# Patient Record
Sex: MALE | Race: BLACK OR AFRICAN AMERICAN | HISPANIC OR LATINO | Employment: OTHER | URBAN - METROPOLITAN AREA
[De-identification: names, ages, dates, MRNs, and addresses within clinical notes are randomized per-mention and may not be internally consistent; named-entity substitution may affect disease eponyms.]

---

## 2018-05-24 LAB
ABSOL LYMPHOCYTES (HISTORICAL): 2.6 K/UL (ref 0.5–4)
ALBUMIN SERPL BCP-MCNC: 4.2 G/DL (ref 3–5.2)
ALP SERPL-CCNC: 92 U/L (ref 43–122)
ALT SERPL W P-5'-P-CCNC: 32 U/L (ref 9–52)
ANION GAP SERPL CALCULATED.3IONS-SCNC: 10 MMOL/L (ref 5–14)
AST SERPL W P-5'-P-CCNC: 26 U/L (ref 17–59)
BASOPHILS # BLD AUTO: 0.1 K/UL (ref 0–0.1)
BASOPHILS # BLD AUTO: 1 % (ref 0–1)
BILIRUB SERPL-MCNC: 0.4 MG/DL
BUN SERPL-MCNC: 13 MG/DL (ref 5–25)
CALCIUM SERPL-MCNC: 10 MG/DL (ref 8.4–10.2)
CHLORIDE SERPL-SCNC: 105 MEQ/L (ref 97–108)
CHOLEST SERPL-MCNC: 113 MG/DL
CHOLEST/HDLC SERPL: 2.8 {RATIO}
CO2 SERPL-SCNC: 24 MMOL/L (ref 22–30)
CREATINE, SERUM (HISTORICAL): 1.05 MG/DL (ref 0.7–1.5)
CREATININE, RANDOM URINE (HISTORICAL): 174.2 MG/DL (ref 50–200)
DEPRECATED RDW RBC AUTO: 14.2 %
EGFR (HISTORICAL): >60 ML/MIN/1.73 M2
EOSINOPHIL # BLD AUTO: 0.2 K/UL (ref 0–0.4)
EOSINOPHIL NFR BLD AUTO: 2 % (ref 0–6)
EST. AVERAGE GLUCOSE BLD GHB EST-MCNC: 111 MG/DL
GLUCOSE SERPL-MCNC: 98 MG/DL (ref 70–99)
HBA1C MFR BLD HPLC: 5.5 %
HCT VFR BLD AUTO: 44.4 % (ref 41–53)
HDLC SERPL-MCNC: 41 MG/DL
HEPATITIS A IGM ANTIBODY (HISTORICAL): ABNORMAL
HEPATITIS B CORE IGM ANTIBODY (HISTORICAL): ABNORMAL
HEPATITIS B SURFACE AG CONFIRMATION (HISTORICAL): ABNORMAL
HEPATITIS C ANTIBODY (HISTORICAL): REACTIVE
HGB BLD-MCNC: 14.9 G/DL (ref 13.5–17.5)
LDL/HDL RATIO (HISTORICAL): 1.1
LDLC SERPL CALC-MCNC: 45 MG/DL
LYMPHOCYTES NFR BLD AUTO: 32 % (ref 25–45)
MCH RBC QN AUTO: 30.9 PG (ref 26–34)
MCHC RBC AUTO-ENTMCNC: 33.5 % (ref 31–36)
MCV RBC AUTO: 92 FL (ref 80–100)
MICROALBUM.,U,RANDOM (HISTORICAL): 1.1 MG/DL
MICROALBUMIN/CREATININE RATIO (HISTORICAL): 6.3
MONOCYTES # BLD AUTO: 0.6 K/UL (ref 0.2–0.9)
MONOCYTES NFR BLD AUTO: 7 % (ref 1–10)
NEUTROPHILS ABS COUNT (HISTORICAL): 4.7 K/UL (ref 1.8–7.8)
NEUTS SEG NFR BLD AUTO: 58 % (ref 45–65)
PLATELET # BLD AUTO: 220 K/MCL (ref 150–450)
POTASSIUM SERPL-SCNC: 5.1 MEQ/L (ref 3.6–5)
RBC # BLD AUTO: 4.81 M/MCL (ref 4.5–5.9)
SODIUM SERPL-SCNC: 139 MEQ/L (ref 137–147)
TOTAL PROTEIN (HISTORICAL): 7.7 G/DL (ref 5.9–8.4)
TRIGL SERPL-MCNC: 134 MG/DL
TSH SERPL DL<=0.05 MIU/L-ACNC: 1.45 UIU/ML (ref 0.47–4.68)
VLDLC SERPL CALC-MCNC: 27 MG/DL (ref 0–40)
WBC # BLD AUTO: 8.1 K/MCL (ref 4.5–11)

## 2018-09-10 DIAGNOSIS — E11.9 TYPE 2 DIABETES MELLITUS WITHOUT COMPLICATION, WITHOUT LONG-TERM CURRENT USE OF INSULIN (HCC): Primary | ICD-10-CM

## 2018-10-09 ENCOUNTER — TELEPHONE (OUTPATIENT)
Dept: FAMILY MEDICINE CLINIC | Facility: CLINIC | Age: 58
End: 2018-10-09

## 2018-10-09 NOTE — TELEPHONE ENCOUNTER
Patient states that he was placed on a medication but it was never sent to the Pharmacy  Patient states that he does not know the name but knows its 5mg   Patient wants medication to be sent to [facility] on [address]

## 2018-10-11 DIAGNOSIS — I10 ESSENTIAL HYPERTENSION, BENIGN: Primary | ICD-10-CM

## 2018-10-11 PROBLEM — E78.5 HYPERLIPIDEMIA: Status: ACTIVE | Noted: 2017-08-14

## 2018-10-11 PROBLEM — I85.10 SECONDARY ESOPHAGEAL VARICES WITHOUT BLEEDING (HCC): Status: ACTIVE | Noted: 2018-01-11

## 2018-10-11 PROBLEM — N43.3 HYDROCELE: Status: ACTIVE | Noted: 2017-06-28

## 2018-10-11 PROBLEM — K82.4 GALLBLADDER POLYP: Status: ACTIVE | Noted: 2018-01-11

## 2018-10-11 RX ORDER — AMILORIDE HYDROCHLORIDE 5 MG/1
5 TABLET ORAL EVERY 24 HOURS
Qty: 30 TABLET | Refills: 0 | Status: SHIPPED | OUTPATIENT
Start: 2018-10-11 | End: 2018-10-23 | Stop reason: SDUPTHER

## 2018-10-11 RX ORDER — AMILORIDE HYDROCHLORIDE 5 MG/1
TABLET ORAL EVERY 24 HOURS
COMMUNITY
Start: 2018-03-15 | End: 2018-10-11 | Stop reason: SDUPTHER

## 2018-10-23 ENCOUNTER — OFFICE VISIT (OUTPATIENT)
Dept: FAMILY MEDICINE CLINIC | Facility: CLINIC | Age: 58
End: 2018-10-23
Payer: COMMERCIAL

## 2018-10-23 VITALS
HEART RATE: 94 BPM | SYSTOLIC BLOOD PRESSURE: 132 MMHG | TEMPERATURE: 98.5 F | OXYGEN SATURATION: 97 % | WEIGHT: 220 LBS | HEIGHT: 68 IN | RESPIRATION RATE: 18 BRPM | BODY MASS INDEX: 33.34 KG/M2 | DIASTOLIC BLOOD PRESSURE: 80 MMHG

## 2018-10-23 DIAGNOSIS — B07.0 PLANTAR WART, LEFT FOOT: ICD-10-CM

## 2018-10-23 DIAGNOSIS — E11.9 TYPE 2 DIABETES MELLITUS WITHOUT COMPLICATION, WITHOUT LONG-TERM CURRENT USE OF INSULIN (HCC): Primary | ICD-10-CM

## 2018-10-23 DIAGNOSIS — I10 ESSENTIAL HYPERTENSION, BENIGN: ICD-10-CM

## 2018-10-23 PROCEDURE — 3079F DIAST BP 80-89 MM HG: CPT | Performed by: NURSE PRACTITIONER

## 2018-10-23 PROCEDURE — 3075F SYST BP GE 130 - 139MM HG: CPT | Performed by: NURSE PRACTITIONER

## 2018-10-23 PROCEDURE — 99214 OFFICE O/P EST MOD 30 MIN: CPT | Performed by: NURSE PRACTITIONER

## 2018-10-23 RX ORDER — SYRINGE AND NEEDLE,INSULIN,1ML 31 GX5/16"
SYRINGE, EMPTY DISPOSABLE MISCELLANEOUS 3 TIMES DAILY
Qty: 1 KIT | Refills: 0 | Status: SHIPPED | OUTPATIENT
Start: 2018-10-23 | End: 2018-11-20

## 2018-10-23 RX ORDER — METHADONE HYDROCHLORIDE 10 MG/1
10 TABLET ORAL EVERY 6 HOURS PRN
COMMUNITY
End: 2019-02-21 | Stop reason: SDUPTHER

## 2018-10-23 RX ORDER — PROPRANOLOL HYDROCHLORIDE 20 MG/1
20 TABLET ORAL EVERY 12 HOURS SCHEDULED
COMMUNITY
End: 2018-12-19 | Stop reason: SDUPTHER

## 2018-10-23 RX ORDER — AMILORIDE HYDROCHLORIDE 5 MG/1
5 TABLET ORAL DAILY
Qty: 30 TABLET | Refills: 3 | Status: SHIPPED | OUTPATIENT
Start: 2018-10-23 | End: 2019-05-07 | Stop reason: SDUPTHER

## 2018-10-23 RX ORDER — AMILORIDE HYDROCHLORIDE 5 MG/1
5 TABLET ORAL DAILY
Qty: 30 TABLET | Refills: 3 | Status: SHIPPED | OUTPATIENT
Start: 2018-10-23 | End: 2018-10-23 | Stop reason: SDUPTHER

## 2018-10-23 NOTE — PROGRESS NOTES
Assessment/Plan:    Diabetes mellitus type 2, uncomplicated ([facility])  Lab Results   Component Value Date    HGBA1C 5 5 05/24/2018       No results for input(s): POCGLU in the last 72 hours  Blood Sugar Average: Last 72 hrs:  Pt to continue on current medication  Latest A1c was check at previous visit  States he has enough Metformin 1000mg left  No changes made today  Referred to podiatry for diabetic foot exam as he has a non-healed plantar wart that is tender to palpation on his left foot  Plantar wart, left foot   I am recommending patient avoid sharing shoes or socks with someone with a plantar wart, to keep feet dry and change socks every day  He should wear pool slippers or flip flops in communal changing rooms and showers  Had previous surgery to have removal of plantar wart in April with continued pain and non-healing  Referral given to podiatry for further evaluation of plantar wart  Diagnoses and all orders for this visit:    Type 2 diabetes mellitus without complication, without long-term current use of insulin ([facility])  -     Blood Glucose Monitoring Suppl (FREESTYLE INSULINX SYSTEM) w/Device KIT; Inject under the skin 3 (three) times a day  -     glucose blood (FREESTYLE INSULINX TEST) test strip; Use as instructed    Plantar wart, left foot  -     Ambulatory referral to Podiatry; Future    Essential hypertension, benign  -     Discontinue: AMILoride 5 mg tablet; Take 1 tablet (5 mg total) by mouth daily  -     AMILoride 5 mg tablet; Take 1 tablet (5 mg total) by mouth daily    Other orders  -     methadone (DOLOPHINE) 10 mg tablet; Take 10 mg by mouth every 6 (six) hours as needed for moderate pain,  -     propranolol (INDERAL) 20 mg tablet; Take 20 mg by mouth every 12 (twelve) hours          Subjective:      Patient ID: Praneeth Johnson is a 60 y o  male  Cc  Per pt  Follow up feet issues  60year old male patient here for follow up visit from removal of wart removal of left foot   The removal was in May or June per patient  States that bother never really left post removal  Feels like a stabbing pain in the same area of his left part of foot  Diabetes   He presents for his initial diabetic visit  He has type 2 diabetes mellitus  No MedicAlert identification noted  His disease course has been stable  Pertinent negatives for hypoglycemia include no dizziness, headaches, hunger or sweats  Pertinent negatives for diabetes include no chest pain, no foot paresthesias, no foot ulcerations, no polyphagia and no polyuria  There are no hypoglycemic complications  Symptoms are stable  Pertinent negatives for diabetic complications include no autonomic neuropathy, peripheral neuropathy or retinopathy  Risk factors for coronary artery disease include male sex, sedentary lifestyle, obesity and diabetes mellitus  Current diabetic treatment includes oral agent (dual therapy)  There is no change in his home blood glucose trend  The following portions of the patient's history were reviewed and updated as appropriate: allergies, current medications, past family history, past medical history, past social history, past surgical history and problem list     Review of Systems   Constitutional: Negative  Negative for fever  Eyes: Negative  Respiratory: Negative  Negative for shortness of breath  Cardiovascular: Negative  Negative for chest pain  Endocrine: Negative for polyphagia and polyuria  Musculoskeletal:        Plantar pain   Skin: Negative  Neurological: Negative for dizziness and headaches  Hematological: Negative  Psychiatric/Behavioral: Negative  Objective:      /80 (BP Location: Left arm, Patient Position: Sitting, Cuff Size: Standard)   Pulse 94   Temp 98 5 °F (36 9 °C) (Oral)   Resp 18   Ht 5' 8" (1 727 m)   Wt 99 8 kg (220 lb)   SpO2 97%   BMI 33 45 kg/m²          Physical Exam   Constitutional: He is oriented to person, place, and time   He appears well-developed and well-nourished  HENT:   Head: Normocephalic and atraumatic  Cardiovascular: Normal rate, regular rhythm and normal heart sounds  Pulmonary/Chest: Effort normal and breath sounds normal    Abdominal: Soft  Bowel sounds are normal    Musculoskeletal: Normal range of motion  Feet:    Left plantar area of foot with callused, endophytic papule with deeply penetrating central depression noted  Tender to palpation  Round and 2mm in size  Neurological: He is alert and oriented to person, place, and time  He has normal reflexes  Skin: Skin is warm and dry  Psychiatric: He has a normal mood and affect  Thought content normal    Nursing note and vitals reviewed

## 2018-10-23 NOTE — PATIENT INSTRUCTIONS
VERRUGA PLANTAR   LO QUE NECESITA SABER:   ¿Qué es masoud verruga plantar? Masoud verruga plantar es masoud protuberancia gruesa y áspera en la planta de novak pie  Las verrugas plantares son benignas (no cáncer) y son causadas por el virus del papiloma humano ([address])  [address] es un germen que se propaga a través de contacto directo  Generalmente éste entra por la piel a través de rivera o rasguños en la planta de novak pie  Puede que usted contraiga masoud verruga plantar si toca la verruga de otra persona  ¿Cuáles son los signos y síntomas de la verruga plantar? Las verrugas plantares usualmente se phill en los puntos de presión, home el tobillo o la andres de novak pie  Puede presentar cualquiera de los siguientes signos o síntomas:  · Masoud protuberancia plana, lakesha, castaña, o color de piel    · Puntos negros en el centro de novak verruga    · Serenity Beecham o un racimo de ellas    · Serenity [place]cham pequeña que crece en tamaño    · Dolor o sensibilidad cuando usted camina o está de pie  ¿Cómo trata un médico masoud verruga plantar? · La terapia queratolítica  es cuando se [place] un medicamento acídico para reducir la verruga  Florencia medicamento provoca que la capa exterior de la piel se afloje y desprenda  Novak médico podría comenzar esta terapia en novak consultorio y decirle que usted continúe usando el medicamento en novak casa  · La crioterapia  es cuando novak médico congela la verruga con un nitrógeno líquido ian en novak consultorio  La piel en y alrededor de la verruga puede formar masoud Cosme Fossa  El tejido muerto de la verruga se seca y se desprende dentro de pocas semanas  · Inmunoterapia  [place] un medicamento para ayudar a que novak sistema inmunitario mate al [address]  [place] puede que cause que novak verruga desaparezca  Florencia medicamento puede ser en forma de crema o inyección  · Terapia laser  [place] masoud haz de javier estrecho para cortar la verruga  · La cirugía para extirpar la verruga  podría ser necesaria   Novak médico adormecerá la Lacey Daniele la verruga y usará electricidad para quemar el área para ayudar a prevenir que regrese  ¿Cómo puedo tratar mi verruga plantar en casa? Utilice tratamientos en casa home es indicado  Mantenga novak verruga y la piel limpias y secas entre tratamientos  · El ácido salicílico  Florencia es un agente de peladura de venta fabio que viene en forma de líquido  Remoje novak pie en agua tibia por hasta 20 minutos  Aplique masoud pequeña cantidad del ácido salicílico directamente en la verruga  Evite que el líquido caiga en otras áreas de la pie porque puede irritar piel saludable  Permita que se seque y [place] la verruga según indicado  Después de varias horas, utilice masoud jose pomez o masoud lima de uñas para suavemente quitar la piel [place]  Utilice 2 veces al día por el tiempo radha indicado  · Un parche de yeso  también está disponible sin receta médica  Carleen un parche al tamaño de novak verruga  Aplique la parte adhesiva a la verruga  Después de 1 a 2 días, pele el parche y aplique un parche nuevo  · El nitrógeno líquido  se [place] para congelar la verruga  El nitrógeno líquido está disponible sin receta médica, jose l puede también ser aplicado en el consultorio de novak médico  El nitrógeno líquido puede causar un leve dolor por un corto periodo de [place]  Use solamente según indicado  · La cinta adhesiva de maria de jesus  puede ayudar a secar y quitar la verruga  [address] indicaciones  Podrían indicarle que se deje la cinta adhesiva puesta por 6 días  En el día 7 quítese la cinta y remoje la verruga en agua tibia por 5 minutos  Tenga cuidado al limar la verruga con la jose pómez o la lima para uñas  Después aplique masoud nueva cinta adhesiva y siga los pasos anteriores hasta que la verruga desaparezca  ¿Cómo puedo prevenir otra verruga plantar? · No se toque la verruga ni las verrugas de [place] persona  Si toca la verruga, lávese las flaca  · No camine descalzo en lugares públicos    Use sandalias o zapatos de ducha en áreas cálidas y [address], duchas y áreas de piscinas  · Mantenga alvin pies limpios y secos  Utilice polvo para los pies entre alvin dedos y en alvin pies después de lavarlos y secarlos  Cambie los calcetines a menudo para evitar que los pies estén húmedos  Si alvin zapatos se encuentran húmedos a causa del sudor, colóquelos en un lugar donde puedan secarse antes de ponérselos nuevamente  · No comparta o vuelva a usar artículos  Ejemplos incluyen lima de uñas, piedras de pómez, calcetines o toallas  Limpie estos artículos con agua enjabonada antes de utilizarlos nuevamente  ¿Cuándo tayla comunicarme con mi médico?   · La verruga regresa o no desaparece después del tratamiento  · Novak verruga crece, comienza a propagarse o agruparse  · Usted tiene sangrado o más dolor después del tratamiento  · Usted tiene preguntas o inquietudes acerca de novak condición o cuidado  ACUERDOS SOBRE NOVAK CUIDADO:   Usted tiene el derecho de ayudar a planear novak cuidado  Aprenda todo lo que pueda sobre novak condición y home darle tratamiento  Discuta alvin opciones de tratamiento con alvin médicos para decidir el cuidado que usted desea recibir  Usted siempre tiene el derecho de rechazar el tratamiento  Esta información es sólo para uso en educación  Novak intención no es darle un consejo médico sobre enfermedades o tratamientos  Colsulte con novka Gaylyn Harsh farmacéutico antes de seguir cualquier régimen médico para saber si es seguro y efectivo para usted  © 2017 [facility] Information is for End User's use only and may not be sold, redistributed or otherwise used for commercial purposes  All illustrations and images included in CareNotes® are the copyrighted property of A D A M , Inc  or Vidal Wesley

## 2018-10-23 NOTE — ASSESSMENT & PLAN NOTE
Lab Results   Component Value Date    HGBA1C 5 5 05/24/2018       No results for input(s): POCGLU in the last 72 hours  Blood Sugar Average: Last 72 hrs:  Pt to continue on current medication  Latest A1c was check at previous visit  States he has enough Metformin 1000mg left  No changes made today  Referred to podiatry for diabetic foot exam as he has a non-healed plantar wart that is tender to palpation on his left foot

## 2018-10-23 NOTE — ASSESSMENT & PLAN NOTE
I am recommending patient avoid sharing shoes or socks with someone with a plantar wart, to keep feet dry and change socks every day  He should wear pool slippers or flip flops in communal changing rooms and showers  Had previous surgery to have removal of plantar wart in April with continued pain and non-healing  Referral given to podiatry for further evaluation of plantar wart

## 2018-10-26 ENCOUNTER — TELEPHONE (OUTPATIENT)
Dept: FAMILY MEDICINE CLINIC | Facility: CLINIC | Age: 58
End: 2018-10-26

## 2018-10-30 DIAGNOSIS — E11.9 TYPE 2 DIABETES MELLITUS WITHOUT COMPLICATION, WITHOUT LONG-TERM CURRENT USE OF INSULIN (HCC): Primary | ICD-10-CM

## 2018-11-20 ENCOUNTER — OFFICE VISIT (OUTPATIENT)
Dept: FAMILY MEDICINE CLINIC | Facility: CLINIC | Age: 58
End: 2018-11-20
Payer: COMMERCIAL

## 2018-11-20 VITALS
DIASTOLIC BLOOD PRESSURE: 80 MMHG | WEIGHT: 219 LBS | HEART RATE: 109 BPM | SYSTOLIC BLOOD PRESSURE: 126 MMHG | HEIGHT: 68 IN | OXYGEN SATURATION: 96 % | BODY MASS INDEX: 33.19 KG/M2 | RESPIRATION RATE: 16 BRPM | TEMPERATURE: 98 F

## 2018-11-20 DIAGNOSIS — Z00.01 ENCOUNTER FOR GENERAL ADULT MEDICAL EXAMINATION WITH ABNORMAL FINDINGS: Primary | ICD-10-CM

## 2018-11-20 DIAGNOSIS — N13.8 BPH WITH URINARY OBSTRUCTION: ICD-10-CM

## 2018-11-20 DIAGNOSIS — E11.65 UNCONTROLLED TYPE 2 DIABETES MELLITUS WITH HYPERGLYCEMIA (HCC): ICD-10-CM

## 2018-11-20 DIAGNOSIS — N52.9 IMPOTENCE: ICD-10-CM

## 2018-11-20 DIAGNOSIS — N40.1 BPH WITH URINARY OBSTRUCTION: ICD-10-CM

## 2018-11-20 DIAGNOSIS — B35.3 TINEA PEDIS OF BOTH FEET: ICD-10-CM

## 2018-11-20 DIAGNOSIS — Z23 NEEDS FLU SHOT: ICD-10-CM

## 2018-11-20 PROCEDURE — 99213 OFFICE O/P EST LOW 20 MIN: CPT | Performed by: FAMILY MEDICINE

## 2018-11-20 PROCEDURE — G0439 PPPS, SUBSEQ VISIT: HCPCS | Performed by: FAMILY MEDICINE

## 2018-11-20 PROCEDURE — 3008F BODY MASS INDEX DOCD: CPT | Performed by: FAMILY MEDICINE

## 2018-11-20 RX ORDER — KETOCONAZOLE 20 MG/G
CREAM TOPICAL DAILY
Qty: 30 G | Refills: 0 | Status: SHIPPED | OUTPATIENT
Start: 2018-11-20 | End: 2019-02-21 | Stop reason: ALTCHOICE

## 2018-11-20 RX ORDER — BLOOD-GLUCOSE METER
KIT MISCELLANEOUS 2 TIMES DAILY
Qty: 1 EACH | Refills: 0 | Status: SHIPPED | OUTPATIENT
Start: 2018-11-20 | End: 2019-05-02 | Stop reason: SDUPTHER

## 2018-11-20 RX ORDER — BLOOD-GLUCOSE METER
KIT MISCELLANEOUS 2 TIMES DAILY
Qty: 1 EACH | Refills: 0 | Status: SHIPPED | OUTPATIENT
Start: 2018-11-20 | End: 2018-11-20 | Stop reason: SDUPTHER

## 2018-11-20 RX ORDER — CLOTRIMAZOLE 1 %
CREAM (GRAM) TOPICAL
Refills: 3 | COMMUNITY
Start: 2018-10-30 | End: 2019-02-21 | Stop reason: ALTCHOICE

## 2018-11-20 NOTE — PATIENT INSTRUCTIONS
Visita de bienestar para los adultos   LO QUE NECESITA SABER:   ¿Qué es masoud visita de bienestar? Teresa Seller de bienestar es cuando usted acude con un médico para que le preston exámenes de detección de problemas de [place]  También puede obtener asesoramiento sobre cómo mantenerse saludable  Anote alvin preguntas para que se acuerde de hacerlas  Pregunte a novak médico con qué frecuencia debería realizarse masoud visita de bienestar  ¿Qupe sucede en masoud visita de bienestar? Novak médico le preguntará sobre novak brittanie y novak historia familiar [id number]   [place] incluye presión arterial carmelina, enfermedades del corazón y cáncer  El médico le preguntará si tiene síntomas que le preocupen, si [facility] y [place] de ánimo  También se le preguntará acerca del uso de medicamentos, suplementos, alimentos y alcohol  Es posible que le preston cualquiera de lo siguiente:  · Novak peso  será revisado  Es posible que [facility] midan novak altura para calcular novak índice de masa corporal [facility]  El [facility] indica si tiene un peso saludable  · Se verificarán novak presión arterial  y frecuencia cardíaca  También pueden revisar novak temperatura  · Exámenes de [facility] y [facility]  se podría realizar  Se podrían realizar exámenes de lakshmi para revisar los niveles de [facility]  Los niveles anormales de colesterol aumentan el riesgo de enfermedad del corazón y accidente cerebrovascular  Puede que también necesite masoud prueba de lakshmi u orina para revisar si tiene diabetes si usted está en mayor riesgo  Las pruebas de orina pueden hacerse para buscar signos de masoud infección o masoud enfermedad renal      · Un examen físico  incluye la comprobación de alvin latidos del corazón y los pulmones con un estetoscopio  Novak médico también podría revisarle la piel para buscar daños causados por el sol  · Pruebas de detección  podría recomendarse  Se realiza un examen de detección para detectar enfermedades que pueden no causar síntomas   Los exámenes de Damien 'THADDEUS' Us necesite dependen de novak edad, género, antecedentes familiares y hábitos de sujata  Por ejemplo, podrían recomendarle la exploración selectiva colorrectal si tiene 48 años o más  ¿Qué exámenes de detección necesito si soy masoud masha? · El examen de Papanicolaou  se utiliza para detectar cáncer de boo uterino  El examen del Papanicolaou por lo general se realiza entre 3 a 5 años dependiendo de novak edad  Puede necesitarlo más a menudo si usted ha tenido [employer] de la prueba de Papanicolaou en el pasado  Pregunte a novak médico con qué frecuencia debería realizarse un examen de Papanicolaou  · Masoud mamografía  es masoud radiografía de alvin senos para detectar cáncer de mama  Los expertos recomiendan [address] cada 2 años empezando a los 48 años de [place]  Es probable que usted necesite [address] a los 52 años o antes si tiene riesgo alto de cáncer de seno  Hable con novak médico sobre cuándo debe empezar con alvin mamografías y con cuánta frecuencia las necesita  ¿Qué vacunas podría necesitar? · Debe recibir [place] vacuna contra la influenza  todos los [place]  La vacuna contra la influenza protege de la gripe  Varios tipos de virus causan la influenza  Debido a que los virus [place] con el [place], es necesaria la producción de nuevas vacunas cada año  · Debe recibir [place] vacuna de refuerzo contra el tétanos-difteria (Td)  cada 10 años  Esta vacuna protege contra el tétanos y la difteria  El tétanos es masoud infección severa que puede causar trismo y espasmos musculares dolorosos  La difteria es masoud infección bacterial grave que produce masoud cubierta gruesa en la parte de atrás de la boca y garganta  · Debe recibir la vacuna contra el virus del papiloma humano (VPH)  si usted es masha y [place] 19 y 32 años o es hombre y [place] 23 y 24 años y nunca la recibió  Esta vacuna protege contra la infección por VPH   El virus del papiloma humano o VPH es la infección más común que se transmite por contacto sexual  El virus del papiloma humano también podría provocar cáncer vaginal, del pene y del ano  · Debe recibir la vacuna antineumocócica  si tiene más de 72 años  La vacuna antineumocócica es masoud inyección que se administra para protegerlo contra masoud enfermedad neumocócica  La enfermedad neumocócica es masoud infección causada por la bacteria neumocócica  La infección puede causar neumonía, meningitis o masoud infección del oído  · Debe recibir la vacuna contra la culebrilla  si tiene [address] [place] [address],[address] [place] [address] o [place], incluso si gupta tenido culebrilla antes  La vacuna contra la culebrilla (herpes zóster) es masoud inyección usada para proteger contra el virus zóster que causa la varicela  Florencia es el mismo virus que causa la varicela  La culebrilla es un sarpullido doloroso que se desarrolla en personas que tuvieron varicela o gupta estado expuestas al virus  ¿Cómo puedo comer de manera saludable? [place] es un modelo para planear comidas sanas  Muestra los tipos y cantidades de alimentos que deberían ir en un plato  Mehdi Rubinstein y verduras representan alrededor de la mitad de novak plato y los granos y proteínas representan la otra mitad  Se incluye masoud porción de productos lácteos al lado del plato  La cantidad de calorías y [address] de las porciones que usted necesita dependen de novak edad, [place], peso y altura  Los ejemplos de alimentos saludables son:  · Consuma masoud variedad de verduras  home las de color alfonso oscuro, hooker y [place]  Usted también puede incluir verduras enlatadas bajas en sodio (sal) y verduras congeladas sin mantequilla ni salsas [id number]  · Consuma masoud variedad de fruta frescas , las frutas enlatadas en 100% de jugo , fruta [place] y jhony secos  · Incluya granos enteros  Por lo menos la mitad de los granos que usted consume deben ser granos de silva integral  Por ejemplo, panes de grano entero, pasta integral, arroz integral y cereales de grano entero home la nikolay      · Consuma masoud variedad de alimentos con proteínas home mariscos (pescado y crustáceos), [place] Gurrola y carne de ave sin piel (pavo y lia)  Ejemplos de verena magras incluyen pierna, paleta o lomo de puerco y mckenzie, solomillo o, lomo de res y carne [place] de res extra [place]  Otros alimentos ricos en proteínas son los huevos y sustitutos de [place], frijoles, chícharos, productos de soya, nueces y semillas  · Elija productos lácteos bajos en grasa [employer] o del 1% o yogur, queso y requesón bajos en grasa  · Limite las grasas poco saludables,  home la [place], la margarina dura y la [place]  ¿Qué cantidad de actividad física necesito? Realice masoud actividad física por lo menos 30 minutos al día, la mayoría de los días de la [place]  Algunos de los ejercicios incluyen caminar, montar en bicicleta, bailar y nadar  Usted también puede realizar más actividad física usando las escaleras en vez de los ascensores o estacionarse más lejos cuando [place] a las tiendas  Incluya ejercicios para fortalecer los músculos 2 días a la semana  El ejercicio regular proporciona muchos beneficios para la brittanie  Saroj Epps a controlar novak peso y [employer] riesgo de diabetes tipo 2, presión arterial carmelina, enfermedad del corazón y accidente cerebrovascular  El ejercicio [employer] puede ayudar a mejorar novak estado de ánimo  Pregunte a novak médico acerca del mejor plan de ejercicio para usted  ¿Cuáles son Aleks Amrquez generales de brittanie y seguridad que tayla seguir? · No fume  La nicotina y otras sustancias químicas que contienen los cigarrillos y cigarros pueden dañar los pulmones  Pida información a novak médico si usted actualmente fuma y necesita ayuda para dejar de fumar  Los cigarrillos electrónicos o tabaco sin humo todavía contienen nicotina  Consulte con novak médico antes de [employer]  · Limite el consumo de alcohol  Un trago equivale a 12 onzas de cerveza, 5 onzas de vino o 1 onza y ½ de licor      · Baje de peso, si es necesario  El sobrepeso aumenta el riesgo de ciertas condiciones de [place]  Estos incluyen enfermedad del corazón, presión arterial carmelina, diabetes tipo 2 y ciertos tipos de cáncer  · Proteja novak piel  No tome el sol ni use amparo de bronceado  Use protector solar con un SPF 13 o mayor  Aplíquese el bloqueador por lo menos 15 minutos antes de que vaya a estar al [employer]  Vuelva a aplicarse la crema solar cada 2 horas  Use ropa protectora, sombrero y lentes para el sol cuando se encuentre afuera  · Conduzca con seguridad  Use siempre novak cinturón de seguridad  Asegúrese que todos en el annie usan el cinturón de seguridad  Un cinturón de seguridad puede salvar novak sujata en adam de un accidente automovilístico  No use el celular cuando esté manejando  [place] puede hacer que se distraiga y causar un accidente  Es mejor que pare y se orille si necesita hacer masoud [place] un texto  · Practique el sexo seguro  Use condones de látex si es sexualmente [place] y tiene más de [place] and [place]  Novak médico puede recomendar exámenes de detección de infecciones de transmisión sexual (ITS)  · Use un magali, un chaleco salvavidas y unos implementos de protección  Siempre use un magali al [employer] en bicicleta o motocicleta, esquiar o jugar deportes que podrían causar masoud lesión en la jesus  Use implementos de protección cuando practique deportes  Use un chaleco salvavidas cuando esté en un bote o practicando actividades acuáticas  ACUERDOS SOBRE NOVAK CUIDADO:   Usted tiene el derecho de ayudar a planear novak cuidado  Aprenda todo lo que pueda sobre novak condición y home darle tratamiento  Discuta alvin opciones de tratamiento con alvin médicos para decidir el cuidado que usted desea recibir  Usted siempre tiene el derecho de rechazar el tratamiento  Esta información es sólo para uso en educación  Novak intención no es darle un consejo médico sobre enfermedades o tratamientos   Colsulte con novak médico, enfermera o farmacéutico antes de seguir cualquier régimen médico para saber si es seguro y efectivo para usted  © 2017 [address] Information is for End User's use only and may not be sold, redistributed or otherwise used for commercial purposes  All illustrations and images included in CareNotes® are the copyrighted property of A D A M , Inc  or Vidal Wesley

## 2018-11-20 NOTE — PROGRESS NOTES
Assessment/Plan:  1  Needs flu shot  Patient declined influenza vaccine:   Despite the explanation for the need of flu Immunization need, and the  flu  possible complications and high risk for illness and death, patient declined influenza immunization      2  BPH with urinary obstruction    - PSA, total and free; Future    3  Uncontrolled type 2 diabetes mellitus with hyperglycemia (HCC)    - Comprehensive metabolic panel; Future  - Lipid Panel with Direct LDL reflex; Future  - Hemoglobin A1C; Future  - Blood Glucose Monitoring Suppl (FREESTYLE LITE) COCO; Inject under the skin 2 (two) times a day  Dispense: 1 each; Refill: 0  - glucose blood (FREESTYLE LITE) test strip; 1 each by Other route 2 (two) times a day Use as instructed  Dispense: 100 each; Refill: 5    4  Impotence    - Testosterone, free, total; Future  - Luteinizing hormone; Future  - Prolactin; Future    5  Tinea pedis of both feet  - ketoconazole (NIZORAL) 2 % cream; Apply topically daily  Dispense: 30 g; Refill: 0    6  Encounter for general adult medical examination with abnormal findings  During this visit we have a goal to personalize prevention  I discussed the patient new illness and  and decrease the risk of current problems  Health risk assessment was discussed with patient today and the ways to stay healthier  We reviewed also the current medications in his treatment regimen  Discussed about how the chronic conditions are impacting now and later  Recommended a healthy diet and exercising frequently will help to control better jerardo's current chronic conditions  We also discussed about  Vaccinations, and the need to compliance with them  Patient declined at this time advanced directives  Patient will discuss with family regarding advanced directives  No problem-specific Assessment & Plan notes found for this encounter         Diagnoses and all orders for this visit:    Needs flu shot  -     influenza vaccine, [id number], quadrivalent, recombinant, PF, 0 5 mL, for patients 18 yr+ (FLUBLOK)          Subjective:      Patient ID: Blanca Denis is a 60 y o  male  Pt here for annual physical exam       Back Pain   Pertinent negatives include no abdominal pain, chest pain, dysuria, fever or headaches  The following portions of the patient's history were reviewed and updated as appropriate: allergies, current medications, past family history, past medical history, past social history, past surgical history and problem list     Review of Systems   Constitutional: Negative for activity change, appetite change, fatigue and fever  HENT: Negative for congestion, dental problem, ear pain, sinus pain and trouble swallowing  Eyes: Negative for discharge, redness and itching  Respiratory: Negative for cough, shortness of breath and wheezing  Cardiovascular: Negative for chest pain  Gastrointestinal: Negative for abdominal distention and abdominal pain  Genitourinary: Negative for difficulty urinating, dysuria and enuresis  Musculoskeletal: Positive for back pain  Negative for arthralgias and gait problem  Neurological: Negative for dizziness and headaches  Hematological: Negative for adenopathy  Does not bruise/bleed easily  Psychiatric/Behavioral: Negative for behavioral problems  Objective:      /80 (BP Location: Left arm, Patient Position: Sitting, Cuff Size: Standard)   Pulse (!) 109   Temp 98 °F (36 7 °C) (Oral)   Resp 16   Ht 5' 8" (1 727 m)   Wt 99 3 kg (219 lb)   SpO2 96%   BMI 33 30 kg/m²          Physical Exam   Constitutional: He is oriented to person, place, and time  He appears well-developed and well-nourished  HENT:   Head: Normocephalic  Eyes: Pupils are equal, round, and reactive to light  EOM are normal    Neck: Neck supple  Cardiovascular: Normal rate, regular rhythm and normal heart sounds  Pulmonary/Chest: Effort normal and breath sounds normal    Abdominal: Soft   Bowel sounds are normal  He exhibits distension (The patient with ascites and history of cirrhosis of the liver)  Musculoskeletal: Normal range of motion  He exhibits tenderness (chronic, worsening by present Ascitis/)  Neurological: He is alert and oriented to person, place, and time  He has normal reflexes  Skin: Skin is warm and dry  Psychiatric: He has a normal mood and affect   His behavior is normal  Judgment and thought content normal

## 2018-11-21 ENCOUNTER — APPOINTMENT (OUTPATIENT)
Dept: LAB | Facility: HOSPITAL | Age: 58
End: 2018-11-21
Payer: COMMERCIAL

## 2018-11-21 ENCOUNTER — TRANSCRIBE ORDERS (OUTPATIENT)
Dept: ADMINISTRATIVE | Facility: HOSPITAL | Age: 58
End: 2018-11-21

## 2018-11-21 DIAGNOSIS — N52.9 IMPOTENCE: ICD-10-CM

## 2018-11-21 DIAGNOSIS — N40.1 BPH WITH URINARY OBSTRUCTION: ICD-10-CM

## 2018-11-21 DIAGNOSIS — N13.8 BPH WITH URINARY OBSTRUCTION: ICD-10-CM

## 2018-11-21 DIAGNOSIS — E11.65 UNCONTROLLED TYPE 2 DIABETES MELLITUS WITH HYPERGLYCEMIA (HCC): ICD-10-CM

## 2018-11-21 LAB
ALBUMIN SERPL BCP-MCNC: 4.2 G/DL (ref 3–5.2)
ALP SERPL-CCNC: 98 U/L (ref 43–122)
ALT SERPL W P-5'-P-CCNC: 29 U/L (ref 9–52)
ANION GAP SERPL CALCULATED.3IONS-SCNC: 9 MMOL/L (ref 5–14)
AST SERPL W P-5'-P-CCNC: 37 U/L (ref 17–59)
BILIRUB SERPL-MCNC: 0.5 MG/DL
BUN SERPL-MCNC: 13 MG/DL (ref 5–25)
CALCIUM SERPL-MCNC: 9.3 MG/DL (ref 8.4–10.2)
CHLORIDE SERPL-SCNC: 104 MMOL/L (ref 97–108)
CHOLEST SERPL-MCNC: 124 MG/DL
CO2 SERPL-SCNC: 27 MMOL/L (ref 22–30)
CREAT SERPL-MCNC: 1.09 MG/DL (ref 0.7–1.5)
EST. AVERAGE GLUCOSE BLD GHB EST-MCNC: 126 MG/DL
GFR SERPL CREATININE-BSD FRML MDRD: 74 ML/MIN/1.73SQ M
GLUCOSE P FAST SERPL-MCNC: 112 MG/DL (ref 70–99)
HBA1C MFR BLD: 6 % (ref 4.2–6.3)
HDLC SERPL-MCNC: 40 MG/DL (ref 40–59)
LDLC SERPL CALC-MCNC: 58 MG/DL
LH SERPL-ACNC: 4.7 MIU/ML (ref 1.2–10.6)
POTASSIUM SERPL-SCNC: 4.2 MMOL/L (ref 3.6–5)
PROLACTIN SERPL-MCNC: 7.1 NG/ML (ref 2.5–17.4)
PROT SERPL-MCNC: 8 G/DL (ref 5.9–8.4)
SODIUM SERPL-SCNC: 140 MMOL/L (ref 137–147)
TRIGL SERPL-MCNC: 128 MG/DL

## 2018-11-21 PROCEDURE — 80053 COMPREHEN METABOLIC PANEL: CPT

## 2018-11-21 PROCEDURE — 83002 ASSAY OF GONADOTROPIN (LH): CPT

## 2018-11-21 PROCEDURE — 84146 ASSAY OF PROLACTIN: CPT

## 2018-11-21 PROCEDURE — 84154 ASSAY OF PSA FREE: CPT

## 2018-11-21 PROCEDURE — 84153 ASSAY OF PSA TOTAL: CPT

## 2018-11-21 PROCEDURE — 36415 COLL VENOUS BLD VENIPUNCTURE: CPT

## 2018-11-21 PROCEDURE — 83036 HEMOGLOBIN GLYCOSYLATED A1C: CPT

## 2018-11-21 PROCEDURE — 84402 ASSAY OF FREE TESTOSTERONE: CPT

## 2018-11-21 PROCEDURE — 80061 LIPID PANEL: CPT

## 2018-11-21 PROCEDURE — 84403 ASSAY OF TOTAL TESTOSTERONE: CPT

## 2018-11-23 LAB
PSA FREE MFR SERPL: 40 %
PSA FREE SERPL-MCNC: 0.04 NG/ML
PSA SERPL-MCNC: 0.1 NG/ML (ref 0–4)
TESTOST FREE SERPL-MCNC: 2.6 PG/ML (ref 7.2–24)
TESTOST SERPL-MCNC: 186 NG/DL (ref 264–916)

## 2018-12-19 ENCOUNTER — OFFICE VISIT (OUTPATIENT)
Dept: FAMILY MEDICINE CLINIC | Facility: CLINIC | Age: 58
End: 2018-12-19
Payer: COMMERCIAL

## 2018-12-19 ENCOUNTER — HOSPITAL ENCOUNTER (OUTPATIENT)
Dept: RADIOLOGY | Facility: HOSPITAL | Age: 58
Discharge: HOME | End: 2018-12-19
Payer: COMMERCIAL

## 2018-12-19 VITALS
BODY MASS INDEX: 33.04 KG/M2 | HEIGHT: 68 IN | WEIGHT: 218 LBS | HEART RATE: 114 BPM | OXYGEN SATURATION: 98 % | DIASTOLIC BLOOD PRESSURE: 70 MMHG | SYSTOLIC BLOOD PRESSURE: 130 MMHG | TEMPERATURE: 98 F | RESPIRATION RATE: 16 BRPM

## 2018-12-19 DIAGNOSIS — R79.89 LOW TESTOSTERONE IN MALE: ICD-10-CM

## 2018-12-19 DIAGNOSIS — M53.3 SACROILIAC JOINT PAIN: ICD-10-CM

## 2018-12-19 DIAGNOSIS — I10 HYPERTENSION, UNSPECIFIED TYPE: Primary | ICD-10-CM

## 2018-12-19 DIAGNOSIS — M53.3 SACROILIAC JOINT PAIN: Primary | ICD-10-CM

## 2018-12-19 DIAGNOSIS — F11.20 METHADONE DEPENDENCE (HCC): ICD-10-CM

## 2018-12-19 PROCEDURE — 99213 OFFICE O/P EST LOW 20 MIN: CPT | Performed by: FAMILY MEDICINE

## 2018-12-19 PROCEDURE — 72202 X-RAY EXAM SI JOINTS 3/> VWS: CPT

## 2018-12-19 PROCEDURE — 3008F BODY MASS INDEX DOCD: CPT | Performed by: FAMILY MEDICINE

## 2018-12-19 RX ORDER — PROPRANOLOL HYDROCHLORIDE 20 MG/1
20 TABLET ORAL EVERY 12 HOURS SCHEDULED
Qty: 60 TABLET | Refills: 5 | Status: SHIPPED | OUTPATIENT
Start: 2018-12-19 | End: 2019-05-02 | Stop reason: SDUPTHER

## 2018-12-19 RX ORDER — TESTOSTERONE 12.5 MG/1.25G
30 GEL TOPICAL DAILY
Qty: 1 PACKAGE | Refills: 0 | Status: SHIPPED | OUTPATIENT
Start: 2018-12-19 | End: 2019-02-21 | Stop reason: ALTCHOICE

## 2018-12-19 NOTE — PROGRESS NOTES
Assessment/Plan:  1  Sacroiliac joint pain  To assess x ray and likely treated at next appt  explained to patient about methadone lower doses may take time to overcome fare ups of pain anywhere  - XR sacroiliac joints 3+ views; Future    2  Methadone dependence ([facility] )  Options are suboxon or continue on Methadone  Patient will need to go on withdrawal from methadone and have COWS criteria enough to start at 50% dose of common dose for his severe liver disease  Patient deserves a more liberal plan after faithful to current Methadone program  Demetri Vu information regarding his current plan is needed  3  Low testosterone in male  Testosterone cypionate is contraindicated, patient willing to get risk and improve sex drive, common on liver disease patient  Physiopathology explained  - testosterone (ANDROGEL) 25 MG/2 5GM (1%) GEL; Place 0 03 g on the skin daily  Dispense: 1 Package; Refill: 0    No problem-specific Assessment & Plan notes found for this encounter  Diagnoses and all orders for this visit:    Sacroiliac joint pain  -     XR sacroiliac joints 3+ views; Future    Methadone dependence ([facility] )          Subjective:      Patient ID: Maia Concepcion is a 60 y o  male  He continues having pain in the right sacral area  He states pain is worse due to decreasing methadone that he takes for the past 15 years  He did use heroine and got infected with hep C, cured after treatment  He has now cirrhosis of the liver with ascitis  He is [place] to [place] [address]  He did not take Suboxon due to his liver impairment  He is looking for option to get out of Methadone Program, that he has to be present every Tuesday, He did not committed any crime  He is not on parole          The following portions of the patient's history were reviewed and updated as appropriate: allergies, current medications, past family history, past medical history, past social history, past surgical history and problem list     Review of Systems   Constitutional: Negative for activity change, appetite change, fatigue and fever  HENT: Negative for congestion, dental problem, ear pain, sinus pain and trouble swallowing  Eyes: Negative for discharge, redness and itching  Respiratory: Negative for cough, shortness of breath and wheezing  Cardiovascular: Negative for chest pain  Gastrointestinal: Negative for abdominal distention and abdominal pain  Genitourinary: Negative for difficulty urinating, dysuria and enuresis  Musculoskeletal: Positive for arthralgias and back pain  Negative for gait problem  Neurological: Negative for dizziness and headaches  Hematological: Negative for adenopathy  Does not bruise/bleed easily  Psychiatric/Behavioral: Negative for behavioral problems  Objective:      /70 (BP Location: Left arm, Patient Position: Sitting, Cuff Size: Standard)   Pulse (!) 114   Temp 98 °F (36 7 °C) (Oral)   Resp 16   Ht 5' 8" (1 727 m)   Wt 98 9 kg (218 lb)   SpO2 98%   BMI 33 15 kg/m²          Physical Exam   Constitutional: He is oriented to person, place, and time  He appears well-developed  HENT:   Head: Normocephalic  Eyes: Pupils are equal, round, and reactive to light  Neck: Normal range of motion  Cardiovascular: Normal rate  Pulmonary/Chest: Effort normal and breath sounds normal    Abdominal: Soft  He exhibits distension (with fluid, "ascitis")  Genitourinary: Penis normal    Musculoskeletal: Normal range of motion  He exhibits tenderness (of SI joint, right )  Neurological: He is alert and oriented to person, place, and time  Skin: Skin is warm and dry  Psychiatric: He has a normal mood and affect   His behavior is normal  Judgment and thought content normal

## 2018-12-20 ENCOUNTER — TELEPHONE (OUTPATIENT)
Dept: FAMILY MEDICINE CLINIC | Facility: CLINIC | Age: 58
End: 2018-12-20

## 2019-01-24 ENCOUNTER — OFFICE VISIT (OUTPATIENT)
Dept: FAMILY MEDICINE CLINIC | Facility: CLINIC | Age: 59
End: 2019-01-24
Payer: COMMERCIAL

## 2019-01-24 VITALS
WEIGHT: 219 LBS | BODY MASS INDEX: 33.19 KG/M2 | OXYGEN SATURATION: 95 % | HEIGHT: 68 IN | RESPIRATION RATE: 16 BRPM | DIASTOLIC BLOOD PRESSURE: 80 MMHG | HEART RATE: 83 BPM | SYSTOLIC BLOOD PRESSURE: 126 MMHG | TEMPERATURE: 98.1 F

## 2019-01-24 DIAGNOSIS — R79.89 LOW TESTOSTERONE IN MALE: ICD-10-CM

## 2019-01-24 DIAGNOSIS — K74.4 SECONDARY BILIARY CIRRHOSIS (HCC): ICD-10-CM

## 2019-01-24 DIAGNOSIS — R31.0 GROSS HEMATURIA: Primary | ICD-10-CM

## 2019-01-24 DIAGNOSIS — N20.0 CALCULUS OF KIDNEY: ICD-10-CM

## 2019-01-24 PROCEDURE — 3725F SCREEN DEPRESSION PERFORMED: CPT | Performed by: FAMILY MEDICINE

## 2019-01-24 PROCEDURE — 3008F BODY MASS INDEX DOCD: CPT | Performed by: FAMILY MEDICINE

## 2019-01-24 PROCEDURE — 99214 OFFICE O/P EST MOD 30 MIN: CPT | Performed by: FAMILY MEDICINE

## 2019-01-24 NOTE — PROGRESS NOTES
Assessment/Plan:    No problem-specific Assessment & Plan notes found for this encounter  Diagnoses and all orders for this visit:    Gross hematuria  -     US retroperitoneal complete; Future    Calculus of kidney  -     US retroperitoneal complete; Future    Low testosterone in male  -     Ambulatory referral to Endocrinology; Future    Secondary biliary cirrhosis (HCC)          Subjective:      Patient ID: Mandie Epps is a 60 y o  male  HPI    The following portions of the patient's history were reviewed and updated as appropriate: allergies, current medications, past family history, past medical history, past social history, past surgical history and problem list     Review of Systems   Constitutional: Negative for activity change, appetite change, fatigue and fever  HENT: Negative for congestion, dental problem, ear pain, sinus pain and trouble swallowing  Eyes: Negative for discharge, redness and itching  Respiratory: Negative for cough, shortness of breath and wheezing  Cardiovascular: Negative for chest pain  Gastrointestinal: Negative for abdominal distention and abdominal pain  Genitourinary: Negative for difficulty urinating, dysuria and enuresis  Musculoskeletal: Negative for arthralgias, back pain and gait problem  Neurological: Negative for dizziness and headaches  Hematological: Negative for adenopathy  Does not bruise/bleed easily  Psychiatric/Behavioral: Negative for behavioral problems  Objective:      /80 (BP Location: Left arm, Patient Position: Sitting, Cuff Size: Standard)   Pulse 83   Temp 98 1 °F (36 7 °C) (Oral)   Resp 16   Ht 5' 8" (1 727 m)   Wt 99 3 kg (219 lb)   SpO2 95%   BMI 33 30 kg/m²          Physical Exam   HENT:   Head: Normocephalic  Eyes: Pupils are equal, round, and reactive to light  EOM are normal    Neck: Neck supple  Cardiovascular: Normal rate and regular rhythm  Pulmonary/Chest: Effort normal    Abdominal: Soft  Musculoskeletal: Normal range of motion  Neurological: He is alert  Skin: Skin is warm and dry  Psychiatric: He has a normal mood and affect   His behavior is normal

## 2019-01-24 NOTE — PROGRESS NOTES
Assessment/Plan:  1  Gross hematuria  To rule out kidney stones, we are going to perform an ultrasound of kidneys and bladder   - US retroperitoneal complete; Future    2  Calculus of kidney  As history  - US retroperitoneal complete; Future    3  Low testosterone in male  Patient has cirrhosis of the liver and needs to have consultation Endocrinology regarding use of testosterone in the risk of worsening liver function   - Ambulatory referral to Endocrinology; Future    4  Secondary biliary cirrhosis ([address] )  This secondary to hepatitis C that was a rate treated  No problem-specific Assessment & Plan notes found for this encounter  Diagnoses and all orders for this visit:    Gross hematuria  -     US retroperitoneal complete; Future    Calculus of kidney  -     US retroperitoneal complete; Future    Low testosterone in male  -     Ambulatory referral to Endocrinology; Future    Secondary biliary cirrhosis (HCC)          Subjective:      Patient ID: Mandie Epps is a 60 y o  male  This is a chronic problem  Patient starts pain many years ago even before got ascites  He suffers of cirrhosis of the liver with ascites which causes abdominal protuberant  Problem is intermittently during walking    The problem has been gradually worsening since onset  The pain is present in the lumbar spine  The quality of the pain is described as aching  The pain radiates to the right foot  The pain is at a severity of 5/10  The pain is moderate  The pain is worse during the night  The symptoms are aggravated by lying down  Associated symptoms include numbness, paresthesias and tingling  Pertinent negatives include no abdominal pain, chest pain, dysuria, fever or headaches  He has tried NSAIDs for the symptoms  The treatment provided mild relief  Patient states history of hematuria, back pain in the past was diagnosed with kidney stones    He states after treatment of kidney stones the pain resolved 10 years ago     Patient is stable from cirrhosis of the liver that was caused by infection hepatitis C that he get during heroin injections  Patient is now on methadone program     Multiple test testosterone were reported so optimal, patient suffers of cirrhosis and is a challenge to treat impotency  Diabetes   Pertinent negatives for hypoglycemia include no dizziness or headaches  Pertinent negatives for diabetes include no chest pain and no fatigue  The following portions of the patient's history were reviewed and updated as appropriate: allergies, current medications, past family history, past medical history, past social history, past surgical history and problem list     Review of Systems   Constitutional: Negative for activity change, appetite change, fatigue and fever  HENT: Negative for congestion, dental problem, ear pain, sinus pain and trouble swallowing  Eyes: Negative for discharge, redness and itching  Respiratory: Negative for cough, shortness of breath and wheezing  Cardiovascular: Negative for chest pain  Gastrointestinal: Negative for abdominal distention and abdominal pain  Endocrine:        Patient has erectile dysfunction related to cirrhosis of the liver  His  testosterone has been low multiple times  Genitourinary: Negative for difficulty urinating, dysuria and enuresis  Musculoskeletal: Positive for back pain (He is a chronic problems of many years is worsening with ascites  )  Negative for arthralgias and gait problem  Neurological: Negative for dizziness and headaches  Hematological: Negative for adenopathy  Does not bruise/bleed easily  Psychiatric/Behavioral: Negative for behavioral problems           Objective:      /80 (BP Location: Left arm, Patient Position: Sitting, Cuff Size: Standard)   Pulse 83   Temp 98 1 °F (36 7 °C) (Oral)   Resp 16   Ht 5' 8" (1 727 m)   Wt 99 3 kg (219 lb)   SpO2 95%   BMI 33 30 kg/m²          Physical Exam Constitutional: He is oriented to person, place, and time  Chronic ill looking, pale, no jaundice  with ascites  HENT:   Head: Normocephalic  Eyes: Pupils are equal, round, and reactive to light  Neck: Normal range of motion  Cardiovascular: Normal rate  Pulmonary/Chest: Effort normal and breath sounds normal    Abdominal: Soft  He exhibits distension (In protuberance secondary to ascites  )  Genitourinary: Penis normal    Musculoskeletal: Normal range of motion  He exhibits tenderness (Over sacroiliac joints mostly left)  Neurological: He is alert and oriented to person, place, and time  Skin: Skin is warm and dry  Several vice S through her lower back and abdomen are seen the present exam    Psychiatric: He has a normal mood and affect   His behavior is normal  Judgment and thought content normal

## 2019-01-24 NOTE — PATIENT INSTRUCTIONS
Testosterone (Absorbed through the skin)   Testosterone (osvaldo-TOS-ter-one)  Treats low testosterone levels  Brand Name(s): Androderm   There may be other brand names for this medicine  When This Medicine Should Not Be Used: This medicine is not right for everyone  Do not use it if you had an allergic reaction to testosterone, or if you have breast cancer or prostate cancer  How to Use This Medicine:   Patch  · Your doctor will tell you how many patches to use, where to apply them, and how often to apply them  Do not use more patches or apply them more often than your doctor tells you to  · Read and follow the patient instructions that come with this medicine  Talk to your doctor or pharmacist if you have any questions  · Wash your hands with soap and water before and after applying a patch  · Leave the patch in its sealed wrapper until you are ready to put it on  Tear the wrapper open carefully  NEVER CUT the wrapper or the patch with scissors  Do not use any patch that has been cut by accident  · The patient instructions will show the body areas where you can wear the patch  When putting on each new patch, choose a different place within these areas  Do not put the new patch on the same place you wore the last one  Be sure to remove the old patch before applying a new one  · Apply the patch to clean, dry skin with very little hair, on your back, abdomen, upper arms, or thighs  Apply the patch at about the same time every night  · Do not put the patch over burns, cuts, or irritated skin  Do not put the patch on oily or sweaty skin or on a spot that might put extra pressure on it (such as over a joint)  · Bathing or swimming should not affect the patch  However, wait at least 3 hours after you apply the patch before you wash the skin area or shower or swim  Heavy exercise and sweating may cause the patch to fall off  · If the patch becomes loose, smooth it down and press it back onto your skin   If the patch comes off before 12 o'clock noon, put on a new patch, and then replace the new patch at your regular time  If the patch comes off after noon, just wait and put on a new patch at your next regular time  Do not tape the patch to your skin  · Missed dose: If you forget to wear or change a patch, put one on as soon as you can  If it is almost time to put on your next patch, wait until then to apply a new patch and skip the one you missed  Do not apply extra patches to make up for a missed dose  · Store the patches at room temperature in a closed container, away from heat, moisture, and direct light  · Fold the used patch in half with the sticky sides together  Throw any used patch away so that children or pets cannot get to it  You will also need to throw away old patches after the expiration date has passed  · Keep the medicine in a safe place  Do not give it to anyone else, even if you have the same symptoms  Drugs and Foods to Avoid:   Ask your doctor or pharmacist before using any other medicine, including over-the-counter medicines, vitamins, and herbal products  · Some medicines can affect how testosterone works  Tell your doctor if you are using any of the following:   ¨ Insulin  ¨ Blood thinner (including warfarin)  ¨ Corticosteroid (including dexamethasone, hydrocortisone, methylprednisolone, prednisolone, prednisone)  Warnings While Using This Medicine:   · Tell your doctor if you have kidney disease, liver disease, cancer, diabetes, an enlarged prostate, heart disease, lung disease, sleep apnea, or a history of heart attack or stroke  · This medicine may cause the following problems:  ¨ Increased numbers of red blood cells  ¨ Increased risk of prostate cancer  ¨ Blood clot in your leg or lung  ¨ Increase risk of heart attack or stroke  ¨ Lower sperm count (with large doses)  · The skin patch contains aluminum, which may cause skin burns if you have an MRI (magnetic resonance imaging) scan   You must remove the patch before an MRI  · This medicine is not indicated for use in women and should never be used by a pregnant woman  · This medicine can be habit-forming  Do not use more than your prescribed dose  Call your doctor if you think your medicine is not working  · Your doctor will do lab tests at regular visits to check on the effects of this medicine  Keep all appointments  · Keep all medicine out of the reach of children  Never share your medicine with anyone  Possible Side Effects While Using This Medicine:   Call your doctor right away if you notice any of these side effects:  · Allergic reaction: Itching or hives, swelling in your face or hands, swelling or tingling in your mouth or throat, chest tightness, trouble breathing  · Change in how much or how often you urinate, trouble urinating  · Chest pain that may spread to your arms, jaw, back, or neck, trouble breathing, coughing up blood, unusual sweating, faintness  · Chest pain, trouble breathing, or coughing up blood  · Numbness or weakness on one side of your body, sudden or severe headache, problems with vision, speech, or walking  · Pain, redness, or swelling in your arm or leg  · Severe skin blisters, redness, swelling, or burning where the patch is applied  · Swelling in your hands, ankles, or feet  · Unusual bleeding or bruising  If you notice these less serious side effects, talk with your doctor:   · Mild skin soreness, redness, itching, or irritation where the patch was applied  · Swollen breasts  If you notice other side effects that you think are caused by this medicine, tell your doctor  Call your doctor for medical advice about side effects  You may report side effects to FDA at 9-157-FDA-9864  © 2017 [address] [employer] Information is for End User's use only and may not be sold, redistributed or otherwise used for commercial purposes  The above information is an  only   It is not intended as medical advice for individual conditions or treatments  Talk to your doctor, nurse or pharmacist before following any medical regimen to see if it is safe and effective for you

## 2019-01-31 ENCOUNTER — HOSPITAL ENCOUNTER (OUTPATIENT)
Dept: ULTRASOUND IMAGING | Facility: HOSPITAL | Age: 59
Discharge: HOME | End: 2019-01-31
Payer: COMMERCIAL

## 2019-01-31 DIAGNOSIS — R31.0 GROSS HEMATURIA: ICD-10-CM

## 2019-01-31 DIAGNOSIS — N20.0 CALCULUS OF KIDNEY: ICD-10-CM

## 2019-01-31 PROCEDURE — 76770 US EXAM ABDO BACK WALL COMP: CPT

## 2019-02-21 ENCOUNTER — OFFICE VISIT (OUTPATIENT)
Dept: FAMILY MEDICINE CLINIC | Facility: CLINIC | Age: 59
End: 2019-02-21
Payer: COMMERCIAL

## 2019-02-21 VITALS
SYSTOLIC BLOOD PRESSURE: 120 MMHG | TEMPERATURE: 99.1 F | OXYGEN SATURATION: 97 % | BODY MASS INDEX: 32.43 KG/M2 | RESPIRATION RATE: 16 BRPM | WEIGHT: 214 LBS | HEART RATE: 88 BPM | DIASTOLIC BLOOD PRESSURE: 80 MMHG | HEIGHT: 68 IN

## 2019-02-21 DIAGNOSIS — I10 BENIGN ESSENTIAL HYPERTENSION: ICD-10-CM

## 2019-02-21 DIAGNOSIS — F11.90 OPIOID USE DISORDER: ICD-10-CM

## 2019-02-21 DIAGNOSIS — B35.3 TINEA PEDIS OF BOTH FEET: ICD-10-CM

## 2019-02-21 DIAGNOSIS — K74.4 SECONDARY BILIARY CIRRHOSIS (HCC): ICD-10-CM

## 2019-02-21 DIAGNOSIS — J06.9 ACUTE UPPER RESPIRATORY INFECTION: ICD-10-CM

## 2019-02-21 DIAGNOSIS — E11.9 TYPE 2 DIABETES MELLITUS WITHOUT COMPLICATION, WITHOUT LONG-TERM CURRENT USE OF INSULIN (HCC): Primary | ICD-10-CM

## 2019-02-21 PROCEDURE — 3074F SYST BP LT 130 MM HG: CPT | Performed by: FAMILY MEDICINE

## 2019-02-21 PROCEDURE — 99214 OFFICE O/P EST MOD 30 MIN: CPT | Performed by: FAMILY MEDICINE

## 2019-02-21 PROCEDURE — 3079F DIAST BP 80-89 MM HG: CPT | Performed by: FAMILY MEDICINE

## 2019-02-21 RX ORDER — KETOCONAZOLE 20 MG/G
CREAM TOPICAL DAILY
Qty: 30 G | Refills: 1 | Status: SHIPPED | OUTPATIENT
Start: 2019-02-21 | End: 2019-05-22 | Stop reason: ALTCHOICE

## 2019-02-21 RX ORDER — METHADONE HYDROCHLORIDE 10 MG/1
TABLET ORAL
Qty: 30 TABLET | Refills: 0
Start: 2019-02-21 | End: 2020-03-23 | Stop reason: SDUPTHER

## 2019-02-21 NOTE — ASSESSMENT & PLAN NOTE
Lab Results   Component Value Date    HGBA1C 6 0 11/21/2018       Stable condition continue same medications encourage the use of metformin    Follow following up with eye doctor and podiatrist

## 2019-02-21 NOTE — PROGRESS NOTES
Assessment/Plan:    Tinea pedis of both feet  Improved condition continue same treatment  Benign essential hypertension  Well control continue same medications    Diabetes mellitus type 2, uncomplicated ([address] )  Lab Results   Component Value Date    HGBA1C 6 0 11/21/2018       Stable condition continue same medications encourage the use of metformin  Follow following up with eye doctor and podiatrist       Opioid use disorder [facility])  To continue with methadone Clinic  No narcotics or other controlled substances has been given to patient this office  Diagnoses and all orders for this visit:    Type 2 diabetes mellitus without complication, without long-term current use of insulin (HCC)    Tinea pedis of both feet  -     ketoconazole (NIZORAL) 2 % cream; Apply topically daily    Opioid use disorder (HCC)  -     methadone (DOLOPHINE) 10 mg tablet; One tablet daily ( on blind reduction),    Secondary biliary cirrhosis (HCC)    Benign essential hypertension    Acute upper respiratory infection          Subjective:      Patient ID: Rabia Danielle is a 60 y o  male  Patient is here to follow-up diabetes, he suffers of cirrhosis of the liver  He is stable in the condition  He also has opioid use disorder and has been treated by methadone Clinic  He is getting blind reduction of methadone  He also is complaining of acute respiratory infection for the past one month  His last hemoglobin A1c was 6 0  He is on metformin only  The following portions of the patient's history were reviewed and updated as appropriate: allergies, current medications, past family history, past medical history, past social history, past surgical history and problem list     Review of Systems   Constitutional: Negative for activity change, appetite change, diaphoresis, fatigue, fever and unexpected weight change  HENT: Positive for sore throat  Negative for congestion, dental problem, ear pain, sinus pain and trouble swallowing  Eyes: Negative for discharge, redness and itching  Respiratory: Negative for apnea, cough, choking, chest tightness, shortness of breath and wheezing  Cardiovascular: Negative for chest pain, palpitations and leg swelling  Gastrointestinal: Negative for abdominal distention, abdominal pain, anal bleeding, blood in stool and constipation  Genitourinary: Negative for difficulty urinating, dysuria and enuresis  Musculoskeletal: Negative for arthralgias, back pain, gait problem and joint swelling  Neurological: Negative for dizziness, facial asymmetry, light-headedness and headaches  Hematological: Negative for adenopathy  Does not bruise/bleed easily  Psychiatric/Behavioral: Negative for behavioral problems, dysphoric mood and self-injury  The patient is not nervous/anxious  Objective:      /80 (BP Location: Left arm, Patient Position: Sitting, Cuff Size: Standard)   Pulse 88   Temp 99 1 °F (37 3 °C) (Oral)   Resp 16   Ht 5' 8" (1 727 m)   Wt 97 1 kg (214 lb)   SpO2 97%   BMI 32 54 kg/m²          Physical Exam   Constitutional: He is oriented to person, place, and time  He appears well-developed  HENT:   Head: Normocephalic  Eyes: Pupils are equal, round, and reactive to light  No scleral icterus  Neck: Normal range of motion  No JVD present  No thyromegaly present  Cardiovascular: Normal rate  Pulmonary/Chest: Effort normal and breath sounds normal  No respiratory distress  He has no wheezes  He has no rales  Abdominal: Soft  He exhibits distension (From ascites secondary to liver cirrhosis)  There is no tenderness  There is no rebound  Genitourinary: Penis normal    Musculoskeletal: Normal range of motion  Right shoulder: He exhibits tenderness, spasm and decreased strength  Lumbar back: He exhibits bony tenderness and deformity  He exhibits no spasm and normal pulse  Lymphadenopathy:     He has no cervical adenopathy     Neurological: He is alert and oriented to person, place, and time  Skin: Skin is warm and dry  Psychiatric: He has a normal mood and affect  His behavior is normal  Judgment and thought content normal        BMI Counseling: Body mass index is 32 54 kg/m²  Discussed the patient's BMI with him  The BMI is above average  BMI counseling and education was provided to the patient  Nutrition recommendations include 3-5 servings of fruits/vegetables daily

## 2019-02-21 NOTE — ASSESSMENT & PLAN NOTE
To continue with methadone Clinic  No narcotics or other controlled substances has been given to patient this office

## 2019-02-21 NOTE — PATIENT INSTRUCTIONS
Por favor celine con detenimiento  Muy Importante!!!    · Novak peso  será revisado  Es posible que [employer] midan novak altura para calcular novak índice de masa corporal [facility])  El [facility] indica si tiene un peso saludable  · Se verificarán novak presión arterial  y frecuencia cardíaca  También pueden revisar novak temperatura  · Exámenes de [place] y [facility]  se podría realizar  Se podrían realizar exámenes de lakshmi para revisar los niveles de [facility]  Los niveles anormales de colesterol aumentan el riesgo de enfermedad del corazón y accidente cerebrovascular  Puede que también necesite masoud prueba de lakshmi u orina para revisar si tiene diabetes si usted está en mayor riesgo  Las pruebas de orina pueden hacerse para buscar signos de masoud infección o masoud enfermedad renal      · Un examen físico  incluye la comprobación de alvin latidos del corazón y los pulmones con un estetoscopio  Novak médico también podría revisarle la piel para buscar daños causados por el sol  · Pruebas de detección  podría recomendarse  Se realiza un examen de detección para detectar enfermedades que pueden no causar síntomas  Los exámenes de detección que necesite dependen de novak edad, género, antecedentes familiares y hábitos de sujata  Por ejemplo, podrían recomendarle la exploración selectiva colorrectal si tiene 48 años o más  ¿Qué exámenes de detección necesito si soy masoud masha? · El examen de Papanicolaou  se utiliza para detectar cáncer de boo uterino  El examen del Papanicolaou por lo general se realiza entre 3 a 5 años dependiendo de novak edad  Puede necesitarlo más a menudo si usted ha tenido [employer] de la prueba de Papanicolaou en el pasado  Pregunte a novak médico con qué frecuencia debería realizarse un examen de Papanicolaou  · Masoud mamografía  es masoud radiografía de alvin senos para detectar cáncer de mama  Los expertos recomiendan [address] cada 2 años empezando a los 48 años de [place]   Es probable que usted necesite masoud mamografía a los 52 años o antes si tiene riesgo alto de cáncer de seno  Hable con novak médico sobre cuándo debe empezar con alvin mamografías y con cuánta frecuencia las necesita  ¿Qué vacunas podría necesitar? · Debe recibir [place] vacuna contra la influenza  todos los [place]  La vacuna contra la influenza protege de la gripe  Varios tipos de virus causan la influenza  Debido a que los virus [place] con el [place], es necesaria la producción de nuevas vacunas cada año  · Debe recibir [place] vacuna de refuerzo contra el tétanos-difteria (Td)  cada 10 años  Esta vacuna protege contra el tétanos y la difteria  El tétanos es masoud infección severa que puede causar trismo y espasmos musculares dolorosos  La difteria es masoud infección bacterial grave que produce masoud cubierta gruesa en la parte de atrás de la boca y garganta  · Debe recibir la vacuna contra el virus del papiloma humano (VPH)  si usted es masha y Baker 19 y 32 años o es hombre y Baker 23 y 24 años y nunca la recibió  Esta vacuna protege contra la infección por VPH  El virus del papiloma humano o VPH es la infección más común que se transmite por contacto sexual  El virus del papiloma humano también podría provocar cáncer vaginal, del pene y del ano  · Debe recibir la vacuna antineumocócica  si tiene más de 72 años  La vacuna antineumocócica es masoud inyección que se administra para protegerlo contra masoud enfermedad neumocócica  La enfermedad neumocócica es masoud infección causada por la bacteria neumocócica  La infección puede causar neumonía, meningitis o masoud infección del oído  · Debe recibir la vacuna contra la culebrilla  si tiene [address] [place] [address],[address] [place] [address] o Antonio Gamma, incluso si gupta tenido culebrilla antes  La vacuna contra la culebrilla (herpes zóster) es masoud inyección usada para proteger contra el virus zóster que causa la varicela  Florencia es el mismo virus que causa la varicela   La culebrilla es un sarpullido doloroso que se desarrolla en personas que tuvieron varicela o gupta estado expuestas al virus  ¿Cómo puedo comer de manera saludable? [place] es un modelo para planear comidas sanas  Muestra los tipos y cantidades de alimentos que deberían ir en un plato  Lisa Bucy y verduras representan alrededor de la mitad de novak plato y los granos y proteínas representan la otra mitad  Se incluye masoud porción de productos lácteos al lado del plato  La cantidad de calorías y [address] de las porciones que usted necesita dependen de novak edad, [place], peso y altura  Los ejemplos de alimentos saludables son:  · Consuma masoud variedad de verduras  home las de color alfonso oscuro, hooker y [place]  Usted también puede incluir verduras enlatadas bajas en sodio (sal) y verduras congeladas sin mantequilla ni salsas IDCSIUIF  · Consuma masoud variedad de fruta frescas , las frutas enlatadas en 100% de jugo , fruta [place] y jhony secos  · Incluya granos enteros  Por lo menos la mitad de los granos que usted consume deben ser granos de silva integral  Por ejemplo, panes de grano entero, pasta integral, arroz integral y cereales de grano entero home la nikolay  · Consuma masoud variedad de alimentos con proteínas home mariscos (pescado y crustáceos), Dellie Buster y carne de ave sin piel (pavo y lia)  Ejemplos de verena magras incluyen pierna, paleta o lomo de puerco y mckenzie, solomillo o, lomo de res y carne [place] de res extra [place]  Otros alimentos ricos en proteínas son los huevos y sustitutos de [place], frijoles, chícharos, productos de soya, nueces y semillas  · Elija productos lácteos bajos en grasa [employer] o del 1% o yogur, queso y requesón bajos en grasa  · Limite las grasas poco saludables,  home la [place], la margarina dura y la [place]  ¿Qué cantidad de actividad física necesito? Realice masoud actividad física por lo menos 30 minutos al día, la mayoría de los días de la [place]   Algunos de los ejercicios incluyen caminar, montar en bicicleta, bailar y nadar  Usted también puede realizar más actividad física usando las escaleras en vez de los ascensores o estacionarse más lejos cuando [place] a las tiendas  Incluya ejercicios para fortalecer los músculos 2 días a la semana  El ejercicio regular proporciona muchos beneficios para la brittanie  Donnia Griffins a controlar novak peso y [employer] riesgo de diabetes tipo 2, presión arterial carmelina, enfermedad del corazón y accidente cerebrovascular  El ejercicio [employer] puede ayudar a mejorar novak estado de ánimo  Pregunte a novak médico acerca del mejor plan de ejercicio para usted  ¿Cuáles son Aleks Marquez generales de brittanie y seguridad que tayla seguir? · No fume  La nicotina y otras sustancias químicas que contienen los cigarrillos y cigarros pueden dañar los pulmones  Pida información a novak médico si usted actualmente fuma y necesita ayuda para dejar de fumar  Los cigarrillos electrónicos o tabaco sin humo todavía contienen nicotina  Consulte con novak médico antes de [employer]  · Limite el consumo de alcohol  Un trago equivale a 12 onzas de cerveza, 5 onzas de vino o 1 onza y ½ de licor  · Baje de peso, si es necesario  El sobrepeso aumenta el riesgo de ciertas condiciones de [place]  Estos incluyen enfermedad del corazón, presión arterial carmelina, diabetes tipo 2 y ciertos tipos de cáncer  · Proteja novak piel  No tome el sol ni use amparo de bronceado  Use protector solar con un SPF 13 o mayor  Aplíquese el bloqueador por lo menos 15 minutos antes de que vaya a estar al [employer]  Vuelva a aplicarse la crema solar cada 2 horas  Use ropa protectora, sombrero y lentes para el sol cuando se encuentre afuera  · Conduzca con seguridad  Use siempre novak cinturón de seguridad  Asegúrese que todos en el annie usan el cinturón de seguridad  Un cinturón de seguridad puede salvar novak sujata en adam de un accidente automovilístico  No use el celular cuando esté manejando   [place] puede hacer que se distraiga y causar un accidente  Es mejor que pare y se orille si necesita hacer masoud Yon Shereen un texto  · Practique el sexo seguro  Use condones de látex si es sexualmente [place] y tiene más de [place] and [place]  Adhikari médico puede recomendar exámenes de detección de infecciones de transmisión sexual (ITS)  · Use un magali, un chaleco salvavidas y unos implementos de protección  Siempre use un magali al [employer] en bicicleta o motocicleta, esquiar o jugar deportes que podrían causar masoud lesión en la jesus  Use implementos de protección cuando practique deportes  Use un chaleco salvavidas cuando esté en un bote o practicando actividades acuáticas

## 2019-05-02 DIAGNOSIS — E11.65 UNCONTROLLED TYPE 2 DIABETES MELLITUS WITH HYPERGLYCEMIA (HCC): ICD-10-CM

## 2019-05-02 DIAGNOSIS — I10 HYPERTENSION, UNSPECIFIED TYPE: ICD-10-CM

## 2019-05-03 RX ORDER — BLOOD-GLUCOSE METER
KIT MISCELLANEOUS 2 TIMES DAILY
Qty: 1 EACH | Refills: 0 | Status: SHIPPED | OUTPATIENT
Start: 2019-05-03 | End: 2020-09-24 | Stop reason: ALTCHOICE

## 2019-05-03 RX ORDER — PROPRANOLOL HYDROCHLORIDE 20 MG/1
20 TABLET ORAL EVERY 12 HOURS SCHEDULED
Qty: 60 TABLET | Refills: 5 | Status: SHIPPED | OUTPATIENT
Start: 2019-05-03 | End: 2019-05-22 | Stop reason: SDUPTHER

## 2019-05-07 DIAGNOSIS — I10 ESSENTIAL HYPERTENSION, BENIGN: ICD-10-CM

## 2019-05-09 RX ORDER — AMILORIDE HYDROCHLORIDE 5 MG/1
5 TABLET ORAL DAILY
Qty: 30 TABLET | Refills: 3 | Status: SHIPPED | OUTPATIENT
Start: 2019-05-09 | End: 2019-05-22 | Stop reason: SDUPTHER

## 2019-05-22 ENCOUNTER — OFFICE VISIT (OUTPATIENT)
Dept: FAMILY MEDICINE CLINIC | Facility: CLINIC | Age: 59
End: 2019-05-22
Payer: COMMERCIAL

## 2019-05-22 VITALS
BODY MASS INDEX: 34.15 KG/M2 | DIASTOLIC BLOOD PRESSURE: 72 MMHG | TEMPERATURE: 97.5 F | OXYGEN SATURATION: 97 % | HEART RATE: 95 BPM | SYSTOLIC BLOOD PRESSURE: 117 MMHG | HEIGHT: 67 IN | WEIGHT: 217.6 LBS | RESPIRATION RATE: 20 BRPM

## 2019-05-22 DIAGNOSIS — I10 ESSENTIAL HYPERTENSION, BENIGN: ICD-10-CM

## 2019-05-22 DIAGNOSIS — F11.90 OPIOID USE DISORDER: ICD-10-CM

## 2019-05-22 DIAGNOSIS — I10 BENIGN ESSENTIAL HYPERTENSION: ICD-10-CM

## 2019-05-22 DIAGNOSIS — B18.2 CHRONIC HEPATITIS C WITHOUT HEPATIC COMA (HCC): ICD-10-CM

## 2019-05-22 DIAGNOSIS — I10 HYPERTENSION, UNSPECIFIED TYPE: ICD-10-CM

## 2019-05-22 DIAGNOSIS — I85.10 SECONDARY ESOPHAGEAL VARICES WITHOUT BLEEDING (HCC): ICD-10-CM

## 2019-05-22 DIAGNOSIS — K76.6 PORTAL HYPERTENSION (HCC): ICD-10-CM

## 2019-05-22 DIAGNOSIS — E11.65 UNCONTROLLED TYPE 2 DIABETES MELLITUS WITH HYPERGLYCEMIA (HCC): Primary | ICD-10-CM

## 2019-05-22 DIAGNOSIS — K74.4 SECONDARY BILIARY CIRRHOSIS (HCC): ICD-10-CM

## 2019-05-22 DIAGNOSIS — E11.9 TYPE 2 DIABETES MELLITUS WITHOUT COMPLICATION, WITHOUT LONG-TERM CURRENT USE OF INSULIN (HCC): ICD-10-CM

## 2019-05-22 PROCEDURE — 99214 OFFICE O/P EST MOD 30 MIN: CPT | Performed by: FAMILY MEDICINE

## 2019-05-22 PROCEDURE — 3008F BODY MASS INDEX DOCD: CPT | Performed by: FAMILY MEDICINE

## 2019-05-22 PROCEDURE — 3074F SYST BP LT 130 MM HG: CPT | Performed by: FAMILY MEDICINE

## 2019-05-22 PROCEDURE — 3078F DIAST BP <80 MM HG: CPT | Performed by: FAMILY MEDICINE

## 2019-05-22 RX ORDER — CLOTRIMAZOLE 1 %
CREAM (GRAM) TOPICAL
Refills: 3 | COMMUNITY
Start: 2019-05-04 | End: 2019-05-22 | Stop reason: ALTCHOICE

## 2019-05-22 RX ORDER — AMILORIDE HYDROCHLORIDE 5 MG/1
5 TABLET ORAL DAILY
Qty: 90 TABLET | Refills: 3 | Status: SHIPPED | OUTPATIENT
Start: 2019-05-22 | End: 2020-03-04

## 2019-05-22 RX ORDER — PROPRANOLOL HYDROCHLORIDE 20 MG/1
20 TABLET ORAL EVERY 12 HOURS SCHEDULED
Qty: 180 TABLET | Refills: 3 | Status: SHIPPED | OUTPATIENT
Start: 2019-05-22 | End: 2020-08-25 | Stop reason: HOSPADM

## 2019-06-07 ENCOUNTER — LAB (OUTPATIENT)
Dept: LAB | Facility: HOSPITAL | Age: 59
End: 2019-06-07
Payer: COMMERCIAL

## 2019-06-07 DIAGNOSIS — I10 HYPERTENSION, UNSPECIFIED TYPE: ICD-10-CM

## 2019-06-07 DIAGNOSIS — E11.65 UNCONTROLLED TYPE 2 DIABETES MELLITUS WITH HYPERGLYCEMIA (HCC): ICD-10-CM

## 2019-06-07 LAB
ALBUMIN SERPL BCP-MCNC: 4.3 G/DL (ref 3–5.2)
ALP SERPL-CCNC: 108 U/L (ref 43–122)
ALT SERPL W P-5'-P-CCNC: 35 U/L (ref 9–52)
ANION GAP SERPL CALCULATED.3IONS-SCNC: 12 MMOL/L (ref 5–14)
AST SERPL W P-5'-P-CCNC: 37 U/L (ref 17–59)
BASOPHILS # BLD AUTO: 0 THOUSANDS/ΜL (ref 0–0.1)
BASOPHILS NFR BLD AUTO: 1 % (ref 0–1)
BILIRUB SERPL-MCNC: 0.6 MG/DL
BUN SERPL-MCNC: 12 MG/DL (ref 5–25)
CALCIUM SERPL-MCNC: 9.1 MG/DL (ref 8.4–10.2)
CHLORIDE SERPL-SCNC: 102 MMOL/L (ref 97–108)
CHOLEST SERPL-MCNC: 128 MG/DL
CO2 SERPL-SCNC: 26 MMOL/L (ref 22–30)
CREAT SERPL-MCNC: 0.94 MG/DL (ref 0.7–1.5)
CREAT UR-MCNC: 153 MG/DL
EOSINOPHIL # BLD AUTO: 0.1 THOUSAND/ΜL (ref 0–0.4)
EOSINOPHIL NFR BLD AUTO: 2 % (ref 0–6)
ERYTHROCYTE [DISTWIDTH] IN BLOOD BY AUTOMATED COUNT: 14.6 %
EST. AVERAGE GLUCOSE BLD GHB EST-MCNC: 126 MG/DL
GFR SERPL CREATININE-BSD FRML MDRD: 88 ML/MIN/1.73SQ M
GLUCOSE P FAST SERPL-MCNC: 114 MG/DL (ref 70–99)
HBA1C MFR BLD: 6 % (ref 4.2–6.3)
HCT VFR BLD AUTO: 46.7 % (ref 41–53)
HDLC SERPL-MCNC: 38 MG/DL (ref 40–59)
HGB BLD-MCNC: 15.5 G/DL (ref 13.5–17.5)
LDLC SERPL CALC-MCNC: 65 MG/DL
LYMPHOCYTES # BLD AUTO: 2.2 THOUSANDS/ΜL (ref 0.5–4)
LYMPHOCYTES NFR BLD AUTO: 37 % (ref 25–45)
MCH RBC QN AUTO: 30.4 PG (ref 26–34)
MCHC RBC AUTO-ENTMCNC: 33.1 G/DL (ref 31–36)
MCV RBC AUTO: 92 FL (ref 80–100)
MICROALBUMIN UR-MCNC: 12.3 MG/L (ref 0–20)
MICROALBUMIN/CREAT 24H UR: 8 MG/G CREATININE (ref 0–30)
MONOCYTES # BLD AUTO: 0.4 THOUSAND/ΜL (ref 0.2–0.9)
MONOCYTES NFR BLD AUTO: 8 % (ref 1–10)
NEUTROPHILS # BLD AUTO: 3.1 THOUSANDS/ΜL (ref 1.8–7.8)
NEUTS SEG NFR BLD AUTO: 53 % (ref 45–65)
NONHDLC SERPL-MCNC: 90 MG/DL
PLATELET # BLD AUTO: 198 THOUSANDS/UL (ref 150–450)
PMV BLD AUTO: 8.3 FL (ref 8.9–12.7)
POTASSIUM SERPL-SCNC: 4 MMOL/L (ref 3.6–5)
PROT SERPL-MCNC: 8.2 G/DL (ref 5.9–8.4)
RBC # BLD AUTO: 5.09 MILLION/UL (ref 4.5–5.9)
SODIUM SERPL-SCNC: 140 MMOL/L (ref 137–147)
TRIGL SERPL-MCNC: 125 MG/DL
WBC # BLD AUTO: 5.9 THOUSAND/UL (ref 4.5–11)

## 2019-06-07 PROCEDURE — 80053 COMPREHEN METABOLIC PANEL: CPT

## 2019-06-07 PROCEDURE — 82043 UR ALBUMIN QUANTITATIVE: CPT | Performed by: FAMILY MEDICINE

## 2019-06-07 PROCEDURE — 80061 LIPID PANEL: CPT

## 2019-06-07 PROCEDURE — 83036 HEMOGLOBIN GLYCOSYLATED A1C: CPT

## 2019-06-07 PROCEDURE — 82570 ASSAY OF URINE CREATININE: CPT | Performed by: FAMILY MEDICINE

## 2019-06-07 PROCEDURE — 3061F NEG MICROALBUMINURIA REV: CPT | Performed by: FAMILY MEDICINE

## 2019-06-07 PROCEDURE — 36415 COLL VENOUS BLD VENIPUNCTURE: CPT

## 2019-06-07 PROCEDURE — 85025 COMPLETE CBC W/AUTO DIFF WBC: CPT

## 2019-06-14 LAB
LEFT EYE DIABETIC RETINOPATHY: NORMAL
RIGHT EYE DIABETIC RETINOPATHY: NORMAL

## 2019-06-14 PROCEDURE — 3072F LOW RISK FOR RETINOPATHY: CPT | Performed by: FAMILY MEDICINE

## 2019-08-21 ENCOUNTER — OFFICE VISIT (OUTPATIENT)
Dept: FAMILY MEDICINE CLINIC | Facility: CLINIC | Age: 59
End: 2019-08-21
Payer: COMMERCIAL

## 2019-08-21 VITALS
OXYGEN SATURATION: 96 % | HEART RATE: 99 BPM | WEIGHT: 213 LBS | DIASTOLIC BLOOD PRESSURE: 70 MMHG | TEMPERATURE: 99.4 F | RESPIRATION RATE: 16 BRPM | BODY MASS INDEX: 32.28 KG/M2 | HEIGHT: 68 IN | SYSTOLIC BLOOD PRESSURE: 110 MMHG

## 2019-08-21 DIAGNOSIS — R79.89 LOW TESTOSTERONE IN MALE: ICD-10-CM

## 2019-08-21 DIAGNOSIS — F17.200 SMOKING: Primary | ICD-10-CM

## 2019-08-21 PROCEDURE — 99214 OFFICE O/P EST MOD 30 MIN: CPT | Performed by: FAMILY MEDICINE

## 2019-08-21 RX ORDER — CLOTRIMAZOLE 1 %
CREAM (GRAM) TOPICAL
Refills: 3 | COMMUNITY
Start: 2019-06-01 | End: 2020-03-04

## 2019-08-21 NOTE — PATIENT INSTRUCTIONS
Medición de testosterona, total   INFORMACIÓN GENERAL:  ¿Qué es florencia examen? Florencia examen es usado para medir la cantidad total de testosterona en la lakshmi  Es usado para evaluar que [employer] órganos sexuales (ovarios en hembras y testículos en varones) están trabajando  ¿Cuáles son otros nombres para florencia examen? · Testosterone measurement, total  ¿Cuáles son otros exámenes similares? · Medición de la hormona estimulante de tiroides  · Medición sérica de la hormona folículo estimulante  · Medición de 17-hidroxiprogesterona  · Medición de la globulina unida a hormonas sexuales  · Medición de la globulina unida a hormonas sexuales en geraldo  · Medición de la hormona luteinizante  · Medición de prolactina  · Medición de testosterona fabio en geraldo  ¿Por qué puedo necesitar florencia examen? Los exámenes de laboratorio pueden realizarse por muchas razones  Los exámenes se realizan [address] rutinaria de brittanie o sospecha de enfermedad o de toxicidad  También pueden ser [employer] para determinar si Willim Speaker  Los exámenes también podrían usarse para medir la efectividad o fracaso de un medicamento o plan de tratamiento  Pueden ser solicitados por razones médicas o legales  Usted podría necesitar florencia examen si tiene:   · Ausencia o disminución en la secreción de hormonas sexuales  · Cáncer de las glándulas suprarrenales  · Fractura de cadera  · Hiperplasia adrenal congénita  · Ovarios poliquísticos  ¿Cuándo y con qué frecuencia puedo tener florencia examen? Existen varios factores para determinar cuándo y con qué frecuencia se pueden realizar los exámenes  Adhikari duración puede depender de [place] o terminación de otros exámenes, procedimientos o tratamientos  Los exámenes pueden ser realizados inmediatamente en masoud emergencia o pueden ser demorados conforme masoud condición es tratada o monitoreada   Se puede sugerir un examen o llegar a ser necesario cuando aparecen ciertos signos o síntomas  Debido a cambios de las funciones naturales del organismo bertrand el día, los exámenes pueden ser realizados en masoud determinada hora  Si usted se ha preparado para jyoti examen con cambios en la ingesta de comida o líquidos, los exámenes pueden ser realizados de acuerdo con [employer]  Los intervalos para la realización de los exámenes pueden basarse en el aumento o disminución de los niveles de [place], drogas u otras sustancias en el organismo  La edad o el género de las personas puede influir en la fecha y la frecuencia con que se requiere un examen  Las condiciones crónicas o progresivas pueden necesitar un monitoreo continuo mediante exámenes  Ciertos exámenes pueden ser repetidos para obtener masoud serie de [place] o para confirmar o [employer]  Las veces que deban [employer] exámenes y novak frecuencia varían dependiendo si se llevan a cabo por razones profesionales o legales  Los niveles de testosterona varían a lo jas del día  Para encontrar un nivel medio, las [place] de lakshmi son coleccionadas en tiempos diferentes bertrand el día  Por lo general, la primera [place] es coleccionada a las 7 de la [place]  ¿Cómo tayla prepararme para el examen? Antes de que le tomen la [facility], informe al técnico laboratorista si es usted alérgico al látex y si está ingiriendo medicamentos que puedan causar sangrado excesivo  También informe si en alguna ocasión presentó náuseas, ligero dolor de [place] o debilidad cuando le sacaron [place]  Infórmele al trabajador de brittanie que hace el examen si usted está embarazada  ¿Cómo se hace el examen? Cuando se necesita obtener masoud muestra de [place], generalmente se selecciona masoud vena del brazo  Se puede poner un torniquete (starr larga de Psychiatricho) para kiko la vena  Se limpia la piel sobre la vena y se introduce la aguja  Le pedirán que permanezca muy quieto mientras maria antonia la [place]   La lakshmi se puede recolectar en kylee o más tubos y el torniquete puede ser retirado  Cuando se ha obtenido suficiente lakshmi para la [place] técnico laboratorista saca la aguja  ¿Qué me sentiré bertrand el examen? El Piyush Martinez que usted puede sentir dependerá de muchos factores, incluyendo novak sensibilidad para el dolor  Comuníquele a la persona que realiza la prueba lo que esté sintiendo  Informe a dicha persona si usted no puede continuar con el procedimiento  Bertrand la aung de la [place], ted puede sentir masoud ligera molestia en el sitio donde ésta se obtiene  ¿Qué tayla hacer después del examen? Después de obtener la [place] sanguínea de masoud vena, se coloca un algodón, maria de jesus adhesiva o gasa en el área donde fue insertada la aguja  Le pueden solicitar que aplique presión en el área  Evite practicar ejercicio intenso inmediatamente después de la aung sanguínea  Avise al técnico laboratorista si usted presenta dolor, enrojecimiento, hinchazón o descarga en el sitio de la punción  ¿Cuáles son los riesgos? Lakshmi: En el sitio donde se aung la [place] puede ocurrir la extravasación de [place], un hematoma (lakshmi extravasada debajo de la piel), así home masoud infección  Las personas que realizan estos exámenes pueden necesitar hacerlos más de masoud vez  Consulte con novak médico en adam de brant acerca de los riesgos de [facility]  ¿Cuáles son los resultados normales para éste examen? Los [employer] de las pruebas de laboratorio pueden variar dependiendo de la edad, género, historia clínica, el método usado para esta prueba y [place]adem[place] 1904  Si alvin resultados son diferentes de los [employer] sugeridos a continuación, esto no significa que usted tenga masoud enfermedad  Contacte a novak médico para cualquier pregunta que tenga   Los C H  [employer] son considerados normales para estas pruebas:  · Adultos masculinos (Zunilda Liu): 754-1036 ng/dL (9 36-37 1 nmol/L)  · Adultos femeninas (Zunilda Liu): 6-86 ng/dL (0 21-2 98 nmol/L)  · Mujeres embarazadas: 3 a 4 veces normales  · Mujeres postmenopáusicas: 8-35 ng/dL (0 28-1 22 nmol/L)  · Neonatos prematuros, masculinos:  ng/dL (1 28-6 87 nmol/L)  · Neonatos prematuros, femeninas: 5-22 ng/dL (0 17-0 76 nmol/L)  · Neonatos, masculinos:  ng/dL (2 6-13 9 nmol/L)  · Neonatos, femeninas: 20-64 ng/dL (0 69-2 22 nmol/L)  · Niños, masculinos, 1 a 5 meses: 1-177 ng/dL (0 03-6 14 nmol/L)  · Niñas, femeninas, 1 a 5 meses: 1-5 ng/dL (0 03-0 17 nmol/L)  · Niños, masculinos, 6-11 meses: 2-7 ng/dL (0 07-0 24 nmol/L)  · Niñas, femeninas, 6 a 11 meses: 2-5 ng/dL (0 07-0 24 nmol/L)  · Niños, masculinos, 1 a 5 años: 2-25 ng/dL (0 07-0 87 nmol/L)  · Niñas, femeninas, 1 a 5 años: 2-10 ng/dL (0 07-0 35 nmol/L)  · Niños, masculinos, 6 a 9 años: 3-30 ng/dL (0 10-1 04 nmol/L)  · Niñas, femeninas, 6 a 9 años: 2-20 ng/dL (0 07-0 69 nmol/L)  · Etapas de Eddie masculinos:  ? Etapa de Eddie 1: 2-23 ng/dL (0 07-0 8 nmol/L)  ? Etapa de Eddie 2: 5-70 ng/dL (0 17-2 43 nmol/L)  ? Etapa de Eddie 3:  ng/dL (0 52-9 72 nmol/L)  ? Etapa de Eddie 4: 105-545 ng/dL (3 64-18 91 nmol/L)  ? Etapa de Eddie 5: 265-800 ng/dL (9 19-27 76 nmol/L)  · Etapas de Eddie femeninas:  ? Etapa de Eddie 1: 2-10 ng/dL (0 07-0 35 nmol/L)  ? Etapa de Eddie 2: 5-30 ng/dL (0 17-1 04 nmol/L)  ? Etapa de Eddie 3: 10-30 ng/dL (0 35-1 04 nmol/L)  ? Etapa de Eddie 4: 15-40 ng/dL (0 52-1 39 nmol/L)  ? Etapa de Eddie 5: 10-40 ng/dL (0 35-1 39 nmol/L)  ¿Qué son los próximos pasos después del examen? Pide a tu médico que te informe [place] de tus exámenes  Puede ser que te pidan que llames para pedir [place], que hagas masoud sherita para discutirlos con tu médico, o puede ser que [employer] por correo  El seguimiento depende de muchos factores relacionados con los resultados de [place]  Incluso, puede ser que no tengas que hacer ningún seguimiento después de [employer]   En algunos casos, pueden sugerirte o ser necesario algún seguimiento  Algunos ejemplos de seguimiento pueden ser cambios en tus medicamentos, o planes de tratamiento, referirte a un especialista, masoud vigilancia con mayor o josette frecuencia, o pruebas o procedimientos adicionales  Platica con tu médico sobre cualquier cosa que te preocupe o cualquier brant que tengas acerca de tu tratamiento o las instrucciones que te ha dado  ¿Dónde puedo conseguir más información? Related Companies   ? [address] [facility] - http://cWyze/  ? National Kidney and Urologic Diseases Information Clearinghouse - http://kidney  niddk nih gov/    ACUERDOS SOBRE NOVAK CUIDADO:   Rylee tiene el derecho de participar en la planificación de alvin cuidados  Para ayudar en esta planificación; rylee debe informarse acerca de novak estado de brittanie y [place] la forma home puede tratarse  De rylee Up y alvin médicos pueden hablar acerca de alvin opciones y decidir el cuidado que se usará bertrand novak tratamiento  Rylee siempre tiene el derecho a rechazar novak tratamiento  © Copyright [employer] 2018  Information is for End User's use only and may not be sold, redistributed or otherwise used for commercial purposes  The above information is an  only  It is not intended as a substitute for medical advice for your individual conditions or treatments  Talk to your doctor, nurse or pharmacist before following any medical regimen to see if it is safe and effective for you

## 2019-08-21 NOTE — PROGRESS NOTES
Assessment/Plan:  1  Smoking  TOBACCO SCREENING: this is a follow up recommendation from the USPTF  All adults who are using tobacco are to be advised to stop using tobacco, and provide behavioral interventions  Also, [place]  Food and Drug Administration (FDA)-approved pharmacotherapy for cessation should be given to adults who use tobacco     A written information and verbal explanation was given to the patient  Patient was receptive  Screening as recommended  - [place] lung screening program; Future    2  Low testosterone in male  High risk for testosterone replacement due to liver cirrhosis  Consultation for other options  - Ambulatory referral to Endocrinology; Future    No problem-specific Assessment & Plan notes found for this encounter  Diagnoses and all orders for this visit:    Smoking  -     CT lung screening program; Future    Low testosterone in male  -     Ambulatory referral to Endocrinology; Future          Subjective:      Patient ID: Samir Giordano is a 60 y o  male  Follow after confirmation of testosterone levels been low  Replacement not indicated  Patient wants second opinion  His cirrhosis has been stable and he is 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  He is in a "blind" dose of Methadone  He is planning to "stop" after 2020 when he is planning move with her disabled mother to [facility]  He continue smoking "only few cigarettes per day", failure of previous treatments to stop smoking  The following portions of the patient's history were reviewed and updated as appropriate: allergies, current medications, past family history, past medical history, past social history, past surgical history and problem list     Review of Systems   Constitutional: Positive for fatigue  Negative for chills, diaphoresis and fever  HENT: Negative for hearing loss, sinus pressure, sore throat and trouble swallowing      Eyes: Negative for photophobia, pain, redness and visual disturbance  Respiratory: Negative for cough, choking, chest tightness and shortness of breath  Cardiovascular: Negative for chest pain, palpitations and leg swelling  Gastrointestinal: Positive for abdominal distention  Negative for abdominal pain  Cirrhosis, no more ascitis  He follows with GI  Genitourinary: Negative for difficulty urinating, dysuria, enuresis and flank pain  Poor sex drive, low testosterone  Musculoskeletal: Positive for back pain  Negative for arthralgias, gait problem and joint swelling  Neurological: Negative for dizziness, facial asymmetry, light-headedness and headaches  Psychiatric/Behavioral: Negative for agitation, behavioral problems, confusion and decreased concentration  On chronic dose of methadone  Objective:      /70 (BP Location: Left arm, Patient Position: Sitting, Cuff Size: Standard)   Pulse 99   Temp 99 4 °F (37 4 °C) (Oral)   Resp 16   Ht 5' 8" (1 727 m)   Wt 96 6 kg (213 lb)   SpO2 96%   BMI 32 39 kg/m²          Physical Exam   Constitutional: No distress  HENT:   Nose: Nose normal    Mouth/Throat: Oropharynx is clear and moist    Eyes: Pupils are equal, round, and reactive to light  Conjunctivae are normal    Neck: Normal range of motion  No thyromegaly present  Cardiovascular: Normal rate, regular rhythm and normal heart sounds  Exam reveals no friction rub  No murmur heard  Pulmonary/Chest: Effort normal and breath sounds normal  No stridor  No respiratory distress  Abdominal: He exhibits distension (,chronic ascitis, no increase of it  Not able to prove by the PE )  There is no tenderness  Musculoskeletal: He exhibits no edema, tenderness or deformity  Neurological: He displays normal reflexes  No cranial nerve deficit  He exhibits normal muscle tone  Coordination normal    Skin: He is not diaphoretic

## 2019-09-13 ENCOUNTER — TRANSCRIBE ORDERS (OUTPATIENT)
Dept: ADMINISTRATIVE | Facility: HOSPITAL | Age: 59
End: 2019-09-13

## 2019-09-13 ENCOUNTER — APPOINTMENT (OUTPATIENT)
Dept: LAB | Facility: HOSPITAL | Age: 59
End: 2019-09-13
Payer: COMMERCIAL

## 2019-09-13 DIAGNOSIS — E29.1 3-OXO-5 ALPHA-STEROID DELTA 4-DEHYDROGENASE DEFICIENCY: Primary | ICD-10-CM

## 2019-09-13 DIAGNOSIS — E29.1 3-OXO-5 ALPHA-STEROID DELTA 4-DEHYDROGENASE DEFICIENCY: ICD-10-CM

## 2019-09-13 LAB
BASOPHILS # BLD AUTO: 0.1 THOUSANDS/ΜL (ref 0–0.1)
BASOPHILS NFR BLD AUTO: 1 % (ref 0–1)
EOSINOPHIL # BLD AUTO: 0.1 THOUSAND/ΜL (ref 0–0.4)
EOSINOPHIL NFR BLD AUTO: 2 % (ref 0–6)
ERYTHROCYTE [DISTWIDTH] IN BLOOD BY AUTOMATED COUNT: 14.9 %
ESTRADIOL SERPL-MCNC: 17 PG/ML (ref 11–52.5)
FSH SERPL-ACNC: 5.1 MIU/ML (ref 0.7–10.8)
HCT VFR BLD AUTO: 45.8 % (ref 41–53)
HGB BLD-MCNC: 15.4 G/DL (ref 13.5–17.5)
LH SERPL-ACNC: 4 MIU/ML (ref 1.2–10.6)
LYMPHOCYTES # BLD AUTO: 2.7 THOUSANDS/ΜL (ref 0.5–4)
LYMPHOCYTES NFR BLD AUTO: 36 % (ref 25–45)
MCH RBC QN AUTO: 30.7 PG (ref 26–34)
MCHC RBC AUTO-ENTMCNC: 33.5 G/DL (ref 31–36)
MCV RBC AUTO: 92 FL (ref 80–100)
MONOCYTES # BLD AUTO: 0.5 THOUSAND/ΜL (ref 0.2–0.9)
MONOCYTES NFR BLD AUTO: 7 % (ref 1–10)
NEUTROPHILS # BLD AUTO: 4 THOUSANDS/ΜL (ref 1.8–7.8)
NEUTS SEG NFR BLD AUTO: 54 % (ref 45–65)
PLATELET # BLD AUTO: 199 THOUSANDS/UL (ref 150–450)
PMV BLD AUTO: 8.2 FL (ref 8.9–12.7)
RBC # BLD AUTO: 5 MILLION/UL (ref 4.5–5.9)
TSH SERPL DL<=0.05 MIU/L-ACNC: 2.68 UIU/ML (ref 0.47–4.68)
WBC # BLD AUTO: 7.4 THOUSAND/UL (ref 4.5–11)

## 2019-09-13 PROCEDURE — 82670 ASSAY OF TOTAL ESTRADIOL: CPT

## 2019-09-13 PROCEDURE — 84403 ASSAY OF TOTAL TESTOSTERONE: CPT

## 2019-09-13 PROCEDURE — 83002 ASSAY OF GONADOTROPIN (LH): CPT

## 2019-09-13 PROCEDURE — 84402 ASSAY OF FREE TESTOSTERONE: CPT

## 2019-09-13 PROCEDURE — 85025 COMPLETE CBC W/AUTO DIFF WBC: CPT

## 2019-09-13 PROCEDURE — 84443 ASSAY THYROID STIM HORMONE: CPT

## 2019-09-13 PROCEDURE — 83001 ASSAY OF GONADOTROPIN (FSH): CPT

## 2019-09-13 PROCEDURE — 36415 COLL VENOUS BLD VENIPUNCTURE: CPT

## 2019-09-14 LAB
TESTOST FREE SERPL-MCNC: 2.9 PG/ML (ref 6.6–18.1)
TESTOST SERPL-MCNC: 138 NG/DL (ref 264–916)

## 2019-12-07 LAB — HBA1C MFR BLD HPLC: 5.4 %

## 2019-12-19 ENCOUNTER — TRANSCRIBE ORDERS (OUTPATIENT)
Dept: ADMINISTRATIVE | Facility: HOSPITAL | Age: 59
End: 2019-12-19

## 2019-12-19 ENCOUNTER — APPOINTMENT (OUTPATIENT)
Dept: LAB | Facility: HOSPITAL | Age: 59
End: 2019-12-19
Payer: COMMERCIAL

## 2019-12-19 DIAGNOSIS — E29.1 3-OXO-5 ALPHA-STEROID DELTA 4-DEHYDROGENASE DEFICIENCY: ICD-10-CM

## 2019-12-19 DIAGNOSIS — E55.9 VITAMIN D DEFICIENCY DISEASE: Primary | ICD-10-CM

## 2019-12-19 DIAGNOSIS — E55.9 VITAMIN D DEFICIENCY DISEASE: ICD-10-CM

## 2019-12-19 LAB
25(OH)D3 SERPL-MCNC: 13.9 NG/ML (ref 30–100)
ALBUMIN SERPL BCP-MCNC: 4.3 G/DL (ref 3–5.2)
ALP SERPL-CCNC: 118 U/L (ref 43–122)
ALT SERPL W P-5'-P-CCNC: 25 U/L (ref 9–52)
ANION GAP SERPL CALCULATED.3IONS-SCNC: 12 MMOL/L (ref 5–14)
AST SERPL W P-5'-P-CCNC: 30 U/L (ref 17–59)
BASOPHILS # BLD AUTO: 0.1 THOUSANDS/ΜL (ref 0–0.1)
BASOPHILS NFR BLD AUTO: 1 % (ref 0–1)
BILIRUB SERPL-MCNC: 0.6 MG/DL
BUN SERPL-MCNC: 9 MG/DL (ref 5–25)
CALCIUM SERPL-MCNC: 9.3 MG/DL (ref 8.4–10.2)
CHLORIDE SERPL-SCNC: 103 MMOL/L (ref 97–108)
CO2 SERPL-SCNC: 23 MMOL/L (ref 22–30)
CREAT SERPL-MCNC: 1.1 MG/DL (ref 0.7–1.5)
EOSINOPHIL # BLD AUTO: 0.1 THOUSAND/ΜL (ref 0–0.4)
EOSINOPHIL NFR BLD AUTO: 2 % (ref 0–6)
ERYTHROCYTE [DISTWIDTH] IN BLOOD BY AUTOMATED COUNT: 14.9 %
EST. AVERAGE GLUCOSE BLD GHB EST-MCNC: 120 MG/DL
GFR SERPL CREATININE-BSD FRML MDRD: 84 ML/MIN/1.73SQ M
GLUCOSE P FAST SERPL-MCNC: 121 MG/DL (ref 70–99)
HBA1C MFR BLD: 5.8 % (ref 4.2–6.3)
HCT VFR BLD AUTO: 49.4 % (ref 41–53)
HGB BLD-MCNC: 16.7 G/DL (ref 13.5–17.5)
LYMPHOCYTES # BLD AUTO: 3.2 THOUSANDS/ΜL (ref 0.5–4)
LYMPHOCYTES NFR BLD AUTO: 42 % (ref 25–45)
MCH RBC QN AUTO: 31.3 PG (ref 26–34)
MCHC RBC AUTO-ENTMCNC: 33.9 G/DL (ref 31–36)
MCV RBC AUTO: 92 FL (ref 80–100)
MONOCYTES # BLD AUTO: 0.5 THOUSAND/ΜL (ref 0.2–0.9)
MONOCYTES NFR BLD AUTO: 6 % (ref 1–10)
NEUTROPHILS # BLD AUTO: 3.8 THOUSANDS/ΜL (ref 1.8–7.8)
NEUTS SEG NFR BLD AUTO: 49 % (ref 45–65)
PLATELET # BLD AUTO: 227 THOUSANDS/UL (ref 150–450)
PMV BLD AUTO: 7.9 FL (ref 8.9–12.7)
POTASSIUM SERPL-SCNC: 4.3 MMOL/L (ref 3.6–5)
PROT SERPL-MCNC: 8.5 G/DL (ref 5.9–8.4)
RBC # BLD AUTO: 5.35 MILLION/UL (ref 4.5–5.9)
SODIUM SERPL-SCNC: 138 MMOL/L (ref 137–147)
WBC # BLD AUTO: 7.7 THOUSAND/UL (ref 4.5–11)

## 2019-12-19 PROCEDURE — 85025 COMPLETE CBC W/AUTO DIFF WBC: CPT

## 2019-12-19 PROCEDURE — 36415 COLL VENOUS BLD VENIPUNCTURE: CPT

## 2019-12-19 PROCEDURE — 82306 VITAMIN D 25 HYDROXY: CPT

## 2019-12-19 PROCEDURE — 80053 COMPREHEN METABOLIC PANEL: CPT

## 2019-12-19 PROCEDURE — 84402 ASSAY OF FREE TESTOSTERONE: CPT

## 2019-12-19 PROCEDURE — 83036 HEMOGLOBIN GLYCOSYLATED A1C: CPT

## 2019-12-19 PROCEDURE — 84403 ASSAY OF TOTAL TESTOSTERONE: CPT

## 2019-12-21 LAB
TESTOST FREE SERPL-MCNC: 16.2 PG/ML (ref 6.6–18.1)
TESTOST SERPL-MCNC: 823 NG/DL (ref 264–916)

## 2019-12-24 RX ORDER — TESTOSTERONE CYPIONATE 100 MG/ML
INJECTION, SOLUTION INTRAMUSCULAR
Refills: 0 | COMMUNITY
Start: 2019-09-27 | End: 2020-03-04

## 2019-12-24 RX ORDER — SYRINGE WITH NEEDLE, 1 ML 25GX5/8"
SYRINGE, EMPTY DISPOSABLE MISCELLANEOUS
Refills: 1 | COMMUNITY
Start: 2019-09-30 | End: 2020-09-24 | Stop reason: SDUPTHER

## 2020-01-28 ENCOUNTER — HOSPITAL ENCOUNTER (OUTPATIENT)
Dept: RADIOLOGY | Facility: HOSPITAL | Age: 60
Discharge: HOME | End: 2020-01-28
Attending: PODIATRIST
Payer: COMMERCIAL

## 2020-01-28 ENCOUNTER — TRANSCRIBE ORDERS (OUTPATIENT)
Dept: ADMINISTRATIVE | Facility: HOSPITAL | Age: 60
End: 2020-01-28

## 2020-01-28 DIAGNOSIS — M79.671 RIGHT FOOT PAIN: ICD-10-CM

## 2020-01-28 DIAGNOSIS — M79.672 LEFT FOOT PAIN: ICD-10-CM

## 2020-01-28 DIAGNOSIS — M79.671 RIGHT FOOT PAIN: Primary | ICD-10-CM

## 2020-01-28 PROCEDURE — 73630 X-RAY EXAM OF FOOT: CPT

## 2020-03-04 ENCOUNTER — HOSPITAL ENCOUNTER (INPATIENT)
Facility: HOSPITAL | Age: 60
LOS: 3 days | Discharge: HOME | DRG: 178 | End: 2020-03-07
Attending: EMERGENCY MEDICINE | Admitting: INTERNAL MEDICINE
Payer: COMMERCIAL

## 2020-03-04 ENCOUNTER — APPOINTMENT (EMERGENCY)
Dept: CT IMAGING | Facility: HOSPITAL | Age: 60
DRG: 178 | End: 2020-03-04
Payer: COMMERCIAL

## 2020-03-04 DIAGNOSIS — E11.9 TYPE 2 DIABETES MELLITUS WITHOUT COMPLICATION, WITHOUT LONG-TERM CURRENT USE OF INSULIN (HCC): ICD-10-CM

## 2020-03-04 DIAGNOSIS — R11.2 NON-INTRACTABLE VOMITING WITH NAUSEA: ICD-10-CM

## 2020-03-04 DIAGNOSIS — E87.6 HYPOKALEMIA: ICD-10-CM

## 2020-03-04 DIAGNOSIS — R11.2 NAUSEA AND VOMITING: ICD-10-CM

## 2020-03-04 DIAGNOSIS — A41.9 SEPSIS (HCC): Primary | ICD-10-CM

## 2020-03-04 DIAGNOSIS — J18.9 MULTIFOCAL PNEUMONIA: ICD-10-CM

## 2020-03-04 DIAGNOSIS — R09.02 HYPOXIA: ICD-10-CM

## 2020-03-04 LAB
ALBUMIN SERPL BCP-MCNC: 2.9 G/DL (ref 3.5–5)
ALP SERPL-CCNC: 97 U/L (ref 46–116)
ALT SERPL W P-5'-P-CCNC: 21 U/L (ref 12–78)
ANION GAP SERPL CALCULATED.3IONS-SCNC: 13 MMOL/L (ref 4–13)
APTT PPP: 33 SECONDS (ref 23–37)
AST SERPL W P-5'-P-CCNC: 17 U/L (ref 5–45)
ATRIAL RATE: 119 BPM
BASOPHILS # BLD AUTO: 0.02 THOUSANDS/ΜL (ref 0–0.1)
BASOPHILS NFR BLD AUTO: 0 % (ref 0–1)
BILIRUB SERPL-MCNC: 1.62 MG/DL (ref 0.2–1)
BILIRUB UR QL STRIP: NEGATIVE
BUN SERPL-MCNC: 14 MG/DL (ref 5–25)
CALCIUM SERPL-MCNC: 8.9 MG/DL (ref 8.3–10.1)
CHLORIDE SERPL-SCNC: 99 MMOL/L (ref 100–108)
CLARITY UR: ABNORMAL
CO2 SERPL-SCNC: 25 MMOL/L (ref 21–32)
COLOR UR: YELLOW
CREAT SERPL-MCNC: 1.23 MG/DL (ref 0.6–1.3)
EOSINOPHIL # BLD AUTO: 0 THOUSAND/ΜL (ref 0–0.61)
EOSINOPHIL NFR BLD AUTO: 0 % (ref 0–6)
ERYTHROCYTE [DISTWIDTH] IN BLOOD BY AUTOMATED COUNT: 14.4 % (ref 11.6–15.1)
FLUAV RNA NPH QL NAA+PROBE: NORMAL
FLUBV RNA NPH QL NAA+PROBE: NORMAL
GFR SERPL CREATININE-BSD FRML MDRD: 73 ML/MIN/1.73SQ M
GLUCOSE SERPL-MCNC: 52 MG/DL (ref 65–140)
GLUCOSE SERPL-MCNC: 70 MG/DL (ref 65–140)
GLUCOSE SERPL-MCNC: 81 MG/DL (ref 65–140)
GLUCOSE SERPL-MCNC: 82 MG/DL (ref 65–140)
GLUCOSE SERPL-MCNC: 93 MG/DL (ref 65–140)
GLUCOSE SERPL-MCNC: 94 MG/DL (ref 65–140)
GLUCOSE UR STRIP-MCNC: NEGATIVE MG/DL
HCT VFR BLD AUTO: 45.7 % (ref 36.5–49.3)
HGB BLD-MCNC: 14.9 G/DL (ref 12–17)
HGB UR QL STRIP.AUTO: NEGATIVE
IMM GRANULOCYTES # BLD AUTO: 0.08 THOUSAND/UL (ref 0–0.2)
IMM GRANULOCYTES NFR BLD AUTO: 1 % (ref 0–2)
INR PPP: 1.22 (ref 0.84–1.19)
KETONES UR STRIP-MCNC: ABNORMAL MG/DL
LACTATE SERPL-SCNC: 1.1 MMOL/L (ref 0.5–2)
LEUKOCYTE ESTERASE UR QL STRIP: NEGATIVE
LYMPHOCYTES # BLD AUTO: 2.07 THOUSANDS/ΜL (ref 0.6–4.47)
LYMPHOCYTES NFR BLD AUTO: 14 % (ref 14–44)
MCH RBC QN AUTO: 29.8 PG (ref 26.8–34.3)
MCHC RBC AUTO-ENTMCNC: 32.6 G/DL (ref 31.4–37.4)
MCV RBC AUTO: 91 FL (ref 82–98)
MONOCYTES # BLD AUTO: 1.84 THOUSAND/ΜL (ref 0.17–1.22)
MONOCYTES NFR BLD AUTO: 12 % (ref 4–12)
NEUTROPHILS # BLD AUTO: 11.08 THOUSANDS/ΜL (ref 1.85–7.62)
NEUTS SEG NFR BLD AUTO: 73 % (ref 43–75)
NITRITE UR QL STRIP: NEGATIVE
NRBC BLD AUTO-RTO: 0 /100 WBCS
P AXIS: 66 DEGREES
PH UR STRIP.AUTO: 6 [PH]
PLATELET # BLD AUTO: 243 THOUSANDS/UL (ref 149–390)
PMV BLD AUTO: 9.9 FL (ref 8.9–12.7)
POTASSIUM SERPL-SCNC: 3.2 MMOL/L (ref 3.5–5.3)
PR INTERVAL: 156 MS
PROCALCITONIN SERPL-MCNC: <0.05 NG/ML
PROT SERPL-MCNC: 8.5 G/DL (ref 6.4–8.2)
PROT UR STRIP-MCNC: NEGATIVE MG/DL
PROTHROMBIN TIME: 15.6 SECONDS (ref 11.6–14.5)
QRS AXIS: -30 DEGREES
QRSD INTERVAL: 76 MS
QT INTERVAL: 324 MS
QTC INTERVAL: 455 MS
RBC # BLD AUTO: 5 MILLION/UL (ref 3.88–5.62)
RSV RNA NPH QL NAA+PROBE: NORMAL
SODIUM SERPL-SCNC: 137 MMOL/L (ref 136–145)
SP GR UR STRIP.AUTO: <=1.005 (ref 1–1.03)
T WAVE AXIS: 52 DEGREES
UROBILINOGEN UR QL STRIP.AUTO: 4 E.U./DL
VENTRICULAR RATE: 119 BPM
WBC # BLD AUTO: 15.09 THOUSAND/UL (ref 4.31–10.16)

## 2020-03-04 PROCEDURE — 99285 EMERGENCY DEPT VISIT HI MDM: CPT | Performed by: EMERGENCY MEDICINE

## 2020-03-04 PROCEDURE — 99233 SBSQ HOSP IP/OBS HIGH 50: CPT | Performed by: INTERNAL MEDICINE

## 2020-03-04 PROCEDURE — 84145 PROCALCITONIN (PCT): CPT | Performed by: EMERGENCY MEDICINE

## 2020-03-04 PROCEDURE — 94640 AIRWAY INHALATION TREATMENT: CPT

## 2020-03-04 PROCEDURE — 74177 CT ABD & PELVIS W/CONTRAST: CPT

## 2020-03-04 PROCEDURE — 81003 URINALYSIS AUTO W/O SCOPE: CPT | Performed by: PHYSICIAN ASSISTANT

## 2020-03-04 PROCEDURE — 87070 CULTURE OTHR SPECIMN AEROBIC: CPT | Performed by: PHYSICIAN ASSISTANT

## 2020-03-04 PROCEDURE — 87449 NOS EACH ORGANISM AG IA: CPT | Performed by: INTERNAL MEDICINE

## 2020-03-04 PROCEDURE — 94760 N-INVAS EAR/PLS OXIMETRY 1: CPT

## 2020-03-04 PROCEDURE — 87040 BLOOD CULTURE FOR BACTERIA: CPT | Performed by: EMERGENCY MEDICINE

## 2020-03-04 PROCEDURE — 87185 SC STD ENZYME DETCJ PER NZM: CPT | Performed by: PHYSICIAN ASSISTANT

## 2020-03-04 PROCEDURE — 96374 THER/PROPH/DIAG INJ IV PUSH: CPT

## 2020-03-04 PROCEDURE — 96365 THER/PROPH/DIAG IV INF INIT: CPT

## 2020-03-04 PROCEDURE — 82948 REAGENT STRIP/BLOOD GLUCOSE: CPT

## 2020-03-04 PROCEDURE — 93005 ELECTROCARDIOGRAM TRACING: CPT

## 2020-03-04 PROCEDURE — 99223 1ST HOSP IP/OBS HIGH 75: CPT | Performed by: PHYSICIAN ASSISTANT

## 2020-03-04 PROCEDURE — 87077 CULTURE AEROBIC IDENTIFY: CPT | Performed by: PHYSICIAN ASSISTANT

## 2020-03-04 PROCEDURE — 93010 ELECTROCARDIOGRAM REPORT: CPT | Performed by: INTERNAL MEDICINE

## 2020-03-04 PROCEDURE — 87184 SC STD DISK METHOD PER PLATE: CPT | Performed by: PHYSICIAN ASSISTANT

## 2020-03-04 PROCEDURE — 80053 COMPREHEN METABOLIC PANEL: CPT | Performed by: EMERGENCY MEDICINE

## 2020-03-04 PROCEDURE — 83605 ASSAY OF LACTIC ACID: CPT | Performed by: EMERGENCY MEDICINE

## 2020-03-04 PROCEDURE — 99285 EMERGENCY DEPT VISIT HI MDM: CPT

## 2020-03-04 PROCEDURE — 85025 COMPLETE CBC W/AUTO DIFF WBC: CPT | Performed by: EMERGENCY MEDICINE

## 2020-03-04 PROCEDURE — 36415 COLL VENOUS BLD VENIPUNCTURE: CPT | Performed by: EMERGENCY MEDICINE

## 2020-03-04 PROCEDURE — 87631 RESP VIRUS 3-5 TARGETS: CPT | Performed by: INTERNAL MEDICINE

## 2020-03-04 PROCEDURE — 85730 THROMBOPLASTIN TIME PARTIAL: CPT | Performed by: EMERGENCY MEDICINE

## 2020-03-04 PROCEDURE — 96361 HYDRATE IV INFUSION ADD-ON: CPT

## 2020-03-04 PROCEDURE — 87205 SMEAR GRAM STAIN: CPT | Performed by: PHYSICIAN ASSISTANT

## 2020-03-04 PROCEDURE — 85610 PROTHROMBIN TIME: CPT | Performed by: EMERGENCY MEDICINE

## 2020-03-04 PROCEDURE — 71275 CT ANGIOGRAPHY CHEST: CPT

## 2020-03-04 RX ORDER — PROPRANOLOL HYDROCHLORIDE 20 MG/1
20 TABLET ORAL EVERY 12 HOURS SCHEDULED
Status: DISCONTINUED | OUTPATIENT
Start: 2020-03-04 | End: 2020-03-07 | Stop reason: HOSPADM

## 2020-03-04 RX ORDER — GUAIFENESIN 600 MG
600 TABLET, EXTENDED RELEASE 12 HR ORAL 2 TIMES DAILY
Status: DISCONTINUED | OUTPATIENT
Start: 2020-03-04 | End: 2020-03-07 | Stop reason: HOSPADM

## 2020-03-04 RX ORDER — ACETAMINOPHEN 325 MG/1
650 TABLET ORAL EVERY 6 HOURS PRN
Status: DISCONTINUED | OUTPATIENT
Start: 2020-03-04 | End: 2020-03-07 | Stop reason: HOSPADM

## 2020-03-04 RX ORDER — SODIUM CHLORIDE 9 MG/ML
100 INJECTION, SOLUTION INTRAVENOUS CONTINUOUS
Status: DISCONTINUED | OUTPATIENT
Start: 2020-03-04 | End: 2020-03-07 | Stop reason: HOSPADM

## 2020-03-04 RX ORDER — POTASSIUM CHLORIDE 20 MEQ/1
40 TABLET, EXTENDED RELEASE ORAL ONCE
Status: COMPLETED | OUTPATIENT
Start: 2020-03-04 | End: 2020-03-04

## 2020-03-04 RX ORDER — AMILORIDE HYDROCHLORIDE 5 MG/1
5 TABLET ORAL DAILY
Status: DISCONTINUED | OUTPATIENT
Start: 2020-03-04 | End: 2020-03-07 | Stop reason: HOSPADM

## 2020-03-04 RX ORDER — METHADONE HYDROCHLORIDE 10 MG/ML
44 CONCENTRATE ORAL DAILY
Status: DISCONTINUED | OUTPATIENT
Start: 2020-03-04 | End: 2020-03-07 | Stop reason: HOSPADM

## 2020-03-04 RX ORDER — ONDANSETRON 2 MG/ML
4 INJECTION INTRAMUSCULAR; INTRAVENOUS EVERY 6 HOURS PRN
Status: DISCONTINUED | OUTPATIENT
Start: 2020-03-04 | End: 2020-03-07 | Stop reason: HOSPADM

## 2020-03-04 RX ORDER — LEVALBUTEROL INHALATION SOLUTION 0.63 MG/3ML
0.63 SOLUTION RESPIRATORY (INHALATION)
Status: DISCONTINUED | OUTPATIENT
Start: 2020-03-04 | End: 2020-03-05

## 2020-03-04 RX ORDER — ONDANSETRON 2 MG/ML
4 INJECTION INTRAMUSCULAR; INTRAVENOUS ONCE
Status: COMPLETED | OUTPATIENT
Start: 2020-03-04 | End: 2020-03-04

## 2020-03-04 RX ORDER — NICOTINE 21 MG/24HR
1 PATCH, TRANSDERMAL 24 HOURS TRANSDERMAL DAILY
Status: DISCONTINUED | OUTPATIENT
Start: 2020-03-04 | End: 2020-03-07 | Stop reason: HOSPADM

## 2020-03-04 RX ORDER — AZITHROMYCIN 250 MG/1
500 TABLET, FILM COATED ORAL EVERY 24 HOURS
Status: DISCONTINUED | OUTPATIENT
Start: 2020-03-04 | End: 2020-03-05

## 2020-03-04 RX ORDER — ONDANSETRON 2 MG/ML
4 INJECTION INTRAMUSCULAR; INTRAVENOUS ONCE AS NEEDED
Status: COMPLETED | OUTPATIENT
Start: 2020-03-04 | End: 2020-03-04

## 2020-03-04 RX ORDER — AMILORIDE HYDROCHLORIDE 5 MG/1
5 TABLET ORAL DAILY
COMMUNITY
End: 2020-09-24 | Stop reason: ALTCHOICE

## 2020-03-04 RX ORDER — CALCIUM CARBONATE 200(500)MG
500 TABLET,CHEWABLE ORAL 3 TIMES DAILY PRN
Status: DISCONTINUED | OUTPATIENT
Start: 2020-03-04 | End: 2020-03-07 | Stop reason: HOSPADM

## 2020-03-04 RX ADMIN — ONDANSETRON 4 MG: 2 INJECTION INTRAMUSCULAR; INTRAVENOUS at 03:34

## 2020-03-04 RX ADMIN — IPRATROPIUM BROMIDE 0.5 MG: 0.5 SOLUTION RESPIRATORY (INHALATION) at 20:33

## 2020-03-04 RX ADMIN — SODIUM CHLORIDE 125 ML/HR: 0.9 INJECTION, SOLUTION INTRAVENOUS at 04:59

## 2020-03-04 RX ADMIN — METRONIDAZOLE 500 MG: 500 INJECTION, SOLUTION INTRAVENOUS at 16:58

## 2020-03-04 RX ADMIN — LEVALBUTEROL HYDROCHLORIDE 0.63 MG: 0.63 SOLUTION RESPIRATORY (INHALATION) at 20:33

## 2020-03-04 RX ADMIN — METHADONE HYDROCHLORIDE 44 MG: 10 CONCENTRATE ORAL at 08:32

## 2020-03-04 RX ADMIN — AZITHROMYCIN 500 MG: 250 TABLET, FILM COATED ORAL at 17:07

## 2020-03-04 RX ADMIN — ONDANSETRON 4 MG: 2 INJECTION INTRAMUSCULAR; INTRAVENOUS at 14:25

## 2020-03-04 RX ADMIN — ONDANSETRON 4 MG: 2 INJECTION INTRAMUSCULAR; INTRAVENOUS at 02:02

## 2020-03-04 RX ADMIN — SODIUM CHLORIDE 1000 ML: 0.9 INJECTION, SOLUTION INTRAVENOUS at 02:01

## 2020-03-04 RX ADMIN — AMILORIDE HYDROCHLORIDE 5 MG: 5 TABLET ORAL at 08:32

## 2020-03-04 RX ADMIN — NICOTINE 1 PATCH: 14 PATCH TRANSDERMAL at 08:32

## 2020-03-04 RX ADMIN — PROPRANOLOL HYDROCHLORIDE 20 MG: 20 TABLET ORAL at 08:31

## 2020-03-04 RX ADMIN — ACETAMINOPHEN 650 MG: 325 TABLET ORAL at 07:43

## 2020-03-04 RX ADMIN — IOHEXOL 100 ML: 350 INJECTION, SOLUTION INTRAVENOUS at 02:33

## 2020-03-04 RX ADMIN — CEFEPIME HYDROCHLORIDE 2000 MG: 2 INJECTION, POWDER, FOR SOLUTION INTRAVENOUS at 01:39

## 2020-03-04 RX ADMIN — CEFTRIAXONE SODIUM 1000 MG: 10 INJECTION, POWDER, FOR SOLUTION INTRAVENOUS at 17:48

## 2020-03-04 RX ADMIN — GUAIFENESIN 600 MG: 600 TABLET, EXTENDED RELEASE ORAL at 08:32

## 2020-03-04 RX ADMIN — ONDANSETRON 4 MG: 2 INJECTION INTRAMUSCULAR; INTRAVENOUS at 22:08

## 2020-03-04 RX ADMIN — POTASSIUM CHLORIDE 40 MEQ: 1500 TABLET, EXTENDED RELEASE ORAL at 04:01

## 2020-03-04 RX ADMIN — ENOXAPARIN SODIUM 40 MG: 40 INJECTION SUBCUTANEOUS at 08:32

## 2020-03-04 RX ADMIN — GUAIFENESIN 600 MG: 600 TABLET, EXTENDED RELEASE ORAL at 17:08

## 2020-03-04 RX ADMIN — ONDANSETRON 4 MG: 2 INJECTION INTRAMUSCULAR; INTRAVENOUS at 07:43

## 2020-03-04 NOTE — ASSESSMENT & PLAN NOTE
Lab Results   Component Value Date    HGBA1C 5 8 12/19/2019       No results for input(s): POCGLU in the last 72 hours      Blood Sugar Average: Last 72 hrs:   hold oral metformin while inpatient  Sliding scale insulin with Accu-Cheks

## 2020-03-04 NOTE — ED PROVIDER NOTES
History  Chief Complaint   Patient presents with    Vomiting     5 days NV + wet cough productive of green sputum   Cough     Roberto Boas is a 60 y o  male w/ a PMHx of T2DM presenting to the ED w/ c/o nausea and vomiting x5 days  Pt reports that he hasn't been able to keep any food or fluid down  Associated sx include a productive cough and dark colored brown/red urine  Pt denies any CP, SOB, adbominal pain, back pain, or dysuria  Pt has a hx of kidney stones and passed his last stone 1 month ago  Prior to Admission Medications   Prescriptions Last Dose Informant Patient Reported? Taking?    B-D 3CC LUER-MARÍA ELENA SYR 83CL4-0/2 21G X 1-1/2" 3 ML MISC   Yes No   Sig: USE WITH TESTOSTERONE CYPIONATE EVERY TWO WEEKS   Blood Glucose Monitoring Suppl (FREESTYLE LITE) COCO   No Yes   Sig: Inject under the skin 2 (two) times a day   glucose blood (FREESTYLE LITE) test strip   No Yes   Si each by Other route 2 (two) times a day Use as instructed   metFORMIN (GLUCOPHAGE) 1000 MG tablet   No Yes   Sig: Take 1 tablet (1,000 mg total) by mouth 2 (two) times a day with meals   methadone (DOLOPHINE) 10 mg tablet   No Yes   Sig: One tablet daily ( on blind reduction),   propranolol (INDERAL) 20 mg tablet   No Yes   Sig: Take 1 tablet (20 mg total) by mouth every 12 (twelve) hours      Facility-Administered Medications: None       Past Medical History:   Diagnosis Date    Diabetes mellitus (HCC)     Esophageal varices (HCC)     Gastritis     Hepatitis C     History of kidney stones     Hyperlipidemia     Hypertension     Substance abuse ([facility] [address] )        Past Surgical History:   Procedure Laterality Date    COLONOSCOPY  2014    dr [email or web address]    ESOPHAGOGASTRODUODENOSCOPY  12/10/2015    esophageal varices, cirrhosis, gastritis    HYDROCELE EXCISION / REPAIR      LIVER BIOPSY         Family History   Problem Relation Age of Onset    Diabetes type II Mother         MELLITUS    Hypertension Mother    [facility] Hypertension Sister     Diabetes Sister         MELLITUS     I have reviewed and agree with the history as documented  E-Cigarette/Vaping     E-Cigarette/Vaping Substances     Social History     Tobacco Use    Smoking status: Current Every Day Smoker     Packs/day: 1 00    Smokeless tobacco: Never Used    Tobacco comment: TOBACCO USE   Substance Use Topics    Alcohol use: No    Drug use: No     Comment: former user       Review of Systems   Constitutional: Positive for appetite change, chills, diaphoresis and fever  HENT: Negative  Respiratory: Positive for cough  Negative for chest tightness, shortness of breath and wheezing  Cardiovascular: Negative  Gastrointestinal: Positive for nausea and vomiting  Negative for abdominal distention, abdominal pain, blood in stool, constipation, diarrhea and rectal pain  Genitourinary: Negative  Hematuria: dark colored brown/red urine  Musculoskeletal: Negative  Neurological: Negative  All other systems reviewed and are negative  Physical Exam  Physical Exam   Constitutional: He is oriented to person, place, and time  He appears well-developed and well-nourished  He is active and cooperative  He appears ill  He appears distressed  HENT:   Head: Normocephalic and atraumatic  Right Ear: External ear normal    Left Ear: External ear normal    Nose: Nose normal    Eyes: Conjunctivae and EOM are normal    Neck: Normal range of motion  Neck supple  Cardiovascular: Regular rhythm, normal heart sounds and intact distal pulses  Tachycardia present  Pulmonary/Chest: Effort normal  Tachypnea noted  He has rales  Abdominal: Soft  Bowel sounds are normal  He exhibits no distension  There is no tenderness  Musculoskeletal: Normal range of motion  He exhibits no edema or tenderness  Neurological: He is alert and oriented to person, place, and time  Skin: Skin is warm and dry  He is not diaphoretic         Vital Signs  ED Triage Vitals [03/04/20 0106]   Temperature Pulse Respirations Blood Pressure SpO2   100 °F (37 8 °C) (!) 127 (!) 24 133/74 (!) 88 %      Temp Source Heart Rate Source Patient Position - Orthostatic VS BP Location FiO2 (%)   Temporal Monitor Sitting Right arm --      Pain Score       No Pain           Vitals:    03/04/20 0106 03/04/20 0144 03/04/20 0240   BP: 133/74 133/73 131/72   Pulse: (!) 127 104 104   Patient Position - Orthostatic VS: Sitting Lying Lying         Visual Acuity      ED Medications  Medications   potassium chloride (K-DUR,KLOR-CON) CR tablet 40 mEq (has no administration in time range)   ondansetron (ZOFRAN) injection 4 mg (has no administration in time range)   cefepime (MAXIPIME) 2 g/50 mL dextrose IVPB (0 mg Intravenous Stopped 3/4/20 0240)   sodium chloride 0 9 % bolus 1,000 mL (1,000 mL Intravenous New Bag 3/4/20 0201)   ondansetron (ZOFRAN) injection 4 mg (4 mg Intravenous Given 3/4/20 0202)   iohexol (OMNIPAQUE) 350 MG/ML injection (MULTI-DOSE) 100 mL (100 mL Intravenous Given 3/4/20 0233)       Diagnostic Studies  Results Reviewed     Procedure Component Value Units Date/Time    Lactic acid x2 [[id number]]  (Normal) Collected:  03/04/20 0126    Lab Status:  Final result Specimen:  Blood from Hand, Left Updated:  03/04/20 0207     LACTIC ACID 1 1 mmol/L     Narrative:       Result may be elevated if tourniquet was used during collection      Comprehensive metabolic panel [[id number]]  (Abnormal) Collected:  03/04/20 0126    Lab Status:  Final result Specimen:  Blood from Hand, Left Updated:  03/04/20 0155     Sodium 137 mmol/L      Potassium 3 2 mmol/L      Chloride 99 mmol/L      CO2 25 mmol/L      ANION GAP 13 mmol/L      BUN 14 mg/dL      Creatinine 1 23 mg/dL      Glucose 93 mg/dL      Calcium 8 9 mg/dL      AST 17 U/L      ALT 21 U/L      Alkaline Phosphatase 97 U/L      Total Protein 8 5 g/dL      Albumin 2 9 g/dL      Total Bilirubin 1 62 mg/dL      eGFR 73 ml/min/1 73sq m     Narrative: National Kidney Disease Foundation guidelines for Chronic Kidney Disease (CKD):     Stage 1 with normal or high GFR (GFR > 90 mL/min/1 73 square meters)    Stage 2 Mild CKD (GFR = 60-89 mL/min/1 73 square meters)    Stage 3A Moderate CKD (GFR = 45-59 mL/min/1 73 square meters)    Stage 3B Moderate CKD (GFR = 30-44 mL/min/1 73 square meters)    Stage 4 Severe CKD (GFR = 15-29 mL/min/1 73 square meters)    Stage 5 End Stage CKD (GFR <15 mL/min/1 73 square meters)  Note: GFR calculation is accurate only with a steady state creatinine    Protime-INR [[id number]]  (Abnormal) Collected:  03/04/20 0126    Lab Status:  Final result Specimen:  Blood from Hand, Left Updated:  03/04/20 0152     Protime 15 6 seconds      INR 1 22    APTT [[id number]]  (Normal) Collected:  03/04/20 0126    Lab Status:  Final result Specimen:  Blood from Hand, Left Updated:  03/04/20 0152     PTT 33 seconds     CBC and differential [[id number]]  (Abnormal) Collected:  03/04/20 0126    Lab Status:  Final result Specimen:  Blood from Hand, Left Updated:  03/04/20 0141     WBC 15 09 Thousand/uL      RBC 5 00 Million/uL      Hemoglobin 14 9 g/dL      Hematocrit 45 7 %      MCV 91 fL      MCH 29 8 pg      MCHC 32 6 g/dL      RDW 14 4 %      MPV 9 9 fL      Platelets 324 Thousands/uL      nRBC 0 /100 WBCs      Neutrophils Relative 73 %      Immat GRANS % 1 %      Lymphocytes Relative 14 %      Monocytes Relative 12 %      Eosinophils Relative 0 %      Basophils Relative 0 %      Neutrophils Absolute 11 08 Thousands/µL      Immature Grans Absolute 0 08 Thousand/uL      Lymphocytes Absolute 2 07 Thousands/µL      Monocytes Absolute 1 84 Thousand/µL      Eosinophils Absolute 0 00 Thousand/µL      Basophils Absolute 0 02 Thousands/µL     Procalcitonin [[id number]] Collected:  03/04/20 0126    Lab Status: In process Specimen:  Blood from Hand, Left Updated:  03/04/20 0140    Blood culture #2 [[id number]] Collected:  03/04/20 0131    Lab Status:   In process Specimen:  Blood from Arm, Left Updated:  03/04/20 0137    Blood culture #1 [[id number]] Collected:  03/04/20 0124    Lab Status: In process Specimen:  Blood from Hand, Left Updated:  03/04/20 0137    UA w Reflex to Microscopic w Reflex to Culture [[id number]]     Lab Status:  No result Specimen:  Urine     Lactic acid x2 [[id number]]     Lab Status:  No result Specimen:  Blood                  PE Study with CT abdomen & pelvis with contrast   Final Result by Gely Husain MD (03/04 0255)      1  Limited evaluation due to poor contrast bolus timing  No pulmonary embolism to the lobar level  2   Patchy opacities seen throughout the lungs most compatible with multifocal pneumonia  3   Thickening of the bronchi predominantly in the lower lobes compatible with bronchitis  4   Mediastinal lymphadenopathy which is likely reactive  5   Cirrhotic morphology of liver  6   5 x 2 mm nonobstructive calculus within lower pole the right kidney  No hydronephrosis  7   Bilateral gynecomastia  Workstation performed: [id number]                    Procedures  ECG 12 Lead Documentation Only  Date/Time: 3/4/2020 1:31 AM  Performed by: Alta Rueda PA-C  Authorized by: Alta Rueda PA-C     ECG reviewed by me, the ED Provider: yes    Patient location:  ED  Previous ECG:     Previous ECG:  Unavailable    Comparison to cardiac monitor: No    Interpretation:     Interpretation: abnormal    Rate:     ECG rate:  119    ECG rate assessment: tachycardic    Rhythm:     Rhythm: sinus tachycardia    Ectopy:     Ectopy: none    QRS:     QRS axis:  Left    QRS intervals:  Normal  Conduction:     Conduction: normal    ST segments:     ST segments:  Normal  T waves:     T waves: normal               ED Course  ED Course as of Mar 04 0327   Wed Mar 04, 2020   0147 Pt presenting meeting SIRS criteria with tachycardia, tachypnea and elevation in white count  Concerns for pneumonia, UTI, colitis potentially   With hypoxia as well, could be from pneumonia but will CT to rule out less likely PE or CHF  Zofran for vomiting  He denies pain at this time  Will give fluids and cefepime for sepsis  Initial Sepsis Screening     Row Name 03/04/20 0326                Is the patient's history suggestive of a new or worsening infection? (!) Yes (Proceed)  -BY        Suspected source of infection  pneumonia;acute abdominal infection;urinary tract infection  -BY        Are two or more of the following signs & symptoms of infection both present and new to the patient? (!) Yes (Proceed)  -BY        Indicate SIRS criteria  Tachycardia > 90 bpm;Tachypnea > 20 resp per min;Leukocytosis (WBC > 80348 IJL)  -BY        If the answer is yes to both questions, suspicion of sepsis is present          If severe sepsis is present AND tissue hypoperfusion perists in the hour after fluid resuscitation or lactate > 4, the patient meets criteria for SEPTIC SHOCK          Are any of the following organ dysfunction criteria present within 6 hours of suspected infection and SIRS criteria that are NOT considered to be chronic conditions? No  -BY        Organ dysfunction          Date of presentation of severe sepsis          Time of presentation of severe sepsis          Tissue hypoperfusion persists in the hour after crystalloid fluid administration, evidenced, by either:          Was hypotension present within one hour of the conclusion of crystalloid fluid administration?           Date of presentation of septic shock          Time of presentation of septic shock            User Key  (r) = Recorded By, (t) = Taken By, (c) = Cosigned By    Initials Name Provider Type    BY Holly Wright PA-C Physician Assistant                  MDM      Disposition  Final diagnoses:   Sepsis ([address] )   Multifocal pneumonia   Hypoxia   Hypokalemia   Nausea and vomiting     Time reflects when diagnosis was documented in both MDM as applicable and the Disposition within this note     Time User Action Codes Description Comment    3/4/2020  3:17 AM Agata Mule B Add [A41 9] Sepsis ([address] )     3/4/2020  3:17 AM Agata Mule B Add [J18 9] Multifocal pneumonia     3/4/2020  3:17 AM Agata Mule B Add [R09 02] Hypoxia     3/4/2020  3:18 AM Agata Mule B Add [E87 6] Hypokalemia     3/4/2020  3:18 AM Agata Mule B Add [R11 2] Nausea and vomiting       ED Disposition     ED Disposition Condition Date/Time Comment    Admit Stable Wed Mar 4, 2020  3:25 AM Case was discussed with MICHELE and the patient's admission status was agreed to be Admission Status: inpatient status to the service of Dr Delmar Ugalde   Follow-up Information    None         Patient's Medications   Discharge Prescriptions    No medications on file     No discharge procedures on file      PDMP Review     None          ED Provider  Electronically Signed by           Selina Kelley PA-C  03/04/20 0329

## 2020-03-04 NOTE — SOCIAL WORK
Pt admitted with Sepsis 2' PNA  Pt lives alone in a 1st floor apartment where he is independent with all aspects of care, ambulating without an AD, driving self to appointments  Denies MH hx, D&A, legal issues nor having a POA (declines information)- pt does not wish to name a HCR at this time  PCP Dr Jane Sparks and he uses [facility] on [address] for prescriptions  Sister will transport him home on d/c  No further questions/concerns voiced  No barriers to dc identified

## 2020-03-04 NOTE — UTILIZATION REVIEW
Initial Clinical Review    Admission: Date/Time/Statement: Admission Orders (From admission, onward)     Ordered        03/04/20 0326  Inpatient Admission (expected length of stay for this patient Order details is greater than two midnights)  Once                   Orders Placed This Encounter   Procedures    Inpatient Admission (expected length of stay for this patient Order details is greater than two midnights)     Standing Status:   Standing     Number of Occurrences:   1     Order Specific Question:   Admitting Physician     Answer:   Darlin Chung [[id number]]     Order Specific Question:   Level of Care     Answer:   Med Surg [16]     Order Specific Question:   Estimated length of stay     Answer:   More than 2 Midnights     Order Specific Question:   Certification     Answer:   I certify that inpatient services are medically necessary for this patient for a duration of greater than two midnights  See H&P and MD Progress Notes for additional information about the patient's course of treatment  ED Arrival Information     Expected Arrival Acuity Means of Arrival Escorted By Service Admission Type    - 3/4/2020 01:00 Emergent Walk-In Self Hospitalist Emergency    Arrival Complaint    nausea        Chief Complaint   Patient presents with    Vomiting     5 days NV + wet cough productive of green sputum   Cough     Assessment/Plan:     60 Y O male  Presents to ED   From home with intermittent  Nausea and vomiting for past 5 days, has not been able to eat or drink much  Has productive cough, greenish sputum  Has  Been having fever and chills  at home  Ct  Chest  Shows multifocal pneumonia  Ct abdomen  No acute findings  PMH  Is  Dm2,  Benign essential hypertension, opioid use disorder  Admit  Ip  With multifocal pneumonia, non intractable vomiting with nausea   And plan is  RANCHO, monitor labs,  IVF, blood/sputum cultures, maintain methadone  And antiemetics as needed          ED Triage Vitals [03/04/20 0106]   Temperature Pulse Respirations Blood Pressure SpO2   100 °F (37 8 °C) (!) 127 (!) 24 133/74 (!) 88 %      Temp Source Heart Rate Source Patient Position - Orthostatic VS BP Location FiO2 (%)   Temporal Monitor Sitting Right arm --      Pain Score       No Pain        Wt Readings from Last 1 Encounters:   03/04/20 92 3 kg (203 lb 7 8 oz)     Additional Vital Signs:   03/04/20 1100            Nasal cannula     03/04/20 0700  99 2 °F (37 3 °C)  89  18  138/81  95 %     Lying   SpO2: 5liter at 03/04/20 0700   03/04/20 0436            Nasal cannula     03/04/20 0426  98 6 °F (37 °C)  112Abnormal   16  158/93  94 %  Nasal cannula  Sitting   03/04/20 0335    97  12  120/72  97 %  Nasal cannula  Lying   03/04/20 0240  98 8 °F (37 1 °C)  104  12  131/72  96 %  Nasal cannula  Lying   03/04/20 0144    104  24Abnormal   133/73  95 %  Nasal cannula  Lying   03/04/20 0106  100 °F (37 8 °C)  127Abnormal   24Abnormal   133/74  88 %Abnormal   None (Room air)  Sitting         Pertinent Labs/Diagnostic Test Results:   CT PULMONARY ANGIOGRAM OF THE CHEST AND CT ABDOMEN AND PELVIS   ( 3/4)     Limited evaluation due to poor contrast bolus timing   No pulmonary embolism to the lobar level  2   Patchy opacities seen throughout the lungs most compatible with multifocal pneumonia  3   Thickening of the bronchi predominantly in the lower lobes compatible with bronchitis  4   Mediastinal lymphadenopathy which is likely reactive     5   Cirrhotic morphology of liver  6   5 x 2 mm nonobstructive calculus within lower pole the right kidney   No hydronephrosis  7   Bilateral gynecomastia    Results from last 7 days   Lab Units 03/04/20  0126   WBC Thousand/uL 15 09*   HEMOGLOBIN g/dL 14 9   HEMATOCRIT % 45 7   PLATELETS Thousands/uL 243   NEUTROS ABS Thousands/µL 11 08*         Results from last 7 days   Lab Units 03/04/20  0126   SODIUM mmol/L 137   POTASSIUM mmol/L 3 2*   CHLORIDE mmol/L 99*   CO2 mmol/L 25 ANION GAP mmol/L 13   BUN mg/dL 14   CREATININE mg/dL 1 23   EGFR ml/min/1 73sq m 73   CALCIUM mg/dL 8 9     Results from last 7 days   Lab Units 03/04/20  0126   AST U/L 17   ALT U/L 21   ALK PHOS U/L 97   TOTAL PROTEIN g/dL 8 5*   ALBUMIN g/dL 2 9*   TOTAL BILIRUBIN mg/dL 1 62*     Results from last 7 days   Lab Units 03/04/20  1108 03/04/20  0702   POC GLUCOSE mg/dl 81 82     Results from last 7 days   Lab Units 03/04/20  0126   GLUCOSE RANDOM mg/dL 93               Results from last 7 days   Lab Units 03/04/20  0126   PROTIME seconds 15 6*   INR  1 22*   PTT seconds 33         Results from last 7 days   Lab Units 03/04/20  0126   PROCALCITONIN ng/ml <0 05     Results from last 7 days   Lab Units 03/04/20  0126   LACTIC ACID mmol/L 1 1               Results from last 7 days   Lab Units 03/04/20  0504   CLARITY UA  Slightly Cloudy   COLOR UA  Yellow   SPEC GRAV UA  <=1 005   PH UA  6 0   GLUCOSE UA mg/dl Negative   KETONES UA mg/dl 15 (1+)*   BLOOD UA  Negative   PROTEIN UA mg/dl Negative   NITRITE UA  Negative   BILIRUBIN UA  Negative   UROBILINOGEN UA E U /dl 4 0*   LEUKOCYTES UA  Negative     Results from last 7 days   Lab Units 03/04/20  0824   INFLUENZA A PCR  None Detected   INFLUENZA B PCR  None Detected   RSV PCR  None Detected           Results from last 7 days   Lab Units 03/04/20  0131 03/04/20  0124   BLOOD CULTURE  Received in Microbiology Lab  Culture in Progress  Received in Microbiology Lab  Culture in Progress           ED Treatment:   Medication Administration from 03/04/2020 0100 to 03/04/2020 0411       Date/Time Order Dose Route Action Comments     03/04/2020 0240 cefepime (MAXIPIME) 2 g/50 mL dextrose IVPB 0 mg Intravenous Stopped      03/04/2020 0139 cefepime (MAXIPIME) 2 g/50 mL dextrose IVPB 2,000 mg Intravenous New Bag      03/04/2020 0358 sodium chloride 0 9 % bolus 1,000 mL 0 mL Intravenous Stopped      03/04/2020 0201 sodium chloride 0 9 % bolus 1,000 mL 1,000 mL Intravenous New Bag 03/04/2020 0202 ondansetron (ZOFRAN) injection 4 mg 4 mg Intravenous Given      03/04/2020 0233 iohexol (OMNIPAQUE) 350 MG/ML injection (MULTI-DOSE) 100 mL 100 mL Intravenous Given      03/04/2020 0401 potassium chloride (K-DUR,KLOR-CON) CR tablet 40 mEq 40 mEq Oral Given      03/04/2020 0334 ondansetron (ZOFRAN) injection 4 mg 4 mg Intravenous Given           Present on Admission:   Benign essential hypertension   Diabetes mellitus type 2, uncomplicated (HCC)   Opioid use disorder ([facility] )      Admitting Diagnosis: Cough [R05]  Hypokalemia [E87 6]  Nausea and vomiting [R11 2]  Hypoxia [R09 02]  Sepsis ([facility] ) [A41 9]  Multifocal pneumonia [J18 9]  Age/Sex: 60 y o  male  Admission Orders:  Scheduled Medications:    Medications:  AMILoride 5 mg Oral Daily   cefTRIAXone 1,000 mg Intravenous Q24H   And      azithromycin 500 mg Oral Q24H   enoxaparin 40 mg Subcutaneous Daily   guaiFENesin 600 mg Oral BID   insulin lispro 1-5 Units Subcutaneous TID AC   insulin lispro 1-5 Units Subcutaneous HS   methadone 44 mg Oral Daily   nicotine 1 patch Transdermal Daily   propranolol 20 mg Oral Q12H [address]     Continuous IV Infusions:    sodium chloride 125 mL/hr Intravenous Continuous     PRN Meds:    acetaminophen 650 mg Oral Q6H PRN   calcium carbonate 500 mg Oral TID PRN   ondansetron 4 mg Intravenous Q6H PRN           Network Utilization Review Department  [email or web address]  ATTENTION: Please call with any questions or concerns to [phone] and carefully listen to the prompts so that you are directed to the right person  All voicemails are confidential   Harikiaraludivina MarmolejoMartine all requests for admission clinical reviews, approved or denied determinations and any other requests to dedicated fax number below belonging to the campus where the patient is receiving treatment   List of dedicated fax numbers for the Facilities:  FACILITY NAME DALE Laine[address] DENIALS (Administrative/Medical Necessity) [phone] [address] (Maternity/NICU/Pediatrics) [facility] [phone]   [facility] [phone]   Halley Milner [phone]   [address] [phone]   [facility]  [phone]   [address] [facility]  [address] [facility] [place] [address] [facility] [phone]

## 2020-03-04 NOTE — ASSESSMENT & PLAN NOTE
Patient presents with 5 days of nausea and vomiting  Reports poor oral intake secondary to vomiting  CT negative for acute findings in abdomen  Likely component of viral gastroenteritis  IV fluid hydration  Surgical soft diet, advance as tolerated  P r n   Zofran

## 2020-03-04 NOTE — ASSESSMENT & PLAN NOTE
Potassium 3 2 at time of presentation  Likely secondary to GI losses  Replete orally  Monitor BMP while inpatient

## 2020-03-04 NOTE — ASSESSMENT & PLAN NOTE
Patient met sepsis criteria time of presentation with leukocytosis, tachycardia, tachypnea  Source likely multifocal pneumonia  Patient reports multiple days of cough productive of sputum  Given cefepime in ED, will change to ceftriaxone and azithromycin and continue daily  Blood cultures pending  Will check sputum culture  IV fluid hydration

## 2020-03-04 NOTE — ASSESSMENT & PLAN NOTE
Patient reporting multiple days of persistent cough  Found to have CT evidence of patchy opacities throughout the lungs most compatible with multifocal pneumonia  Started on cefepime in ED, will switch to ceftriaxone and azithromycin daily  Mucinex b i d   IV fluids  Incentive spirometry  Blood and sputum cultures pending

## 2020-03-04 NOTE — H&P
[facility] Internal Medicine  H&P- Wilmer Car 1960, 60 y o  male MRN: [id number]    Unit/Bed#: E2 -01 Encounter: [id number]    Primary Care Provider: Adebayo Banda MD   Date and time admitted to hospital: 3/4/2020  1:07 AM        * Sepsis [facility])  Assessment & Plan  Patient met sepsis criteria time of presentation with leukocytosis, tachycardia, tachypnea  Source likely multifocal pneumonia  Patient reports multiple days of cough productive of sputum  Given cefepime in ED, will change to ceftriaxone and azithromycin and continue daily  Blood cultures pending  Will check sputum culture  IV fluid hydration      Multifocal pneumonia  Assessment & Plan  Patient reporting multiple days of persistent cough  Found to have CT evidence of patchy opacities throughout the lungs most compatible with multifocal pneumonia  Started on cefepime in ED, will switch to ceftriaxone and azithromycin daily  Mucinex b i d   IV fluids  Incentive spirometry  Blood and sputum cultures pending    Non-intractable vomiting with nausea  Assessment & Plan  Patient presents with 5 days of nausea and vomiting  Reports poor oral intake secondary to vomiting  CT negative for acute findings in abdomen  Likely component of viral gastroenteritis  IV fluid hydration  Surgical soft diet, advance as tolerated  P r n  Zofran    Opioid use disorder (HCC)  Assessment & Plan  Maintained on methadone  Will need to verify dose with methadone clinic    Diabetes mellitus type 2, uncomplicated [facility])  Assessment & Plan  Lab Results   Component Value Date    HGBA1C 5 8 12/19/2019       No results for input(s): POCGLU in the last 72 hours      Blood Sugar Average: Last 72 hrs:   hold oral metformin while inpatient  Sliding scale insulin with Accu-Cheks    Benign essential hypertension  Assessment & Plan  BP appears controlled at this time  Continue home propranolol and continue to monitor BP      VTE Prophylaxis: Enoxaparin (Lovenox)  / sequential compression device   Code Status:  Full code  POLST: POLST form is not discussed and not completed at this time  Discussion with family:  None    Anticipated Length of Stay:  Patient will be admitted on an Inpatient basis with an anticipated length of stay of  more than 2 midnights  Justification for Hospital Stay:  Multifocal pneumonia requiring IV antibiotics    Total Time for Visit, including Counseling / Coordination of Care: 1 hour  Greater than 50% of this total time spent on direct patient counseling and coordination of care  Chief Complaint:   Cough and vomiting    History of Present Illness:    Jaylan Chacon is a 60 y o  male with past medical history of hypertension and diabetes who presents with cough and vomiting  Patient reports approximately 5 days of intermittent nausea and vomiting  Patient reports he has not been able to eat or drink much for the past few days secondary to nausea and vomiting  Patient denies any blood in his vomit  Patient denies any diarrhea  Patient also notes productive cough  Reporting cough is productive of green sputum  Patient reports coughing spells often ending in vomiting  Patient reports he has been having fevers and chills at home  Patient denies any associated chest pain or shortness of breath  Patient denies any dysuria but has noted dark colored urine  Review of Systems:    Review of Systems   Constitutional: Positive for appetite change, chills and fever  Negative for diaphoresis  HENT: Negative for trouble swallowing  Eyes: Negative for visual disturbance  Respiratory: Positive for cough  Negative for wheezing  Cardiovascular: Negative for chest pain and leg swelling  Gastrointestinal: Positive for abdominal pain, nausea and vomiting  Negative for diarrhea  Genitourinary: Positive for decreased urine volume  Negative for difficulty urinating  Musculoskeletal: Negative for gait problem  Skin: Negative for rash     Neurological: Negative for dizziness and weakness  Psychiatric/Behavioral: Negative for confusion  Past Medical and Surgical History:     Past Medical History:   Diagnosis Date    Diabetes mellitus ([facility] )     Esophageal varices (HCC)     Gastritis     Hepatitis C     History of kidney stones     Hyperlipidemia     Hypertension     Substance abuse ([facility] )        Past Surgical History:   Procedure Laterality Date    COLONOSCOPY  06/20/2014    dr [email or web address]    ESOPHAGOGASTRODUODENOSCOPY  12/10/2015    esophageal varices, cirrhosis, gastritis    HYDROCELE EXCISION / REPAIR      LIVER BIOPSY  2014       Meds/Allergies:    Prior to Admission medications    Medication Sig Start Date End Date Taking? Authorizing Provider   Blood Glucose Monitoring Suppl (FREESTYLE LITE) COCO Inject under the skin 2 (two) times a day 5/3/19  Yes Kelvin Yeboah MD   glucose blood (FREESTYLE LITE) test strip 1 each by Other route 2 (two) times a day Use as instructed 11/20/18  Yes Kelvin Yeboah MD   metFORMIN (GLUCOPHAGE) 1000 MG tablet Take 1 tablet (1,000 mg total) by mouth 2 (two) times a day with meals 5/22/19  Yes Kelvin Yeboah MD   methadone (DOLOPHINE) 10 mg tablet One tablet daily ( on blind reduction), 2/21/19  Yes Kelvin Yeboah MD   propranolol (INDERAL) 20 mg tablet Take 1 tablet (20 mg total) by mouth every 12 (twelve) hours 5/22/19  Yes Soren Trivedi MD   B-D 3CC LUER-MARÍA ELENA SYR 03IZ5-5/2 21G X 1-1/2" 3 ML MISC USE WITH TESTOSTERONE CYPIONATE EVERY TWO WEEKS 9/30/19   Historical Provider, MD   AMILoride 5 mg tablet Take 1 tablet (5 mg total) by mouth daily 5/22/19 3/4/20  Kelvin Yeboah MD   clotrimazole (LOTRIMIN) 1 % cream APLIQUE AL ?ALICIA AFECTADA DOS VECES AL D? A EN LA MA? LAYLA Y EN LA NOCHE 6/1/19 3/4/20  Historical Provider, MD   testosterone cypionate (DEPO-TESTOSTERONE) 100 mg/mL IM injection INJECT 2 ML (200 MG TOTAL) INJECT INTO THE MUSCLE EVERY 14 (FOURTEEN) DAYS   9/27/19 3/4/20  Historical Provider, MD     I have reviewed home medications with patient personally  Allergies: Allergies   Allergen Reactions    Meperidine Abdominal Pain       Social History:     Marital Status: Single   Occupation:  Unknown  Patient Pre-hospital Living Situation:  Home  Patient Pre-hospital Level of Mobility:  Ambulatory  Patient Pre-hospital Diet Restrictions:  Diabetic  Substance Use History:   Social History     Substance and Sexual Activity   Alcohol Use Never    Alcohol/week: 0 0 standard drinks    Frequency: Never     Social History     Tobacco Use   Smoking Status Current Every Day Smoker    Packs/day: 1 00   Smokeless Tobacco Never Used   Tobacco Comment    TOBACCO USE     Social History     Substance and Sexual Activity   Drug Use No    Comment: former user- heroin was drug of choice       Family History:    Family History   Problem Relation Age of Onset    Diabetes type II Mother         MELLITUS    Hypertension Mother     Hypertension Sister     Diabetes Sister         MELLITUS       Physical Exam:     Vitals:   Blood Pressure: 158/93 (03/04/20 0426)  Pulse: (!) 112 (03/04/20 0426)  Temperature: 98 6 °F (37 °C) (03/04/20 0426)  Temp Source: Tympanic (03/04/20 0426)  Respirations: 16 (03/04/20 0426)  Height: 5' 6" (167 6 cm) (03/04/20 0503)  Weight - Scale: 92 3 kg (203 lb 7 8 oz) (03/04/20 0419)  SpO2: 94 % (03/04/20 0426)    Physical Exam   Constitutional: He is oriented to person, place, and time  He appears well-nourished  HENT:   Head: Normocephalic and atraumatic  Mouth/Throat: Oropharynx is clear and moist    Eyes: Conjunctivae are normal  No scleral icterus  Neck: Neck supple  Cardiovascular: Normal rate, regular rhythm and normal heart sounds  No murmur heard  Pulmonary/Chest: Effort normal  No respiratory distress  He has no wheezes  Coarse breath sounds, clears with cough   Abdominal: Soft  Bowel sounds are normal  He exhibits no distension  There is no tenderness     Musculoskeletal: He exhibits no edema or deformity  Neurological: He is alert and oriented to person, place, and time  Skin: Skin is warm and dry  Psychiatric: He has a normal mood and affect  His behavior is normal  Thought content normal    Vitals reviewed  Additional Data:     Lab Results: I have personally reviewed pertinent reports  Results from last 7 days   Lab Units 03/04/20  0126   WBC Thousand/uL 15 09*   HEMOGLOBIN g/dL 14 9   HEMATOCRIT % 45 7   PLATELETS Thousands/uL 243   NEUTROS PCT % 73   LYMPHS PCT % 14   MONOS PCT % 12   EOS PCT % 0     Results from last 7 days   Lab Units 03/04/20  0126   SODIUM mmol/L 137   POTASSIUM mmol/L 3 2*   CHLORIDE mmol/L 99*   CO2 mmol/L 25   BUN mg/dL 14   CREATININE mg/dL 1 23   ANION GAP mmol/L 13   CALCIUM mg/dL 8 9   ALBUMIN g/dL 2 9*   TOTAL BILIRUBIN mg/dL 1 62*   ALK PHOS U/L 97   ALT U/L 21   AST U/L 17   GLUCOSE RANDOM mg/dL 93     Results from last 7 days   Lab Units 03/04/20  0126   INR  1 22*             Results from last 7 days   Lab Units 03/04/20  0126   LACTIC ACID mmol/L 1 1       Imaging: I have personally reviewed pertinent reports  PE Study with CT abdomen & pelvis with contrast   Final Result by Shea Jordan MD (03/04 0255)      1  Limited evaluation due to poor contrast bolus timing  No pulmonary embolism to the lobar level  2   Patchy opacities seen throughout the lungs most compatible with multifocal pneumonia  3   Thickening of the bronchi predominantly in the lower lobes compatible with bronchitis  4   Mediastinal lymphadenopathy which is likely reactive  5   Cirrhotic morphology of liver  6   5 x 2 mm nonobstructive calculus within lower pole the right kidney  No hydronephrosis  7   Bilateral gynecomastia  Workstation performed: [id number]               eDabba / [employer] Records Reviewed: Yes     ** Please Note: This note has been constructed using a voice recognition system   **

## 2020-03-05 LAB
ALBUMIN SERPL BCP-MCNC: 2.2 G/DL (ref 3.5–5)
ALP SERPL-CCNC: 91 U/L (ref 46–116)
ALT SERPL W P-5'-P-CCNC: 17 U/L (ref 12–78)
ANION GAP SERPL CALCULATED.3IONS-SCNC: 13 MMOL/L (ref 4–13)
AST SERPL W P-5'-P-CCNC: 23 U/L (ref 5–45)
B PARAP IS1001 DNA NPH QL NAA+NON-PROBE: NOT DETECTED
B PERT.PT PRMT NPH QL NAA+NON-PROBE: NOT DETECTED
BILIRUB DIRECT SERPL-MCNC: 0.52 MG/DL (ref 0–0.2)
BILIRUB SERPL-MCNC: 0.86 MG/DL (ref 0.2–1)
BUN SERPL-MCNC: 11 MG/DL (ref 5–25)
C PNEUM DNA NPH QL NAA+NON-PROBE: NOT DETECTED
CALCIUM SERPL-MCNC: 8.3 MG/DL (ref 8.3–10.1)
CHLORIDE SERPL-SCNC: 103 MMOL/L (ref 100–108)
CO2 SERPL-SCNC: 21 MMOL/L (ref 21–32)
CREAT SERPL-MCNC: 1.06 MG/DL (ref 0.6–1.3)
ERYTHROCYTE [DISTWIDTH] IN BLOOD BY AUTOMATED COUNT: 14.6 % (ref 11.6–15.1)
FLUAV RNA NPH QL NAA+NON-PROBE: NOT DETECTED
FLUBV RNA NPH QL NAA+NON-PROBE: NOT DETECTED
GFR SERPL CREATININE-BSD FRML MDRD: 88 ML/MIN/1.73SQ M
GLUCOSE SERPL-MCNC: 100 MG/DL (ref 65–140)
GLUCOSE SERPL-MCNC: 102 MG/DL (ref 65–140)
GLUCOSE SERPL-MCNC: 113 MG/DL (ref 65–140)
GLUCOSE SERPL-MCNC: 62 MG/DL (ref 65–140)
GLUCOSE SERPL-MCNC: 64 MG/DL (ref 65–140)
GLUCOSE SERPL-MCNC: 69 MG/DL (ref 65–140)
HADV DNA NPH QL NAA+NON-PROBE: NOT DETECTED
HCT VFR BLD AUTO: 45 % (ref 36.5–49.3)
HGB BLD-MCNC: 14.1 G/DL (ref 12–17)
HMPV RNA NPH QL NAA+NON-PROBE: NOT DETECTED
HPIV 1+2+3+4 RNA NPH QL NAA+NON-PROBE: NOT DETECTED
HPIV 1+2+3+4 RNA NPH QL NAA+NON-PROBE: NOT DETECTED
L PNEUMO1 AG UR QL IA.RAPID: NEGATIVE
M PNEUMO DNA NPH QL NAA+NON-PROBE: NOT DETECTED
MCH RBC QN AUTO: 29.6 PG (ref 26.8–34.3)
MCHC RBC AUTO-ENTMCNC: 31.3 G/DL (ref 31.4–37.4)
MCV RBC AUTO: 94 FL (ref 82–98)
PLATELET # BLD AUTO: 208 THOUSANDS/UL (ref 149–390)
PMV BLD AUTO: 10 FL (ref 8.9–12.7)
POTASSIUM SERPL-SCNC: 4 MMOL/L (ref 3.5–5.3)
PROT SERPL-MCNC: 7.2 G/DL (ref 6.4–8.2)
RBC # BLD AUTO: 4.77 MILLION/UL (ref 3.88–5.62)
RSV RNA NPH QL NAA+NON-PROBE: NOT DETECTED
RV+EV RNA NPH QL NAA+NON-PROBE: NOT DETECTED
S PNEUM AG UR QL: NEGATIVE
SODIUM SERPL-SCNC: 137 MMOL/L (ref 136–145)
WBC # BLD AUTO: 9.24 THOUSAND/UL (ref 4.31–10.16)

## 2020-03-05 PROCEDURE — 85027 COMPLETE CBC AUTOMATED: CPT | Performed by: INTERNAL MEDICINE

## 2020-03-05 PROCEDURE — 94640 AIRWAY INHALATION TREATMENT: CPT

## 2020-03-05 PROCEDURE — 87633 RESP VIRUS 12-25 TARGETS: CPT | Performed by: NURSE PRACTITIONER

## 2020-03-05 PROCEDURE — 87581 M.PNEUMON DNA AMP PROBE: CPT | Performed by: NURSE PRACTITIONER

## 2020-03-05 PROCEDURE — 80076 HEPATIC FUNCTION PANEL: CPT | Performed by: INTERNAL MEDICINE

## 2020-03-05 PROCEDURE — 80048 BASIC METABOLIC PNL TOTAL CA: CPT | Performed by: INTERNAL MEDICINE

## 2020-03-05 PROCEDURE — 82948 REAGENT STRIP/BLOOD GLUCOSE: CPT

## 2020-03-05 PROCEDURE — 87798 DETECT AGENT NOS DNA AMP: CPT | Performed by: NURSE PRACTITIONER

## 2020-03-05 PROCEDURE — 99232 SBSQ HOSP IP/OBS MODERATE 35: CPT | Performed by: INTERNAL MEDICINE

## 2020-03-05 PROCEDURE — 94760 N-INVAS EAR/PLS OXIMETRY 1: CPT

## 2020-03-05 PROCEDURE — 87486 CHLMYD PNEUM DNA AMP PROBE: CPT | Performed by: NURSE PRACTITIONER

## 2020-03-05 PROCEDURE — 99223 1ST HOSP IP/OBS HIGH 75: CPT | Performed by: INTERNAL MEDICINE

## 2020-03-05 RX ORDER — METRONIDAZOLE 500 MG/1
500 TABLET ORAL EVERY 8 HOURS SCHEDULED
Status: DISCONTINUED | OUTPATIENT
Start: 2020-03-05 | End: 2020-03-05

## 2020-03-05 RX ORDER — LEVALBUTEROL INHALATION SOLUTION 0.63 MG/3ML
0.63 SOLUTION RESPIRATORY (INHALATION)
Status: DISCONTINUED | OUTPATIENT
Start: 2020-03-06 | End: 2020-03-06

## 2020-03-05 RX ORDER — LEVALBUTEROL 1.25 MG/.5ML
SOLUTION, CONCENTRATE RESPIRATORY (INHALATION)
Status: DISPENSED
Start: 2020-03-05 | End: 2020-03-06

## 2020-03-05 RX ORDER — LEVALBUTEROL INHALATION SOLUTION 0.63 MG/3ML
0.63 SOLUTION RESPIRATORY (INHALATION) EVERY 4 HOURS PRN
Status: DISCONTINUED | OUTPATIENT
Start: 2020-03-05 | End: 2020-03-06

## 2020-03-05 RX ADMIN — IPRATROPIUM BROMIDE 0.5 MG: 0.5 SOLUTION RESPIRATORY (INHALATION) at 08:28

## 2020-03-05 RX ADMIN — ENOXAPARIN SODIUM 40 MG: 40 INJECTION SUBCUTANEOUS at 08:48

## 2020-03-05 RX ADMIN — LEVALBUTEROL HYDROCHLORIDE 0.63 MG: 0.63 SOLUTION RESPIRATORY (INHALATION) at 08:28

## 2020-03-05 RX ADMIN — LEVALBUTEROL HYDROCHLORIDE: 0.63 SOLUTION RESPIRATORY (INHALATION) at 19:11

## 2020-03-05 RX ADMIN — NICOTINE 1 PATCH: 14 PATCH TRANSDERMAL at 08:48

## 2020-03-05 RX ADMIN — METHADONE HYDROCHLORIDE 44 MG: 10 CONCENTRATE ORAL at 08:47

## 2020-03-05 RX ADMIN — AMILORIDE HYDROCHLORIDE 5 MG: 5 TABLET ORAL at 08:49

## 2020-03-05 RX ADMIN — PROPRANOLOL HYDROCHLORIDE 20 MG: 20 TABLET ORAL at 08:48

## 2020-03-05 RX ADMIN — SODIUM CHLORIDE 125 ML/HR: 0.9 INJECTION, SOLUTION INTRAVENOUS at 07:46

## 2020-03-05 RX ADMIN — METRONIDAZOLE 500 MG: 500 INJECTION, SOLUTION INTRAVENOUS at 07:46

## 2020-03-05 RX ADMIN — ONDANSETRON 4 MG: 2 INJECTION INTRAMUSCULAR; INTRAVENOUS at 19:44

## 2020-03-05 RX ADMIN — ONDANSETRON 4 MG: 2 INJECTION INTRAMUSCULAR; INTRAVENOUS at 04:23

## 2020-03-05 RX ADMIN — IPRATROPIUM BROMIDE: 0.5 SOLUTION RESPIRATORY (INHALATION) at 19:11

## 2020-03-05 RX ADMIN — GUAIFENESIN 600 MG: 600 TABLET, EXTENDED RELEASE ORAL at 18:12

## 2020-03-05 RX ADMIN — METRONIDAZOLE 500 MG: 500 INJECTION, SOLUTION INTRAVENOUS at 00:27

## 2020-03-05 RX ADMIN — GUAIFENESIN 600 MG: 600 TABLET, EXTENDED RELEASE ORAL at 08:48

## 2020-03-05 RX ADMIN — PROPRANOLOL HYDROCHLORIDE 20 MG: 20 TABLET ORAL at 21:20

## 2020-03-05 NOTE — PLAN OF CARE
Problem: Nutrition/Hydration-ADULT  Goal: Nutrient/Hydration intake appropriate for improving, restoring or maintaining nutritional needs  Description  Monitor and assess patient's nutrition/hydration status for malnutrition  Collaborate with interdisciplinary team and initiate plan and interventions as ordered  Monitor patient's weight and dietary intake as ordered or per policy  Utilize nutrition screening tool and intervene as necessary  Determine patient's food preferences and provide high-protein, high-caloric foods as appropriate       INTERVENTIONS:  - Monitor oral intake, urinary output, labs, and treatment plans  - Assess nutrition and hydration status and recommend course of action  - Evaluate amount of meals eaten  - Assist patient with eating if necessary   - Allow adequate time for meals  - Recommend/ encourage appropriate diets, oral nutritional supplements, and vitamin/mineral supplements  - Order, calculate, and assess calorie counts as needed  - Recommend, monitor, and adjust tube feedings and TPN/PPN based on assessed needs  - Assess need for intravenous fluids  - Provide specific nutrition/hydration education as appropriate  - Include patient/family/caregiver in decisions related to nutrition  Outcome: Progressing     Problem: RESPIRATORY - ADULT  Goal: Achieves optimal ventilation and oxygenation  Description  INTERVENTIONS:  - Assess for changes in respiratory status  - Assess for changes in mentation and behavior  - Position to facilitate oxygenation and minimize respiratory effort  - Oxygen administered by appropriate delivery if ordered  - Initiate smoking cessation education as indicated  - Encourage broncho-pulmonary hygiene including cough, deep breathe, Incentive Spirometry  - Assess the need for suctioning and aspirate as needed  - Assess and instruct to report SOB or any respiratory difficulty  - Respiratory Therapy support as indicated  Outcome: Progressing     Problem: METABOLIC, FLUID AND ELECTROLYTES - ADULT  Goal: Electrolytes maintained within normal limits  Description  INTERVENTIONS:  - Monitor labs and assess patient for signs and symptoms of electrolyte imbalances  - Administer electrolyte replacement as ordered  - Monitor response to electrolyte replacements, including repeat lab results as appropriate  - Instruct patient on fluid and nutrition as appropriate  Outcome: Progressing  Goal: Glucose maintained within target range  Description  INTERVENTIONS:  - Monitor Blood Glucose as ordered  - Assess for signs and symptoms of hyperglycemia and hypoglycemia  - Administer ordered medications to maintain glucose within target range  - Assess nutritional intake and initiate nutrition service referral as needed  Outcome: Progressing     Problem: COPING  Goal: Pt/Family able to verbalize concerns and demonstrate effective coping strategies  Description  INTERVENTIONS:  - Assist patient/family to identify coping skills, available support systems and cultural and spiritual values  - Provide emotional support, including active listening and acknowledgement of concerns of patient and caregivers  - Reduce environmental stimuli, as able  - Provide patient education  - Assess for spiritual pain/suffering and initiate spiritual care, including notification of Pastoral Care or [place] based community as needed  - Assess effectiveness of coping strategies  Outcome: Progressing     Problem: PAIN - ADULT  Goal: Verbalizes/displays adequate comfort level or baseline comfort level  Description  Interventions:  - Encourage patient to monitor pain and request assistance  - Assess pain using appropriate pain scale  - Administer analgesics based on type and severity of pain and evaluate response  - Implement non-pharmacological measures as appropriate and evaluate response  - Consider cultural and social influences on pain and pain management  - Notify physician/advanced practitioner if interventions unsuccessful or patient reports new pain  Outcome: Progressing     Problem: INFECTION - ADULT  Goal: Absence or prevention of progression during hospitalization  Description  INTERVENTIONS:  - Assess and monitor for signs and symptoms of infection  - Monitor lab/diagnostic results  - Monitor all insertion sites, i e  indwelling lines, tubes, and drains  - Monitor endotracheal if appropriate and nasal secretions for changes in amount and color  - [place] appropriate cooling/warming therapies per order  - Administer medications as ordered  - Instruct and encourage patient and family to use good hand hygiene technique  - Identify and instruct in appropriate isolation precautions for identified infection/condition  Outcome: Progressing     Problem: SAFETY ADULT  Goal: Patient will remain free of falls  Description  INTERVENTIONS:  - Assess patient frequently for physical needs  -  Identify cognitive and physical deficits and behaviors that affect risk of falls    -  [place] fall precautions as indicated by assessment   - Educate patient/family on patient safety including physical limitations  - Instruct patient to call for assistance with activity based on assessment  - Modify environment to reduce risk of injury  - Consider OT/PT consult to assist with strengthening/mobility  Outcome: Progressing     Problem: DISCHARGE PLANNING  Goal: Discharge to home or other facility with appropriate resources  Description  INTERVENTIONS:  - Identify barriers to discharge w/patient and caregiver  - Arrange for needed discharge resources and transportation as appropriate  - Identify discharge learning needs (meds, wound care, etc )  - Arrange for interpretive services to assist at discharge as needed  - Refer to Case Management Department for coordinating discharge planning if the patient needs post-hospital services based on physician/advanced practitioner order or complex needs related to functional status, cognitive ability, or social support system  Outcome: Progressing     Problem: Knowledge Deficit  Goal: Patient/family/caregiver demonstrates understanding of disease process, treatment plan, medications, and discharge instructions  Description  Complete learning assessment and assess knowledge base  Interventions:  - Provide teaching at level of understanding  - Provide teaching via preferred learning methods  Outcome: Progressing     Problem: Potential for Falls  Goal: Patient will remain free of falls  Description  INTERVENTIONS:  - Assess patient frequently for physical needs  -  Identify cognitive and physical deficits and behaviors that affect risk of falls    -  [place] fall precautions as indicated by assessment   - Educate patient/family on patient safety including physical limitations  - Instruct patient to call for assistance with activity based on assessment  - Modify environment to reduce risk of injury  - Consider OT/PT consult to assist with strengthening/mobility  Outcome: Progressing

## 2020-03-05 NOTE — CONSULTS
Consultation - Infectious Disease   Zackery Santillan 60 y o  male MRN: [id number]  Unit/Bed#: E2 -01 Encounter: [id number]    IMPRESSION & RECOMMENDATIONS:   1  Sepsis  POA  Tachycardia, tachypnea, leukocytosis, and hypoxia  Suspect secondary to aspiration pneumonia versus pneumonitis from his recent nausea and vomiting  Chest imaging showed patchy opacities throughout the bilateral lungs  Patient's procalcitonin was negative  Urine strep and Legionella antigen were negative  His flu/RSV PCR was negative  Will check an RP2 for additional viral workup  No other clear source appreciated  Blood cultures are negative after 24 hours  UA was unremarkable  Fortunately the patient is clinically improving  His WBC count has trended down and he remains afebrile   -antibiotic as below  -monitor CBC and BMP  -follow-up blood cultures  -follow-up RP2  -follow-up sputum culture  -monitor vitals  -supportive care    2  Multifocal pneumonia  Suspect aspiration as patient experienced multiple days of nausea and vomiting  Also consider community-acquired has he has recently been around large crowds and family members due to  services for his mother  Flu/RSV PCR was negative  Will check an RP2 for additional viral workup  Urine strep and Legionella antigen were negative  Sputum culture is pending  The patient is currently receiving azithromycin, Flagyl, and ceftriaxone  I would recommend stopping the azithromycin at this time  Will continue him on IV ceftriaxone for now and change his Flagyl administration from IV to PO  Anticipate rapid transition to a fully oral antibiotic regimen within the next 24 hours if patient continues to clinically improve   -stop IV azithromycin  -continue Flagyl but change administration from IV to PO  -continue IV ceftriaxone  -monitor CBC and BMP  -follow-up sputum culture  -follow-up RP2  -monitor vitals  -monitor respiratory status    3  Nausea with vomiting    Unclear etiology but I suspect this was a viral gastroenteritis  The vomiting likely contributed to pneumonia above  While he cannot identify a recent sick contact he does admit to being in large crowds around family recently  He denies recent consumption of raw or undercooked foods and reports he only drinks bottled water  He has not vomited in the past 48 hours and reports his nausea is less frequent and less intense  His appetite is slowly improving  I personally reviewed his CT imaging which showed no acute abdominal or pelvic process  No obstruction noted in the bowels  -serial abdominal exams  -monitor GI symptoms  -supportive and symptomatic care per primary service    4  Acute hypoxia  Suspect this was secondary to pneumonia above  O2 saturation upon arrival to the hospital was 88%  Patient's O2 saturation now remains stable on room air  He remains on aspiration precautions  He is clinically improving with antibiotic above   -antibiotic as above  -maintain aspiration precautions  -monitor vitals  -monitor respiratory status  -wean O2 support per primary service    5  Type 2 diabetes mellitus without long-term insulin use  Patient's last hemoglobin A1c was 5 8% on 12/19/2019  Recommend tight glycemic control for overall health   -blood glucose management per primary service    6  Opioid dependence  Patient with history of heroin use  He has been maintained on methadone as an outpatient at [facility]  -methadone management per primary service    7  Cirrhosis  Patient with history of hepatitis C, portal hypertension, and esophageal varices  Patient reports he was successfully treated for his hepatitis-C in 2014 by his primary care provider  Thank you for the consult  We will continue to follow along  Above plan was discussed in detail with patient at the bedside  Above plan was discussed in detail with infection control RN, [facility]    Above plan was discussed in detail with MICHELE attending, Dr Lacne Mccoy  HISTORY OF PRESENT ILLNESS:  Reason for Consult:  Multifocal pneumonia  HPI: Angelique Khan is a 60y o  year old male who presented to the [facility] Department on 03/04/2020 with nausea, vomiting, and wet cough productive of green sputum  Patient reports his symptoms have been present and worsening over the past five days  He was unable to keep down any food or fluid  Patient has also noticed that his urine appeared dark (red/brown) in color  He does have a history of kidney stones  Upon arrival to the ED the patient's temperature was a 100°  His heart rate was 127  His respiratory rate was 24  His O2 saturation was low at 88%  Patient's WBC count was 15 09  His lactic acid was 1 1  His creatinine was 1 23 with a GFR of 73  LFTs were normal but his total bilirubin was slightly elevated  Blood cultures were sent  A flu/RSV PCR was sent  He had a urinalysis which showed positive ketones and bilirubin but was otherwise benign  He was taken for CT PE study which was concerning for bilateral pneumonia  The patient was given a dose of cefepime  He was admitted for additional medical management  After his admission the patient's antibiotic regimen was transitioned to azithromycin, ceftriaxone, and Flagyl  There was concern for possible aspiration  His procalcitonin was checked and was found to be normal at <0 05  Urine strep and Legionella antigen were checked and were negative  Flu/RSV PCR was negative  A sputum culture was collected and is pending  Patient was continued on methadone as he has a history of heroin use and is currently maintained on methadone at the methadone clinic  Patient's blood cultures are negative after 24 hours  There is concern patient's chest x-ray findings may be the result of a different viral process versus a bacterial process so we have been asked for formal consult for multifocal pneumonia      The patient has a past medical and surgical history significant for gastritis, esophageal varices, heroin abuse, hypertension, esophageal varices, type 2 diabetes mellitus, hyperlipidemia, obesity, fatigue, vitamin-D deficiency, kidney stones, hypogonadism, substance abuse, hepatitis-C, plantar warts, nicotine dependence, portal hypertension, dysthymic disorder, H pylori infection, cirrhosis of the liver with ascites, micro hematuria, hydrocele, gallbladder polyps, LUK[place], lower extremity edema, BPH with urinary obstruction, impotence, sacroiliac joint pain, proteinuria, tinea pedis of both feet, upper respiratory infections, gross hematuria, methadone use with management outpatient methadone clinic, colonoscopy, liver biopsy, repair of hydrocele, and EGD  He has an allergy to meperidine  REVIEW OF SYSTEMS:  Patient reports that he is feeling better today  He tells me last Saturday he began experiencing nausea and vomiting at home  States it persisted for several days and eventually he was just having dry heaves and occasional stomach acid because he had no appetite eat food  He also reports some recent sinus congestion and postnasal drip which he also felt contributed to him gagging and vomiting  Today he tells me he still has some mild nausea but he has not vomited in several days  Denies diarrhea and constipation  He denies difficulty breathing, chest pain, and shortness of breath  He does report a frequent cough which is now productive for thin clear/white phlegm  He feels like he has more in his chest that he has not coughed out yet  His runny nose and postnasal drip have slowed down  He denies headaches and dizziness  He denies rashes, skin lesions, open wounds  He denies dysuria, hesitancy, and frequency  He tells me that his urine does appear dark but it has no odor  He has no pain or pressure in his pelvic region  He denies fevers, chills, shakes, sweats  He does tell me that he overall feels very fatigued  He admits to feeling worn down recently because his mother passed away a few weeks ago and he has been dealing with her  and his family who is visiting from \[place] [place] [facility]\""  A complete 12 point system-based review of systems is otherwise negative  PAST MEDICAL HISTORY:  Past Medical History:   Diagnosis Date    [facility] mellitus ([facility] [address] )     Esophageal varices (HCC)     Gastritis     Hepatitis C     History of kidney stones     Hyperlipidemia     Hypertension     [facility]      Past Surgical History:   Procedure Laterality Date    COLONOSCOPY  2014    dr [email or web address]    ESOPHAGOGASTRODUODENOSCOPY  12/10/2015    esophageal varices, cirrhosis, gastritis    HYDROCELE EXCISION / REPAIR      LIVER BIOPSY       FAMILY HISTORY:  Non-contributory    SOCIAL HISTORY:  Social History   Social History     Substance and Sexual Activity   Alcohol Use Never    Alcohol/week: 0 0 standard drinks    Frequency: Never     Social History     Substance and Sexual Activity   Drug Use No    Comment: former user- heroin was drug of choice     Social History     Tobacco Use   Smoking Status Current Every Day Smoker    Packs/day: 1 00   Smokeless Tobacco Never Used   Tobacco Comment    TOBACCO USE     ALLERGIES:  Allergies   Allergen Reactions    Meperidine Abdominal Pain     MEDICATIONS:  All current active medications have been reviewed      ANTIBIOTICS:  Ceftriaxone 2  Azithromycin - stop  Flagyl 2  Antibiotics 2    PHYSICAL EXAM:  Temp:  [97 8 °F (36 6 °C)-99 3 °F (37 4 °C)] 98 1 °F (36 7 °C)  HR:  [74-79] 78  Resp:  [18-20] 18  BP: ()/(60-78) 148/66  SpO2:  [90 %-97 %] 94 %  Temp (24hrs), Av 4 °F (36 9 °C), Min:97 8 °F (36 6 °C), Max:99 3 °F (37 4 °C)  Current: Temperature: 98 1 °F (36 7 °C)    Intake/Output Summary (Last 24 hours) at 3/5/2020 1046  Last data filed at 3/5/2020 0746  Gross per 24 hour   Intake 3347 92 ml   Output 300 ml   Net 3047 92 ml     General Appearance:  Appearing well but tired, nontoxic, and in no distress   Head:  Normocephalic, without obvious abnormality, atraumatic   Eyes:  Conjunctiva pink and sclera anicteric, both eyes   Nose: Nares normal, mucosa normal, no drainage   Throat: Oropharynx moist without lesions   Neck: Supple, symmetrical, no adenopathy, no tenderness/mass/nodules   Back:   Symmetric, ROM normal, no CVA tenderness   Lungs:   Mild scattered rhonchi to auscultation bilaterally, no wheezing, respirations unlabored, patient is on nasal cannula O2   Chest Wall:  No tenderness or deformity   Heart:  RRR; no murmur, rub or gallop   Abdomen:   Soft, non-tender, non-distended, positive bowel sounds throughout   Extremities: No cyanosis or clubbing, no edema   Skin: No rashes or lesions  No draining wounds noted  Lymph nodes: Cervical, supraclavicular nodes normal   Neurologic: Alert and oriented times 3, follows commands, symmetric facial movement     LABS, IMAGING, & OTHER STUDIES:  Lab Results:  I have personally reviewed pertinent labs  Results from last 7 days   Lab Units 03/05/20  0443 03/04/20  0126   WBC Thousand/uL 9 24 15 09*   HEMOGLOBIN g/dL 14 1 14 9   PLATELETS Thousands/uL 208 243     Results from last 7 days   Lab Units 03/05/20  0442 03/04/20  0126   POTASSIUM mmol/L 4 0 3 2*   CHLORIDE mmol/L 103 99*   CO2 mmol/L 21 25   BUN mg/dL 11 14   CREATININE mg/dL 1 06 1 23   EGFR ml/min/1 73sq m 88 73   CALCIUM mg/dL 8 3 8 9   AST U/L 23 17   ALT U/L 17 21   ALK PHOS U/L 91 97     Results from last 7 days   Lab Units 03/04/20  1543 03/04/20  0131 03/04/20  0124   BLOOD CULTURE   --  No Growth at 24 hrs  No Growth at 24 hrs  LEGIONELLA URINARY ANTIGEN  Negative  --   --      Results from last 7 days   Lab Units 03/04/20  0126   PROCALCITONIN ng/ml <0 05     Imaging Studies:   I have personally reviewed pertinent imaging study reports and images in PACS      PE STUDY WITH CT ABDOMEN & PELVIS WITH CONTRAST 3/4/2020 - I personally reviewed this study which showed ground-glass opacities throughout bilateral lungs  There is thickened of the bronchi in the bilateral lower lobes  Central airways remain patent  Patient does have some thoracic lymphadenopathy  He has bilateral gynecomastia  No PE is seen  Patient's bowel and stomach appear unremarkable with no evidence of obstruction  Gallbladder and appendix have no acute abnormalities  There is a small right-sided kidney stone in the lower pole  There is some mild bilateral perinephric stranding but no obvious hydronephrosis or obstruction of the kidneys  Bladder is unremarkable with no wall thickening      Other Studies:   Sputum culture is pending

## 2020-03-06 LAB
AFP-TM SERPL-MCNC: 1.8 NG/ML (ref 0.5–8)
ALBUMIN SERPL BCP-MCNC: 2.3 G/DL (ref 3.5–5)
ALP SERPL-CCNC: 90 U/L (ref 46–116)
ALT SERPL W P-5'-P-CCNC: 18 U/L (ref 12–78)
ANION GAP SERPL CALCULATED.3IONS-SCNC: 10 MMOL/L (ref 4–13)
AST SERPL W P-5'-P-CCNC: 20 U/L (ref 5–45)
BASOPHILS # BLD AUTO: 0.03 THOUSANDS/ΜL (ref 0–0.1)
BASOPHILS NFR BLD AUTO: 0 % (ref 0–1)
BILIRUB DIRECT SERPL-MCNC: 0.3 MG/DL (ref 0–0.2)
BILIRUB SERPL-MCNC: 0.56 MG/DL (ref 0.2–1)
BUN SERPL-MCNC: 10 MG/DL (ref 5–25)
CALCIUM SERPL-MCNC: 8.7 MG/DL (ref 8.3–10.1)
CHLORIDE SERPL-SCNC: 103 MMOL/L (ref 100–108)
CO2 SERPL-SCNC: 22 MMOL/L (ref 21–32)
CREAT SERPL-MCNC: 1.07 MG/DL (ref 0.6–1.3)
EOSINOPHIL # BLD AUTO: 0.02 THOUSAND/ΜL (ref 0–0.61)
EOSINOPHIL NFR BLD AUTO: 0 % (ref 0–6)
ERYTHROCYTE [DISTWIDTH] IN BLOOD BY AUTOMATED COUNT: 14.5 % (ref 11.6–15.1)
GFR SERPL CREATININE-BSD FRML MDRD: 87 ML/MIN/1.73SQ M
GLUCOSE SERPL-MCNC: 58 MG/DL (ref 65–140)
GLUCOSE SERPL-MCNC: 61 MG/DL (ref 65–140)
GLUCOSE SERPL-MCNC: 67 MG/DL (ref 65–140)
GLUCOSE SERPL-MCNC: 74 MG/DL (ref 65–140)
GLUCOSE SERPL-MCNC: 84 MG/DL (ref 65–140)
GLUCOSE SERPL-MCNC: 89 MG/DL (ref 65–140)
HCT VFR BLD AUTO: 45.6 % (ref 36.5–49.3)
HGB BLD-MCNC: 14.2 G/DL (ref 12–17)
IMM GRANULOCYTES # BLD AUTO: 0.13 THOUSAND/UL (ref 0–0.2)
IMM GRANULOCYTES NFR BLD AUTO: 2 % (ref 0–2)
LIPASE SERPL-CCNC: 96 U/L (ref 73–393)
LYMPHOCYTES # BLD AUTO: 1.79 THOUSANDS/ΜL (ref 0.6–4.47)
LYMPHOCYTES NFR BLD AUTO: 21 % (ref 14–44)
MCH RBC QN AUTO: 29.1 PG (ref 26.8–34.3)
MCHC RBC AUTO-ENTMCNC: 31.1 G/DL (ref 31.4–37.4)
MCV RBC AUTO: 93 FL (ref 82–98)
MONOCYTES # BLD AUTO: 0.88 THOUSAND/ΜL (ref 0.17–1.22)
MONOCYTES NFR BLD AUTO: 10 % (ref 4–12)
NEUTROPHILS # BLD AUTO: 5.63 THOUSANDS/ΜL (ref 1.85–7.62)
NEUTS SEG NFR BLD AUTO: 67 % (ref 43–75)
NRBC BLD AUTO-RTO: 0 /100 WBCS
PLATELET # BLD AUTO: 228 THOUSANDS/UL (ref 149–390)
PMV BLD AUTO: 10.3 FL (ref 8.9–12.7)
POTASSIUM SERPL-SCNC: 4 MMOL/L (ref 3.5–5.3)
PROCALCITONIN SERPL-MCNC: <0.05 NG/ML
PROT SERPL-MCNC: 6.9 G/DL (ref 6.4–8.2)
RBC # BLD AUTO: 4.88 MILLION/UL (ref 3.88–5.62)
SODIUM SERPL-SCNC: 135 MMOL/L (ref 136–145)
WBC # BLD AUTO: 8.48 THOUSAND/UL (ref 4.31–10.16)

## 2020-03-06 PROCEDURE — 94640 AIRWAY INHALATION TREATMENT: CPT

## 2020-03-06 PROCEDURE — 83690 ASSAY OF LIPASE: CPT | Performed by: PHYSICIAN ASSISTANT

## 2020-03-06 PROCEDURE — 85025 COMPLETE CBC W/AUTO DIFF WBC: CPT | Performed by: INTERNAL MEDICINE

## 2020-03-06 PROCEDURE — 82105 ALPHA-FETOPROTEIN SERUM: CPT | Performed by: PHYSICIAN ASSISTANT

## 2020-03-06 PROCEDURE — 80048 BASIC METABOLIC PNL TOTAL CA: CPT | Performed by: INTERNAL MEDICINE

## 2020-03-06 PROCEDURE — 99232 SBSQ HOSP IP/OBS MODERATE 35: CPT | Performed by: INTERNAL MEDICINE

## 2020-03-06 PROCEDURE — 94760 N-INVAS EAR/PLS OXIMETRY 1: CPT

## 2020-03-06 PROCEDURE — 80076 HEPATIC FUNCTION PANEL: CPT | Performed by: PHYSICIAN ASSISTANT

## 2020-03-06 PROCEDURE — 82948 REAGENT STRIP/BLOOD GLUCOSE: CPT

## 2020-03-06 PROCEDURE — 84145 PROCALCITONIN (PCT): CPT | Performed by: INTERNAL MEDICINE

## 2020-03-06 PROCEDURE — 99223 1ST HOSP IP/OBS HIGH 75: CPT | Performed by: INTERNAL MEDICINE

## 2020-03-06 RX ORDER — PANTOPRAZOLE SODIUM 40 MG/1
40 TABLET, DELAYED RELEASE ORAL
Status: DISCONTINUED | OUTPATIENT
Start: 2020-03-06 | End: 2020-03-06

## 2020-03-06 RX ORDER — LEVALBUTEROL 1.25 MG/.5ML
1.25 SOLUTION, CONCENTRATE RESPIRATORY (INHALATION)
Status: DISCONTINUED | OUTPATIENT
Start: 2020-03-06 | End: 2020-03-07 | Stop reason: HOSPADM

## 2020-03-06 RX ORDER — PANTOPRAZOLE SODIUM 40 MG/1
40 TABLET, DELAYED RELEASE ORAL
Status: DISCONTINUED | OUTPATIENT
Start: 2020-03-06 | End: 2020-03-07 | Stop reason: HOSPADM

## 2020-03-06 RX ORDER — FAMOTIDINE 20 MG/1
20 TABLET, FILM COATED ORAL
Status: DISCONTINUED | OUTPATIENT
Start: 2020-03-06 | End: 2020-03-07 | Stop reason: HOSPADM

## 2020-03-06 RX ORDER — SUCRALFATE ORAL 1 G/10ML
1000 SUSPENSION ORAL EVERY 6 HOURS SCHEDULED
Status: DISCONTINUED | OUTPATIENT
Start: 2020-03-06 | End: 2020-03-07 | Stop reason: HOSPADM

## 2020-03-06 RX ADMIN — GUAIFENESIN 600 MG: 600 TABLET, EXTENDED RELEASE ORAL at 08:45

## 2020-03-06 RX ADMIN — ONDANSETRON 4 MG: 2 INJECTION INTRAMUSCULAR; INTRAVENOUS at 08:44

## 2020-03-06 RX ADMIN — PROPRANOLOL HYDROCHLORIDE 20 MG: 20 TABLET ORAL at 08:45

## 2020-03-06 RX ADMIN — LEVALBUTEROL HYDROCHLORIDE 0.63 MG: 0.63 SOLUTION RESPIRATORY (INHALATION) at 08:31

## 2020-03-06 RX ADMIN — SUCRALFATE 1000 MG: 1 SUSPENSION ORAL at 23:15

## 2020-03-06 RX ADMIN — FAMOTIDINE 20 MG: 20 TABLET ORAL at 15:20

## 2020-03-06 RX ADMIN — NICOTINE 1 PATCH: 14 PATCH TRANSDERMAL at 08:46

## 2020-03-06 RX ADMIN — IPRATROPIUM BROMIDE 0.5 MG: 0.5 SOLUTION RESPIRATORY (INHALATION) at 14:17

## 2020-03-06 RX ADMIN — IPRATROPIUM BROMIDE 0.5 MG: 0.5 SOLUTION RESPIRATORY (INHALATION) at 08:31

## 2020-03-06 RX ADMIN — AMILORIDE HYDROCHLORIDE 5 MG: 5 TABLET ORAL at 08:45

## 2020-03-06 RX ADMIN — LEVALBUTEROL HYDROCHLORIDE 1.25 MG: 1.25 SOLUTION, CONCENTRATE RESPIRATORY (INHALATION) at 18:57

## 2020-03-06 RX ADMIN — PROPRANOLOL HYDROCHLORIDE 20 MG: 20 TABLET ORAL at 23:16

## 2020-03-06 RX ADMIN — SUCRALFATE 1000 MG: 1 SUSPENSION ORAL at 15:18

## 2020-03-06 RX ADMIN — IPRATROPIUM BROMIDE 0.5 MG: 0.5 SOLUTION RESPIRATORY (INHALATION) at 18:55

## 2020-03-06 RX ADMIN — SODIUM CHLORIDE 100 ML/HR: 0.9 INJECTION, SOLUTION INTRAVENOUS at 23:16

## 2020-03-06 RX ADMIN — METHADONE HYDROCHLORIDE 44 MG: 10 CONCENTRATE ORAL at 08:45

## 2020-03-06 RX ADMIN — ENOXAPARIN SODIUM 40 MG: 40 INJECTION SUBCUTANEOUS at 08:45

## 2020-03-06 RX ADMIN — LEVALBUTEROL HYDROCHLORIDE 0.63 MG: 0.63 SOLUTION RESPIRATORY (INHALATION) at 14:17

## 2020-03-06 RX ADMIN — PANTOPRAZOLE SODIUM 40 MG: 40 TABLET, DELAYED RELEASE ORAL at 15:18

## 2020-03-06 NOTE — PROGRESS NOTES
Progress Note - Infectious Disease   Adam Mustafa 60 y o  male MRN: [id number]  Unit/Bed#: E2 -01 Encounter: [id number]      Impression/Plan:  1  SIRS  POA  Tachycardia, tachypnea, leukocytosis, and hypoxia  Suspect secondary to aspiration pneumonitis from his recent nausea and vomiting  Chest imaging showed patchy opacities throughout the bilateral lungs  Patient's procalcitonin was negative  Urine strep and Legionella antigen were negative  His flu/RSV PCR was negative  RP2 was negative  No other clear source appreciated  Blood cultures are negative after 48 hours  UA was unremarkable  Fortunately the patient is clinically improving  His WBC count has trended down and he remains afebrile  No indication for antibiotic treatment at this time   -monitor patient off antibiotics  -monitor CBC and BMP  -follow-up blood cultures  -can stop droplet precaution given his flu/RSV PCR and RP2 were negative  -monitor vitals  -supportive care     2  Aspiration pneumonitis  Likely secondary to multiple days of nausea and vomiting  Also consider community-acquired pneumonia, however this is less likely as his procalcitonin was negative  Flu/RSV PCR was negative  RP2 was negative  Urine strep and Legionella antigen were negative  Antibiotic treatment was discontinued and the patient remains clinically stable  No indication for ongoing antibiotics at this time   -monitor patient off antibiotics  -monitor CBC and BMP  -monitor vitals  -monitor respiratory status     3  Nausea with vomiting  Unclear etiology but I suspect this was a viral gastroenteritis  Also consider secondary to his past history of H pylori  This is likely cause his aspiration pneumonitis above  Patient's vomiting has stopped but he continues to feel nauseous especially with eating  CT imaging showed no acute abdominal or pelvic process  No obstruction noted in the bowels    -serial abdominal exams  -monitor GI symptoms  -supportive and symptomatic care per primary service     4  Acute hypoxia  Suspect this was secondary to pneumonitis above  O2 saturation upon arrival to the hospital was 88%  Patient's O2 saturation now remains stable on room air  He remains on aspiration precautions  -maintain aspiration precautions  -monitor vitals  -monitor respiratory status  -wean O2 support per primary service     5  Type 2 diabetes mellitus without long-term insulin use  Patient's last hemoglobin A1c was 5 8% on 2019  Recommend tight glycemic control for overall health   -blood glucose management per primary service     6  Opioid dependence  Patient with history of heroin use  He has been maintained on methadone as an outpatient at [facility]  -methadone management per primary service     7  Cirrhosis  Patient with history of hepatitis C, portal hypertension, and esophageal varices  Patient reports he was successfully treated for his hepatitis-C in  by his primary care provider  Above plan was discussed in detail with patient at the bedside  Antibiotics:  No current antibiotic use  Subjective:  Patient reports he is feeling much better today  He has no shortness of breath, cough, or chest pain  He tells me that he has not vomited at all today but does have ongoing nausea, especially when he tries to eat  He denies abdominal pain, cramping, bloating, and diarrhea  The patient tells me he just took some nausea medication and is hopeful that will help him eat his lunch  Objective:  Vitals:  Temp:  [97 °F (36 1 °C)-99 2 °F (37 3 °C)] 97 °F (36 1 °C)  HR:  [69-76] 69  Resp:  [18-20] 18  BP: (123-131)/(66-73) 131/69  SpO2:  [90 %-97 %] 97 %  Temp (24hrs), Av 4 °F (36 9 °C), Min:97 °F (36 1 °C), Max:99 2 °F (37 3 °C)  Current: Temperature: (!) 97 °F (36 1 °C)    Physical Exam:   General Appearance:  Alert, interactive, nontoxic, no acute distress  Appears comfortable out of bed visiting with his sister  Throat: Oropharynx moist without lesions  Lungs:   Clear to auscultation bilaterally; no wheezes, rhonchi or rales; respirations unlabored; patient is on room air   Heart:  RRR; no murmur, rub or gallop   Abdomen:   Soft, non-tender, non-distended, positive bowel sounds  Extremities: No clubbing or cyanosis, no edema   Skin: No new rashes, lesions, or draining wounds noted on exposed skin  Labs, Imaging, & Other studies:   All pertinent labs and imaging studies were personally reviewed  Results from last 7 days   Lab Units 03/06/20  0425 03/05/20 0443 03/04/20  0126   WBC Thousand/uL 8 48 9 24 15 09*   HEMOGLOBIN g/dL 14 2 14 1 14 9   PLATELETS Thousands/uL 228 208 243     Results from last 7 days   Lab Units 03/06/20  0425 03/05/20  0442 03/04/20  0126   POTASSIUM mmol/L 4 0 4 0 3 2*   CHLORIDE mmol/L 103 103 99*   CO2 mmol/L 22 21 25   BUN mg/dL 10 11 14   CREATININE mg/dL 1 07 1 06 1 23   EGFR ml/min/1 73sq m 87 88 73   CALCIUM mg/dL 8 7 8 3 8 9   AST U/L  --  23 17   ALT U/L  --  17 21   ALK PHOS U/L  --  91 97     Results from last 7 days   Lab Units 03/04/20  1543 03/04/20  0825 03/04/20  0131 03/04/20  0124   BLOOD CULTURE   --   --  No Growth at 24 hrs  No Growth at 24 hrs     SPUTUM CULTURE   --  Culture too young- will reincubate  --   --    GRAM STAIN RESULT   --  2+ Polys  No bacteria seen  --   --    LEGIONELLA URINARY ANTIGEN  Negative  --   --   --      Results from last 7 days   Lab Units 03/04/20  0126   PROCALCITONIN ng/ml <0 05

## 2020-03-06 NOTE — PROGRESS NOTES
Progress Note - Janina Dougherty 60 y o  male MRN: [id number]    Unit/Bed#: E2 -01 Encounter: [id number]    Delayed entry:  Patient was examined and interviewed earlier this afternoon, however charting delayed as computers were down    Assessment/Plan:  1-sepsis:  Patient presented to the ER with criteria for sepsis  He was noted to have a leukocytosis with a white count of 15, tachycardia, and tachypnea  His sepsis was attributed to his community-acquired, multifocal pneumonia  -patient was given IV fluids, and started on broad-spectrum antibiotics, and has improved              -blood cultures negative   -negative Legionella/strep pneumoniae antigen              -urinalysis not consistent with acute infection and patient does not have any urinary symptoms              -influenza/RSV PCR negative   -negative mycoplasma   -respiratory pathogen panel negative      -continue current antibiotics with ceftriaxone/Flagyl for the time being, however patient's procalcitonin is unremarkable      -if repeat procalcitonin is unremarkable in the a m   Anticipate discontinuing antibiotics    7-uaaapajlp-lyquqzno, multifocal pneumonia:  Patient's CT scan was consistent with multifocal pneumonia               -blood cultures negative              -Legionella/strep pneumoniae antigen negative   -negative mycoplasma              -sputum cultures pending              -patient also at risk for possible aspiration as he had 5 days of nausea/vomiting prior to the onset of his pulmonary symptoms              -CT scan without evidence of pulmonary embolism in the main or lobar pulmonary arteries              -patient's initial procalcitonin was unremarkable, also raising the possibility of aspiration pneumonitis rather than pneumonia    -continue antibiotics with ceftriaxone/Flagyl possible aspiration pneumonia:  If procalcitonin remains unremarkable tomorrow, his presentation may be have been secondary to aspiration pneumonitis rather than pneumonia     3-opioid use disorder on methadone maintenance:  Continue patient's home dose of methadone of 44 mg daily  Patient relates he goes to his methadone [facility] and [place] once a week and receives a 6 day supply  He relates he has been tolerating his methadone, despite nausea and vomiting  He denies any withdrawal symptoms are missed doses  -EKG with normal intervals     4-nausea/vomiting:  Patient presented with a 5 day history of nausea/vomiting and poor p o  Intake  -CT scan without acute abnormalities              -LFTs with isolated total bilirubin  No evidence of obstructive pattern              -possible viral gastroenteritis              -denies opiate withdrawal, or missed doses     5-diabetes type 2:  Without long-term use of insulin, without complication  He follows with the endocrinologist   He is on metformin 1 g b i d  As an outpatient               -continue Accu-Cheks and sliding scale insulin              -blood sugars with episodes of hypoglycemia this morning, patient relates he was nauseous and did not eat breakfast      6-essential hypertension:  Patient's blood pressure adequately controlled  Continue his home amiloride 5 mg daily, and propranolol 20 mg b i d               -most recent blood pressure 148/66     7-cirrhosis:  Patient's CT scan has radiographic evidence of cirrhosis  This is a known diagnosis, per his outpatient records  -patient has a history of chronic hepatitis C, cirrhosis, esophageal varices, and portal hypertension               -he does not have any significant thrombocytopenia or coagulopathy  Continue outpatient follow-up  Patient relates he was previously treated for his hepatitis C   He does not have a hepatologist and notes he is managed by his primary care provider     8-incidental finding:  Nephrolithiasis:  Patient's CT scan revealed a 5 x 2 mm nonobstructing calculus in the lower pole of the right kidney  No current intervention required  The patient relates a known history of nephrolithiasis     9-history of hypogonadism:  Patient follows with endocrinology, Dr Ludivina Talbot  Was last seen 1/2020 and received testosterone injection     10-tobacco abuse:  Smoking cessation counseled  Nicotine patch     11-status post elevated total bilirubin:  Patient's total bilirubin was elevated at 1 62  He did not have any significant elevation in his transaminases are alkaline phosphatase      -patient's repeat labs this morning are significant for a normal total bilirubin  No significant transaminitis  His previous elevated 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      12-history of hepatitis-C:  Per old records, secondary to IV drug abuse  Patient received treatment for this  Outpatient follow-up     13-acute hypoxic respiratory distress:  Patient is noted to have hypoxia and was 88% on room air  This is likely secondary to his multifocal pneumonia  Patient has adequate oxygenation is on supplemental O2  Continue to monitor              -will need home O2 eval prior to discharge     Discussed with patient's nurse and   Discussed with ID team: Rose Bull PA-C and Dr Maria Ines Erickson        VTE Pharmacologic Prophylaxis: Enoxaparin (Lovenox)  VTE Mechanical Prophylaxis: sequential compression device     Certification Statement: The patient will continue to require additional inpatient hospital stay due to need for further acute intervention for pneumonia, IV antibiotics     Status: inpatient       ===================================================================    Subjective:  Patient notes he had nausea this morning was not able to eat breakfast   He relates that improved afterwards and he was able to eat lunch without any difficulties  He denies any current nausea or vomiting  He denies any abdominal pain or discomfort    He denies any diarrhea or constipation  He denies any abdominal cramping  He denies any pain anywhere else  He denies any headache, chest pain, back pain, or extremity pain  He denies any shortness of breath  He notes his cough has improved  He denies any dizziness or lightheadedness  He denies any other complaints    Physical Exam:   Temp:  [98 1 °F (36 7 °C)-99 3 °F (37 4 °C)] 99 2 °F (37 3 °C)  HR:  [76-79] 76  Resp:  [18-20] 18  BP: ()/(60-70) 148/66    Gen:  Pleasant, non-tachypnic, non-dyspnic  Conversant  Heart: regular rate and rhythm, S1S2 present, no murmur, rub or gallop  Lungs:  Faint crackles right lower lobe  Decreased breath sounds bilaterally, however no wheezes or rhonchi  No other crackles    No accessory muscle use or respiratory distress  Abd: soft, non-tender, non-distended  NABS, no guarding, rebound or peritoneal signs  Extremities: no clubbing, cyanosis or edema  2+pedal pulses bilaterally  Full range of motion  Neuro: awake, alert  Fluent speech   Skin: warm and dry: no petechiae, purpura and rash  LABS:   Results from last 7 days   Lab Units 03/05/20  0443 03/04/20  0126   WBC Thousand/uL 9 24 15 09*   HEMOGLOBIN g/dL 14 1 14 9   HEMATOCRIT % 45 0 45 7   PLATELETS Thousands/uL 208 243     Results from last 7 days   Lab Units 03/05/20  0442 03/04/20  0126   POTASSIUM mmol/L 4 0 3 2*   CHLORIDE mmol/L 103 99*   CO2 mmol/L 21 25   BUN mg/dL 11 14   CREATININE mg/dL 1 06 1 23   CALCIUM mg/dL 8 3 8 9       Hospital Data:    3/4:  CTA chest/abdomen/pelvis:  1   Limited evaluation due to poor contrast bolus timing   No pulmonary embolism to the lobar level  2   Patchy opacities seen throughout the lungs most compatible with multifocal pneumonia  3   Thickening of the bronchi predominantly in the lower lobes compatible with bronchitis  4   Mediastinal lymphadenopathy which is likely reactive     5   Cirrhotic morphology of liver    6   5 x 2 mm nonobstructive calculus within lower pole the right kidney   No hydronephrosis  7   Bilateral gynecomastia     Microbiology  3/5:  Negative respiratory pathogen PCR  3/5:  Negative mycoplasma  3/4:  Negative strep pneumoniae/Legionella antigen  3/4:  Negative influenza/RSV PCR  3/4:  Blood culture:  Negative x2     3/3:  EKG:  Sinus tachycardia rate of 119  T-wave inversion in V1  T-wave inversion in 3  Normal QTC interval            ---------------------------------------------------------------------------------------------------------------  This note has been constructed using a voice recognition system

## 2020-03-06 NOTE — PROGRESS NOTES
Progress Note - Wilmer Car 60 y o  male MRN: [id number]    Unit/Bed#: E2 -01 Encounter: [id number]      Assessment/Plan:  1-SIRS:  Patient presented to the ER with criteria for sepsis  He was noted to have a leukocytosis with a white count of 15, tachycardia, and tachypnea  His sepsis was attributed to his community-acquired, multifocal pneumonia  -patient was given IV fluids, and started on broad-spectrum antibiotics, and has improved              -blood cultures negative thus far              -urinalysis not consistent with acute infection and patient does not have any urinary symptoms              -influenza/RSV PCR negative   -respiratory pathogen panel negative   -patient's presentation was more likely consistent with SIRS secondary to aspiration pneumonitis     2-aspiration pneumonitis, multifocal: Patient's CT scan was consistent with multifocal pneumonia               -blood cultures negative              -Legionella/strep pneumoniae antigen negative  Negative respiratory pathogen panel               -patient also at risk for possible aspiration as he had 5 days of nausea/vomiting prior to the onset of his pulmonary symptoms              -CT scan without evidence of pulmonary embolism in the main or lobar pulmonary arteries              -patient's initial and repeat procalcitonin was unremarkable, indicating his presentation is more consistent with aspiration pneumonitis rather than pneumonia  His aspiration pneumonitis is likely secondary to his multiple preceding days of nausea and vomiting    -patient was initially on antibiotics with ceftriaxone and Flagyl, which has since been discontinued     3-opioid use disorder on methadone maintenance:  Continue patient's home dose of methadone of 44 mg daily  Patient relates he goes to his methadone [facility] and [place] once a week and receives a 6 day supply    He relates he has been tolerating his methadone, despite nausea and vomiting  He denies any withdrawal symptoms are missed doses  -EKG with normal intervals     4-nausea/vomiting:  Patient presented with a 5 day history of nausea/vomiting and poor p o  Intake  -CT scan without acute abnormalities              -LFTs with isolated total bilirubin  No evidence of obstructive pattern              -possible viral gastroenteritis              -denies opiate withdrawal, or missed doses   -patient is overall improved, apart from his nausea  Yesterday and this morning again he had significant nausea he was unable to eat breakfast, and had subsequent hypoglycemia  -patient noted nausea this morning  No associated abdominal pain  No indigestion  His symptoms did not sound consistent with gastroparesis    -this is most likely related to his recent viral gastroenteritis:  Continue p r n  Zofran  Schedule proton pump inhibitor  Will confer with the GI team due to his prolonged symptoms      5-diabetes type 2:  Without long-term use of insulin, without complication  He follows with the endocrinologist   He is on metformin 1 g b i d  As an outpatient               -continue Accu-Cheks and sliding scale insulin              -patient without hypoglycemia secondary to lack of eating  Not on standing insulin      6-essential hypertension:  Patient's blood pressure adequately controlled  Continue his home amiloride 5 mg daily, and propranolol 20 mg b i d               -most recent blood pressure 131/69     7-cirrhosis:  Patient's CT scan has radiographic evidence of cirrhosis  This is a known diagnosis, per his outpatient records  -patient has a history of chronic hepatitis C, cirrhosis, esophageal varices, and portal hypertension               -he does not have any significant thrombocytopenia or coagulopathy  Continue outpatient follow-up  Patient relates he was previously treated for his hepatitis C   He does not have a hepatologist and notes he is managed by his primary care provider     8-incidental finding:  Nephrolithiasis:  Patient's CT scan revealed a 5 x 2 mm nonobstructing calculus in the lower pole of the right kidney  No current intervention required  The patient relates a known history of nephrolithiasis     9-history of hypogonadism:  Patient follows with endocrinology, Dr Paige Gaspar  Was last seen 1/2020 and received testosterone injection     10-tobacco abuse:  Smoking cessation counseled  Nicotine patch     11-elevated total bilirubin:  Patient's total bilirubin was elevated at 1 62  He did not have any significant elevation in his transaminases are alkaline phosphatase  Will fractionate his bilirubin, as may be secondary to Gilbert's disease, however patient also has a history of cirrhosis  Review of his previous lab work from 2019, his bilirubin was normal      12-history of hepatitis-C:  Per old records, secondary to IV drug abuse  Patient received treatment for this  Outpatient follow-up     13-acute hypoxic respiratory distress:  Patient is noted to have hypoxia and was 88% on room air  This is likely secondary to his multifocal pneumonia  patient's cognition continue to improve  He currently has adequate oxygenation on room air    Discussed with patient's nurse and         VTE Pharmacologic Prophylaxis: Enoxaparin (Lovenox)  VTE Mechanical Prophylaxis: sequential compression device     Certification Statement: The patient will continue to require additional inpatient hospital stay due to need for further acute intervention for pneumonia, IV antibiotics     Status: inpatient         ===================================================================    Subjective:  Patient denies any shortness of breath or dyspnea on exertion  He notes he continues to have a productive cough, however improved  He feels that is his baseline cough  Patient notes he again felt nauseous this morning    He notes just smelling the food worsened his nausea and he was unable to eat anything  He relates a Zofran improved his nausea and he was able to eat lunch  He denies any abdominal pain or cramping  He denies any diarrhea or constipation  Patient denies any postprandial pain  He relates prior to his this admission, and the 5 days of vomiting that preceded this admission, he did not have any GI complaints last week  He notes he is tolerating p o  He denies any prior abdominal pain, nausea, vomiting, postprandial pain, or heartburn  Patient denies any dizziness or lightheadedness  He notes he has chronic left lower back pain  He denies any other pain anywhere at this time      Physical Exam:   Temp:  [97 °F (36 1 °C)-99 2 °F (37 3 °C)] 97 °F (36 1 °C)  HR:  [69-76] 69  Resp:  [18-20] 18  BP: (123-131)/(66-73) 131/69    Gen:  Pleasant, non-tachypnic, non-dyspnic  Conversant  Heart: regular rate and rhythm, S1S2 present, no murmur, rub or gallop  Lungs: clear to ausculatation bilaterally  No wheezing, crackles, or rhonchi  No accessory muscle use or respiratory distress  Abd: soft, non-tender, non-distended  NABS, no guarding, rebound or peritoneal signs  Extremities: no clubbing, cyanosis or edema  2+pedal pulses bilaterally  Full range of motion  Neuro: awake, alert  Fluent speech  Skin: warm and dry: no petechiae, purpura and rash      LABS:   Results from last 7 days   Lab Units 03/06/20  0425 03/05/20  0443 03/04/20  0126   WBC Thousand/uL 8 48 9 24 15 09*   HEMOGLOBIN g/dL 14 2 14 1 14 9   HEMATOCRIT % 45 6 45 0 45 7   PLATELETS Thousands/uL 228 208 243     Results from last 7 days   Lab Units 03/06/20  0425 03/05/20  0442 03/04/20  0126   POTASSIUM mmol/L 4 0 4 0 3 2*   CHLORIDE mmol/L 103 103 99*   CO2 mmol/L 22 21 25   BUN mg/dL 10 11 14   CREATININE mg/dL 1 07 1 06 1 23   CALCIUM mg/dL 8 7 8 3 8 9       Hospital Data:    3/4:  CTA chest/abdomen/pelvis:  1   Limited evaluation due to poor contrast bolus timing   No pulmonary embolism to the lobar level  2   Patchy opacities seen throughout the lungs most compatible with multifocal pneumonia  3   Thickening of the bronchi predominantly in the lower lobes compatible with bronchitis  4   Mediastinal lymphadenopathy which is likely reactive     5   Cirrhotic morphology of liver  6   5 x 2 mm nonobstructive calculus within lower pole the right kidney   No hydronephrosis  7   Bilateral gynecomastia     Microbiology  3/4:  Negative influenza/RSV PCR  3/4:  Blood culture:  Negative x2     3/3:  EKG:  Sinus tachycardia rate of 119  T-wave inversion in V1  T-wave inversion in 3  Normal QTC interval         ---------------------------------------------------------------------------------------------------------------  This note has been constructed using a voice recognition system

## 2020-03-06 NOTE — SOCIAL WORK
Pt not medically cleared for d/c due to ongoing nausea and hypoglycemia- GI to see  Pt continues to have no needs from CM standpoint- friend to transport home when medically cleared

## 2020-03-06 NOTE — CONSULTS
[facility] - New Consultation  Adam Mustafa 60 y o  male MRN: [id number]  Encounter: [id number]          ASSESSMENT AND PLAN:      1   Nausea, vomiting x 5 days:  Patient with approximately 5 days of nausea, anorexia, decreased p o  Intake, and intermittent vomiting prior to eating and after coughing  Patient with documented history of gastritis on last e g  from 12/2015, biopsies positive for H pylori  Acute symptoms correspond to duration of acute respiratory symptoms as well, rendering nausea and vomiting secondary to pulmonary infection possible vs  GERD vs  Gastritis vs  PUD  Gastroparesis less likely as A1c indicates diabetes  well-controlled  - PPI BID  - famotidine BID  - carafate 1gm PO QID  - non-ulcerogenic diet  - check H pylori stool antigen  - antiemetics per primary team; could try scheduled antiemetic rather than PRN, with cautious monitoring of QTc given methadone use  - if N/V persist beyond this acute episode, could consider EGD next week vs  Outpatient    2  Hx of Hep C (reportedly treated) c/b cirrhosis, portal HTN, esophageal varices:  History of hep C reportedly treated per patient (Hep C RN not detected 79/9/5188 x 1), complicated by cirrhosis with documentation of portal hypertension esophageal varices in patient's epic chart  AFP 4 4 in 2015  Last EGD documented on 12/10/2015 with Dr Amalia Velez of [facility], with finding of small esophageal varices and gastritis  No reported history of hepatic encephalopathy or ascites per patient  CT scan of abdomen pelvis during this admission does not show any discrete liver masses    - check Hep C VL  - pt expresses interest in following up with [facility], [facility] for his GI care and cirrhosis management; will request appointment for pt with our office  - check AFP    Recommendations relayed to Dr Deven Live, primary team   ______________________________________________________________________  Leonor Barrett was seen examined using [employer]  #[id number] per pt request]    HPI:  Bernadine Schwartz is a 60 y o  Korean-speaking male with PMH of 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), type 2 diabetes, hep C cirrhosis complicated by portal hypertension, ?esophageal varices, tobacco abuse who presents to the hospital with cough, nausea, and emesis x 5 days, found to have multifocal pneumonia  Gastroenterology was consulted to comment on patient's persistent nausea and vomiting x 5 days  Patient reports that he began having nausea before meals starting on 03/01/2020  Patient reports that the smell of his meal would make him nauseated, he began eating less as a result, and occasionally would have postprandial emesis  Patient states he has also been coughing over this course of time, and states that he occasionally has emesis after fits of coughing  Patient reports occasional sensation of acid reflux at home, for which he takes Tums p r n  Patient denies any odynophagia or dysphagia  Last EGD documented on 12/10/2015 with Dr Iban Reyes of [facility], with finding of small esophageal varices and gastritis  Patient reports history of hep C which he states was treated  Patient denies any sick contacts, recent travel, or new medications  Patient denies any hematemesis, abdominal pain, diarrhea, or black or bloody stool  Patient states his diabetes has been under good control, with last A1c of 5 8 in 12/2019  Patient denies any recent unintentional weight loss  Patient reports history of IV drug use, clean since 1999, currently on methadone maintenance without any other use of opioids reported since that time  Patient denies missing any doses of methadone in the interim  Patient is a daily smoker of tobacco   Patient denies any alcohol use, marijuana use  Patient reports that he does not have a gastroenterologist the outpatient setting    Patient denies any prior history of hepatic encephalopathy or ascites requiring paracentesis  REVIEW OF SYSTEMS:    CONSTITUTIONAL: Denies any fever, chills, rigors, and weight loss  HEENT: No earache or tinnitus  Denies hearing loss or visual disturbances  CARDIOVASCULAR: No chest pain or palpitations  RESPIRATORY: +cough, no hemoptysis  GASTROINTESTINAL: As noted in the History of Present Illness  GENITOURINARY: No problems with urination  Denies any hematuria or dysuria  NEUROLOGIC: No dizziness or vertigo, denies headaches  MUSCULOSKELETAL: Denies any muscle or joint pain  SKIN: Denies skin rashes or itching  ENDOCRINE: Denies excessive thirst  Denies intolerance to heat or cold  PSYCHOSOCIAL: Denies depression or anxiety  Denies any recent memory loss         Historical Information   Past Medical History:   Diagnosis Date    Diabetes mellitus ([facility] )     Esophageal varices (HCC)     Gastritis     Hepatitis C     History of kidney stones     Hyperlipidemia     Hypertension     Substance abuse ([place] [address] )      Past Surgical History:   Procedure Laterality Date    COLONOSCOPY  06/20/2014    dr [email or web address]    ESOPHAGOGASTRODUODENOSCOPY  12/10/2015    esophageal varices, cirrhosis, gastritis    HYDROCELE EXCISION / REPAIR      LIVER BIOPSY  2014     Social History   Social History     Substance and Sexual Activity   Alcohol Use Never    Alcohol/week: 0 0 standard drinks    Frequency: Never     Social History     Substance and Sexual Activity   Drug Use No    Comment: former user- heroin was drug of choice     Social History     Tobacco Use   Smoking Status Current Every Day Smoker    Packs/day: 1 00   Smokeless Tobacco Never Used   Tobacco Comment    TOBACCO USE     Family History   Problem Relation Age of Onset    Diabetes type II Mother         MELLITUS    Hypertension Mother     Hypertension Sister     Diabetes Sister         MELLITUS       Meds/Allergies       Current Facility-Administered Medications:     acetaminophen (TYLENOL) tablet 650 mg, 650 mg, Oral, Q6H PRN, 650 mg at 03/04/20 0743    AMILoride tablet 5 mg, 5 mg, Oral, Daily, 5 mg at 03/06/20 0845    calcium carbonate (TUMS) chewable tablet 500 mg, 500 mg, Oral, TID PRN    enoxaparin (LOVENOX) subcutaneous injection 40 mg, 40 mg, Subcutaneous, Daily, 40 mg at 03/06/20 0845    guaiFENesin (MUCINEX) 12 hr tablet 600 mg, 600 mg, Oral, BID, 600 mg at 03/06/20 0845    insulin lispro (HumaLOG) 100 units/mL subcutaneous injection 1-5 Units, 1-5 Units, Subcutaneous, TID AC, Stopped at 03/05/20 1202 **AND** Fingerstick Glucose (POCT), , , TID AC    insulin lispro (HumaLOG) 100 units/mL subcutaneous injection 1-5 Units, 1-5 Units, Subcutaneous, HS    ipratropium (ATROVENT) 0 02 % inhalation solution 0 5 mg, 0 5 mg, Nebulization, TID, 0 5 mg at 03/06/20 1417    ipratropium (ATROVENT) 0 02 % inhalation solution 0 5 mg, 0 5 mg, Nebulization, Q4H PRN    levalbuterol (XOPENEX) inhalation solution 0 63 mg, 0 63 mg, Nebulization, TID, 0 63 mg at 03/06/20 1417    levalbuterol (XOPENEX) inhalation solution 0 63 mg, 0 63 mg, Nebulization, Q4H PRN    methadone (DOLOPHINE) 10 mg/mL oral concentrated solution 44 mg, 44 mg, Oral, Daily, 44 mg at 03/06/20 0845    nicotine (NICODERM CQ) 14 mg/24hr TD 24 hr patch 1 patch, 1 patch, Transdermal, Daily, 1 patch at 03/06/20 0846    ondansetron (ZOFRAN) injection 4 mg, 4 mg, Intravenous, Q6H PRN, 4 mg at 03/06/20 0844    pantoprazole (PROTONIX) EC tablet 40 mg, 40 mg, Oral, BID AC    propranolol (INDERAL) tablet 20 mg, 20 mg, Oral, Q12H HADLEY, 20 mg at 03/06/20 0845    sodium chloride 0 9 % infusion, 100 mL/hr, Intravenous, Continuous, 100 mL/hr at 03/05/20 2121    sucralfate (CARAFATE) oral suspension 1,000 mg, 1,000 mg, Oral, Q6H HADLEY    Allergies   Allergen Reactions    Meperidine Abdominal Pain           Objective     Blood pressure 131/69, pulse 69, temperature (!) 97 °F (36 1 °C), temperature source Tympanic, resp   rate 18, height 5' 6" (1 676 m), weight 92 3 kg (203 lb 7 8 oz), SpO2 96 %  Body mass index is 32 84 kg/m²      PHYSICAL EXAM:      General Appearance:   Alert, cooperative, no distress   HEENT:   Normocephalic, atraumatic, anicteric, edentulous   Neck:  Supple, symmetrical, trachea midline   Lungs:   Clear to auscultation bilaterally; respirations even and unlabored   Heart:   Regular rate and rhythm; +S1/+S2   Abdomen:   Soft, non-tender, non-distended; normal bowel sounds; no masses, no organomegaly    Genitalia:   Deferred    Rectal:   Deferred    Extremities:  No cyanosis, clubbing or edema    Pulses:  2+ and symmetric    Skin:  No jaundice, rashes, or lesions          Lab Results:   Admission on 03/04/2020   Component Date Value    WBC 03/04/2020 15 09*    RBC 03/04/2020 5 00     Hemoglobin 03/04/2020 14 9     Hematocrit 03/04/2020 45 7     MCV 03/04/2020 91     MCH 03/04/2020 29 8     MCHC 03/04/2020 32 6     RDW 03/04/2020 14 4     MPV 03/04/2020 9 9     Platelets 84/84/9635 243     nRBC 03/04/2020 0     Neutrophils Relative 03/04/2020 73     Immat GRANS % 03/04/2020 1     Lymphocytes Relative 03/04/2020 14     Monocytes Relative 03/04/2020 12     Eosinophils Relative 03/04/2020 0     Basophils Relative 03/04/2020 0     Neutrophils Absolute 03/04/2020 11 08*    Immature Grans Absolute 03/04/2020 0 08     Lymphocytes Absolute 03/04/2020 2 07     Monocytes Absolute 03/04/2020 1 84*    Eosinophils Absolute 03/04/2020 0 00     Basophils Absolute 03/04/2020 0 02     Sodium 03/04/2020 137     Potassium 03/04/2020 3 2*    Chloride 03/04/2020 99*    CO2 03/04/2020 25     ANION GAP 03/04/2020 13     BUN 03/04/2020 14     Creatinine 03/04/2020 1 23     Glucose 03/04/2020 93     Calcium 03/04/2020 8 9     AST 03/04/2020 17     ALT 03/04/2020 21     Alkaline Phosphatase 03/04/2020 97     Total Protein 03/04/2020 8 5*    Albumin 03/04/2020 2 9*    Total Bilirubin 03/04/2020 1 62*    eGFR 03/04/2020 73     LACTIC ACID 03/04/2020 1 1     Procalcitonin 03/04/2020 <0 05     Protime 03/04/2020 15 6*    INR 03/04/2020 1 22*    PTT 03/04/2020 33     Blood Culture 03/04/2020 No Growth at 48 hrs   Blood Culture 03/04/2020 No Growth at 48 hrs   Color, UA 03/04/2020 Yellow     Clarity, UA 03/04/2020 Slightly Cloudy     Specific Gravity, UA 03/04/2020 <=1 005     pH, UA 03/04/2020 6 0     Leukocytes, UA 03/04/2020 Negative     Nitrite, UA 03/04/2020 Negative     Protein, UA 03/04/2020 Negative     Glucose, UA 03/04/2020 Negative     Ketones, UA 03/04/2020 15 (1+)*    Urobilinogen, UA 03/04/2020 4 0*    Bilirubin, UA 03/04/2020 Negative     Blood, UA 03/04/2020 Negative     Sputum Culture 03/04/2020 Culture results to follow       Gram Stain Result 03/04/2020 2+ Polys     Gram Stain Result 03/04/2020 No bacteria seen     POC Glucose 03/04/2020 82     INFLUENZA A PCR 03/04/2020 None Detected     INFLUENZA B PCR 03/04/2020 None Detected     RSV PCR 03/04/2020 None Detected     Ventricular Rate 03/04/2020 119     Atrial Rate 03/04/2020 119     FL Interval 03/04/2020 156     QRSD Interval 03/04/2020 76     QT Interval 03/04/2020 324     QTC Interval 03/04/2020 455     P Axis 03/04/2020 66     QRS Axis 03/04/2020 -30     T Wave Axis 03/04/2020 52     POC Glucose 03/04/2020 81     Legionella Urinary Antig* 03/04/2020 Negative     Strep pneumoniae antigen* 03/04/2020 Negative     POC Glucose 03/04/2020 52*    POC Glucose 03/04/2020 94     POC Glucose 03/04/2020 70     WBC 03/05/2020 9 24     RBC 03/05/2020 4 77     Hemoglobin 03/05/2020 14 1     Hematocrit 03/05/2020 45 0     MCV 03/05/2020 94     MCH 03/05/2020 29 6     MCHC 03/05/2020 31 3*    RDW 03/05/2020 14 6     Platelets 70/93/7344 208     MPV 03/05/2020 10 0     Sodium 03/05/2020 137     Potassium 03/05/2020 4 0     Chloride 03/05/2020 103     CO2 03/05/2020 21     ANION GAP 03/05/2020 13     BUN 03/05/2020 11     Creatinine 03/05/2020 1 06     Glucose 03/05/2020 69     Calcium 03/05/2020 8 3     eGFR 03/05/2020 88     Total Bilirubin 03/05/2020 0 86     Bilirubin, Direct 03/05/2020 0 52*    Alkaline Phosphatase 03/05/2020 91     AST 03/05/2020 23     ALT 03/05/2020 17     Total Protein 03/05/2020 7 2     Albumin 03/05/2020 2 2*    POC Glucose 03/05/2020 62*    POC Glucose 03/05/2020 102     Adenovirus 03/05/2020 Not Detected     Bordetella parapertussis 03/05/2020 Not Detected     Bordetella pertussis 03/05/2020 Not Detected     Chlamydia pneumoniae 03/05/2020 Not Detected     Coronavirus 03/05/2020 Not Detected     Human Metapneumovirus 03/05/2020 Not Detected     Rhino/Enterovirus 03/05/2020 Not Detected     Influenza A 03/05/2020 Not Detected     Influenza B 03/05/2020 Not Detected     Mycoplasma pneumoniae 03/05/2020 Not Detected     Parainfluenza Virus 03/05/2020 Not Detected     Respiratory Syncytial Vi* 03/05/2020 Not Detected     POC Glucose 03/05/2020 64*    POC Glucose 03/05/2020 100     POC Glucose 03/05/2020 113     Procalcitonin 03/06/2020 <0 05     WBC 03/06/2020 8 48     RBC 03/06/2020 4 88     Hemoglobin 03/06/2020 14 2     Hematocrit 03/06/2020 45 6     MCV 03/06/2020 93     MCH 03/06/2020 29 1     MCHC 03/06/2020 31 1*    RDW 03/06/2020 14 5     MPV 03/06/2020 10 3     Platelets 77/10/0536 228     nRBC 03/06/2020 0     Neutrophils Relative 03/06/2020 67     Immat GRANS % 03/06/2020 2     Lymphocytes Relative 03/06/2020 21     Monocytes Relative 03/06/2020 10     Eosinophils Relative 03/06/2020 0     Basophils Relative 03/06/2020 0     Neutrophils Absolute 03/06/2020 5 63     Immature Grans Absolute 03/06/2020 0 13     Lymphocytes Absolute 03/06/2020 1 79     Monocytes Absolute 03/06/2020 0 88     Eosinophils Absolute 03/06/2020 0 02     Basophils Absolute 03/06/2020 0 03     Sodium 03/06/2020 135*    Potassium 03/06/2020 4 0     Chloride 03/06/2020 103     CO2 03/06/2020 22     ANION GAP 03/06/2020 10     BUN 03/06/2020 10     Creatinine 03/06/2020 1 07     Glucose 03/06/2020 84     Calcium 03/06/2020 8 7     eGFR 03/06/2020 87     POC Glucose 03/06/2020 89     POC Glucose 03/06/2020 74          Radiology Results:   Pe Study With Ct Abdomen & Pelvis With Contrast    Result Date: 3/4/2020  Narrative: CT PULMONARY ANGIOGRAM OF THE CHEST AND CT ABDOMEN AND PELVIS WITH INTRAVENOUS CONTRAST INDICATION:   Nausea/vomiting tachycardic, hypoxic, cough, sepsis, vomiting  COMPARISON:  None  TECHNIQUE:  CT examination of the chest, abdomen and pelvis was performed  Thin section CT angiographic technique was used in the chest in order to evaluate for pulmonary embolus and coronal 3D MIP postprocessing was performed on the acquisition scanner  Axial, sagittal, and coronal 2D reformatted images were created from the source data and submitted for interpretation  Radiation dose length product (DLP) for this visit:  1203 mGy-cm   This examination, like all CT scans performed in the [facility], was performed utilizing techniques to minimize radiation dose exposure, including the use of iterative reconstruction and automated exposure control  IV Contrast:  100 mL of iohexol (OMNIPAQUE)  was administered intravenously without immediate adverse reaction  Enteric Contrast:  Enteric contrast was not administered  FINDINGS: CHEST PULMONARY ARTERIAL TREE:  Limited evaluation due to poor contrast bolus timing  No main or lobar pulmonary embolism  LUNGS:  Patchy opacities are seen within the lower aspects of the right upper lobe and right lower lobe  Scattered small groundglass opacities seen within the right upper lobe  Patchy opacities seen within the left lower lobe base and perihilar left upper lobe  The central airways are patent however there is thickening of the bronchi predominantly in the lower lobes  PLEURA:  No pneumothorax or pleural effusion   HEART/AORTA:  Unremarkable for patient's age  MEDIASTINUM AND CORRIE:  Precarinal lymph node measures 1 1 cm in short axis  Subcarinal lymph node measures 1 1 cm in short axis  Hilar lymph nodes measure up to 5 mm in short axis  CHEST WALL AND LOWER NECK:   Bilateral gynecomastia  ABDOMEN LIVER/BILIARY TREE:  Cirrhotic morphology of the liver  GALLBLADDER:  No calcified gallstones  No pericholecystic inflammatory change  SPLEEN:  Unremarkable  PANCREAS:  Unremarkable  ADRENAL GLANDS:  Unremarkable  KIDNEYS/URETERS:  One or more sharply circumscribed subcentimeter renal hypodensities are noted  These lesions are too small to accurately characterize, but are statistically most likely to represent benign cortical renal cyst(s)  According to the guidelines published in the [facility] Paper of the ACR Incidental Findings Committee (Radiology 2010), no further workup of these lesions is recommended  No hydronephrosis  Nonspecific bilateral perinephric stranding  5 x 2 mm calculus within the lower pole the right kidney  STOMACH AND BOWEL:  Unremarkable  APPENDIX:  A normal appendix was visualized  ABDOMINOPELVIC CAVITY:  No ascites or free intraperitoneal air  No lymphadenopathy  VESSELS:  Unremarkable for patient's age  PELVIS REPRODUCTIVE ORGANS:  Unremarkable for patient's age  URINARY BLADDER:  Unremarkable  ABDOMINAL WALL/INGUINAL REGIONS:  Unremarkable  OSSEOUS STRUCTURES:  No acute fracture or destructive osseous lesion  Impression: 1  Limited evaluation due to poor contrast bolus timing  No pulmonary embolism to the lobar level  2   Patchy opacities seen throughout the lungs most compatible with multifocal pneumonia  3   Thickening of the bronchi predominantly in the lower lobes compatible with bronchitis  4   Mediastinal lymphadenopathy which is likely reactive  5   Cirrhotic morphology of liver  6   5 x 2 mm nonobstructive calculus within lower pole the right kidney  No hydronephrosis  7   Bilateral gynecomastia   Workstation performed: [id number]       The patient was seen and examined by Dr Delvin Mendoza, all pickering medical decisions were made with Dr Delvin Mendoza  Thank you for allowing us to participate in the care of this pleasant patient  We will follow up with you closely

## 2020-03-07 VITALS
OXYGEN SATURATION: 98 % | HEIGHT: 66 IN | DIASTOLIC BLOOD PRESSURE: 93 MMHG | BODY MASS INDEX: 32.7 KG/M2 | WEIGHT: 203.48 LBS | TEMPERATURE: 98.1 F | HEART RATE: 60 BPM | RESPIRATION RATE: 18 BRPM | SYSTOLIC BLOOD PRESSURE: 141 MMHG

## 2020-03-07 PROBLEM — R65.10 SIRS (SYSTEMIC INFLAMMATORY RESPONSE SYNDROME) (HCC): Status: ACTIVE | Noted: 2020-03-04

## 2020-03-07 PROBLEM — E87.6 HYPOKALEMIA: Status: RESOLVED | Noted: 2020-03-04 | Resolved: 2020-03-07

## 2020-03-07 PROBLEM — J69.0 ASPIRATION PNEUMONITIS (HCC): Status: ACTIVE | Noted: 2020-03-04

## 2020-03-07 PROBLEM — A08.4 VIRAL GASTROENTERITIS: Status: RESOLVED | Noted: 2020-03-07 | Resolved: 2020-03-07

## 2020-03-07 PROBLEM — R09.02 HYPOXIA: Status: RESOLVED | Noted: 2020-03-04 | Resolved: 2020-03-07

## 2020-03-07 PROBLEM — R11.2 NON-INTRACTABLE VOMITING WITH NAUSEA: Status: RESOLVED | Noted: 2020-03-04 | Resolved: 2020-03-07

## 2020-03-07 PROBLEM — A08.4 VIRAL GASTROENTERITIS: Status: ACTIVE | Noted: 2020-03-07

## 2020-03-07 LAB
BACTERIA SPT RESP CULT: ABNORMAL
GLUCOSE SERPL-MCNC: 96 MG/DL (ref 65–140)
GRAM STN SPEC: ABNORMAL
GRAM STN SPEC: ABNORMAL

## 2020-03-07 PROCEDURE — 87522 HEPATITIS C REVRS TRNSCRPJ: CPT | Performed by: PHYSICIAN ASSISTANT

## 2020-03-07 PROCEDURE — 99239 HOSP IP/OBS DSCHRG MGMT >30: CPT | Performed by: INTERNAL MEDICINE

## 2020-03-07 PROCEDURE — 94760 N-INVAS EAR/PLS OXIMETRY 1: CPT

## 2020-03-07 PROCEDURE — 82948 REAGENT STRIP/BLOOD GLUCOSE: CPT

## 2020-03-07 PROCEDURE — 94640 AIRWAY INHALATION TREATMENT: CPT

## 2020-03-07 RX ORDER — ONDANSETRON 4 MG/1
4 TABLET, ORALLY DISINTEGRATING ORAL EVERY 6 HOURS PRN
Qty: 10 TABLET | Refills: 0 | Status: SHIPPED | OUTPATIENT
Start: 2020-03-07 | End: 2020-03-18 | Stop reason: ALTCHOICE

## 2020-03-07 RX ORDER — PANTOPRAZOLE SODIUM 40 MG/1
40 TABLET, DELAYED RELEASE ORAL DAILY
Qty: 30 TABLET | Refills: 0 | Status: SHIPPED | OUTPATIENT
Start: 2020-03-07 | End: 2020-09-24 | Stop reason: ALTCHOICE

## 2020-03-07 RX ADMIN — NICOTINE 1 PATCH: 14 PATCH TRANSDERMAL at 09:27

## 2020-03-07 RX ADMIN — METHADONE HYDROCHLORIDE 44 MG: 10 CONCENTRATE ORAL at 09:26

## 2020-03-07 RX ADMIN — AMILORIDE HYDROCHLORIDE 5 MG: 5 TABLET ORAL at 09:30

## 2020-03-07 RX ADMIN — PROPRANOLOL HYDROCHLORIDE 20 MG: 20 TABLET ORAL at 09:26

## 2020-03-07 RX ADMIN — ENOXAPARIN SODIUM 40 MG: 40 INJECTION SUBCUTANEOUS at 09:27

## 2020-03-07 RX ADMIN — LEVALBUTEROL HYDROCHLORIDE 1.25 MG: 1.25 SOLUTION, CONCENTRATE RESPIRATORY (INHALATION) at 08:48

## 2020-03-07 RX ADMIN — PANTOPRAZOLE SODIUM 40 MG: 40 TABLET, DELAYED RELEASE ORAL at 05:24

## 2020-03-07 RX ADMIN — SUCRALFATE 1000 MG: 1 SUSPENSION ORAL at 05:23

## 2020-03-07 RX ADMIN — IPRATROPIUM BROMIDE 0.5 MG: 0.5 SOLUTION RESPIRATORY (INHALATION) at 08:48

## 2020-03-07 RX ADMIN — FAMOTIDINE 20 MG: 20 TABLET ORAL at 05:24

## 2020-03-07 NOTE — PLAN OF CARE
Problem: Nutrition/Hydration-ADULT  Goal: Nutrient/Hydration intake appropriate for improving, restoring or maintaining nutritional needs  Description  Monitor and assess patient's nutrition/hydration status for malnutrition  Collaborate with interdisciplinary team and initiate plan and interventions as ordered  Monitor patient's weight and dietary intake as ordered or per policy  Utilize nutrition screening tool and intervene as necessary  Determine patient's food preferences and provide high-protein, high-caloric foods as appropriate       INTERVENTIONS:  - Monitor oral intake, urinary output, labs, and treatment plans  - Assess nutrition and hydration status and recommend course of action  - Evaluate amount of meals eaten  - Assist patient with eating if necessary   - Allow adequate time for meals  - Recommend/ encourage appropriate diets, oral nutritional supplements, and vitamin/mineral supplements  - Order, calculate, and assess calorie counts as needed  - Recommend, monitor, and adjust tube feedings and TPN/PPN based on assessed needs  - Assess need for intravenous fluids  - Provide specific nutrition/hydration education as appropriate  - Include patient/family/caregiver in decisions related to nutrition  Outcome: Adequate for Discharge     Problem: RESPIRATORY - ADULT  Goal: Achieves optimal ventilation and oxygenation  Description  INTERVENTIONS:  - Assess for changes in respiratory status  - Assess for changes in mentation and behavior  - Position to facilitate oxygenation and minimize respiratory effort  - Oxygen administered by appropriate delivery if ordered  - Initiate smoking cessation education as indicated  - Encourage broncho-pulmonary hygiene including cough, deep breathe, Incentive Spirometry  - Assess the need for suctioning and aspirate as needed  - Assess and instruct to report SOB or any respiratory difficulty  - Respiratory Therapy support as indicated  Outcome: Adequate for Discharge Problem: METABOLIC, FLUID AND ELECTROLYTES - ADULT  Goal: Electrolytes maintained within normal limits  Description  INTERVENTIONS:  - Monitor labs and assess patient for signs and symptoms of electrolyte imbalances  - Administer electrolyte replacement as ordered  - Monitor response to electrolyte replacements, including repeat lab results as appropriate  - Instruct patient on fluid and nutrition as appropriate  Outcome: Adequate for Discharge  Goal: Glucose maintained within target range  Description  INTERVENTIONS:  - Monitor Blood Glucose as ordered  - Assess for signs and symptoms of hyperglycemia and hypoglycemia  - Administer ordered medications to maintain glucose within target range  - Assess nutritional intake and initiate nutrition service referral as needed  Outcome: Adequate for Discharge     Problem: COPING  Goal: Pt/Family able to verbalize concerns and demonstrate effective coping strategies  Description  INTERVENTIONS:  - Assist patient/family to identify coping skills, available support systems and cultural and spiritual values  - Provide emotional support, including active listening and acknowledgement of concerns of patient and caregivers  - Reduce environmental stimuli, as able  - Provide patient education  - Assess for spiritual pain/suffering and initiate spiritual care, including notification of Pastoral Care or [place] based community as needed  - Assess effectiveness of coping strategies  Outcome: Adequate for Discharge     Problem: PAIN - ADULT  Goal: Verbalizes/displays adequate comfort level or baseline comfort level  Description  Interventions:  - Encourage patient to monitor pain and request assistance  - Assess pain using appropriate pain scale  - Administer analgesics based on type and severity of pain and evaluate response  - Implement non-pharmacological measures as appropriate and evaluate response  - Consider cultural and social influences on pain and pain management  - Notify physician/advanced practitioner if interventions unsuccessful or patient reports new pain  Outcome: Adequate for Discharge     Problem: INFECTION - ADULT  Goal: Absence or prevention of progression during hospitalization  Description  INTERVENTIONS:  - Assess and monitor for signs and symptoms of infection  - Monitor lab/diagnostic results  - Monitor all insertion sites, i e  indwelling lines, tubes, and drains  - Monitor endotracheal if appropriate and nasal secretions for changes in amount and color  - [place] appropriate cooling/warming therapies per order  - Administer medications as ordered  - Instruct and encourage patient and family to use good hand hygiene technique  - Identify and instruct in appropriate isolation precautions for identified infection/condition  Outcome: Adequate for Discharge     Problem: SAFETY ADULT  Goal: Patient will remain free of falls  Description  INTERVENTIONS:  - Assess patient frequently for physical needs  -  Identify cognitive and physical deficits and behaviors that affect risk of falls    -  [place] fall precautions as indicated by assessment   - Educate patient/family on patient safety including physical limitations  - Instruct patient to call for assistance with activity based on assessment  - Modify environment to reduce risk of injury  - Consider OT/PT consult to assist with strengthening/mobility  Outcome: Adequate for Discharge     Problem: DISCHARGE PLANNING  Goal: Discharge to home or other facility with appropriate resources  Description  INTERVENTIONS:  - Identify barriers to discharge w/patient and caregiver  - Arrange for needed discharge resources and transportation as appropriate  - Identify discharge learning needs (meds, wound care, etc )  - Arrange for interpretive services to assist at discharge as needed  - Refer to Case Management Department for coordinating discharge planning if the patient needs post-hospital services based on physician/advanced practitioner order or complex needs related to functional status, cognitive ability, or social support system  Outcome: Adequate for Discharge     Problem: Knowledge Deficit  Goal: Patient/family/caregiver demonstrates understanding of disease process, treatment plan, medications, and discharge instructions  Description  Complete learning assessment and assess knowledge base  Interventions:  - Provide teaching at level of understanding  - Provide teaching via preferred learning methods  Outcome: Adequate for Discharge     Problem: Potential for Falls  Goal: Patient will remain free of falls  Description  INTERVENTIONS:  - Assess patient frequently for physical needs  -  Identify cognitive and physical deficits and behaviors that affect risk of falls    -  [place] fall precautions as indicated by assessment   - Educate patient/family on patient safety including physical limitations  - Instruct patient to call for assistance with activity based on assessment  - Modify environment to reduce risk of injury  - Consider OT/PT consult to assist with strengthening/mobility  Outcome: Adequate for Discharge

## 2020-03-07 NOTE — PLAN OF CARE
Problem: Nutrition/Hydration-ADULT  Goal: Nutrient/Hydration intake appropriate for improving, restoring or maintaining nutritional needs  Description  Monitor and assess patient's nutrition/hydration status for malnutrition  Collaborate with interdisciplinary team and initiate plan and interventions as ordered  Monitor patient's weight and dietary intake as ordered or per policy  Utilize nutrition screening tool and intervene as necessary  Determine patient's food preferences and provide high-protein, high-caloric foods as appropriate       INTERVENTIONS:  - Monitor oral intake, urinary output, labs, and treatment plans  - Assess nutrition and hydration status and recommend course of action  - Evaluate amount of meals eaten  - Assist patient with eating if necessary   - Allow adequate time for meals  - Recommend/ encourage appropriate diets, oral nutritional supplements, and vitamin/mineral supplements  - Order, calculate, and assess calorie counts as needed  - Recommend, monitor, and adjust tube feedings and TPN/PPN based on assessed needs  - Assess need for intravenous fluids  - Provide specific nutrition/hydration education as appropriate  - Include patient/family/caregiver in decisions related to nutrition  Outcome: Progressing     Problem: RESPIRATORY - ADULT  Goal: Achieves optimal ventilation and oxygenation  Description  INTERVENTIONS:  - Assess for changes in respiratory status  - Assess for changes in mentation and behavior  - Position to facilitate oxygenation and minimize respiratory effort  - Oxygen administered by appropriate delivery if ordered  - Initiate smoking cessation education as indicated  - Encourage broncho-pulmonary hygiene including cough, deep breathe, Incentive Spirometry  - Assess the need for suctioning and aspirate as needed  - Assess and instruct to report SOB or any respiratory difficulty  - Respiratory Therapy support as indicated  Outcome: Progressing     Problem: METABOLIC, FLUID AND ELECTROLYTES - ADULT  Goal: Electrolytes maintained within normal limits  Description  INTERVENTIONS:  - Monitor labs and assess patient for signs and symptoms of electrolyte imbalances  - Administer electrolyte replacement as ordered  - Monitor response to electrolyte replacements, including repeat lab results as appropriate  - Instruct patient on fluid and nutrition as appropriate  Outcome: Progressing  Goal: Glucose maintained within target range  Description  INTERVENTIONS:  - Monitor Blood Glucose as ordered  - Assess for signs and symptoms of hyperglycemia and hypoglycemia  - Administer ordered medications to maintain glucose within target range  - Assess nutritional intake and initiate nutrition service referral as needed  Outcome: Progressing     Problem: COPING  Goal: Pt/Family able to verbalize concerns and demonstrate effective coping strategies  Description  INTERVENTIONS:  - Assist patient/family to identify coping skills, available support systems and cultural and spiritual values  - Provide emotional support, including active listening and acknowledgement of concerns of patient and caregivers  - Reduce environmental stimuli, as able  - Provide patient education  - Assess for spiritual pain/suffering and initiate spiritual care, including notification of Pastoral Care or [place] based community as needed  - Assess effectiveness of coping strategies  Outcome: Progressing     Problem: PAIN - ADULT  Goal: Verbalizes/displays adequate comfort level or baseline comfort level  Description  Interventions:  - Encourage patient to monitor pain and request assistance  - Assess pain using appropriate pain scale  - Administer analgesics based on type and severity of pain and evaluate response  - Implement non-pharmacological measures as appropriate and evaluate response  - Consider cultural and social influences on pain and pain management  - Notify physician/advanced practitioner if interventions unsuccessful or patient reports new pain  Outcome: Progressing     Problem: INFECTION - ADULT  Goal: Absence or prevention of progression during hospitalization  Description  INTERVENTIONS:  - Assess and monitor for signs and symptoms of infection  - Monitor lab/diagnostic results  - Monitor all insertion sites, i e  indwelling lines, tubes, and drains  - Monitor endotracheal if appropriate and nasal secretions for changes in amount and color  - [place] appropriate cooling/warming therapies per order  - Administer medications as ordered  - Instruct and encourage patient and family to use good hand hygiene technique  - Identify and instruct in appropriate isolation precautions for identified infection/condition  Outcome: Progressing     Problem: SAFETY ADULT  Goal: Patient will remain free of falls  Description  INTERVENTIONS:  - Assess patient frequently for physical needs  -  Identify cognitive and physical deficits and behaviors that affect risk of falls    -  [place] fall precautions as indicated by assessment   - Educate patient/family on patient safety including physical limitations  - Instruct patient to call for assistance with activity based on assessment  - Modify environment to reduce risk of injury  - Consider OT/PT consult to assist with strengthening/mobility  Outcome: Progressing     Problem: DISCHARGE PLANNING  Goal: Discharge to home or other facility with appropriate resources  Description  INTERVENTIONS:  - Identify barriers to discharge w/patient and caregiver  - Arrange for needed discharge resources and transportation as appropriate  - Identify discharge learning needs (meds, wound care, etc )  - Arrange for interpretive services to assist at discharge as needed  - Refer to Case Management Department for coordinating discharge planning if the patient needs post-hospital services based on physician/advanced practitioner order or complex needs related to functional status, cognitive ability, or social support system  Outcome: Progressing     Problem: Knowledge Deficit  Goal: Patient/family/caregiver demonstrates understanding of disease process, treatment plan, medications, and discharge instructions  Description  Complete learning assessment and assess knowledge base  Interventions:  - Provide teaching at level of understanding  - Provide teaching via preferred learning methods  Outcome: Progressing     Problem: Potential for Falls  Goal: Patient will remain free of falls  Description  INTERVENTIONS:  - Assess patient frequently for physical needs  -  Identify cognitive and physical deficits and behaviors that affect risk of falls    -  [place] fall precautions as indicated by assessment   - Educate patient/family on patient safety including physical limitations  - Instruct patient to call for assistance with activity based on assessment  - Modify environment to reduce risk of injury  - Consider OT/PT consult to assist with strengthening/mobility  Outcome: Progressing

## 2020-03-07 NOTE — PROGRESS NOTES
Patient Name: Janina Dougherty  Patient MRN: [id number]  Date: 03/07/20  Service: [facility]    Ty Rodriguez is a 60 y o  male who was admitted with SIRS (systemic inflammatory response syndrome) ([facility] [address] )  He feels well today  Patient denies: Chest pain, shortness of breath, fever, weight loss, rash, adenopathy  All others negative except as noted in HPI  Objective     Vitals  Blood pressure 141/93, pulse 60, temperature 98 1 °F (36 7 °C), temperature source Tympanic, resp  rate 18, height 5' 6" (1 676 m), weight 92 3 kg (203 lb 7 8 oz), SpO2 98 %  ROS:  Patient denies: Chest pain, shortness of breath, fever, weight loss, rash, adenopathy  All others negative except as noted in HPI  General: Alert, no apparent distress  Eyes: No scleral icterus  Abdomen: Soft  Nontender  Nondistended  Bowel sounds present and normoactive  No hepatosplenomegaly    Skin: No jaundice  Neuro: Alert and oriented x3    Laboratory Studies  Lab Results   Component Value Date    CREATININE 1 07 03/06/2020    BUN 10 03/06/2020    SODIUM 135 (L) 03/06/2020    K 4 0 03/06/2020     03/06/2020    CO2 22 03/06/2020    GLUCOSE 98 05/24/2018    CALCIUM 8 7 03/06/2020    PROT 7 7 05/24/2018    ALKPHOS 90 03/06/2020    BILITOT 0 4 05/24/2018    AST 20 03/06/2020    ALT 18 03/06/2020     Lab Results   Component Value Date    WBC 8 48 03/06/2020    HGB 14 2 03/06/2020    HCT 45 6 03/06/2020     03/06/2020    MCV 93 03/06/2020     Lab Results   Component Value Date    PROTIME 15 6 (H) 03/04/2020    INR 1 22 (H) 03/04/2020       Inhouse Medications       Current Facility-Administered Medications:     acetaminophen (TYLENOL) tablet 650 mg, 650 mg, Oral, Q6H PRN, 650 mg at 03/04/20 0743    AMILoride tablet 5 mg, 5 mg, Oral, Daily, 5 mg at 03/07/20 0930    calcium carbonate (TUMS) chewable tablet 500 mg, 500 mg, Oral, TID PRN    enoxaparin (LOVENOX) subcutaneous injection 40 mg, 40 mg, Subcutaneous, Daily, 40 mg at 03/07/20 0927    famotidine (PEPCID) tablet 20 mg, 20 mg, Oral, BID AC, 20 mg at 03/07/20 0524    guaiFENesin (MUCINEX) 12 hr tablet 600 mg, 600 mg, Oral, BID, Stopped at 03/06/20 1800    insulin lispro (HumaLOG) 100 units/mL subcutaneous injection 1-5 Units, 1-5 Units, Subcutaneous, TID AC, Stopped at 03/05/20 1202 **AND** Fingerstick Glucose (POCT), , , TID AC    insulin lispro (HumaLOG) 100 units/mL subcutaneous injection 1-5 Units, 1-5 Units, Subcutaneous, HS    ipratropium (ATROVENT) 0 02 % inhalation solution 0 5 mg, 0 5 mg, Nebulization, TID, 0 5 mg at 03/07/20 0848    ipratropium (ATROVENT) 0 02 % inhalation solution 0 5 mg, 0 5 mg, Nebulization, Q4H PRN    levalbuterol (XOPENEX) inhalation solution 1 25 mg, 1 25 mg, Nebulization, TID, 1 25 mg at 03/07/20 0848    methadone (DOLOPHINE) 10 mg/mL oral concentrated solution 44 mg, 44 mg, Oral, Daily, 44 mg at 03/07/20 0926    nicotine (NICODERM CQ) 14 mg/24hr TD 24 hr patch 1 patch, 1 patch, Transdermal, Daily, 1 patch at 03/07/20 0927    ondansetron (ZOFRAN) injection 4 mg, 4 mg, Intravenous, Q6H PRN, 4 mg at 03/06/20 0844    pantoprazole (PROTONIX) EC tablet 40 mg, 40 mg, Oral, BID AC, 40 mg at 03/07/20 0524    propranolol (INDERAL) tablet 20 mg, 20 mg, Oral, Q12H [place], 20 mg at 03/07/20 0926    sodium chloride 0 9 % infusion, 100 mL/hr, Intravenous, Continuous, 100 mL/hr at 03/06/20 2316    sucralfate (CARAFATE) oral suspension 1,000 mg, 1,000 mg, Oral, Q6H [address], 1,000 mg at 03/07/20 0523      Assessment/Plan:    Feels well and eating without difficulty  He will follow-up with CARLYLE Novak MD

## 2020-03-07 NOTE — DISCHARGE SUMMARY
Discharge Summary - Medical Angel Luis Waters 60 y o  male MRN: [id number]    [address] [facility] [address] [address] MED SURG Room / Bed: [place] [address] /E2 -* Encounter: [id number]    BRIEF OVERVIEW      Admission Date: 3/4/2020       Discharge Date:  03/07/2020    Primary Diagnoses  Principal Problem:    Sepsis [facility])  Active Problems:    Benign essential hypertension    Chronic hepatitis C virus infection ([facility] )    Cirrhosis of liver ([facility] )    Type 2 diabetes mellitus without complication, without long-term current use of insulin (HCC)    Methadone maintenance therapy patient ([facility] )    Tobacco use disorder    Opioid use disorder ([place] [address] )    Multifocal pneumonia    Non-intractable vomiting with nausea    Hypokalemia    Hypoxia  Resolved Problems:    * No resolved hospital problems  *      Service:  [facility] Internal Medicine, [facility]  Consulting Providers   Id:  Dr Lebron Pagan  GI: Dr Octavia Doyle    Procedures Performed   none    Hospital Studies:  3/4:  CTA chest/abdomen/pelvis:  1   Limited evaluation due to poor contrast bolus timing   No pulmonary embolism to the lobar level  2   Patchy opacities seen throughout the lungs most compatible with multifocal pneumonia  3   Thickening of the bronchi predominantly in the lower lobes compatible with bronchitis  4   Mediastinal lymphadenopathy which is likely reactive     5   Cirrhotic morphology of liver  6   5 x 2 mm nonobstructive calculus within lower pole the right kidney   No hydronephrosis  7   Bilateral gynecomastia     Microbiology  3/5:  Sputum culture:  2+ Haemophilus influenzae, 2+actinomyces odontolyticus  Mixed resp kemal  3/4:  Negative influenza/RSV PCR  3/4:  Blood culture:  Negative x2     3/3:  EKG:  Sinus tachycardia rate of 119   T-wave inversion in V1   T-wave inversion in 3   Normal QTC interval     Results from last 7 days   Lab Units 03/06/20  0425 03/05/20  0443 03/04/20  0126   WBC Thousand/uL 8 48 9 24 15 09* HEMOGLOBIN g/dL 14 2 14 1 14 9   HEMATOCRIT % 45 6 45 0 45 7   PLATELETS Thousands/uL 228 208 243     Results from last 7 days   Lab Units 03/06/20  0425 03/05/20  0442 03/04/20  0126   POTASSIUM mmol/L 4 0 4 0 3 2*   CHLORIDE mmol/L 103 103 99*   CO2 mmol/L 22 21 25   BUN mg/dL 10 11 14   CREATININE mg/dL 1 07 1 06 1 23   CALCIUM mg/dL 8 7 8 3 8 9       History and Physical Exam:  Please refer to the Admission H&P note    Hospital Course by Problem  1-SIRS:  Patient presented to the ER with criteria for sepsis   He was noted to have a leukocytosis with a white count of 15, tachycardia, and tachypnea   His sepsis was initially attributed to his community-acquired, multifocal pneumonia              -TYXZFHM was given IV fluids, and started on broad-spectrum antibiotics, and has improved              -blood cultures negative thus far              -urinalysis not consistent with acute infection and patient does not have any urinary symptoms              -influenza/RSV PCR negative              -respiratory pathogen panel negative              -patient's sputum culture was positive for Haemophilus influenzae and Actinomyces    -patient was evaluated by the Infectious Disease team :  It is suspected that patient had initial viral gastroenteritis with 4-5 days of intractable nausea and vomiting , and then had subsequent aspiration pneumonitis  He was initially placed on broad-spectrum antibiotics with ceftriaxone /azithromycin , and then Flagyl was added to cover possible aspiration pneumonia  However as his procalcitonin was negative x2, infectious disease team notes that his symptoms are more likely secondary to SIRS, from aspiration pneumonitis rather than pneumonia and sepsis  His antibiotics were discontinued  His sputum cultures were felt to represent colonization      2-aspiration pneumonitis, multifocal: Patient's CT scan was consistent with multifocal pneumonia                -blood cultures negative              -Legionella/strep pneumoniae antigen negative  Negative respiratory pathogen panel  Urine culture positive for Haemophilus influenzae and Actinomyces, mixed respiratory kemal              -patient also at risk for possible aspiration as he had 5 days of nausea/vomiting prior to the onset of his pulmonary symptoms              -CT scan without evidence of pulmonary embolism in the main or lobar pulmonary arteries   -patient was seen in consultation by the Infectious Disease team   Initially he was diagnosed with multifocal community-acquired pneumonia, and possible aspiration pneumonia  He was on broad-spectrum antibiotics with ceftriaxone/azithromycin/Flagyl  However after patient's procalcitonin was negative x2, the Infectious Disease team concluded that his presentation and radiographic changes are secondary to aspiration pneumonitis, rather than bacterial pneumonia  His antibiotics were discontinued  His sputum culture was felt to represent colonization                3-opioid use disorder on methadone maintenance:  Continue patient's home dose of methadone of 44 mg daily   Patient relates he goes to his methadone [facility] once a week and receives a 6 day supply  Medrano Maine relates he has been tolerating his methadone, despite nausea and vomiting   He denies any withdrawal symptoms are missed doses              -EKG with normal intervals     4-status post nausea/vomiting:  Patient presented with a 5 day history of nausea/vomiting and poor p o  Intake               -OA scan without acute abnormalities              -LFTs with isolated total bilirubin   No evidence of obstructive pattern              -possible viral gastroenteritis              -denies opiate withdrawal, or missed doses              -patient is seen in consultation with the GI team during his hospitalization as he had persistent nausea with poor oral intake especially in the mornings    Patient improved with Zofran as well as initiation of proton pump inhibitor    -patient's symptoms are likely secondary to a recent viral gastroenteritis  He has improved and his symptoms have resolved  He is tolerating p o  And did not require Zofran today  He will be discharged home    -he will follow-up with the GI team in the office      5-diabetes type 2:  Without long-term use of insulin, without complication   He follows with the endocrinologist  [facility] is on metformin 1 g b i d  As an outpatient  Patient noted to have hypoglycemia in the hospital, but had poor p o  Intake  He is currently tolerating p o  At this time  He was instructed to decrease his metformin to 500 mg b i d , half his prior dose  He will continue to check his Accu-Chek was he states he checks b i d  He denies any hypoglycemia at home prior to admission     6-essential hypertension:  Patient's blood pressure adequately controlled   Continue his home amiloride 5 mg daily, and propranolol 20 mg b i d               -most recent blood pressure  141/93     7-cirrhosis:  Patient's CT scan has radiographic evidence of cirrhosis   This is a known diagnosis, per his outpatient records               -LAKISHA has a history of chronic hepatitis C, cirrhosis, esophageal varices, and portal hypertension               -he does not have any significant thrombocytopenia or coagulopathy   Continue outpatient follow-up  Magdalena Micaela relates he was previously treated for his hepatitis C     -he will follow-up with the GI team as an outpatient      8-incidental finding:  Nephrolithiasis:  Patient's CT scan revealed a 5 x 2 mm nonobstructing calculus in the lower pole of the right kidney   No current intervention required   The patient relates a known history of nephrolithiasis  Patient relates he has had recurrent kidney stones for many years    The usually pass without any intervention required      9-history of hypogonadism:  Patient follows with endocrinology, Dr Woodrow Hernandes last seen 1/2020 and received testosterone injection     10-tobacco abuse:  Smoking cessation counseled   Nicotine patch     11-elevated total bilirubin:  Patient's total bilirubin was elevated at 1 62  Britany Frey did not have any significant elevation in his transaminases are alkaline phosphatase   Patient did not have any significant transaminitis  His total bilirubin normalized prior to discharge  This may have been secondary to [place] disease     12-history of hepatitis-C:  Per old records, secondary to IV drug abuse   Patient received treatment for this   Outpatient follow-up     13-acute hypoxic respiratory distress:  Patient was initially noted to have hypoxia and was 88% on room air   This is likely secondary to his multifocal pneumonia    patient's cognition continue to improve  He currently has adequate oxygenation on room air  He will not require home O2 at discharge  He is 98% saturation on room air     Discussed with patient's nurse      VTE Pharmacologic Prophylaxis: Enoxaparin (Lovenox)  VTE Mechanical Prophylaxis: sequential compression device    Discharge Condition: Improved  Discharge Disposition:  Home    Discharge Note and Physical Exam:   Patient denies any complaints at all  He denies any nausea or vomiting  He notes this morning he was able to eat breakfast without any nausea or vomiting and did not require any Zofran  He notes he is tolerating p o  He denies any diarrhea or constipation  He is passing bowel movements  He denies any heartburn  Patient denies any shortness of breath, chest congestion  He notes his cough has improved  Notes he is ambulating and denies any dyspnea on exertion  He denies any pain anywhere else apart from chronic left lower back pain which she attributes to chronic kidney stone pain  He denies any headache, chest pain  He relates he is urinating without any difficulty  He denies any hematuria    He notes he feels markedly improved and is agreeable for discharge home    Vitals:    03/07/20 0848   BP:    Pulse:    Resp:    Temp:    SpO2: 98%     General:  Pleasant, non-tachypnic, non-dyspnic  Conversant  Heart: Regular rate and rhythm, S1S2 present  No murmur, rub or gallop  Lungs: Clear to auscultation bilaterally, no wheezing, rhonchi, or crackles  Good air movement  No accessory muscle use or respiratory distress  Abdomen: soft, non-tender, non-distended, NABS  No rebound or guarding  No mass or peritoneal signs  Extremities: no clubbing, cyanosis or edema  2+ pedal pulses bilaterally  Neurologic:  Awake and alert  Fluent speech  Skin: warm and dry  No petechiae, purpura or rash  Discharge Medications   Please see Medical Reconciliation Discharge Form    Discharge Follow Up Appointments:   Anabell Lambert MD: 1 week  Dr Najera Ny: 2 weeks    Discharge  Statement   Total Time Spent today including physical exam, discussion with patient, and discharge arrangements/care = 34 minutes      This note has been constructed using a voice recognition system

## 2020-03-07 NOTE — DISCHARGE INSTRUCTIONS
==========================================================  Discharge instructions:    Please take your medication as directed    Please be sure to stay well hydrated and drink at least 6-8 large glasses of water a day    Please continue the pill Protonix/pantoprazole for your stomach, as well as the nausea pill if you need it      Please see your family doctor in 1 week for a checkup as well as the GI doctor in the office    Please return to the ER with any problems at all, especially any worsening nausea, vomiting, diarrhea, abdominal pain, difficulty breathing, worsening cough, or any other problems at

## 2020-03-09 ENCOUNTER — TRANSITIONAL CARE MANAGEMENT (OUTPATIENT)
Dept: FAMILY MEDICINE CLINIC | Facility: CLINIC | Age: 60
End: 2020-03-09

## 2020-03-09 LAB
BACTERIA BLD CULT: NORMAL
BACTERIA BLD CULT: NORMAL

## 2020-03-10 NOTE — UTILIZATION REVIEW
Notification of Discharge  This is a Notification of Discharge from our facility [address]  Please be advised that this patient has been discharge from our facility  Below you will find the admission and discharge date and time including the patients disposition  PRESENTATION DATE: 3/4/2020  1:07 AM    IP ADMISSION DATE: 3/4/20 0326   DISCHARGE DATE: 3/7/2020 12:58 PM  DISPOSITION: Home/Self Care Home/Self Care   Admission Orders listed below:  Admission Orders (From admission, onward)     Ordered        03/04/20 0326  Inpatient Admission (expected length of stay for this patient Order details is greater than two midnights)  Once                   Please contact the UR Department if additional information is required to close this patient's authorization/case  Gabreila Awad  Network Utilization Review Department  Main: [phone] x carefully listen to the prompts  All voicemails are confidential   [email or web address]  Send all requests for admission clinical reviews, approved or denied determinations and any other requests to dedicated fax number below belonging to the campus where the patient is receiving treatment   List of dedicated fax numbers:  [address] [place] [address] DENIALS (Administrative/Medical Necessity) [phone]   [address] [place] [address] (Maternity/NICU/Pediatrics) [phone]   Ayanna Gan [phone]     [address]  [phone]   Pj Santiago [phone]   [address] [facility] [phone]   Elen Wright [phone]   [address] [facility] [address]  [address] [facility] [address] [facility] [phone]

## 2020-03-11 LAB
HCV RNA SERPL NAA+PROBE-ACNC: NORMAL IU/ML
TEST INFORMATION: NORMAL

## 2020-03-12 ENCOUNTER — TELEPHONE (OUTPATIENT)
Dept: GASTROENTEROLOGY | Facility: MEDICAL CENTER | Age: 60
End: 2020-03-12

## 2020-03-12 NOTE — TELEPHONE ENCOUNTER
----- Message from Ritesh Davis PA-C sent at 3/6/2020  3:17 PM EST -----  Regarding: outpatient new pt appointment  Pt admitted to [facility] with PNA, now with N/V  Hx of cirrhosis, does not follow with any GI provider but would like to establish care with GI MD at [facility], [facility]  Pt speaks some English, Chinese is primary language  Likely hospital d/c in 24-48 hours  Please contact him to set up appointment  Thank you!

## 2020-03-18 ENCOUNTER — OFFICE VISIT (OUTPATIENT)
Dept: FAMILY MEDICINE CLINIC | Facility: CLINIC | Age: 60
End: 2020-03-18
Payer: COMMERCIAL

## 2020-03-18 VITALS
HEIGHT: 68 IN | TEMPERATURE: 97.9 F | DIASTOLIC BLOOD PRESSURE: 70 MMHG | SYSTOLIC BLOOD PRESSURE: 110 MMHG | RESPIRATION RATE: 16 BRPM | BODY MASS INDEX: 31.22 KG/M2 | OXYGEN SATURATION: 98 % | HEART RATE: 96 BPM | WEIGHT: 206 LBS

## 2020-03-18 DIAGNOSIS — E11.9 TYPE 2 DIABETES MELLITUS WITHOUT COMPLICATION, WITHOUT LONG-TERM CURRENT USE OF INSULIN (HCC): ICD-10-CM

## 2020-03-18 DIAGNOSIS — F11.20 METHADONE MAINTENANCE THERAPY PATIENT (HCC): ICD-10-CM

## 2020-03-18 DIAGNOSIS — Z11.4 SCREENING FOR HIV (HUMAN IMMUNODEFICIENCY VIRUS): ICD-10-CM

## 2020-03-18 DIAGNOSIS — E29.1 3-OXO-5 ALPHA-STEROID DELTA 4-DEHYDROGENASE DEFICIENCY: ICD-10-CM

## 2020-03-18 DIAGNOSIS — K74.4 SECONDARY BILIARY CIRRHOSIS (HCC): ICD-10-CM

## 2020-03-18 DIAGNOSIS — F11.90 OPIOID USE DISORDER: ICD-10-CM

## 2020-03-18 DIAGNOSIS — E78.2 MIXED HYPERLIPIDEMIA: Primary | ICD-10-CM

## 2020-03-18 DIAGNOSIS — R65.10 SIRS (SYSTEMIC INFLAMMATORY RESPONSE SYNDROME) (HCC): ICD-10-CM

## 2020-03-18 PROCEDURE — 99495 TRANSJ CARE MGMT MOD F2F 14D: CPT | Performed by: FAMILY MEDICINE

## 2020-03-18 PROCEDURE — 1111F DSCHRG MED/CURRENT MED MERGE: CPT | Performed by: FAMILY MEDICINE

## 2020-03-18 RX ORDER — ERGOCALCIFEROL 1.25 MG/1
CAPSULE ORAL
COMMUNITY
Start: 2019-12-27 | End: 2020-09-24 | Stop reason: ALTCHOICE

## 2020-03-23 ENCOUNTER — TELEPHONE (OUTPATIENT)
Dept: FAMILY MEDICINE CLINIC | Facility: CLINIC | Age: 60
End: 2020-03-23

## 2020-03-23 RX ORDER — METHADONE HYDROCHLORIDE 10 MG/1
TABLET ORAL
Qty: 30 TABLET | Refills: 0
Start: 2020-03-23 | End: 2021-03-18 | Stop reason: CLARIF

## 2020-03-23 NOTE — ASSESSMENT & PLAN NOTE
Lab Results   Component Value Date    HGBA1C 5 8 12/19/2019    Is controlled, metformin was discontinued since I believe the was was the cause of  his diarrhea

## 2020-03-23 NOTE — ASSESSMENT & PLAN NOTE
He will continue care with methadone clinic  I am concerned about patient is not having appropriate MAT, he needs to be on naloxone in case of needed  I sent a communication to his methadone clinic about this issue

## 2020-04-02 ENCOUNTER — TELEMEDICINE (OUTPATIENT)
Dept: GASTROENTEROLOGY | Facility: MEDICAL CENTER | Age: 60
End: 2020-04-02
Payer: COMMERCIAL

## 2020-04-02 DIAGNOSIS — B18.2 CHRONIC HEPATITIS C WITHOUT HEPATIC COMA (HCC): ICD-10-CM

## 2020-04-02 DIAGNOSIS — K74.4 SECONDARY BILIARY CIRRHOSIS (HCC): Primary | ICD-10-CM

## 2020-04-02 DIAGNOSIS — R11.2 NON-INTRACTABLE VOMITING WITH NAUSEA: ICD-10-CM

## 2020-04-02 PROCEDURE — G2012 BRIEF CHECK IN BY MD/QHP: HCPCS | Performed by: PHYSICIAN ASSISTANT

## 2020-06-24 ENCOUNTER — TELEMEDICINE (OUTPATIENT)
Dept: GASTROENTEROLOGY | Facility: MEDICAL CENTER | Age: 60
End: 2020-06-24
Payer: COMMERCIAL

## 2020-06-24 DIAGNOSIS — R11.2 NON-INTRACTABLE VOMITING WITH NAUSEA: ICD-10-CM

## 2020-06-24 DIAGNOSIS — K74.4 SECONDARY BILIARY CIRRHOSIS (HCC): ICD-10-CM

## 2020-06-24 DIAGNOSIS — B18.2 CHRONIC HEPATITIS C WITHOUT HEPATIC COMA (HCC): Primary | ICD-10-CM

## 2020-06-24 DIAGNOSIS — Z12.12 ENCOUNTER FOR COLORECTAL CANCER SCREENING: ICD-10-CM

## 2020-06-24 DIAGNOSIS — Z12.11 ENCOUNTER FOR COLORECTAL CANCER SCREENING: ICD-10-CM

## 2020-06-24 DIAGNOSIS — Z86.19 HISTORY OF HELICOBACTER PYLORI INFECTION: ICD-10-CM

## 2020-06-24 PROCEDURE — 99214 OFFICE O/P EST MOD 30 MIN: CPT | Performed by: INTERNAL MEDICINE

## 2020-07-30 ENCOUNTER — OFFICE VISIT (OUTPATIENT)
Dept: GASTROENTEROLOGY | Facility: MEDICAL CENTER | Age: 60
End: 2020-07-30
Payer: COMMERCIAL

## 2020-07-30 VITALS
SYSTOLIC BLOOD PRESSURE: 138 MMHG | WEIGHT: 213 LBS | HEIGHT: 68 IN | DIASTOLIC BLOOD PRESSURE: 88 MMHG | BODY MASS INDEX: 32.28 KG/M2 | TEMPERATURE: 98.6 F | HEART RATE: 100 BPM

## 2020-07-30 DIAGNOSIS — I85.10 SECONDARY ESOPHAGEAL VARICES WITHOUT BLEEDING (HCC): ICD-10-CM

## 2020-07-30 DIAGNOSIS — Z12.11 COLON CANCER SCREENING: ICD-10-CM

## 2020-07-30 DIAGNOSIS — B18.2 CHRONIC HEPATITIS C WITHOUT HEPATIC COMA (HCC): Primary | ICD-10-CM

## 2020-07-30 PROCEDURE — 3079F DIAST BP 80-89 MM HG: CPT | Performed by: PHYSICIAN ASSISTANT

## 2020-07-30 PROCEDURE — 3008F BODY MASS INDEX DOCD: CPT | Performed by: PHYSICIAN ASSISTANT

## 2020-07-30 PROCEDURE — 3066F NEPHROPATHY DOC TX: CPT | Performed by: PHYSICIAN ASSISTANT

## 2020-07-30 PROCEDURE — 99214 OFFICE O/P EST MOD 30 MIN: CPT | Performed by: PHYSICIAN ASSISTANT

## 2020-07-30 PROCEDURE — 2022F DILAT RTA XM EVC RTNOPTHY: CPT | Performed by: PHYSICIAN ASSISTANT

## 2020-07-30 PROCEDURE — 3075F SYST BP GE 130 - 139MM HG: CPT | Performed by: PHYSICIAN ASSISTANT

## 2020-07-30 NOTE — PATIENT INSTRUCTIONS
The patient is scheduled at [facility] for a Colon/egd with Miles Conway on 08/25/2020  miralax/dulcolax prep instructions have been gone over in the office, with the patient, by the MA  The patient is aware that they will receive a call with the arrival time the day prior to procedure and that they will need a  the day of the procedure   I have asked the patient to call with any questions that they might have prior to procedure

## 2020-07-30 NOTE — PROGRESS NOTES
[facility] - Outpatient Follow-up Note  Argenis Live 63 y o  male MRN: [id number]  Encounter: [id number]          ASSESSMENT AND PLAN:      1  Chronic hepatitis C without hepatic coma ([facility] ): hx of hep C s/p treatment in 2014 and achieving SVR  He has a hx of EV but no hx of HE or ascites  No recent labs for meld score and he will be due for [facility]  screening in september  - CBC and differential  - Comprehensive metabolic panel  - Protime-INR  - AFP tumor marker; Future  - US right upper quadrant; Future  - EGD; Future  - Colonoscopy; Future    2  Secondary esophageal varices without bleeding ([facility] ): his last EGD was in 2015 with small esophageal varices  He was previously on 20mg propranolol but is no longer taking this  Will evaluated for EV during EGD and make further recommendations on non-selective beta blocker afterwards  -EGD for variceal screening    3  Colon cancer screening: his last colonoscopy was in 2014 with Dr Charity Malone  -repeat colonoscopy at time of EGD for screening    4  H pylori: last EGD with biopsy showing h pylor, unclear if this was ever treated and stool antigen was never completed  -check h pylori stool antigen  -can re-biopsy on upcoming EGD    Risks and benefits of EGD and colonoscopy were discussed including but not limited to bleeding, infection, perforation  He understands and agrees to proceed with procedure  He needs to be done in the hospital setting secondary to his cirrhosis    ______________________________________________________________________    SUBJECTIVE:  Argenis Live is a 53-year-old male who is here for follow-up of hep C cirrhosis  He has a history of chronic hepatitis C but is status post treatment in 2014 and achieved SVR  Her known recent labs calculate a meld score  His last [facility]  screening was in March, which was negative via CT scan in AFP tumor marker  His cirrhosis is complicated by esophageal varices seen on last EGD in 2015    There is no evidence of hepatic encephalopathy or ascites  He denies any confusion, abdominal pain, further nausea vomiting, change in bowel movements, melena or hematochezia  His last EGD was in 2015 that showed small esophageal varices, biopsies showed H pylori  It is unsure if he was ever treated for this and there has not been any stool antigen  His last colonoscopy was in 2015 which was unremarkable  REVIEW OF SYSTEMS IS OTHERWISE NEGATIVE        Historical Information   Past Medical History:   Diagnosis Date    Diabetes mellitus ([facility] )     Esophageal varices (HCC)     Gastritis     Hepatitis C     History of kidney stones     Hyperlipidemia     Hypertension     Kidney stone     Pneumonia     Substance abuse ([facility] )      Past Surgical History:   Procedure Laterality Date    COLONOSCOPY  06/20/2014    dr padilla    COLONOSCOPY  2014    Dr Padilla    ESOPHAGOGASTRODUODENOSCOPY  12/10/2015    esophageal varices, cirrhosis, gastritis    HYDROCELE EXCISION / REPAIR      LIVER BIOPSY  2014     Social History   Social History     Substance and Sexual Activity   Alcohol Use Never    Alcohol/week: 0 0 standard drinks    Frequency: Never     Social History     Substance and Sexual Activity   Drug Use No    Comment: former user- heroin was drug of choice     Social History     Tobacco Use   Smoking Status Current Every Day Smoker    Packs/day: 1 00   Smokeless Tobacco Never Used   Tobacco Comment    TOBACCO USE     Family History   Problem Relation Age of Onset    Diabetes type II Mother         MELLITUS    Hypertension Mother     Hypertension Sister     Diabetes Sister         MELLITUS       Meds/Allergies       Current Outpatient Medications:     methadone (DOLOPHINE) 10 mg tablet    AMILoride 5 mg tablet    B-D 3CC LUER-MARÍA ELENA SYR 23HH7-6/2 21G X 1-1/2" 3 ML MISC    Blood Glucose Monitoring Suppl (FREESTYLE LITE) COCO    ergocalciferol (VITAMIN D2) 50,000 units    glucose blood (FREESTYLE LITE) test strip    pantoprazole (PROTONIX) 40 mg tablet    propranolol (INDERAL) 20 mg tablet    Allergies   Allergen Reactions    Meperidine Abdominal Pain           Objective     Blood pressure 138/88, pulse 100, temperature 98 6 °F (37 °C), temperature source Tympanic, height 5' 8" (1 727 m), weight 96 6 kg (213 lb)  Body mass index is 32 39 kg/m²  PHYSICAL EXAM:      General Appearance:   Alert, cooperative, no distress   HEENT:   Normocephalic, atraumatic, anicteric  Right eye exhibits no discharge  Left eye exhibits no discharge  No scleral icterus    Neck:  Supple, symmetrical, trachea midline, no stridor    Lungs:   Clear to auscultation bilaterally; no rales, rhonchi or wheezing; respirations unlabored    Heart[de-identified]   Regular rate and rhythm; no murmur, rub, or gallop  Abdomen:   Soft, non-tender, non-distended; normal bowel sounds; no masses, no organomegaly    Genitalia:   Deferred    Rectal:   Deferred    Extremities:  No cyanosis, clubbing or edema        Skin:  No jaundice, rashes, or lesions          Lab Results:   No visits with results within 1 Day(s) from this visit     Latest known visit with results is:   Admission on 03/04/2020, Discharged on 03/07/2020   Component Date Value    WBC 03/04/2020 15 09*    RBC 03/04/2020 5 00     Hemoglobin 03/04/2020 14 9     Hematocrit 03/04/2020 45 7     MCV 03/04/2020 91     MCH 03/04/2020 29 8     MCHC 03/04/2020 32 6     RDW 03/04/2020 14 4     MPV 03/04/2020 9 9     Platelets 36/44/2495 243     nRBC 03/04/2020 0     Neutrophils Relative 03/04/2020 73     Immat GRANS % 03/04/2020 1     Lymphocytes Relative 03/04/2020 14     Monocytes Relative 03/04/2020 12     Eosinophils Relative 03/04/2020 0     Basophils Relative 03/04/2020 0     Neutrophils Absolute 03/04/2020 11 08*    Immature Grans Absolute 03/04/2020 0 08     Lymphocytes Absolute 03/04/2020 2 07     Monocytes Absolute 03/04/2020 1 84*    Eosinophils Absolute 03/04/2020 0 00     Basophils Absolute 03/04/2020 0 02     Sodium 03/04/2020 137     Potassium 03/04/2020 3 2*    Chloride 03/04/2020 99*    CO2 03/04/2020 25     ANION GAP 03/04/2020 13     BUN 03/04/2020 14     Creatinine 03/04/2020 1 23     Glucose 03/04/2020 93     Calcium 03/04/2020 8 9     AST 03/04/2020 17     ALT 03/04/2020 21     Alkaline Phosphatase 03/04/2020 97     Total Protein 03/04/2020 8 5*    Albumin 03/04/2020 2 9*    Total Bilirubin 03/04/2020 1 62*    eGFR 03/04/2020 73     LACTIC ACID 03/04/2020 1 1     Procalcitonin 03/04/2020 <0 05     Protime 03/04/2020 15 6*    INR 03/04/2020 1 22*    PTT 03/04/2020 33     Blood Culture 03/04/2020 No Growth After 5 Days   Blood Culture 03/04/2020 No Growth After 5 Days       Color, UA 03/04/2020 Yellow     Clarity, UA 03/04/2020 Slightly Cloudy     Specific Gravity, UA 03/04/2020 <=1 005     pH, UA 03/04/2020 6 0     Leukocytes, UA 03/04/2020 Negative     Nitrite, UA 03/04/2020 Negative     Protein, UA 03/04/2020 Negative     Glucose, UA 03/04/2020 Negative     Ketones, UA 03/04/2020 15 (1+)*    Urobilinogen, UA 03/04/2020 4 0*    Bilirubin, UA 03/04/2020 Negative     Blood, UA 03/04/2020 Negative     Sputum Culture 03/04/2020 2+ Growth of Haemophilus influenzae*    Sputum Culture 03/04/2020 2+ Growth of Actinomyces odontolyticus*    Sputum Culture 03/04/2020 2+ Growth of      Gram Stain Result 03/04/2020 2+ Polys     Gram Stain Result 03/04/2020 No bacteria seen     POC Glucose 03/04/2020 82     INFLUENZA A PCR 03/04/2020 None Detected     INFLUENZA B PCR 03/04/2020 None Detected     RSV PCR 03/04/2020 None Detected     Ventricular Rate 03/04/2020 119     Atrial Rate 03/04/2020 119     CT Interval 03/04/2020 156     QRSD Interval 03/04/2020 76     QT Interval 03/04/2020 324     QTC Interval 03/04/2020 455     P Axis 03/04/2020 66     QRS Axis 03/04/2020 -30     T Wave Axis 03/04/2020 52     POC Glucose 03/04/2020 81     Legionella Urinary Antig* 03/04/2020 Negative     Strep pneumoniae antigen* 03/04/2020 Negative     POC Glucose 03/04/2020 52*    POC Glucose 03/04/2020 94     POC Glucose 03/04/2020 70     WBC 03/05/2020 9 24     RBC 03/05/2020 4 77     Hemoglobin 03/05/2020 14 1     Hematocrit 03/05/2020 45 0     MCV 03/05/2020 94     MCH 03/05/2020 29 6     MCHC 03/05/2020 31 3*    RDW 03/05/2020 14 6     Platelets 71/41/3529 208     MPV 03/05/2020 10 0     Sodium 03/05/2020 137     Potassium 03/05/2020 4 0     Chloride 03/05/2020 103     CO2 03/05/2020 21     ANION GAP 03/05/2020 13     BUN 03/05/2020 11     Creatinine 03/05/2020 1 06     Glucose 03/05/2020 69     Calcium 03/05/2020 8 3     eGFR 03/05/2020 88     Total Bilirubin 03/05/2020 0 86     Bilirubin, Direct 03/05/2020 0 52*    Alkaline Phosphatase 03/05/2020 91     AST 03/05/2020 23     ALT 03/05/2020 17     Total Protein 03/05/2020 7 2     Albumin 03/05/2020 2 2*    POC Glucose 03/05/2020 62*    POC Glucose 03/05/2020 102     Adenovirus 03/05/2020 Not Detected     Bordetella parapertussis 03/05/2020 Not Detected     Bordetella pertussis 03/05/2020 Not Detected     Chlamydia pneumoniae 03/05/2020 Not Detected     (NOT novel covid-19) Cor* 03/05/2020 Not Detected     Human Metapneumovirus 03/05/2020 Not Detected     Rhino/Enterovirus 03/05/2020 Not Detected     Influenza A 03/05/2020 Not Detected     Influenza B 03/05/2020 Not Detected     Mycoplasma pneumoniae 03/05/2020 Not Detected     Parainfluenza Virus 03/05/2020 Not Detected     Respiratory Syncytial Vi* 03/05/2020 Not Detected     POC Glucose 03/05/2020 64*    POC Glucose 03/05/2020 100     POC Glucose 03/05/2020 113     Procalcitonin 03/06/2020 <0 05     WBC 03/06/2020 8 48     RBC 03/06/2020 4 88     Hemoglobin 03/06/2020 14 2     Hematocrit 03/06/2020 45 6     MCV 03/06/2020 93     MCH 03/06/2020 29 1     MCHC 03/06/2020 31 1*    RDW 03/06/2020 14 5     MPV 03/06/2020 10 3     Platelets 21/58/7603 228     nRBC 03/06/2020 0     Neutrophils Relative 03/06/2020 67     Immat GRANS % 03/06/2020 2     Lymphocytes Relative 03/06/2020 21     Monocytes Relative 03/06/2020 10     Eosinophils Relative 03/06/2020 0     Basophils Relative 03/06/2020 0     Neutrophils Absolute 03/06/2020 5 63     Immature Grans Absolute 03/06/2020 0 13     Lymphocytes Absolute 03/06/2020 1 79     Monocytes Absolute 03/06/2020 0 88     Eosinophils Absolute 03/06/2020 0 02     Basophils Absolute 03/06/2020 0 03     Sodium 03/06/2020 135*    Potassium 03/06/2020 4 0     Chloride 03/06/2020 103     CO2 03/06/2020 22     ANION GAP 03/06/2020 10     BUN 03/06/2020 10     Creatinine 03/06/2020 1 07     Glucose 03/06/2020 84     Calcium 03/06/2020 8 7     eGFR 03/06/2020 87     POC Glucose 03/06/2020 89     POC Glucose 03/06/2020 74     Total Bilirubin 03/06/2020 0 56     Bilirubin, Direct 03/06/2020 0 30*    Alkaline Phosphatase 03/06/2020 90     AST 03/06/2020 20     ALT 03/06/2020 18     Total Protein 03/06/2020 6 9     Albumin 03/06/2020 2 3*    Lipase 03/06/2020 96     AFP TUMOR MARKER 03/06/2020 1 8     POC Glucose 03/06/2020 58*    POC Glucose 03/06/2020 61*    POC Glucose 03/06/2020 67     HCV PCR Quantitative 03/07/2020 HCV Not Detected     Test Information 03/07/2020 Comment     POC Glucose 03/07/2020 96          Radiology Results:   No results found

## 2020-08-24 ENCOUNTER — ANESTHESIA EVENT (OUTPATIENT)
Dept: GASTROENTEROLOGY | Facility: HOSPITAL | Age: 60
End: 2020-08-24

## 2020-08-24 NOTE — ANESTHESIA PREPROCEDURE EVALUATION
Procedure:  EGD  COLONOSCOPY    Relevant Problems   CARDIO   (+) Benign essential hypertension   (+) Hyperlipidemia      GI/HEPATIC   (+) Chronic hepatitis C virus infection (HCC)   (+) Cirrhosis of liver (HCC)      NEURO/PSYCH   (+) History of Helicobacter pylori infection      Other   (+) Methadone maintenance therapy patient ([facility] )   (+) Opioid use disorder ([place] [address] )   (+) Tobacco use disorder        Physical Exam    Airway    Mallampati score: I  TM Distance: >3 FB  Neck ROM: full     Dental   Comment: No teeth,     Cardiovascular  Cardiovascular exam normal    Pulmonary  Pulmonary exam normal     Other Findings        Anesthesia Plan  ASA Score- 3     Anesthesia Type- IV sedation with anesthesia with ASA Monitors  Additional Monitors:   Airway Plan:           Plan Factors-    Induction-     Postoperative Plan-     Informed Consent- Anesthetic plan and risks discussed with patient  I personally reviewed this patient with the CRNA  Discussed and agreed on the Anesthesia Plan with the CRNA  Bridget Ly

## 2020-08-25 ENCOUNTER — HOSPITAL ENCOUNTER (OUTPATIENT)
Dept: GASTROENTEROLOGY | Facility: HOSPITAL | Age: 60
Setting detail: OUTPATIENT SURGERY
Discharge: HOME | End: 2020-08-25
Attending: PHYSICIAN ASSISTANT | Admitting: INTERNAL MEDICINE
Payer: COMMERCIAL

## 2020-08-25 ENCOUNTER — ANESTHESIA (OUTPATIENT)
Dept: GASTROENTEROLOGY | Facility: HOSPITAL | Age: 60
End: 2020-08-25

## 2020-08-25 VITALS
DIASTOLIC BLOOD PRESSURE: 68 MMHG | BODY MASS INDEX: 32.28 KG/M2 | TEMPERATURE: 98 F | OXYGEN SATURATION: 95 % | SYSTOLIC BLOOD PRESSURE: 105 MMHG | RESPIRATION RATE: 16 BRPM | WEIGHT: 213 LBS | HEIGHT: 68 IN | HEART RATE: 71 BPM

## 2020-08-25 DIAGNOSIS — I85.10 ESOPHAGEAL VARICES IN CIRRHOSIS (HCC): Primary | ICD-10-CM

## 2020-08-25 DIAGNOSIS — K74.60 ESOPHAGEAL VARICES IN CIRRHOSIS (HCC): Primary | ICD-10-CM

## 2020-08-25 DIAGNOSIS — I10 HYPERTENSION, UNSPECIFIED TYPE: ICD-10-CM

## 2020-08-25 DIAGNOSIS — B18.2 CHRONIC HEPATITIS C WITHOUT HEPATIC COMA (HCC): ICD-10-CM

## 2020-08-25 PROCEDURE — 88305 TISSUE EXAM BY PATHOLOGIST: CPT | Performed by: PATHOLOGY

## 2020-08-25 PROCEDURE — 43239 EGD BIOPSY SINGLE/MULTIPLE: CPT | Performed by: INTERNAL MEDICINE

## 2020-08-25 PROCEDURE — 88312 SPECIAL STAINS GROUP 1: CPT | Performed by: PATHOLOGY

## 2020-08-25 PROCEDURE — 45380 COLONOSCOPY AND BIOPSY: CPT | Performed by: INTERNAL MEDICINE

## 2020-08-25 RX ORDER — PROPOFOL 10 MG/ML
INJECTION, EMULSION INTRAVENOUS AS NEEDED
Status: DISCONTINUED | OUTPATIENT
Start: 2020-08-25 | End: 2020-08-25

## 2020-08-25 RX ORDER — NADOLOL 20 MG/1
20 TABLET ORAL DAILY
Qty: 30 TABLET | Refills: 3 | Status: SHIPPED | OUTPATIENT
Start: 2020-08-25 | End: 2020-11-13

## 2020-08-25 RX ORDER — LIDOCAINE HYDROCHLORIDE 10 MG/ML
INJECTION, SOLUTION EPIDURAL; INFILTRATION; INTRACAUDAL; PERINEURAL AS NEEDED
Status: DISCONTINUED | OUTPATIENT
Start: 2020-08-25 | End: 2020-08-25

## 2020-08-25 RX ORDER — SODIUM CHLORIDE 9 MG/ML
125 INJECTION, SOLUTION INTRAVENOUS CONTINUOUS
Status: DISCONTINUED | OUTPATIENT
Start: 2020-08-25 | End: 2020-08-29 | Stop reason: HOSPADM

## 2020-08-25 RX ADMIN — PROPOFOL 60 MG: 10 INJECTION, EMULSION INTRAVENOUS at 12:33

## 2020-08-25 RX ADMIN — PROPOFOL 20 MG: 10 INJECTION, EMULSION INTRAVENOUS at 12:43

## 2020-08-25 RX ADMIN — PROPOFOL 20 MG: 10 INJECTION, EMULSION INTRAVENOUS at 12:45

## 2020-08-25 RX ADMIN — SODIUM CHLORIDE 125 ML/HR: 0.9 INJECTION, SOLUTION INTRAVENOUS at 11:12

## 2020-08-25 RX ADMIN — PROPOFOL 30 MG: 10 INJECTION, EMULSION INTRAVENOUS at 12:34

## 2020-08-25 RX ADMIN — LIDOCAINE HYDROCHLORIDE 50 MG: 10 INJECTION, SOLUTION EPIDURAL; INFILTRATION; INTRACAUDAL; PERINEURAL at 12:32

## 2020-08-25 RX ADMIN — PROPOFOL 60 MG: 10 INJECTION, EMULSION INTRAVENOUS at 12:32

## 2020-08-25 RX ADMIN — PROPOFOL 30 MG: 10 INJECTION, EMULSION INTRAVENOUS at 12:36

## 2020-08-25 NOTE — H&P
H&P EXAM - Outpatient Endoscopy   Maia Concepcion 63 y o  male MRN: [id number]    [address]   Encounter: [id number]      History and Physical -  [facility]  Maia Concepcion 63 y o  male MRN: [id number]                  HPI: Maia Concepcion is a 63y o  year old male who presents for egd/colonoscopy      REVIEW OF SYSTEMS: Per the HPI, and otherwise unremarkable      Historical Information   Past Medical History:   Diagnosis Date    Diabetes mellitus ([facility] )     Esophageal varices (HCC)     Gastritis     Hepatitis C     History of kidney stones     Hyperlipidemia     Hypertension     Kidney stone     Pneumonia     Substance abuse ([facility] )      Past Surgical History:   Procedure Laterality Date    COLONOSCOPY  06/20/2014    dr padilla    COLONOSCOPY  2014    Dr Padilla    ESOPHAGOGASTRODUODENOSCOPY  12/10/2015    esophageal varices, cirrhosis, gastritis    HYDROCELE EXCISION / REPAIR      LIVER BIOPSY  2014     Social History   Social History     Substance and Sexual Activity   Alcohol Use Never    Alcohol/week: 0 0 standard drinks    Frequency: Never     Social History     Substance and Sexual Activity   Drug Use No    Comment: former user- heroin was drug of choice     Social History     Tobacco Use   Smoking Status Current Every Day Smoker    Packs/day: 1 00   Smokeless Tobacco Never Used   Tobacco Comment    TOBACCO USE     Family History   Problem Relation Age of Onset    Diabetes type II Mother         MELLITUS    Hypertension Mother     Hypertension Sister     Diabetes Sister         MELLITUS       Meds/Allergies     (Not in a hospital admission)      Allergies   Allergen Reactions    Meperidine Abdominal Pain       Objective     /86   Pulse 92   Temp 98 4 °F (36 9 °C) (Temporal)   Resp 18   Ht 5' 8" (1 727 m)   Wt 96 6 kg (213 lb)   SpO2 95%   BMI 32 39 kg/m²       PHYSICAL EXAM    Gen: NAD  CV: RRR  CHEST: Clear  ABD: soft, NT/ND  EXT: no edema      ASSESSMENT/PLAN:  This is a 63y o  year old male here for egd/colonoscopy, and he is stable and optimized for his procedure

## 2020-08-25 NOTE — NURSING NOTE
Pt is awake, alert,tolerated diet, seen and instructed by Dr Tess Zabala   Pt and friend verbalize an understanding  Pt offers no complaints,all questions were answered

## 2020-08-25 NOTE — DISCHARGE INSTRUCTIONS
Endoscopia superior   LO QUE NECESITA SABER:   Masoud endoscopia superior también se conoce home endoscopia gastrointestinal (GI) superior o esofagogastroduodenoscopia (EGD)  Usted podría sentirse inflamado, con gases o sentir molestia abdominal después de novak procedimiento  Novak garganta podría estar adolorida por 24 a 36 horas  Usted podría eructar o expulsar gas debido al aire que todavía está en novak cuerpo  [facility]:   Llame al 911 si presenta:   · Tiene dolor en el pecho o dificultad para respirar de forma repentina  Busque atención médica de inmediato si:   · Está mareado o siente que se va a desmayar  · Usted tiene dificultad para tragar  · Usted tiene dolor de garganta intenso  · Alvin evacuaciones intestinales son [place]le [place] o negras  · Novak abdomen está maite y firme y usted siente dolor intenso  · Usted vomita lakshmi  Pregúntele a novak [place] vitaminas y minerales son adecuados para usted  · Usted se siente lleno o hinchado y no puede eructar o expulsar gas  · Usted no ha tenido masoud evacuación intestinal después de 3 días de novak procedimiento  · Usted tiene dolor de boo  · Usted tiene fiebre o escalofríos  · Usted tiene náuseas o está vomitando  · Usted tiene salpullido o urticaria  · Usted tiene preguntas o inquietudes acerca de novak endoscopia  Alivie el dolor de garganta:  Chupe pastillas para la garganta o hielo triturado  Ca gárgaras con masoud pequeña cantidad de agua tibia con sal  Mezcle 1 cucharadita de sal y 1 taza de agua tibia para hacer agua salada  Alivie el gas y la molestia de la inflamación:  Acuéstese sobre novak costado derecho con masoud almohada térmica sobre novak abdomen  Camine un poco para ayudar a expulsar el gas  Coma comidas pequeñas hasta que se alivie de la inflamación  Descanse después de novak procedimiento:  No maneje o tome decisiones importantes hasta el día siguiente de novak procedimiento   Regrese a alvin [employer] normales según le indiquen  Usted generalmente puede regresar al [place] al día siguiente de adhikari procedimiento  Acuda a alvin consultas de control con adhikari médico según le indicaron  Anote alvin preguntas para que se acuerde de hacerlas bertrand alvin visitas  © 2017 [address] Information is for End User's use only and may not be sold, redistributed or otherwise used for commercial purposes  All illustrations and images included in CareNotes® are the copyrighted property of A D A M , Inc  or Vidal Wesley  Esta información es sólo para uso en educación  Adhikari intención no es darle un consejo médico sobre enfermedades o tratamientos  Colsulte con adhikari Mariya Shouts farmacéutico antes de seguir cualquier régimen médico para saber si es seguro y efectivo para usted  Colonoscopia   LO QUE NECESITA SABER:   Masoud colonoscopia es un procedimiento para examinar con un endoscopio el interior de adhikari colon (intestino)  Bertrand masoud colonoscopia, es posible que le retiren pólipos o crecimientos de tejidos  Es normal que se sienta inflamado o que tenga molestia abdominal  Usted debería estar expulsando los gases  Si tiene hemorroides o si le removieron pólipos, usted podría presentar masoud pequeña cantidad de sangrado  INSTRUCCIONES [facility]BRE [facility]:   Busque atención médica de inmediato si:   · Usted presenta masoud cantidad mitul de lakshmi ada brillante en alvin evacuaciones intestinales  · Adhikari abdomen está maite y firme y usted siente dolor intenso  · Usted tiene dificultad repentina para respirar  Pregúntele a adhikari Le[place] Handsome vitaminas y minerales son adecuados para usted  · Usted presenta sarpullido o urticaria  · Usted tiene fiebre dentro de las 24 horas después de adhikari procedimiento  · Usted no ha tenido masoud evacuación intestinal después de 3 días de adhikari procedimiento  · Usted tiene preguntas o inquietudes acerca de adhikari condición o cuidado    Actividad:   · No levante nada, no se esfuerce o corra  por 3 días después de novak procedimiento  · Descanse después de novak procedimiento  A usted le gupta administrado medicamento para relajarse  No  maneje o tome decisiones importantes hasta el día siguiente de novak procedimiento  Regrese a alvin actividades normales según le indiquen  · Alivie los gases y la incomodidad de la inflamación  acostándose en novak costado derecho con masoud almohada térmica sobre novak abdomen  Es posible que necesite caminar un poco para ayudar a eliminar los gases  Coma comidas pequeñas hasta que se alivie de la inflamación  Si a usted le removieron pólipos:  Por 7 días después de novak procedimiento:  · No  tome aspirina  · No  realice paseos largos en annie  Ayude a prevenir el estreñimiento:   · Consuma alimentos saludables y variados  Los alimentos saludables incluyen fruta, vegetales, panes integrales, productos lácteos bajo en grasa, frijoles, verena sin grasa, y pescado  Pregunte si necesita seguir masoud dieta especial  Novak médico puede recomendarle que coma alimentos ricos en fibra, home frijoles cocidos  La fibra lo ayuda a tener evacuaciones intestinales regulares  · [address] se le haya indicado  Los adultos deberían de beber entre 9 a 13 vasos de 8 onzas de líquidos cada día  Pregunte cuál es la cantidad [facility] para usted  Para [facility], los mejores líquidos son Ruba Sol[place], y [place]  · Ejercítese según indicaciones  Consulte con novak médico acerca de cuál es el mejor régimen de ejercicio para usted  El ejercicio puede ayudar a prevenir estreñimiento, reducir novak presión arterial y [employer]  Acuda a alvin consultas de control con novak médico según le indicaron  Anote alvin preguntas para que se acuerde de hacerlas bertrand alvin visitas  © 2017 [address] Information is for End User's use only and may not be sold, redistributed or otherwise used for commercial purposes   All illustrations and images included in CareNotes® are the copyrighted property of A D A M , Inc  or Vidal Wesley  Esta información es sólo para uso en educación  Novak intención no es darle un consejo médico sobre enfermedades o tratamientos  Colsulte con novak Ozie Sharp farmacéutico antes de seguir cualquier régimen médico para saber si es seguro y efectivo para usted

## 2020-08-25 NOTE — ANESTHESIA POSTPROCEDURE EVALUATION
Post-Op Assessment Note    CV Status:  Stable  Pain Score: 0    Pain management: adequate     Mental Status:  Alert and awake   Hydration Status:  Euvolemic   PONV Controlled:  Controlled   Airway Patency:  Patent      Post Op Vitals Reviewed: Yes      Staff: CRNA         No complications documented      BP   92/50   Temp      Pulse  73   Resp   18   SpO2   98

## 2020-09-01 ENCOUNTER — HOSPITAL ENCOUNTER (OUTPATIENT)
Dept: ULTRASOUND IMAGING | Facility: HOSPITAL | Age: 60
Discharge: HOME | End: 2020-09-01
Payer: COMMERCIAL

## 2020-09-01 DIAGNOSIS — B18.2 CHRONIC HEPATITIS C WITHOUT HEPATIC COMA (HCC): ICD-10-CM

## 2020-09-01 PROCEDURE — 76705 ECHO EXAM OF ABDOMEN: CPT

## 2020-09-02 NOTE — RESULT ENCOUNTER NOTE
My medical assistant will call patient with results  Stomach biopsy showed inflammation and no infection    The  colon polyp removed was called an adenoma  This is a pre-cancerous lesion and was completely removed  There was no evidence of cancer in the polyp       he should have the colonoscopy repeated in 7 years due to  A history of colon polyp    He is scheduled for office follow up in september

## 2020-09-03 ENCOUNTER — TELEPHONE (OUTPATIENT)
Dept: GASTROENTEROLOGY | Facility: MEDICAL CENTER | Age: 60
End: 2020-09-03

## 2020-09-03 NOTE — TELEPHONE ENCOUNTER
Vijay Ch   9/3/2020 10:02 AM       Called patient, caridacia to call office   [facility] letter to patient      Michael Fuentes MD   9/2/2020  5:11 PM       My medical assistant will call patient with results        Stomach biopsy showed inflammation and no infection       The  colon polyp removed was called an adenoma  This is a pre-cancerous lesion and was completely removed   There was no evidence of cancer in the polyp        he should have the colonoscopy repeated in 7 years due to  A history of colon polyp       He is scheduled for office follow up in september

## 2020-09-18 ENCOUNTER — APPOINTMENT (OUTPATIENT)
Dept: LAB | Facility: MEDICAL CENTER | Age: 60
End: 2020-09-18
Payer: COMMERCIAL

## 2020-09-18 ENCOUNTER — OFFICE VISIT (OUTPATIENT)
Dept: GASTROENTEROLOGY | Facility: MEDICAL CENTER | Age: 60
End: 2020-09-18
Payer: COMMERCIAL

## 2020-09-18 VITALS
DIASTOLIC BLOOD PRESSURE: 81 MMHG | HEIGHT: 68 IN | SYSTOLIC BLOOD PRESSURE: 126 MMHG | TEMPERATURE: 97.3 F | HEART RATE: 83 BPM | WEIGHT: 226 LBS | BODY MASS INDEX: 34.25 KG/M2

## 2020-09-18 DIAGNOSIS — I85.10 SECONDARY ESOPHAGEAL VARICES WITHOUT BLEEDING (HCC): ICD-10-CM

## 2020-09-18 DIAGNOSIS — K74.60 CIRRHOSIS OF LIVER WITHOUT ASCITES, UNSPECIFIED HEPATIC CIRRHOSIS TYPE (HCC): ICD-10-CM

## 2020-09-18 DIAGNOSIS — B18.2 CHRONIC HEPATITIS C WITHOUT HEPATIC COMA (HCC): Primary | ICD-10-CM

## 2020-09-18 DIAGNOSIS — B18.2 CHRONIC HEPATITIS C WITHOUT HEPATIC COMA (HCC): ICD-10-CM

## 2020-09-18 DIAGNOSIS — D36.9 TUBULAR ADENOMA: ICD-10-CM

## 2020-09-18 LAB
AFP-TM SERPL-MCNC: 4 NG/ML (ref 0.5–8)
ALBUMIN SERPL BCP-MCNC: 3.5 G/DL (ref 3.5–5)
ALP SERPL-CCNC: 117 U/L (ref 46–116)
ALT SERPL W P-5'-P-CCNC: 40 U/L (ref 12–78)
ANION GAP SERPL CALCULATED.3IONS-SCNC: 5 MMOL/L (ref 4–13)
AST SERPL W P-5'-P-CCNC: 27 U/L (ref 5–45)
BASOPHILS # BLD AUTO: 0.04 THOUSANDS/ΜL (ref 0–0.1)
BASOPHILS NFR BLD AUTO: 1 % (ref 0–1)
BILIRUB SERPL-MCNC: 0.49 MG/DL (ref 0.2–1)
BUN SERPL-MCNC: 11 MG/DL (ref 5–25)
CALCIUM SERPL-MCNC: 9 MG/DL (ref 8.3–10.1)
CHLORIDE SERPL-SCNC: 104 MMOL/L (ref 100–108)
CO2 SERPL-SCNC: 30 MMOL/L (ref 21–32)
CREAT SERPL-MCNC: 0.97 MG/DL (ref 0.6–1.3)
EOSINOPHIL # BLD AUTO: 0.11 THOUSAND/ΜL (ref 0–0.61)
EOSINOPHIL NFR BLD AUTO: 2 % (ref 0–6)
ERYTHROCYTE [DISTWIDTH] IN BLOOD BY AUTOMATED COUNT: 13.6 % (ref 11.6–15.1)
GFR SERPL CREATININE-BSD FRML MDRD: 97 ML/MIN/1.73SQ M
GLUCOSE P FAST SERPL-MCNC: 89 MG/DL (ref 65–99)
HCT VFR BLD AUTO: 48.5 % (ref 36.5–49.3)
HGB BLD-MCNC: 15.5 G/DL (ref 12–17)
IMM GRANULOCYTES # BLD AUTO: 0.03 THOUSAND/UL (ref 0–0.2)
IMM GRANULOCYTES NFR BLD AUTO: 0 % (ref 0–2)
INR PPP: 0.99 (ref 0.84–1.19)
LYMPHOCYTES # BLD AUTO: 2.55 THOUSANDS/ΜL (ref 0.6–4.47)
LYMPHOCYTES NFR BLD AUTO: 38 % (ref 14–44)
MCH RBC QN AUTO: 30.3 PG (ref 26.8–34.3)
MCHC RBC AUTO-ENTMCNC: 32 G/DL (ref 31.4–37.4)
MCV RBC AUTO: 95 FL (ref 82–98)
MONOCYTES # BLD AUTO: 0.47 THOUSAND/ΜL (ref 0.17–1.22)
MONOCYTES NFR BLD AUTO: 7 % (ref 4–12)
NEUTROPHILS # BLD AUTO: 3.52 THOUSANDS/ΜL (ref 1.85–7.62)
NEUTS SEG NFR BLD AUTO: 52 % (ref 43–75)
NRBC BLD AUTO-RTO: 0 /100 WBCS
PLATELET # BLD AUTO: 197 THOUSANDS/UL (ref 149–390)
PMV BLD AUTO: 9.6 FL (ref 8.9–12.7)
POTASSIUM SERPL-SCNC: 4.5 MMOL/L (ref 3.5–5.3)
PROT SERPL-MCNC: 8.1 G/DL (ref 6.4–8.2)
PROTHROMBIN TIME: 13.1 SECONDS (ref 11.6–14.5)
RBC # BLD AUTO: 5.11 MILLION/UL (ref 3.88–5.62)
SODIUM SERPL-SCNC: 139 MMOL/L (ref 136–145)
WBC # BLD AUTO: 6.72 THOUSAND/UL (ref 4.31–10.16)

## 2020-09-18 PROCEDURE — 82105 ALPHA-FETOPROTEIN SERUM: CPT

## 2020-09-18 PROCEDURE — 36415 COLL VENOUS BLD VENIPUNCTURE: CPT | Performed by: PHYSICIAN ASSISTANT

## 2020-09-18 PROCEDURE — 3074F SYST BP LT 130 MM HG: CPT | Performed by: PHYSICIAN ASSISTANT

## 2020-09-18 PROCEDURE — 3079F DIAST BP 80-89 MM HG: CPT | Performed by: PHYSICIAN ASSISTANT

## 2020-09-18 PROCEDURE — 85610 PROTHROMBIN TIME: CPT | Performed by: PHYSICIAN ASSISTANT

## 2020-09-18 PROCEDURE — 80053 COMPREHEN METABOLIC PANEL: CPT | Performed by: PHYSICIAN ASSISTANT

## 2020-09-18 PROCEDURE — 99214 OFFICE O/P EST MOD 30 MIN: CPT | Performed by: PHYSICIAN ASSISTANT

## 2020-09-18 PROCEDURE — 85025 COMPLETE CBC W/AUTO DIFF WBC: CPT | Performed by: PHYSICIAN ASSISTANT

## 2020-09-18 NOTE — PROGRESS NOTES
[facility] - Outpatient Follow-up Note  Jeana Bernabe 63 y o  male MRN: [id number]  Encounter: [id number]          ASSESSMENT AND PLAN:      1  Chronic hepatitis C without hepatic coma ([facility] )  2  Cirrhosis:   History of hep C cirrhosis status post treatment in 2014 and achieving sustained viral response  He does have a history of esophageal varices but does not currently have any evidence of hepatic encephalopathy or ascites  No recent labs for meld score  -CBC  -CMP  -INR  -AFP tumor marker  -he did recently have ultrasound that was negative for liver mass    3  Secondary esophageal varices without bleeding ([place] [address] ):   EGD 08/25/2020 with 3 columns of small esophageal varices without high-risk stigmata, mild antral erythema without gastric varices  Biopsy showed inflammation but no evidence of H pylori  Was started on nadolol 20 mg daily and is doing well on this medication  His heart rate was in the 80s today and blood pressure 120 is over 80s  Will plan to increase this to 40 mg daily  -increase to nadolol 20 mg b i d   -goal for heart rate in the 60s    4  Colon cancer screen:  Colonoscopy 08/25/2020 revealed a <5 mm ascending colon polyp and small internal hemorrhoids, biopsies revealed a tubular adenoma  Repeat was recommended in 7 years    ______________________________________________________________________    SUBJECTIVE:  Jeana Bernabe is a 67-year-old male who is here for follow-up after EGD and colonoscopy  He has a past medical history of chronic hep C cirrhosis  Fortunately he is status post treatment in 2014 and achieved sustained viral response  He does not have any recent labs to calculate a meld score  His last [place] [address]  screening was in March of 2020 with a negative CT scan and AFP tumor marker  His cirrhosis is complicated by esophageal varices last seen on EGD in 2015    He does not have any evidence of hepatic encephalopathy or ascites on exam   He has taken diuretics in the past but has not been on these for quite some time  He did recently have EGD and colonoscopy 08/25/2020  EGD revealed 3 columns of small esophageal varices without high-risk stigmata, mild antral erythema without gastric varices  Biopsy showed inflammation but were negative for infection  He was started on nadolol 20 mg daily his heart rate today is in the 80s  Colonoscopy revealed a less than 5 mm ascending colon polyp and small internal hemorrhoids  Biopsies revealed a tubular adenoma  Repeat was recommended in 7 years      REVIEW OF SYSTEMS IS OTHERWISE NEGATIVE        Historical Information   Past Medical History:   Diagnosis Date    Diabetes mellitus ([address] )     Esophageal varices (HCC)     Gastritis     Hepatitis C     History of kidney stones     Hyperlipidemia     Hypertension     Kidney stone     Pneumonia     Substance abuse [facility])      Past Surgical History:   Procedure Laterality Date    COLONOSCOPY  06/20/2014    dr padilla    COLONOSCOPY  2014    Dr Padilla    ESOPHAGOGASTRODUODENOSCOPY  12/10/2015    esophageal varices, cirrhosis, gastritis    HYDROCELE EXCISION / REPAIR      LIVER BIOPSY  2014     Social History   Social History     Substance and Sexual Activity   Alcohol Use Never    Alcohol/week: 0 0 standard drinks    Frequency: Never     Social History     Substance and Sexual Activity   Drug Use No    Comment: former user- heroin was drug of choice     Social History     Tobacco Use   Smoking Status Current Every Day Smoker    Packs/day: 1 00   Smokeless Tobacco Never Used   Tobacco Comment    TOBACCO USE     Family History   Problem Relation Age of Onset    Diabetes type II Mother         MELLITUS    Hypertension Mother     Hypertension Sister     Diabetes Sister         MELLITUS       Meds/Allergies       Current Outpatient Medications:     AMILoride 5 mg tablet    B-D 3CC LUER-MARÍA ELENA SYR 18BC6-1/2 21G X 1-1/2" 3 ML MISC    Blood Glucose Monitoring Suppl (FREESTYLE LITE) COCO    ergocalciferol (VITAMIN D2) 50,000 units    glucose blood (FREESTYLE LITE) test strip    methadone (DOLOPHINE) 10 mg tablet    nadolol (CORGARD) 20 mg tablet    pantoprazole (PROTONIX) 40 mg tablet    Allergies   Allergen Reactions    Meperidine Abdominal Pain           Objective     Blood pressure 126/81, pulse 83, temperature (!) 97 3 °F (36 3 °C), temperature source Tympanic, height 5' 8" (1 727 m), weight 103 kg (226 lb)  Body mass index is 34 36 kg/m²  PHYSICAL EXAM:      General Appearance:   Alert, cooperative, no distress   HEENT:   Normocephalic, atraumatic, anicteric      Neck:  Supple, symmetrical, trachea midline   Lungs:   Clear to auscultation bilaterally; no rales, rhonchi or wheezing; respirations unlabored    Heart[de-identified]   Regular rate and rhythm; no murmur, rub, or gallop  Abdomen:   Soft, non-tender, non-distended; normal bowel sounds; no masses, no organomegaly    Genitalia:   Deferred    Rectal:   Deferred    Extremities:  No cyanosis, clubbing or edema    Pulses:  2+ and symmetric    Skin:  No jaundice, rashes, or lesions    Lymph nodes:  No palpable cervical lymphadenopathy        Lab Results:   No visits with results within 1 Day(s) from this visit     Latest known visit with results is:   Hospital Outpatient Visit on 08/25/2020   Component Date Value    Case Report 08/25/2020                      Value:Surgical Pathology Report                         Case: [id number]                                   Authorizing Provider:  Les Yarbrough MD       Collected:           08/25/2020 1235              Ordering Location:     [facility] Received:            08/25/2020 1823                                     Heart Endoscopy                                                              Pathologist:           Anthony Christy MD                                                                Specimens:   A) - Stomach, stomach bx, r/o hpylori B) - Large Intestine, Right/Ascending Colon, ascending colon polyp                         Final Diagnosis 08/25/2020                      Value: This result contains rich text formatting which cannot be displayed here   Additional Information 08/25/2020                      Value: This result contains rich text formatting which cannot be displayed here   Synoptic Checklist 08/25/2020                      Value:  (COLON/RECTUM POLYP FORM - GI - B)                                                                                                                 : Adenoma(s)     Eunice James Description 08/25/2020                      Value: This result contains rich text formatting which cannot be displayed here  Radiology Results:   Us Right Upper Quadrant    Result Date: 9/2/2020  Narrative: RIGHT UPPER QUADRANT ULTRASOUND INDICATION:     B18 2: Chronic viral hepatitis C  COMPARISON:  CT 3/4/2020 TECHNIQUE:   Real-time ultrasound of the right upper quadrant was performed with a curvilinear transducer with both volumetric sweeps and still imaging techniques  FINDINGS: PANCREAS:  Visualized portions of the pancreas are within normal limits  AORTA AND IVC:  Visualized portions are normal for patient age  LIVER: Size:  Within normal range  The liver measures 13 4 cm in the midclavicular line  Contour:  Surface contour is nodular  Parenchyma:  Echogenicity and echotexture are within normal limits  No evidence of suspicious mass  Limited imaging of the main portal vein shows it to be patent and hepatopetal   BILIARY: Minimal gallbladder sludge  Comet tail artifacts from the gallbladder wall may be associated with Rokitansky-Aschoff sinuses  No intrahepatic biliary dilatation  CBD measures 4 4 mm  No choledocholithiasis  KIDNEY: Right kidney measures 12 5 x 6 x 5 6 cm  No hydronephrosis  Tiny cortical cyst measuring 6 x 8 mm  ASCITES:   None  Impression: 1  Minimal gallbladder sludge  2   Nodular liver may be associated with underlying cirrhosis

## 2020-09-24 ENCOUNTER — OFFICE VISIT (OUTPATIENT)
Dept: FAMILY MEDICINE CLINIC | Facility: CLINIC | Age: 60
End: 2020-09-24
Payer: COMMERCIAL

## 2020-09-24 VITALS
OXYGEN SATURATION: 98 % | WEIGHT: 228 LBS | BODY MASS INDEX: 37.99 KG/M2 | RESPIRATION RATE: 16 BRPM | TEMPERATURE: 97.8 F | HEART RATE: 64 BPM | SYSTOLIC BLOOD PRESSURE: 140 MMHG | DIASTOLIC BLOOD PRESSURE: 80 MMHG | HEIGHT: 65 IN

## 2020-09-24 DIAGNOSIS — R79.89 LOW TESTOSTERONE IN MALE: ICD-10-CM

## 2020-09-24 DIAGNOSIS — E29.1 HYPOGONADISM IN MALE: ICD-10-CM

## 2020-09-24 DIAGNOSIS — Z00.00 MEDICARE ANNUAL WELLNESS VISIT, SUBSEQUENT: Primary | ICD-10-CM

## 2020-09-24 PROCEDURE — G0439 PPPS, SUBSEQ VISIT: HCPCS | Performed by: FAMILY MEDICINE

## 2020-09-24 PROCEDURE — 3725F SCREEN DEPRESSION PERFORMED: CPT | Performed by: FAMILY MEDICINE

## 2020-09-24 PROCEDURE — 99214 OFFICE O/P EST MOD 30 MIN: CPT | Performed by: FAMILY MEDICINE

## 2020-09-24 RX ORDER — TESTOSTERONE CYPIONATE 100 MG/ML
100 INJECTION, SOLUTION INTRAMUSCULAR
Qty: 10 ML | Refills: 0 | Status: SHIPPED | OUTPATIENT
Start: 2020-09-24 | End: 2020-11-19 | Stop reason: SDUPTHER

## 2020-09-24 RX ORDER — SYRINGE WITH NEEDLE, 1 ML 25GX5/8"
SYRINGE, EMPTY DISPOSABLE MISCELLANEOUS
Qty: 10 EACH | Refills: 0 | Status: SHIPPED | OUTPATIENT
Start: 2020-09-24 | End: 2020-11-19

## 2020-09-24 RX ORDER — TESTOSTERONE CYPIONATE 100 MG/ML
VIAL (ML) INTRAMUSCULAR
Status: CANCELLED | OUTPATIENT
Start: 2020-09-24

## 2020-09-24 NOTE — PROGRESS NOTES
Assessment and Plan:     Problem List Items Addressed This Visit     None      Visit Diagnoses     Medicare annual wellness visit, subsequent    -  Primary           Preventive health issues were discussed with patient, and age appropriate screening tests were ordered as noted in patient's After Visit Summary  Personalized health advice and appropriate referrals for health education or preventive services given if needed, as noted in patient's After Visit Summary       History of Present Illness:     Patient presents for Medicare Annual Wellness visit    Patient Care Team:  Jamarcus Garcia MD as PCP - General (Family Medicine)     Problem List:     Patient Active Problem List   Diagnosis    Benign essential hypertension    BMI 36 0-36 9,adult    Chronic [facility] C virus infection ([facility] )    Cirrhosis of liver ([facility] )    Gallbladder polyp    History of Helicobacter pylori infection    Hydrocele    Hyperlipidemia    Methadone maintenance therapy patient ([facility] )    Microhematuria    Portal hypertension ([facility] )    Proteinuria    Secondary esophageal varices without bleeding (HCC)    Tobacco use disorder    Plantar wart, left foot    Tinea pedis of both feet    Opioid use disorder ([facility] )    Aspiration pneumonitis ([facility] )    SIRS (systemic inflammatory response syndrome) ([facility] )    Non-intractable vomiting with nausea    3-oxo-5 alpha-steroid delta 4-dehydrogenase deficiency    Encounter for colorectal cancer screening      Past Medical and Surgical History:     Past Medical History:   Diagnosis Date    Colon polyp     Diabetes mellitus ([facility] )     Esophageal varices ([facility] )     Gastritis     Hepatitis C     History of kidney stones     Hyperlipidemia     Hypertension     Kidney stone     Pneumonia     Substance abuse ([facility] )      Past Surgical History:   Procedure Laterality Date    COLONOSCOPY  06/20/2014    dr padilla    COLONOSCOPY  2014    Dr Padilla    EGD AND COLONOSCOPY  08/2020    esophageal varices, colon polyp, hemorrhoids    ESOPHAGOGASTRODUODENOSCOPY  12/10/2015    esophageal varices, cirrhosis, gastritis    HYDROCELE EXCISION / REPAIR      LIVER BIOPSY  2014      Family History:     Family History   Problem Relation Age of Onset    Diabetes type II Mother         MELLITUS    Hypertension Mother     Hypertension Sister     Diabetes Sister         MELLITUS      Social History:     E-Cigarette/Vaping    E-Cigarette Use Never User      E-Cigarette/Vaping Substances    Nicotine No     THC No     CBD No      Social History     Socioeconomic History    Marital status: Single     Spouse name: None    Number of children: None    Years of education: None    Highest education level: None   Occupational History    None   Social Needs    Financial resource strain: None    Food insecurity     Worry: None     Inability: None    Transportation needs     Medical: None     Non-medical: None   Tobacco Use    Smoking status: Current Every Day Smoker     Packs/day: 1 00    Smokeless tobacco: Never Used    Tobacco comment: TOBACCO USE   Substance and Sexual Activity    Alcohol use: Never     Alcohol/week: 0 0 standard drinks     Frequency: Never    Drug use: No     Comment: former user- heroin was drug of choice    Sexual activity: Not Currently     Partners: Female   Lifestyle    Physical activity     Days per week: None     Minutes per session: None    Stress: None   Relationships    Social connections     Talks on phone: None     Gets together: None     Attends Yazidi service: None     Active member of club or organization: None     Attends meetings of clubs or organizations: None     Relationship status: None    Intimate partner violence     Fear of current or ex partner: None     Emotionally abused: None     Physically abused: None     Forced sexual activity: None   Other Topics Concern    None   Social History Narrative    None      Medications and Allergies:     Current Outpatient Medications   Medication Sig Dispense Refill    methadone (DOLOPHINE) 10 mg tablet One tablet daily ( on blind reduction) 45 mg patient does not know dose, 30 tablet 0    nadolol (CORGARD) 20 mg tablet Take 1 tablet (20 mg total) by mouth daily 30 tablet 3    B-D 3CC LUER-MARÍA ELENA SYR 53OY8-2/2 21G X 1-1/2" 3 ML MISC USE WITH TESTOSTERONE CYPIONATE EVERY TWO WEEKS  1     No current facility-administered medications for this visit  Allergies   Allergen Reactions    Meperidine Abdominal Pain      Immunizations:     Immunization History   Administered Date(s) Administered    Hep A / Hep B 02/07/2014, 03/07/2014, 09/12/2014    INFLUENZA 10/20/2013, 11/13/2014, 11/06/2015    Pneumococcal Polysaccharide PPV23 11/05/2013    Tdap 12/10/2013    Tuberculin Skin Test-PPD Intradermal 11/15/2011      Health Maintenance:         Topic Date Due    Hepatitis C Screening  Discontinued     There are no preventive care reminders to display for this patient  Medicare Health Risk Assessment:     /80 (BP Location: Left arm, Patient Position: Sitting, Cuff Size: Standard)   Pulse 64   Temp 97 8 °F (36 6 °C) (Temporal)   Resp 16   Ht 5' 5" (1 651 m)   Wt 103 kg (228 lb)   SpO2 98%   BMI 37 94 kg/m²      Alka Pires is here for his Subsequent Wellness visit  Last Medicare Wellness visit information reviewed, patient interviewed and updates made to the record today  Health Risk Assessment:   Patient rates overall health as good  Patient feels that their physical health rating is same  Eyesight was rated as same  Hearing was rated as same  Patient feels that their emotional and mental health rating is same  Pain experienced in the last 7 days has been none  Patient states that he has experienced no weight loss or gain in last 6 months  Depression Screening:   PHQ-2 Score: 0      Fall Risk Screening:    In the past year, patient has experienced: no history of falling in past year      Home Safety:  Patient does not have trouble with stairs inside or outside of their home  Patient has working smoke alarms and has working carbon monoxide detector  Home safety hazards include: none  Nutrition:   Current diet is Regular  Medications:   Patient is not currently taking any over-the-counter supplements  Patient is able to manage medications  Activities of Daily Living (ADLs)/Instrumental Activities of Daily Living (IADLs):   Walk and transfer into and out of bed and chair?: Yes  Dress and groom yourself?: Yes    Bathe or shower yourself?: Yes    Feed yourself?  Yes  Do your laundry/housekeeping?: Yes  Manage your money, pay your bills and track your expenses?: Yes  Make your own meals?: Yes    Do your own shopping?: Yes    Previous Hospitalizations:   Any hospitalizations or ED visits within the last 12 months?: Yes    How many hospitalizations have you had in the last year?: 1-2    Hospitalization Comments: egd/colonoscopy    Advance Care Planning:   Living will: No    Durable POA for healthcare: No    Advanced directive: No    Advanced directive counseling given: Yes    Five wishes given: Yes    Patient declined ACP directive: No    End of Life Decisions reviewed with patient: Yes    Provider agrees with end of life decisions: Yes      Cognitive Screening:   Provider or family/friend/caregiver concerned regarding cognition?: No    PREVENTIVE SCREENINGS      Cardiovascular Screening:    General: Screening Not Indicated and History Lipid Disorder      Diabetes Screening:     General: Screening Not Indicated and History Diabetes      Colorectal Cancer Screening:     General: Screening Current      Prostate Cancer Screening:      Due for: PSA      Osteoporosis Screening:    General: Screening Not Indicated      Abdominal Aortic Aneurysm (AAA) Screening:    Risk factors include: tobacco use        General: Screening Not Indicated      Lung Cancer Screening:     General: Screening Not Indicated      Hepatitis C Screening: General: Screening Not Indicated and History Hepatitis C    Other Counseling Topics:   Alcohol use counseling, car/seat belt/driving safety and calcium and vitamin D intake and regular weightbearing exercise         Devona Sacks, MD

## 2020-09-24 NOTE — PATIENT INSTRUCTIONS
Medicare Preventive Visit Patient Instructions  Thank you for completing your Welcome to Medicare Visit or Medicare Annual Wellness Visit today  Your next wellness visit will be due in one year (9/24/2021)  The screening/preventive services that you may require over the next 5-10 years are detailed below  Some tests may not apply to you based off risk factors and/or age  Screening tests ordered at today's visit but not completed yet may show as past due  Also, please note that scanned in results may not display below  Preventive Screenings:  Service Recommendations Previous Testing/Comments   Colorectal Cancer Screening  · Colonoscopy    · Fecal Occult Blood Test (FOBT)/Fecal Immunochemical Test (FIT)  · Fecal DNA/Cologuard Test  · Flexible Sigmoidoscopy Age: 54-65 years old   Colonoscopy: every 10 years (May be performed more frequently if at higher risk)  OR  FOBT/FIT: every 1 year  OR  Cologuard: every 3 years  OR  Sigmoidoscopy: every 5 years  Screening may be recommended earlier than age 48 if at higher risk for colorectal cancer  Also, an individualized decision between you and your healthcare provider will decide whether screening between the ages of 74-80 would be appropriate   Colonoscopy: 08/25/2020  FOBT/FIT: Not on file  Cologuard: Not on file  Sigmoidoscopy: Not on file    Screening Current     Prostate Cancer Screening Individualized decision between patient and health care provider in men between ages of 53-78   Medicare will cover every 12 months beginning on the day after your 50th birthday PSA: 0 1 ng/mL          Hepatitis C Screening Once for adults born between 1945 and 1965  More frequently in patients at high risk for Hepatitis C Hep C Antibody: 03/07/2020    Screening Not Indicated  History Hepatitis C   Diabetes Screening 1-2 times per year if you're at risk for diabetes or have pre-diabetes Fasting glucose: 89 mg/dL   A1C: 5 8 %    Screening Not Indicated  History Diabetes   Cholesterol Screening Once every 5 years if you don't have a lipid disorder  May order more often based on risk factors  Lipid panel: 06/07/2019    Screening Not Indicated  History Lipid Disorder      Other Preventive Screenings Covered by Medicare:  1  Abdominal Aortic Aneurysm (AAA) Screening: covered once if your at risk  You're considered to be at risk if you have a family history of AAA or a male between the age of 73-68 who smoking at least 100 cigarettes in your lifetime  2  Lung Cancer Screening: covers low dose CT scan once per year if you meet all of the following conditions: (1) Age 50-69; (2) No signs or symptoms of lung cancer; (3) Current smoker or have quit smoking within the last 15 years; (4) You have a tobacco smoking history of at least 30 pack years (packs per day x number of years you smoked); (5) You get a written order from a healthcare provider  3  Glaucoma Screening: covered annually if you're considered high risk: (1) You have diabetes OR (2) Family history of glaucoma OR (3)  aged 48 and older OR (3)  American aged 72 and older  3  Osteoporosis Screening: covered every 2 years if you meet one of the following conditions: (1) Have a vertebral abnormality; (2) On glucocorticoid therapy for more than 3 months; (3) Have primary hyperparathyroidism; (4) On osteoporosis medications and need to assess response to drug therapy  5  HIV Screening: covered annually if you're between the age of 12-76  Also covered annually if you are younger than 13 and older than 72 with risk factors for HIV infection  For pregnant patients, it is covered up to 3 times per pregnancy      Immunizations:  Immunization Recommendations   Influenza Vaccine Annual influenza vaccination during flu season is recommended for all persons aged >= 6 months who do not have contraindications   Pneumococcal Vaccine (Prevnar and Pneumovax)  * Prevnar = PCV13  * Pneumovax = PPSV23 Adults 25-60 years old: 1-3 doses may be recommended based on certain risk factors  Adults 72 years old: Prevnar (PCV13) vaccine recommended followed by Pneumovax (PPSV23) vaccine  If already received PPSV23 since turning 65, then PCV13 recommended at least one year after PPSV23 dose  Hepatitis B Vaccine 3 dose series if at intermediate or high risk (ex: diabetes, end stage renal disease, liver disease)   Tetanus (Td) Vaccine - COST NOT COVERED BY MEDICARE PART B Following completion of primary series, a booster dose should be given every 10 years to maintain immunity against tetanus  Td may also be given as tetanus wound prophylaxis  Tdap Vaccine - COST NOT COVERED BY MEDICARE PART B Recommended at least once for all adults  For pregnant patients, recommended with each pregnancy  Shingles Vaccine (Shingrix) - COST NOT COVERED BY MEDICARE PART B  2 shot series recommended in those aged 48 and above     Health Maintenance Due:      Topic Date Due    Hepatitis C Screening  Discontinued     Immunizations Due:  There are no preventive care reminders to display for this patient  Advance Directives   What are advance directives? Advance directives are legal documents that state your wishes and plans for medical care  These plans are made ahead of time in case you lose your ability to make decisions for yourself  Advance directives can apply to any medical decision, such as the treatments you want, and if you want to donate organs  What are the types of advance directives? There are many types of advance directives, and each state has rules about how to use them  You may choose a combination of any of the following:  · Living will: This is a written record of the treatment you want  You can also choose which treatments you do not want, which to limit, and which to stop at a certain time  This includes surgery, medicine, IV fluid, and tube feedings  · Durable power of  for healthcare [facility]):   This is a written record that states who you want to make healthcare choices for you when you are unable to make them for yourself  This person, called a proxy, is usually a family member or a friend  You may choose more than 1 proxy  · Do not resuscitate (DNR) order:  A DNR order is used in case your heart stops beating or you stop breathing  It is a request not to have certain forms of treatment, such as CPR  A DNR order may be included in other types of advance directives  · Medical directive: This covers the care that you want if you are in a coma, near death, or unable to make decisions for yourself  You can list the treatments you want for each condition  Treatment may include pain medicine, surgery, blood transfusions, dialysis, IV or tube feedings, and a ventilator (breathing machine)  · Values history: This document has questions about your views, beliefs, and how you feel and think about life  This information can help others choose the care that you would choose  Why are advance directives important? An advance directive helps you control your care  Although spoken wishes may be used, it is better to have your wishes written down  Spoken wishes can be misunderstood, or not followed  Treatments may be given even if you do not want them  An advance directive may make it easier for your family to make difficult choices about your care  Cigarette Smoking and Your Health   Risks to your health if you smoke:  Nicotine and other chemicals found in tobacco damage every cell in your body  Even if you are a light smoker, you have an increased risk for cancer, heart disease, and lung disease  If you are pregnant or have diabetes, smoking increases your risk for complications  Benefits to your health if you stop smoking:   · You decrease respiratory symptoms such as coughing, wheezing, and shortness of breath  · You reduce your risk for cancers of the lung, mouth, throat, kidney, bladder, pancreas, stomach, and cervix   If you already have cancer, you increase the benefits of chemotherapy  You also reduce your risk for cancer returning or a second cancer from developing  · You reduce your risk for heart disease, blood clots, heart attack, and stroke  · You reduce your risk for lung infections, and diseases such as pneumonia, asthma, chronic bronchitis, and emphysema  · Your circulation improves  More oxygen can be delivered to your body  If you have diabetes, you lower your risk for complications, such as kidney, artery, and eye diseases  You also lower your risk for nerve damage  Nerve damage can lead to amputations, poor vision, and blindness  · You improve your body's ability to heal and to fight infections  For more information and support to stop smoking:   · [place]  Phone: [phone]  Web Address: [employer]  Weight Management   Why it is important to manage your weight:  Being overweight increases your risk of health conditions such as heart disease, high blood pressure, type 2 diabetes, and certain types of cancer  It can also increase your risk for osteoarthritis, sleep apnea, and other respiratory problems  Aim for a slow, steady weight loss  Even a small amount of weight loss can lower your risk of health problems  How to lose weight safely:  A safe and healthy way to lose weight is to eat fewer calories and get regular exercise  You can lose up about 1 pound a week by decreasing the number of calories you eat by 500 calories each day  Healthy meal plan for weight management:  A healthy meal plan includes a variety of foods, contains fewer calories, and helps you stay healthy  A healthy meal plan includes the following:  · Eat whole-grain foods more often  A healthy meal plan should contain fiber  Fiber is the part of grains, fruits, and vegetables that is not broken down by your body  Whole-grain foods are healthy and provide extra fiber in your diet   Some examples of whole-grain foods are whole-wheat breads and pastas, oatmeal, brown rice, and bulgur  · Eat a variety of vegetables every day  Include dark, leafy greens such as spinach, kale, giovany greens, and mustard greens  Eat yellow and orange vegetables such as carrots, sweet potatoes, and winter squash  · Eat a variety of fruits every day  Choose fresh or canned fruit (canned in its own juice or light syrup) instead of juice  Fruit juice has very little or no fiber  · Eat low-fat dairy foods  Drink fat-free (skim) milk or 1% milk  Eat fat-free yogurt and low-fat cottage cheese  Try low-fat cheeses such as mozzarella and other reduced-fat cheeses  · Choose meat and other protein foods that are low in fat  Choose beans or other legumes such as split peas or lentils  Choose fish, skinless poultry (chicken or turkey), or lean cuts of red meat (beef or pork)  Before you cook meat or poultry, cut off any visible fat  · Use less fat and oil  Try baking foods instead of frying them  Add less fat, such as margarine, sour cream, regular salad dressing and mayonnaise to foods  Eat fewer high-fat foods  Some examples of high-fat foods include french fries, doughnuts, ice cream, and cakes  · Eat fewer sweets  Limit foods and drinks that are high in sugar  This includes candy, cookies, regular soda, and sweetened drinks  Exercise:  Exercise at least 30 minutes per day on most days of the week  Some examples of exercise include walking, biking, dancing, and swimming  You can also fit in more physical activity by taking the stairs instead of the elevator or parking farther away from stores  Ask your healthcare provider about the best exercise plan for you  © Copyright [employer] 2018 Information is for End User's use only and may not be sold, redistributed or otherwise used for commercial purposes   All illustrations and images included in CareNotes® are the copyrighted property of A D A M Silvia  or Jessy Haque  Calorie Counting Diet   WHAT YOU NEED TO KNOW:   What is a calorie counting diet? It is a meal plan based on counting calories each day to reach a healthy body weight  You will need to eat fewer calories if you are trying to lose weight  Weight loss may decrease your risk for certain health problems or improve your health if you have health problems  Some of these health problems include heart disease, high blood pressure, and diabetes  What foods should I avoid? Your dietitian will tell you if you need to avoid certain foods based on your body weight and health condition  You may need to avoid high-fat foods if you are at risk for or have heart disease  You may need to eat fewer foods from the breads and starches food group if you have diabetes  How many calories are in foods? The following is a list of foods and drinks with the approximate number of calories in each  Check the food label to find the exact number of calories  A dietitian can tell you how many calories you should have from each food group each day    · Carbohydrate:      ¨ ½ of a 3-inch bagel, 1 slice of bread, or ½ of a hamburger bun or hot dog bun (80)    ¨ 1 (8-inch) flour tortilla or ½ cup of cooked rice (100)    ¨ 1 (6-inch) corn tortilla (80)    ¨ 1 (6-inch) pancake or 1 cup of bran flakes cereal (110)    ¨ ½ cup of cooked cereal (80)    ¨ ½ cup of cooked pasta (85)    ¨ 1 ounce of pretzels (100)    ¨ 3 cups of air-popped popcorn without butter or oil (80)    · Dairy:      ¨ 1 cup of skim or 1% milk (90)    ¨ 1 cup of 2% milk (120)    ¨ 1 cup of whole milk (160)    ¨ 1 cup of 2% chocolate milk (220)    ¨ 1 ounce of low-fat cheese with 3 grams of fat per ounce (70)    ¨ 1 ounce of cheddar cheese (114)    ¨ ½ cup of 1% fat cottage cheese (80)    ¨ 1 cup of plain or sugar-free, fat-free yogurt (90)    · Protein foods:      ¨ 3 ounces of fish (not breaded or fried) (95)    ¨ 3 ounces of breaded, fried fish (195)    ¨ ¾ cup of tuna canned in water (105)    ¨ 3 ounces of chicken breast without skin (105)    ¨ 1 fried chicken breast with skin (350)    ¨ ¼ cup of fat free egg substitute (40)    ¨ 1 large egg (75)    ¨ 3 ounces of lean beef or pork (165)    ¨ 3 ounces of fried pork chop or ham (185)    ¨ ½ cup of cooked dried beans, such as kidney, christian, lentils, or navy (115)    ¨ 3 ounces of bologna or lunch meat (225)    ¨ 2 links of breakfast sausage (140)    · Vegetables:      ¨ ½ cup of sliced mushrooms (10)    ¨ 1 cup of salad greens, such as lettuce, spinach, or reymundo (15)    ¨ ½ cup of steamed asparagus (20)    ¨ ½ cup of cooked summer squash, zucchini squash, or green or wax beans (25)    ¨ 1 cup of broccoli or cauliflower florets, or 1 medium tomato (25)    ¨ 1 large raw carrot or ½ cup of cooked carrots (40)    ¨ ? of a medium cucumber or 1 stalk of celery (5)    ¨ 1 small baked potato (160)    ¨ 1 cup of breaded, fried vegetables (230)    · Fruit:      ¨ 1 (6-inch) banana (55)     ¨ ½ of a 4-inch grapefruit (55)    ¨ 15 grapes (60)    ¨ 1 medium orange or apple (70)    ¨ 1 large peach (65)    ¨ 1 cup of fresh pineapple chunks (75)    ¨ 1 cup of melon cubes (50)    ¨ 1¼ cups of whole strawberries (45)    ¨ ½ cup of fruit canned in juice (55)    ¨ ½ cup of fruit canned in heavy syrup (110)    ¨ ?  cup of raisins (130)    ¨ ½ cup of unsweetened fruit juice (60)    ¨ ½ cup of grape, cranberry, or prune juice (90)    · Fat:      ¨ 10 peanuts or 2 teaspoons of peanut butter (55)    ¨ 2 tablespoons of avocado or 1 tablespoon of regular salad dressing (45)    ¨ 2 slices of marroquin (90)    ¨ 1 teaspoon of oil, such as safflower, canola, corn, or olive oil (45)    ¨ 2 teaspoons of low-fat margarine, or 1 tablespoon of low-fat mayonnaise (50)    ¨ 1 teaspoon of regular margarine (40)    ¨ 1 tablespoon of regular mayonnaise (135)    ¨ 1 tablespoon of cream cheese or 2 tablespoons of low-fat cream cheese (45)    ¨ 2 tablespoons of vegetable shortening (215)    · Dessert and sweets:      ¨ 8 animal crackers or 5 vanilla wafers (80)    ¨ 1 frozen fruit juice bar (80)    ¨ ½ cup of ice milk or low-fat frozen yogurt (90)    ¨ ½ cup of sherbet or sorbet (125)    ¨ ½ cup of sugar-free pudding or custard (60)    ¨ ½ cup of ice cream (140)    ¨ ½ cup of pudding or custard (175)    ¨ 1 (2-inch) square chocolate brownie (185)    · Combination foods:      ¨ Bean burrito made with an 8-inch tortilla, without cheese (275)    ¨ Chicken breast sandwich with lettuce and tomato (325)    ¨ 1 cup of chicken noodle soup (60)    ¨ 1 beef taco (175)    ¨ Regular hamburger with lettuce and tomato (310)    ¨ Regular cheeseburger with lettuce and tomato (410)     ¨ ¼ of a 12-inch cheese pizza (280)    ¨ Fried fish sandwich with lettuce and tomato (425)    ¨ Hot dog and bun (275)    ¨ 1½ cups of macaroni and cheese (310)    ¨ Taco salad with a fried tortilla shell (870)    · Low-calorie foods:      ¨ 1 tablespoon of ketchup or 1 tablespoon of fat free sour cream (15)    ¨ 1 teaspoon of mustard (5)    ¨ ¼ cup of salsa (20)    ¨ 1 large dill pickle (15)    ¨ 1 tablespoon of fat free salad dressing (10)    ¨ 2 teaspoons of low-sugar, light jam or jelly, or 1 tablespoon of sugar-free syrup (15)    ¨ 1 sugar-free popsicle (15)    ¨ 1 cup of club soda, seltzer water, or diet soda (0)  CARE AGREEMENT:   You have the right to help plan your care  Discuss treatment options with your caregivers to decide what care you want to receive  You always have the right to refuse treatment  The above information is an  only  It is not intended as medical advice for individual conditions or treatments  Talk to your doctor, nurse or pharmacist before following any medical regimen to see if it is safe and effective for you  © 2017 [address] [employer] Information is for End User's use only and may not be sold, redistributed or otherwise used for commercial purposes   All illustrations and images included in CareNotes® are the copyrighted property of Silvia QUINONES  or Vidal Wesley

## 2020-09-30 NOTE — PROGRESS NOTES
Assessment/Plan:    No problem-specific Assessment & Plan notes found for this encounter  Diagnoses and all orders for this visit:    Medicare annual wellness visit, subsequent    Low testosterone in male  -     Insulin syringes    Hypogonadism in male  -     testosterone cypionate (DEPO-TESTOSTERONE) 100 mg/mL IM injection; Inject 1 mL (100 mg total) into a muscle every 7 days for 10 doses  -     B-D 3CC LUER-MARÍA ELENA SYR 00OK3-0/2 21G X 1-1/2" 3 ML MISC; Inject into a muscle every 7 days for 1 dose    BMI 37 0-37 9, adult    Other orders  -     Cancel: Testosterone Cypionate 100 MG/ML SOLN; Inject as directed          Subjective:      Patient ID: Darron Rizo is a 63 y o  male  Patient follow-up cirrhosis of the liver  He is in a chronic treatment with methadone for opioid use disorder  He also has hypogonadism as consequence  He was in testosterone replacement by Endocrinology that he stopped due to no follow-up appointment  by their office  The following portions of the patient's history were reviewed and updated as appropriate: allergies, current medications, past family history, past medical history, past social history, past surgical history and problem list     Review of Systems   Constitutional: Positive for fatigue  Negative for diaphoresis, fever and unexpected weight change  Respiratory: Negative for apnea, cough, choking, chest tightness and shortness of breath  Cardiovascular: Negative for chest pain, palpitations and leg swelling  Gastrointestinal: Negative for abdominal distention, abdominal pain, anal bleeding, blood in stool and constipation  Musculoskeletal: Negative for arthralgias, back pain, gait problem and joint swelling  Neurological: Negative for dizziness, facial asymmetry, light-headedness and headaches  Psychiatric/Behavioral: Negative for behavioral problems, dysphoric mood and self-injury  The patient is not nervous/anxious            Objective:      /80 (BP Location: Left arm, Patient Position: Sitting, Cuff Size: Standard)   Pulse 64   Temp 97 8 °F (36 6 °C) (Temporal)   Resp 16   Ht 5' 5" (1 651 m)   Wt 103 kg (228 lb)   SpO2 98%   BMI 37 94 kg/m²          Physical Exam      BMI Counseling: Body mass index is 37 94 kg/m²  The BMI is above normal  Exercise recommendations include exercising 3-5 times per week

## 2020-11-13 DIAGNOSIS — K74.60 ESOPHAGEAL VARICES IN CIRRHOSIS (HCC): ICD-10-CM

## 2020-11-13 DIAGNOSIS — I85.10 ESOPHAGEAL VARICES IN CIRRHOSIS (HCC): ICD-10-CM

## 2020-11-13 RX ORDER — NADOLOL 20 MG/1
TABLET ORAL
Qty: 90 TABLET | Refills: 3 | Status: SHIPPED | OUTPATIENT
Start: 2020-11-13 | End: 2021-04-20 | Stop reason: SDUPTHER

## 2020-11-19 ENCOUNTER — OFFICE VISIT (OUTPATIENT)
Dept: FAMILY MEDICINE CLINIC | Facility: CLINIC | Age: 60
End: 2020-11-19
Payer: COMMERCIAL

## 2020-11-19 ENCOUNTER — LAB (OUTPATIENT)
Dept: LAB | Facility: HOSPITAL | Age: 60
End: 2020-11-19
Payer: COMMERCIAL

## 2020-11-19 VITALS
DIASTOLIC BLOOD PRESSURE: 80 MMHG | WEIGHT: 233 LBS | BODY MASS INDEX: 38.82 KG/M2 | HEART RATE: 86 BPM | HEIGHT: 65 IN | RESPIRATION RATE: 16 BRPM | SYSTOLIC BLOOD PRESSURE: 140 MMHG | OXYGEN SATURATION: 98 % | TEMPERATURE: 98 F

## 2020-11-19 DIAGNOSIS — E29.1 HYPOGONADISM IN MALE: ICD-10-CM

## 2020-11-19 DIAGNOSIS — E66.01 OBESITY, MORBID (HCC): ICD-10-CM

## 2020-11-19 DIAGNOSIS — Z13.220 SCREENING CHOLESTEROL LEVEL: ICD-10-CM

## 2020-11-19 DIAGNOSIS — Z11.4 SCREENING FOR HUMAN IMMUNODEFICIENCY VIRUS: ICD-10-CM

## 2020-11-19 DIAGNOSIS — Z13.220 SCREENING CHOLESTEROL LEVEL: Primary | ICD-10-CM

## 2020-11-19 DIAGNOSIS — R31.0 GROSS HEMATURIA: ICD-10-CM

## 2020-11-19 PROBLEM — E11.65 UNCONTROLLED TYPE 2 DIABETES MELLITUS WITH HYPERGLYCEMIA (HCC): Status: RESOLVED | Noted: 2020-11-19 | Resolved: 2020-11-19

## 2020-11-19 PROBLEM — E11.65 UNCONTROLLED TYPE 2 DIABETES MELLITUS WITH HYPERGLYCEMIA (HCC): Status: ACTIVE | Noted: 2020-11-19

## 2020-11-19 LAB
CHOLEST SERPL-MCNC: 119 MG/DL
HDLC SERPL-MCNC: 29 MG/DL
LDLC SERPL CALC-MCNC: 66 MG/DL
NONHDLC SERPL-MCNC: 90 MG/DL
SL AMB  POCT GLUCOSE, UA: ABNORMAL
SL AMB LEUKOCYTE ESTERASE,UA: ABNORMAL
SL AMB POCT BILIRUBIN,UA: ABNORMAL
SL AMB POCT BLOOD,UA: ABNORMAL
SL AMB POCT CLARITY,UA: CLEAR
SL AMB POCT COLOR,UA: ABNORMAL
SL AMB POCT KETONES,UA: ABNORMAL
SL AMB POCT NITRITE,UA: ABNORMAL
SL AMB POCT PH,UA: 6
SL AMB POCT SPECIFIC GRAVITY,UA: 1.02
SL AMB POCT URINE PROTEIN: ABNORMAL
SL AMB POCT UROBILINOGEN: 0.2
TRIGL SERPL-MCNC: 119 MG/DL

## 2020-11-19 PROCEDURE — 3079F DIAST BP 80-89 MM HG: CPT | Performed by: FAMILY MEDICINE

## 2020-11-19 PROCEDURE — 3008F BODY MASS INDEX DOCD: CPT | Performed by: FAMILY MEDICINE

## 2020-11-19 PROCEDURE — 87389 HIV-1 AG W/HIV-1&-2 AB AG IA: CPT

## 2020-11-19 PROCEDURE — 3061F NEG MICROALBUMINURIA REV: CPT | Performed by: FAMILY MEDICINE

## 2020-11-19 PROCEDURE — 99214 OFFICE O/P EST MOD 30 MIN: CPT | Performed by: FAMILY MEDICINE

## 2020-11-19 PROCEDURE — 3077F SYST BP >= 140 MM HG: CPT | Performed by: FAMILY MEDICINE

## 2020-11-19 PROCEDURE — 80061 LIPID PANEL: CPT

## 2020-11-19 PROCEDURE — 81002 URINALYSIS NONAUTO W/O SCOPE: CPT | Performed by: FAMILY MEDICINE

## 2020-11-19 PROCEDURE — 36415 COLL VENOUS BLD VENIPUNCTURE: CPT

## 2020-11-19 RX ORDER — TESTOSTERONE CYPIONATE 100 MG/ML
100 INJECTION, SOLUTION INTRAMUSCULAR
Qty: 12 ML | Refills: 0 | Status: SHIPPED | OUTPATIENT
Start: 2020-11-19 | End: 2021-01-07 | Stop reason: SDUPTHER

## 2020-11-19 RX ORDER — SYRINGE W-NEEDLE,DISPOSAB,3 ML 25GX5/8"
SYRINGE, EMPTY DISPOSABLE MISCELLANEOUS
Qty: 12 EACH | Refills: 0 | Status: SHIPPED | OUTPATIENT
Start: 2020-11-19 | End: 2021-05-24 | Stop reason: SDUPTHER

## 2020-11-20 LAB — HIV 1+2 AB+HIV1 P24 AG SERPL QL IA: NORMAL

## 2020-11-24 PROBLEM — R11.2 NON-INTRACTABLE VOMITING WITH NAUSEA: Status: RESOLVED | Noted: 2020-03-04 | Resolved: 2020-11-24

## 2020-11-24 PROBLEM — J69.0 ASPIRATION PNEUMONITIS (HCC): Status: RESOLVED | Noted: 2020-03-04 | Resolved: 2020-11-24

## 2020-11-24 PROBLEM — Z12.11 ENCOUNTER FOR COLORECTAL CANCER SCREENING: Status: RESOLVED | Noted: 2020-06-24 | Resolved: 2020-11-24

## 2020-11-24 PROBLEM — E78.5 HYPERLIPIDEMIA: Status: RESOLVED | Noted: 2017-08-14 | Resolved: 2020-11-24

## 2020-11-24 PROBLEM — B35.3 TINEA PEDIS OF BOTH FEET: Status: RESOLVED | Noted: 2019-02-21 | Resolved: 2020-11-24

## 2020-11-24 PROBLEM — R65.10 SIRS (SYSTEMIC INFLAMMATORY RESPONSE SYNDROME) (HCC): Status: RESOLVED | Noted: 2020-03-04 | Resolved: 2020-11-24

## 2020-11-24 PROBLEM — Z12.12 ENCOUNTER FOR COLORECTAL CANCER SCREENING: Status: RESOLVED | Noted: 2020-06-24 | Resolved: 2020-11-24

## 2020-12-08 ENCOUNTER — TRANSCRIBE ORDERS (OUTPATIENT)
Dept: ADMINISTRATIVE | Facility: HOSPITAL | Age: 60
End: 2020-12-08

## 2020-12-08 ENCOUNTER — HOSPITAL ENCOUNTER (OUTPATIENT)
Dept: RADIOLOGY | Facility: HOSPITAL | Age: 60
Discharge: HOME | End: 2020-12-08
Payer: COMMERCIAL

## 2020-12-08 DIAGNOSIS — F11.29 OPIOID DEPENDENCE WITH OPIOID-INDUCED DISORDER (HCC): Primary | ICD-10-CM

## 2020-12-08 DIAGNOSIS — F11.29 OPIOID DEPENDENCE WITH OPIOID-INDUCED DISORDER (HCC): ICD-10-CM

## 2020-12-08 DIAGNOSIS — R76.11 POSITIVE PPD: ICD-10-CM

## 2020-12-08 DIAGNOSIS — Z02.9 ENCOUNTERS FOR UNSPECIFIED ADMINISTRATIVE PURPOSE: ICD-10-CM

## 2020-12-08 PROCEDURE — 71046 X-RAY EXAM CHEST 2 VIEWS: CPT

## 2021-01-07 ENCOUNTER — OFFICE VISIT (OUTPATIENT)
Dept: FAMILY MEDICINE CLINIC | Facility: CLINIC | Age: 61
End: 2021-01-07
Payer: COMMERCIAL

## 2021-01-07 VITALS
HEART RATE: 92 BPM | TEMPERATURE: 97.9 F | SYSTOLIC BLOOD PRESSURE: 130 MMHG | BODY MASS INDEX: 38.82 KG/M2 | RESPIRATION RATE: 16 BRPM | WEIGHT: 233 LBS | OXYGEN SATURATION: 98 % | HEIGHT: 65 IN | DIASTOLIC BLOOD PRESSURE: 70 MMHG

## 2021-01-07 DIAGNOSIS — E66.01 OBESITY, MORBID (HCC): ICD-10-CM

## 2021-01-07 DIAGNOSIS — E29.1 HYPOGONADISM IN MALE: ICD-10-CM

## 2021-01-07 DIAGNOSIS — I85.10 SECONDARY ESOPHAGEAL VARICES WITHOUT BLEEDING (HCC): ICD-10-CM

## 2021-01-07 DIAGNOSIS — F11.29 OPIOID DEPENDENCE WITH OPIOID-INDUCED DISORDER (HCC): ICD-10-CM

## 2021-01-07 DIAGNOSIS — E11.9 TYPE 2 DIABETES MELLITUS WITHOUT COMPLICATION, WITHOUT LONG-TERM CURRENT USE OF INSULIN (HCC): ICD-10-CM

## 2021-01-07 LAB
CREAT UR-MCNC: 133 MG/DL
MICROALBUMIN UR-MCNC: 6.3 MG/L (ref 0–20)
MICROALBUMIN/CREAT 24H UR: 5 MG/G CREATININE (ref 0–30)
SL AMB POCT HEMOGLOBIN AIC: 8.6 (ref ?–6.5)

## 2021-01-07 PROCEDURE — 82043 UR ALBUMIN QUANTITATIVE: CPT | Performed by: FAMILY MEDICINE

## 2021-01-07 PROCEDURE — 3052F HG A1C>EQUAL 8.0%<EQUAL 9.0%: CPT | Performed by: FAMILY MEDICINE

## 2021-01-07 PROCEDURE — 83036 HEMOGLOBIN GLYCOSYLATED A1C: CPT | Performed by: FAMILY MEDICINE

## 2021-01-07 PROCEDURE — 82570 ASSAY OF URINE CREATININE: CPT | Performed by: FAMILY MEDICINE

## 2021-01-07 PROCEDURE — 3061F NEG MICROALBUMINURIA REV: CPT | Performed by: FAMILY MEDICINE

## 2021-01-07 PROCEDURE — 3075F SYST BP GE 130 - 139MM HG: CPT | Performed by: FAMILY MEDICINE

## 2021-01-07 PROCEDURE — 3008F BODY MASS INDEX DOCD: CPT | Performed by: FAMILY MEDICINE

## 2021-01-07 PROCEDURE — 3078F DIAST BP <80 MM HG: CPT | Performed by: FAMILY MEDICINE

## 2021-01-07 PROCEDURE — 99214 OFFICE O/P EST MOD 30 MIN: CPT | Performed by: FAMILY MEDICINE

## 2021-01-07 RX ORDER — TESTOSTERONE CYPIONATE 100 MG/ML
100 INJECTION, SOLUTION INTRAMUSCULAR
Qty: 12 ML | Refills: 3 | Status: SHIPPED | OUTPATIENT
Start: 2021-01-07 | End: 2021-05-20 | Stop reason: SDUPTHER

## 2021-01-07 NOTE — PROGRESS NOTES
Assessment/Plan:    Secondary esophageal varices without bleeding (HCC)  Condition is stable with current treatment, to  continue the same    No bleeding    Methadone maintenance therapy patient [facility])  Continue care, blind treatment in [place] in [facility]  Obesity, morbid ([address] )  The BMI is above normal  Nutrition recommendations include reducing portion sizes, decreasing overall calorie intake, 3-5 servings of fruits/vegetables daily, reducing fast food intake, consuming healthier snacks, decreasing soda and/or juice intake, moderation in carbohydrate intake, increasing intake of lean protein, reducing intake of saturated fat and trans fat, and reducing intake of cholesterol  Exercise recommendations include moderate aerobic physical activity for 150 minutes/week, vigorous aerobic physical activity for 75 minutes/week if possible, the most usual recommendation is exercising 3-5 times per week, joining a gym is a reasonable option with the precaution of the current situation, and strength training exercises are always important as we the as well  Type 2 diabetes mellitus without complication, without long-term current use of insulin (HCC)    Lab Results   Component Value Date    HGBA1C 5 8 12/19/2019   I explained to CARLYLE JENNINGS  [facility] the goals of blood sugar levels , with fasting  of 130 or less and  2 hrs after meals of 150 or less  Education given verbally and with written materials  Change His life style modification again stressed  The vascular complications secondary to diabetes poor control were explained with details  The renal function protection and the prevention of major vascular disease with the use of  ARB's and statins respectively and exercise/diet was also discussed  Diagnoses and all orders for this visit:    Hypogonadism in male  -     testosterone cypionate (DEPO-TESTOSTERONE) 100 mg/mL IM injection;  Inject 1 mL (100 mg total) into a muscle every 7 days for 12 doses    Secondary esophageal varices without bleeding (HCC)    Opioid dependence with opioid-induced disorder (HCC)    Obesity, morbid ([facility] )    Type 2 diabetes mellitus without complication, without long-term current use of insulin ([facility] )    Other orders  -     POCT hemoglobin A1c  -     Microalbumin / creatinine urine ratio          Subjective:      Patient ID: Romaine Don is a 63 y o  male  HPI  Patient is here to follow Diabetes and HTN, patient states good compliance with treatment  Denies chest pain, shortness of breath, angina, urinary problems  No exercise but follows low salt diet  The following portions of the patient's history were reviewed and updated as appropriate: allergies, current medications, past family history, past medical history, past social history, past surgical history and problem list     Review of Systems   Constitutional: Negative for diaphoresis, fatigue, fever and unexpected weight change  Respiratory: Negative for apnea, cough, choking, chest tightness and shortness of breath  Cardiovascular: Negative for chest pain, palpitations and leg swelling  Gastrointestinal: Negative for abdominal distention, abdominal pain, anal bleeding, blood in stool and constipation  Musculoskeletal: Negative for arthralgias, back pain, gait problem and joint swelling  Neurological: Negative for dizziness, facial asymmetry, light-headedness and headaches  Psychiatric/Behavioral: Negative for behavioral problems, dysphoric mood and self-injury  The patient is not nervous/anxious  Objective:      /70 (BP Location: Left arm, Patient Position: Sitting, Cuff Size: Standard)   Pulse 92   Temp 97 9 °F (36 6 °C) (Temporal)   Resp 16   Ht 5' 5" (1 651 m)   Wt 106 kg (233 lb)   SpO2 98%   BMI 38 77 kg/m²          Physical Exam  Vitals signs and nursing note reviewed  Neck:      Musculoskeletal: No edema or neck rigidity  Thyroid: No thyroid mass or thyromegaly  Vascular: No carotid bruit or JVD  Trachea: No tracheal tenderness  Cardiovascular:      Rate and Rhythm: Normal rate and regular rhythm  No extrasystoles are present  Pulses: Normal pulses  Heart sounds: Normal heart sounds  Heart sounds not distant  No friction rub  Pulmonary:      Effort: Pulmonary effort is normal  No tachypnea or bradypnea  Breath sounds: Normal breath sounds  No stridor  Abdominal:      General: Bowel sounds are normal  There is no abdominal bruit  Palpations: Abdomen is soft  There is no hepatomegaly or splenomegaly  Hernia: No hernia is present  Musculoskeletal: Normal range of motion  Skin:     General: Skin is warm and dry  Neurological:      Mental Status: He is oriented to person, place, and time  Deep Tendon Reflexes: Reflexes are normal and symmetric  Psychiatric:         Behavior: Behavior normal          Thought Content:  Thought content normal          Judgment: Judgment normal

## 2021-01-07 NOTE — ASSESSMENT & PLAN NOTE
Lab Results   Component Value Date    HGBA1C 5 8 12/19/2019   I explained to CARLYLE JENNINGS  [facility] the goals of blood sugar levels , with fasting  of 130 or less and  2 hrs after meals of 150 or less  Education given verbally and with written materials  Change His life style modification again stressed  The vascular complications secondary to diabetes poor control were explained with details  The renal function protection and the prevention of major vascular disease with the use of  ARB's and statins respectively and exercise/diet was also discussed

## 2021-01-15 ENCOUNTER — TELEPHONE (OUTPATIENT)
Dept: FAMILY MEDICINE CLINIC | Facility: CLINIC | Age: 61
End: 2021-01-15

## 2021-01-15 NOTE — TELEPHONE ENCOUNTER
testosterone cypionate     Denied by insurance, prior required or medication to be changed  Paper scanned

## 2021-01-18 DIAGNOSIS — E11.9 TYPE 2 DIABETES MELLITUS WITHOUT COMPLICATION, WITHOUT LONG-TERM CURRENT USE OF INSULIN (HCC): Primary | ICD-10-CM

## 2021-01-18 RX ORDER — ATORVASTATIN CALCIUM 40 MG/1
40 TABLET, FILM COATED ORAL DAILY
Qty: 90 TABLET | Refills: 3 | Status: SHIPPED | OUTPATIENT
Start: 2021-01-18 | End: 2021-09-10

## 2021-01-27 ENCOUNTER — TELEPHONE (OUTPATIENT)
Dept: FAMILY MEDICINE CLINIC | Facility: CLINIC | Age: 61
End: 2021-01-27

## 2021-02-16 DIAGNOSIS — E29.1 HYPOGONADISM IN MALE: ICD-10-CM

## 2021-02-17 RX ORDER — SYRINGE W-NEEDLE,DISPOSAB,3 ML 25GX5/8"
SYRINGE, EMPTY DISPOSABLE MISCELLANEOUS
Qty: 12 EACH | Refills: 0 | OUTPATIENT
Start: 2021-02-17

## 2021-02-17 RX ORDER — TESTOSTERONE CYPIONATE 100 MG/ML
100 INJECTION, SOLUTION INTRAMUSCULAR
Qty: 12 ML | Refills: 3 | OUTPATIENT
Start: 2021-02-17 | End: 2021-05-06

## 2021-02-25 DIAGNOSIS — E29.1 HYPOGONADISM IN MALE: ICD-10-CM

## 2021-02-26 RX ORDER — SYRINGE W-NEEDLE,DISPOSAB,3 ML 25GX5/8"
SYRINGE, EMPTY DISPOSABLE MISCELLANEOUS
Qty: 12 EACH | Refills: 0 | OUTPATIENT
Start: 2021-02-26

## 2021-02-26 RX ORDER — TESTOSTERONE CYPIONATE 100 MG/ML
100 INJECTION, SOLUTION INTRAMUSCULAR
Qty: 12 ML | Refills: 3 | OUTPATIENT
Start: 2021-02-26 | End: 2021-05-15

## 2021-02-26 NOTE — TELEPHONE ENCOUNTER
Please call patient insurance is not covering testosterone, he needs to follow up with endocrinologist

## 2021-02-27 DIAGNOSIS — E11.9 TYPE 2 DIABETES MELLITUS WITHOUT COMPLICATION, WITHOUT LONG-TERM CURRENT USE OF INSULIN (HCC): ICD-10-CM

## 2021-02-27 DIAGNOSIS — E29.1 HYPOGONADISM IN MALE: Primary | ICD-10-CM

## 2021-03-18 ENCOUNTER — CONSULT (OUTPATIENT)
Dept: ENDOCRINOLOGY | Facility: CLINIC | Age: 61
End: 2021-03-18
Payer: COMMERCIAL

## 2021-03-18 VITALS
HEIGHT: 65 IN | SYSTOLIC BLOOD PRESSURE: 118 MMHG | DIASTOLIC BLOOD PRESSURE: 80 MMHG | BODY MASS INDEX: 38.62 KG/M2 | WEIGHT: 231.8 LBS | HEART RATE: 99 BPM

## 2021-03-18 DIAGNOSIS — E29.1 HYPOGONADISM IN MALE: ICD-10-CM

## 2021-03-18 DIAGNOSIS — R06.83 SNORING: ICD-10-CM

## 2021-03-18 DIAGNOSIS — I10 BENIGN ESSENTIAL HYPERTENSION: Primary | ICD-10-CM

## 2021-03-18 DIAGNOSIS — E11.9 TYPE 2 DIABETES MELLITUS WITHOUT COMPLICATION, WITHOUT LONG-TERM CURRENT USE OF INSULIN (HCC): ICD-10-CM

## 2021-03-18 DIAGNOSIS — N62 GYNECOMASTIA: ICD-10-CM

## 2021-03-18 PROCEDURE — 99204 OFFICE O/P NEW MOD 45 MIN: CPT | Performed by: INTERNAL MEDICINE

## 2021-03-18 RX ORDER — METHADONE HYDROCHLORIDE 10 MG/ML
44 CONCENTRATE ORAL DAILY
COMMUNITY

## 2021-03-18 NOTE — PROGRESS NOTES
Ming Orantes 63 y o  male MRN: [id number]    Encounter: [id number]      Assessment/Plan     Assessment: This is a 63 y o  male diagnosed with hypogonadism  Plan:    Diagnoses and all orders for this visit:    Benign essential hypertension    Hypogonadism in male -  It is unclear whether this is primary or secondary hypogonadism  Patient has risk factor for both, possibly uncontrolled obstructive sleep apnea, obesity, chronic opioid use, varicocele or hydrocele of testes  Patient is agreeable to stop testosterone completely  I will recheck laboratory work including testosterone, [address], [address] [place] [address] in 2 months  -     Ambulatory referral to Endocrinology  -     Luteinizing hormone Lab Collect; Future  -     Follicle stimulating hormone Lab Collect; Future  -     Testosterone, free, total Lab Collect; Future  -     Sex Hormone Binding Globulin- Lab Collect; Future    Type 2 diabetes mellitus without complication, without long-term current use of insulin (HCC) -  most recent A1c 8 6%, patient currently on 500 mg of metformin twice a day  I referred patient to a nutritionist  -     Ambulatory referral to Endocrinology  -     Microalbumin / creatinine urine ratio; Future  -     HEMOGLOBIN A1C W/ EAG ESTIMATION Lab Collect; Future  -     Comprehensive metabolic panel Lab Collect; Future  -     Ambulatory referral to Diabetic Education; Future  -     Lipid Panel with Direct LDL reflex Lab Collect; Future    Gynecomastia -  Patient has bilateral gynecomastia larger on the right breast, will order ultrasound of the breast bilaterally  -     US BREAST BILATERAL LIMITED (DIAGNOSTIC); Future    Snoring -  I referred patient to Sleep Medicine for obstructive sleep apnea evaluation  -     Ambulatory referral to Sleep Medicine; Future     I will see patient back in my office in 2 months         CC:  hypogonadism      History of Present Illness     HPI:  This is a 63 y o  male diagnosed with hypogonadism 2 years ago, first note at [address] [place] [address] 321 system by Dr Tobias Davis in 10/2019  Patient was started on testosterone 200 mg im every 14 days  He is currently on testosterone 100 mg every week  He has been off of testosterone therapy for the past 1 month  He says that he does not see any improvement in his symptoms of low libido, erectile dysfunction, mood, muscle strength with testosterone therapy  He was never evaluated for obstructive sleep apnea  He has history of chronic methadone use  He also has history of hydrocele or varicocele in one of his testes  Last lab work for testosterone was done in 12/2019 showed total testosterone 823, free testosterone 16 2  Diabetes first diagnosed in June 2014  He was initially on insulin therapy  He was on metformin for 3-4 years, it was stopped  He was restarted on metformin in 1/2021 when his blood work showed A1C 8 6%  PMH - resolved hepC, liver cirrhosis, HTN, CM, methadone use, obesity  PSH - operation on kidney many years ago, 1 testicle hydrocele or varicocele - 4 years  SH - he is smoker, denies alcohol use  FH - mother with diabetes    Review of Systems   Constitutional: Negative for fatigue and unexpected weight change  HENT: Negative for congestion, postnasal drip and sore throat  Eyes: Negative for pain and redness  Respiratory: Negative for cough, chest tightness, shortness of breath and wheezing  Cardiovascular: Negative for chest pain, palpitations and leg swelling  Gastrointestinal: Negative for abdominal pain, constipation, diarrhea, nausea and vomiting  Endocrine: Negative for polydipsia and polyuria  Genitourinary: Negative for dysuria  Musculoskeletal: Negative for arthralgias and back pain  Neurological: Negative for dizziness, light-headedness and headaches  Psychiatric/Behavioral: Negative for agitation  The patient is not nervous/anxious  All other systems reviewed and are negative        Historical Information   Past Medical History:   Diagnosis Date    Colon polyp     [facility]     Esophageal varices (HCC)     Gastritis     Hepatitis C     History of kidney stones     Hyperlipidemia     Hypertension     Kidney stone     Pneumonia     PPD positive 2020    Substance abuse ([facility] )      Past Surgical History:   Procedure Laterality Date    COLONOSCOPY  06/20/2014    dr padilla    COLONOSCOPY  2014    Dr Padilla    EGD AND COLONOSCOPY  08/2020    esophageal varices, colon polyp, hemorrhoids    ESOPHAGOGASTRODUODENOSCOPY  12/10/2015    esophageal varices, cirrhosis, gastritis    HYDROCELE EXCISION / REPAIR      LIVER BIOPSY  2014     Social History   Social History     Substance and Sexual Activity   Alcohol Use Not Currently    Alcohol/week: 0 0 standard drinks    Frequency: Never    Binge frequency: Never     Social History     Substance and Sexual Activity   Drug Use Not Currently    Types: Heroin    Comment: former user- heroin was drug of choice     Social History     Tobacco Use   Smoking Status Current Every Day Smoker    Packs/day: 1 00    Types: Cigarettes   Smokeless Tobacco Never Used   Tobacco Comment    TOBACCO USE     Family History:   Family History   Problem Relation Age of Onset    Diabetes type II Mother         MELLITUS    Hypertension Mother     Hypertension Sister     Diabetes Sister         MELLITUS       Meds/Allergies   Current Outpatient Medications   Medication Sig Dispense Refill    metFORMIN (GLUCOPHAGE) 500 mg tablet Take 1 tablet (500 mg total) by mouth 2 (two) times a day with meals    60 tablet 5    methadone (DOLOPHINE) 10 mg/mL oral concentrated solution Take 44 mg by mouth daily      nadolol (CORGARD) 20 mg tablet TOME CHARITY TABLETA TODOS LOS LO 90 tablet 3    atorvastatin (LIPITOR) 40 mg tablet Take 1 tablet (40 mg total) by mouth daily (Patient not taking: Reported on 3/18/2021) 90 tablet 3    Syringe/Needle, Disp, (SYRINGE 3CC/26ZI7-0/2") 21G X 1-1/2" 3 ML MISC Use every 7 days (Patient not taking: Reported on 3/18/2021) 12 each 0    testosterone cypionate (DEPO-TESTOSTERONE) 100 mg/mL IM injection Inject 1 mL (100 mg total) into a muscle every 7 days for 12 doses (Patient not taking: Reported on 3/18/2021) 12 mL 3     No current facility-administered medications for this visit  Allergies   Allergen Reactions    Meperidine Abdominal Pain       Objective   Vitals: Blood pressure 118/80, pulse 99, height 5' 5" (1 651 m), weight 105 kg (231 lb 12 8 oz)  Physical Exam  Constitutional:       Appearance: He is well-developed  HENT:      Head: Normocephalic and atraumatic  Mouth/Throat:      Pharynx: No oropharyngeal exudate  Eyes:      Conjunctiva/sclera: Conjunctivae normal       Pupils: Pupils are equal, round, and reactive to light  Neck:      Musculoskeletal: Neck supple  Cardiovascular:      Rate and Rhythm: Normal rate and regular rhythm  Heart sounds: No murmur  Pulmonary:      Effort: Pulmonary effort is normal  No respiratory distress  Breath sounds: Normal breath sounds  Abdominal:      General: There is no distension  Palpations: Abdomen is soft  Tenderness: There is no abdominal tenderness  Skin:     General: Skin is warm and dry  Findings: No erythema  Neurological:      Mental Status: He is alert  Psychiatric:         Mood and Affect: Mood normal          The history was obtained from the review of the chart, patient  Lab Results:        Imaging Studies:        I have personally reviewed pertinent reports  Portions of the record may have been created with voice recognition software  Occasional wrong word or "sound a like" substitutions may have occurred due to the inherent limitations of voice recognition software  Read the chart carefully and recognize, using context, where substitutions have occurred

## 2021-03-29 ENCOUNTER — OFFICE VISIT (OUTPATIENT)
Dept: DIABETES SERVICES | Facility: CLINIC | Age: 61
End: 2021-03-29
Payer: COMMERCIAL

## 2021-03-29 VITALS — BODY MASS INDEX: 37.24 KG/M2 | WEIGHT: 223.8 LBS

## 2021-03-29 DIAGNOSIS — E11.9 TYPE 2 DIABETES MELLITUS WITHOUT COMPLICATION, WITHOUT LONG-TERM CURRENT USE OF INSULIN (HCC): Primary | ICD-10-CM

## 2021-03-29 PROCEDURE — 97802 MEDICAL NUTRITION INDIV IN: CPT | Performed by: DIETITIAN, REGISTERED

## 2021-03-29 NOTE — PATIENT INSTRUCTIONS
1  Consume 3 meals a day 4-5 hours apart  2  45-60 grams of carbohydrate per meal   3  Avoid consume juice unless having a low blood sugar

## 2021-03-29 NOTE — PROGRESS NOTES
Medical Nutrition Therapy        Assessment    Visit Type: Initial visit  Chief complaint/Medical Diagnosis/reason for visit E11 9 (I9ZH without complications, without insulin)    MERISSA Garcia was seen today for his initial MNT visit  Rishi reports skipping breakfast for the past two months secondary to loss of appetite  He states, "I have multiple health problems that may be affecting my appetite including liver disease " Problems identified in food recall include inconsistent carbohydrate intake at meals and meal skipping  Provided patient with carbohydrate parameters for a 1600 calorie meal plan to assist with consistency, balance and portion control  Encouraged the consumption of regular meals at regular times about 4-5 hours apart  Advised patient to keep carbohydrate intake to 45-60 grams per meal to assist with glycemic control  Rishi would benefit from avoiding all sweetened beverages including juice  Khmer portion booklet and food labels were used to teach basic carbohydrate counting  Follow-up TBD  RD will remain available for further dietary questions/concerns  Ht Readings from Last 1 Encounters:   03/18/21 5' 5" (1 651 m)     Wt Readings from Last 3 Encounters:   03/29/21 102 kg (223 lb 12 8 oz)   03/18/21 105 kg (231 lb 12 8 oz)   01/07/21 106 kg (233 lb)     Weight Change: Yes Weight gain of 10-15 pounds in the past year      Barriers to Learning: no barriers    Do you follow any special diet presently?: No  Who shops: patient  Who cooks: patient    Food Log: Completed via the method of food recall    Breakfast:SKIPS DAILY  Morning Snack:none  Lunch:11:00-12:00PM eggs, 1 cup boiled yucca, 4 ounces of passion fruit juice  Afternoon Snack: none  Dinner:5:00-6:00PM 2-3 taco shells with tuna and salami, OR 1 cup rice and beans, meat; water or 4 oz passion fruit juice  Evening Snack:none  Beverages: juice and water  Eating out/Take out:none  Exercise walk around the building he lives in (20-30 minutes) daily after each meal    Calorie needs 1600 kcals/day Carbs: 45-60g/meal        Nutrition Diagnosis:  Food and nutrition related knowledge deficit  related to Lack of prior exposure to accurate nutrition related information as evidenced by No prior knowledge of need for food and nutrition related recommendations    Intervention: label reading, behavior modification strategies, carbohydrate counting, meal timing, meal planning and monitoring portion control     Treatment Goals: Patient will consume 3 meals a day and Patient will count carbohydrates    Monitoring and evaluation:    Term code indicator  FH 1 3 2 Food Intake Criteria: Consume 3 meals a day 4-5 hours apart  Term code indicator  FH 1 6 3 Carbohydrate Intake Criteria: 45-60 grams of carbohydrate per meal     Materials Provided: Ghanaian portion booklet    Patients Response to Instruction:  Mer Martin  Expected Compliancegood    Start- Stop: 1:00-1:45  Total Minutes: 45 Minutes  Group or Individual Instruction: MNT-I  Other: Tamar Serra MD    Thank you for coming to the [facility] for education today  Please feel free to call with any questions or concerns      Nata De Oliveira  [address] [place] [phone]

## 2021-03-30 ENCOUNTER — IMMUNIZATIONS (OUTPATIENT)
Dept: FAMILY MEDICINE CLINIC | Facility: HOSPITAL | Age: 61
End: 2021-03-30

## 2021-03-30 DIAGNOSIS — Z23 ENCOUNTER FOR IMMUNIZATION: Primary | ICD-10-CM

## 2021-03-30 PROCEDURE — 0011A SARS-COV-2 / COVID-19 MRNA VACCINE (MODERNA) 100 MCG: CPT

## 2021-03-30 PROCEDURE — 91301 SARS-COV-2 / COVID-19 MRNA VACCINE (MODERNA) 100 MCG: CPT

## 2021-04-20 ENCOUNTER — HOSPITAL ENCOUNTER (OUTPATIENT)
Dept: MAMMOGRAPHY | Facility: CLINIC | Age: 61
Discharge: HOME | End: 2021-04-20
Payer: COMMERCIAL

## 2021-04-20 VITALS — HEIGHT: 65 IN | BODY MASS INDEX: 37.15 KG/M2 | WEIGHT: 223 LBS

## 2021-04-20 DIAGNOSIS — N62 GYNECOMASTIA: ICD-10-CM

## 2021-04-20 DIAGNOSIS — K74.60 ESOPHAGEAL VARICES IN CIRRHOSIS (HCC): ICD-10-CM

## 2021-04-20 DIAGNOSIS — I85.10 ESOPHAGEAL VARICES IN CIRRHOSIS (HCC): ICD-10-CM

## 2021-04-20 PROCEDURE — 76642 ULTRASOUND BREAST LIMITED: CPT

## 2021-04-20 PROCEDURE — 77066 DX MAMMO INCL CAD BI: CPT

## 2021-04-20 PROCEDURE — G0279 TOMOSYNTHESIS, MAMMO: HCPCS

## 2021-04-20 RX ORDER — NADOLOL 20 MG/1
20 TABLET ORAL DAILY
Qty: 90 TABLET | Refills: 3 | Status: SHIPPED | OUTPATIENT
Start: 2021-04-20 | End: 2021-12-29

## 2021-04-26 ENCOUNTER — IMMUNIZATIONS (OUTPATIENT)
Dept: FAMILY MEDICINE CLINIC | Facility: HOSPITAL | Age: 61
End: 2021-04-26

## 2021-04-26 DIAGNOSIS — Z23 ENCOUNTER FOR IMMUNIZATION: Primary | ICD-10-CM

## 2021-04-26 PROCEDURE — 91301 SARS-COV-2 / COVID-19 MRNA VACCINE (MODERNA) 100 MCG: CPT

## 2021-04-26 PROCEDURE — 0012A SARS-COV-2 / COVID-19 MRNA VACCINE (MODERNA) 100 MCG: CPT

## 2021-05-11 ENCOUNTER — APPOINTMENT (OUTPATIENT)
Dept: LAB | Facility: HOSPITAL | Age: 61
End: 2021-05-11
Payer: COMMERCIAL

## 2021-05-11 DIAGNOSIS — E11.9 TYPE 2 DIABETES MELLITUS WITHOUT COMPLICATION, WITHOUT LONG-TERM CURRENT USE OF INSULIN (HCC): ICD-10-CM

## 2021-05-11 DIAGNOSIS — E29.1 HYPOGONADISM IN MALE: ICD-10-CM

## 2021-05-11 LAB
ALBUMIN SERPL BCP-MCNC: 3.9 G/DL (ref 3–5.2)
ALP SERPL-CCNC: 129 U/L (ref 43–122)
ALT SERPL W P-5'-P-CCNC: 39 U/L
ANION GAP SERPL CALCULATED.3IONS-SCNC: 8 MMOL/L (ref 5–14)
AST SERPL W P-5'-P-CCNC: 49 U/L (ref 17–59)
BILIRUB SERPL-MCNC: 0.73 MG/DL
BUN SERPL-MCNC: 10 MG/DL (ref 5–25)
CALCIUM SERPL-MCNC: 9.5 MG/DL (ref 8.4–10.2)
CHLORIDE SERPL-SCNC: 101 MMOL/L (ref 97–108)
CHOLEST SERPL-MCNC: 70 MG/DL
CO2 SERPL-SCNC: 31 MMOL/L (ref 22–30)
CREAT SERPL-MCNC: 0.8 MG/DL (ref 0.7–1.5)
CREAT UR-MCNC: 181 MG/DL
EST. AVERAGE GLUCOSE BLD GHB EST-MCNC: 180 MG/DL
FSH SERPL-ACNC: 6.2 MIU/ML (ref 0.7–10.8)
GFR SERPL CREATININE-BSD FRML MDRD: 111 ML/MIN/1.73SQ M
GLUCOSE P FAST SERPL-MCNC: 128 MG/DL (ref 70–99)
HBA1C MFR BLD: 7.9 %
HDLC SERPL-MCNC: 37 MG/DL
LDLC SERPL CALC-MCNC: 18 MG/DL
LH SERPL-ACNC: 3.9 MIU/ML (ref 1.2–10.6)
MICROALBUMIN UR-MCNC: 8 MG/L (ref 0–20)
MICROALBUMIN/CREAT 24H UR: 4 MG/G CREATININE (ref 0–30)
POTASSIUM SERPL-SCNC: 4.5 MMOL/L (ref 3.6–5)
PROT SERPL-MCNC: 8 G/DL (ref 5.9–8.4)
SODIUM SERPL-SCNC: 140 MMOL/L (ref 137–147)
TRIGL SERPL-MCNC: 77 MG/DL

## 2021-05-11 PROCEDURE — 84403 ASSAY OF TOTAL TESTOSTERONE: CPT

## 2021-05-11 PROCEDURE — 80061 LIPID PANEL: CPT

## 2021-05-11 PROCEDURE — 3051F HG A1C>EQUAL 7.0%<8.0%: CPT | Performed by: FAMILY MEDICINE

## 2021-05-11 PROCEDURE — 36415 COLL VENOUS BLD VENIPUNCTURE: CPT

## 2021-05-11 PROCEDURE — 83036 HEMOGLOBIN GLYCOSYLATED A1C: CPT

## 2021-05-11 PROCEDURE — 84270 ASSAY OF SEX HORMONE GLOBUL: CPT

## 2021-05-11 PROCEDURE — 84402 ASSAY OF FREE TESTOSTERONE: CPT

## 2021-05-11 PROCEDURE — 82043 UR ALBUMIN QUANTITATIVE: CPT

## 2021-05-11 PROCEDURE — 82570 ASSAY OF URINE CREATININE: CPT

## 2021-05-11 PROCEDURE — 83002 ASSAY OF GONADOTROPIN (LH): CPT

## 2021-05-11 PROCEDURE — 80053 COMPREHEN METABOLIC PANEL: CPT

## 2021-05-11 PROCEDURE — 83001 ASSAY OF GONADOTROPIN (FSH): CPT

## 2021-05-12 LAB
SHBG SERPL-SCNC: 43.6 NMOL/L (ref 19.3–76.4)
TESTOST FREE SERPL-MCNC: 2.6 PG/ML (ref 6.6–18.1)
TESTOST SERPL-MCNC: 145 NG/DL (ref 264–916)

## 2021-05-14 DIAGNOSIS — R06.83 SNORING: Primary | ICD-10-CM

## 2021-05-20 ENCOUNTER — OFFICE VISIT (OUTPATIENT)
Dept: ENDOCRINOLOGY | Facility: CLINIC | Age: 61
End: 2021-05-20
Payer: COMMERCIAL

## 2021-05-20 VITALS
BODY MASS INDEX: 36.32 KG/M2 | HEART RATE: 81 BPM | SYSTOLIC BLOOD PRESSURE: 134 MMHG | WEIGHT: 218 LBS | DIASTOLIC BLOOD PRESSURE: 76 MMHG | HEIGHT: 65 IN

## 2021-05-20 DIAGNOSIS — E29.1 SECONDARY MALE HYPOGONADISM: Primary | ICD-10-CM

## 2021-05-20 DIAGNOSIS — E66.01 OBESITY, MORBID (HCC): ICD-10-CM

## 2021-05-20 DIAGNOSIS — E11.65 TYPE 2 DIABETES MELLITUS WITH HYPERGLYCEMIA, WITHOUT LONG-TERM CURRENT USE OF INSULIN (HCC): ICD-10-CM

## 2021-05-20 DIAGNOSIS — E29.1 HYPOGONADISM IN MALE: ICD-10-CM

## 2021-05-20 DIAGNOSIS — F11.90 OPIOID USE DISORDER: ICD-10-CM

## 2021-05-20 DIAGNOSIS — I10 BENIGN ESSENTIAL HYPERTENSION: ICD-10-CM

## 2021-05-20 PROCEDURE — 99214 OFFICE O/P EST MOD 30 MIN: CPT | Performed by: INTERNAL MEDICINE

## 2021-05-20 RX ORDER — TESTOSTERONE CYPIONATE 100 MG/ML
100 INJECTION, SOLUTION INTRAMUSCULAR
Qty: 12 ML | Refills: 1 | Status: SHIPPED | OUTPATIENT
Start: 2021-05-20 | End: 2021-07-07 | Stop reason: SDUPTHER

## 2021-05-20 NOTE — PROGRESS NOTES
Stiven Dailey 63 y o  male MRN: [id number]    Encounter: [id number]      Assessment/Plan     Assessment: This is a 63 y o  male with hypogonadism, diabetes mellitus type 2,  hypertension, hyperlipidemia  Plan:    Diagnoses and all orders for this visit:    Secondary male hypogonadism - due to obesity, chronic opioid use, possible obstructive sleep apnea  -  Patient with very low total testosterone less than 200, ordered MRI pituitary of the brain  I restarted patient on Depo testosterone 100 mg every 7 days  I asked patient to have testosterone lab work done in 6 weeks in the middle of his injection cycle, in the morning between 7-9 a m   -     Testosterone, free, total Lab Collect; Future  -     MRI brain pituitary wo and w contrast; Future  -     Testosterone, free, total- Lab Collect; Future  -     testosterone cypionate (DEPO-TESTOSTERONE) 100 mg/mL IM injection; Inject 1 mL (100 mg total) into a muscle every 7 days for 12 doses    Type 2 diabetes mellitus with hyperglycemia, without long-term current use of insulin (HCC) -  Most recent A1c 7 9%, increase metformin to 1000 mg twice a day  -     metFORMIN (GLUCOPHAGE) 1000 MG tablet; Take 1 tablet (1,000 mg total) by mouth 2 (two) times a day with meals  -     HEMOGLOBIN A1C W/ EAG ESTIMATION Lab Collect; Future  -     Comprehensive metabolic panel Lab Collect; Future    Hypogonadism in male  -     testosterone cypionate (DEPO-TESTOSTERONE) 100 mg/mL IM injection; Inject 1 mL (100 mg total) into a muscle every 7 days for 12 doses    Benign essential hypertension -  Blood pressure well controlled, continue current regimen    Opioid use disorder ([facility] ) -  I discussed with patient importance of weaning him off of methadone gradually to help to increase testosterone level  He will discuss this further with his addiction medicine physician      Obesity, morbid ([facility] ) -  Advised  Diet and exercise, loose weight will help him to increase testosterone level    CC: hypogonadism, diabetes mellitus type 2      History of Present Illness     HPI:  This is a 63 y o  male diagnosed with hypogonadism in 2019  Initially saw Dr Kellie Hussein  He was on testosterone injections 200 mcg every 2 weeks, then 100 mg weekly  He came to our office when he was off of testosterone  Lab work up:  Component      Latest Ref Rng & Units 5/11/2021   TESTOSTERONE FREE      6 6 - 18 1 pg/mL 2 6 (L)   Testosterone, Total, LC/MS      264 - 916 ng/dL 145 (L)   LUTEINIZING HORMONE      1 2 - 10 6 mIU/mL 3 9   FSH, POC      0 7 - 10 8 mIU/mL 6 2   SEX HORMONE BINDING GLOBULIN      19 3 - 76 4 nmol/L 43 6     Patient is scheduled for RODRICK evaluation on 5/25  He has history of chronic methadone use  He also has history of hydrocele or varicocele in left testes first diagnosed about 2 years ago  He has obesity and lost 5 lb in the past few months  Patient says when on testosterone he had erection 2x weekly, better libido, no change in the mood or muscle strength  He wants to go back on testosterone, prefers injections  Diabetes first diagnosed in June 2014  He restarted metformin on 1/2021 when his blood work showed A1C 8 6%  Complications: neuropathy  He is currently taking 1000 mg of metformin daily  He saw nutritionist  He lost 5 lb since 3/2021  He follows up with podiatry  He saw ophthalmology in 2020  Regarding HLP - lipitor  Regarding HTN - nadolol      Review of Systems   Constitutional: Negative for fatigue and unexpected weight change  HENT: Negative for congestion, postnasal drip and sore throat  Eyes: Negative for pain and redness  Respiratory: Negative for cough, chest tightness, shortness of breath and wheezing  Cardiovascular: Negative for chest pain, palpitations and leg swelling  Gastrointestinal: Negative for abdominal pain, constipation, diarrhea, nausea and vomiting  Endocrine: Negative for polydipsia and polyuria  Genitourinary: Negative for dysuria  Musculoskeletal: Negative for arthralgias and back pain  Neurological: Negative for dizziness, light-headedness and headaches  Psychiatric/Behavioral: Negative for agitation  The patient is not nervous/anxious  All other systems reviewed and are negative        Historical Information   Past Medical History:   Diagnosis Date    Colon polyp     Diabetes mellitus ([facility] )     Esophageal varices (HCC)     Gastritis     Hepatitis C     History of kidney stones     Hyperlipidemia     Hypertension     Kidney stone     Pneumonia     PPD positive 2020    Substance abuse ([facility] )      Past Surgical History:   Procedure Laterality Date    COLONOSCOPY  06/20/2014    dr padilla    COLONOSCOPY  2014    Dr Padilla    EGD AND COLONOSCOPY  08/2020    esophageal varices, colon polyp, hemorrhoids    ESOPHAGOGASTRODUODENOSCOPY  12/10/2015    esophageal varices, cirrhosis, gastritis    HYDROCELE EXCISION / REPAIR      LIVER BIOPSY  2014     Social History   Social History     Substance and Sexual Activity   Alcohol Use Not Currently    Alcohol/week: 0 0 standard drinks    Frequency: Never    Binge frequency: Never     Social History     Substance and Sexual Activity   Drug Use Not Currently    Types: Heroin    Comment: former user- heroin was drug of choice     Social History     Tobacco Use   Smoking Status Current Every Day Smoker    Packs/day: 1 00    Types: Cigarettes   Smokeless Tobacco Never Used   Tobacco Comment    TOBACCO USE     Family History:   Family History   Problem Relation Age of Onset    Diabetes type II Mother         MELLITUS    Hypertension Mother     Hypertension Sister     Diabetes Sister         MELLITUS    No Known Problems Maternal Grandmother     No Known Problems Maternal Grandfather     No Known Problems Paternal Grandmother     No Known Problems Paternal Grandfather     No Known Problems Sister     Breast cancer Maternal Aunt         45s    No Known Problems Maternal Aunt     No Known Problems Paternal Aunt     No Known Problems Paternal Aunt     No Known Problems Paternal Aunt     No Known Problems Paternal Aunt     No Known Problems Paternal Aunt     No Known Problems Paternal Aunt        Meds/Allergies   Current Outpatient Medications   Medication Sig Dispense Refill    atorvastatin (LIPITOR) 40 mg tablet Take 1 tablet (40 mg total) by mouth daily 90 tablet 3    methadone (DOLOPHINE) 10 mg/mL oral concentrated solution Take 44 mg by mouth daily      nadolol (CORGARD) 20 mg tablet Take 1 tablet (20 mg total) by mouth daily 90 tablet 3    metFORMIN (GLUCOPHAGE) 500 mg tablet Take 1 tablet (500 mg total) by mouth 2 (two) times a day with meals   60 tablet 5    Syringe/Needle, Disp, (SYRINGE 3CC/78NX0-1/2") 21G X 1-1/2" 3 ML MISC Use every 7 days (Patient not taking: Reported on 3/18/2021) 12 each 0    testosterone cypionate (DEPO-TESTOSTERONE) 100 mg/mL IM injection Inject 1 mL (100 mg total) into a muscle every 7 days for 12 doses (Patient not taking: Reported on 3/18/2021) 12 mL 3     No current facility-administered medications for this visit  Allergies   Allergen Reactions    Meperidine Abdominal Pain       Objective   Vitals: Blood pressure 134/76, pulse 81, height 5' 5" (1 651 m), weight 98 9 kg (218 lb)  Physical Exam  Constitutional:       Appearance: He is well-developed  HENT:      Head: Normocephalic and atraumatic  Mouth/Throat:      Pharynx: No oropharyngeal exudate  Eyes:      Conjunctiva/sclera: Conjunctivae normal       Pupils: Pupils are equal, round, and reactive to light  Neck:      Musculoskeletal: Neck supple  Cardiovascular:      Rate and Rhythm: Normal rate and regular rhythm  Heart sounds: No murmur  Pulmonary:      Effort: Pulmonary effort is normal  No respiratory distress  Breath sounds: Normal breath sounds  Abdominal:      General: There is no distension  Palpations: Abdomen is soft  Tenderness:  There is no abdominal tenderness  Skin:     General: Skin is warm and dry  Findings: No erythema  Neurological:      Mental Status: He is alert  Psychiatric:         Mood and Affect: Mood normal          The history was obtained from the review of the chart, patient  Lab Results:        Imaging Studies:        I have personally reviewed pertinent reports  Portions of the record may have been created with voice recognition software  Occasional wrong word or "sound a like" substitutions may have occurred due to the inherent limitations of voice recognition software  Read the chart carefully and recognize, using context, where substitutions have occurred

## 2021-05-21 ENCOUNTER — TELEPHONE (OUTPATIENT)
Dept: ENDOCRINOLOGY | Facility: CLINIC | Age: 61
End: 2021-05-21

## 2021-05-21 NOTE — TELEPHONE ENCOUNTER
Received a request form [facility] requesting PA for Depo Testosterone vial   PA sent to Pt's prescription plan via cover my med  Maia Jamey ([place]ider: [id number])Need help? Call us at [phone]  Status  Sent to [place]  [employer]  The plan will fax you a determination, typically within 1 to 5 business days  How do I follow up?   Drug  Depo-Testosterone 100MG/ML solution  Form  [employer] Medicare Coverage Determination Form  Prior Authorization Form for Medicare Members  [phone]

## 2021-05-24 DIAGNOSIS — E29.1 HYPOGONADISM IN MALE: ICD-10-CM

## 2021-05-24 RX ORDER — SYRINGE W-NEEDLE,DISPOSAB,3 ML 25GX5/8"
SYRINGE, EMPTY DISPOSABLE MISCELLANEOUS
Qty: 12 EACH | Refills: 3 | Status: SHIPPED | OUTPATIENT
Start: 2021-05-24 | End: 2021-08-26 | Stop reason: SDUPTHER

## 2021-05-25 ENCOUNTER — OFFICE VISIT (OUTPATIENT)
Dept: SLEEP CENTER | Facility: CLINIC | Age: 61
End: 2021-05-25
Payer: COMMERCIAL

## 2021-05-25 VITALS
HEIGHT: 65 IN | SYSTOLIC BLOOD PRESSURE: 110 MMHG | HEART RATE: 80 BPM | DIASTOLIC BLOOD PRESSURE: 76 MMHG | BODY MASS INDEX: 35.69 KG/M2 | WEIGHT: 214.2 LBS

## 2021-05-25 DIAGNOSIS — F17.200 TOBACCO USE DISORDER: ICD-10-CM

## 2021-05-25 DIAGNOSIS — F11.90 OPIOID USE DISORDER: ICD-10-CM

## 2021-05-25 DIAGNOSIS — R06.83 SNORING: ICD-10-CM

## 2021-05-25 DIAGNOSIS — F11.20 METHADONE MAINTENANCE THERAPY PATIENT (HCC): ICD-10-CM

## 2021-05-25 DIAGNOSIS — K74.60 CIRRHOSIS OF LIVER WITHOUT ASCITES, UNSPECIFIED HEPATIC CIRRHOSIS TYPE (HCC): ICD-10-CM

## 2021-05-25 DIAGNOSIS — I10 BENIGN ESSENTIAL HYPERTENSION: ICD-10-CM

## 2021-05-25 DIAGNOSIS — I85.10 SECONDARY ESOPHAGEAL VARICES WITHOUT BLEEDING (HCC): ICD-10-CM

## 2021-05-25 DIAGNOSIS — G47.33 OSA (OBSTRUCTIVE SLEEP APNEA): Primary | ICD-10-CM

## 2021-05-25 DIAGNOSIS — E66.9 OBESITY (BMI 30-39.9): ICD-10-CM

## 2021-05-25 DIAGNOSIS — E29.1 HYPOGONADISM IN MALE: ICD-10-CM

## 2021-05-25 DIAGNOSIS — E11.9 TYPE 2 DIABETES MELLITUS WITHOUT COMPLICATION, WITHOUT LONG-TERM CURRENT USE OF INSULIN (HCC): ICD-10-CM

## 2021-05-25 PROCEDURE — 3078F DIAST BP <80 MM HG: CPT | Performed by: INTERNAL MEDICINE

## 2021-05-25 PROCEDURE — 99204 OFFICE O/P NEW MOD 45 MIN: CPT | Performed by: INTERNAL MEDICINE

## 2021-05-25 PROCEDURE — 3074F SYST BP LT 130 MM HG: CPT | Performed by: INTERNAL MEDICINE

## 2021-05-25 NOTE — PROGRESS NOTES
Review of Systems      Genitourinary difficulty with erection   Cardiology ankle/leg swelling   Gastrointestinal none   Neurology none   Constitutional weight change   Integumentary none   Psychiatry none   Musculoskeletal back pain and leg cramps   Pulmonary snoring   ENT none   Endocrine none   Hematological none

## 2021-05-25 NOTE — PATIENT INSTRUCTIONS
What is RODRICK? Obstructive sleep apnea is a common and serious sleep disorder that causes you to stop breathing during sleep  The airway repeatedly becomes blocked, limiting the amount of air that reaches your lungs  When this happens, you may snore loudly or making choking noises as you try to breathe  Your brain and body becomes oxygen deprived and you may wake up  This may happen a few times a night, or in more severe cases, several hundred times a night  Sleep apnea can make you wake up in the morning feeling tired or unrefreshed even though you have had a full night of sleep  During the day, you may feel fatigued, have difficulty concentrating or you may even unintentionally fall asleep  This is because your body is waking up numerous times throughout the night, even though you might not be conscious of each awakening  The lack of oxygen your body receives can have negative long-term consequences for your health  This includes:  High blood pressure  Heart disease  Irregular heart rhythms  Stroke  Pre-diabetes and diabetes  Depression    Testing  An objective evaluation of your sleep may be needed before your board certified sleep physician can make a diagnosis  Options include:   In-lab overnight sleep study  This type of sleep study requires you to stay overnight at a sleep center, in a bed that may resemble a hotel room  You will sleep with sensors hooked up to various parts of your body  These sensors record your brain waves, heartbeat, breathing and movement  An overnight sleep study also provides your doctor with the most complete information about your sleep  Learn more about an overnight sleep study      Home sleep apnea test  Some patients with high risk factors for obstructive sleep apnea and no other medical disorders may be candidates for a home sleep apnea test  The testing equipment differs in that it is less complicated than what is used in an overnight sleep study   As such, does not give all the data an in-lab will and if negative, may not mean you do not have the problem  Treatment for sleep apnea  includes using a continuous positive airway pressure (CPAP) machine to keep your airway open during sleep  A mask is placed over your nose and mouth, or just your nose  The mask is hooked to the CPAP machine that blows a gentle stream of air into the mask when you breathe  This helps keep your airway open so you can breathe more regularly  Extra oxygen may be given to you through the machine  You may be given a mouth device  It looks like a mouth guard or dental retainer and stops your tongue and mouth tissues from blocking your throat while you sleep  Surgery may be needed to remove extra tissues that block your mouth, throat, or nose  Manage sleep apnea:   Do not smoke  Nicotine and other chemicals in cigarettes and cigars can cause lung damage  Ask your healthcare provider for information if you currently smoke and need help to quit  E-cigarettes or smokeless tobacco still contain nicotine  Talk to your healthcare provider before you use these products  Do not drink alcohol or take sedative medicine before you go to sleep  Alcohol and sedatives can relax the muscles and tissues around your throat  This can block the airflow to your lungs  Maintain a healthy weight  Excess tissue around your throat may restrict your breathing  Ask your healthcare provider for information if you need to lose weight  Sleep on your side or use pillows designed to prevent sleep apnea  This prevents your tongue or other tissues from blocking your throat  You can also raise the head of your bed  Driving Safety  Refrain from driving when drowsy  Follow up with your healthcare provider as directed:  Write down your questions so you remember to ask them during your visits  Go to AASM website for more information: Sleepeducation  org     What is RODRICK?    Obstructive sleep apnea is a common and serious sleep disorder that causes you to stop breathing during sleep  The airway repeatedly becomes blocked, limiting the amount of air that reaches your lungs  When this happens, you may snore loudly or making choking noises as you try to breathe  Your brain and body becomes oxygen deprived and you may wake up  This may happen a few times a night, or in more severe cases, several hundred times a night  Sleep apnea can make you wake up in the morning feeling tired or unrefreshed even though you have had a full night of sleep  During the day, you may feel fatigued, have difficulty concentrating or you may even unintentionally fall asleep  This is because your body is waking up numerous times throughout the night, even though you might not be conscious of each awakening  The lack of oxygen your body receives can have negative long-term consequences for your health  This includes:  High blood pressure  Heart disease  Irregular heart rhythms  Stroke  Pre-diabetes and diabetes  Depression    Testing  An objective evaluation of your sleep may be needed before your board certified sleep physician can make a diagnosis  Options include:   In-lab overnight sleep study  This type of sleep study requires you to stay overnight at a sleep center, in a bed that may resemble a hotel room  You will sleep with sensors hooked up to various parts of your body  These sensors record your brain waves, heartbeat, breathing and movement  An overnight sleep study also provides your doctor with the most complete information about your sleep  Learn more about an overnight sleep study      Home sleep apnea test  Some patients with high risk factors for obstructive sleep apnea and no other medical disorders may be candidates for a home sleep apnea test  The testing equipment differs in that it is less complicated than what is used in an overnight sleep study   As such, does not give all the data an in-lab will and if negative, may not mean you do not have the problem  Treatment for sleep apnea  includes using a continuous positive airway pressure (CPAP) machine to keep your airway open during sleep  A mask is placed over your nose and mouth, or just your nose  The mask is hooked to the CPAP machine that blows a gentle stream of air into the mask when you breathe  This helps keep your airway open so you can breathe more regularly  Extra oxygen may be given to you through the machine  You may be given a mouth device  It looks like a mouth guard or dental retainer and stops your tongue and mouth tissues from blocking your throat while you sleep  Surgery may be needed to remove extra tissues that block your mouth, throat, or nose  Manage sleep apnea:   Do not smoke  Nicotine and other chemicals in cigarettes and cigars can cause lung damage  Ask your healthcare provider for information if you currently smoke and need help to quit  E-cigarettes or smokeless tobacco still contain nicotine  Talk to your healthcare provider before you use these products  Do not drink alcohol or take sedative medicine before you go to sleep  Alcohol and sedatives can relax the muscles and tissues around your throat  This can block the airflow to your lungs  Maintain a healthy weight  Excess tissue around your throat may restrict your breathing  Ask your healthcare provider for information if you need to lose weight  Sleep on your side or use pillows designed to prevent sleep apnea  This prevents your tongue or other tissues from blocking your throat  You can also raise the head of your bed  Driving Safety  Refrain from driving when drowsy  Follow up with your healthcare provider as directed:  Write down your questions so you remember to ask them during your visits  Go to AAS website for more information: Sleepeducation  org           Nursing Support:  When: Monday through Friday 7A-5PM except holidays  Where: Our direct line is [phone]      If you are having a true emergency please call 911  In the event that the line is busy or it is after hours please leave a voice message and we will return your call  Please speak clearly, leaving your full name, birth date, best number to reach you and the reason for your call  Medication refills: We will need the name of the medication, the dosage, the ordering provider, whether you get a 30 or 90 day refill, and the pharmacy name and address  Medications will be ordered by the provider only  Nurses cannot call in prescriptions  Please allow 7 days for medication refills  Physician requested updates: If your provider requested that you call with an update after starting medication, please be ready to provide us the medication and dosage, what time you take your medication, the time you attempt to fall asleep, time you fall asleep, when you wake up, and what time you get out of bed  Sleep Study Results: We will contact you with sleep study results and/or next steps after the physician has reviewed your testing

## 2021-05-25 NOTE — PROGRESS NOTES
Consultation - [address] [facility]  63 y o  male  :1960  HGE:[id number]    Physician Requesting Consult: Charity León MD     Reason for Consult : At your kind request I saw Stiven Dailey for initial sleep evaluation today  The patient is here to evaluate for suspected Obstructive Sleep Apnea  PFSH, Problem List, Medications & Allergies were reviewed in EMR  Charly Anthony  has a past medical history of Colon polyp, Diabetes mellitus ([facility] ), Esophageal varices ([facility] ), Gastritis, Hepatitis C, History of kidney stones, Hyperlipidemia, Hypertension, Kidney stone, Pneumonia, PPD positive (), and Substance abuse ([facility] )  He has a current medication list which includes the following prescription(s): atorvastatin, metformin, methadone, nadolol, syringe 3cc/44pt8-4/2", testosterone cypionate, and metformin  HPI:  He is unsure why he is here  He volunteered no sleep complaints  However,  Family report loud snoring that he is not aware of  There is no report of breathing difficulties during sleep  Other Complaints: none  Restless Leg Syndrome: reports no suggestive symptoms    Parasomnia: no features reported    Sleep Routine (on average):   Typical Bedtime:  10:00 p m  Gets OOB:  7:00 a m  TIB:9 hrs  Sleep latency:<  45 minutes Sleep Interruptions:1-2/nite because of nocturia and is able to fall back asleep  Awakens: spontaneously  Upon awakening: feels refreshed  Charly Anthony denies Excessive Daytime Sleepiness or napping and rated himself at Total score: 4 /24 on the [place] Sleepiness Scale  Habits:  reports that he has been smoking cigarettes  He has been smoking about 1 00 pack per day  He has never used smokeless tobacco  ;   E-Cigarette/Vaping    E-Cigarette Use Never User     ;  reports previous drug use  Drug: Heroin ;  reports previous alcohol use  ; Caffeine use:limited ; Exercise routine: regular through walking  Family History: Negative for sleep disturbance    ROS - see attached  Significant for approximately 15 lb intentional weight loss in the past year  Abel Lapel EXAM:  /76   Pulse 80   Ht 5' 5" (1 651 m)   Wt 97 2 kg (214 lb 3 2 oz)   BMI 35 64 kg/m²    General: Well groomed male, well appearing, in no apparent distress  Neurological: Alert and orientated;  cooperative; Cranial nerves intact;    Psychiatric: Speech:clear and coherent;  Normal mood, affect & thought   Skin: warm and dry; Color& Hydration good; no facial rashes or lesions   HEENT:  Craniofacial anatomy: retrognathia Sinuses: non- tender  TMJ: Normal    Eyes: EOM's intact;  conjunctiva/corneas clear   Ears: Externallyappear normal     Nasal Airway: assymetric nares Septum:  Deviated; Mucosa: normal; Turbinates: normal; Rhinorrhea: None   Mouth: Lips: normal posture; Dentition: edentulous  Mucosa:moist  ; Hard Palate:normal    Oropharryx: crowdedTongue: Mallampati:Class III and MobileSoft Palate:  redundant  Tonsils: 1+  and cryptic  Neck: Neck Circumference: 14 5 "; Supple; no abnormal masses; Thyroid:normal  Trachea:central     Lymph: No Cervical or Submandibular Lymhadenopathy  Heart: S1,S2 normal; RRR; no gallop; no murmurs   Lungs: Respiratory Effort:normal  Air entry good bilaterally  No wheezes  No rales  Abdomen: Obese, Soft & non-tender    Extremities: No pedal edema  No clubbing or cyanosis  Musculoskeletal:  Motor normal; Gait:normal        IMPRESSION: Primary/Secondary Sleep Diagnoses (to Medical or Psych conditions) & Comorbidities   1  RODRICK (obstructive sleep apnea)  Diagnostic Sleep Study    CPAP Study   2  Snoring  Ambulatory referral to Sleep Medicine    Diagnostic Sleep Study   3  Obesity (BMI 30-39  9)  Diagnostic Sleep Study   4  Opioid use disorder ([facility] )  Diagnostic Sleep Study   5  Methadone maintenance therapy patient ([facility] )     6  Tobacco use disorder     7  Benign essential hypertension     8  Hypogonadism in male     5   Cirrhosis of liver without ascites, unspecified hepatic cirrhosis type ([facility] )     10  Secondary esophageal varices without bleeding (HCC)     11  Type 2 diabetes mellitus without complication, without long-term current use of insulin (HCC)          PLAN:   With respect to above conditions, comprehensive counseling provided on pathophysiology; effects on symptoms and comorbidities, diagnostic strategies & limitations; treatment options; risks or no treament; risks & benefits of the various therapeutic options; costs and insurance aspects  He is at risk for sleep disordered breathing based on he snoring and multiple other risk factors  I advised weight reduction, avoiding sleeping supine, using alcohol or sedating medications close to bed time and on safe driving practices  Nocturnal polysomnography is indicated and a diagnostic study will be scheduled  Patient is willing to try Positive airway pressure therapy and will be scheduled for a titration study if indicated  I advised smoking cessation and he is working at it  Follow-up to be scheduled after the studies to review results, further details of treatment options and to initiate/adjust therapy      Sincerely,     Authenticated electronically by Dutch Olvera MD   on 25/69/80   Board Certified Specialist

## 2021-05-27 ENCOUNTER — HOSPITAL ENCOUNTER (OUTPATIENT)
Dept: SLEEP CENTER | Facility: CLINIC | Age: 61
Discharge: HOME | End: 2021-05-27
Payer: COMMERCIAL

## 2021-05-27 DIAGNOSIS — E66.9 OBESITY (BMI 30-39.9): ICD-10-CM

## 2021-05-27 DIAGNOSIS — F11.90 OPIOID USE DISORDER: ICD-10-CM

## 2021-05-27 DIAGNOSIS — R06.83 SNORING: ICD-10-CM

## 2021-05-27 DIAGNOSIS — G47.33 OSA (OBSTRUCTIVE SLEEP APNEA): ICD-10-CM

## 2021-05-27 PROCEDURE — 95810 POLYSOM 6/> YRS 4/> PARAM: CPT

## 2021-05-28 NOTE — PROGRESS NOTES
Sleep Study Documentation    Pre-Sleep Study       Sleep testing procedure explained to patient:NO    Patient napped prior to study:NO    Caffeine:Dayshift worker after 12PM   Caffeine use:NO    Alcohol:Dayshift workers after 5PM: Alcohol use:NO    Typical day for patient:YES       Study Documentation    Sleep Study Indications: The patient is here for snoring and a BMI>30  Sleep Study: Diagnostic   Snore: Moderate  Supplemental O2: no    Minimum SaO2 85  Baseline SaO2 89            EKG abnormalities: no     EEG abnormalities: no    Sleep Study Recorded < 2 hours: N/A    Sleep Study Recorded > 2 hours but incomplete study: N/A    Sleep Study Recorded 6 hours but no sleep obtained: NO          Post-Sleep Study    Medication used at bedtime or during sleep study:YES other prescription medications    Patient reports time it took to fall asleep:20 to 30 minutes    Patient reports waking up during study:1 to 2 times  Patient reports returning to sleep in 10 to 30 minutes  Patient reports sleeping 6 to 8 hours without dreaming  Patient reports sleep during study:typical    Patient rated sleepiness: Not sleepy or tired    PAP treatment:no

## 2021-06-01 ENCOUNTER — OFFICE VISIT (OUTPATIENT)
Dept: FAMILY MEDICINE CLINIC | Facility: CLINIC | Age: 61
End: 2021-06-01
Payer: COMMERCIAL

## 2021-06-01 VITALS
RESPIRATION RATE: 16 BRPM | HEIGHT: 65 IN | WEIGHT: 216 LBS | OXYGEN SATURATION: 96 % | DIASTOLIC BLOOD PRESSURE: 80 MMHG | SYSTOLIC BLOOD PRESSURE: 126 MMHG | TEMPERATURE: 98 F | BODY MASS INDEX: 35.99 KG/M2 | HEART RATE: 84 BPM

## 2021-06-01 DIAGNOSIS — E66.9 OBESITY (BMI 35.0-39.9 WITHOUT COMORBIDITY): ICD-10-CM

## 2021-06-01 DIAGNOSIS — E11.9 TYPE 2 DIABETES MELLITUS WITHOUT COMPLICATION, WITHOUT LONG-TERM CURRENT USE OF INSULIN (HCC): Primary | ICD-10-CM

## 2021-06-01 DIAGNOSIS — R30.0 DYSURIA: ICD-10-CM

## 2021-06-01 DIAGNOSIS — F17.200 CURRENT EVERY DAY SMOKER: ICD-10-CM

## 2021-06-01 LAB
SL AMB  POCT GLUCOSE, UA: ABNORMAL
SL AMB LEUKOCYTE ESTERASE,UA: ABNORMAL
SL AMB POCT BILIRUBIN,UA: ABNORMAL
SL AMB POCT BLOOD,UA: ABNORMAL
SL AMB POCT CLARITY,UA: CLEAR
SL AMB POCT COLOR,UA: ABNORMAL
SL AMB POCT KETONES,UA: ABNORMAL
SL AMB POCT NITRITE,UA: ABNORMAL
SL AMB POCT PH,UA: 6
SL AMB POCT SPECIFIC GRAVITY,UA: 1.02
SL AMB POCT URINE PROTEIN: ABNORMAL
SL AMB POCT UROBILINOGEN: 0.2

## 2021-06-01 PROCEDURE — 99215 OFFICE O/P EST HI 40 MIN: CPT | Performed by: FAMILY MEDICINE

## 2021-06-01 PROCEDURE — 3061F NEG MICROALBUMINURIA REV: CPT | Performed by: FAMILY MEDICINE

## 2021-06-01 PROCEDURE — 81002 URINALYSIS NONAUTO W/O SCOPE: CPT | Performed by: FAMILY MEDICINE

## 2021-06-01 PROCEDURE — 3008F BODY MASS INDEX DOCD: CPT | Performed by: FAMILY MEDICINE

## 2021-06-01 PROCEDURE — 3725F SCREEN DEPRESSION PERFORMED: CPT | Performed by: FAMILY MEDICINE

## 2021-06-01 NOTE — PROGRESS NOTES
Assessment/Plan:  The main concern today is the fact the patient is still smoking despite multiple attempts smoking cessations  He states used to smoke two pack per day  He has a history of 30 pack year smoker  He does not have signs of cancer  He needs to have a screening lung cancer due to  Current smoking  he has liver cirrhosis that remains stable  He has diabetes mellitus type 2 currently on metformin only with the last hemoglobin A1c of 7 6  Patient understands the importance of following low carb diet and exercise  He also is obese patient with BMI of 35 94 which is more obvious in the center of the body, statistically putting him and high risk for coronary artery disease  He does not have any symptoms to stress him at this time  He is in chronic replacement of testosterone with 100 mg weekly  He understands checking testosterone levels fasting fashion three days after injection  Today a check his prostate which was normal   His testicle were normal size  I explained to patient that I do not have any criteria to do a MRI of the brain since no other brain tropics factors of hormones abnormalities  No problem-specific Assessment & Plan notes found for this encounter  Diagnoses and all orders for this visit:    Type 2 diabetes mellitus without complication, without long-term current use of insulin ([facility] [address] )  -     Ambulatory referral to Ophthalmology; Future    Obesity (BMI 35 0-39 9 without comorbidity)    Current every day smoker  -     CT lung screening program; Future    Dysuria  -     POCT urine dip          Subjective:      Patient ID: Taty Gray is a 63 y o  male  Patient is here to follow Diabetes and HTN, patient states good compliance with treatment  Denies chest pain, shortness of breath, angina, urinary problems  No exercise but follows low salt and low carbohydrates diet       Lab Results       Component                Value               Date/Time HGBA1C                   7 9 (H)             05/11/2021 07:58 AM        HGBA1C                   5 5                 05/24/2018 08:36 AM        MICROALBCRE              4                   05/11/2021 07:58 AM        MICROALBCRE              6 3                 05/24/2018 08:36 AM        CHOLESTEROL              70                  05/11/2021 07:58 AM        LDLCALC                  18                  05/11/2021 07:58 AM        LDLCALC                  45                  05/24/2018 08:36 AM        TRIG                     77                  05/11/2021 07:58 AM        TRIG                     134                 05/24/2018 08:36 AM        CREATININE               0 80                05/11/2021 07:58 AM        EGFR                     111                 05/11/2021 07:58 AM     atorvastatin  metFORMIN  methadone  nadolol  SYRINGE 3CC/38YP2-9/2" Misc  testosterone cypionate the    He follows with Endo for hypogonadism and getting 100 mg of testosterone weekly  The following portions of the patient's history were reviewed and updated as appropriate: allergies, current medications, past family history, past medical history, past social history, past surgical history and problem list     Review of Systems   Constitutional: Negative for diaphoresis, fatigue, fever and unexpected weight change  Respiratory: Negative for apnea, cough, choking, chest tightness and shortness of breath  Cardiovascular: Negative for chest pain, palpitations and leg swelling  Gastrointestinal: Negative for abdominal distention, abdominal pain, anal bleeding, blood in stool and constipation  Musculoskeletal: Negative for arthralgias, back pain, gait problem and joint swelling  Neurological: Negative for dizziness, facial asymmetry, light-headedness and headaches  Psychiatric/Behavioral: Negative for behavioral problems, dysphoric mood and self-injury  The patient is not nervous/anxious            Objective:      /80 (BP Location: Left arm, Patient Position: Sitting, Cuff Size: Standard)   Pulse 84   Temp 98 °F (36 7 °C) (Temporal)   Resp 16   Ht 5' 5" (1 651 m)   Wt 98 kg (216 lb)   SpO2 96%   BMI 35 94 kg/m²          Physical Exam  Vitals signs and nursing note reviewed  Neck:      Musculoskeletal: No edema or neck rigidity  Thyroid: No thyroid mass or thyromegaly  Vascular: No carotid bruit or JVD  Trachea: No tracheal tenderness  Cardiovascular:      Rate and Rhythm: Normal rate and regular rhythm  No extrasystoles are present  Pulses: Normal pulses  Heart sounds: Normal heart sounds  Heart sounds not distant  No friction rub  Pulmonary:      Effort: Pulmonary effort is normal  No tachypnea or bradypnea  Breath sounds: Normal breath sounds  No stridor  Abdominal:      General: Bowel sounds are normal  There is no abdominal bruit  Palpations: Abdomen is soft  There is no hepatomegaly or splenomegaly  Hernia: No hernia is present  Genitourinary:     Scrotum/Testes: Normal       Prostate: Normal       Comments: Grade 2 external hemorrhoid, non tender  (2)  Musculoskeletal: Normal range of motion  Skin:     General: Skin is warm and dry  Neurological:      Mental Status: He is oriented to person, place, and time  Deep Tendon Reflexes: Reflexes are normal and symmetric  Psychiatric:         Behavior: Behavior normal          Thought Content: Thought content normal          Judgment: Judgment normal          BMI Counseling: Body mass index is 35 94 kg/m²  The BMI is above normal  Nutrition recommendations include moderation in carbohydrate intake, increasing intake of lean protein, reducing intake of saturated fat and trans fat and reducing intake of cholesterol  Exercise recommendations include exercising 3-5 times per week

## 2021-06-03 ENCOUNTER — TELEPHONE (OUTPATIENT)
Dept: SLEEP CENTER | Facility: CLINIC | Age: 61
End: 2021-06-03

## 2021-06-03 NOTE — TELEPHONE ENCOUNTER
Via  # [id number]    Sleep study shows mild to moderate RODRICK  Dr Desire Lopez ordered CPAP titration study that is scheduled for 6/24/2021   Patient aware

## 2021-06-30 ENCOUNTER — TELEPHONE (OUTPATIENT)
Dept: ENDOCRINOLOGY | Facility: CLINIC | Age: 61
End: 2021-06-30

## 2021-06-30 NOTE — TELEPHONE ENCOUNTER
Brain MRI that was ordered has not been approved    If you want to do a peer to peer, the number is [phone]

## 2021-06-30 NOTE — TELEPHONE ENCOUNTER
I called the peer to peer review, ID number [id number], reference number [id number]  - I was told the MRI ordered by Ms Lena Freire on 6/28 was not approved  - MRI ordered by me on 5/25 was approved, order valid until 9/18      - please call pt to schedule anytime until 9/18

## 2021-07-01 ENCOUNTER — APPOINTMENT (OUTPATIENT)
Dept: LAB | Facility: HOSPITAL | Age: 61
End: 2021-07-01
Payer: COMMERCIAL

## 2021-07-01 DIAGNOSIS — E29.1 SECONDARY MALE HYPOGONADISM: ICD-10-CM

## 2021-07-01 PROCEDURE — 84402 ASSAY OF FREE TESTOSTERONE: CPT

## 2021-07-01 PROCEDURE — 36415 COLL VENOUS BLD VENIPUNCTURE: CPT

## 2021-07-01 PROCEDURE — 84403 ASSAY OF TOTAL TESTOSTERONE: CPT

## 2021-07-02 LAB
TESTOST FREE SERPL-MCNC: 25.1 PG/ML (ref 6.6–18.1)
TESTOST SERPL-MCNC: 1250 NG/DL (ref 264–916)

## 2021-07-06 ENCOUNTER — TELEPHONE (OUTPATIENT)
Dept: ENDOCRINOLOGY | Facility: CLINIC | Age: 61
End: 2021-07-06

## 2021-07-06 DIAGNOSIS — E29.1 SECONDARY MALE HYPOGONADISM: Primary | ICD-10-CM

## 2021-07-06 DIAGNOSIS — E29.1 SECONDARY MALE HYPOGONADISM: ICD-10-CM

## 2021-07-06 DIAGNOSIS — E29.1 HYPOGONADISM IN MALE: ICD-10-CM

## 2021-07-06 RX ORDER — TESTOSTERONE CYPIONATE 100 MG/ML
75 INJECTION, SOLUTION INTRAMUSCULAR
Qty: 4.5 ML | Refills: 27 | Status: CANCELLED | OUTPATIENT
Start: 2021-07-06 | End: 2028-05-17

## 2021-07-06 NOTE — TELEPHONE ENCOUNTER
----- Message from Gagandeep Gomez MD sent at 7/2/2021  9:46 PM EDT -----  Pls call patient regarding results: testosterone level is high, please verify with pt that he took lab testing 3-4 days after the testo injection

## 2021-07-06 NOTE — TELEPHONE ENCOUNTER
----- Message from Mili Schmidt MD sent at 7/2/2021  9:46 PM EDT -----  Pls call patient regarding results: testosterone level is high, please verify with pt that he took lab testing 3-4 days after the testo injection

## 2021-07-06 NOTE — TELEPHONE ENCOUNTER
Thanks  Please call pt to hold all injections for two weeks, then lower to 75mg every 2 weeks, repeat testosterone labs in 6 weeks

## 2021-07-07 DIAGNOSIS — E29.1 HYPOGONADISM IN MALE: ICD-10-CM

## 2021-07-07 DIAGNOSIS — E29.1 SECONDARY MALE HYPOGONADISM: ICD-10-CM

## 2021-07-07 RX ORDER — TESTOSTERONE CYPIONATE 100 MG/ML
100 INJECTION, SOLUTION INTRAMUSCULAR
Qty: 12 ML | Refills: 1 | Status: SHIPPED | OUTPATIENT
Start: 2021-07-07 | End: 2021-08-26 | Stop reason: SDUPTHER

## 2021-07-09 ENCOUNTER — TELEPHONE (OUTPATIENT)
Dept: SLEEP CENTER | Facility: CLINIC | Age: 61
End: 2021-07-09

## 2021-07-10 ENCOUNTER — HOSPITAL ENCOUNTER (OUTPATIENT)
Dept: MRI IMAGING | Facility: HOSPITAL | Age: 61
Discharge: HOME | End: 2021-07-10
Payer: COMMERCIAL

## 2021-07-10 DIAGNOSIS — E29.1 SECONDARY MALE HYPOGONADISM: ICD-10-CM

## 2021-07-10 PROCEDURE — 70553 MRI BRAIN STEM W/O & W/DYE: CPT

## 2021-07-10 PROCEDURE — G1004 CDSM NDSC: HCPCS

## 2021-07-10 PROCEDURE — A9585 GADOBUTROL INJECTION: HCPCS | Performed by: INTERNAL MEDICINE

## 2021-07-10 RX ADMIN — GADOBUTROL 9 ML: 604.72 INJECTION INTRAVENOUS at 11:41

## 2021-07-12 ENCOUNTER — TELEPHONE (OUTPATIENT)
Dept: SLEEP CENTER | Facility: CLINIC | Age: 61
End: 2021-07-12

## 2021-07-12 NOTE — TELEPHONE ENCOUNTER
----- Message from Robbie Martínez MD sent at 7/9/2021  2:54 PM EDT -----  Notify patient we unable to do titration studies at this time  I am initiating auto titrating Pap pending the study

## 2021-08-19 ENCOUNTER — APPOINTMENT (OUTPATIENT)
Dept: LAB | Facility: HOSPITAL | Age: 61
End: 2021-08-19
Payer: COMMERCIAL

## 2021-08-19 DIAGNOSIS — E29.1 SECONDARY MALE HYPOGONADISM: ICD-10-CM

## 2021-08-19 DIAGNOSIS — E11.65 TYPE 2 DIABETES MELLITUS WITH HYPERGLYCEMIA, WITHOUT LONG-TERM CURRENT USE OF INSULIN (HCC): ICD-10-CM

## 2021-08-19 LAB
ALBUMIN SERPL BCP-MCNC: 4.1 G/DL (ref 3–5.2)
ALP SERPL-CCNC: 89 U/L (ref 43–122)
ALT SERPL W P-5'-P-CCNC: 18 U/L
ANION GAP SERPL CALCULATED.3IONS-SCNC: 9 MMOL/L (ref 5–14)
AST SERPL W P-5'-P-CCNC: 27 U/L (ref 17–59)
BILIRUB SERPL-MCNC: 1.03 MG/DL
BUN SERPL-MCNC: 3 MG/DL (ref 5–25)
CALCIUM SERPL-MCNC: 9.2 MG/DL (ref 8.4–10.2)
CHLORIDE SERPL-SCNC: 101 MMOL/L (ref 97–108)
CO2 SERPL-SCNC: 27 MMOL/L (ref 22–30)
CREAT SERPL-MCNC: 0.89 MG/DL (ref 0.7–1.5)
EST. AVERAGE GLUCOSE BLD GHB EST-MCNC: 137 MG/DL
GFR SERPL CREATININE-BSD FRML MDRD: 106 ML/MIN/1.73SQ M
GLUCOSE P FAST SERPL-MCNC: 118 MG/DL (ref 70–99)
HBA1C MFR BLD: 6.4 %
POTASSIUM SERPL-SCNC: 4 MMOL/L (ref 3.6–5)
PROT SERPL-MCNC: 7.6 G/DL (ref 5.9–8.4)
SODIUM SERPL-SCNC: 137 MMOL/L (ref 137–147)

## 2021-08-19 PROCEDURE — 80053 COMPREHEN METABOLIC PANEL: CPT

## 2021-08-19 PROCEDURE — 3044F HG A1C LEVEL LT 7.0%: CPT | Performed by: INTERNAL MEDICINE

## 2021-08-19 PROCEDURE — 84403 ASSAY OF TOTAL TESTOSTERONE: CPT

## 2021-08-19 PROCEDURE — 84402 ASSAY OF FREE TESTOSTERONE: CPT

## 2021-08-19 PROCEDURE — 36415 COLL VENOUS BLD VENIPUNCTURE: CPT

## 2021-08-19 PROCEDURE — 83036 HEMOGLOBIN GLYCOSYLATED A1C: CPT

## 2021-08-20 ENCOUNTER — TELEPHONE (OUTPATIENT)
Dept: ENDOCRINOLOGY | Facility: CLINIC | Age: 61
End: 2021-08-20

## 2021-08-20 LAB
TESTOST FREE SERPL-MCNC: 16.7 PG/ML (ref 6.6–18.1)
TESTOST SERPL-MCNC: 968 NG/DL (ref 264–916)

## 2021-08-20 NOTE — TELEPHONE ENCOUNTER
----- Message from Los Vital MD sent at 8/19/2021  6:35 PM EDT -----  Pls call patient regarding results: labs will be discussed during upcoming appt

## 2021-08-26 ENCOUNTER — OFFICE VISIT (OUTPATIENT)
Dept: ENDOCRINOLOGY | Facility: CLINIC | Age: 61
End: 2021-08-26
Payer: COMMERCIAL

## 2021-08-26 VITALS
SYSTOLIC BLOOD PRESSURE: 122 MMHG | WEIGHT: 222.6 LBS | BODY MASS INDEX: 37.09 KG/M2 | HEART RATE: 111 BPM | HEIGHT: 65 IN | DIASTOLIC BLOOD PRESSURE: 88 MMHG

## 2021-08-26 DIAGNOSIS — K74.60 CIRRHOSIS OF LIVER WITHOUT ASCITES, UNSPECIFIED HEPATIC CIRRHOSIS TYPE (HCC): ICD-10-CM

## 2021-08-26 DIAGNOSIS — E55.9 VITAMIN D DEFICIENCY: ICD-10-CM

## 2021-08-26 DIAGNOSIS — E66.9 OBESITY (BMI 30-39.9): ICD-10-CM

## 2021-08-26 DIAGNOSIS — E29.1 HYPOGONADISM IN MALE: ICD-10-CM

## 2021-08-26 DIAGNOSIS — F11.20 METHADONE MAINTENANCE THERAPY PATIENT (HCC): ICD-10-CM

## 2021-08-26 DIAGNOSIS — G47.33 OSA (OBSTRUCTIVE SLEEP APNEA): ICD-10-CM

## 2021-08-26 DIAGNOSIS — E11.9 TYPE 2 DIABETES MELLITUS WITHOUT COMPLICATION, WITHOUT LONG-TERM CURRENT USE OF INSULIN (HCC): ICD-10-CM

## 2021-08-26 DIAGNOSIS — E29.1 SECONDARY MALE HYPOGONADISM: Primary | ICD-10-CM

## 2021-08-26 PROCEDURE — 99215 OFFICE O/P EST HI 40 MIN: CPT | Performed by: INTERNAL MEDICINE

## 2021-08-26 PROCEDURE — 3074F SYST BP LT 130 MM HG: CPT | Performed by: INTERNAL MEDICINE

## 2021-08-26 PROCEDURE — 3008F BODY MASS INDEX DOCD: CPT | Performed by: INTERNAL MEDICINE

## 2021-08-26 PROCEDURE — 3079F DIAST BP 80-89 MM HG: CPT | Performed by: INTERNAL MEDICINE

## 2021-08-26 RX ORDER — ORAL SEMAGLUTIDE 3 MG/1
TABLET ORAL
Qty: 30 TABLET | Refills: 3 | Status: SHIPPED | OUTPATIENT
Start: 2021-08-26 | End: 2021-11-10

## 2021-08-26 RX ORDER — TESTOSTERONE CYPIONATE 100 MG/ML
50 INJECTION, SOLUTION INTRAMUSCULAR
Qty: 12 ML | Refills: 1
Start: 2021-08-26 | End: 2022-01-18 | Stop reason: ALTCHOICE

## 2021-08-26 NOTE — PATIENT INSTRUCTIONS
Decrease Testosterone to 50 mg weekly  In 6-8 weeks obtain testosterone level: 3 days after your injection    Stop metformin  Start Rybelsus 1 pill daily

## 2021-08-26 NOTE — PROGRESS NOTES
Alannah Villeda 57 y o  male MRN: [id number]    Encounter: [id number]      Assessment/Plan     Assessment: This is a 57y o -year-old male with secondary hypogonadism, history of opioid use disorder on methadone maintenance therapy, obstructive sleep apnea, type 2 diabetes, obesity, liver cirrhosis and hyperlipidemia  Plan:    -Hypogonadism: likely secondary due to methadone use; labs while off testosterone showed low testosterone and  Inappropriately normal FSH and LH  MRI of the pituitary was normal   Labs on testosterone 75 mg weekly show elevated testosterone  We will decrease testosterone to 50 mg weekly  Repeat testosterone level in 6-8 weeks to be done 3-4 days post injection  We will check hematocrit level  -Type 2 diabetes: controlled with A1c 6 4%  patient reports episodes of hypoglycemia that could be related to liver cirrhosis  We will stop metformin in the context of liver cirrhosis  Patient will benefit from 1 of the incretin based therapy in this setting  Given his BMI of 37 04 he will benefit from GLP 1 analogs for glycemic control as well as promoting weight loss  Discussed benefits and risks/ side effects GLP 1 analogs  We will start Rybelsus 3 mg daily and increase as tolerated     -Obstructive sleep apnea: continue CPAP, per sleep medicine    -Obesity:  Lifestyle modifications advised  He will benefit from GLP 1 analogs as above  CC: Hypogonadism    History of Present Illness     HPI:    Patient is a 57year old male who presents for follow up of hypogonadism  Medical Hx is also significant for [facility] and liver cirrhosis,  Opioid use disorder on chronic methadone therapy, obstructive sleep apnea and type 2 diabetes  After his initial consult, patient was asked to stop testosterone therapy and obtain lab work  Do later showed low total and free testosterone, normal sex hormone binding globulin, and inappropriately  Normal FSH and LH   He has had MRI of the pituitary that came back normal   He was restarted on testosterone 100 mg weekly  Labs showed elevated testosterone and the dose was decreased to 75 mg weekly  He reports a good energy level  He reports improved libido and resolution of the erectile dysfunction  He is on maintenance methadone therapy since 2002  He was diagnosed with type 2 diabetes in 2014  He was started on insulin therapy then switched to metformin  Currently he takes metformin 1000 mg b i d  He checks FS 1 to 2 times a day  Morning 110s  Before dinner 100-140  Twice a week Hypoglycemia: 65-70s; sweating and shaking, in the afternoon  Denies polyuria, polydipsia or blurry vision  Reports numbness of the feet  Weight has been stable  Sees Podiatry regularly  He has seen Ophthalmology last year, no known diabetic retinopathy  Due for exam                 Review of Systems   Constitutional: Negative for chills and fever  HENT: Negative for ear pain, sore throat, trouble swallowing and voice change  Eyes: Negative for pain and visual disturbance  Respiratory: Negative for cough and shortness of breath  Cardiovascular: Negative for chest pain and palpitations  Gastrointestinal: Negative for abdominal pain, constipation, diarrhea and vomiting  Endocrine: Negative for cold intolerance, heat intolerance, polydipsia, polyphagia and polyuria  Genitourinary: Negative for dysuria and hematuria  Musculoskeletal: Negative for arthralgias, back pain and gait problem  Skin: Negative for color change and rash  Neurological: Positive for numbness  Negative for seizures, syncope, light-headedness and headaches  Psychiatric/Behavioral: Negative for decreased concentration, dysphoric mood and hallucinations  The patient is not nervous/anxious and is not hyperactive  All other systems reviewed and are negative        Historical Information   Past Medical History:   Diagnosis Date    Colon polyp     Diabetes mellitus ([facility] [address] )     Esophageal varices (HCC)     Gastritis     Hepatitis C     History of kidney stones     Hyperlipidemia     Hypertension     Kidney stone     Obesity     Pneumonia     PPD positive 2020    Sleep apnea     Substance abuse ([facility] [address] )      Past Surgical History:   Procedure Laterality Date    COLONOSCOPY  06/20/2014    dr padilla    COLONOSCOPY  2014    Dr Padilla    EGD AND COLONOSCOPY  08/2020    esophageal varices, colon polyp, hemorrhoids    ESOPHAGOGASTRODUODENOSCOPY  12/10/2015    esophageal varices, cirrhosis, gastritis    HYDROCELE EXCISION / REPAIR      LIVER BIOPSY  2014     Social History   Social History     Substance and Sexual Activity   Alcohol Use Not Currently    Alcohol/week: 0 0 standard drinks     Social History     Substance and Sexual Activity   Drug Use Not Currently    Types: Heroin    Comment: former user- heroin was drug of choice     Social History     Tobacco Use   Smoking Status Current Every Day Smoker    Packs/day: 1 00    Types: Cigarettes   Smokeless Tobacco Never Used   Tobacco Comment    TOBACCO USE     Family History:   Family History   Problem Relation Age of Onset    Diabetes type II Mother         MELLITUS    Hypertension Mother     Hypertension Sister     Diabetes Sister         MELLITUS    No Known Problems Maternal Grandmother     No Known Problems Maternal Grandfather     No Known Problems Paternal Grandmother     No Known Problems Paternal Grandfather     No Known Problems Sister     Breast cancer Maternal Aunt         45s    No Known Problems Maternal Aunt     No Known Problems Paternal Aunt     No Known Problems Paternal Aunt     No Known Problems Paternal Aunt     No Known Problems Paternal Aunt     No Known Problems Paternal Aunt     No Known Problems Paternal Aunt        Meds/Allergies   Current Outpatient Medications   Medication Sig Dispense Refill    atorvastatin (LIPITOR) 40 mg tablet Take 1 tablet (40 mg total) by mouth daily 90 tablet 3    metFORMIN (GLUCOPHAGE) 1000 MG tablet Take 1 tablet (1,000 mg total) by mouth 2 (two) times a day with meals 90 tablet 1    methadone (DOLOPHINE) 10 mg/mL oral concentrated solution Take 44 mg by mouth daily      nadolol (CORGARD) 20 mg tablet Take 1 tablet (20 mg total) by mouth daily 90 tablet 3    Syringe/Needle, Disp, (SYRINGE 3CC/81KW7-2/2") 21G X 1-1/2" 3 ML MISC Use every 7 days 12 each 3    testosterone cypionate (DEPO-TESTOSTERONE) 100 mg/mL IM injection Inject 1 mL (100 mg total) into a muscle every 7 days for 180 doses 12 mL 1    metFORMIN (GLUCOPHAGE) 500 mg tablet Take 1 tablet (500 mg total) by mouth 2 (two) times a day with meals   60 tablet 5     No current facility-administered medications for this visit  Allergies   Allergen Reactions    Meperidine Abdominal Pain       Objective   Vitals: Blood pressure 122/88, pulse (!) 111, height 5' 5" (1 651 m), weight 101 kg (222 lb 9 6 oz)  Physical Exam  Vitals reviewed  Constitutional:       General: He is not in acute distress  Appearance: He is obese  HENT:      Head: Normocephalic and atraumatic  Nose: Nose normal       Mouth/Throat:      Mouth: Mucous membranes are moist    Eyes:      General: No scleral icterus  Left eye: No discharge  Extraocular Movements: Extraocular movements intact  Conjunctiva/sclera: Conjunctivae normal       Pupils: Pupils are equal, round, and reactive to light  Neck:      Comments: No thyromegaly  Cardiovascular:      Rate and Rhythm: Normal rate  Pulses: Normal pulses  Heart sounds: Normal heart sounds  Pulmonary:      Effort: Pulmonary effort is normal       Breath sounds: Normal breath sounds  Abdominal:      General: Bowel sounds are normal       Palpations: Abdomen is soft  Tenderness: There is no abdominal tenderness  There is no right CVA tenderness  Musculoskeletal:         General: Normal range of motion        Cervical back: Normal range of motion and neck supple  Right lower leg: Edema present  Left lower leg: Edema present  Lymphadenopathy:      Cervical: No cervical adenopathy  Skin:     General: Skin is warm  Neurological:      Mental Status: He is oriented to person, place, and time  Gait: Gait normal       Deep Tendon Reflexes: Reflexes normal    Psychiatric:         Mood and Affect: Mood normal          Behavior: Behavior normal          Thought Content: Thought content normal          Judgment: Judgment normal          The history was obtained from the review of the chart, patient      Lab Results:   Lab Results   Component Value Date/Time    Hemoglobin A1C 6 4 (H) 08/19/2021 07:11 AM    Hemoglobin A1C 7 9 (H) 05/11/2021 07:58 AM    Hemoglobin A1C 8 6 (A) 01/07/2021 01:35 PM    WBC 6 72 09/18/2020 11:49 AM    Hemoglobin 15 5 09/18/2020 11:49 AM    Hematocrit 48 5 09/18/2020 11:49 AM    MCV 95 09/18/2020 11:49 AM    Platelets 784 08/27/0602 11:49 AM    BUN 3 (L) 08/19/2021 07:11 AM    BUN 10 05/11/2021 07:58 AM    BUN 11 09/18/2020 11:49 AM    Potassium 4 0 08/19/2021 07:11 AM    Potassium 4 5 05/11/2021 07:58 AM    Potassium 4 5 09/18/2020 11:49 AM    Chloride 101 08/19/2021 07:11 AM    Chloride 101 05/11/2021 07:58 AM    Chloride 104 09/18/2020 11:49 AM    CO2 27 08/19/2021 07:11 AM    CO2 31 (H) 05/11/2021 07:58 AM    CO2 30 09/18/2020 11:49 AM    Creatinine 0 89 08/19/2021 07:11 AM    Creatinine 0 80 05/11/2021 07:58 AM    Creatinine 0 97 09/18/2020 11:49 AM    AST 27 08/19/2021 07:11 AM    AST 49 05/11/2021 07:58 AM    AST 27 09/18/2020 11:49 AM    ALT 18 08/19/2021 07:11 AM    ALT 39 05/11/2021 07:58 AM    ALT 40 09/18/2020 11:49 AM    Albumin 4 1 08/19/2021 07:11 AM    Albumin 3 9 05/11/2021 07:58 AM    Albumin 3 5 09/18/2020 11:49 AM    HDL, Direct 37 (L) 05/11/2021 07:58 AM    HDL, Direct 29 (L) 11/19/2020 10:30 AM    Triglycerides 77 05/11/2021 07:58 AM    Triglycerides 119 11/19/2020 10:30 AM           Imaging Studies: I have personally reviewed pertinent reports  Portions of the record may have been created with voice recognition software  Occasional wrong word or "sound a like" substitutions may have occurred due to the inherent limitations of voice recognition software  Read the chart carefully and recognize, using context, where substitutions have occurred

## 2021-09-10 DIAGNOSIS — E11.9 TYPE 2 DIABETES MELLITUS WITHOUT COMPLICATION, WITHOUT LONG-TERM CURRENT USE OF INSULIN (HCC): ICD-10-CM

## 2021-09-10 RX ORDER — ATORVASTATIN CALCIUM 40 MG/1
40 TABLET, FILM COATED ORAL DAILY
Qty: 90 TABLET | Refills: 3 | Status: SHIPPED | OUTPATIENT
Start: 2021-09-10 | End: 2022-02-09

## 2021-09-17 ENCOUNTER — OFFICE VISIT (OUTPATIENT)
Dept: SLEEP CENTER | Facility: CLINIC | Age: 61
End: 2021-09-17
Payer: COMMERCIAL

## 2021-09-17 VITALS
DIASTOLIC BLOOD PRESSURE: 82 MMHG | WEIGHT: 225 LBS | HEIGHT: 65 IN | BODY MASS INDEX: 37.49 KG/M2 | SYSTOLIC BLOOD PRESSURE: 116 MMHG

## 2021-09-17 DIAGNOSIS — E66.9 OBESITY (BMI 30-39.9): ICD-10-CM

## 2021-09-17 DIAGNOSIS — F17.200 TOBACCO USE DISORDER: ICD-10-CM

## 2021-09-17 DIAGNOSIS — G47.33 OSA (OBSTRUCTIVE SLEEP APNEA): Primary | ICD-10-CM

## 2021-09-17 DIAGNOSIS — E11.9 TYPE 2 DIABETES MELLITUS WITHOUT COMPLICATION, WITHOUT LONG-TERM CURRENT USE OF INSULIN (HCC): ICD-10-CM

## 2021-09-17 DIAGNOSIS — G47.34 SLEEP RELATED HYPOXIA: ICD-10-CM

## 2021-09-17 DIAGNOSIS — E29.1 HYPOGONADISM IN MALE: ICD-10-CM

## 2021-09-17 DIAGNOSIS — I10 BENIGN ESSENTIAL HYPERTENSION: ICD-10-CM

## 2021-09-17 DIAGNOSIS — F11.90 OPIOID USE DISORDER: ICD-10-CM

## 2021-09-17 DIAGNOSIS — F11.20 METHADONE MAINTENANCE THERAPY PATIENT (HCC): ICD-10-CM

## 2021-09-17 PROCEDURE — 3074F SYST BP LT 130 MM HG: CPT | Performed by: INTERNAL MEDICINE

## 2021-09-17 PROCEDURE — 3079F DIAST BP 80-89 MM HG: CPT | Performed by: INTERNAL MEDICINE

## 2021-09-17 PROCEDURE — 99214 OFFICE O/P EST MOD 30 MIN: CPT | Performed by: INTERNAL MEDICINE

## 2021-09-17 PROCEDURE — 3008F BODY MASS INDEX DOCD: CPT | Performed by: INTERNAL MEDICINE

## 2021-09-17 NOTE — PATIENT INSTRUCTIONS

## 2021-09-17 NOTE — PROGRESS NOTES
Follow-Up Note - [facility]  57 y o  male  :1960  ZHJ:[id number]  DOS:2021    CC: I saw this patient for follow-up in clinic today for Sleep disordered breathing, Coexisting Sleep and Medical Problems  Patient recently had a diagnostic sleep study and is here to review results and adjust therapy  The study demonstrated   obstructive sleep apnea: AHI 11 9 /hour , higher while supine and during stage REM  Rafita Jossy Moderate intensity  snoring  was noted  Minimum oxygen saturation 84 %  and 289 5 minutes (out of 474 minutes) of total sleep time was spent with saturations less than 90%  Auto titrating Pap using a ResMed med machine was initiated while awaiting a titration study that could not be undertaken because of the Dinesh recall  PFSH, Problem List, Medications & Allergies were reviewed in EMR  Interval changes: none reported  He  has a past medical history of Colon polyp, Diabetes mellitus ([address] ), Esophageal varices ([address] ), Gastritis, Hepatitis C, History of kidney stones, Hyperlipidemia, Hypertension, Kidney stone, Obesity, Pneumonia, PPD positive (), Sleep apnea, and Substance abuse ([address] )  He has a current medication list which includes the following prescription(s): atorvastatin, metformin, methadone, nadolol, rybelsus, syringe 3cc/[id number]/2", testosterone cypionate, and metformin  PHYSIOLOGICAL DATA REVIEW AND INTERPRETATION:   using PAP > 4 hours/night 10%  He has used for 25 out of the past 30 days and data shows average use of just over 2 hours but he states he is using entire sleep duration  Estimated JOHN 2 4/hour with pressure of 13cm H2O @90th percentile; Compliance: fair; Sleep disordered breathing:stable & within target range; Patient has not been using ozone based or other devices to sanitize the machine      SUBJECTIVE: Regarding use of PAP, Rishi reports:   · He is experiencing no  adverse effects: ; has not noticed any fibres or foreign material in air line      · He is benefiting from use: sleeping better   Sleep Routine: [place] Client reports getting 7 hrs sleep  ; he has no difficulty initiating or maintaining sleep   He arises spontaneously and feels refreshed  Rishi reports somewhat improved Excessive Daytime Sleepiness, or napping  He rated himself at Total score: 9 /24 on the [place] Sleepiness Scale  Habits: reports that he has been smoking cigarettes  He has been smoking about 1 00 pack per day  He has never used smokeless tobacco ,  reports previous alcohol use ,  reports previous drug use  Drug: Heroin  , Caffeine use: rarely , Exercise routine: regular    ROS: reviewed & as attached  EXAM: /82   Ht 5' 5" (1 651 m)   Wt 102 kg (225 lb)   BMI 37 44 kg/m²     Patient is well groomed; well appearing  H&N: EOMI; NC/AT:no facial pressure marks, no rashes  Skin/Extrem: col & hydration normal; no edema  Psych: cooperativeand in no distress  Mental state:appears normal   Resp: Respiratory effort is normal  CNS: Alert, orientated, clear & coherent speech  Physical findings otherwise essentially unchanged from previous  IMPRESSION: Problem List Items & Comorbidities Addressed this Visit    1  RODRICK (obstructive sleep apnea)  CPAP Study   2  Sleep related hypoxia  CPAP Study   3  Methadone maintenance therapy patient ([facility] )     4  Opioid use disorder ([place] [address] )     5  Tobacco use disorder     6  Benign essential hypertension     7  Hypogonadism in male     8  Obesity (BMI 30-39 9)     9  Type 2 diabetes mellitus without complication, without long-term current use of insulin ([place] [address] )         PLAN:  1  I reviewed results of prior studies and physiologic data with the patient  I discussed treatment options with risks and benefits  2  Treatment with  PAP is medically necessary and [place] Client is agreable to continue use  3  Care of equipment, methods to improve comfort using PAP and importance of compliance with therapy were discussed    4  Pressure setting: change 8-15 cmH2O     5  Rx provided to replace  supplies and Care coordinated with DME provider  6  Discussed strategies for weight reduction  7  I advised smoking cessation and he is working at it  8  He is unable to reduce dose of methadone further at this time because of chronic pain  9  A in-lab titration study is warranted to determine adequate treatment of sleep disordered breathing  He may need BiPAP or supplemental oxygen during sleep  10  Follow-up is advised in 1 year or sooner if needed to monitor progress, compliance and to adjust therapy  Thank you for allowing me to participate in the care of this patient  Sincerely,    Authenticated electronically by Nito Mares MD on 73/18/00   Board Certified Specialist     Portions of the record may have been created with voice recognition software  Occasional wrong word or "sound a like" substitutions may have occurred due to the inherent limitations of voice recognition software  There may also be notations and random deletions of words or characters from malfunctioning software  Read the chart carefully and recognize, using context, where substitutions/deletions have occurred

## 2021-09-20 ENCOUNTER — HOSPITAL ENCOUNTER (OUTPATIENT)
Dept: SLEEP CENTER | Facility: CLINIC | Age: 61
Discharge: HOME | End: 2021-09-20
Payer: COMMERCIAL

## 2021-09-20 DIAGNOSIS — G47.33 OSA (OBSTRUCTIVE SLEEP APNEA): ICD-10-CM

## 2021-09-20 DIAGNOSIS — G47.34 SLEEP RELATED HYPOXIA: ICD-10-CM

## 2021-09-20 PROCEDURE — 95811 POLYSOM 6/>YRS CPAP 4/> PARM: CPT

## 2021-09-21 NOTE — PROGRESS NOTES
Sleep Study Documentation    Pre-Sleep Study       Sleep testing procedure explained to patient:YES    Patient napped prior to study:NO    Caffeine:Dayshift worker after 12PM   Caffeine use:NO    Alcohol:Dayshift workers after 5PM: Alcohol use:NO    Typical day for patient:YES       Study Documentation    Sleep Study Indications: RODRICK diagnosed in-lab study    Sleep Study: Treatment   Optimal PAP pressure: 12  Leak:Small  Snore:Eliminated  REM Obtained:yes  Supplemental O2: no    Minimum SaO2 87  Baseline SaO2 96  PAP mask tried (list all)[employer] Simplus Fullface, Small  PAP mask choice (final)[employer] Simplus Fullface, Small    PAP mask type:full face  PAP pressure at which snoring was eliminated 9  Minimum SaO2 at final PAP pressure 90  Mode of Therapy:CPAP    CPAP changed to BiPAP:No        EKG abnormalities: no     EEG abnormalities: no    Sleep Study Recorded < 2 hours: N/A    Sleep Study Recorded > 2 hours but incomplete study: N/A    Sleep Study Recorded 6 hours but no sleep obtained: NO        Post-Sleep Study    Medication used at bedtime or during sleep study:NO    Patient reports time it took to fall asleep:20 to 30 minutes    Patient reports waking up during study:1 to 2 times  Patient reports returning to sleep in 10 to 30 minutes  Patient reports sleeping 4 to 6 hours without dreaming  Patient reports sleep during study:typical    Patient rated sleepiness: Not sleepy or tired    PAP treatment:yes: Post PAP treatment patient reports feeling unchanged and would wear PAP mask at home

## 2021-09-23 ENCOUNTER — TELEPHONE (OUTPATIENT)
Dept: SLEEP CENTER | Facility: CLINIC | Age: 61
End: 2021-09-23

## 2021-09-23 NOTE — TELEPHONE ENCOUNTER
Called patient and advised sleep study resulted  Pressure change ordered to CPAP 12cm  Patient currently using APAP 8-18cm  Advised order will be sent to [facility] to make change  It may take 24-48 hours for the change to occur  Patient verbalized understanding  [facility] made aware of pressure change order

## 2021-09-24 LAB
LEFT EYE DIABETIC RETINOPATHY: NORMAL
RIGHT EYE DIABETIC RETINOPATHY: NORMAL

## 2021-10-05 ENCOUNTER — TELEPHONE (OUTPATIENT)
Dept: ADMINISTRATIVE | Facility: OTHER | Age: 61
End: 2021-10-05

## 2021-10-12 ENCOUNTER — VBI (OUTPATIENT)
Dept: ADMINISTRATIVE | Facility: OTHER | Age: 61
End: 2021-10-12

## 2021-10-21 ENCOUNTER — APPOINTMENT (OUTPATIENT)
Dept: LAB | Facility: HOSPITAL | Age: 61
End: 2021-10-21
Payer: COMMERCIAL

## 2021-10-21 DIAGNOSIS — E29.1 SECONDARY MALE HYPOGONADISM: ICD-10-CM

## 2021-10-21 DIAGNOSIS — E11.9 TYPE 2 DIABETES MELLITUS WITHOUT COMPLICATION, WITHOUT LONG-TERM CURRENT USE OF INSULIN (HCC): ICD-10-CM

## 2021-10-21 DIAGNOSIS — E55.9 VITAMIN D DEFICIENCY: ICD-10-CM

## 2021-10-21 DIAGNOSIS — F11.20 METHADONE MAINTENANCE THERAPY PATIENT (HCC): ICD-10-CM

## 2021-10-21 DIAGNOSIS — E29.1 HYPOGONADISM IN MALE: ICD-10-CM

## 2021-10-21 DIAGNOSIS — K74.60 CIRRHOSIS OF LIVER WITHOUT ASCITES, UNSPECIFIED HEPATIC CIRRHOSIS TYPE (HCC): ICD-10-CM

## 2021-10-21 DIAGNOSIS — G47.33 OSA (OBSTRUCTIVE SLEEP APNEA): ICD-10-CM

## 2021-10-21 PROCEDURE — 84402 ASSAY OF FREE TESTOSTERONE: CPT

## 2021-10-21 PROCEDURE — 36415 COLL VENOUS BLD VENIPUNCTURE: CPT

## 2021-10-21 PROCEDURE — 84403 ASSAY OF TOTAL TESTOSTERONE: CPT

## 2021-10-22 LAB
TESTOST FREE SERPL-MCNC: 13.8 PG/ML (ref 6.6–18.1)
TESTOST SERPL-MCNC: 740 NG/DL (ref 264–916)

## 2021-11-05 DIAGNOSIS — E29.1 HYPOGONADISM IN MALE: ICD-10-CM

## 2021-11-05 DIAGNOSIS — F11.20 METHADONE MAINTENANCE THERAPY PATIENT (HCC): ICD-10-CM

## 2021-11-05 DIAGNOSIS — G47.33 OSA (OBSTRUCTIVE SLEEP APNEA): ICD-10-CM

## 2021-11-05 DIAGNOSIS — K74.60 CIRRHOSIS OF LIVER WITHOUT ASCITES, UNSPECIFIED HEPATIC CIRRHOSIS TYPE (HCC): ICD-10-CM

## 2021-11-05 DIAGNOSIS — E55.9 VITAMIN D DEFICIENCY: ICD-10-CM

## 2021-11-05 DIAGNOSIS — E29.1 SECONDARY MALE HYPOGONADISM: ICD-10-CM

## 2021-11-05 DIAGNOSIS — E11.9 TYPE 2 DIABETES MELLITUS WITHOUT COMPLICATION, WITHOUT LONG-TERM CURRENT USE OF INSULIN (HCC): ICD-10-CM

## 2021-11-10 RX ORDER — ORAL SEMAGLUTIDE 3 MG/1
TABLET ORAL
Qty: 30 TABLET | Refills: 3 | Status: SHIPPED | OUTPATIENT
Start: 2021-11-10 | End: 2021-12-07 | Stop reason: DRUGHIGH

## 2021-11-26 ENCOUNTER — APPOINTMENT (OUTPATIENT)
Dept: LAB | Facility: HOSPITAL | Age: 61
End: 2021-11-26
Payer: COMMERCIAL

## 2021-11-26 DIAGNOSIS — K74.60 CIRRHOSIS OF LIVER WITHOUT ASCITES, UNSPECIFIED HEPATIC CIRRHOSIS TYPE (HCC): ICD-10-CM

## 2021-11-26 DIAGNOSIS — E29.1 SECONDARY MALE HYPOGONADISM: ICD-10-CM

## 2021-11-26 DIAGNOSIS — E11.9 TYPE 2 DIABETES MELLITUS WITHOUT COMPLICATION, WITHOUT LONG-TERM CURRENT USE OF INSULIN (HCC): ICD-10-CM

## 2021-11-26 DIAGNOSIS — G47.33 OSA (OBSTRUCTIVE SLEEP APNEA): ICD-10-CM

## 2021-11-26 DIAGNOSIS — E55.9 VITAMIN D DEFICIENCY: ICD-10-CM

## 2021-11-26 DIAGNOSIS — E29.1 HYPOGONADISM IN MALE: ICD-10-CM

## 2021-11-26 DIAGNOSIS — F11.20 METHADONE MAINTENANCE THERAPY PATIENT (HCC): ICD-10-CM

## 2021-11-26 LAB
25(OH)D3 SERPL-MCNC: 29.1 NG/ML (ref 30–100)
BASOPHILS # BLD AUTO: 0.1 THOUSANDS/ΜL (ref 0–0.1)
BASOPHILS NFR BLD AUTO: 1 % (ref 0–1)
EOSINOPHIL # BLD AUTO: 0.1 THOUSAND/ΜL (ref 0–0.4)
EOSINOPHIL NFR BLD AUTO: 1 % (ref 0–6)
ERYTHROCYTE [DISTWIDTH] IN BLOOD BY AUTOMATED COUNT: 16.7 %
EST. AVERAGE GLUCOSE BLD GHB EST-MCNC: 166 MG/DL
HBA1C MFR BLD: 7.4 %
HCT VFR BLD AUTO: 53.2 % (ref 41–53)
HGB BLD-MCNC: 17.4 G/DL (ref 13.5–17.5)
LYMPHOCYTES # BLD AUTO: 3.5 THOUSANDS/ΜL (ref 0.5–4)
LYMPHOCYTES NFR BLD AUTO: 29 % (ref 25–45)
MCH RBC QN AUTO: 29.8 PG (ref 26–34)
MCHC RBC AUTO-ENTMCNC: 32.7 G/DL (ref 31–36)
MCV RBC AUTO: 91 FL (ref 80–100)
MONOCYTES # BLD AUTO: 1 THOUSAND/ΜL (ref 0.2–0.9)
MONOCYTES NFR BLD AUTO: 8 % (ref 1–10)
NEUTROPHILS # BLD AUTO: 7.3 THOUSANDS/ΜL (ref 1.8–7.8)
NEUTS SEG NFR BLD AUTO: 61 % (ref 45–65)
PLATELET # BLD AUTO: 215 THOUSANDS/UL (ref 150–450)
PMV BLD AUTO: 8 FL (ref 8.9–12.7)
RBC # BLD AUTO: 5.85 MILLION/UL (ref 4.5–5.9)
WBC # BLD AUTO: 12 THOUSAND/UL (ref 4.5–11)

## 2021-11-26 PROCEDURE — 36415 COLL VENOUS BLD VENIPUNCTURE: CPT

## 2021-11-26 PROCEDURE — 85025 COMPLETE CBC W/AUTO DIFF WBC: CPT

## 2021-11-26 PROCEDURE — 82306 VITAMIN D 25 HYDROXY: CPT

## 2021-11-26 PROCEDURE — 83036 HEMOGLOBIN GLYCOSYLATED A1C: CPT

## 2021-12-01 ENCOUNTER — TELEPHONE (OUTPATIENT)
Dept: ENDOCRINOLOGY | Facility: CLINIC | Age: 61
End: 2021-12-01

## 2021-12-02 ENCOUNTER — OFFICE VISIT (OUTPATIENT)
Dept: ENDOCRINOLOGY | Facility: CLINIC | Age: 61
End: 2021-12-02
Payer: COMMERCIAL

## 2021-12-02 VITALS
HEART RATE: 92 BPM | SYSTOLIC BLOOD PRESSURE: 110 MMHG | WEIGHT: 220 LBS | BODY MASS INDEX: 36.65 KG/M2 | DIASTOLIC BLOOD PRESSURE: 80 MMHG | HEIGHT: 65 IN

## 2021-12-02 DIAGNOSIS — E29.1 HYPOGONADISM IN MALE: ICD-10-CM

## 2021-12-02 DIAGNOSIS — F11.90 OPIOID USE DISORDER: ICD-10-CM

## 2021-12-02 DIAGNOSIS — E11.9 TYPE 2 DIABETES MELLITUS WITHOUT COMPLICATION, WITHOUT LONG-TERM CURRENT USE OF INSULIN (HCC): ICD-10-CM

## 2021-12-02 DIAGNOSIS — I10 BENIGN ESSENTIAL HYPERTENSION: ICD-10-CM

## 2021-12-02 DIAGNOSIS — K74.60 CIRRHOSIS OF LIVER WITHOUT ASCITES, UNSPECIFIED HEPATIC CIRRHOSIS TYPE (HCC): ICD-10-CM

## 2021-12-02 DIAGNOSIS — F11.20 METHADONE MAINTENANCE THERAPY PATIENT (HCC): ICD-10-CM

## 2021-12-02 DIAGNOSIS — G47.33 OSA (OBSTRUCTIVE SLEEP APNEA): ICD-10-CM

## 2021-12-02 DIAGNOSIS — E55.9 VITAMIN D DEFICIENCY: ICD-10-CM

## 2021-12-02 DIAGNOSIS — E29.1 SECONDARY MALE HYPOGONADISM: Primary | ICD-10-CM

## 2021-12-02 PROCEDURE — 99214 OFFICE O/P EST MOD 30 MIN: CPT | Performed by: INTERNAL MEDICINE

## 2021-12-02 RX ORDER — ORAL SEMAGLUTIDE 7 MG/1
TABLET ORAL
Qty: 90 TABLET | Refills: 3 | Status: SHIPPED | OUTPATIENT
Start: 2021-12-02 | End: 2022-06-09

## 2021-12-02 RX ORDER — SYRINGE WITH NEEDLE, 1 ML 25GX5/8"
SYRINGE, EMPTY DISPOSABLE MISCELLANEOUS
Qty: 12 EACH | Refills: 0 | Status: SHIPPED | OUTPATIENT
Start: 2021-12-02

## 2021-12-07 ENCOUNTER — OFFICE VISIT (OUTPATIENT)
Dept: FAMILY MEDICINE CLINIC | Facility: CLINIC | Age: 61
End: 2021-12-07
Payer: COMMERCIAL

## 2021-12-07 VITALS
RESPIRATION RATE: 20 BRPM | HEIGHT: 65 IN | WEIGHT: 220 LBS | DIASTOLIC BLOOD PRESSURE: 80 MMHG | HEART RATE: 92 BPM | SYSTOLIC BLOOD PRESSURE: 138 MMHG | TEMPERATURE: 97.9 F | OXYGEN SATURATION: 99 % | BODY MASS INDEX: 36.65 KG/M2

## 2021-12-07 DIAGNOSIS — E11.9 TYPE 2 DIABETES MELLITUS WITHOUT COMPLICATION, WITHOUT LONG-TERM CURRENT USE OF INSULIN (HCC): ICD-10-CM

## 2021-12-07 DIAGNOSIS — Z12.5 PROSTATE CANCER SCREENING: ICD-10-CM

## 2021-12-07 DIAGNOSIS — Z00.00 ENCOUNTER FOR ANNUAL WELLNESS VISIT (AWV) IN MEDICARE PATIENT: Primary | ICD-10-CM

## 2021-12-07 PROBLEM — N43.3 HYDROCELE: Status: RESOLVED | Noted: 2017-06-28 | Resolved: 2021-12-07

## 2021-12-07 PROBLEM — E66.01 OBESITY, MORBID (HCC): Status: RESOLVED | Noted: 2020-11-19 | Resolved: 2021-12-07

## 2021-12-07 PROBLEM — B07.0 PLANTAR WART, LEFT FOOT: Status: RESOLVED | Noted: 2018-10-23 | Resolved: 2021-12-07

## 2021-12-07 PROCEDURE — 3725F SCREEN DEPRESSION PERFORMED: CPT | Performed by: PHYSICIAN ASSISTANT

## 2021-12-07 PROCEDURE — G0439 PPPS, SUBSEQ VISIT: HCPCS | Performed by: PHYSICIAN ASSISTANT

## 2021-12-07 PROCEDURE — 3075F SYST BP GE 130 - 139MM HG: CPT | Performed by: PHYSICIAN ASSISTANT

## 2021-12-07 PROCEDURE — 4004F PT TOBACCO SCREEN RCVD TLK: CPT | Performed by: PHYSICIAN ASSISTANT

## 2021-12-07 PROCEDURE — 3008F BODY MASS INDEX DOCD: CPT | Performed by: PHYSICIAN ASSISTANT

## 2021-12-07 PROCEDURE — 3079F DIAST BP 80-89 MM HG: CPT | Performed by: PHYSICIAN ASSISTANT

## 2021-12-27 DIAGNOSIS — K74.60 ESOPHAGEAL VARICES IN CIRRHOSIS (HCC): ICD-10-CM

## 2021-12-27 DIAGNOSIS — I85.10 ESOPHAGEAL VARICES IN CIRRHOSIS (HCC): ICD-10-CM

## 2021-12-29 RX ORDER — NADOLOL 20 MG/1
20 TABLET ORAL DAILY
Qty: 90 TABLET | Refills: 3 | Status: SHIPPED | OUTPATIENT
Start: 2021-12-29 | End: 2022-03-21

## 2022-01-10 ENCOUNTER — HOSPITAL ENCOUNTER (OUTPATIENT)
Dept: RADIOLOGY | Facility: HOSPITAL | Age: 62
Discharge: HOME | End: 2022-01-10
Payer: MEDICARE

## 2022-01-10 DIAGNOSIS — R76.11 NONSPECIFIC REACTION TO TUBERCULIN TEST: ICD-10-CM

## 2022-01-10 DIAGNOSIS — F11.20 OPIOID TYPE DEPENDENCE, CONTINUOUS (HCC): ICD-10-CM

## 2022-01-10 DIAGNOSIS — Z02.9 ENCOUNTERS FOR UNSPECIFIED ADMINISTRATIVE PURPOSE: ICD-10-CM

## 2022-01-10 PROCEDURE — 71046 X-RAY EXAM CHEST 2 VIEWS: CPT

## 2022-01-13 ENCOUNTER — APPOINTMENT (OUTPATIENT)
Dept: LAB | Facility: HOSPITAL | Age: 62
End: 2022-01-13
Payer: MEDICARE

## 2022-01-13 ENCOUNTER — TELEPHONE (OUTPATIENT)
Dept: ENDOCRINOLOGY | Facility: CLINIC | Age: 62
End: 2022-01-13

## 2022-01-13 DIAGNOSIS — G47.33 OSA (OBSTRUCTIVE SLEEP APNEA): ICD-10-CM

## 2022-01-13 DIAGNOSIS — F11.20 METHADONE MAINTENANCE THERAPY PATIENT (HCC): ICD-10-CM

## 2022-01-13 DIAGNOSIS — E29.1 HYPOGONADISM IN MALE: ICD-10-CM

## 2022-01-13 DIAGNOSIS — E55.9 VITAMIN D DEFICIENCY: ICD-10-CM

## 2022-01-13 DIAGNOSIS — K74.60 CIRRHOSIS OF LIVER WITHOUT ASCITES, UNSPECIFIED HEPATIC CIRRHOSIS TYPE (HCC): ICD-10-CM

## 2022-01-13 DIAGNOSIS — E29.1 SECONDARY MALE HYPOGONADISM: ICD-10-CM

## 2022-01-13 DIAGNOSIS — E29.1 HYPOGONADISM IN MALE: Primary | ICD-10-CM

## 2022-01-13 DIAGNOSIS — E11.9 TYPE 2 DIABETES MELLITUS WITHOUT COMPLICATION, WITHOUT LONG-TERM CURRENT USE OF INSULIN (HCC): ICD-10-CM

## 2022-01-13 DIAGNOSIS — Z12.5 PROSTATE CANCER SCREENING: ICD-10-CM

## 2022-01-13 DIAGNOSIS — I10 BENIGN ESSENTIAL HYPERTENSION: ICD-10-CM

## 2022-01-13 DIAGNOSIS — F11.90 OPIOID USE DISORDER: ICD-10-CM

## 2022-01-13 LAB
CHOLEST SERPL-MCNC: 82 MG/DL
EST. AVERAGE GLUCOSE BLD GHB EST-MCNC: 131 MG/DL
HBA1C MFR BLD: 6.2 %
HCT VFR BLD AUTO: 57.9 % (ref 41–53)
HDLC SERPL-MCNC: 39 MG/DL
HGB BLD-MCNC: 19.5 G/DL (ref 13.5–17.5)
LDLC SERPL CALC-MCNC: 24 MG/DL
NONHDLC SERPL-MCNC: 43 MG/DL
PSA SERPL-MCNC: 0.5 NG/ML (ref 0–4)
TRIGL SERPL-MCNC: 95 MG/DL

## 2022-01-13 PROCEDURE — 36415 COLL VENOUS BLD VENIPUNCTURE: CPT

## 2022-01-13 PROCEDURE — 80061 LIPID PANEL: CPT

## 2022-01-13 PROCEDURE — 85014 HEMATOCRIT: CPT

## 2022-01-13 PROCEDURE — 85018 HEMOGLOBIN: CPT

## 2022-01-13 PROCEDURE — G0103 PSA SCREENING: HCPCS

## 2022-01-13 PROCEDURE — 3044F HG A1C LEVEL LT 7.0%: CPT | Performed by: PHYSICIAN ASSISTANT

## 2022-01-13 PROCEDURE — 83036 HEMOGLOBIN GLYCOSYLATED A1C: CPT

## 2022-01-13 NOTE — TELEPHONE ENCOUNTER
I called the patient regarding his lab results showing elevated hematocrit and hemoglobin  I asked him to stop testosterone and treat his sleep apnea  We will repeat labs prior to his visit  He understands and will stop taking testosterone    He has an appointment for sleep study in April 2022

## 2022-01-14 LAB — TESTOST SERPL-MCNC: NORMAL NG/DL

## 2022-01-19 ENCOUNTER — TELEPHONE (OUTPATIENT)
Dept: ENDOCRINOLOGY | Facility: CLINIC | Age: 62
End: 2022-01-19

## 2022-02-08 DIAGNOSIS — E11.9 TYPE 2 DIABETES MELLITUS WITHOUT COMPLICATION, WITHOUT LONG-TERM CURRENT USE OF INSULIN (HCC): ICD-10-CM

## 2022-02-09 RX ORDER — ATORVASTATIN CALCIUM 40 MG/1
40 TABLET, FILM COATED ORAL DAILY
Qty: 90 TABLET | Refills: 3 | Status: SHIPPED | OUTPATIENT
Start: 2022-02-09

## 2022-03-01 ENCOUNTER — APPOINTMENT (OUTPATIENT)
Dept: LAB | Facility: HOSPITAL | Age: 62
End: 2022-03-01
Payer: MEDICARE

## 2022-03-01 DIAGNOSIS — E29.1 HYPOGONADISM IN MALE: ICD-10-CM

## 2022-03-01 LAB
HCT VFR BLD AUTO: 53.5 % (ref 41–53)
HGB BLD-MCNC: 18 G/DL (ref 13.5–17.5)

## 2022-03-01 PROCEDURE — 36415 COLL VENOUS BLD VENIPUNCTURE: CPT

## 2022-03-01 PROCEDURE — 85014 HEMATOCRIT: CPT

## 2022-03-01 PROCEDURE — 85018 HEMOGLOBIN: CPT

## 2022-03-01 PROCEDURE — 84403 ASSAY OF TOTAL TESTOSTERONE: CPT

## 2022-03-01 PROCEDURE — 84402 ASSAY OF FREE TESTOSTERONE: CPT

## 2022-03-02 LAB
TESTOST FREE SERPL-MCNC: 3.3 PG/ML (ref 6.6–18.1)
TESTOST SERPL-MCNC: 147 NG/DL (ref 264–916)

## 2022-03-04 ENCOUNTER — TELEPHONE (OUTPATIENT)
Dept: ENDOCRINOLOGY | Facility: CLINIC | Age: 62
End: 2022-03-04

## 2022-03-04 NOTE — TELEPHONE ENCOUNTER
----- Message from Mayela Marmolejo MD sent at 3/3/2022 11:05 AM EST -----  Labs reviewed  They show low testosterone but we can not resume Testosterone therapy at this moment given the high hematocrit

## 2022-03-08 ENCOUNTER — OFFICE VISIT (OUTPATIENT)
Dept: FAMILY MEDICINE CLINIC | Facility: CLINIC | Age: 62
End: 2022-03-08
Payer: MEDICARE

## 2022-03-08 VITALS
WEIGHT: 200 LBS | HEART RATE: 88 BPM | HEIGHT: 65 IN | TEMPERATURE: 98 F | RESPIRATION RATE: 20 BRPM | DIASTOLIC BLOOD PRESSURE: 86 MMHG | SYSTOLIC BLOOD PRESSURE: 138 MMHG | BODY MASS INDEX: 33.32 KG/M2 | OXYGEN SATURATION: 97 %

## 2022-03-08 DIAGNOSIS — K74.60 CIRRHOSIS OF LIVER WITHOUT ASCITES, UNSPECIFIED HEPATIC CIRRHOSIS TYPE (HCC): ICD-10-CM

## 2022-03-08 DIAGNOSIS — I85.10 SECONDARY ESOPHAGEAL VARICES WITHOUT BLEEDING (HCC): ICD-10-CM

## 2022-03-08 DIAGNOSIS — K76.6 PORTAL HYPERTENSION (HCC): ICD-10-CM

## 2022-03-08 DIAGNOSIS — R30.0 DYSURIA: ICD-10-CM

## 2022-03-08 DIAGNOSIS — F11.20 METHADONE MAINTENANCE THERAPY PATIENT (HCC): ICD-10-CM

## 2022-03-08 DIAGNOSIS — B18.2 CHRONIC HEPATITIS C WITHOUT HEPATIC COMA (HCC): ICD-10-CM

## 2022-03-08 DIAGNOSIS — Z23 NEEDS FLU SHOT: ICD-10-CM

## 2022-03-08 DIAGNOSIS — F17.200 TOBACCO USE DISORDER: Primary | ICD-10-CM

## 2022-03-08 DIAGNOSIS — E11.9 TYPE 2 DIABETES MELLITUS WITHOUT COMPLICATION, WITHOUT LONG-TERM CURRENT USE OF INSULIN (HCC): ICD-10-CM

## 2022-03-08 LAB
CREAT UR-MCNC: 150 MG/DL
MICROALBUMIN UR-MCNC: 7.8 MG/L (ref 0–20)
MICROALBUMIN/CREAT 24H UR: 5 MG/G CREATININE (ref 0–30)
SL AMB  POCT GLUCOSE, UA: ABNORMAL
SL AMB LEUKOCYTE ESTERASE,UA: ABNORMAL
SL AMB POCT BILIRUBIN,UA: ABNORMAL
SL AMB POCT BLOOD,UA: ABNORMAL
SL AMB POCT CLARITY,UA: CLEAR
SL AMB POCT COLOR,UA: ABNORMAL
SL AMB POCT KETONES,UA: ABNORMAL
SL AMB POCT NITRITE,UA: ABNORMAL
SL AMB POCT PH,UA: 6
SL AMB POCT SPECIFIC GRAVITY,UA: 1.01
SL AMB POCT URINE PROTEIN: ABNORMAL
SL AMB POCT UROBILINOGEN: 0.2

## 2022-03-08 PROCEDURE — G0008 ADMIN INFLUENZA VIRUS VAC: HCPCS | Performed by: FAMILY MEDICINE

## 2022-03-08 PROCEDURE — 90682 RIV4 VACC RECOMBINANT DNA IM: CPT | Performed by: FAMILY MEDICINE

## 2022-03-08 PROCEDURE — 99215 OFFICE O/P EST HI 40 MIN: CPT | Performed by: FAMILY MEDICINE

## 2022-03-08 PROCEDURE — 82043 UR ALBUMIN QUANTITATIVE: CPT | Performed by: FAMILY MEDICINE

## 2022-03-08 PROCEDURE — 82570 ASSAY OF URINE CREATININE: CPT | Performed by: FAMILY MEDICINE

## 2022-03-08 PROCEDURE — 81002 URINALYSIS NONAUTO W/O SCOPE: CPT | Performed by: FAMILY MEDICINE

## 2022-03-08 RX ORDER — BLOOD-GLUCOSE CONTROL, NORMAL
EACH MISCELLANEOUS
COMMUNITY
Start: 2022-03-05

## 2022-03-08 RX ORDER — LANCING DEVICE
EACH MISCELLANEOUS
COMMUNITY
Start: 2022-03-05

## 2022-03-08 RX ORDER — BLOOD-GLUCOSE METER
KIT MISCELLANEOUS
COMMUNITY
Start: 2022-03-05

## 2022-03-08 RX ORDER — BLOOD-GLUCOSE METER
1 KIT MISCELLANEOUS DAILY
Qty: 100 STRIP | Refills: 3 | Status: SHIPPED | OUTPATIENT
Start: 2022-03-08

## 2022-03-08 RX ORDER — BLOOD-GLUCOSE METER
KIT MISCELLANEOUS
COMMUNITY
Start: 2022-03-05 | End: 2022-03-08 | Stop reason: SDUPTHER

## 2022-03-08 RX ORDER — LANCETS 28 GAUGE
EACH MISCELLANEOUS
COMMUNITY
Start: 2022-03-05

## 2022-03-08 RX ORDER — ISOPROPYL ALCOHOL 0.75 G/1
SWAB TOPICAL
COMMUNITY
Start: 2022-03-05

## 2022-03-08 NOTE — ASSESSMENT & PLAN NOTE
Cornell Costello is 57y o  years old, asymptomatic from lung cancer; with smoking status current , he has a 30 pack year smoking history  he  understands CT lung screening goals  We discussed about the possible diagnosis from the screening, the false positive rate and the total radiation exposure  Also he understood about importance of compliance annual lung cancer low dose CT screening  Rishi decided to undergo diagnosis and treatment if needed  Patient  was encouraged to quit smoking,  I offered smoking cessation that he declined

## 2022-03-08 NOTE — PATIENT INSTRUCTIONS
Cuidado del pie para personas con diabetes   CUIDADO AMBULATORIO:   Lo que usted necesita saber acerca del cuidado del pie:  · El cuidado del pie ayuda a proteger novak pies y evitar úlceras o llagas en el pie  Los [facility] de azúcar en la lakshmi a jas plazo pueden dañar los vasos sanguíneos y los nervios en caity piernas y pies  Florencia daño dificulta que sienta presión, dolor, temperatura y el tacto  Es posible que usted no sienta masoud cortada o masoud úlcera o que los zapatos están muy apretados  El cuidado del pie es necesario para evitar problemas graves, home masoud infección o masoud amputación  · La diabetes podría provocar que los dedos de caity pies se tuerzan o se encorven [place]  Estos cambios podrían afectar la manera en que usted camina y pueden conllevar al aumento de la presión en novak pie  La presión puede disminuir el flujo sanguíneo a caity pies  La falta del flujo sanguíneo aumenta novak riesgo de masoud úlcera en [employer]  No ignore problemas pequeños, home la piel reseca o heridas pequeñas  Con el tiempo, estos puede representar masoud amenaza para la sujata si no se les da el cuidado apropiado  Llame al proveedor del equipo de cuidados de brittanie si:  · Caity pies se ponen entumecidos, débiles o difícil de   · Usted tiene pus drenando de masoud llaga en novak pie  · Usted tiene masoud herida en novak pie que se hace más mitul, más profunda o que no karlie  · Usted nota que tiene ampollas, cortadas, rasguños, callos o llagas en novak pie  · Usted tiene fiebre y caity pies se ponen rojos, calientes e inflamados  · Las uñas de caity pies se vuelven gruesas, encorvadas o [id number]  · Le es difícil revisarse los pies porque novak visión no está karen  · Usted tiene preguntas o inquietudes acerca de novak condición o cuidado  Cómo cuidar de caity pies:  · Revísese los pies a diario  Observe todo el pie, incluyendo la planta del pie, entre y [employer] dedos  Revise si hay heridas y callos   Use un karen para verse la planta de los pies  La piel de los pies podría estar brillante, estirada o más obscura de lo normal  Caity pies también podrían estar fríos y pálidos  Pase caity flaca por encima, por debajo, por los lados y [employer] dedos del pie para sentir la piel  El enrojecimiento, inflamación y calor son signos de problemas con el flujo sanguíneo que pueden conllevar a masoud úlcera en el pie  No trate de quitarse los callos usted mismo  · [employer] todos los días con agua tibia y Ronald  No use [place], porque esto puede lesionarle el pie  Séquese los pies suavemente con masoud toalla después de lavarlos  Seque entre y [employer] dedos  · Aplique masoud loción o un humectante sobre caity pies secos  Pregunte al médico del equipo de cuidados de brittanie cuáles lociones son las mejores que puede usar  No  aplique loción o humectante entre caity dedos  La [employer] dedos del pie podría causar rupturas de la piel  · [address] uñas de los pies correctamente  Lime o colin las uñas de los pies en línea recta  Use un cepillo suave para limpiar alrededor [employer]  Si las [address] gruesas, es posible que necesite que un médico del equipo de cuidados de brittanie o un especialista se las colin  · Proteja caity pies  No  camine descalzo ni use zapatos sin calcetines  Compruebe que no haya piedras u otros objetos dentro de caity zapatos que le podrían independenceIT  Use calcetines de algodón para ayudar a [employer] [employer]  Use calcetines sin costura en los dedos o póngaselos con la costura hacia afuera  Cámbiese los calcetines diariamente  No use calcetines sucios ni húmedos  · Use zapatos que le calcen karen  Use zapatos que no rocen ninguna parte de caity pies  Adhikari calzado debería ser de ½ a ¾ pulgada (1 a 2 centímetros) más grandes que caity pies  Adhikari calzado también debería tener espacio adicional alrededor de la parte más ancha de caity pies   El calzado para caminar o atlético con cordones o correas que se ajustan son los mejores  Pida ayuda al médico del equipo de cuidados de brittanie para elegir un par de zapatos que le calcen karen  Pregúntele si debe usar algún tipo de plantilla, soporte o vendaje en alvin pies  · Asista a alvin citas de seguimiento  El médico del equipo de cuidados de brittanie raffy [place] revisión a alvin pies al menos masoud vez al [place]  Es posible que usted necesite hacerse un examen de pie más seguido si tiene los nervios dañados, deformidad en el pie o [place]  Él revisará si hay daño al nervio y qué tan karen usted puede sentir alvin pies  Él le revisará novak calzado para kiko si le [employer]  · No fume  Fumar puede dañar los vasos sanguíneos y aumentar novak riesgo de úlceras en los pies  Pida al médico de novak equipo de cuidados de brittanie información si usted fuma actualmente y [place] para dejar de hacerlo  Los cigarrillos electrónicos o el tabaco sin humo igualmente contienen nicotina  Consulte con novak médico del equipo de cuidados de brittanie antes de utilizar estos productos  Acuda a alvin consultas de control con el médico del equipo de cuidado de la diabetes o un especialista en pies según le indicaron: Usyessy va a necesitar que le revisen alvin pies al menos masoud vez al [place]  Es posible que usted necesite hacerse un examen de pie más seguido si tiene los nervios dañados, deformidad en el pie o [place]  Anote alvin preguntas para que se acuerde de hacerlas bertrand alvin visitas  © Copyright [employer] 2022 Information is for End User's use only and may not be sold, redistributed or otherwise used for commercial purposes  All illustrations and images included in CareNotes® are the copyrighted property of [employer]  or [address] [place] [address] es sólo para uso en educación  Novak intención no es darle un consejo médico sobre enfermedades o tratamientos  Colsulte con novak Alexia Antis farmacéutico antes de seguir cualquier régimen médico para saber si es seguro y efectivo para usted

## 2022-03-08 NOTE — PROGRESS NOTES
Assessment/Plan:  1  Tobacco use 38 pk year    - CT lung screening program; Future    2  Chronic hepatitis C without hepatic coma ([facility] )      3  Cirrhosis of liver without ascites, unspecified hepatic cirrhosis type ([facility] )              4  Methadone maintenance therapy patient ([facility] )    I have evaluated patient: condition is stable, I reviewed consultation with methadone clinic   , Avoid OTC NSAID's and Compliance with therapy encouraged  8  Dysuria    - POCT urine dip    9  Needs flu shot    - influenza vaccine, quadrivalent, recombinant, PF, 0 5 mL, for patients 18 yr+ (FLUBLOK)    Chronic hepatitis C treated in 2014 [facility])  Post treatment Simeprevir+Sofosbuvir+Ribavirin x 12 weeks  Negative viral load after treatment  Patient cured  Cirrhosis of liver as sequela  Type 2 diabetes mellitus with hyperglycemia, without long-term current use of insulin (HCC)    Lab Results   Component Value Date    HGBA1C 6 2 (H) 01/13/2022   I have evaluated patient: condition is stable            Tobacco use 45 pk year    Lynette Marmolejo is 57y o  years old, asymptomatic from lung cancer; with smoking status current , he has a 30 pack year smoking history  he  understands CT lung screening goals  We discussed about the possible diagnosis from the screening, the false positive rate and the total radiation exposure  Also he understood about importance of compliance annual lung cancer low dose CT screening  Rishi decided to undergo diagnosis and treatment if needed  Patient  was encouraged to quit smoking,  I offered smoking cessation that he declined           Diagnoses and all orders for this visit:    Tobacco use 38 pk year  -     CT lung screening program; Future    Chronic hepatitis C without hepatic coma (HCC)    Cirrhosis of liver without ascites, unspecified hepatic cirrhosis type ([facility] )    Methadone maintenance therapy patient ([place] [address] )    Portal hypertension ([place] [address] )    Secondary esophageal varices without bleeding ([facility] )    Type 2 diabetes mellitus without complication, without long-term current use of insulin ([place] [address] )  -     Cancel: Microalbumin / creatinine urine ratio; Future  -     FREESTYLE LITE test strip; Use 1 each in the morning Use as instructed  -     Microalbumin / creatinine urine ratio    Dysuria  -     POCT urine dip    Needs flu shot  -     influenza vaccine, quadrivalent, recombinant, PF, 0 5 mL, for patients 18 yr+ (FLUBLOK)    Other orders  -     Alcohol Swabs (B-D SINGLE USE SWABS REGULAR) PADS; TEST 2-3 TIMES A DAY AS DIRECTED TEST 2-3 TIMES A DAY AS DIRECTED  -     Blood Glucose Calibration (FreeStyle Control Solution) LIQD; USE AS DIRECTED USE DANI LAS INDICACIONES  -     Blood Glucose Monitoring Suppl (FreeStyle Lite) w/Device KIT; USE AS DIRECTED USE DANI LAS INDICACIONES  -     Blood Pressure Monitoring ([place] Choice BP Monitor/Arm) COCO; USE AS DIRECTED USE DANI LAS INDICACIONES  -     Discontinue: FREESTYLE LITE test strip; TEST 2-3 TIMES A DAY AS DIRECTED TEST 2-3 TIMES A DAY AS DIRECTED  -     Lancet Devices (Adjustable Lancing Device) MISC; USE AS DIRECTED USE DANI LAS INDICACIONES  -     Global Inject Ease Lancets 28G MISC; TEST 2-3 TIMES A DAY AS DIRECTED TEST 2-3 TIMES A DAY AS DIRECTED          Subjective:      Patient ID: Juventino Brown is a 57 y o  male  Patient is here to follow Diabetes and HTN, patient states good compliance with treatment  Denies chest pain, shortness of breath, angina, urinary problems  No exercise but follows low salt and low carbohydrates diet  Atorvastatin 40 mg a day  Nadolol for portal hypertension  Rybelsus Tabs 7 mg a day  Having nauseas after increases rybelsus        The following portions of the patient's history were reviewed and updated as appropriate: allergies, current medications, past family history, past medical history, past social history, past surgical history and problem list     Review of Systems   Constitutional: Negative for diaphoresis, fatigue, fever and unexpected weight change  Respiratory: Negative for apnea, cough, choking, chest tightness and shortness of breath  Cardiovascular: Negative for chest pain, palpitations and leg swelling  Gastrointestinal: Positive for nausea  Negative for abdominal distention, abdominal pain, anal bleeding, blood in stool and constipation  Musculoskeletal: Negative for arthralgias, back pain, gait problem and joint swelling  Neurological: Negative for dizziness, facial asymmetry, light-headedness and headaches  Psychiatric/Behavioral: Negative for behavioral problems, dysphoric mood and self-injury  The patient is not nervous/anxious  Objective:      /86 (BP Location: Left arm, Patient Position: Sitting, Cuff Size: Standard)   Pulse 88   Temp 98 °F (36 7 °C) (Temporal)   Resp 20   Ht 5' 5" (1 651 m)   Wt 90 7 kg (200 lb)   SpO2 97%   BMI 33 28 kg/m²          Physical Exam  Vitals and nursing note reviewed  Constitutional:       Appearance: Normal appearance  Neck:      Thyroid: No thyroid mass or thyromegaly  Vascular: No carotid bruit or JVD  Trachea: No tracheal tenderness  Cardiovascular:      Rate and Rhythm: Normal rate and regular rhythm  No extrasystoles are present  Pulses: Normal pulses  Heart sounds: Normal heart sounds  Heart sounds not distant  No friction rub  Pulmonary:      Effort: Pulmonary effort is normal  No tachypnea or bradypnea  Breath sounds: Normal breath sounds  No stridor  Abdominal:      General: Bowel sounds are normal  There is distension  There is no abdominal bruit  Palpations: Abdomen is soft  There is no hepatomegaly or splenomegaly  Hernia: No hernia is present  Musculoskeletal:         General: Normal range of motion  Cervical back: No edema or rigidity  Skin:     General: Skin is warm and dry  Neurological:      Mental Status: He is oriented to person, place, and time        Deep Tendon Reflexes: Reflexes are normal and symmetric  Psychiatric:         Behavior: Behavior normal          Thought Content:  Thought content normal          Judgment: Judgment normal

## 2022-03-08 NOTE — ASSESSMENT & PLAN NOTE
Post treatment Simeprevir+Sofosbuvir+Ribavirin x 12 weeks  Negative viral load after treatment  Patient cured  Cirrhosis of liver as sequela

## 2022-03-08 NOTE — ASSESSMENT & PLAN NOTE
Lab Results   Component Value Date    HGBA1C 6 2 (H) 01/13/2022   I have evaluated patient: condition is stable

## 2022-03-17 ENCOUNTER — OFFICE VISIT (OUTPATIENT)
Dept: ENDOCRINOLOGY | Facility: CLINIC | Age: 62
End: 2022-03-17
Payer: MEDICARE

## 2022-03-17 VITALS
BODY MASS INDEX: 33.72 KG/M2 | WEIGHT: 202.4 LBS | HEIGHT: 65 IN | SYSTOLIC BLOOD PRESSURE: 134 MMHG | HEART RATE: 98 BPM | DIASTOLIC BLOOD PRESSURE: 98 MMHG

## 2022-03-17 DIAGNOSIS — E11.65 TYPE 2 DIABETES MELLITUS WITH HYPERGLYCEMIA, WITHOUT LONG-TERM CURRENT USE OF INSULIN (HCC): ICD-10-CM

## 2022-03-17 DIAGNOSIS — E29.1 HYPOGONADISM IN MALE: Primary | ICD-10-CM

## 2022-03-17 DIAGNOSIS — G47.33 OSA (OBSTRUCTIVE SLEEP APNEA): ICD-10-CM

## 2022-03-17 DIAGNOSIS — E55.9 VITAMIN D DEFICIENCY: ICD-10-CM

## 2022-03-17 PROCEDURE — 99214 OFFICE O/P EST MOD 30 MIN: CPT | Performed by: INTERNAL MEDICINE

## 2022-03-17 NOTE — PROGRESS NOTES
Luz Garvin 57 y o  male MRN: [id number]    Encounter: [id number]      Assessment/Plan     Assessment: This is a 57y o -year-old male with secondary hypogonadism due to chronic opioid use, type 2 diabetes, RODRICK, liver cirrhosis, vitamin D deficiency and hyperlipidemia  Plan:    -Secondary hypogonadism due to chronic opioid use  Patient is currently off testosterone supplementation due to high hematocrit level  We discussed again the risks of blood clots with testosterone therapy  We advised to treat congestion and use CPAP regularly  Patient can follow-up p r n  and consider restarting testosterone once hematocrit is normal         -Type 2 diabetes: controlled with A1c 6 2%  Patient reports nausea with Rebylsus 7 mg daily  We discussed the option of decreasing the dose back to 3 mg daily  He prefers to continue Rybelsus 7 mg daily for next few months and assess if nausea resolve  -RODRICK: continue CPAP and follow-up with sleep medicine    -Vitamin-D deficiency: level improved to 29 1; continue vitamin-D supplementation          CC: Hypogonadism/  Type 2 diabetes    History of Present Illness     HPI:    Patient is a 60-year-old male who presents for follow-up of secondary hypogonadism and type 2 diabetes  Medical history is also significant for liver cirrhosis and hepatitis-C, opioid use disorder on chronic methadone therapy and obstructive sleep apnea  Since his last visit, hematocrit was rechecked and found persistently high; we have discontinued testosterone supplementation and advised patient to treat RODRICK to normalize hematocrit level  Prior to that, testosterone level was normal on testosterone 50 mg weekly  Patient admits to wearing the CPAP machine 4 to 5 times a week due to nasal congestion  He has an upcoming appointment with sleep medicine   He reports good energy level  He reports erectile dysfunction that does not bother him at this moment        For type 2 diabetes, he take Rebylsus 7 mg daily  He reports nausea since increasing his dose from 3 mg to 7 mg  He denies any abdominal pain or vomiting  He lost 20 lb in the last 3 months  He made changes in his diet and walks regularly  He checks his fingerstick 2 to 3 times a day; they range between 90 and 130 during the day,He does report lower fasting blood glucose 78-97  He has no symptoms of low blood glucose  He denies polyuria, polydipsia or blurry vision  He does report numbness of the feet  Last foot exam: last Tuesday  Eye exam:   September 2021  No diabetic retinopathy      Review of Systems   Constitutional: Negative for activity change, appetite change, chills, fatigue, fever and unexpected weight change  HENT: Positive for congestion  Negative for ear pain, sore throat, trouble swallowing and voice change  Eyes: Negative for pain and visual disturbance  Respiratory: Negative for cough and shortness of breath  Cardiovascular: Negative for chest pain and palpitations  Gastrointestinal: Negative for abdominal pain, constipation, diarrhea and vomiting  Endocrine: Negative for cold intolerance, heat intolerance, polydipsia, polyphagia and polyuria  Genitourinary: Negative for dysuria and hematuria  Musculoskeletal: Negative for arthralgias and back pain  Skin: Negative for color change and rash  Neurological: Positive for numbness  Negative for seizures and syncope  Psychiatric/Behavioral: Negative for confusion  The patient is not nervous/anxious  All other systems reviewed and are negative        Historical Information   Past Medical History:   Diagnosis Date    Colon polyp     Diabetes mellitus ([address] )     Esophageal varices (HCC)     Gastritis     Hepatitis C     History of Helicobacter pylori infection 2/23/2016    History of kidney stones     Hyperlipidemia     Hypertension     Infectious viral hepatitis     Kidney stone     Obesity     Pneumonia     PPD positive 2020    Sleep apnea  Substance abuse [facility])      Past Surgical History:   Procedure Laterality Date    COLONOSCOPY  06/20/2014    dr padilla    COLONOSCOPY  2014    Dr Padilla    EGD AND COLONOSCOPY  08/2020    esophageal varices, colon polyp, hemorrhoids    ESOPHAGOGASTRODUODENOSCOPY  12/10/2015    esophageal varices, cirrhosis, gastritis    HYDROCELE EXCISION / REPAIR      LIVER BIOPSY  2014     Social History   Social History     Substance and Sexual Activity   Alcohol Use Not Currently    Alcohol/week: 0 0 standard drinks     Social History     Substance and Sexual Activity   Drug Use Not Currently    Types: Heroin    Comment: former user- heroin was drug of choice     Social History     Tobacco Use   Smoking Status Current Every Day Smoker    Packs/day: 0 50    Types: Cigarettes   Smokeless Tobacco Never Used   Tobacco Comment    TOBACCO USE     Family History:   Family History   Problem Relation Age of Onset    Diabetes type II Mother         MELLITUS    Hypertension Mother     Hypertension Sister     Diabetes Sister         MELLITUS    No Known Problems Maternal Grandmother     No Known Problems Maternal Grandfather     No Known Problems Paternal Grandmother     No Known Problems Paternal Grandfather     No Known Problems Sister     Breast cancer Maternal Aunt         45s    No Known Problems Maternal Aunt     No Known Problems Paternal Aunt     No Known Problems Paternal Aunt     No Known Problems Paternal Aunt     No Known Problems Paternal Aunt     No Known Problems Paternal Aunt     No Known Problems Paternal Aunt        Meds/Allergies   Current Outpatient Medications   Medication Sig Dispense Refill    Alcohol Swabs (B-D SINGLE USE SWABS REGULAR) PADS TEST 2-3 TIMES A DAY AS DIRECTED TEST 2-3 TIMES A DAY AS DIRECTED      atorvastatin (LIPITOR) 40 mg tablet TAKE 1 TABLET (40 MG TOTAL) BY MOUTH DAILY 90 tablet 3    Blood Glucose Calibration (FreeStyle Control Solution) LIQD USE AS DIRECTED USE DANI PEREZ INDICAPRISCILLA      Blood Glucose Monitoring Suppl (FreeStyle Lite) w/Device KIT USE AS DIRECTED USE DANI PEREZ DESOUZA      Blood Pressure Monitoring ([place] Choice BP Monitor/Arm) COCO USE AS DIRECTED USE DANI PEREZ DESOUZA      FREESTYLE LITE test strip Use 1 each in the morning Use as instructed 100 strip 3    Global Inject Ease Lancets 28G MISC TEST 2-3 TIMES A DAY AS DIRECTED TEST 2-3 TIMES A DAY AS DIRECTED      Lancet Devices (Adjustable Lancing Device) MISC USE AS DIRECTED USE DANI LAS INDICAPRISCILLA      methadone (DOLOPHINE) 10 mg/mL oral concentrated solution Take 44 mg by mouth daily      nadolol (CORGARD) 20 mg tablet TAKE 1 TABLET (20 MG TOTAL) BY MOUTH DAILY 90 tablet 3    Semaglutide (Rybelsus) 7 MG TABS Take 7 mg daily 90 tablet 3    SYRINGE-NEEDLE, DISP, 3 ML (B-D 3CC LUER-MARÍA ELENA SYR 00NO4-7/2) 21G X 1-1/2" 3 ML MISC Use once weekly 12 each 0     No current facility-administered medications for this visit  Allergies   Allergen Reactions    Meperidine Abdominal Pain       Objective   Vitals: There were no vitals taken for this visit  Physical Exam  Vitals reviewed  Constitutional:       General: He is not in acute distress  Appearance: Normal appearance  He is obese  He is not ill-appearing or diaphoretic  HENT:      Head: Normocephalic and atraumatic  Nose: Nose normal       Mouth/Throat:      Mouth: Mucous membranes are moist    Eyes:      General: No scleral icterus  Extraocular Movements: Extraocular movements intact  Conjunctiva/sclera: Conjunctivae normal       Pupils: Pupils are equal, round, and reactive to light  Neck:      Comments: No thyromegaly  Cardiovascular:      Rate and Rhythm: Normal rate  Pulses: Normal pulses  Heart sounds: Normal heart sounds  Pulmonary:      Effort: Pulmonary effort is normal       Breath sounds: Normal breath sounds     Abdominal:      General: Bowel sounds are normal       Palpations: Abdomen is soft       Tenderness: There is no abdominal tenderness  Musculoskeletal:         General: Normal range of motion  Cervical back: Normal range of motion and neck supple  Right lower leg: No edema  Left lower leg: No edema  Lymphadenopathy:      Cervical: No cervical adenopathy  Skin:     General: Skin is warm  Neurological:      Mental Status: He is alert and oriented to person, place, and time  Gait: Gait normal       Deep Tendon Reflexes: Reflexes normal    Psychiatric:         Mood and Affect: Mood normal          Behavior: Behavior normal          Thought Content: Thought content normal          Judgment: Judgment normal          The history was obtained from the review of the chart, patient      Lab Results:   Lab Results   Component Value Date/Time    Hemoglobin A1C 6 2 (H) 01/13/2022 07:13 AM    Hemoglobin A1C 7 4 (H) 11/26/2021 07:16 AM    Hemoglobin A1C 6 4 (H) 08/19/2021 07:11 AM    WBC 12 00 (H) 11/26/2021 07:16 AM    Hemoglobin 18 0 (H) 03/01/2022 07:11 AM    Hemoglobin 19 5 (H) 01/13/2022 07:13 AM    Hemoglobin 17 4 11/26/2021 07:16 AM    Hematocrit 53 5 (H) 03/01/2022 07:11 AM    Hematocrit 57 9 (H) 01/13/2022 07:13 AM    Hematocrit 53 2 (H) 11/26/2021 07:16 AM    MCV 91 11/26/2021 07:16 AM    Platelets 097 31/26/7936 07:16 AM    BUN 3 (L) 08/19/2021 07:11 AM    BUN 10 05/11/2021 07:58 AM    Potassium 4 0 08/19/2021 07:11 AM    Potassium 4 5 05/11/2021 07:58 AM    Chloride 101 08/19/2021 07:11 AM    Chloride 101 05/11/2021 07:58 AM    CO2 27 08/19/2021 07:11 AM    CO2 31 (H) 05/11/2021 07:58 AM    Creatinine 0 89 08/19/2021 07:11 AM    Creatinine 0 80 05/11/2021 07:58 AM    AST 27 08/19/2021 07:11 AM    AST 49 05/11/2021 07:58 AM    ALT 18 08/19/2021 07:11 AM    ALT 39 05/11/2021 07:58 AM    Albumin 4 1 08/19/2021 07:11 AM    Albumin 3 9 05/11/2021 07:58 AM    HDL, Direct 39 (L) 01/13/2022 07:13 AM    HDL, Direct 37 (L) 05/11/2021 07:58 AM    Triglycerides 95 01/13/2022 07:13 AM    Triglycerides 77 05/11/2021 07:58 AM        Ref  Range 3/1/2022 07:11   Testosterone, Total, LC/MS Latest Ref Range: 264 - 916 ng/dL 147 (L)   TESTOSTERONE FREE Latest Ref Range: 6 6 - 18 1 pg/mL 3 3 (L)      Ref  Range 1/13/2022 07:13 3/1/2022 07:11   Hemoglobin Latest Ref Range: 13 5 - 17 5 g/dL 19 5 (H) 18 0 (H)   HCT Latest Ref Range: 41 0 - 53 0 % 57 9 (H) 53 5 (H)         Imaging Studies: I have personally reviewed pertinent reports  Portions of the record may have been created with voice recognition software  Occasional wrong word or "sound a like" substitutions may have occurred due to the inherent limitations of voice recognition software  Read the chart carefully and recognize, using context, where substitutions have occurred

## 2022-03-21 DIAGNOSIS — K74.60 ESOPHAGEAL VARICES IN CIRRHOSIS (HCC): ICD-10-CM

## 2022-03-21 DIAGNOSIS — I85.10 ESOPHAGEAL VARICES IN CIRRHOSIS (HCC): ICD-10-CM

## 2022-03-21 RX ORDER — NADOLOL 20 MG/1
20 TABLET ORAL DAILY
Qty: 90 TABLET | Refills: 3 | Status: SHIPPED | OUTPATIENT
Start: 2022-03-21

## 2022-04-15 ENCOUNTER — OFFICE VISIT (OUTPATIENT)
Dept: SLEEP CENTER | Facility: CLINIC | Age: 62
End: 2022-04-15
Payer: MEDICARE

## 2022-04-15 DIAGNOSIS — F11.20 METHADONE MAINTENANCE THERAPY PATIENT (HCC): ICD-10-CM

## 2022-04-15 DIAGNOSIS — F17.200 TOBACCO USE DISORDER: ICD-10-CM

## 2022-04-15 DIAGNOSIS — K74.60 CIRRHOSIS OF LIVER WITHOUT ASCITES, UNSPECIFIED HEPATIC CIRRHOSIS TYPE (HCC): ICD-10-CM

## 2022-04-15 DIAGNOSIS — E11.9 TYPE 2 DIABETES MELLITUS WITHOUT COMPLICATION, WITHOUT LONG-TERM CURRENT USE OF INSULIN (HCC): ICD-10-CM

## 2022-04-15 DIAGNOSIS — F11.90 OPIOID USE DISORDER: ICD-10-CM

## 2022-04-15 DIAGNOSIS — I10 BENIGN ESSENTIAL HYPERTENSION: ICD-10-CM

## 2022-04-15 DIAGNOSIS — G47.34 SLEEP RELATED HYPOXIA: ICD-10-CM

## 2022-04-15 DIAGNOSIS — G47.33 OSA (OBSTRUCTIVE SLEEP APNEA): Primary | ICD-10-CM

## 2022-04-15 DIAGNOSIS — R09.81 NASAL CONGESTION: ICD-10-CM

## 2022-04-15 DIAGNOSIS — E66.9 OBESITY (BMI 30-39.9): ICD-10-CM

## 2022-04-15 PROCEDURE — 99214 OFFICE O/P EST MOD 30 MIN: CPT | Performed by: INTERNAL MEDICINE

## 2022-04-15 NOTE — PATIENT INSTRUCTIONS
Nasal symptoms may [improve] with regular nasal saline rinse followed by topical nasal steroid if necessary  Nursing Support:  When: Monday through Friday 7A-5PM except holidays  Where: Our direct line is [phone]  If you are having a true emergency please call 911  In the event that the line is busy or it is after hours please leave a voice message and we will return your call  Please speak clearly, leaving your full name, birth date, best number to reach you and the reason for your call  Medication refills: We will need the name of the medication, the dosage, the ordering provider, whether you get a 30 or 90 day refill, and the pharmacy name and address  Medications will be ordered by the provider only  Nurses cannot call in prescriptions  Please allow 7 days for medication refills  Physician requested updates: If your provider requested that you call with an update after starting medication, please be ready to provide us the medication and dosage, what time you take your medication, the time you attempt to fall asleep, time you fall asleep, when you wake up, and what time you get out of bed  Sleep Study Results: We will contact you with sleep study results and/or next steps after the physician has reviewed your testing

## 2022-04-15 NOTE — PROGRESS NOTES
Follow-Up Note - [facility]  57 y o  male  :1960  NCC:[id number]  DOS:4/15/2022    CC: I saw this patient for follow-up in clinic today for Sleep disordered breathing, Coexisting Sleep and Medical Problems  A sleep study to titrate Positive airway pressure (PAP) therapy was undertaken  Patient is here to review results and to adjust therapy  He initiated CPAP using a ResMed med machine in   The study demonstrated sleep disordered breathing was adequately remediated with PAP at 12 cm H2O  A diagnostic study in May of 2021 demonstrated obstructive sleep apnea: AHI 11 9 /hour , higher while supine and during stage REM  Francella Crooked Moderate intensity  snoring  was noted  Minimum oxygen saturation 84 %  and 289 5 minutes (out of 474 minutes) of total sleep time was spent with saturations less than 90%  Auto titrating Pap using a ResMed med machine was initiated while awaiting a titration study that could not be undertaken because of the Dinesh recall  PFSH, Problem List, Medications & Allergies were reviewed in EMR  Interval changes: none reported  He  has a past medical history of Colon polyp, Diabetes mellitus ([address] ), Esophageal varices ([address] ), Gastritis, Hepatitis C, History of Helicobacter pylori infection (2016), History of kidney stones, Hyperlipidemia, Hypertension, Infectious viral hepatitis, Kidney stone, Obesity, Pneumonia, PPD positive (), Sleep apnea, and Substance abuse ([address] )  He has a current medication list which includes the following prescription(s): b-d single use swabs regular, atorvastatin, freestyle control solution, freestyle lite, clever choice bp monitor/arm, freestyle lite, global inject ease lancets 28g, adjustable lancing device, methadone, nadolol, rybelsus, and b-d 3cc luer-falguni syr [id number]/2  PHYSIOLOGICAL DATA REVIEW AND INTERPRETATION:  In the past 30 days  using PAP > 4 hours/night 3%   Estimated JOHN 1 4/hour with pressure of 12cm H2O ]; Patient has not been using ozone based devices to sanitize the machine  SUBJECTIVE: Regarding use of PAP, Rishi reports:   · He is experiencing significant adverse effects: nasal congestion that limits use; has not noticed any fibres or foreign material in air line  · He is benefiting from use: sleeping better   Sleep Routine: Jimmy Jalloh reports getting 6 hrs sleep  ; he has no difficulty initiating or maintaining sleep   He arises spontaneously and usually feels refreshed  Rishi reports Excessive Daytime Sleepiness, takes planned naps for 2-3 hours  He rated himself at Total score: 10 /24 on the [place] Sleepiness Scale  Habits: reports that he has been smoking cigarettes  He has been smoking about 0 50 packs per day  He has never used smokeless tobacco ,  reports previous alcohol use ,  reports previous drug use  Drug: Heroin  , Caffeine use:rarely ; Exercise routine: regular    ROS: reviewed & as attached  Significant for some intentional weight reduction since his last visit  Jose Espinoza He reported no respiratory or cardiac symptoms  He has back pain that is not disturbing sleep  Jose Espinoza EXAM: /88 (BP Location: Left arm, Patient Position: Sitting, Cuff Size: Adult)   Pulse (!) 110   Ht 5' 5" (1 651 m)   BMI 33 68 kg/m²     Patient is well groomed; well appearing  CNS: Alert, orientated, clear & coherent speech  Psych: cooperativeand in no distress  Mental state:appears normal   H&N: EOMI; NC/AT:no facial pressure marks, no rashes  Skin/Extrem: col & hydration normal; no edema  Resp: Respiratory effort is normal  Physical findings otherwise essentially unchanged from previous  IMPRESSION: Problem List Items & Comorbidities Addressed this Visit    1  RODRICK (obstructive sleep apnea)  PAP DME Resupply/Reorder   2  Sleep related hypoxia     3  Nasal congestion     4  Methadone maintenance therapy patient ([facility] )     5  Opioid use disorder     6  Tobacco use disorder     7   Benign essential hypertension     8  Obesity (BMI 30-39 9)     9  Type 2 diabetes mellitus without complication, without long-term current use of insulin ([facility] )     10  Cirrhosis of liver without ascites, unspecified hepatic cirrhosis type ([facility] )         PLAN:  1  I reviewed results of prior studies and physiologic data with the patient  2  I discussed treatment options with risks and benefits  3  Treatment with  PAP is medically necessary and Maryann James is agreable to continue use  4  Care of equipment, methods to improve comfort using PAP and importance of compliance with therapy were discussed  5  Pressure setting: continue 12 cmH2O     6  Rx provided to replace  supplies and Care coordinated with DME provider  7  Nasal symptoms may improve with regular nasal saline rinse followed by topical nasal steroid if necessary  If symptoms persists, he would warrant ENT evaluation  8  I also advised smoking cessation and he is contemplating  9  Encouraged to persist with strategies for weight reduction  10  Follow-up advised in a few months but he declined  He will return 1 year or sooner if needed to monitor progress, compliance and to adjust therapy  Thank you for allowing me to participate in the care of this patient  Sincerely,    Authenticated electronically by Zach Kearney MD on 59/81/66   Board Certified Specialist     Portions of the record may have been created with voice recognition software  Occasional wrong word or "sound a like" substitutions may have occurred due to the inherent limitations of voice recognition software  There may also be notations and random deletions of words or characters from malfunctioning software  Read the chart carefully and recognize, using context, where substitutions/deletions have occurred  Clear bilaterally, pupils equal, round and reactive to light.

## 2022-04-15 NOTE — PROGRESS NOTES
Review of Systems      Genitourinary difficulty with erection   Cardiology none   Gastrointestinal none   Neurology none   Constitutional none   Integumentary none   Psychiatry none   Musculoskeletal back pain   Pulmonary none   ENT none   Endocrine none   Hematological none

## 2022-04-18 ENCOUNTER — TELEPHONE (OUTPATIENT)
Dept: SLEEP CENTER | Facility: CLINIC | Age: 62
End: 2022-04-18

## 2022-04-18 NOTE — TELEPHONE ENCOUNTER
Rx for PAP resupply sent to [facility] via [facility]

## 2022-05-16 DIAGNOSIS — E11.9 TYPE 2 DIABETES MELLITUS WITHOUT COMPLICATION, WITHOUT LONG-TERM CURRENT USE OF INSULIN (HCC): Primary | ICD-10-CM

## 2022-05-18 VITALS
HEART RATE: 110 BPM | BODY MASS INDEX: 33.49 KG/M2 | SYSTOLIC BLOOD PRESSURE: 141 MMHG | HEIGHT: 65 IN | DIASTOLIC BLOOD PRESSURE: 88 MMHG | WEIGHT: 201 LBS

## 2022-06-08 DIAGNOSIS — G47.33 OSA (OBSTRUCTIVE SLEEP APNEA): ICD-10-CM

## 2022-06-08 DIAGNOSIS — F11.90 OPIOID USE DISORDER: ICD-10-CM

## 2022-06-08 DIAGNOSIS — K74.60 CIRRHOSIS OF LIVER WITHOUT ASCITES, UNSPECIFIED HEPATIC CIRRHOSIS TYPE (HCC): ICD-10-CM

## 2022-06-08 DIAGNOSIS — E29.1 HYPOGONADISM IN MALE: ICD-10-CM

## 2022-06-08 DIAGNOSIS — E29.1 SECONDARY MALE HYPOGONADISM: ICD-10-CM

## 2022-06-08 DIAGNOSIS — E55.9 VITAMIN D DEFICIENCY: ICD-10-CM

## 2022-06-08 DIAGNOSIS — F11.20 METHADONE MAINTENANCE THERAPY PATIENT (HCC): ICD-10-CM

## 2022-06-08 DIAGNOSIS — E11.9 TYPE 2 DIABETES MELLITUS WITHOUT COMPLICATION, WITHOUT LONG-TERM CURRENT USE OF INSULIN (HCC): ICD-10-CM

## 2022-06-08 DIAGNOSIS — I10 BENIGN ESSENTIAL HYPERTENSION: ICD-10-CM

## 2022-09-13 ENCOUNTER — OFFICE VISIT (OUTPATIENT)
Dept: FAMILY MEDICINE CLINIC | Facility: CLINIC | Age: 62
End: 2022-09-13
Payer: MEDICARE

## 2022-09-13 VITALS
TEMPERATURE: 97.9 F | DIASTOLIC BLOOD PRESSURE: 90 MMHG | HEART RATE: 96 BPM | HEIGHT: 65 IN | WEIGHT: 218 LBS | RESPIRATION RATE: 20 BRPM | BODY MASS INDEX: 36.32 KG/M2 | SYSTOLIC BLOOD PRESSURE: 138 MMHG | OXYGEN SATURATION: 97 %

## 2022-09-13 DIAGNOSIS — K74.60 CIRRHOSIS OF LIVER WITHOUT ASCITES, UNSPECIFIED HEPATIC CIRRHOSIS TYPE (HCC): ICD-10-CM

## 2022-09-13 DIAGNOSIS — E11.9 TYPE 2 DIABETES MELLITUS WITHOUT COMPLICATION, WITHOUT LONG-TERM CURRENT USE OF INSULIN (HCC): Primary | ICD-10-CM

## 2022-09-13 LAB — SL AMB POCT HEMOGLOBIN AIC: 6.8 (ref ?–6.5)

## 2022-09-13 PROCEDURE — 83036 HEMOGLOBIN GLYCOSYLATED A1C: CPT | Performed by: FAMILY MEDICINE

## 2022-09-13 PROCEDURE — 99214 OFFICE O/P EST MOD 30 MIN: CPT | Performed by: FAMILY MEDICINE

## 2022-09-13 NOTE — PROGRESS NOTES
Name: Blanca Denis      : 1960      MRN: [id number]  Encounter Provider: Cyndi Lopez MD  Encounter Date: 2022   Encounter department:   [facility]     1  Type 2 diabetes mellitus without complication, without long-term current use of insulin (HCC)  -     Comprehensive metabolic panel; Future  -     Lipid panel; Future  -     POCT hemoglobin A1c    2  BMI 36 0-36 9,adult    3   Cirrhosis of liver without ascites, unspecified hepatic cirrhosis type (HCC)  -     CBC and differential; Future           Subjective      HPI  Review of Systems    Current Outpatient Medications on File Prior to Visit   Medication Sig    Alcohol Swabs (B-D SINGLE USE SWABS REGULAR) PADS TEST 2-3 TIMES A DAY AS DIRECTED TEST 2-3 TIMES A DAY AS DIRECTED    atorvastatin (LIPITOR) 40 mg tablet TAKE 1 TABLET (40 MG TOTAL) BY MOUTH DAILY    Blood Glucose Calibration (FreeStyle Control Solution) LIQD USE AS DIRECTED USE DANI Intellitix    Blood Glucose Monitoring Suppl (FreeStyle Lite) w/Device KIT USE AS DIRECTED USE DANI PEREZ INDICACIONES    Blood Pressure Monitoring ([place] Choice BP Monitor/Arm) COCO USE AS DIRECTEDUSE DANI LAS INDICACIONES    FREESTYLE LITE test strip Use 1 each in the morning Use as instructed    Global Inject Ease Lancets 28G MISC TEST 2-3 TIMES A DAY AS DIRECTED TEST 2-3 TIMES A DAY AS DIRECTED    Lancet Devices (Adjustable Lancing Device) MISC USE AS DIRECTED USE DANI PEREZ INDICACIONES    methadone (DOLOPHINE) 10 mg/mL oral concentrated solution Take 44 mg by mouth daily    nadolol (CORGARD) 20 mg tablet TAKE 1 TABLET (20 MG TOTAL) BY MOUTH DAILY    SYRINGE-NEEDLE, DISP, 3 ML (B-D 3CC LUER-MARÍA ELENA SYR 77TZ6-3/2) 21G X 1-1/2" 3 ML MISC Use once weekly    [DISCONTINUED] Semaglutide (Rybelsus) 7 MG TABS TAKE ONE TABLET BY MOUTH DAILYTOMAR 1 TABLETA POR VIA ORAL DIARIAMENTE       Objective     /90 (BP Location: Left arm, Patient Position: Sitting, Cuff Size: Standard)   Pulse 96   Temp 97 9 °F (36 6 °C) (Temporal)   Resp 20   Ht 5' 5" (1 651 m)   Wt 98 9 kg (218 lb)   SpO2 97%   BMI 36 28 kg/m²     Physical Exam  Leo Bill MD  BMI Counseling: Body mass index is 36 28 kg/m²  The BMI is above normal  Nutrition recommendations include reducing fast food intake, consuming healthier snacks and decreasing soda and/or juice intake  Exercise recommendations include exercising 3-5 times per week

## 2022-09-23 ENCOUNTER — LAB (OUTPATIENT)
Dept: LAB | Facility: HOSPITAL | Age: 62
End: 2022-09-23
Payer: MEDICARE

## 2022-09-23 ENCOUNTER — TELEPHONE (OUTPATIENT)
Dept: FAMILY MEDICINE CLINIC | Facility: CLINIC | Age: 62
End: 2022-09-23

## 2022-09-23 DIAGNOSIS — K74.60 CIRRHOSIS OF LIVER WITHOUT ASCITES, UNSPECIFIED HEPATIC CIRRHOSIS TYPE (HCC): ICD-10-CM

## 2022-09-23 DIAGNOSIS — E11.9 TYPE 2 DIABETES MELLITUS WITHOUT COMPLICATION, WITHOUT LONG-TERM CURRENT USE OF INSULIN (HCC): ICD-10-CM

## 2022-09-23 LAB
ALBUMIN SERPL BCP-MCNC: 4.2 G/DL (ref 3.5–5)
ALP SERPL-CCNC: 90 U/L (ref 43–122)
ALT SERPL W P-5'-P-CCNC: 46 U/L
ANION GAP SERPL CALCULATED.3IONS-SCNC: 6 MMOL/L (ref 5–14)
AST SERPL W P-5'-P-CCNC: 42 U/L (ref 17–59)
BASOPHILS # BLD AUTO: 0.06 THOUSANDS/ΜL (ref 0–0.1)
BASOPHILS NFR BLD AUTO: 1 % (ref 0–1)
BILIRUB SERPL-MCNC: 1.04 MG/DL (ref 0.2–1)
BUN SERPL-MCNC: 13 MG/DL (ref 5–25)
CALCIUM SERPL-MCNC: 8.9 MG/DL (ref 8.4–10.2)
CHLORIDE SERPL-SCNC: 98 MMOL/L (ref 96–108)
CHOLEST SERPL-MCNC: 104 MG/DL
CO2 SERPL-SCNC: 33 MMOL/L (ref 21–32)
CREAT SERPL-MCNC: 1.21 MG/DL (ref 0.7–1.5)
EOSINOPHIL # BLD AUTO: 0.08 THOUSAND/ΜL (ref 0–0.61)
EOSINOPHIL NFR BLD AUTO: 1 % (ref 0–6)
ERYTHROCYTE [DISTWIDTH] IN BLOOD BY AUTOMATED COUNT: 17.4 % (ref 11.6–15.1)
GFR SERPL CREATININE-BSD FRML MDRD: 63 ML/MIN/1.73SQ M
GLUCOSE P FAST SERPL-MCNC: 126 MG/DL (ref 70–99)
HCT VFR BLD AUTO: 60.4 % (ref 36.5–49.3)
HDLC SERPL-MCNC: 37 MG/DL
HGB BLD-MCNC: 20.1 G/DL (ref 12–17)
IMM GRANULOCYTES # BLD AUTO: 0.05 THOUSAND/UL (ref 0–0.2)
IMM GRANULOCYTES NFR BLD AUTO: 1 % (ref 0–2)
LDLC SERPL CALC-MCNC: 51 MG/DL
LYMPHOCYTES # BLD AUTO: 3.2 THOUSANDS/ΜL (ref 0.6–4.47)
LYMPHOCYTES NFR BLD AUTO: 41 % (ref 14–44)
MCH RBC QN AUTO: 31 PG (ref 26.8–34.3)
MCHC RBC AUTO-ENTMCNC: 33.3 G/DL (ref 31.4–37.4)
MCV RBC AUTO: 93 FL (ref 82–98)
MONOCYTES # BLD AUTO: 0.63 THOUSAND/ΜL (ref 0.17–1.22)
MONOCYTES NFR BLD AUTO: 8 % (ref 4–12)
NEUTROPHILS # BLD AUTO: 3.88 THOUSANDS/ΜL (ref 1.85–7.62)
NEUTS SEG NFR BLD AUTO: 48 % (ref 43–75)
NONHDLC SERPL-MCNC: 67 MG/DL
NRBC BLD AUTO-RTO: 0 /100 WBCS
PLATELET # BLD AUTO: 174 THOUSANDS/UL (ref 149–390)
PMV BLD AUTO: 10.6 FL (ref 8.9–12.7)
POTASSIUM SERPL-SCNC: 4.6 MMOL/L (ref 3.5–5.3)
PROT SERPL-MCNC: 8 G/DL (ref 6.4–8.4)
RBC # BLD AUTO: 6.49 MILLION/UL (ref 3.88–5.62)
SODIUM SERPL-SCNC: 137 MMOL/L (ref 135–147)
TRIGL SERPL-MCNC: 80 MG/DL
WBC # BLD AUTO: 7.9 THOUSAND/UL (ref 4.31–10.16)

## 2022-09-23 PROCEDURE — 80061 LIPID PANEL: CPT

## 2022-09-23 PROCEDURE — 80053 COMPREHEN METABOLIC PANEL: CPT

## 2022-09-23 PROCEDURE — 85025 COMPLETE CBC W/AUTO DIFF WBC: CPT

## 2022-09-23 PROCEDURE — 36415 COLL VENOUS BLD VENIPUNCTURE: CPT

## 2022-10-03 DIAGNOSIS — E11.9 TYPE 2 DIABETES MELLITUS WITHOUT COMPLICATION, WITHOUT LONG-TERM CURRENT USE OF INSULIN (HCC): Primary | ICD-10-CM

## 2022-10-03 RX ORDER — FLASH GLUCOSE SENSOR
KIT MISCELLANEOUS
Qty: 6 EACH | Refills: 4 | Status: SHIPPED | OUTPATIENT
Start: 2022-10-03

## 2022-11-29 DIAGNOSIS — E11.9 TYPE 2 DIABETES MELLITUS WITHOUT COMPLICATION, WITHOUT LONG-TERM CURRENT USE OF INSULIN (HCC): ICD-10-CM

## 2022-11-29 RX ORDER — ATORVASTATIN CALCIUM 40 MG/1
40 TABLET, FILM COATED ORAL DAILY
Qty: 90 TABLET | Refills: 3 | Status: SHIPPED | OUTPATIENT
Start: 2022-11-29

## 2022-12-07 DIAGNOSIS — K74.60 ESOPHAGEAL VARICES IN CIRRHOSIS (HCC): ICD-10-CM

## 2022-12-07 DIAGNOSIS — I85.10 ESOPHAGEAL VARICES IN CIRRHOSIS (HCC): ICD-10-CM

## 2022-12-07 RX ORDER — NADOLOL 20 MG/1
20 TABLET ORAL DAILY
Qty: 90 TABLET | Refills: 3 | Status: SHIPPED | OUTPATIENT
Start: 2022-12-07 | End: 2022-12-13 | Stop reason: SINTOL

## 2022-12-12 RX ORDER — ORAL SEMAGLUTIDE 7 MG/1
TABLET ORAL
COMMUNITY
Start: 2022-12-07 | End: 2022-12-13 | Stop reason: ALTCHOICE

## 2022-12-13 ENCOUNTER — APPOINTMENT (OUTPATIENT)
Dept: LAB | Facility: HOSPITAL | Age: 62
End: 2022-12-13

## 2022-12-13 ENCOUNTER — OFFICE VISIT (OUTPATIENT)
Dept: FAMILY MEDICINE CLINIC | Facility: CLINIC | Age: 62
End: 2022-12-13

## 2022-12-13 VITALS
HEART RATE: 88 BPM | BODY MASS INDEX: 34.32 KG/M2 | WEIGHT: 206 LBS | DIASTOLIC BLOOD PRESSURE: 90 MMHG | SYSTOLIC BLOOD PRESSURE: 146 MMHG | RESPIRATION RATE: 20 BRPM | OXYGEN SATURATION: 100 % | HEIGHT: 65 IN | TEMPERATURE: 97.8 F

## 2022-12-13 DIAGNOSIS — K74.60 ESOPHAGEAL VARICES IN CIRRHOSIS (HCC): ICD-10-CM

## 2022-12-13 DIAGNOSIS — Z23 NEEDS FLU SHOT: ICD-10-CM

## 2022-12-13 DIAGNOSIS — E11.9 TYPE 2 DIABETES MELLITUS WITHOUT COMPLICATION, WITHOUT LONG-TERM CURRENT USE OF INSULIN (HCC): ICD-10-CM

## 2022-12-13 DIAGNOSIS — D75.1 POLYCYTHEMIA: ICD-10-CM

## 2022-12-13 DIAGNOSIS — B35.3 TINEA PEDIS OF BOTH FEET: ICD-10-CM

## 2022-12-13 DIAGNOSIS — I85.10 ESOPHAGEAL VARICES IN CIRRHOSIS (HCC): ICD-10-CM

## 2022-12-13 DIAGNOSIS — Z00.00 MEDICARE ANNUAL WELLNESS VISIT, SUBSEQUENT: ICD-10-CM

## 2022-12-13 DIAGNOSIS — E11.9 TYPE 2 DIABETES MELLITUS WITHOUT COMPLICATION, WITHOUT LONG-TERM CURRENT USE OF INSULIN (HCC): Primary | ICD-10-CM

## 2022-12-13 LAB
ALBUMIN SERPL BCP-MCNC: 4.3 G/DL (ref 3.5–5)
ALP SERPL-CCNC: 99 U/L (ref 43–122)
ALT SERPL W P-5'-P-CCNC: 34 U/L
ANION GAP SERPL CALCULATED.3IONS-SCNC: 7 MMOL/L (ref 5–14)
AST SERPL W P-5'-P-CCNC: 35 U/L (ref 17–59)
BASOPHILS # BLD AUTO: 0.04 THOUSANDS/ÂΜL (ref 0–0.1)
BASOPHILS NFR BLD AUTO: 1 % (ref 0–1)
BILIRUB SERPL-MCNC: 1.28 MG/DL (ref 0.2–1)
BUN SERPL-MCNC: 11 MG/DL (ref 5–25)
CALCIUM SERPL-MCNC: 9.1 MG/DL (ref 8.4–10.2)
CHLORIDE SERPL-SCNC: 105 MMOL/L (ref 96–108)
CO2 SERPL-SCNC: 26 MMOL/L (ref 21–32)
CREAT SERPL-MCNC: 0.94 MG/DL (ref 0.7–1.5)
EOSINOPHIL # BLD AUTO: 0.04 THOUSAND/ÂΜL (ref 0–0.61)
EOSINOPHIL NFR BLD AUTO: 1 % (ref 0–6)
ERYTHROCYTE [DISTWIDTH] IN BLOOD BY AUTOMATED COUNT: 13.6 % (ref 11.6–15.1)
EST. AVERAGE GLUCOSE BLD GHB EST-MCNC: 137 MG/DL
GFR SERPL CREATININE-BSD FRML MDRD: 85 ML/MIN/1.73SQ M
GLUCOSE P FAST SERPL-MCNC: 135 MG/DL (ref 70–99)
HBA1C MFR BLD: 6.4 %
HCT VFR BLD AUTO: 53 % (ref 36.5–49.3)
HGB BLD-MCNC: 18.1 G/DL (ref 12–17)
IMM GRANULOCYTES # BLD AUTO: 0.02 THOUSAND/UL (ref 0–0.2)
IMM GRANULOCYTES NFR BLD AUTO: 0 % (ref 0–2)
LYMPHOCYTES # BLD AUTO: 1.78 THOUSANDS/ÂΜL (ref 0.6–4.47)
LYMPHOCYTES NFR BLD AUTO: 33 % (ref 14–44)
MCH RBC QN AUTO: 31.8 PG (ref 26.8–34.3)
MCHC RBC AUTO-ENTMCNC: 34.2 G/DL (ref 31.4–37.4)
MCV RBC AUTO: 93 FL (ref 82–98)
MONOCYTES # BLD AUTO: 0.4 THOUSAND/ÂΜL (ref 0.17–1.22)
MONOCYTES NFR BLD AUTO: 7 % (ref 4–12)
NEUTROPHILS # BLD AUTO: 3.2 THOUSANDS/ÂΜL (ref 1.85–7.62)
NEUTS SEG NFR BLD AUTO: 58 % (ref 43–75)
NRBC BLD AUTO-RTO: 0 /100 WBCS
PLATELET # BLD AUTO: 154 THOUSANDS/UL (ref 149–390)
PMV BLD AUTO: 10.5 FL (ref 8.9–12.7)
POTASSIUM SERPL-SCNC: 3.7 MMOL/L (ref 3.5–5.3)
PROT SERPL-MCNC: 8.2 G/DL (ref 6.4–8.4)
RBC # BLD AUTO: 5.7 MILLION/UL (ref 3.88–5.62)
SODIUM SERPL-SCNC: 138 MMOL/L (ref 135–147)
WBC # BLD AUTO: 5.48 THOUSAND/UL (ref 4.31–10.16)

## 2022-12-13 RX ORDER — ORAL SEMAGLUTIDE 7 MG/1
7 TABLET ORAL DAILY
Qty: 30 TABLET | Refills: 5 | Status: SHIPPED | OUTPATIENT
Start: 2022-12-13

## 2022-12-13 RX ORDER — NADOLOL 20 MG/1
20 TABLET ORAL DAILY
Qty: 90 TABLET | Refills: 3 | Status: SHIPPED | OUTPATIENT
Start: 2022-12-13

## 2022-12-13 RX ORDER — KETOCONAZOLE 20 MG/G
CREAM TOPICAL DAILY
Qty: 15 G | Refills: 0 | Status: SHIPPED | OUTPATIENT
Start: 2022-12-13

## 2022-12-13 NOTE — PROGRESS NOTES
Assessment and Plan:   During this visit we have a goal to personalize prevention  I discussed the patient about Benign essential hypertension     Chronic hepatitis C treated in 2014 [facility])     Cirrhosis of liver (HCC)     Type 2 diabetes mellitus without complication     Methadone maintenance therapy patient ([facility] )     Portal hypertension ([facility] )     Proteinuria     Secondary esophageal varices without bleeding (HCC)     Tobacco use 38 pk year     Opioid use disorder     3-oxo-5 alpha-steroid delta 4-dehydrogenase deficiency     RODRICK (obstructive sleep apnea)     Polycythemia  The need for a life style plan and decrease the impact of current problems  Health risk assessment was discussed with patient also and the ways to stay healthier  We reviewed also the current medications, the need to avoid polypharmacy in his current treatment; also about how the chronic conditions are impacting now and later  Recommended a healthy diet and exercising frequently will help to control better patient's current chronic conditions;  Immunizations, and the need to compliance with current CDC's recommendations  Patient declined at this time advanced directives  I encouraged against the use alcohol, tobacco, recreational illegal prescribed and non-prescribed drugs  About smoking: recommended quit smoking  Diabetes mellitus type 2: Recommended patient to restart medication as prescribed  Checking hemoglobin A1c today  Influenza vaccine was given to patient today  Polycythemia: Despite stopped testosterone administration patient still having elevated hemoglobin  He is not likely an effect combination of testosterone along with obstructive sleep apnea and his smoking  Requesting opinion from hematology  History of esophageal varices, explained to patient that he should not stop taking nadolol that is to decrease the pressure of the portal system    Problem List Items Addressed This Visit     Type 2 diabetes mellitus without complication, without long-term current use of insulin (HCC) - Primary    Relevant Medications    Rybelsus 7 MG TABS    Other Relevant Orders    HEMOGLOBIN A1C W/ EAG ESTIMATION    Needs flu shot    Relevant Orders    influenza vaccine, quadrivalent, recombinant, PF, 0 5 mL, for patients 18 yr+ (FLUBLOK) (Completed)    Polycythemia    Relevant Orders    CBC and differential    Comprehensive metabolic panel    Ambulatory referral to Hematology / Oncology   Other Visit Diagnoses     Esophageal varices in cirrhosis (HCC)        Relevant Medications    nadolol (CORGARD) 20 mg tablet    Tinea pedis of both feet        Relevant Medications    ketoconazole (NIZORAL) 2 % cream           Preventive health issues were discussed with patient, and age appropriate screening tests were ordered as noted in patient's After Visit Summary  Personalized health advice and appropriate referrals for health education or preventive services given if needed, as noted in patient's After Visit Summary  History of Present Illness:     Patient presents for a Medicare Wellness Visit    Prince Francisco is a 69-year-old male who suffers of      Benign essential hypertension     Chronic hepatitis C treated in 2014 [facility])     Cirrhosis of liver ([facility] )     Type 2 diabetes mellitus without complication     Methadone maintenance therapy patient ([facility] )     Portal hypertension ([facility] )     Proteinuria     Secondary esophageal varices without bleeding (HCC)     Tobacco use 38 pk year     Opioid use disorder     3-oxo-5 alpha-steroid delta 4-dehydrogenase deficiency     RODRICK (obstructive sleep apnea)     Polycythemia    He reports a history of syphilis that was treated in the medical [place]  Current labs performed by the methadone clinic 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  Patient is stopped using testosterone cypionate 6-month ago when it was requested due to elevation of hemoglobin      Labs performed 1 month ago shows hemoglobin of 19 g/dL    Yesterday his blood sugar has been low and he stopped using all medications  He is not taking either atorvastatin  Only medication that he is using is methadone               Patient Care Team:  Wilfred Zarco MD as PCP - General (Family Medicine)  Kyle Moser MD as PCP - Endocrinology (Endocrinology)     Review of Systems:     Review of Systems     Problem List:     Patient Active Problem List   Diagnosis   • Benign essential hypertension   • Chronic hepatitis C treated in 2014 [facility])   • Cirrhosis of liver ([place] [address] )   • Type 2 diabetes mellitus without complication, without long-term current use of insulin ([place] [address] )   • Gallbladder polyp   • Methadone maintenance therapy patient ([place] [address] )   • Portal hypertension ([place] [address] )   • Proteinuria   • Secondary esophageal varices without bleeding ([place] [address] )   • Tobacco use 38 pk year   • Opioid use disorder   • 3-oxo-5 alpha-steroid delta 4-dehydrogenase deficiency   • RODRICK (obstructive sleep apnea)   • Dysuria   • Needs flu shot   • Polycythemia      Past Medical and Surgical History:     Past Medical History:   Diagnosis Date   • Colon polyp    • Diabetes mellitus ([place] [address] )    • Esophageal varices ([place] [address] )    • Gastritis    • Hepatitis C    • History of Helicobacter pylori infection 2/23/2016   • History of kidney stones    • Hyperlipidemia    • Hypertension    • Infectious viral hepatitis    • Kidney stone    • Obesity    • Pneumonia    • PPD positive 2020   • Sleep apnea    • Substance abuse [facility])      Past Surgical History:   Procedure Laterality Date   • COLONOSCOPY  06/20/2014    dr padilla   • COLONOSCOPY  2014    Dr Padilla   • EGD AND COLONOSCOPY  08/2020    esophageal varices, colon polyp, hemorrhoids   • ESOPHAGOGASTRODUODENOSCOPY  12/10/2015    esophageal varices, cirrhosis, gastritis   • HYDROCELE EXCISION / REPAIR     • LIVER BIOPSY  2014      Family History:     Family History   Problem Relation Age of Onset   • Diabetes type II Mother         MELLITUS   • Hypertension Mother    • Hypertension Sister    • Diabetes Sister         MELLITUS   • No Known Problems Maternal Grandmother    • No Known Problems Maternal Grandfather    • No Known Problems Paternal Grandmother    • No Known Problems Paternal Grandfather    • No Known Problems Sister    • Breast cancer Maternal Aunt         45s   • No Known Problems Maternal Aunt    • No Known Problems Paternal Aunt    • No Known Problems Paternal Aunt    • No Known Problems Paternal Aunt    • No Known Problems Paternal Aunt    • No Known Problems Paternal Aunt    • No Known Problems Paternal Aunt       Social History:     Social History     Socioeconomic History   • Marital status: Single     Spouse name: None   • Number of children: None   • Years of education: None   • Highest education level: None   Occupational History   • None   Tobacco Use   • Smoking status: Every Day     Packs/day: 0 50     Types: Cigarettes   • Smokeless tobacco: Never   • Tobacco comments:     TOBACCO USE   Vaping Use   • Vaping Use: Never used   Substance and Sexual Activity   • Alcohol use: Not Currently     Alcohol/week: 0 0 standard drinks   • Drug use: Not Currently     Types: Heroin     Comment: former user- heroin was drug of choice   • Sexual activity: Not Currently     Partners: Female   Other Topics Concern   • None   Social History Narrative   • None     Social Determinants of Health     Financial Resource Strain: Low Risk    • Difficulty of Paying Living Expenses: Not hard at all   Food Insecurity: Not on file   Transportation Needs: No Transportation Needs   • Lack of Transportation (Medical): No   • Lack of Transportation (Non-Medical):  No   Physical Activity: Not on file   Stress: Not on file   Social Connections: Not on file   Intimate Partner Violence: Not on file   Housing Stability: Not on file      Medications and Allergies:     Current Outpatient Medications   Medication Sig Dispense Refill   • ketoconazole (NIZORAL) 2 % cream Apply topically daily 15 g 0   • nadolol (CORGARD) 20 mg tablet Take 1 tablet (20 mg total) by mouth daily 90 tablet 3   • Rybelsus 7 MG TABS Take 7 mg by mouth in the morning 30 tablet 5   • Alcohol Swabs (B-D SINGLE USE SWABS REGULAR) PADS TEST 2-3 TIMES A DAY AS DIRECTED TEST 2-3 TIMES A DAY AS DIRECTED     • atorvastatin (LIPITOR) 40 mg tablet TAKE 1 TABLET (40 MG TOTAL) BY MOUTH DAILY 90 tablet 3   • Blood Glucose Calibration (FreeStyle Control Solution) LIQD USE AS DIRECTED USE DANI LAS INDICACIONES     • Blood Glucose Monitoring Suppl (FreeStyle Lite) w/Device KIT USE AS DIRECTED USE DANI LAS INDICACIONES     • Blood Pressure Monitoring ([place] Choice BP Monitor/Arm) COCO USE AS DIRECTEDUSE DANI LAS INDICACIONES 1 each 0   • Continuous Blood Gluc Sensor (FreeStyle Luis Alberto 14 Day Sensor) MISC USE AS DIRECTEDUSE DANI LAS INDICACIONES 6 each 4   • FREESTYLE LITE test strip Use 1 each in the morning Use as instructed 100 strip 3   • Global Inject Ease Lancets 28G MISC TEST 2-3 TIMES A DAY AS DIRECTED TEST 2-3 TIMES A DAY AS DIRECTED     • Lancet Devices (Adjustable Lancing Device) MISC USE AS DIRECTED USE DANI LAS INDICACIONES     • methadone (DOLOPHINE) 10 mg/mL oral concentrated solution Take 44 mg by mouth daily     • SYRINGE-NEEDLE, DISP, 3 ML (B-D 3CC LUER-MARÍA ELENA SYR 12VA9-9/2) 21G X 1-1/2" 3 ML MISC Use once weekly 12 each 0     No current facility-administered medications for this visit       Allergies   Allergen Reactions   • Meperidine Abdominal Pain      Immunizations:     Immunization History   Administered Date(s) Administered   • COVID-19 MODERNA VACC 0 5 ML IM 03/30/2021, 04/26/2021, 12/16/2021   • Hep A / Hep B 02/07/2014, 03/07/2014, 09/12/2014   • INFLUENZA 10/20/2013, 11/13/2014, 11/06/2015   • Influenza Quadrivalent 3 years and older 02/01/2022   • Influenza, recombinant, quadrivalent,injectable, preservative free 03/08/2022, 12/13/2022   • Pneumococcal Polysaccharide PPV23 11/05/2013   • Tdap 12/10/2013   • Tuberculin Skin Test-PPD Intradermal 11/15/2011   • influenza, injectable, quadrivalent 02/01/2022      Health Maintenance:         Topic Date Due   • Colorectal Cancer Screening  08/25/2027   • HIV Screening  Completed   • Hepatitis C Screening  Discontinued         Topic Date Due   • Pneumococcal Vaccine: Pediatrics (0 to 5 Years) and At-Risk Patients (6 to 59 Years) (2 - PCV) 11/05/2014   • COVID-19 Vaccine (4 - Booster for Moderna series) 04/16/2022      Medicare Screening Tests and Risk Assessments:     Nury Umanzor is here for his Subsequent Wellness visit  Last Medicare Wellness visit information reviewed, patient interviewed and updates made to the record today  Health Risk Assessment:   Patient rates overall health as good  Patient feels that their physical health rating is same  Patient is satisfied with their life  Eyesight was rated as same  Hearing was rated as same  Patient feels that their emotional and mental health rating is same  Patients states they are never, rarely angry  Patient states they are sometimes unusually tired/fatigued  Pain experienced in the last 7 days has been some  Patient's pain rating has been 2/10  Patient states that he has experienced no weight loss or gain in last 6 months  Fall Risk Screening: In the past year, patient has experienced: no history of falling in past year      Home Safety:  Patient does not have trouble with stairs inside or outside of their home  Patient has working smoke alarms and has working carbon monoxide detector  Home safety hazards include: none  Nutrition:   Current diet is Diabetic  Medications:   Patient is currently taking over-the-counter supplements  OTC medications include: see medication list  Patient is able to manage medications       Activities of Daily Living (ADLs)/Instrumental Activities of Daily Living (IADLs):   Walk and transfer into and out of bed and chair?: Yes  Dress and groom yourself?: Yes    Bathe or shower yourself?: Yes    Feed yourself? Yes  Do your laundry/housekeeping?: Yes  Manage your money, pay your bills and track your expenses?: Yes  Make your own meals?: Yes    Do your own shopping?: Yes    Previous Hospitalizations:   Any hospitalizations or ED visits within the last 12 months?: Yes    How many hospitalizations have you had in the last year?: 1-2    Advance Care Planning:   Living will: No    Durable POA for healthcare: No    Advanced directive: No    Advanced directive counseling given: Yes    Five wishes given: Yes    Patient declined ACP directive: No    End of Life Decisions reviewed with patient: Yes    Provider agrees with end of life decisions: Yes      Cognitive Screening:   Provider or family/friend/caregiver concerned regarding cognition?: No    PREVENTIVE SCREENINGS      Cardiovascular Screening:    General: Screening Current    Due for: Lipid Panel      Diabetes Screening:     General: Screening Not Indicated and History Diabetes    Due for: Blood Glucose      Colorectal Cancer Screening:     General: Screening Current    Due for: FOBT/FIT      Prostate Cancer Screening:    General: Screening Current      Osteoporosis Screening:    General: Risks and Benefits Discussed    Due for: DXA Axial      Abdominal Aortic Aneurysm (AAA) Screening:    Risk factors include: tobacco use        General: Screening Current      Lung Cancer Screening:     General: Risks and Benefits Discussed      Hepatitis C Screening:    General: Screening Not Indicated and History Hepatitis C    Hep C Screening Accepted: Yes        Preventive Screening Comments: Patient previously giving the request for lung cancer screening that he did not do since he was cutting in his smoking  Explained to him that this is needed even 15 years after quitting smoking  Gerry Villareal is 60y o  years old, asymptomatic from lung cancer; with smoking status current , he has a 30 pack year smoking history  he  understands CT lung screening goals  We discussed about the possible diagnosis from the screening, the false positive rate and the total radiation exposure  Also he understood about importance of compliance annual lung cancer low dose CT screening  Rishi decided to undergo diagnosis and treatment if needed  Patient  was encouraged to quit smoking,  I offered smoking cessation that he declined  Screening, Brief Intervention, and Referral to Treatment (SBIRT)    Screening  Typical number of drinks in a day: 0  Typical number of drinks in a week: 0  Interpretation: Low risk drinking behavior  Single Item Drug Screening:  How often have you used an illegal drug (including marijuana) or a prescription medication for non-medical reasons in the past year? never    Single Item Drug Screen Score: 0  Interpretation: Negative screen for possible drug use disorder    Review of Current Opioid Use    Opioid Risk Tool (ORT) Interpretation: Complete Opioid Risk Tool (ORT)    Other Counseling Topics:   Alcohol use counseling, car/seat belt/driving safety, skin self-exam, sunscreen and calcium and vitamin D intake and regular weightbearing exercise  No results found  Physical Exam:     /90 (BP Location: Left arm, Patient Position: Sitting, Cuff Size: Standard)   Pulse 88   Temp 97 8 °F (36 6 °C) (Tympanic)   Resp 20   Ht 5' 5" (1 651 m)   Wt 93 4 kg (206 lb)   SpO2 100%   BMI 34 28 kg/m²     Physical Exam  Vitals and nursing note reviewed  Constitutional:       General: He is not in acute distress  Appearance: He is well-developed  HENT:      Head: Normocephalic and atraumatic  Eyes:      Conjunctiva/sclera: Conjunctivae normal    Cardiovascular:      Rate and Rhythm: Normal rate and regular rhythm  Heart sounds: No murmur heard  Pulmonary:      Effort: Pulmonary effort is normal  No respiratory distress  Breath sounds: Normal breath sounds  Abdominal:      General: There is no distension        Palpations: Abdomen is soft  Tenderness: There is no abdominal tenderness  Comments: Protuberant abdomen, domestic conference he is 60 inches, I could not feel fluid or ascites  Genitourinary:     Prostate: Normal    Musculoskeletal:         General: No swelling  Cervical back: Neck supple  Skin:     General: Skin is warm and dry  Capillary Refill: Capillary refill takes less than 2 seconds  Comments: Scaling in both feet   Neurological:      Mental Status: He is alert     Psychiatric:         Mood and Affect: Mood normal           Cristian Purvis MD

## 2022-12-13 NOTE — LETTER
December 13, 2022     Patient: Carol Saucedo  YOB: 1960  Date of Visit: 12/13/2022      Current Outpatient Medications:   •  ketoconazole (NIZORAL) 2 % cream, Apply topically daily, Disp: 15 g, Rfl: 0  •  nadolol (CORGARD) 20 mg tablet, Take 1 tablet (20 mg total) by mouth daily, Disp: 90 tablet, Rfl: 3  •  Rybelsus 7 MG TABS, Take 7 mg by mouth in the morning, Disp: 30 tablet, Rfl: 5  •  atorvastatin (LIPITOR) 40 mg tablet, TAKE 1 TABLET (40 MG TOTAL) BY MOUTH DAILY, Disp: 90 tablet, Rfl: 3  •  methadone (DOLOPHINE) 10 mg/mL oral concentrated solution, Take 44 mg by mouth daily, Disp: , Rfl:   I         Frantz Quiñones MD

## 2022-12-13 NOTE — PATIENT INSTRUCTIONS
Medicare Preventive Visit Patient Instructions  Thank you for completing your Welcome to Medicare Visit or Medicare Annual Wellness Visit today  Your next wellness visit will be due in one year (12/14/2023)  The screening/preventive services that you may require over the next 5-10 years are detailed below  Some tests may not apply to you based off risk factors and/or age  Screening tests ordered at today's visit but not completed yet may show as past due  Also, please note that scanned in results may not display below  Preventive Screenings:  Service Recommendations Previous Testing/Comments   Colorectal Cancer Screening  · Colonoscopy    · Fecal Occult Blood Test (FOBT)/Fecal Immunochemical Test (FIT)  · Fecal DNA/Cologuard Test  · Flexible Sigmoidoscopy Age: 39-70 years old   Colonoscopy: every 10 years (May be performed more frequently if at higher risk)  OR  FOBT/FIT: every 1 year  OR  Cologuard: every 3 years  OR  Sigmoidoscopy: every 5 years  Screening may be recommended earlier than age 39 if at higher risk for colorectal cancer  Also, an individualized decision between you and your healthcare provider will decide whether screening between the ages of 74-80 would be appropriate   Colonoscopy: 08/25/2020  FOBT/FIT: Not on file  Cologuard: Not on file  Sigmoidoscopy: Not on file    Screening Current     Prostate Cancer Screening Individualized decision between patient and health care provider in men between ages of 53-78   Medicare will cover every 12 months beginning on the day after your 50th birthday PSA: 0 5 ng/mL     Screening Current     Hepatitis C Screening Once for adults born between 1945 and 1965  More frequently in patients at high risk for Hepatitis C Hep C Antibody: 03/07/2020    Screening Not Indicated  History Hepatitis C   Diabetes Screening 1-2 times per year if you're at risk for diabetes or have pre-diabetes Fasting glucose: 126 mg/dL (9/23/2022)  A1C: 6 8 (9/13/2022)  Screening Not Indicated  History Diabetes   Cholesterol Screening Once every 5 years if you don't have a lipid disorder  May order more often based on risk factors  Lipid panel: 09/23/2022  Screening Current      Other Preventive Screenings Covered by Medicare:  1  Abdominal Aortic Aneurysm (AAA) Screening: covered once if your at risk  You're considered to be at risk if you have a family history of AAA or a male between the age of 73-68 who smoking at least 100 cigarettes in your lifetime  2  Lung Cancer Screening: covers low dose CT scan once per year if you meet all of the following conditions: (1) Age 50-69; (2) No signs or symptoms of lung cancer; (3) Current smoker or have quit smoking within the last 15 years; (4) You have a tobacco smoking history of at least 20 pack years (packs per day x number of years you smoked); (5) You get a written order from a healthcare provider  3  Glaucoma Screening: covered annually if you're considered high risk: (1) You have diabetes OR (2) Family history of glaucoma OR (3)  aged 48 and older OR (3)  American aged 72 and older  3  Osteoporosis Screening: covered every 2 years if you meet one of the following conditions: (1) Have a vertebral abnormality; (2) On glucocorticoid therapy for more than 3 months; (3) Have primary hyperparathyroidism; (4) On osteoporosis medications and need to assess response to drug therapy  5  HIV Screening: covered annually if you're between the age of 12-76  Also covered annually if you are younger than 13 and older than 72 with risk factors for HIV infection  For pregnant patients, it is covered up to 3 times per pregnancy      Immunizations:  Immunization Recommendations   Influenza Vaccine Annual influenza vaccination during flu season is recommended for all persons aged >= 6 months who do not have contraindications   Pneumococcal Vaccine   * Pneumococcal conjugate vaccine = PCV13 (Prevnar 13), PCV15 (Vaxneuvance), PCV20 (Prevnar 20)  * Pneumococcal polysaccharide vaccine = PPSV23 (Pneumovax) Adults 2364 years old: 1-3 doses may be recommended based on certain risk factors  Adults 72 years old: 1-2 doses may be recommended based off what pneumonia vaccine you previously received   Hepatitis B Vaccine 3 dose series if at intermediate or high risk (ex: diabetes, end stage renal disease, liver disease)   Tetanus (Td) Vaccine - COST NOT COVERED BY MEDICARE PART B Following completion of primary series, a booster dose should be given every 10 years to maintain immunity against tetanus  Td may also be given as tetanus wound prophylaxis  Tdap Vaccine - COST NOT COVERED BY MEDICARE PART B Recommended at least once for all adults  For pregnant patients, recommended with each pregnancy  Shingles Vaccine (Shingrix) - COST NOT COVERED BY MEDICARE PART B  2 shot series recommended in those aged 48 and above     Health Maintenance Due:      Topic Date Due   • Colorectal Cancer Screening  08/25/2027   • HIV Screening  Completed   • Hepatitis C Screening  Discontinued     Immunizations Due:      Topic Date Due   • Pneumococcal Vaccine: Pediatrics (0 to 5 Years) and At-Risk Patients (6 to 59 Years) (2 - PCV) 11/05/2014   • COVID-19 Vaccine (4 - Booster for Moderna series) 04/16/2022   • Influenza Vaccine (1) 09/01/2022     Advance Directives   What are advance directives? Advance directives are legal documents that state your wishes and plans for medical care  These plans are made ahead of time in case you lose your ability to make decisions for yourself  Advance directives can apply to any medical decision, such as the treatments you want, and if you want to donate organs  What are the types of advance directives? There are many types of advance directives, and each state has rules about how to use them  You may choose a combination of any of the following:  · Living will: This is a written record of the treatment you want   You can also choose which treatments you do not want, which to limit, and which to stop at a certain time  This includes surgery, medicine, IV fluid, and tube feedings  · Durable power of  for healthcare [facility]): This is a written record that states who you want to make healthcare choices for you when you are unable to make them for yourself  This person, called a proxy, is usually a family member or a friend  You may choose more than 1 proxy  · Do not resuscitate (DNR) order:  A DNR order is used in case your heart stops beating or you stop breathing  It is a request not to have certain forms of treatment, such as CPR  A DNR order may be included in other types of advance directives  · Medical directive: This covers the care that you want if you are in a coma, near death, or unable to make decisions for yourself  You can list the treatments you want for each condition  Treatment may include pain medicine, surgery, blood transfusions, dialysis, IV or tube feedings, and a ventilator (breathing machine)  · Values history: This document has questions about your views, beliefs, and how you feel and think about life  This information can help others choose the care that you would choose  Why are advance directives important? An advance directive helps you control your care  Although spoken wishes may be used, it is better to have your wishes written down  Spoken wishes can be misunderstood, or not followed  Treatments may be given even if you do not want them  An advance directive may make it easier for your family to make difficult choices about your care  Cigarette Smoking and Your Health   Risks to your health if you smoke:  Nicotine and other chemicals found in tobacco damage every cell in your body  Even if you are a light smoker, you have an increased risk for cancer, heart disease, and lung disease  If you are pregnant or have diabetes, smoking increases your risk for complications     Benefits to your health if you stop smoking:   · You decrease respiratory symptoms such as coughing, wheezing, and shortness of breath  · You reduce your risk for cancers of the lung, mouth, throat, kidney, bladder, pancreas, stomach, and cervix  If you already have cancer, you increase the benefits of chemotherapy  You also reduce your risk for cancer returning or a second cancer from developing  · You reduce your risk for heart disease, blood clots, heart attack, and stroke  · You reduce your risk for lung infections, and diseases such as pneumonia, asthma, chronic bronchitis, and emphysema  · Your circulation improves  More oxygen can be delivered to your body  If you have diabetes, you lower your risk for complications, such as kidney, artery, and eye diseases  You also lower your risk for nerve damage  Nerve damage can lead to amputations, poor vision, and blindness  · You improve your body's ability to heal and to fight infections  For more information and support to stop smoking:   · [employer]  Phone: 2- 406 - 532-8120  Web Address: [employer]  Weight Management   Why it is important to manage your weight:  Being overweight increases your risk of health conditions such as heart disease, high blood pressure, type 2 diabetes, and certain types of cancer  It can also increase your risk for osteoarthritis, sleep apnea, and other respiratory problems  Aim for a slow, steady weight loss  Even a small amount of weight loss can lower your risk of health problems  How to lose weight safely:  A safe and healthy way to lose weight is to eat fewer calories and get regular exercise  You can lose up about 1 pound a week by decreasing the number of calories you eat by 500 calories each day  Healthy meal plan for weight management:  A healthy meal plan includes a variety of foods, contains fewer calories, and helps you stay healthy  A healthy meal plan includes the following:  · Eat whole-grain foods more often    A healthy meal plan should contain fiber  Fiber is the part of grains, fruits, and vegetables that is not broken down by your body  Whole-grain foods are healthy and provide extra fiber in your diet  Some examples of whole-grain foods are whole-wheat breads and pastas, oatmeal, brown rice, and bulgur  · Eat a variety of vegetables every day  Include dark, leafy greens such as spinach, kale, giovany greens, and mustard greens  Eat yellow and orange vegetables such as carrots, sweet potatoes, and winter squash  · Eat a variety of fruits every day  Choose fresh or canned fruit (canned in its own juice or light syrup) instead of juice  Fruit juice has very little or no fiber  · Eat low-fat dairy foods  Drink fat-free (skim) milk or 1% milk  Eat fat-free yogurt and low-fat cottage cheese  Try low-fat cheeses such as mozzarella and other reduced-fat cheeses  · Choose meat and other protein foods that are low in fat  Choose beans or other legumes such as split peas or lentils  Choose fish, skinless poultry (chicken or turkey), or lean cuts of red meat (beef or pork)  Before you cook meat or poultry, cut off any visible fat  · Use less fat and oil  Try baking foods instead of frying them  Add less fat, such as margarine, sour cream, regular salad dressing and mayonnaise to foods  Eat fewer high-fat foods  Some examples of high-fat foods include french fries, doughnuts, ice cream, and cakes  · Eat fewer sweets  Limit foods and drinks that are high in sugar  This includes candy, cookies, regular soda, and sweetened drinks  Exercise:  Exercise at least 30 minutes per day on most days of the week  Some examples of exercise include walking, biking, dancing, and swimming  You can also fit in more physical activity by taking the stairs instead of the elevator or parking farther away from stores  Ask your healthcare provider about the best exercise plan for you     Narcotic (Opioid) Safety    Use narcotics safely:  · Take prescribed narcotics exactly as directed  · Do not give narcotics to others or take narcotics that belong to someone else  · Do not mix narcotics without medicines or alcohol  · Do not drive or operate heavy machinery after you take the narcotic  · Monitor for side effects and notify your healthcare provider if you experienced side effects such as nausea, sleepiness, itching, or trouble thinking clearly  Manage constipation:    Constipation is the most common side effect of narcotic medicine  Constipation is when you have hard, dry bowel movements, or you go longer than usual between bowel movements  Tell your healthcare provider about all changes in your bowel movements while you are taking narcotics  He or she may recommend laxative medicine to help you have a bowel movement  He or she may also change the kind of narcotic you are taking, or change when you take it  The following are more ways you can prevent or relieve constipation:    · Drink liquids as directed  You may need to drink extra liquids to help soften and move your bowels  Ask how much liquid to drink each day and which liquids are best for you  · Eat high-fiber foods  This may help decrease constipation by adding bulk to your bowel movements  High-fiber foods include fruits, vegetables, whole-grain breads and cereals, and beans  Your healthcare provider or dietitian can help you create a high-fiber meal plan  Your provider may also recommend a fiber supplement if you cannot get enough fiber from food  · Exercise regularly  Regular physical activity can help stimulate your intestines  Walking is a good exercise to prevent or relieve constipation  Ask which exercises are best for you  · Schedule a time each day to have a bowel movement  This may help train your body to have regular bowel movements  Bend forward while you are on the toilet to help move the bowel movement out   Sit on the toilet for at least 10 minutes, even if you do not have a bowel movement  Store narcotics safely:   · Store narcotics where others cannot easily get them  Keep them in a locked cabinet or secure area  Do not  keep them in a purse or other bag you carry with you  A person may be looking for something else and find the narcotics  · Make sure narcotics are stored out of the reach of children  A child can easily overdose on narcotics  Narcotics may look like candy to a small child  The best way to dispose of narcotics: The laws vary by country and area  In the [place], the best way is to return the narcotics through a take-back program  This program is offered by the [place] ([employer])  The following are options for using the program:  · Take the narcotics to a [employer] collection site  The site is often a law enforcement center  Call your local law enforcement center for scheduled take-back days in your area  You will be given information on where to go if the collection site is in a different location  · Take the narcotics to an approved pharmacy or hospital   A pharmacy or hospital may be set up as a collection site  You will need to ask if it is a [employer] collection site if you were not directed there  A pharmacy or doctor's office may not be able to take back narcotics unless it is a [employer] site  · Use a mail-back system  This means you are given containers to put the narcotics into  You will then mail them in the containers  · Use a take-back drop box  This is a place to leave the narcotics at any time  People and animals will not be able to get into the box  Your local law enforcement agency can tell you where to find a drop box in your area  Other ways to manage pain:   · Ask your healthcare provider about non-narcotic medicines to control pain  Nonprescription medicines include NSAIDs (such as ibuprofen) and acetaminophen  Prescription medicines include muscle relaxers, antidepressants, and steroids    · Pain may be managed without any medicines  Some ways to relieve pain include massage, aromatherapy, or meditation  Physical or occupational therapy may also help  For more information:   · Drug Enforcement Administration  [address] [facility] [address]  Phone: 3- 968 - 089-5942  Web Address: [facility]/drug_[email or web address]    · [address]  and Drug Administration  [place] [facility] , [address] [facility]  Phone: 6- 180 - 697-4823  Web Address: http://[employer]/     © Copyright [employer] 2018 Information is for End User's use only and may not be sold, redistributed or otherwise used for commercial purposes   All illustrations and images included in CareNotes® are the copyrighted property of A D A M , Inc  or [address] [place] [address]

## 2022-12-19 ENCOUNTER — TELEPHONE (OUTPATIENT)
Dept: HEMATOLOGY ONCOLOGY | Facility: CLINIC | Age: 62
End: 2022-12-19

## 2022-12-19 NOTE — TELEPHONE ENCOUNTER
Made attempt to schedule a new patient consultation  A voicemail was left using [employer]  [phone] with [facility] phone number instructing patient to call back and schedule

## 2022-12-20 ENCOUNTER — TELEPHONE (OUTPATIENT)
Dept: HEMATOLOGY ONCOLOGY | Facility: CLINIC | Age: 62
End: 2022-12-20

## 2022-12-20 NOTE — TELEPHONE ENCOUNTER
Made attempt to schedule new patient appointment using [employer]  [id number]  A voicemail was left with [facility] phone number instructing patient to call back and schedule  Third attempt to contact patient unsuccessfully - referral closed, and department letter mailed to patient

## 2023-01-07 DIAGNOSIS — B35.3 TINEA PEDIS OF BOTH FEET: ICD-10-CM

## 2023-01-09 ENCOUNTER — CONSULT (OUTPATIENT)
Dept: HEMATOLOGY ONCOLOGY | Facility: CLINIC | Age: 63
End: 2023-01-09

## 2023-01-09 VITALS
HEIGHT: 65 IN | BODY MASS INDEX: 34.72 KG/M2 | OXYGEN SATURATION: 98 % | HEART RATE: 95 BPM | WEIGHT: 208.4 LBS | RESPIRATION RATE: 18 BRPM | DIASTOLIC BLOOD PRESSURE: 72 MMHG | TEMPERATURE: 96.6 F | SYSTOLIC BLOOD PRESSURE: 124 MMHG

## 2023-01-09 DIAGNOSIS — D75.1 POLYCYTHEMIA: ICD-10-CM

## 2023-01-09 DIAGNOSIS — Z12.2 ENCOUNTER FOR SCREENING FOR LUNG CANCER: Primary | ICD-10-CM

## 2023-01-09 NOTE — PROGRESS NOTES
Hematology/Oncology Outpatient Follow-up  Jeana Bernabe 60 y o  male 1960 [id number]    Date:  1/9/2023        Assessment and Plan:  1  Polycythemia  The patient was educated about the potential etiology of his polycythemia including sleep apnea, tobacco use and less likely due to the testosterone treatment which lasted for 3 months around the beginning of 2022  The patient was asked not to pursue any testosterone treatment at this point  He also was asked to consider smoking cessation  We will check The usual work-up for polycythemia to rule out myeloproliferative disorder type of etiology like PV  We will then discuss the results findings and act accordingly   - CBC and differential; Future  - Comprehensive metabolic panel; Future  - Magnesium; Future  - C-reactive protein; Future  - Sedimentation rate, automated; Future  - LD,Blood; Future  - Iron Panel (Includes Ferritin, Iron Sat%, Iron, and TIBC); Future  - Ferritin; Future  - Vitamin B12; Future  - JAK2 V617F,Ql,W/RFL Exons 12,13 and MPL R214,O380; Future  - Carboxyhemoglobin; Future    2  Encounter for screening for lung cancer  His PCP did order a screening CT scan of the lung since he is a smoker  I did encourage him to schedule the CT scan of the lung as soon as possible  Patient agreed with the plan  HPI:  This is a 69-year-old male with multiple morbid conditions including diabetes mellitus, esophageal varices, hepatitis C, hyperlipidemia, hypertension, sleep apnea, substance abuse, tobacco use, etc   The patient stated he had testosterone injections for 3 months around the beginning of 2022  Subsequently, he was found to have elevated hemoglobin hematocrit which is persisting  His recent blood work from 12/13/2022 showed hemoglobin of 18 1 with hematocrit of 53 0%  White blood cell count was 5 4 with a platelet count of 259    White cell differential was normal     Interval history:    ROS: Review of Systems   Constitutional: Negative for chills and fever  HENT: Negative for ear pain and sore throat  Eyes: Negative for pain and visual disturbance  Respiratory: Negative for cough and shortness of breath  Cardiovascular: Negative for chest pain and palpitations  Gastrointestinal: Negative for abdominal pain and vomiting  Genitourinary: Negative for dysuria and hematuria  Musculoskeletal: Negative for arthralgias and back pain  Skin: Negative for color change and rash  Neurological: Positive for numbness  Negative for seizures and syncope  All other systems reviewed and are negative        Past Medical History:   Diagnosis Date   • Colon polyp    • Diabetes mellitus ([facility] )    • Esophageal varices ([facility] )    • Gastritis    • Hepatitis C    • History of Helicobacter pylori infection 2/23/2016   • History of kidney stones    • Hyperlipidemia    • Hypertension    • Infectious viral hepatitis    • Kidney stone    • Obesity    • Pneumonia    • PPD positive 2020   • Sleep apnea    • Substance abuse [facility])        Past Surgical History:   Procedure Laterality Date   • COLONOSCOPY  06/20/2014    dr padilla   • COLONOSCOPY  2014    Dr Padilla   • EGD AND COLONOSCOPY  08/2020    esophageal varices, colon polyp, hemorrhoids   • ESOPHAGOGASTRODUODENOSCOPY  12/10/2015    esophageal varices, cirrhosis, gastritis   • HYDROCELE EXCISION / REPAIR     • LIVER BIOPSY  2014       Social History     Socioeconomic History   • Marital status: Single     Spouse name: None   • Number of children: None   • Years of education: None   • Highest education level: None   Occupational History   • None   Tobacco Use   • Smoking status: Every Day     Packs/day: 0 50     Types: Cigarettes   • Smokeless tobacco: Never   • Tobacco comments:     TOBACCO USE   Vaping Use   • Vaping Use: Never used   Substance and Sexual Activity   • Alcohol use: Not Currently     Alcohol/week: 0 0 standard drinks   • Drug use: Not Currently     Types: Heroin     Comment: former user- heroin was drug of choice   • Sexual activity: Not Currently     Partners: Female   Other Topics Concern   • None   Social History Narrative   • None     Social Determinants of Health     Financial Resource Strain: Low Risk    • Difficulty of Paying Living Expenses: Not hard at all   Food Insecurity: Not on file   Transportation Needs: No Transportation Needs   • Lack of Transportation (Medical): No   • Lack of Transportation (Non-Medical):  No   Physical Activity: Not on file   Stress: Not on file   Social Connections: Not on file   Intimate Partner Violence: Not on file   Housing Stability: Not on file       Family History   Problem Relation Age of Onset   • Diabetes type II Mother         MELLITUS   • Hypertension Mother    • Hypertension Sister    • Diabetes Sister         MELLITUS   • No Known Problems Maternal Grandmother    • No Known Problems Maternal Grandfather    • No Known Problems Paternal Grandmother    • No Known Problems Paternal Grandfather    • No Known Problems Sister    • Breast cancer Maternal Aunt         45s   • No Known Problems Maternal Aunt    • No Known Problems Paternal Aunt    • No Known Problems Paternal Aunt    • No Known Problems Paternal Aunt    • No Known Problems Paternal Aunt    • No Known Problems Paternal Aunt    • No Known Problems Paternal Aunt        Allergies   Allergen Reactions   • Meperidine Abdominal Pain         Current Outpatient Medications:   •  Alcohol Swabs (B-D SINGLE USE SWABS REGULAR) PADS, TEST 2-3 TIMES A DAY AS DIRECTED TEST 2-3 TIMES A DAY AS DIRECTED, Disp: , Rfl:   •  Blood Glucose Calibration (FreeStyle Control Solution) LIQD, USE AS DIRECTED USE DANI LAS INDICACIONES, Disp: , Rfl:   •  Blood Glucose Monitoring Suppl (FreeStyle Lite) w/Device KIT, USE AS DIRECTED USE DANI LAS INDICACIONES, Disp: , Rfl:   •  Blood Pressure Monitoring ([place] Choice BP Monitor/Arm) COCO, USE AS DIRECTEDUSE DANI LAS INDICACIONES, Disp: 1 each, Rfl: 0  •  Continuous Blood Gluc Sensor (FreeStyle Luis Alberto 14 Day Sensor) MISC, USE AS DIRECTEDUSE DANI LAS INDICACIONES, Disp: 6 each, Rfl: 4  •  FREESTYLE LITE test strip, Use 1 each in the morning Use as instructed, Disp: 100 strip, Rfl: 3  •  Global Inject Ease Lancets 28G MISC, TEST 2-3 TIMES A DAY AS DIRECTED TEST 2-3 TIMES A DAY AS DIRECTED, Disp: , Rfl:   •  ketoconazole (NIZORAL) 2 % cream, Apply topically daily, Disp: 15 g, Rfl: 0  •  Lancet Devices (Adjustable Lancing Device) MISC, USE AS DIRECTED USE DANI LAS INDICACIONES, Disp: , Rfl:   •  methadone (DOLOPHINE) 10 mg/mL oral concentrated solution, Take 44 mg by mouth daily, Disp: , Rfl:   •  SYRINGE-NEEDLE, DISP, 3 ML (B-D 3CC LUER-MARÍA ELENA SYR 51IJ7-6/2) 21G X 1-1/2" 3 ML MISC, Use once weekly, Disp: 12 each, Rfl: 0  •  atorvastatin (LIPITOR) 40 mg tablet, TAKE 1 TABLET (40 MG TOTAL) BY MOUTH DAILY (Patient not taking: Reported on 1/9/2023), Disp: 90 tablet, Rfl: 3  •  nadolol (CORGARD) 20 mg tablet, Take 1 tablet (20 mg total) by mouth daily (Patient not taking: Reported on 1/9/2023), Disp: 90 tablet, Rfl: 3  •  Rybelsus 7 MG TABS, Take 7 mg by mouth in the morning (Patient not taking: Reported on 1/9/2023), Disp: 30 tablet, Rfl: 5      Physical Exam:  /72 (BP Location: Left arm, Patient Position: Sitting, Cuff Size: Large)   Pulse 95   Temp (!) 96 6 °F (35 9 °C) (Tympanic)   Resp 18   Ht 5' 5" (1 651 m)   Wt 94 5 kg (208 lb 6 4 oz)   SpO2 98%   BMI 34 68 kg/m²     Physical Exam  Constitutional:       Appearance: He is well-developed  HENT:      Head: Normocephalic and atraumatic  Eyes:      General: No scleral icterus  Right eye: No discharge  Left eye: No discharge  Conjunctiva/sclera: Conjunctivae normal       Pupils: Pupils are equal, round, and reactive to light  Neck:      Thyroid: No thyromegaly  Trachea: No tracheal deviation  Cardiovascular:      Rate and Rhythm: Normal rate and regular rhythm        Heart sounds: Normal heart sounds  No murmur heard  No friction rub  Pulmonary:      Effort: Pulmonary effort is normal  No respiratory distress  Breath sounds: Normal breath sounds  No wheezing or rales  Chest:      Chest wall: No tenderness  Abdominal:      General: There is no distension  Palpations: Abdomen is soft  There is no hepatomegaly or splenomegaly  Tenderness: There is no abdominal tenderness  There is no guarding or rebound  Musculoskeletal:         General: No tenderness or deformity  Normal range of motion  Cervical back: Normal range of motion and neck supple  Lymphadenopathy:      Cervical: No cervical adenopathy  Skin:     General: Skin is warm and dry  Coloration: Skin is not pale  Findings: No erythema or rash  Neurological:      Mental Status: He is alert and oriented to person, place, and time  Cranial Nerves: No cranial nerve deficit  Coordination: Coordination normal       Deep Tendon Reflexes: Reflexes are normal and symmetric  Psychiatric:         Behavior: Behavior normal          Thought Content: Thought content normal          Judgment: Judgment normal            Labs:  Lab Results   Component Value Date    WBC 5 48 12/13/2022    HGB 18 1 (H) 12/13/2022    HCT 53 0 (H) 12/13/2022    MCV 93 12/13/2022     12/13/2022     Lab Results   Component Value Date     05/24/2018    K 3 7 12/13/2022     12/13/2022    CO2 26 12/13/2022    ANIONGAP 10 05/24/2018    BUN 11 12/13/2022    CREATININE 0 94 12/13/2022    GLUCOSE 98 05/24/2018    GLUF 135 (H) 12/13/2022    CALCIUM 9 1 12/13/2022    AST 35 12/13/2022    ALT 34 12/13/2022    ALKPHOS 99 12/13/2022    PROT 7 7 05/24/2018    BILITOT 0 4 05/24/2018    EGFR 85 12/13/2022     No results found for: TSH    Patient voiced understanding and agreement in the above discussion  Aware to contact our office with questions/symptoms in the interim

## 2023-01-11 RX ORDER — KETOCONAZOLE 20 MG/G
CREAM TOPICAL DAILY
Qty: 15 G | Refills: 0 | Status: SHIPPED | OUTPATIENT
Start: 2023-01-11

## 2023-01-17 ENCOUNTER — HOSPITAL ENCOUNTER (OUTPATIENT)
Dept: CT IMAGING | Facility: HOSPITAL | Age: 63
Discharge: HOME | End: 2023-01-17

## 2023-01-17 DIAGNOSIS — F17.200 TOBACCO USE DISORDER: ICD-10-CM

## 2023-02-06 ENCOUNTER — APPOINTMENT (OUTPATIENT)
Dept: LAB | Facility: HOSPITAL | Age: 63
End: 2023-02-06
Payer: MEDICARE

## 2023-02-06 DIAGNOSIS — D75.1 POLYCYTHEMIA: ICD-10-CM

## 2023-02-06 LAB
ALBUMIN SERPL BCP-MCNC: 3.9 G/DL (ref 3.5–5)
ALP SERPL-CCNC: 105 U/L (ref 43–122)
ALT SERPL W P-5'-P-CCNC: 26 U/L
ANION GAP SERPL CALCULATED.3IONS-SCNC: 5 MMOL/L (ref 5–14)
AST SERPL W P-5'-P-CCNC: 33 U/L (ref 17–59)
BASOPHILS # BLD AUTO: 0.04 THOUSANDS/ÂΜL (ref 0–0.1)
BASOPHILS NFR BLD AUTO: 1 % (ref 0–1)
BILIRUB SERPL-MCNC: 0.76 MG/DL (ref 0.2–1)
BUN SERPL-MCNC: 15 MG/DL (ref 5–25)
CALCIUM SERPL-MCNC: 9.2 MG/DL (ref 8.4–10.2)
CHLORIDE SERPL-SCNC: 104 MMOL/L (ref 96–108)
CO2 SERPL-SCNC: 32 MMOL/L (ref 21–32)
CREAT SERPL-MCNC: 1.04 MG/DL (ref 0.7–1.5)
CRP SERPL QL: <5 MG/L
EOSINOPHIL # BLD AUTO: 0.12 THOUSAND/ÂΜL (ref 0–0.61)
EOSINOPHIL NFR BLD AUTO: 2 % (ref 0–6)
ERYTHROCYTE [DISTWIDTH] IN BLOOD BY AUTOMATED COUNT: 13.5 % (ref 11.6–15.1)
ERYTHROCYTE [SEDIMENTATION RATE] IN BLOOD: 24 MM/HOUR (ref 0–19)
FERRITIN SERPL-MCNC: 338 NG/ML (ref 8–388)
GAS + CO PNL BLDA: 7.4 % (ref 0–1.5)
GFR SERPL CREATININE-BSD FRML MDRD: 76 ML/MIN/1.73SQ M
GLUCOSE P FAST SERPL-MCNC: 127 MG/DL (ref 70–99)
HCT VFR BLD AUTO: 50.8 % (ref 36.5–49.3)
HGB BLD-MCNC: 16.2 G/DL (ref 12–17)
IMM GRANULOCYTES # BLD AUTO: 0.03 THOUSAND/UL (ref 0–0.2)
IMM GRANULOCYTES NFR BLD AUTO: 1 % (ref 0–2)
IRON SATN MFR SERPL: 33 % (ref 20–50)
IRON SERPL-MCNC: 88 UG/DL (ref 65–175)
LDH SERPL-CCNC: 357 U/L (ref 313–618)
LYMPHOCYTES # BLD AUTO: 2.62 THOUSANDS/ÂΜL (ref 0.6–4.47)
LYMPHOCYTES NFR BLD AUTO: 40 % (ref 14–44)
MAGNESIUM SERPL-MCNC: 1.8 MG/DL (ref 1.6–2.3)
MCH RBC QN AUTO: 31.3 PG (ref 26.8–34.3)
MCHC RBC AUTO-ENTMCNC: 31.9 G/DL (ref 31.4–37.4)
MCV RBC AUTO: 98 FL (ref 82–98)
MONOCYTES # BLD AUTO: 0.45 THOUSAND/ÂΜL (ref 0.17–1.22)
MONOCYTES NFR BLD AUTO: 7 % (ref 4–12)
NEUTROPHILS # BLD AUTO: 3.3 THOUSANDS/ÂΜL (ref 1.85–7.62)
NEUTS SEG NFR BLD AUTO: 49 % (ref 43–75)
NRBC BLD AUTO-RTO: 0 /100 WBCS
PLATELET # BLD AUTO: 164 THOUSANDS/UL (ref 149–390)
PMV BLD AUTO: 9.4 FL (ref 8.9–12.7)
POTASSIUM SERPL-SCNC: 4.6 MMOL/L (ref 3.5–5.3)
PROT SERPL-MCNC: 7.7 G/DL (ref 6.4–8.4)
RBC # BLD AUTO: 5.17 MILLION/UL (ref 3.88–5.62)
SODIUM SERPL-SCNC: 141 MMOL/L (ref 135–147)
TIBC SERPL-MCNC: 265 UG/DL (ref 250–450)
VIT B12 SERPL-MCNC: 312 PG/ML (ref 100–900)
WBC # BLD AUTO: 6.56 THOUSAND/UL (ref 4.31–10.16)

## 2023-02-06 PROCEDURE — 83550 IRON BINDING TEST: CPT

## 2023-02-06 PROCEDURE — 81270 JAK2 GENE: CPT

## 2023-02-06 PROCEDURE — 82728 ASSAY OF FERRITIN: CPT

## 2023-02-06 PROCEDURE — 83540 ASSAY OF IRON: CPT

## 2023-02-06 PROCEDURE — 85025 COMPLETE CBC W/AUTO DIFF WBC: CPT

## 2023-02-06 PROCEDURE — 85652 RBC SED RATE AUTOMATED: CPT

## 2023-02-06 PROCEDURE — 82607 VITAMIN B-12: CPT

## 2023-02-06 PROCEDURE — 80053 COMPREHEN METABOLIC PANEL: CPT

## 2023-02-06 PROCEDURE — 83735 ASSAY OF MAGNESIUM: CPT

## 2023-02-06 PROCEDURE — 86140 C-REACTIVE PROTEIN: CPT

## 2023-02-06 PROCEDURE — 82375 ASSAY CARBOXYHB QUANT: CPT

## 2023-02-06 PROCEDURE — 83615 LACTATE (LD) (LDH) ENZYME: CPT

## 2023-02-07 LAB — EPO SERPL-ACNC: 10.8 MIU/ML (ref 2.6–18.5)

## 2023-02-17 LAB — MISCELLANEOUS LAB TEST RESULT: NORMAL

## 2023-02-20 ENCOUNTER — OFFICE VISIT (OUTPATIENT)
Dept: HEMATOLOGY ONCOLOGY | Facility: CLINIC | Age: 63
End: 2023-02-20

## 2023-02-20 VITALS
HEIGHT: 65 IN | WEIGHT: 212 LBS | HEART RATE: 102 BPM | TEMPERATURE: 98 F | RESPIRATION RATE: 17 BRPM | SYSTOLIC BLOOD PRESSURE: 120 MMHG | DIASTOLIC BLOOD PRESSURE: 90 MMHG | BODY MASS INDEX: 35.32 KG/M2 | OXYGEN SATURATION: 98 %

## 2023-02-20 DIAGNOSIS — E53.8 VITAMIN B 12 DEFICIENCY: ICD-10-CM

## 2023-02-20 DIAGNOSIS — D75.1 POLYCYTHEMIA: Primary | ICD-10-CM

## 2023-02-20 NOTE — PROGRESS NOTES
Hematology/Oncology Outpatient Follow-up  Sulema Bojorquez 60 y o  male 1960 [id number]    Date:  2/20/2023    Assessment and Plan:  1  Polycythemia  The patient had extensive work-up for his polycythemia which seems to be secondary polycythemia without any obvious hint of JAK2 mutation which usually rules out myeloproliferative disorder etiology  The patient seems to have a well-established diagnosis of sleep apnea, he also uses testosterone on and off per the endocrinology team which may be a contributing factor  Smoking is also very important contributing factor which is also the etiology of his high carboxyhemoglobin level  I did ask him to consider smoking cessation  We will recheck his carboxyhemoglobin level prior to his next visit in 6 months from now  - CBC and differential; Future  - Comprehensive metabolic panel; Future  - Magnesium; Future  - LD,Blood; Future  - C-reactive protein; Future  - Sedimentation rate, automated; Future  - Erythropoietin; Future  - Carboxyhemoglobin; Future    2  Vitamin B 12 deficiency  He seems to have borderline low normal vitamin B12 level  I did ask him to consider taking vitamin B12 supplements daily  - Vitamin B12; Future      HPI:  This is a 79-year-old male with multiple morbid conditions including diabetes mellitus, esophageal varices, hepatitis C, hyperlipidemia, hypertension, sleep apnea, substance abuse, tobacco use, etc   The patient stated he had testosterone injections for 3 months around the beginning of 2022  Subsequently, he was found to have elevated hemoglobin hematocrit which is persisting  His recent blood work from 12/13/2022 showed hemoglobin of 18 1 with hematocrit of 53 0%  White blood cell count was 5 4 with a platelet count of 034  White cell differential was normal           Interval history:  The patient came today for follow-up visit  He had extensive work-up for further evaluation of his polycythemia    Recent CT scan of the lung for screening was done on 1/17/2023 which was negative for any lung nodules  He was found to have moderate coronary artery calcification with stable liver cirrhosis morphology  Blood work on 2/6/2023 showed hemoglobin of 16 2 with hematocrit of 50 8  White cells and platelets were within normal range with normal white cell differential   Creatinine 1 0 with normal calcium and liver enzymes  88   C-reactive protein was normal with slightly elevated sed rate of 20 normal   Vitamin B12 312  JAK2 mutation analysis came back negative  Carboxyhemoglobin level was 7 4%  Iron panel showed saturation of 33% and ferritin of 338  He denied obvious bleeding from any sites  ROS: Review of Systems   Constitutional: Negative for chills and fever  HENT: Negative for ear pain and sore throat  Eyes: Negative for pain and visual disturbance  Respiratory: Negative for cough and shortness of breath  Cardiovascular: Negative for chest pain and palpitations  Gastrointestinal: Negative for abdominal pain and vomiting  Genitourinary: Negative for dysuria and hematuria  Musculoskeletal: Negative for arthralgias and back pain  Skin: Negative for color change and rash  Neurological: Negative for seizures and syncope  All other systems reviewed and are negative        Past Medical History:   Diagnosis Date   • Colon polyp    • Diabetes mellitus ([facility] )    • Esophageal varices (HCC)    • Gastritis    • Hepatitis C    • History of Helicobacter pylori infection 2/23/2016   • History of kidney stones    • Hyperlipidemia    • Hypertension    • Infectious viral hepatitis    • Kidney stone    • Obesity    • Pneumonia    • PPD positive 2020   • Sleep apnea    • Substance abuse [facility])        Past Surgical History:   Procedure Laterality Date   • COLONOSCOPY  06/20/2014    dr padilla   • COLONOSCOPY  2014    Dr Padilla   • EGD AND COLONOSCOPY  08/2020    esophageal varices, colon polyp, hemorrhoids   • ESOPHAGOGASTRODUODENOSCOPY 12/10/2015    esophageal varices, cirrhosis, gastritis   • HYDROCELE EXCISION / REPAIR     • LIVER BIOPSY  2014       Social History     Socioeconomic History   • Marital status: Single     Spouse name: None   • Number of children: None   • Years of education: None   • Highest education level: None   Occupational History   • None   Tobacco Use   • Smoking status: Every Day     Packs/day: 1 50     Years: 30 00     Pack years: 45 00     Types: Cigarettes   • Smokeless tobacco: Never   • Tobacco comments:     TOBACCO USE   Vaping Use   • Vaping Use: Never used   Substance and Sexual Activity   • Alcohol use: Not Currently     Alcohol/week: 0 0 standard drinks   • Drug use: Not Currently     Types: Heroin     Comment: former user- heroin was drug of choice   • Sexual activity: Not Currently     Partners: Female   Other Topics Concern   • None   Social History Narrative   • None     Social Determinants of Health     Financial Resource Strain: Low Risk    • Difficulty of Paying Living Expenses: Not hard at all   Food Insecurity: Not on file   Transportation Needs: No Transportation Needs   • Lack of Transportation (Medical): No   • Lack of Transportation (Non-Medical):  No   Physical Activity: Not on file   Stress: Not on file   Social Connections: Not on file   Intimate Partner Violence: Not on file   Housing Stability: Not on file       Family History   Problem Relation Age of Onset   • Diabetes type II Mother         MELLITUS   • Hypertension Mother    • Hypertension Sister    • Diabetes Sister         MELLITUS   • No Known Problems Maternal Grandmother    • No Known Problems Maternal Grandfather    • No Known Problems Paternal Grandmother    • No Known Problems Paternal Grandfather    • No Known Problems Sister    • Breast cancer Maternal Aunt         45s   • No Known Problems Maternal Aunt    • No Known Problems Paternal Aunt    • No Known Problems Paternal Aunt    • No Known Problems Paternal Aunt    • No Known Problems Paternal Aunt    • No Known Problems Paternal Aunt    • No Known Problems Paternal Aunt        Allergies   Allergen Reactions   • Meperidine Abdominal Pain         Current Outpatient Medications:   •  Alcohol Swabs (B-D SINGLE USE SWABS REGULAR) PADS, TEST 2-3 TIMES A DAY AS DIRECTED TEST 2-3 TIMES A DAY AS DIRECTED, Disp: , Rfl:   •  Blood Glucose Calibration (FreeStyle Control Solution) LIQD, USE AS DIRECTED USE DANI PEREZ INDICACIONES, Disp: , Rfl:   •  Blood Glucose Monitoring Suppl (FreeStyle Lite) w/Device KIT, USE AS DIRECTED USE DANI LAS INDICACIONES, Disp: , Rfl:   •  Blood Pressure Monitoring ([place] Choice BP Monitor/Arm) COCO, USE AS DIRECTEDUSE DANI LAS INDICACIONES, Disp: 1 each, Rfl: 0  •  Continuous Blood Gluc Sensor (FreeStyle Luis Alberto 14 Day Sensor) MISC, USE AS DIRECTEDUSE DANI PEREZ INDICACIONES, Disp: 6 each, Rfl: 4  •  FREESTYLE LITE test strip, Use 1 each in the morning Use as instructed, Disp: 100 strip, Rfl: 3  •  Global Inject Ease Lancets 28G MISC, TEST 2-3 TIMES A DAY AS DIRECTED TEST 2-3 TIMES A DAY AS DIRECTED, Disp: , Rfl:   •  ketoconazole (NIZORAL) 2 % cream, APPLY TOPICALLY DAILY, Disp: 15 g, Rfl: 0  •  Lancet Devices (Adjustable Lancing Device) MISC, USE AS DIRECTED USE DANI PEREZ INDICACIONES, Disp: , Rfl:   •  methadone (DOLOPHINE) 10 mg/mL oral concentrated solution, Take 44 mg by mouth daily, Disp: , Rfl:   •  SYRINGE-NEEDLE, DISP, 3 ML (B-D 3CC LUER-MARÍA ELENA SYR 19BJ5-9/2) 21G X 1-1/2" 3 ML MISC, Use once weekly, Disp: 12 each, Rfl: 0  •  atorvastatin (LIPITOR) 40 mg tablet, TAKE 1 TABLET (40 MG TOTAL) BY MOUTH DAILY (Patient not taking: Reported on 1/9/2023), Disp: 90 tablet, Rfl: 3  •  nadolol (CORGARD) 20 mg tablet, Take 1 tablet (20 mg total) by mouth daily (Patient not taking: Reported on 1/9/2023), Disp: 90 tablet, Rfl: 3  •  Rybelsus 7 MG TABS, Take 7 mg by mouth in the morning (Patient not taking: Reported on 1/9/2023), Disp: 30 tablet, Rfl: 5      Physical Exam:  BP 120/90 (BP Location: Left arm, Patient Position: Sitting, Cuff Size: Adult)   Pulse 102   Temp 98 °F (36 7 °C)   Resp 17   Ht 5' 5" (1 651 m)   Wt 96 2 kg (212 lb)   SpO2 98%   BMI 35 28 kg/m²     Physical Exam  Constitutional:       Appearance: He is well-developed  HENT:      Head: Normocephalic and atraumatic  Nose: Nose normal    Eyes:      General: No scleral icterus  Right eye: No discharge  Left eye: No discharge  Conjunctiva/sclera: Conjunctivae normal       Pupils: Pupils are equal, round, and reactive to light  Neck:      Thyroid: No thyromegaly  Trachea: No tracheal deviation  Cardiovascular:      Rate and Rhythm: Normal rate and regular rhythm  Heart sounds: Normal heart sounds  No murmur heard  No friction rub  Pulmonary:      Effort: Pulmonary effort is normal  No respiratory distress  Breath sounds: Normal breath sounds  No wheezing or rales  Chest:      Chest wall: No tenderness  Abdominal:      General: There is no distension  Palpations: Abdomen is soft  There is no hepatomegaly or splenomegaly  Tenderness: There is no abdominal tenderness  There is no guarding or rebound  Comments: Obese abdomen   Musculoskeletal:         General: No tenderness or deformity  Normal range of motion  Cervical back: Normal range of motion and neck supple  Lymphadenopathy:      Cervical: No cervical adenopathy  Skin:     General: Skin is warm and dry  Coloration: Skin is not pale  Findings: No erythema or rash  Neurological:      Mental Status: He is alert and oriented to person, place, and time  Cranial Nerves: No cranial nerve deficit  Coordination: Coordination normal       Deep Tendon Reflexes: Reflexes are normal and symmetric  Psychiatric:         Behavior: Behavior normal          Thought Content:  Thought content normal          Judgment: Judgment normal            Labs:  Lab Results   Component Value Date    WBC 6 56 02/06/2023    HGB 16 2 02/06/2023    HCT 50 8 (H) 02/06/2023    MCV 98 02/06/2023     02/06/2023     Lab Results   Component Value Date     05/24/2018    K 4 6 02/06/2023     02/06/2023    CO2 32 02/06/2023    ANIONGAP 10 05/24/2018    BUN 15 02/06/2023    CREATININE 1 04 02/06/2023    GLUCOSE 98 05/24/2018    GLUF 127 (H) 02/06/2023    CALCIUM 9 2 02/06/2023    AST 33 02/06/2023    ALT 26 02/06/2023    ALKPHOS 105 02/06/2023    PROT 7 7 05/24/2018    BILITOT 0 4 05/24/2018    EGFR 76 02/06/2023       Patient voiced understanding and agreement in the above discussion  Aware to contact our office with questions/symptoms in the interim

## 2023-06-14 RX ORDER — TERBINAFINE HYDROCHLORIDE 250 MG/1
TABLET ORAL
COMMUNITY
Start: 2023-04-13

## 2023-06-14 RX ORDER — CLOTRIMAZOLE 1 %
CREAM (GRAM) TOPICAL
COMMUNITY
Start: 2023-05-17

## 2023-06-14 RX ORDER — AMMONIUM LACTATE 12 G/100G
LOTION TOPICAL
COMMUNITY
Start: 2023-05-12

## 2023-06-15 ENCOUNTER — OFFICE VISIT (OUTPATIENT)
Dept: FAMILY MEDICINE CLINIC | Facility: CLINIC | Age: 63
End: 2023-06-15
Payer: MEDICARE

## 2023-06-15 VITALS
HEART RATE: 96 BPM | TEMPERATURE: 97.8 F | SYSTOLIC BLOOD PRESSURE: 140 MMHG | RESPIRATION RATE: 16 BRPM | BODY MASS INDEX: 34.82 KG/M2 | WEIGHT: 209 LBS | HEIGHT: 65 IN | DIASTOLIC BLOOD PRESSURE: 90 MMHG | OXYGEN SATURATION: 98 %

## 2023-06-15 DIAGNOSIS — K74.60 ESOPHAGEAL VARICES IN CIRRHOSIS (HCC): ICD-10-CM

## 2023-06-15 DIAGNOSIS — E11.9 TYPE 2 DIABETES MELLITUS WITHOUT COMPLICATION, WITHOUT LONG-TERM CURRENT USE OF INSULIN (HCC): Primary | ICD-10-CM

## 2023-06-15 DIAGNOSIS — I25.10 CORONARY ARTERY CALCIFICATION: ICD-10-CM

## 2023-06-15 DIAGNOSIS — I25.84 CORONARY ARTERY CALCIFICATION: ICD-10-CM

## 2023-06-15 DIAGNOSIS — I85.10 ESOPHAGEAL VARICES IN CIRRHOSIS (HCC): ICD-10-CM

## 2023-06-15 DIAGNOSIS — F11.20 METHADONE MAINTENANCE THERAPY PATIENT (HCC): ICD-10-CM

## 2023-06-15 LAB
CREAT UR-MCNC: 140 MG/DL
MICROALBUMIN UR-MCNC: 10.8 MG/L (ref 0–20)
MICROALBUMIN/CREAT 24H UR: 8 MG/G CREATININE (ref 0–30)
SL AMB POCT HEMOGLOBIN AIC: 7 (ref ?–6.5)

## 2023-06-15 PROCEDURE — 99215 OFFICE O/P EST HI 40 MIN: CPT | Performed by: FAMILY MEDICINE

## 2023-06-15 PROCEDURE — 83036 HEMOGLOBIN GLYCOSYLATED A1C: CPT | Performed by: FAMILY MEDICINE

## 2023-06-15 PROCEDURE — 82570 ASSAY OF URINE CREATININE: CPT | Performed by: FAMILY MEDICINE

## 2023-06-15 PROCEDURE — 82043 UR ALBUMIN QUANTITATIVE: CPT | Performed by: FAMILY MEDICINE

## 2023-06-15 RX ORDER — ATORVASTATIN CALCIUM 40 MG/1
40 TABLET, FILM COATED ORAL DAILY
Qty: 90 TABLET | Refills: 3 | Status: SHIPPED | OUTPATIENT
Start: 2023-06-15

## 2023-06-15 RX ORDER — LISINOPRIL 5 MG/1
5 TABLET ORAL DAILY
Qty: 30 TABLET | Refills: 3 | Status: SHIPPED | OUTPATIENT
Start: 2023-06-15

## 2023-06-15 NOTE — PROGRESS NOTES
Name: Jenny Roman      : 1960      MRN: [id number]  Encounter Provider: Marcie Isaacs MD  Encounter Date: 6/15/2023   Encounter department: [facility]     1  Type 2 diabetes mellitus without complication, without long-term current use of insulin ([facility])  Assessment & Plan:    Lab Results   Component Value Date    HGBA1C 7 0 (A) 06/15/2023   Patient to stop Rybelsus due to low blood sugar when he was at the dose of 7 mg  His hemoglobin A1c increased  He states morning numbers between 100 mg/dL and 130 mg/dL  Current Outpatient Medications   Medication Instructions   • Alcohol Swabs (B-D SINGLE USE SWABS REGULAR) PADS TEST 2-3 TIMES A DAY AS DIRECTED TEST 2-3 TIMES A DAY AS DIRECTED   • ammonium lactate (LAC-HYDRIN) 12 % lotion APPLY 1 APPLICATION TWICE A DAY BY TOPICAL ROUTE     • atorvastatin (LIPITOR) 40 mg, Oral, Daily   • Blood Glucose Calibration (FreeStyle Control Solution) LIQD USE AS DIRECTED USE DANI LAS INDICACIONES   • Blood Glucose Monitoring Suppl (FreeStyle Lite) w/Device KIT USE AS DIRECTED USE DANI LAS INDICACIONES   • Blood Pressure Monitoring ([place] Choice BP Monitor/Arm) COCO USE AS DIRECTEDUSE DANI LAS INDICACIONES   • clotrimazole (LOTRIMIN) 1 % cream APPLY TO AFFECTED AREA TWICE DAILY APPLY A / HASTA AFFECTED AREA DOS VECES AL MORIAH   • Continuous Blood Gluc Sensor (FreeStyle Luis Alberto 14 Day Sensor) MISC USE AS DIRECTEDUSE DANI LAS INDICACIONES   • FREESTYLE LITE test strip 1 each, Other, Daily, Use as instructed   • Global Inject Ease Lancets 28G MISC TEST 2-3 TIMES A DAY AS DIRECTED TEST 2-3 TIMES A DAY AS DIRECTED   • ketoconazole (NIZORAL) 2 % cream Topical, Daily   • Lancet Devices (Adjustable Lancing Device) MISC USE AS DIRECTED USE DANI LAS INDICACIONES   • lisinopril (ZESTRIL) 5 mg, Oral, Daily   • methadone (DOLOPHINE) 44 mg, Oral, Daily   • nadolol (CORGARD) 20 mg, Oral, Daily   • semaglutide (RYBELSUS) 3 mg, "Oral, Daily before breakfast   • SYRINGE-NEEDLE, DISP, 3 ML (B-D 3CC LUER-MARÍA ELENA SYR 83HJ5-4/2) 21G X 1-1/2\" 3 ML MISC Use once weekly   • terbinafine (LamISIL) 250 mg tablet TAKE ONE TABLET BY MOUTH DAILY NO 1 TABLETA POR VIA ORAL DIARIAMENTE      Lab Results   Component Value Date    HGBA1C 7 0 (A) 06/15/2023    HGBA1C 6 4 (H) 12/13/2022    HGBA1C 6 8 (A) 09/13/2022     Lab Results   Component Value Date    CREATININE 1 04 02/06/2023    GLUF 127 (H) 02/06/2023    LDLCALC 51 09/23/2022     Lab Results   Component Value Date/Time    HGBA1C 7 0 (A) 06/15/2023 09:43 AM    HGBA1C 6 4 (H) 12/13/2022 08:56 AM    HGBA1C 5 5 05/24/2018 08:36 AM     Lab Results   Component Value Date/Time    MICROALBCRE 5 03/08/2022 08:19 AM    MICROALBCRE 6 3 05/24/2018 08:36 AM    and GFR is   Lab Results   Component Value Date/Time    BUN 15 02/06/2023 07:53 AM    BUN 13 05/24/2018 08:36 AM    EGFR 76 02/06/2023 07:53 AM      Lab Results   Component Value Date/Time    LDLCALC 51 09/23/2022 06:18 AM    LDLCALC 45 05/24/2018 08:36 AM     Rishi has diabetes type 2,   His HbA1C shows poor  control:   Lab Results   Component Value Date    HGBA1C 7 0 (A) 06/15/2023      he is not on renal protection and will start on ACI agent:  Lisinopril  Albuminuria is Absent, stable  The importance of statin therapy was explained  LDL is at target with last LDL of   Lab Results   Component Value Date    LDLCALC 51 09/23/2022     Continue Atorvastatin 40 mg  I explained to CARLYLE JENNINGS  [facility] & [facility] the goals of blood sugar levels , with fasting  of less than 130 mg/dl and less than 150 mg/dl 2 hrs after meals  Discussed complication from uncontrolled Diabetes and hypoglycemia symptoms  The importance of following with diabetes educator and life style modification also was stressed  Feet exam: Risk Category 0 : Normal Plantar Sensation -moderate risk due to onychomycosis  Requested patient to make appointment with podiatrist   Retin exam: Done previously and normal   " Compliance with treatment encouraged  Call if side effect with the treatment  Bring blood sugars log book at the next appointment  Orders:  -     Comprehensive metabolic panel; Future  -     Lipid panel; Future  -     Albumin / creatinine urine ratio; Future  -     POCT hemoglobin A1c  -     Albumin / creatinine urine ratio  -     lisinopril (ZESTRIL) 5 mg tablet; Take 1 tablet (5 mg total) by mouth daily  -     semaglutide (Rybelsus) 3 MG tablet; Take 1 tablet (3 mg total) by mouth daily before breakfast    2  BMI 34 0-34 9,adult  Comments:  Stressed importance of low-carb diet, exercise and weight control  3  Esophageal varices in cirrhosis (HCC)  Comments:  Related to cirrhosis of the liver  He does not have any complaints  Denies epigastric pain, hematemesis, melena  He does not have ascites  4  Methadone maintenance therapy patient [facility])  Comments:  He continues long-term use of opioids replacement taking medications weekly with daily dose of 44 mg of methadone  5  Coronary artery calcification  Comments:  lung cancer screening performed in January 2023 reported coronary arteries calcification  Requesting cardiology consultation  Patient denies any symptoms  Orders:  -     Ambulatory Referral to Cardiology; Future  -     atorvastatin (LIPITOR) 40 mg tablet; Take 1 tablet (40 mg total) by mouth daily  -     ECG 12 lead; Future         Subjective      HPI     Review of Systems    Current Outpatient Medications on File Prior to Visit   Medication Sig   • Alcohol Swabs (B-D SINGLE USE SWABS REGULAR) PADS TEST 2-3 TIMES A DAY AS DIRECTED TEST 2-3 TIMES A DAY AS DIRECTED   • ammonium lactate (LAC-HYDRIN) 12 % lotion APPLY 1 APPLICATION TWICE A DAY BY TOPICAL ROUTE     • Blood Glucose Calibration (FreeStyle Control Solution) LIQD USE AS DIRECTED USE DANI LAS INDICACIONES   • Blood Glucose Monitoring Suppl (FreeStyle Lite) w/Device KIT USE AS DIRECTED USE DANI LAS INDICACIONES   • Blood "Pressure Monitoring ([place] Choice BP Monitor/Arm) COCO USE AS DIRECTEDUSE DANI LAS INDICACIONES   • clotrimazole (LOTRIMIN) 1 % cream APPLY TO AFFECTED AREA TWICE DAILY APPLY A / HASTA AFFECTED AREA DOS VECES AL MORIAH   • Continuous Blood Gluc Sensor (FreeStyle Luis Alberto 14 Day Sensor) MISC USE AS DIRECTEDUSE DANI LAS INDICACIONES   • FREESTYLE LITE test strip Use 1 each in the morning Use as instructed   • Global Inject Ease Lancets 28G MISC TEST 2-3 TIMES A DAY AS DIRECTED TEST 2-3 TIMES A DAY AS DIRECTED   • ketoconazole (NIZORAL) 2 % cream APPLY TOPICALLY DAILY   • Lancet Devices (Adjustable Lancing Device) MISC USE AS DIRECTED USE DANI LAS INDICACIONES   • methadone (DOLOPHINE) 10 mg/mL oral concentrated solution Take 44 mg by mouth daily   • nadolol (CORGARD) 20 mg tablet Take 1 tablet (20 mg total) by mouth daily (Patient not taking: Reported on 1/9/2023)   • SYRINGE-NEEDLE, DISP, 3 ML (B-D 3CC LUER-MARÍA ELENA SYR 24DW5-0/2) 21G X 1-1/2\" 3 ML MISC Use once weekly   • terbinafine (LamISIL) 250 mg tablet TAKE ONE TABLET BY MOUTH DAILY NO 1 TABLETA POR VIA ORAL DIARIAMENTE   • [DISCONTINUED] atorvastatin (LIPITOR) 40 mg tablet TAKE 1 TABLET (40 MG TOTAL) BY MOUTH DAILY (Patient not taking: Reported on 1/9/2023)   • [DISCONTINUED] Rybelsus 7 MG TABS Take 7 mg by mouth in the morning (Patient not taking: Reported on 1/9/2023)       Objective     /90 (BP Location: Left arm, Patient Position: Sitting, Cuff Size: Standard)   Pulse 96   Temp 97 8 °F (36 6 °C) (Tympanic)   Resp 16   Ht 5' 5\" (1 651 m)   Wt 94 8 kg (209 lb)   SpO2 98%   BMI 34 78 kg/m²     Physical Exam  Constitutional:       Appearance: Normal appearance  Cardiovascular:      Pulses: no weak pulses          Dorsalis pedis pulses are 2+ on the right side and 2+ on the left side  Posterior tibial pulses are 2+ on the right side and 2+ on the left side     Musculoskeletal:        Feet:    Feet:      Right foot:      Skin integrity: Callus " and dry skin present  No ulcer, skin breakdown, erythema or warmth  Left foot:      Skin integrity: Callus and dry skin present  No ulcer, skin breakdown, erythema or warmth  Neurological:      Mental Status: He is alert  Patient's shoes and socks removed  Right Foot/Ankle   Right Foot Inspection  Skin Exam: skin normal, skin intact, dry skin, callus and callus  No warmth, no erythema, no maceration, no abnormal color, no pre-ulcer and no ulcer  Toe Exam: ROM and strength within normal limits  No swelling, no tenderness, erythema and  no right toe deformity    Sensory   Vibration: intact  Proprioception: intact  Monofilament testing: intact    Vascular  Capillary refills: < 3 seconds  The right DP pulse is 2+  The right PT pulse is 2+  Left Foot/Ankle  Left Foot Inspection  Skin Exam: skin normal, skin intact, dry skin and callus  No warmth, no erythema, no maceration, normal color, no pre-ulcer and no ulcer  Toe Exam: ROM and strength within normal limits  No swelling, no tenderness, no erythema and no left toe deformity  Sensory   Vibration: intact  Proprioception: intact  Monofilament testing: intact    Vascular  Capillary refills: < 3 seconds  The left DP pulse is 2+  The left PT pulse is 2+  Assign Risk Category  No deformity present  No loss of protective sensation  No weak pulses  Risk: 0        Roseanna Donis MD  BMI Counseling: Body mass index is 34 78 kg/m²  The BMI is above normal  Nutrition recommendations include reducing portion sizes  Exercise recommendations include exercising 3-5 times per week

## 2023-06-15 NOTE — ASSESSMENT & PLAN NOTE
"  Lab Results   Component Value Date    HGBA1C 7 0 (A) 06/15/2023   Patient to stop Rybelsus due to low blood sugar when he was at the dose of 7 mg  His hemoglobin A1c increased  He states morning numbers between 100 mg/dL and 130 mg/dL  Current Outpatient Medications   Medication Instructions   • Alcohol Swabs (B-D SINGLE USE SWABS REGULAR) PADS TEST 2-3 TIMES A DAY AS DIRECTED TEST 2-3 TIMES A DAY AS DIRECTED   • ammonium lactate (LAC-HYDRIN) 12 % lotion APPLY 1 APPLICATION TWICE A DAY BY TOPICAL ROUTE     • atorvastatin (LIPITOR) 40 mg, Oral, Daily   • Blood Glucose Calibration (FreeStyle Control Solution) LIQD USE AS DIRECTED USE DANI LAS INDICACIONES   • Blood Glucose Monitoring Suppl (FreeStyle Lite) w/Device KIT USE AS DIRECTED USE DANI LAS INDICACIONES   • Blood Pressure Monitoring ([place] Choice BP Monitor/Arm) COCO USE AS DIRECTEDUSE DANI LAS INDICACIONES   • clotrimazole (LOTRIMIN) 1 % cream APPLY TO AFFECTED AREA TWICE DAILY APPLY A / HASTA AFFECTED AREA DOS VECES AL MORIAH   • Continuous Blood Gluc Sensor (FreeStyle Luis Alberto 14 Day Sensor) MISC USE AS DIRECTEDUSE DANI LAS INDICACIONES   • FREESTYLE LITE test strip 1 each, Other, Daily, Use as instructed   • Global Inject Ease Lancets 28G MISC TEST 2-3 TIMES A DAY AS DIRECTED TEST 2-3 TIMES A DAY AS DIRECTED   • ketoconazole (NIZORAL) 2 % cream Topical, Daily   • Lancet Devices (Adjustable Lancing Device) MISC USE AS DIRECTED USE DANI LAS INDICACIONES   • lisinopril (ZESTRIL) 5 mg, Oral, Daily   • methadone (DOLOPHINE) 44 mg, Oral, Daily   • nadolol (CORGARD) 20 mg, Oral, Daily   • semaglutide (RYBELSUS) 3 mg, Oral, Daily before breakfast   • SYRINGE-NEEDLE, DISP, 3 ML (B-D 3CC LUER-MARÍA ELENA SYR 18HN7-7/2) 21G X 1-1/2\" 3 ML MISC Use once weekly   • terbinafine (LamISIL) 250 mg tablet TAKE ONE TABLET BY MOUTH DAILY NO 1 TABLETA POR VIA ORAL DIARIAMENTE      Lab Results   Component Value Date    HGBA1C 7 0 (A) 06/15/2023    HGBA1C 6 4 (H) 12/13/2022    " HGBA1C 6 8 (A) 09/13/2022     Lab Results   Component Value Date    CREATININE 1 04 02/06/2023    GLUF 127 (H) 02/06/2023    LDLCALC 51 09/23/2022     Lab Results   Component Value Date/Time    HGBA1C 7 0 (A) 06/15/2023 09:43 AM    HGBA1C 6 4 (H) 12/13/2022 08:56 AM    HGBA1C 5 5 05/24/2018 08:36 AM     Lab Results   Component Value Date/Time    MICROALBCRE 5 03/08/2022 08:19 AM    MICROALBCRE 6 3 05/24/2018 08:36 AM    and GFR is   Lab Results   Component Value Date/Time    BUN 15 02/06/2023 07:53 AM    BUN 13 05/24/2018 08:36 AM    EGFR 76 02/06/2023 07:53 AM      Lab Results   Component Value Date/Time    LDLCALC 51 09/23/2022 06:18 AM    LDLCALC 45 05/24/2018 08:36 AM     Rishi has diabetes type 2,   His HbA1C shows poor  control:   Lab Results   Component Value Date    HGBA1C 7 0 (A) 06/15/2023      he is not on renal protection and will start on ACI agent:  Lisinopril  Albuminuria is Absent, stable  The importance of statin therapy was explained  LDL is at target with last LDL of   Lab Results   Component Value Date    LDLCALC 51 09/23/2022     Continue Atorvastatin 40 mg  I explained to CARLYLE JENNINGS  [facility] & [facility] the goals of blood sugar levels , with fasting  of less than 130 mg/dl and less than 150 mg/dl 2 hrs after meals  Discussed complication from uncontrolled Diabetes and hypoglycemia symptoms  The importance of following with diabetes educator and life style modification also was stressed  Feet exam: Risk Category 0 : Normal Plantar Sensation -moderate risk due to onychomycosis  Requested patient to make appointment with podiatrist   Retin exam: Done previously and normal    Compliance with treatment encouraged  Call if side effect with the treatment  Bring blood sugars log book at the next appointment

## 2023-07-17 DIAGNOSIS — F11.90 OPIOID USE DISORDER: ICD-10-CM

## 2023-07-17 DIAGNOSIS — E29.1 HYPOGONADISM IN MALE: ICD-10-CM

## 2023-07-17 DIAGNOSIS — K74.60 CIRRHOSIS OF LIVER WITHOUT ASCITES, UNSPECIFIED HEPATIC CIRRHOSIS TYPE (HCC): ICD-10-CM

## 2023-07-17 DIAGNOSIS — F11.20 METHADONE MAINTENANCE THERAPY PATIENT (HCC): ICD-10-CM

## 2023-07-17 DIAGNOSIS — I10 BENIGN ESSENTIAL HYPERTENSION: ICD-10-CM

## 2023-07-17 DIAGNOSIS — E11.9 TYPE 2 DIABETES MELLITUS WITHOUT COMPLICATION, WITHOUT LONG-TERM CURRENT USE OF INSULIN (HCC): ICD-10-CM

## 2023-07-17 DIAGNOSIS — G47.33 OSA (OBSTRUCTIVE SLEEP APNEA): ICD-10-CM

## 2023-07-17 DIAGNOSIS — E55.9 VITAMIN D DEFICIENCY: ICD-10-CM

## 2023-07-17 DIAGNOSIS — E29.1 SECONDARY MALE HYPOGONADISM: ICD-10-CM

## 2023-07-18 RX ORDER — ORAL SEMAGLUTIDE 7 MG/1
TABLET ORAL
Qty: 90 TABLET | Refills: 3 | OUTPATIENT
Start: 2023-07-18

## 2023-07-26 DIAGNOSIS — E11.9 TYPE 2 DIABETES MELLITUS WITHOUT COMPLICATION, WITHOUT LONG-TERM CURRENT USE OF INSULIN (HCC): ICD-10-CM

## 2023-07-26 RX ORDER — ORAL SEMAGLUTIDE 3 MG/1
TABLET ORAL
Qty: 30 TABLET | Refills: 0 | Status: SHIPPED | OUTPATIENT
Start: 2023-07-26

## 2023-07-27 DIAGNOSIS — E11.9 TYPE 2 DIABETES MELLITUS WITHOUT COMPLICATION, WITHOUT LONG-TERM CURRENT USE OF INSULIN (HCC): ICD-10-CM

## 2023-07-27 RX ORDER — BLOOD-GLUCOSE METER
KIT MISCELLANEOUS
Qty: 300 EACH | Refills: 1 | Status: SHIPPED | OUTPATIENT
Start: 2023-07-27

## 2023-07-28 DIAGNOSIS — E29.1 HYPOGONADISM IN MALE: ICD-10-CM

## 2023-07-28 DIAGNOSIS — F11.90 OPIOID USE DISORDER: ICD-10-CM

## 2023-07-28 DIAGNOSIS — E55.9 VITAMIN D DEFICIENCY: ICD-10-CM

## 2023-07-28 DIAGNOSIS — E29.1 SECONDARY MALE HYPOGONADISM: ICD-10-CM

## 2023-07-28 DIAGNOSIS — F11.20 METHADONE MAINTENANCE THERAPY PATIENT (HCC): ICD-10-CM

## 2023-07-28 DIAGNOSIS — B35.3 TINEA PEDIS OF BOTH FEET: ICD-10-CM

## 2023-07-28 DIAGNOSIS — G47.33 OSA (OBSTRUCTIVE SLEEP APNEA): ICD-10-CM

## 2023-07-28 DIAGNOSIS — E11.9 TYPE 2 DIABETES MELLITUS WITHOUT COMPLICATION, WITHOUT LONG-TERM CURRENT USE OF INSULIN (HCC): ICD-10-CM

## 2023-07-28 DIAGNOSIS — K74.60 CIRRHOSIS OF LIVER WITHOUT ASCITES, UNSPECIFIED HEPATIC CIRRHOSIS TYPE (HCC): ICD-10-CM

## 2023-07-28 DIAGNOSIS — I10 BENIGN ESSENTIAL HYPERTENSION: ICD-10-CM

## 2023-07-30 RX ORDER — KETOCONAZOLE 20 MG/G
CREAM TOPICAL DAILY
Qty: 15 G | Refills: 0 | Status: SHIPPED | OUTPATIENT
Start: 2023-07-30

## 2023-08-01 RX ORDER — ORAL SEMAGLUTIDE 7 MG/1
7 TABLET ORAL DAILY
Qty: 90 TABLET | Refills: 3 | OUTPATIENT
Start: 2023-08-01

## 2023-08-08 ENCOUNTER — APPOINTMENT (OUTPATIENT)
Dept: LAB | Facility: HOSPITAL | Age: 63
End: 2023-08-08
Payer: MEDICARE

## 2023-08-08 DIAGNOSIS — E53.8 VITAMIN B 12 DEFICIENCY: ICD-10-CM

## 2023-08-08 DIAGNOSIS — E11.9 TYPE 2 DIABETES MELLITUS WITHOUT COMPLICATION, WITHOUT LONG-TERM CURRENT USE OF INSULIN (HCC): ICD-10-CM

## 2023-08-08 DIAGNOSIS — D75.1 POLYCYTHEMIA: ICD-10-CM

## 2023-08-08 LAB
ALBUMIN SERPL BCP-MCNC: 4 G/DL (ref 3.5–5)
ALP SERPL-CCNC: 85 U/L (ref 34–104)
ALT SERPL W P-5'-P-CCNC: 16 U/L (ref 7–52)
ANION GAP SERPL CALCULATED.3IONS-SCNC: 10 MMOL/L
AST SERPL W P-5'-P-CCNC: 22 U/L (ref 13–39)
BASOPHILS # BLD AUTO: 0.05 THOUSANDS/ÂΜL (ref 0–0.1)
BASOPHILS NFR BLD AUTO: 1 % (ref 0–1)
BILIRUB SERPL-MCNC: 1.41 MG/DL (ref 0.2–1)
BUN SERPL-MCNC: 11 MG/DL (ref 5–25)
CALCIUM SERPL-MCNC: 9.5 MG/DL (ref 8.4–10.2)
CHLORIDE SERPL-SCNC: 99 MMOL/L (ref 96–108)
CHOLEST SERPL-MCNC: 144 MG/DL
CO2 SERPL-SCNC: 27 MMOL/L (ref 21–32)
CREAT SERPL-MCNC: 1.01 MG/DL (ref 0.6–1.3)
CRP SERPL QL: 3.9 MG/L
EOSINOPHIL # BLD AUTO: 0.13 THOUSAND/ÂΜL (ref 0–0.61)
EOSINOPHIL NFR BLD AUTO: 2 % (ref 0–6)
ERYTHROCYTE [DISTWIDTH] IN BLOOD BY AUTOMATED COUNT: 13.9 % (ref 11.6–15.1)
ERYTHROCYTE [SEDIMENTATION RATE] IN BLOOD: 22 MM/HOUR (ref 0–19)
GAS + CO PNL BLDA: 7.2 % (ref 0–1.5)
GFR SERPL CREATININE-BSD FRML MDRD: 78 ML/MIN/1.73SQ M
GLUCOSE P FAST SERPL-MCNC: 105 MG/DL (ref 65–99)
HCT VFR BLD AUTO: 52.6 % (ref 36.5–49.3)
HDLC SERPL-MCNC: 45 MG/DL
HGB BLD-MCNC: 17.5 G/DL (ref 12–17)
IMM GRANULOCYTES # BLD AUTO: 0.03 THOUSAND/UL (ref 0–0.2)
IMM GRANULOCYTES NFR BLD AUTO: 0 % (ref 0–2)
LDH SERPL-CCNC: 160 U/L (ref 140–271)
LDLC SERPL CALC-MCNC: 52 MG/DL (ref 0–100)
LYMPHOCYTES # BLD AUTO: 2.83 THOUSANDS/ÂΜL (ref 0.6–4.47)
LYMPHOCYTES NFR BLD AUTO: 41 % (ref 14–44)
MAGNESIUM SERPL-MCNC: 1.7 MG/DL (ref 1.9–2.7)
MCH RBC QN AUTO: 31.2 PG (ref 26.8–34.3)
MCHC RBC AUTO-ENTMCNC: 33.3 G/DL (ref 31.4–37.4)
MCV RBC AUTO: 94 FL (ref 82–98)
MONOCYTES # BLD AUTO: 0.46 THOUSAND/ÂΜL (ref 0.17–1.22)
MONOCYTES NFR BLD AUTO: 7 % (ref 4–12)
NEUTROPHILS # BLD AUTO: 3.41 THOUSANDS/ÂΜL (ref 1.85–7.62)
NEUTS SEG NFR BLD AUTO: 49 % (ref 43–75)
NONHDLC SERPL-MCNC: 99 MG/DL
NRBC BLD AUTO-RTO: 0 /100 WBCS
PLATELET # BLD AUTO: 171 THOUSANDS/UL (ref 149–390)
PMV BLD AUTO: 10.1 FL (ref 8.9–12.7)
POTASSIUM SERPL-SCNC: 4.2 MMOL/L (ref 3.5–5.3)
PROT SERPL-MCNC: 7.4 G/DL (ref 6.4–8.4)
RBC # BLD AUTO: 5.61 MILLION/UL (ref 3.88–5.62)
SODIUM SERPL-SCNC: 136 MMOL/L (ref 135–147)
TRIGL SERPL-MCNC: 236 MG/DL
VIT B12 SERPL-MCNC: 244 PG/ML (ref 180–914)
WBC # BLD AUTO: 6.91 THOUSAND/UL (ref 4.31–10.16)

## 2023-08-08 PROCEDURE — 83735 ASSAY OF MAGNESIUM: CPT

## 2023-08-08 PROCEDURE — 83615 LACTATE (LD) (LDH) ENZYME: CPT

## 2023-08-08 PROCEDURE — 82607 VITAMIN B-12: CPT

## 2023-08-08 PROCEDURE — 85025 COMPLETE CBC W/AUTO DIFF WBC: CPT

## 2023-08-08 PROCEDURE — 80053 COMPREHEN METABOLIC PANEL: CPT

## 2023-08-08 PROCEDURE — 36415 COLL VENOUS BLD VENIPUNCTURE: CPT

## 2023-08-08 PROCEDURE — 85652 RBC SED RATE AUTOMATED: CPT

## 2023-08-08 PROCEDURE — 80061 LIPID PANEL: CPT

## 2023-08-08 PROCEDURE — 82668 ASSAY OF ERYTHROPOIETIN: CPT

## 2023-08-08 PROCEDURE — 82375 ASSAY CARBOXYHB QUANT: CPT

## 2023-08-08 PROCEDURE — 86140 C-REACTIVE PROTEIN: CPT

## 2023-08-09 LAB — EPO SERPL-ACNC: 15.3 MIU/ML (ref 2.6–18.5)

## 2023-08-13 DIAGNOSIS — E11.9 TYPE 2 DIABETES MELLITUS WITHOUT COMPLICATION, WITHOUT LONG-TERM CURRENT USE OF INSULIN (HCC): ICD-10-CM

## 2023-08-14 RX ORDER — LISINOPRIL 5 MG/1
5 TABLET ORAL DAILY
Qty: 30 TABLET | Refills: 3 | Status: SHIPPED | OUTPATIENT
Start: 2023-08-14

## 2023-08-15 DIAGNOSIS — B35.3 TINEA PEDIS OF BOTH FEET: ICD-10-CM

## 2023-08-15 PROBLEM — D75.1 ERYTHROCYTOSIS: Status: ACTIVE | Noted: 2023-08-15

## 2023-08-15 PROBLEM — D72.829 LEUKOCYTOSIS: Status: ACTIVE | Noted: 2023-08-15

## 2023-08-15 RX ORDER — KETOCONAZOLE 20 MG/G
CREAM TOPICAL DAILY
Qty: 15 G | Refills: 0 | Status: SHIPPED | OUTPATIENT
Start: 2023-08-15

## 2023-08-21 ENCOUNTER — OFFICE VISIT (OUTPATIENT)
Dept: HEMATOLOGY ONCOLOGY | Facility: CLINIC | Age: 63
End: 2023-08-21
Payer: MEDICARE

## 2023-08-21 ENCOUNTER — TELEPHONE (OUTPATIENT)
Dept: HEMATOLOGY ONCOLOGY | Facility: CLINIC | Age: 63
End: 2023-08-21

## 2023-08-21 VITALS
TEMPERATURE: 97.7 F | SYSTOLIC BLOOD PRESSURE: 122 MMHG | HEART RATE: 87 BPM | BODY MASS INDEX: 34.49 KG/M2 | DIASTOLIC BLOOD PRESSURE: 80 MMHG | WEIGHT: 207 LBS | HEIGHT: 65 IN | RESPIRATION RATE: 16 BRPM | OXYGEN SATURATION: 97 %

## 2023-08-21 DIAGNOSIS — E53.8 B12 DEFICIENCY: ICD-10-CM

## 2023-08-21 DIAGNOSIS — E53.8 VITAMIN B 12 DEFICIENCY: ICD-10-CM

## 2023-08-21 DIAGNOSIS — D75.1 POLYCYTHEMIA, SECONDARY: Primary | ICD-10-CM

## 2023-08-21 PROCEDURE — 99214 OFFICE O/P EST MOD 30 MIN: CPT | Performed by: NURSE PRACTITIONER

## 2023-08-21 RX ORDER — OMEGA-3S/DHA/EPA/FISH OIL/D3 300MG-1000
400 CAPSULE ORAL AS NEEDED
COMMUNITY

## 2023-08-21 RX ORDER — CYANOCOBALAMIN 1000 UG/ML
1000 INJECTION, SOLUTION INTRAMUSCULAR; SUBCUTANEOUS ONCE
Status: CANCELLED | OUTPATIENT
Start: 2023-08-28

## 2023-08-21 NOTE — TELEPHONE ENCOUNTER
What would be a preferred day of the week that would work best for your infusion appointment? Monday or Tuesday  Do you prefer mornings or afternoons for your appointments? AM  Are there any days or dates that do not work for your schedule, including any upcoming vacations? yes  We are going to try our best to schedule you at the infusion center closest to your home. In the event that we are unable to what would be your next preferred infusion site or sites? [address]    Do you have transportation to take you to all of your appointments?  yes

## 2023-08-21 NOTE — PROGRESS NOTES
Hematology/Oncology Outpatient Follow-up  Unknown Abdulaziz 58 y.o. male 1960 [id number]    Date:  8/21/2023      Assessment and Plan:  1. Polycythemia, secondary  Patient continues to have mild polycythemia/erythrocytosis which has improved since he stopped testosterone injections. He did have extensive work-up in the past which was negative for myeloproliferative disorder/polycythemia vera. Etiology of his polycythemia is likely secondary to tobacco abuse and sleep apnea. His carboxyhemoglobin level continues to be elevated 7.2% which is slightly higher than expected for smoker. I did encourage him to slow down the smoking or better yet consider quitting altogether which most likely will improve his red cells. Encouraged him to follow-up with sleep medicine regarding his obstructive sleep apnea as he has not been able to tolerate CPAP machine. No need for therapeutic phlebotomy since he is not symptomatic and polycythemia is compensatory mechanism for his hypoxia. I did recommend that he start baby aspirin. Request he follow-up again with repeat labs in about 6 months from now or sooner should the need arise.    - CBC and differential; Future  - Comprehensive metabolic panel; Future  - LD,Blood; Future  - C-reactive protein; Future  - Sedimentation rate, automated; Future  - Erythropoietin; Future  - Carboxyhemoglobin; Future    2. Vitamin B 12 deficiency  Patient states that he is giving himself B12 injections in the buttocks once or twice a month (do not see this ordered on his med list) is asking for refill on syringes. Recommended rather that we start him on monthly B12 injections in the infusion center which we can arrange and he was in agreement with this. Also recommended that he start sublingual over-the-counter B12 in addition since his B12 is pretty low.     - Vitamin B12; Future        HPI:  This is a 59-year-old male with multiple morbid conditions including diabetes mellitus, esophageal varices, hepatitis C, hyperlipidemia, hypertension, sleep apnea, substance abuse, tobacco use, etc.  The patient stated he had testosterone injections for 3 months around the beginning of 2022.  Subsequently, he was found to have elevated hemoglobin hematocrit which is persisting. His recent blood work from 12/13/2022 showed hemoglobin of 18.1 with hematocrit of 53.0%.  White blood cell count was 5.4 with a platelet count of 150.  White cell differential was normal.    He had extensive work-up for further evaluation of his polycythemia. CT scan of the lung for screening was done on 1/17/2023 which was negative for any lung nodules. He was found to have moderate coronary artery calcification with stable liver cirrhosis morphology. Blood work on 2/6/2023 showed hemoglobin of 16.2 with hematocrit of 50.8. White cells and platelets were within normal range with normal white cell differential. Creatinine 1.0 with normal calcium and liver enzymes. 88.  C-reactive protein was normal with slightly elevated sed rate of 20 normal.  Vitamin B12 312. JAK2 mutation analysis came back negative. Carboxyhemoglobin level was 7.4%. Iron panel showed saturation of 33% and ferritin of 338. Interval history:  Patient presents today for a follow-up visit. He has no new complaints today and feels well. States that his testosterone injections were discontinued quite some time ago. He continues to smoke cigarettes on a daily basis without desire to quit but mentions that he has slowed down from 2 packs a day down to 1 pack/day. He states that he is not using CPAP machine for his sleep apnea and has returned equipment as he cannot tolerate. Mentions that he has been giving himself "B12 injections" in the buttocks once or twice per month. Is asking for refill on syringes.     His most recent laboratory studies from 8/8/23 showed normal white cells and platelets, he continues to have erythrocytosis however this has improved slightly compared to fall/winter of last year H&H 7.5/52.6, MCV 94 glucose 105, total bili slightly elevated 1.41 remaining metabolic panel/liver enzymes normal.  Magnesium slightly lower than average 1.7. Inflammatory markers are slightly elevated C-reactive protein 3.9, sed rate 22. His carboxyhemoglobin level continues to be elevated 7.2%. Vitamin B12 continues to be low 244. ROS: Review of Systems   Psychiatric/Behavioral: Positive for sleep disturbance. All other systems reviewed and are negative.       Past Medical History:   Diagnosis Date   • Colon polyp    • Diabetes mellitus ([address])    • Esophageal varices ([address])    • Gastritis    • Hepatitis C    • History of Helicobacter pylori infection 2/23/2016   • History of kidney stones    • Hyperlipidemia    • Hypertension    • Infectious viral hepatitis    • Kidney stone    • Obesity    • Pneumonia    • PPD positive 2020   • Sleep apnea    • Substance abuse [facility])        Past Surgical History:   Procedure Laterality Date   • COLONOSCOPY  06/20/2014    dr [email or web address]   • COLONOSCOPY  2014       • EGD AND COLONOSCOPY  08/2020    esophageal varices, colon polyp, hemorrhoids   • ESOPHAGOGASTRODUODENOSCOPY  12/10/2015    esophageal varices, cirrhosis, gastritis   • HYDROCELE EXCISION / REPAIR     • LIVER BIOPSY  2014       Social History     Socioeconomic History   • Marital status: Single     Spouse name: None   • Number of children: None   • Years of education: None   • Highest education level: None   Occupational History   • None   Tobacco Use   • Smoking status: Every Day     Packs/day: 1.00     Years: 30.00     Total pack years: 30.00     Types: Cigarettes     Passive exposure: Current   • Smokeless tobacco: Never   • Tobacco comments:     TOBACCO USE   Vaping Use   • Vaping Use: Never used   Substance and Sexual Activity   • Alcohol use: Not Currently     Alcohol/week: 0.0 standard drinks of alcohol   • Drug use: Not Currently     Types: Heroin     Comment: former user- heroin was drug of choice   • Sexual activity: Not Currently     Partners: Female   Other Topics Concern   • None   Social History Narrative   • None     Social Determinants of Health     Financial Resource Strain: Low Risk  (12/13/2022)    Overall Financial Resource Strain (CARDIA)    • Difficulty of Paying Living Expenses: Not hard at all   Food Insecurity: Not on file   Transportation Needs: No Transportation Needs (12/13/2022)    PRAPARE - Transportation    • Lack of Transportation (Medical): No    • Lack of Transportation (Non-Medical):  No   Physical Activity: Not on file   Stress: Not on file   Social Connections: Not on file   Intimate Partner Violence: Not on file   Housing Stability: Not on file       Family History   Problem Relation Age of Onset   • Diabetes type II Mother         MELLITUS   • Hypertension Mother    • Hypertension Sister    • Diabetes Sister         MELLITUS   • No Known Problems Maternal Grandmother    • No Known Problems Maternal Grandfather    • No Known Problems Paternal Grandmother    • No Known Problems Paternal Grandfather    • No Known Problems Sister    • Breast cancer Maternal Aunt         45s   • No Known Problems Maternal Aunt    • No Known Problems Paternal Aunt    • No Known Problems Paternal Aunt    • No Known Problems Paternal Aunt    • No Known Problems Paternal Aunt    • No Known Problems Paternal Aunt    • No Known Problems Paternal Aunt        Allergies   Allergen Reactions   • Meperidine Abdominal Pain         Current Outpatient Medications:   •  Alcohol Swabs (B-D SINGLE USE SWABS REGULAR) PADS, TEST 2-3 TIMES A DAY AS DIRECTED TEST 2-3 TIMES A DAY AS DIRECTED, Disp: , Rfl:   •  ammonium lactate (LAC-HYDRIN) 12 % lotion, APPLY 1 APPLICATION TWICE A DAY BY TOPICAL ROUTE., Disp: , Rfl:   •  atorvastatin (LIPITOR) 40 mg tablet, Take 1 tablet (40 mg total) by mouth daily, Disp: 90 tablet, Rfl: 3  •  Blood Glucose Calibration (FreeStyle Control Solution) LIQD, USE AS DIRECTED USE DANI LAS INDICACIONES, Disp: , Rfl:   •  Blood Glucose Monitoring Suppl (FreeStyle Lite) w/Device KIT, USE AS DIRECTED USE DANI LAS INDICACIONES, Disp: , Rfl:   •  Blood Pressure Monitoring ([place] Choice BP Monitor/Arm) COCO, USE AS DIRECTEDUSE DANI LAS INDICACIONES, Disp: 1 each, Rfl: 0  •  cholecalciferol (VITAMIN D3) 400 units tablet, Take 400 Units by mouth if needed, Disp: , Rfl:   •  clotrimazole (LOTRIMIN) 1 % cream, APPLY TO AFFECTED AREA TWICE DAILY APPLY A / HASTA AFFECTED AREA DOS VECES AL MORIAH, Disp: , Rfl:   •  cyanocobalamin (VITAMIN B-12) 500 MCG tablet, Take 500 mcg by mouth daily, Disp: , Rfl:   •  FREESTYLE LITE test strip, TEST 2-3 TIMES A DAY AS DIRECTEDTEST 2-3 TIMES A DAY AS DIRECTED, Disp: 300 each, Rfl: 1  •  Global Inject Ease Lancets 28G MISC, TEST 2-3 TIMES A DAY AS DIRECTED TEST 2-3 TIMES A DAY AS DIRECTED, Disp: , Rfl:   •  ketoconazole (NIZORAL) 2 % cream, APPLY TOPICALLY DAILY, Disp: 15 g, Rfl: 0  •  Lancet Devices (Adjustable Lancing Device) MISC, USE AS DIRECTED USE DANI LAS INDICACIONES, Disp: , Rfl:   •  lisinopril (ZESTRIL) 5 mg tablet, TAKE 1 TABLET (5 MG TOTAL) BY MOUTH DAILY, Disp: 30 tablet, Rfl: 3  •  methadone (DOLOPHINE) 10 mg/mL oral concentrated solution, Take 44 mg by mouth daily, Disp: , Rfl:   •  nadolol (CORGARD) 20 mg tablet, Take 1 tablet (20 mg total) by mouth daily, Disp: 90 tablet, Rfl: 3  •  SYRINGE-NEEDLE, DISP, 3 ML (B-D 3CC LUER-MARÍA ELENA SYR 39JR4-6/2) 21G X 1-1/2" 3 ML MISC, Use once weekly, Disp: 12 each, Rfl: 0  •  terbinafine (LamISIL) 250 mg tablet, TAKE ONE TABLET BY MOUTH DAILY NO 1 TABLETA POR VIA ORAL DIARIAMENTE, Disp: , Rfl:   •  Continuous Blood Gluc Sensor (FreeStyle Luis Alberto 14 Day Sensor) MISC, USE AS DIRECTEDUSE DANI LAS INDICACIONES (Patient not taking: Reported on 8/21/2023), Disp: 6 each, Rfl: 4      Physical Exam:  /80 (BP Location: Left arm, Patient Position: Sitting, Cuff Size: Adult)   Pulse 87 Temp 97.7 °F (36.5 °C) (Temporal)   Resp 16   Ht 5' 5" (1.651 m)   Wt 93.9 kg (207 lb)   SpO2 97%   BMI 34.45 kg/m²     Physical Exam  Vitals reviewed. Constitutional:       General: He is not in acute distress. Appearance: He is well-developed. He is not diaphoretic. HENT:      Head: Normocephalic and atraumatic. Eyes:      General: Lids are normal. No scleral icterus. Conjunctiva/sclera: Conjunctivae normal.      Pupils: Pupils are equal, round, and reactive to light. Neck:      Thyroid: No thyromegaly. Cardiovascular:      Rate and Rhythm: Normal rate and regular rhythm. Heart sounds: Normal heart sounds. No murmur heard. Pulmonary:      Effort: Pulmonary effort is normal. No respiratory distress. Breath sounds: Normal breath sounds. Abdominal:      General: There is no distension. Palpations: Abdomen is soft. There is no hepatomegaly or splenomegaly. Tenderness: There is no abdominal tenderness. Musculoskeletal:         General: No swelling. Normal range of motion. Cervical back: Normal range of motion and neck supple. Lymphadenopathy:      Cervical: No cervical adenopathy. Upper Body:      Right upper body: No axillary adenopathy. Left upper body: No axillary adenopathy. Skin:     General: Skin is warm and dry. Findings: No erythema or rash. Neurological:      General: No focal deficit present. Mental Status: He is alert and oriented to person, place, and time. Psychiatric:         Mood and Affect: Mood normal. Affect is blunt. Behavior: Behavior normal. Behavior is cooperative. Thought Content: Thought content normal.         Judgment: Judgment normal.           Labs:  Lab Results   Component Value Date    WBC 6.91 08/08/2023    HGB 17.5 (H) 08/08/2023    HCT 52.6 (H) 08/08/2023    MCV 94 08/08/2023     08/08/2023       Patient voiced understanding and agreement in the above discussion.  Aware to contact our office with questions/symptoms in the interim. This note has been generated by voice recognition software system. Therefore, there may be spelling, grammar, and or syntax errors. Please contact if questions arise.

## 2023-08-28 ENCOUNTER — HOSPITAL ENCOUNTER (OUTPATIENT)
Dept: INFUSION CENTER | Facility: HOSPITAL | Age: 63
Discharge: HOME | End: 2023-08-28
Attending: INTERNAL MEDICINE
Payer: MEDICARE

## 2023-08-28 DIAGNOSIS — E53.8 B12 DEFICIENCY: Primary | ICD-10-CM

## 2023-08-28 PROCEDURE — 96372 THER/PROPH/DIAG INJ SC/IM: CPT

## 2023-08-28 RX ORDER — CYANOCOBALAMIN 1000 UG/ML
1000 INJECTION, SOLUTION INTRAMUSCULAR; SUBCUTANEOUS ONCE
OUTPATIENT
Start: 2023-09-25

## 2023-08-28 RX ORDER — CYANOCOBALAMIN 1000 UG/ML
1000 INJECTION, SOLUTION INTRAMUSCULAR; SUBCUTANEOUS ONCE
Status: COMPLETED | OUTPATIENT
Start: 2023-08-28 | End: 2023-08-28

## 2023-08-28 RX ADMIN — CYANOCOBALAMIN 1000 MCG: 1000 INJECTION INTRAMUSCULAR; SUBCUTANEOUS at 09:04

## 2023-08-30 ENCOUNTER — OFFICE VISIT (OUTPATIENT)
Dept: CARDIOLOGY CLINIC | Facility: CLINIC | Age: 63
End: 2023-08-30
Payer: MEDICARE

## 2023-08-30 VITALS
WEIGHT: 208.2 LBS | BODY MASS INDEX: 34.69 KG/M2 | DIASTOLIC BLOOD PRESSURE: 86 MMHG | SYSTOLIC BLOOD PRESSURE: 130 MMHG | HEIGHT: 65 IN | HEART RATE: 73 BPM

## 2023-08-30 DIAGNOSIS — G47.33 OSA (OBSTRUCTIVE SLEEP APNEA): ICD-10-CM

## 2023-08-30 DIAGNOSIS — I10 BENIGN ESSENTIAL HYPERTENSION: ICD-10-CM

## 2023-08-30 DIAGNOSIS — F17.210 TOBACCO DEPENDENCE DUE TO CIGARETTES: ICD-10-CM

## 2023-08-30 DIAGNOSIS — I25.84 CORONARY ARTERY CALCIFICATION: Primary | ICD-10-CM

## 2023-08-30 DIAGNOSIS — E11.65 TYPE 2 DIABETES MELLITUS WITH HYPERGLYCEMIA, WITHOUT LONG-TERM CURRENT USE OF INSULIN (HCC): ICD-10-CM

## 2023-08-30 DIAGNOSIS — F11.20 METHADONE MAINTENANCE THERAPY PATIENT (HCC): ICD-10-CM

## 2023-08-30 DIAGNOSIS — I25.10 CORONARY ARTERY CALCIFICATION: Primary | ICD-10-CM

## 2023-08-30 PROBLEM — Z78.9 INTOLERANCE OF CONTINUOUS POSITIVE AIRWAY PRESSURE (CPAP) VENTILATION: Status: ACTIVE | Noted: 2023-08-30

## 2023-08-30 PROCEDURE — 93000 ELECTROCARDIOGRAM COMPLETE: CPT | Performed by: INTERNAL MEDICINE

## 2023-08-30 PROCEDURE — 99204 OFFICE O/P NEW MOD 45 MIN: CPT | Performed by: INTERNAL MEDICINE

## 2023-08-30 RX ORDER — ASPIRIN 81 MG/1
81 TABLET, CHEWABLE ORAL DAILY
Qty: 90 TABLET | Refills: 3 | Status: SHIPPED | OUTPATIENT
Start: 2023-08-30

## 2023-08-30 NOTE — PATIENT INSTRUCTIONS
CARDIOLOGY ASSESSMENT & PLAN:   Diagnosis ICD-10-CM Associated Orders   1. BMI 34.0-34.9,adult  Z68.34       2. Coronary artery calcification  I25.10 Ambulatory Referral to Cardiology    I25.84 POCT ECG     aspirin 81 mg chewable tablet    lung cancer screening performed in January 2023 reported coronary arteries calcification. Requesting cardiology consultation. Patient denies any symptoms. 3. RODRICK (obstructive sleep apnea)  G47.33       4. Benign essential hypertension  I10       5. Tobacco dependence due to cigarettes  F17.210       6. Methadone maintenance therapy patient ([address] [facility])  F11.20       7. Type 2 diabetes mellitus with hyperglycemia, without long-term current use of insulin ([facility])  E11.65         Coronary artery calcification  Mr. Yogesh Duncan is noted to have moderate coronary artery calcification on his labs screening CT scan. There was no cardiomegaly reported. He gives no recent symptoms that suggest angina or congestive heart failure. His exercise tolerance is fair. Unfortunately he has been a chronic smoker and has tried unsuccessfully to quit smoking in the past.  ECG there is no evidence of significant valvular heart disease on clinical examination. ECG has nonspecific benign changes. At this time in the absence of symptoms and fair exercise tolerance and reasonably controlled diabetes and blood pressure I do not recommend further testing. -- I am advising him that smoking cessation will  reduce his risk for having heart attack in the future. I am strongly encouraging him to try to cut down and quit smoking eventually. -- Given his risk factors and secondary polycythemia I am advising aspirin therapy. -- I am advising him to continue lisinopril therapy and atorvastatin at current dose of 40 mg daily. -- In addition I am encouraging him to try to lose weight through dietary changes and physical activity. -- I will plan to see him back 1 more time in 6 months.   He is advised to reach out to me if he develops any symptoms such as chest pain or unusual shortness of breath or dizziness or palpitations or decline in his exercise tolerance.

## 2023-08-30 NOTE — ASSESSMENT & PLAN NOTE
Mr. Oj Cintron is noted to have moderate coronary artery calcification on his labs screening CT scan. There was no cardiomegaly reported. He gives no recent symptoms that suggest angina or congestive heart failure. His exercise tolerance is fair. Unfortunately he has been a chronic smoker and has tried unsuccessfully to quit smoking in the past.  ECG there is no evidence of significant valvular heart disease on clinical examination. ECG has nonspecific benign changes. At this time in the absence of symptoms and fair exercise tolerance and reasonably controlled diabetes and blood pressure I do not recommend further testing. -- I am advising him that smoking cessation will  reduce his risk for having heart attack in the future. I am strongly encouraging him to try to cut down and quit smoking eventually. -- Given his risk factors and secondary polycythemia I am advising aspirin therapy. -- I am advising him to continue lisinopril therapy and atorvastatin at current dose of 40 mg daily. -- In addition I am encouraging him to try to lose weight through dietary changes and physical activity. -- I will plan to see him back 1 more time in 6 months. He is advised to reach out to me if he develops any symptoms such as chest pain or unusual shortness of breath or dizziness or palpitations or decline in his exercise tolerance.

## 2023-08-30 NOTE — PROGRESS NOTES
[facility]  [address] [address]  Phone#  [phone]. Fax#  [phone]  *-*-*-*-*-*-*-*-*-*-*-*-*-*-*-*-*-*-*-*-*-*-*-*-*-*-*-*-*-*-*-*-*-*-*-*-*-*-*-*-*-*-*-*-*-*-*-*-*-*-*-*-*-*  Jacob Fraire DATE: 08/30/23 4:20 PM  PATIENT NAME: Lindy Lynch   1960    [id number]  Age: 58 y.o. Sex: male  AUTHOR: Erasmo Dubon MD  PRIMARYCARE PHYSICIAN: Amanda Norris MD  REFERRING PHYSICIAN: Amanda Norris MD  [address] Amanda Norris MD   *-*-*-*-*-*-*-*-*-*-*-*-*-*-*-*-*-*-*-*-*-*-*-*-*-*-*-*-*-*-*-*-*-*-*-*-*-*-*-*-*-*-*-*-*-*-*-*-*-*-*-*-*-*-  REASON FOR REFERRAL: Evaluation and management for coronary artery calcification noted during lung cancer screening    *-*-*-*-*-*-*-*-*-*-*-*-*-*-*-*-*-*-*-*-*-*-*-*-*-*-*-*-*-*-*-*-*-*-*-*-*-*-*-*-*-*-*-*-*-*-*-*-*-*-*-*-*-*-  CARDIOLOGY ASSESSMENT & PLAN:   Diagnosis ICD-10-CM Associated Orders   1. Coronary artery calcification  I25.10 Ambulatory Referral to Cardiology    I25.84 POCT ECG     aspirin 81 mg chewable tablet    lung cancer screening performed in January 2023 reported coronary arteries calcification. Requesting cardiology consultation. Patient denies any symptoms. 2. BMI 34.0-34.9,adult  Z68.34       3. RODRICK (obstructive sleep apnea)  G47.33       4. Benign essential hypertension  I10       5. Tobacco dependence due to cigarettes  F17.210       6. Methadone maintenance therapy patient ([facility])  F11.20       7. Type 2 diabetes mellitus with hyperglycemia, without long-term current use of insulin ([facility])  E11.65         Coronary artery calcification  Mr. Lindy Lynch is noted to have moderate coronary artery calcification on his labs screening CT scan. There was no cardiomegaly reported. He gives no recent symptoms that suggest angina or congestive heart failure. His exercise tolerance is fair.   Unfortunately he has been a chronic smoker and has tried unsuccessfully to quit smoking in the past.  ECG there is no evidence of significant valvular heart disease on clinical examination. ECG has nonspecific benign changes. At this time in the absence of symptoms and fair exercise tolerance and reasonably controlled diabetes and blood pressure I do not recommend further testing. -- I am advising him that smoking cessation will  reduce his risk for having heart attack in the future. I am strongly encouraging him to try to cut down and quit smoking eventually. -- Given his risk factors and secondary polycythemia I am advising aspirin therapy. -- I am advising him to continue lisinopril therapy and atorvastatin at current dose of 40 mg daily. -- In addition I am encouraging him to try to lose weight through dietary changes and physical activity. -- I will plan to see him back 1 more time in 6 months. He is advised to reach out to me if he develops any symptoms such as chest pain or unusual shortness of breath or dizziness or palpitations or decline in his exercise tolerance. *-*-*-*-*-*-*-*-*-*-*-*-*-*-*-*-*-*-*-*-*-*-*-*-*-*-*-*-*-*-*-*-*-*-*-*-*-*-*-*-*-*-*-*-*-*-*-*-*-*-*-*-*-*-  CURRENT ECG:  Results for orders placed or performed in visit on 08/30/23   POCT ECG    Narrative    Sinus rhythm, HR 73 bpm, leftward axis, normal intervals, no significant chamber hypertrophy or enlargement, no evidence of prior infarction, no acute ischemia. *-*-*-*-*-*-*-*-*-*-*-*-*-*-*-*-*-*-*-*-*-*-*-*-*-*-*-*-*-*-*-*-*-*-*-*-*-*-*-*-*-*-*-*-*-*-*-*-*-*-*-*-*-*-  HISTORY OF PRESENT ILLNESS:  Patient is a 61-year gentleman of [place] origin who has been referred for evaluation after being noted to have coronary artery calcification during lung cancer screening. His prior medical history significant for:  1. Diabetes mellitus since 2014  2. Hypertension  3. Dyslipidemia  4. History of viral hepatitis secondary to hepatitis C, s/p Rx 2014  5.   History of Helicobacter pylori infection  6. Cirrhosis of liver esophageal varices  7. History of renal calculi  8. Obesity  9. 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 Obstructive sleep apnea, intolerant to CPAP use  11. Positive PPD  12. Chronic tobacco dependence due to smoking for over 40 years  15. Secondary polycythemia    He has no previous history of coronary artery disease or congestive heart failure or arrhythmia or TIA/CVA. He underwent a screening CT scan to detect lung cancer in January this year. He was noted to have moderate coronary artery calcification during the test and hence is referred for further evaluation. From a symptom perspective he denies any recent chest pain or shortness of breath or dizziness or lightheadedness or palpitations or passing out or near passing out episodes. He reports being active and walking regularly 3 times a day after each meal for 15 to 20 minutes. He has not experienced any decline in his exercise tolerance. Denies any orthopnea PND or worsening pedal edema. Reports that he does not use the CPAP machine because it was very uncomfortable to wear it. Reports being compliant with medications. Family history is negative for premature coronary artery disease or sudden cardiac death. States that his mother passed away recently due to leukemia. He is a current smoker smoking a pack of cigarettes a day for over 40 years. He does not intend to quit smoking primary. He says that he tried many times but he was not successful. He states he tried patches in the past it did not work. He reports that he stopped drinking alcohol in 2004. 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 Previously was a  working for 15 years. His home cardiac medications include atorvastatin 40 mg daily.     Most recent blood work is from 8/8/2023:  Sodium 136 potassium 4.2 chloride 99 bicarb 27 BUN 11 creatinine 1.01 fasting glucose 105  Normal liver function test except for total bilirubin which was slightly increased at 1.41  Total cholesterol 144 triglyceride 236 HDL 45 calculated LDL 52  Hemoglobin 17.5 hematocrit 52.6  ESR 22  Last hemoglobin A1c was 7.0 in June 2023  CRP with level recently was 3.9  Last TSH was 2.68 in September 2019    *-*-*-*-*-*-*-*-*-*-*-*-*-*-*-*-*-*-*-*-*-*-*-*-*-*-*-*-*-*-*-*-*-*-*-*-*-*-*-*-*-*-*-*-*-*-*-*-*-*-*-*-*-*  PAST MEDICAL HISTORY:  Past Medical History:   Diagnosis Date   • Colon polyp    • Diabetes mellitus ([facility])    • Esophageal varices (HCC)    • Gastritis    • Hepatitis C    • History of Helicobacter pylori infection 2/23/2016   • History of kidney stones    • Hyperlipidemia    • Hypertension    • Infectious viral hepatitis    • Kidney stone    • Obesity    • Pneumonia    • PPD positive 2020   • Sleep apnea    • Substance abuse ([address])     PAST SURGICAL HISTORY:   Past Surgical History:   Procedure Laterality Date   • COLONOSCOPY  06/20/2014    dr [email or web address]   • COLONOSCOPY  2014       • EGD AND COLONOSCOPY  08/2020    esophageal varices, colon polyp, hemorrhoids   • ESOPHAGOGASTRODUODENOSCOPY  12/10/2015    esophageal varices, cirrhosis, gastritis   • HYDROCELE EXCISION / REPAIR     • LIVER BIOPSY  2014         FAMILY HISTORY:  Family History   Problem Relation Age of Onset   • Diabetes type II Mother         MELLITUS   • Hypertension Mother    • Hypertension Sister    • Diabetes Sister         MELLITUS   • No Known Problems Maternal Grandmother    • No Known Problems Maternal Grandfather    • No Known Problems Paternal Grandmother    • No Known Problems Paternal Grandfather    • No Known Problems Sister    • Breast cancer Maternal Aunt         45s   • No Known Problems Maternal Aunt    • No Known Problems Paternal Aunt    • No Known Problems Paternal Aunt    • No Known Problems Paternal Aunt    • No Known Problems Paternal Aunt    • No Known Problems Paternal Aunt    • No Known Problems Paternal Aunt     SOCIAL HISTORY:  Social History     Tobacco Use   Smoking Status Every Day   • Packs/day: 1.00   • Years: 30.00   • Total pack years: 30.00   • Types: Cigarettes   • Passive exposure: Current   Smokeless Tobacco Never   Tobacco Comments    TOBACCO USE      Social History     Substance and Sexual Activity   Alcohol Use Not Currently   • Alcohol/week: 0.0 standard drinks of alcohol     Social History     Substance and Sexual Activity   Drug Use Not Currently   • Types: Heroin    Comment: former user- heroin was drug of choice    @[facility]@     *-*-*-*-*-*-*-*-*-*-*-*-*-*-*-*-*-*-*-*-*-*-*-*-*-*-*-*-*-*-*-*-*-*-*-*-*-*-*-*-*-*-*-*-*-*-*-*-*-*-*-*-*-*  ALLERGIES:  Allergies   Allergen Reactions   • Meperidine Abdominal Pain    CURRENT SCHEDULED MEDICATIONS:    Current Outpatient Medications:   •  Alcohol Swabs (B-D SINGLE USE SWABS REGULAR) PADS, TEST 2-3 TIMES A DAY AS DIRECTED TEST 2-3 TIMES A DAY AS DIRECTED, Disp: , Rfl:   •  ammonium lactate (LAC-HYDRIN) 12 % lotion, APPLY 1 APPLICATION TWICE A DAY BY TOPICAL ROUTE., Disp: , Rfl:   •  aspirin 81 mg chewable tablet, Chew 1 tablet (81 mg total) daily, Disp: 90 tablet, Rfl: 3  •  atorvastatin (LIPITOR) 40 mg tablet, Take 1 tablet (40 mg total) by mouth daily, Disp: 90 tablet, Rfl: 3  •  Blood Glucose Calibration (FreeStyle Control Solution) LIQD, USE AS DIRECTED USE DANI LAS INDICACIONES, Disp: , Rfl:   •  Blood Glucose Monitoring Suppl (FreeStyle Lite) w/Device KIT, USE AS DIRECTED USE DANI LAS INDICACIONES, Disp: , Rfl:   •  Blood Pressure Monitoring ([place] Choice BP Monitor/Arm) COCO, USE AS DIRECTEDUSE DANI LAS INDICACIONES, Disp: 1 each, Rfl: 0  •  cholecalciferol (VITAMIN D3) 400 units tablet, Take 400 Units by mouth if needed, Disp: , Rfl:   •  clotrimazole (LOTRIMIN) 1 % cream, APPLY TO AFFECTED AREA TWICE DAILY APPLY A / HASTA AFFECTED AREA DOS VECES AL MORIAH, Disp: , Rfl:   •  cyanocobalamin (VITAMIN B-12) 500 MCG tablet, Take 500 mcg by mouth daily, Disp: , Rfl:   •  FREESTYLE LITE test strip, TEST 2-3 TIMES A DAY AS DIRECTEDTEST 2-3 TIMES A DAY AS DIRECTED, Disp: 300 each, Rfl: 1  •  Global Inject Ease Lancets 28G MISC, TEST 2-3 TIMES A DAY AS DIRECTED TEST 2-3 TIMES A DAY AS DIRECTED, Disp: , Rfl:   •  ketoconazole (NIZORAL) 2 % cream, APPLY TOPICALLY DAILY, Disp: 15 g, Rfl: 0  •  Lancet Devices (Adjustable Lancing Device) MISC, USE AS DIRECTED USE DANI LAS INDICACIONES, Disp: , Rfl:   •  lisinopril (ZESTRIL) 5 mg tablet, TAKE 1 TABLET (5 MG TOTAL) BY MOUTH DAILY, Disp: 30 tablet, Rfl: 3  •  methadone (DOLOPHINE) 10 mg/mL oral concentrated solution, Take 44 mg by mouth daily, Disp: , Rfl:   •  nadolol (CORGARD) 20 mg tablet, Take 1 tablet (20 mg total) by mouth daily, Disp: 90 tablet, Rfl: 3  •  SYRINGE-NEEDLE, DISP, 3 ML (B-D 3CC LUER-MARÍA ELENA SYR 51GG8-3/2) 21G X 1-1/2" 3 ML MISC, Use once weekly, Disp: 12 each, Rfl: 0  •  terbinafine (LamISIL) 250 mg tablet, TAKE ONE TABLET BY MOUTH DAILY NO 1 TABLETA POR VIA ORAL DIARIAMENTE, Disp: , Rfl:   •  Continuous Blood Gluc Sensor (FreeStyle Luis Alberto 14 Day Sensor) MISC, USE AS DIRECTEDUSE DANI LAS INDICACIONES (Patient not taking: Reported on 8/21/2023), Disp: 6 each, Rfl: 4     *-*-*-*-*-*-*-*-*-*-*-*-*-*-*-*-*-*-*-*-*-*-*-*-*-*-*-*-*-*-*-*-*-*-*-*-*-*-*-*-*-*-*-*-*-*-*-*-*-*-*-*-*-*  REVIEW OF SYMPTOMS:    Positive for: As noted above in HPI      Negative for: All remaining as reviewed below and in HPI.  SYSTEM SYMPTOMS REVIEWED:  General--weight change, fever, night sweats  Respiratoryl-- Wheezing, shortness of breath, cough, URI symptoms, sputum, blood  Cardiovascular--chest pain, syncope, dyspnea on exertion, edema, decline in exercise tolerance, claudication   Gastrointestinal--persistent vomiting, diarrhea, abdominal distention, blood in stool   Muscular or skeletal--joint pain or swelling   Neurologic--headaches, syncope, abnormal movement  Hematologic--history of easy bruising and bleeding   Endocrine--thyroid enlargement, heat or cold intolerance, polyuria   Psychiatric--anxiety, depression      *-*-*-*-*-*-*-*-*-*-*-*-*-*-*-*-*-*-*-*-*-*-*-*-*-*-*-*-*-*-*-*-*-*-*-*-*-*-*-*-*-*-*-*-*-*-*-*-*-*-*-*-*-*  CURRENT OUTPATIENT MEDICATIONS:     Current Outpatient Medications:   •  Alcohol Swabs (B-D SINGLE USE SWABS REGULAR) PADS, TEST 2-3 TIMES A DAY AS DIRECTED TEST 2-3 TIMES A DAY AS DIRECTED, Disp: , Rfl:   •  ammonium lactate (LAC-HYDRIN) 12 % lotion, APPLY 1 APPLICATION TWICE A DAY BY TOPICAL ROUTE., Disp: , Rfl:   •  aspirin 81 mg chewable tablet, Chew 1 tablet (81 mg total) daily, Disp: 90 tablet, Rfl: 3  •  atorvastatin (LIPITOR) 40 mg tablet, Take 1 tablet (40 mg total) by mouth daily, Disp: 90 tablet, Rfl: 3  •  Blood Glucose Calibration (FreeStyle Control Solution) LIQD, USE AS DIRECTED USE DANI LAS INDICACIONES, Disp: , Rfl:   •  Blood Glucose Monitoring Suppl (FreeStyle Lite) w/Device KIT, USE AS DIRECTED USE DNAI LAS INDICACIONES, Disp: , Rfl:   •  Blood Pressure Monitoring ([place] Choice BP Monitor/Arm) COCO, USE AS DIRECTEDUSE DANI LAS INDICACIONES, Disp: 1 each, Rfl: 0  •  cholecalciferol (VITAMIN D3) 400 units tablet, Take 400 Units by mouth if needed, Disp: , Rfl:   •  clotrimazole (LOTRIMIN) 1 % cream, APPLY TO AFFECTED AREA TWICE DAILY APPLY A / HASTA AFFECTED AREA DOS VECES AL MORIAH, Disp: , Rfl:   •  cyanocobalamin (VITAMIN B-12) 500 MCG tablet, Take 500 mcg by mouth daily, Disp: , Rfl:   •  FREESTYLE LITE test strip, TEST 2-3 TIMES A DAY AS DIRECTEDTEST 2-3 TIMES A DAY AS DIRECTED, Disp: 300 each, Rfl: 1  •  Global Inject Ease Lancets 28G MISC, TEST 2-3 TIMES A DAY AS DIRECTED TEST 2-3 TIMES A DAY AS DIRECTED, Disp: , Rfl:   •  ketoconazole (NIZORAL) 2 % cream, APPLY TOPICALLY DAILY, Disp: 15 g, Rfl: 0  •  Lancet Devices (Adjustable Lancing Device) MISC, USE AS DIRECTED USE DANI LAS INDICACIONES, Disp: , Rfl:   •  lisinopril (ZESTRIL) 5 mg tablet, TAKE 1 TABLET (5 MG TOTAL) BY MOUTH DAILY, Disp: 30 tablet, Rfl: 3  •  methadone (DOLOPHINE) 10 mg/mL oral concentrated solution, Take 44 mg by mouth daily, Disp: , Rfl:   •  nadolol (CORGARD) 20 mg tablet, Take 1 tablet (20 mg total) by mouth daily, Disp: 90 tablet, Rfl: 3  •  SYRINGE-NEEDLE, DISP, 3 ML (B-D 3CC LUER-MARÍA ELENA SYR 19LA3-9/2) 21G X 1-1/2" 3 ML MISC, Use once weekly, Disp: 12 each, Rfl: 0  •  terbinafine (LamISIL) 250 mg tablet, TAKE ONE TABLET BY MOUTH DAILY NO 1 TABLETA POR VIA ORAL DIARIAMENTE, Disp: , Rfl:   •  Continuous Blood Gluc Sensor (FreeStyle Luis Alberto 14 Day Sensor) MISC, USE AS DIRECTEDUSE DANI LAS INDICACIONES (Patient not taking: Reported on 8/21/2023), Disp: 6 each, Rfl: 4    *-*-*-*-*-*-*-*-*-*-*-*-*-*-*-*-*-*-*-*-*-*-*-*-*-*-*-*-*-*-*-*-*-*-*-*-*-*-*-*-*-*-*-*-*-*-*-*-*-*-*-*-*-*  VITAL SIGNS:  Vitals:    08/30/23 1536   BP: 130/86   BP Location: Left arm   Patient Position: Sitting   Cuff Size: Large   Pulse: 73   Weight: 94.4 kg (208 lb 3.2 oz)   Height: 5' 5" (1.651 m)       BMI: Body mass index is 34.65 kg/m².     WEIGHTS:   Wt Readings from Last 25 Encounters:   08/30/23 94.4 kg (208 lb 3.2 oz)   08/21/23 93.9 kg (207 lb)   06/15/23 94.8 kg (209 lb)   02/20/23 96.2 kg (212 lb)   01/09/23 94.5 kg (208 lb 6.4 oz)   12/13/22 93.4 kg (206 lb)   09/13/22 98.9 kg (218 lb)   04/15/22 91.2 kg (201 lb)   03/17/22 91.8 kg (202 lb 6.4 oz)   03/08/22 90.7 kg (200 lb)   12/07/21 99.8 kg (220 lb)   12/02/21 99.8 kg (220 lb)   09/17/21 102 kg (225 lb)   08/26/21 101 kg (222 lb 9.6 oz)   06/01/21 98 kg (216 lb)   05/25/21 97.2 kg (214 lb 3.2 oz)   05/20/21 98.9 kg (218 lb)   04/20/21 101 kg (223 lb)   03/29/21 102 kg (223 lb 12.8 oz)   03/18/21 105 kg (231 lb 12.8 oz)   01/07/21 106 kg (233 lb)   11/19/20 106 kg (233 lb)   09/24/20 103 kg (228 lb)   09/18/20 103 kg (226 lb)   08/25/20 96.6 kg (213 lb) *-*-*-*-*-*-*-*-*-*-*-*-*-*-*-*-*-*-*-*-*-*-*-*-*-*-*-*-*-*-*-*-*-*-*-*-*-*-*-*-*-*-*-*-*-*-*-*-*-*-*-*-*-*-  PHYSICAL EXAM:  General Appearance:    Alert, cooperative, no distress, appears stated age slightly obese   Head, Eyes, ENT:    No obvious abnormality, moist mucous mebranes. Neck:   Supple, no carotid bruit or JVD   Back:     Symmetric, no curvature. Lungs:     Respirations unlabored. Clear to auscultation bilaterally,    Chest wall:    No tenderness or deformity   Heart:    Regular rate and rhythm, S1 and S2 normal, no murmur, rub  or gallop. Abdomen:     Soft, non-tender, obese   Extremities:   Extremities warm, no cyanosis or edema    Skin:   Mild venostatic changes in lower extremities. Normal skin color, texture, and turgor. No rashes or lesions     *-*-*-*-*-*-*-*-*-*-*-*-*-*-*-*-*-*-*-*-*-*-*-*-*-*-*-*-*-*-*-*-*-*-*-*-*-*-*-*-*-*-*-*-*-*-*-*-*-*-*-*-*-*-  LABORATORY DATA: I have personally reviewed the available laboratory data.         Potassium   Date Value Ref Range Status   08/08/2023 4.2 3.5 - 5.3 mmol/L Final   02/06/2023 4.6 3.5 - 5.3 mmol/L Final   12/13/2022 3.7 3.5 - 5.3 mmol/L Final   05/24/2018 5.1 (H) 3.6 - 5.0 MEQ/L Final     Chloride   Date Value Ref Range Status   08/08/2023 99 96 - 108 mmol/L Final   02/06/2023 104 96 - 108 mmol/L Final   12/13/2022 105 96 - 108 mmol/L Final   05/24/2018 105 97 - 108 MEQ/L Final     CO2   Date Value Ref Range Status   08/08/2023 27 21 - 32 mmol/L Final   02/06/2023 32 21 - 32 mmol/L Final   12/13/2022 26 21 - 32 mmol/L Final   05/24/2018 24 22 - 30 MMOL/L Final     Anion Gap   Date Value Ref Range Status   05/24/2018 10 5 - 14 MMOL/L Final     BUN   Date Value Ref Range Status   08/08/2023 11 5 - 25 mg/dL Final   02/06/2023 15 5 - 25 mg/dL Final   12/13/2022 11 5 - 25 mg/dL Final   05/24/2018 13 5 - 25 MG/DL Final     Creatinine   Date Value Ref Range Status   08/08/2023 1.01 0.60 - 1.30 mg/dL Final     Comment:     Standardized to IDMS reference method   02/06/2023 1.04 0.70 - 1.50 mg/dL Final     Comment:     Standardized to IDMS reference method   12/13/2022 0.94 0.70 - 1.50 mg/dL Final     Comment:     Standardized to IDMS reference method     eGFR   Date Value Ref Range Status   08/08/2023 78 ml/min/1.73sq m Final   02/06/2023 76 ml/min/1.73sq m Final   12/13/2022 85 ml/min/1.73sq m Final     Glucose   Date Value Ref Range Status   05/24/2018 98 70 - 99 MG/DL Final     Calcium   Date Value Ref Range Status   08/08/2023 9.5 8.4 - 10.2 mg/dL Final   02/06/2023 9.2 8.4 - 10.2 mg/dL Final   12/13/2022 9.1 8.4 - 10.2 mg/dL Final   05/24/2018 10.0 8.4 - 10.2 MG/DL Final     AST   Date Value Ref Range Status   08/08/2023 22 13 - 39 U/L Final   02/06/2023 33 17 - 59 U/L Final     Comment:     Specimen collection should occur prior to Sulfasalazine administration due to the potential for falsely depressed results. 12/13/2022 35 17 - 59 U/L Final     Comment:     Specimen collection should occur prior to Sulfasalazine administration due to the potential for falsely depressed results. 05/24/2018 26 17 - 59 U/L Final     ALT   Date Value Ref Range Status   08/08/2023 16 7 - 52 U/L Final     Comment:     Specimen collection should occur prior to Sulfasalazine administration due to the potential for falsely depressed results. 02/06/2023 26 <50 U/L Final     Comment:     Specimen collection should occur prior to Sulfasalazine administration due to the potential for falsely depressed results. 12/13/2022 34 <50 U/L Final     Comment:     Specimen collection should occur prior to Sulfasalazine administration due to the potential for falsely depressed results.     05/24/2018 32 9 - 52 U/L Final     Alkaline Phosphatase   Date Value Ref Range Status   08/08/2023 85 34 - 104 U/L Final   02/06/2023 105 43 - 122 U/L Final   12/13/2022 99 43 - 122 U/L Final   05/24/2018 92 43 - 122 U/L Final     Total Protein   Date Value Ref Range Status   05/24/2018 7.7 5.9 - 8.4 G/DL Final     Total Bilirubin   Date Value Ref Range Status   05/24/2018 0.4 <1.3 mg/dL Final     Magnesium   Date Value Ref Range Status   08/08/2023 1.7 (L) 1.9 - 2.7 mg/dL Final   02/06/2023 1.8 1.6 - 2.3 mg/dL Final     WBC   Date Value Ref Range Status   08/08/2023 6.91 4.31 - 10.16 Thousand/uL Final   02/06/2023 6.56 4.31 - 10.16 Thousand/uL Final   12/13/2022 5.48 4.31 - 10.16 Thousand/uL Final   05/24/2018 8.1 4.5 - 11.0 K/MCL Final     Hemoglobin   Date Value Ref Range Status   08/08/2023 17.5 (H) 12.0 - 17.0 g/dL Final   02/06/2023 16.2 12.0 - 17.0 g/dL Final   12/13/2022 18.1 (H) 12.0 - 17.0 g/dL Final   05/24/2018 14.9 13.5 - 17.5 G/DL Final     Platelets   Date Value Ref Range Status   08/08/2023 171 149 - 390 Thousands/uL Final   02/06/2023 164 149 - 390 Thousands/uL Final   12/13/2022 154 149 - 390 Thousands/uL Final   05/24/2018 220 150 - 450 K/MCL Final     PTT   Date Value Ref Range Status   03/04/2020 33 23 - 37 seconds Final     Comment:     Therapeutic Heparin Range =  60-90 seconds     INR   Date Value Ref Range Status   09/18/2020 0.99 0.84 - 1.19 Final   03/04/2020 1.22 (H) 0.84 - 1.19 Final     No results found for: "CKMB", "DIGOXIN"  No results found for: "TSH"  Cholesterol   Date Value Ref Range Status   05/24/2018 113 <200 mg/dL Final     Comment:            NCEP ATP III      <200           DESIRABLE   200-239     BORDERLINE HIGH   > = 240                HIGH       HDL   Date Value Ref Range Status   05/24/2018 41 >40 mg/dL Final     Comment:            NCEP ATP III       <40                 LOW    > = 60                HIGH       HDL, Direct   Date Value Ref Range Status   08/08/2023 45 >=40 mg/dL Final     Triglycerides   Date Value Ref Range Status   08/08/2023 236 (H) See Comment mg/dL Final     Comment:     Triglyceride:     0-9Y            <75mg/dL     10Y-17Y         <90 mg/dL       >=18Y     Normal          <150 mg/dL     Borderline High 150-199 mg/dL     High 200-499 mg/dL        Very High       >499 mg/dL    Specimen collection should occur prior to Metamizole administration due to the potential for falsely depressed results. 05/24/2018 134 <150 mg/dL Final     Comment:            NCEP ATP III      <150              NORMAL  150-199     BORDERLINE HIGH  200-499                HIGH  = > 500           VERY HIGH        Hemoglobin A1C   Date Value Ref Range Status   06/15/2023 7.0 (A) 6.5 Final   12/13/2022 6.4 (H) Normal 3.8-5.6%; PreDiabetic 5.7-6.4%; Diabetic >=6.5%; Glycemic control for adults with diabetes <7.0% % Final   05/24/2018 5.5 <5.7 % Final     Comment:     HEMOGLOBIN A1c VALUES OF 5.7-6.4 PERCENT  INDICATE AN INCREASED RISK OF DEVELOPING  DIABETES MELLITUS. HEMOGLOBIN A1c VALUES  GREATER THAN OR EQUAL TO 6.5 PERCENT ARE  DIAGNOSTIC OF DIABETES MELLITUS. TARGET FOR  DIABETIC CONTROL IS LESS THAN 7 PERCENT. THIS  BORONATE AFFINITY Hb A1c METHOD PROVIDES  ACCURATE ANALYTICAL RESULTS IN THE PRESENCE  OF NEARLY ALL HEMOGLOBIN VARIANTS. Hb F  HIGHER THAN 10 PERCENT OF TOTAL Hb MAY YIELD  FALSELY LOW RESULTS. CONDITIONS THAT SHORTEN  RED CELL SURVIVAL, SUCH AS THE PRESENCE OF  UNSTABLE HEMOGLOBINS LIKE Hb SS, Hb CC AND  Hb SC OR OTHER CAUSES OF HEMOLYTIC ANEMIA MAY  YIELD FALSELY LOW RESULTS. IRON DEFICIENCY  ANEMIA MAY YIELD FALSELY HIGH RESULTS. Blood Culture   Date Value Ref Range Status   03/04/2020 No Growth After 5 Days. Final   03/04/2020 No Growth After 5 Days. Final     Sputum Culture   Date Value Ref Range Status   03/04/2020 2+ Growth of Haemophilus influenzae (AA)  Final   03/04/2020 2+ Growth of Actinomyces odontolyticus (A)  Final   03/04/2020 2+ Growth of  Final     Comment:     Mixed Respiratory kemal     Gram Stain Result   Date Value Ref Range Status   03/04/2020 2+ Polys  Final     Comment: This is an appended report. These results have been appended to a previously preliminary verified report.    03/04/2020 No bacteria seen  Final Comment: This is an appended report. These results have been appended to a previously preliminary verified report. Legionella Urinary Antigen   Date Value Ref Range Status   03/04/2020 Negative Negative Final       *-*-*-*-*-*-*-*-*-*-*-*-*-*-*-*-*-*-*-*-*-*-*-*-*-*-*-*-*-*-*-*-*-*-*-*-*-*-*-*-*-*-*-*-*-*-*-*-*-*-*-*-*-*-  RADIOLOGY RESULTS:  No results found. *-*-*-*-*-*-*-*-*-*-*-*-*-*-*-*-*-*-*-*-*-*-*-*-*-*-*-*-*-*-*-*-*-*-*-*-*-*-*-*-*-*-*-*-*-*-*-*-*-*-*-*-*-*-  LAST ECHOCARDIOGRAM AND OTHER CARDIOLOGY RESULTS:  No results found for this or any previous visit. No results found for this or any previous visit. No results found for this or any previous visit. No results found for this or any previous visit. *-*-*-*-*-*-*-*-*-*-*-*-*-*-*-*-*-*-*-*-*-*-*-*-*-*-*-*-*-*-*-*-*-*-*-*-*-*-*-*-*-*-*-*-*-*-*-*-*-*-*-*-*-*-  RADIOLOGY RESULTS:  No results found. *-*-*-*-*-*-*-*-*-*-*-*-*-*-*-*-*-*-*-*-*-*-*-*-*-*-*-*-*-*-*-*-*-*-*-*-*-*-*-*-*-*-*-*-*-*-*-*-*-*-*-*-*-*-  ECHOCARDIOGRAM AND OTHER CARDIOLOGY RESULTS:  No results found for this or any previous visit. No results found for this or any previous visit. No results found for this or any previous visit. No results found for this or any previous visit.        *-*-*-*-*-*-*-*-*-*-*-*-*-*-*-*-*-*-*-*-*-*-*-*-*-*-*-*-*-*-*-*-*-*-*-*-*-*-*-*-*-*-*-*-*-*-*-*-*-*-*-*-*-*-  SIGNATURES:   [unfilled]   Alex Morales MD     CC:   MD Bbea Delvalle MD

## 2023-09-06 ENCOUNTER — DOCUMENTATION (OUTPATIENT)
Dept: HEMATOLOGY ONCOLOGY | Facility: CLINIC | Age: 63
End: 2023-09-06

## 2023-09-06 NOTE — PROGRESS NOTES
Oncology Finance Advocacy Intake and Intervention  Oncology Finance Counselor/Advocate placed call to patient. This writer informed patient that this writer is here to assist patient with billing questions, financial assistance, payment/payment plans, quotes, copayment assistance, insurance optimization, and insurance navigation. This writer conducted a thorough benefit review of copayment, deductible, and out of pocket cost. This information is documented below and has been reviewed with patient. Copayment: N/A  Deductible: N/A  Out of Pocket Cost: N/A  Insurance optimization (Limited benefit vs self-pay): N/A  Patient assistance status: N/A  Free Drug Applications: N/A  Interventions:  Pt has active [employer]  Added to careteam        Information above was review thoroughly with patient and patient was advise of possible assistance programs/interventions. If any question arise patient can contact this writer at below information. This information was given to patient at time of contact. Nav Morton  Phone:[phone]  Email: [email or web address]

## 2023-09-25 ENCOUNTER — HOSPITAL ENCOUNTER (OUTPATIENT)
Dept: INFUSION CENTER | Facility: HOSPITAL | Age: 63
Discharge: HOME | End: 2023-09-25
Attending: INTERNAL MEDICINE
Payer: MEDICARE

## 2023-09-25 DIAGNOSIS — E53.8 B12 DEFICIENCY: Primary | ICD-10-CM

## 2023-09-25 PROCEDURE — 96372 THER/PROPH/DIAG INJ SC/IM: CPT

## 2023-09-25 RX ORDER — CYANOCOBALAMIN 1000 UG/ML
1000 INJECTION, SOLUTION INTRAMUSCULAR; SUBCUTANEOUS ONCE
Status: COMPLETED | OUTPATIENT
Start: 2023-09-25 | End: 2023-09-25

## 2023-09-25 RX ORDER — CYANOCOBALAMIN 1000 UG/ML
1000 INJECTION, SOLUTION INTRAMUSCULAR; SUBCUTANEOUS ONCE
OUTPATIENT
Start: 2023-10-23

## 2023-09-25 RX ADMIN — CYANOCOBALAMIN 1000 MCG: 1000 INJECTION INTRAMUSCULAR; SUBCUTANEOUS at 09:36

## 2023-09-25 NOTE — PROGRESS NOTES
Pt tolerated B12 IM in the left deltoid. AVS provided and next apt confirmed. Left unit ambulatory with a steady gait.

## 2023-09-26 LAB
LEFT EYE DIABETIC RETINOPATHY: NORMAL
RIGHT EYE DIABETIC RETINOPATHY: NORMAL

## 2023-10-23 ENCOUNTER — HOSPITAL ENCOUNTER (OUTPATIENT)
Dept: INFUSION CENTER | Facility: HOSPITAL | Age: 63
Discharge: HOME | End: 2023-10-23
Attending: INTERNAL MEDICINE
Payer: MEDICARE

## 2023-10-23 DIAGNOSIS — E53.8 B12 DEFICIENCY: Primary | ICD-10-CM

## 2023-10-23 DIAGNOSIS — B35.3 TINEA PEDIS OF BOTH FEET: ICD-10-CM

## 2023-10-23 PROCEDURE — 96372 THER/PROPH/DIAG INJ SC/IM: CPT

## 2023-10-23 RX ORDER — CYANOCOBALAMIN 1000 UG/ML
1000 INJECTION, SOLUTION INTRAMUSCULAR; SUBCUTANEOUS ONCE
Status: COMPLETED | OUTPATIENT
Start: 2023-10-23 | End: 2023-10-23

## 2023-10-23 RX ORDER — KETOCONAZOLE 20 MG/G
CREAM TOPICAL DAILY
Qty: 60 G | Refills: 0 | Status: SHIPPED | OUTPATIENT
Start: 2023-10-23

## 2023-10-23 RX ORDER — CYANOCOBALAMIN 1000 UG/ML
1000 INJECTION, SOLUTION INTRAMUSCULAR; SUBCUTANEOUS ONCE
OUTPATIENT
Start: 2023-11-20

## 2023-10-23 RX ADMIN — CYANOCOBALAMIN 1000 MCG: 1000 INJECTION INTRAMUSCULAR; SUBCUTANEOUS at 09:04

## 2023-11-02 ENCOUNTER — OFFICE VISIT (OUTPATIENT)
Dept: ENDOCRINOLOGY | Facility: CLINIC | Age: 63
End: 2023-11-02
Payer: MEDICARE

## 2023-11-02 VITALS
HEART RATE: 109 BPM | SYSTOLIC BLOOD PRESSURE: 110 MMHG | WEIGHT: 210.8 LBS | BODY MASS INDEX: 35.12 KG/M2 | DIASTOLIC BLOOD PRESSURE: 84 MMHG | HEIGHT: 65 IN

## 2023-11-02 DIAGNOSIS — E11.65 TYPE 2 DIABETES MELLITUS WITH HYPERGLYCEMIA, WITHOUT LONG-TERM CURRENT USE OF INSULIN (HCC): Primary | ICD-10-CM

## 2023-11-02 DIAGNOSIS — I10 BENIGN ESSENTIAL HYPERTENSION: ICD-10-CM

## 2023-11-02 DIAGNOSIS — E29.1 SECONDARY MALE HYPOGONADISM: ICD-10-CM

## 2023-11-02 DIAGNOSIS — N20.0 NEPHROLITHIASIS: ICD-10-CM

## 2023-11-02 LAB — SL AMB POCT HEMOGLOBIN AIC: 6.2 (ref ?–6.5)

## 2023-11-02 PROCEDURE — 83036 HEMOGLOBIN GLYCOSYLATED A1C: CPT | Performed by: INTERNAL MEDICINE

## 2023-11-02 PROCEDURE — 99214 OFFICE O/P EST MOD 30 MIN: CPT | Performed by: INTERNAL MEDICINE

## 2023-11-02 NOTE — PROGRESS NOTES
11/2/2023    Assessment/Plan      Diagnoses and all orders for this visit:    Type 2 diabetes mellitus with hyperglycemia, without long-term current use of insulin (HCC)  -     semaglutide (Rybelsus) 3 MG tablet; 1 tab daily  -     Hemoglobin A1C  -     Comprehensive metabolic panel  -     Lipid Panel with Direct LDL reflex  -     CBC and differential  -     Albumin / creatinine urine ratio  -     TSH, 3rd generation  -     POCT hemoglobin A1c    Benign essential hypertension  -     Hemoglobin A1C  -     Comprehensive metabolic panel  -     Lipid Panel with Direct LDL reflex  -     CBC and differential  -     Albumin / creatinine urine ratio  -     TSH, 3rd generation    Secondary male hypogonadism  -     Testosterone, free, total Lab Collect    Nephrolithiasis  -     Stone risk profile  -     Ambulatory Referral to Nephrology; Future        Assessment/Plan:  1. Type 2 diabetes: Point-of-care A1c today was 6.2. Continue current regimen. In 6 months. I have ordered labs as above and we will call with results. 2.  Hypertension: Continue lisinopril check urine microalbumin as above. 3.  Hyperlipidemia: Continue statin. 4.  History of hypogonadism: Clinically doing well off of testosterone supplementation. Update lab work. 5.  History of multiple kidney stones: Patient is interested in obtaining 24-hour urine stone risk profile and establishing with nephrology for preventive measures. CC: Diabetes follow-up    History of Present Illness     HPI: Blessing Bailey is a 58y.o. year old male with type 2 diabetes who presents for follow up  He is currently not on medication but previously was on Rybelsus. He does state he continues on Rybelsus 3 mg daily and I added this back to his medication list.  He reports higher doses caused hypoglycemia per patient. He denies any polyuria, polydipsia, nocturia and blurry vision. He denies known complications of DM.       Hypoglycemic episodes: No.     The patient's last eye exam was in Sept 2023 no . The patient's last foot exam was in June 2023 with PCP. Blood Sugar/Glucometer/Pump/CGM review: No logs to review today but states fasting sugars typically 90-1 40. HTN: Lisinopril. HLD: Atorvastatin. He also has hx of hypogonadotropic hypogonadism but currently off of testosterone supplementation with history of polycythemia. Nephrolithiasis: Patient states he has had a number of what he believes are calcium based kidney stones over several decades and has had at least 1/year. He has seen nephrology years ago at [facility] but not recently. Review of Systems   Constitutional:  Negative for fatigue. HENT:  Negative for trouble swallowing and voice change. Eyes:  Negative for visual disturbance. Respiratory:  Negative for shortness of breath. Cardiovascular:  Negative for palpitations and leg swelling. Gastrointestinal:  Negative for abdominal pain, nausea and vomiting. Endocrine: Negative for polydipsia and polyuria. Musculoskeletal:  Negative for arthralgias and myalgias. Skin:  Negative for rash. Neurological:  Negative for dizziness, tremors and weakness. Hematological:  Negative for adenopathy. Psychiatric/Behavioral:  Negative for agitation and confusion.         Historical Information   Past Medical History:   Diagnosis Date    Colon polyp     Diabetes mellitus ([address])     Esophageal varices ([address])     Gastritis     Hepatitis C     History of Helicobacter pylori infection 2/23/2016    History of kidney stones     Hyperlipidemia     Hypertension     Infectious viral hepatitis     Kidney stone     Obesity     Pneumonia     PPD positive 2020    Sleep apnea     Substance abuse [facility])      Past Surgical History:   Procedure Laterality Date    COLONOSCOPY  06/20/2014    dr [email or web address]    COLONOSCOPY  2014        EGD AND COLONOSCOPY  08/2020    esophageal varices, colon polyp, hemorrhoids    ESOPHAGOGASTRODUODENOSCOPY 12/10/2015    esophageal varices, cirrhosis, gastritis    HYDROCELE EXCISION / REPAIR      LIVER BIOPSY  2014     Social History   Social History     Substance and Sexual Activity   Alcohol Use Not Currently     Social History     Substance and Sexual Activity   Drug Use Not Currently    Types: Heroin    Comment: former user- heroin was drug of choice     Social History     Tobacco Use   Smoking Status Every Day    Packs/day: 0.50    Years: 30.00    Total pack years: 15.00    Types: Cigarettes    Passive exposure: Current   Smokeless Tobacco Current   Tobacco Comments    TOBACCO USE     Family History:   Family History   Problem Relation Age of Onset    Diabetes type II Mother         MELLITUS    Hypertension Mother     Hypertension Sister     Diabetes Sister         MELLITUS    No Known Problems Maternal Grandmother     No Known Problems Maternal Grandfather     No Known Problems Paternal Grandmother     No Known Problems Paternal Grandfather     No Known Problems Sister     Breast cancer Maternal Aunt         45s    No Known Problems Maternal Aunt     No Known Problems Paternal Aunt     No Known Problems Paternal Aunt     No Known Problems Paternal Aunt     No Known Problems Paternal Aunt     No Known Problems Paternal Aunt     No Known Problems Paternal Aunt        Meds/Allergies   Current Outpatient Medications   Medication Sig Dispense Refill    Alcohol Swabs (B-D SINGLE USE SWABS REGULAR) PADS TEST 2-3 TIMES A DAY AS DIRECTED TEST 2-3 TIMES A DAY AS DIRECTED      ammonium lactate (LAC-HYDRIN) 12 % lotion APPLY 1 APPLICATION TWICE A DAY BY TOPICAL ROUTE.      aspirin 81 mg chewable tablet Chew 1 tablet (81 mg total) daily 90 tablet 3    atorvastatin (LIPITOR) 40 mg tablet Take 1 tablet (40 mg total) by mouth daily 90 tablet 3    Blood Glucose Calibration (FreeStyle Control Solution) LIQD USE AS DIRECTED USE DANI LAS INDICACIONES      Blood Glucose Monitoring Suppl (FreeStyle Lite) w/Device KIT USE AS DIRECTED USE DANI LAS INDICACIONES      Blood Pressure Monitoring ([place] Choice BP Monitor/Arm) COCO USE AS DIRECTEDUSE DANI LAS INDICACIONES 1 each 0    cholecalciferol (VITAMIN D3) 400 units tablet Take 400 Units by mouth if needed      clotrimazole (LOTRIMIN) 1 % cream APPLY TO AFFECTED AREA TWICE DAILY APPLY A / HASTA AFFECTED AREA DOS VECES AL MORIAH      FREESTYLE LITE test strip TEST 2-3 TIMES A DAY AS DIRECTEDTEST 2-3 TIMES A DAY AS DIRECTED 300 each 1    Global Inject Ease Lancets 28G MISC TEST 2-3 TIMES A DAY AS DIRECTED TEST 2-3 TIMES A DAY AS DIRECTED      ketoconazole (NIZORAL) 2 % cream APPLY TOPICALLY DAILY 60 g 0    lisinopril (ZESTRIL) 5 mg tablet TAKE 1 TABLET (5 MG TOTAL) BY MOUTH DAILY 30 tablet 3    methadone (DOLOPHINE) 10 mg/mL oral concentrated solution Take 44 mg by mouth daily      nadolol (CORGARD) 20 mg tablet Take 1 tablet (20 mg total) by mouth daily 90 tablet 3    semaglutide (Rybelsus) 3 MG tablet 1 tab daily      SYRINGE-NEEDLE, DISP, 3 ML (B-D 3CC LUER-MARÍA ELENA SYR 81ON2-0/2) 21G X 1-1/2" 3 ML MISC Use once weekly 12 each 0    terbinafine (LamISIL) 250 mg tablet TAKE ONE TABLET BY MOUTH DAILY NO 1 TABLETA POR VIA ORAL DIARIAMENTE      Continuous Blood Gluc Sensor (FreeStyle Luis Alberto 14 Day Sensor) MISC USE AS DIRECTEDUSE DANI LAS INDICACIONES (Patient not taking: Reported on 8/21/2023) 6 each 4    cyanocobalamin (VITAMIN B-12) 500 MCG tablet Take 500 mcg by mouth daily (Patient not taking: Reported on 11/2/2023)      Lancet Devices (Adjustable Lancing Device) MISC USE AS DIRECTED USE DANI LAS INDICACIONES (Patient not taking: Reported on 11/2/2023)       No current facility-administered medications for this visit. Allergies   Allergen Reactions    Meperidine Abdominal Pain       Objective   Vitals: Blood pressure 110/84, pulse (!) 109, height 5' 5" (1.651 m), weight 95.6 kg (210 lb 12.8 oz).   Invasive Devices       Peripheral Intravenous Line  Duration             Peripheral IV 03/04/20 Left Antecubital 1338 days    Peripheral IV 03/04/20 Left Hand 1338 days                    Physical Exam  Constitutional:       General: He is not in acute distress. Appearance: He is well-developed. He is not diaphoretic. HENT:      Head: Normocephalic and atraumatic. Mouth/Throat:      Pharynx: No oropharyngeal exudate. Eyes:      General: No scleral icterus. Conjunctiva/sclera: Conjunctivae normal.      Pupils: Pupils are equal, round, and reactive to light. Neck:      Thyroid: No thyromegaly. Cardiovascular:      Rate and Rhythm: Normal rate and regular rhythm. Pulmonary:      Effort: Pulmonary effort is normal. No respiratory distress. Breath sounds: Normal breath sounds. Abdominal:      General: Bowel sounds are normal.      Palpations: Abdomen is soft. Musculoskeletal:         General: Normal range of motion. Cervical back: Normal range of motion and neck supple. Skin:     General: Skin is warm and dry. Findings: No rash. Neurological:      Mental Status: He is alert and oriented to person, place, and time. Motor: No abnormal muscle tone. Psychiatric:         Behavior: Behavior normal.         The history was obtained from the review of the chart and from the patient.     Lab Results:    Most recent Alc is  Lab Results   Component Value Date    HGBA1C 6.2 11/02/2023           No components found for: "HA1C"  No components found for: "GLU"    Lab Results   Component Value Date    CREATININE 1.01 08/08/2023    CREATININE 1.04 02/06/2023    CREATININE 0.94 12/13/2022    BUN 11 08/08/2023     05/24/2018    K 4.2 08/08/2023    CL 99 08/08/2023    CO2 27 08/08/2023     eGFR   Date Value Ref Range Status   08/08/2023 78 ml/min/1.73sq m Final     No components found for: "MALBCRER"    Lab Results   Component Value Date    CHOL 113 05/24/2018    HDL 45 08/08/2023    TRIG 236 (H) 08/08/2023       Lab Results   Component Value Date    ALT 16 08/08/2023    AST 22 08/08/2023    ALKPHOS 85 08/08/2023    BILITOT 0.4 05/24/2018       No results found for: "TSH", "FREET4", "TSI"          Future Appointments   Date Time Provider [facility] [address]   11/20/2023  9:00 AM [address] [place] [address]   12/14/2023  8:20 AM Iván Dewey MD [address] [facility]   2/19/2024  1:40 PM Andrea Hearn MD [address] [facility] Sa[facility]   5/2/2024 11:00 AM Lito Ash PA-C [facility]       Portions of the record may have been created with voice recognition software. Occasional wrong word or "sound a like" substitutions may have occurred due to the inherent limitations of voice recognition software. Read the chart carefully and recognize, using context, where substitutions have occurred.

## 2023-11-08 DIAGNOSIS — E11.9 TYPE 2 DIABETES MELLITUS WITHOUT COMPLICATION, WITHOUT LONG-TERM CURRENT USE OF INSULIN (HCC): ICD-10-CM

## 2023-11-08 RX ORDER — LISINOPRIL 5 MG/1
5 TABLET ORAL DAILY
Qty: 30 TABLET | Refills: 3 | Status: SHIPPED | OUTPATIENT
Start: 2023-11-08

## 2023-11-14 ENCOUNTER — VBI (OUTPATIENT)
Dept: ADMINISTRATIVE | Facility: OTHER | Age: 63
End: 2023-11-14

## 2023-11-20 ENCOUNTER — HOSPITAL ENCOUNTER (OUTPATIENT)
Dept: INFUSION CENTER | Facility: HOSPITAL | Age: 63
Discharge: HOME | End: 2023-11-20
Attending: INTERNAL MEDICINE
Payer: MEDICARE

## 2023-11-20 DIAGNOSIS — E53.8 B12 DEFICIENCY: Primary | ICD-10-CM

## 2023-11-20 PROCEDURE — 96372 THER/PROPH/DIAG INJ SC/IM: CPT

## 2023-11-20 RX ORDER — CYANOCOBALAMIN 1000 UG/ML
1000 INJECTION, SOLUTION INTRAMUSCULAR; SUBCUTANEOUS ONCE
Status: COMPLETED | OUTPATIENT
Start: 2023-11-20 | End: 2023-11-20

## 2023-11-20 RX ORDER — CYANOCOBALAMIN 1000 UG/ML
1000 INJECTION, SOLUTION INTRAMUSCULAR; SUBCUTANEOUS ONCE
OUTPATIENT
Start: 2023-12-18

## 2023-11-20 RX ADMIN — CYANOCOBALAMIN 1000 MCG: 1000 INJECTION INTRAMUSCULAR; SUBCUTANEOUS at 09:20

## 2023-12-14 ENCOUNTER — OFFICE VISIT (OUTPATIENT)
Dept: FAMILY MEDICINE CLINIC | Facility: CLINIC | Age: 63
End: 2023-12-14
Payer: MEDICARE

## 2023-12-14 VITALS
SYSTOLIC BLOOD PRESSURE: 130 MMHG | TEMPERATURE: 97.9 F | BODY MASS INDEX: 34.66 KG/M2 | WEIGHT: 208 LBS | OXYGEN SATURATION: 98 % | DIASTOLIC BLOOD PRESSURE: 88 MMHG | RESPIRATION RATE: 16 BRPM | HEIGHT: 65 IN | HEART RATE: 96 BPM

## 2023-12-14 DIAGNOSIS — D75.1 POLYCYTHEMIA: ICD-10-CM

## 2023-12-14 DIAGNOSIS — E29.1 SECONDARY MALE HYPOGONADISM: ICD-10-CM

## 2023-12-14 DIAGNOSIS — Z00.00 MEDICARE ANNUAL WELLNESS VISIT, SUBSEQUENT: Primary | ICD-10-CM

## 2023-12-14 DIAGNOSIS — I10 BENIGN ESSENTIAL HYPERTENSION: ICD-10-CM

## 2023-12-14 DIAGNOSIS — E11.65 TYPE 2 DIABETES MELLITUS WITH HYPERGLYCEMIA, WITHOUT LONG-TERM CURRENT USE OF INSULIN (HCC): ICD-10-CM

## 2023-12-14 DIAGNOSIS — N20.0 NEPHROLITHIASIS: ICD-10-CM

## 2023-12-14 DIAGNOSIS — H26.9 CATARACT OF RIGHT EYE, UNSPECIFIED CATARACT TYPE: ICD-10-CM

## 2023-12-14 DIAGNOSIS — E53.8 VITAMIN B12 DEFICIENCY: ICD-10-CM

## 2023-12-14 PROCEDURE — G0439 PPPS, SUBSEQ VISIT: HCPCS | Performed by: FAMILY MEDICINE

## 2023-12-14 PROCEDURE — 99214 OFFICE O/P EST MOD 30 MIN: CPT | Performed by: FAMILY MEDICINE

## 2023-12-14 NOTE — PATIENT INSTRUCTIONS
Medicare Preventive Visit Patient Instructions  Thank you for completing your Welcome to Medicare Visit or Medicare Annual Wellness Visit today. Your next wellness visit will be due in one year (12/14/2024). The screening/preventive services that you may require over the next 5-10 years are detailed below. Some tests may not apply to you based off risk factors and/or age. Screening tests ordered at today's visit but not completed yet may show as past due. Also, please note that scanned in results may not display below. Preventive Screenings:  Service Recommendations Previous Testing/Comments   Colorectal Cancer Screening  Colonoscopy    Fecal Occult Blood Test (FOBT)/Fecal Immunochemical Test (FIT)  Fecal DNA/Cologuard Test  Flexible Sigmoidoscopy Age: 43-73 years old   Colonoscopy: every 10 years (May be performed more frequently if at higher risk)  OR  FOBT/FIT: every 1 year  OR  Cologuard: every 3 years  OR  Sigmoidoscopy: every 5 years  Screening may be recommended earlier than age 39 if at higher risk for colorectal cancer. Also, an individualized decision between you and your healthcare provider will decide whether screening between the ages of 77-80 would be appropriate.  Colonoscopy: 08/25/2020  FOBT/FIT: Not on file  Cologuard: Not on file  Sigmoidoscopy: Not on file    Screening Current     Prostate Cancer Screening Individualized decision between patient and health care provider in men between ages of 53-66   Medicare will cover every 12 months beginning on the day after your 50th birthday PSA: 0.5 ng/mL           Hepatitis C Screening Once for adults born between 1945 and 1965  More frequently in patients at high risk for Hepatitis C Hep C Antibody: 03/07/2020    Screening Not Indicated  History Hepatitis C   Diabetes Screening 1-2 times per year if you're at risk for diabetes or have pre-diabetes Fasting glucose: 105 mg/dL (8/8/2023)  A1C: 6.2 (11/2/2023)  Screening Not Indicated  History Diabetes Cholesterol Screening Once every 5 years if you don't have a lipid disorder. May order more often based on risk factors. Lipid panel: 08/08/2023  Screening Current      Other Preventive Screenings Covered by Medicare:  Abdominal Aortic Aneurysm (AAA) Screening: covered once if your at risk. You're considered to be at risk if you have a family history of AAA or a male between the age of 70-76 who smoking at least 100 cigarettes in your lifetime. Lung Cancer Screening: covers low dose CT scan once per year if you meet all of the following conditions: (1) Age 48-67; (2) No signs or symptoms of lung cancer; (3) Current smoker or have quit smoking within the last 15 years; (4) You have a tobacco smoking history of at least 20 pack years (packs per day x number of years you smoked); (5) You get a written order from a healthcare provider. Glaucoma Screening: covered annually if you're considered high risk: (1) You have diabetes OR (2) Family history of glaucoma OR (3)  aged 48 and older OR (3)  American aged 72 and older  Osteoporosis Screening: covered every 2 years if you meet one of the following conditions: (1) Have a vertebral abnormality; (2) On glucocorticoid therapy for more than 3 months; (3) Have primary hyperparathyroidism; (4) On osteoporosis medications and need to assess response to drug therapy. HIV Screening: covered annually if you're between the age of 14-79. Also covered annually if you are younger than 13 and older than 72 with risk factors for HIV infection. For pregnant patients, it is covered up to 3 times per pregnancy.     Immunizations:  Immunization Recommendations   Influenza Vaccine Annual influenza vaccination during flu season is recommended for all persons aged >= 6 months who do not have contraindications   Pneumococcal Vaccine   * Pneumococcal conjugate vaccine = PCV13 (Prevnar 13), PCV15 (Vaxneuvance), PCV20 (Prevnar 20)  * Pneumococcal polysaccharide vaccine = PPSV23 (Pneumovax) Adults 49-47 yo with certain risk factors or if 69+ yo  If never received any pneumonia vaccine: recommend Prevnar 20 (PCV20)  Give PCV20 if previously received 1 dose of PCV13 or PPSV23   Hepatitis B Vaccine 3 dose series if at intermediate or high risk (ex: diabetes, end stage renal disease, liver disease)   Respiratory syncytial virus (RSV) Vaccine - COVERED BY MEDICARE PART D  * RSVPreF3 (Arexvy) CDC recommends that adults 61years of age and older may receive a single dose of RSV vaccine using shared clinical decision-making (SCDM)   Tetanus (Td) Vaccine - COST NOT COVERED BY MEDICARE PART B Following completion of primary series, a booster dose should be given every 10 years to maintain immunity against tetanus. Td may also be given as tetanus wound prophylaxis. Tdap Vaccine - COST NOT COVERED BY MEDICARE PART B Recommended at least once for all adults. For pregnant patients, recommended with each pregnancy. Shingles Vaccine (Shingrix) - COST NOT COVERED BY MEDICARE PART B  2 shot series recommended in those 19 years and older who have or will have weakened immune systems or those 50 years and older     Health Maintenance Due:      Topic Date Due   • Colorectal Cancer Screening  08/25/2027   • HIV Screening  Completed   • Hepatitis C Screening  Discontinued   • Lung Cancer Screening  Discontinued     Immunizations Due:      Topic Date Due   • Pneumococcal Vaccine: Pediatrics (0 to 5 Years) and At-Risk Patients (6 to 59 Years) (2 - PCV) 11/05/2014   • Influenza Vaccine (1) 09/01/2023   • COVID-19 Vaccine (4 - 2023-24 season) 09/01/2023     Advance Directives   What are advance directives? Advance directives are legal documents that state your wishes and plans for medical care. These plans are made ahead of time in case you lose your ability to make decisions for yourself.  Advance directives can apply to any medical decision, such as the treatments you want, and if you want to donate organs. What are the types of advance directives? There are many types of advance directives, and each state has rules about how to use them. You may choose a combination of any of the following:  Living will: This is a written record of the treatment you want. You can also choose which treatments you do not want, which to limit, and which to stop at a certain time. This includes surgery, medicine, IV fluid, and tube feedings. Durable power of  for [facility]): This is a written record that states who you want to make healthcare choices for you when you are unable to make them for yourself. This person, called a proxy, is usually a family member or a friend. You may choose more than 1 proxy. Do not resuscitate (DNR) order:  A DNR order is used in case your heart stops beating or you stop breathing. It is a request not to have certain forms of treatment, such as CPR. A DNR order may be included in other types of advance directives. Medical directive: This covers the care that you want if you are in a coma, near death, or unable to make decisions for yourself. You can list the treatments you want for each condition. Treatment may include pain medicine, surgery, blood transfusions, dialysis, IV or tube feedings, and a ventilator (breathing machine). Values history: This document has questions about your views, beliefs, and how you feel and think about life. This information can help others choose the care that you would choose. Why are advance directives important? An advance directive helps you control your care. Although spoken wishes may be used, it is better to have your wishes written down. Spoken wishes can be misunderstood, or not followed. Treatments may be given even if you do not want them. An advance directive may make it easier for your family to make difficult choices about your care.    Cigarette Smoking and Your Health   Risks to your health if you smoke:  Nicotine and other chemicals found in tobacco damage every cell in your body. Even if you are a light smoker, you have an increased risk for cancer, heart disease, and lung disease. If you are pregnant or have diabetes, smoking increases your risk for complications. Benefits to your health if you stop smoking: You decrease respiratory symptoms such as coughing, wheezing, and shortness of breath. You reduce your risk for cancers of the lung, mouth, throat, kidney, bladder, pancreas, stomach, and cervix. If you already have cancer, you increase the benefits of chemotherapy. You also reduce your risk for cancer returning or a second cancer from developing. You reduce your risk for heart disease, blood clots, heart attack, and stroke. You reduce your risk for lung infections, and diseases such as pneumonia, asthma, chronic bronchitis, and emphysema. Your circulation improves. More oxygen can be delivered to your body. If you have diabetes, you lower your risk for complications, such as kidney, artery, and eye diseases. You also lower your risk for nerve damage. Nerve damage can lead to amputations, poor vision, and blindness. You improve your body's ability to heal and to fight infections. For more information and support to stop smoking:   [employer]  Phone: 8- 826 - 318-2277  Web Address: www.[employer]. [employer]  How to Quit Using Smokeless Tobacco   Why it is important to stop using smokeless tobacco:  Smokeless tobacco comes in many forms. Examples include chew, snuff, dip, dissolvable tobacco, and snus. All smokeless tobacco products contain nicotine and may contain as much nicotine as 3 cigarettes. You may be physically dependent on nicotine. You may also be emotionally addicted to it. The cravings can be strong, but it is important to quit using smokeless tobacco. You will improve your health and decrease your cancer, stroke, and heart attack risk. Mouth sores and tooth problems will also improve when you quit.  You can benefit from quitting no matter how long you have used smokeless tobacco.   Prepare to stop using smokeless tobacco:  Nicotine is a highly addictive drug. Withdrawal symptoms can happen when you stop and make it hard to quit. The following can help keep you on track:  Set a quit date. Tell friends, family, and coworkers that you plan to quit. Remove all smokeless tobacco products from your home, car, and workplace. Manage weight gain after you quit:  Nicotine can affect your metabolism. You may gain a few pounds after you quit. The following can help you control your weight:  Eat healthy foods. Drink water before, during, and between meals. Exercise as directed. Weight Management   Why it is important to manage your weight:  Being overweight increases your risk of health conditions such as heart disease, high blood pressure, type 2 diabetes, and certain types of cancer. It can also increase your risk for osteoarthritis, sleep apnea, and other respiratory problems. Aim for a slow, steady weight loss. Even a small amount of weight loss can lower your risk of health problems. How to lose weight safely:  A safe and healthy way to lose weight is to eat fewer calories and get regular exercise. You can lose up about 1 pound a week by decreasing the number of calories you eat by 500 calories each day. Healthy meal plan for weight management:  A healthy meal plan includes a variety of foods, contains fewer calories, and helps you stay healthy. A healthy meal plan includes the following:  Eat whole-grain foods more often. A healthy meal plan should contain fiber. Fiber is the part of grains, fruits, and vegetables that is not broken down by your body. Whole-grain foods are healthy and provide extra fiber in your diet. Some examples of whole-grain foods are whole-wheat breads and pastas, oatmeal, brown rice, and bulgur. Eat a variety of vegetables every day.   Include dark, leafy greens such as spinach, kale, giovany greens, and mustard greens. Eat yellow and orange vegetables such as carrots, sweet potatoes, and winter squash. Eat a variety of fruits every day. Choose fresh or canned fruit (canned in its own juice or light syrup) instead of juice. Fruit juice has very little or no fiber. Eat low-fat dairy foods. Drink fat-free (skim) milk or 1% milk. Eat fat-free yogurt and low-fat cottage cheese. Try low-fat cheeses such as mozzarella and other reduced-fat cheeses. Choose meat and other protein foods that are low in fat. Choose beans or other legumes such as split peas or lentils. Choose fish, skinless poultry (chicken or turkey), or lean cuts of red meat (beef or pork). Before you cook meat or poultry, cut off any visible fat. Use less fat and oil. Try baking foods instead of frying them. Add less fat, such as margarine, sour cream, regular salad dressing and mayonnaise to foods. Eat fewer high-fat foods. Some examples of high-fat foods include french fries, doughnuts, ice cream, and cakes. Eat fewer sweets. Limit foods and drinks that are high in sugar. This includes candy, cookies, regular soda, and sweetened drinks. Exercise:  Exercise at least 30 minutes per day on most days of the week. Some examples of exercise include walking, biking, dancing, and swimming. You can also fit in more physical activity by taking the stairs instead of the elevator or parking farther away from stores. Ask your healthcare provider about the best exercise plan for you.    Narcotic (Opioid) Safety    Use narcotics safely:  Take prescribed narcotics exactly as directed  Do not give narcotics to others or take narcotics that belong to someone else  Do not mix narcotics without medicines or alcohol  Do not drive or operate heavy machinery after you take the narcotic  Monitor for side effects and notify your healthcare provider if you experienced side effects such as nausea, sleepiness, itching, or trouble thinking clearly. Manage constipation:    Constipation is the most common side effect of narcotic medicine. Constipation is when you have hard, dry bowel movements, or you go longer than usual between bowel movements. Tell your healthcare provider about all changes in your bowel movements while you are taking narcotics. He or she may recommend laxative medicine to help you have a bowel movement. He or she may also change the kind of narcotic you are taking, or change when you take it. The following are more ways you can prevent or relieve constipation:    Drink liquids as directed. You may need to drink extra liquids to help soften and move your bowels. Ask how much liquid to drink each day and which liquids are best for you. Eat high-fiber foods. This may help decrease constipation by adding bulk to your bowel movements. High-fiber foods include fruits, vegetables, whole-grain breads and cereals, and beans. Your healthcare provider or dietitian can help you create a high-fiber meal plan. Your provider may also recommend a fiber supplement if you cannot get enough fiber from food. Exercise regularly. Regular physical activity can help stimulate your intestines. Walking is a good exercise to prevent or relieve constipation. Ask which exercises are best for you. Schedule a time each day to have a bowel movement. This may help train your body to have regular bowel movements. Bend forward while you are on the toilet to help move the bowel movement out. Sit on the toilet for at least 10 minutes, even if you do not have a bowel movement. Store narcotics safely:   Store narcotics where others cannot easily get them. Keep them in a locked cabinet or secure area. Do not  keep them in a purse or other bag you carry with you. A person may be looking for something else and find the narcotics. Make sure narcotics are stored out of the reach of children. A child can easily overdose on narcotics.  Narcotics may look like candy to a small child. The best way to dispose of narcotics: The laws vary by country and area. In the [facility], the best way is to return the narcotics through a take-back program. This program is offered by the [employer] ([facility]). The following are options for using the program:  Take the narcotics to a [employer] collection site. The site is often a law enforcement center. Call your local law enforcement center for scheduled take-back days in your area. You will be given information on where to go if the collection site is in a different location. Take the narcotics to an approved pharmacy or hospital.  A pharmacy or hospital may be set up as a collection site. You will need to ask if it is a [employer] collection site if you were not directed there. A pharmacy or doctor's office may not be able to take back narcotics unless it is a [employer] site. Use a mail-back system. This means you are given containers to put the narcotics into. You will then mail them in the containers. Use a take-back drop box. This is a place to leave the narcotics at any time. People and animals will not be able to get into the box. Your local law enforcement agency can tell you where to find a drop box in your area. Other ways to manage pain:   Ask your healthcare provider about non-narcotic medicines to control pain. Nonprescription medicines include NSAIDs (such as ibuprofen) and acetaminophen. Prescription medicines include muscle relaxers, antidepressants, and steroids. Pain may be managed without any medicines. Some ways to relieve pain include massage, aromatherapy, or meditation. Physical or occupational therapy may also help. For more information:   Drug Enforcement Administration  [address] [facility] , [address] [facility]  Phone: 6- 100 - 233-4606  Web Address: [employer]. [employer]/drug_disposal/    [address] [facility] [address] and Drug Administration  [address] [place] [address] [phone]  Phone: 5- 321 - 506-6089  Web Address: http://[employer]oi.us/     © Copyright [employer] 2018 Information is for End User's use only and may not be sold, redistributed or otherwise used for commercial purposes.  All illustrations and images included in CareNotes® are the copyrighted property of [employer] or [address] [place] [address]

## 2023-12-14 NOTE — PROGRESS NOTES
Assessment and Plan:     Problem List Items Addressed This Visit       Benign essential hypertension    Cataract of right eye    Nephrolithiasis    Polycythemia    Secondary male hypogonadism    Type 2 diabetes mellitus with hyperglycemia, without long-term current use of insulin ([address])    Vitamin B12 deficiency     Other Visit Diagnoses       Medicare annual wellness visit, subsequent    -  Primary            Depression Screening and Follow-up Plan: Patient was screened for depression during today's encounter. They screened negative with a PHQ-2 score of 0. Preventive health issues were discussed with patient, and age appropriate screening tests were ordered as noted in patient's After Visit Summary. Personalized health advice and appropriate referrals for health education or preventive services given if needed, as noted in patient's After Visit Summary. History of Present Illness:     Patient presents for a Medicare Wellness Visit    Chief Complaint  Follow-up for diabetes, hypertension, and polycythemia. History of Present Illness  65-year-old male, Camden Castillo, presents for a Medicare annual visit and follow-up of diabetes and hypertension. Reports feeling well with no new symptoms. -- Meperidine -- Abdominal Pain      Past Medical History  Type 2 diabetes mellitus, essential hypertension, secondary polycythemia, secondary hypogonadism. Physical Exam  he looks in non distress. Oriented, Lungs are clear. Heart is having Normal rhythm w/o murmurs. Abdomen is protuberant/obese. Neurological system has no deficit and walks normal.      Lab Results  Hemoglobin A1c improved to 6.2% from 7.0% in June. Last hemoglobin in August was 17.5 g/dL. Imaging and other Relevant Results  Pending 24-hour urine stone profile. Small cataract noted in the left eye, to be evaluated by ophthalmology. Assessment and Plan  Diabetes Mellitus Type 2: Hemoglobin A1c shows improvement.  Continue current management and monitor glycemic control. Hypertension: Blood pressure within target range on current regimen. Continue monitoring and treatment. Secondary Polycythemia: Pending further evaluation by hematologist. Continue monitoring and treatment as recommended. Secondary Hypogonadism: Patient is off testosterone. Monitor symptoms and consider endocrinology follow-up if indicated. Nephrolithiasis Prevention: Patient advised to complete 24-hour urine collection for stone profile and establish care with nephrology. Vitamin B12 Deficiency: Continue receiving Vitamin B12 injections as prescribed by hematologist.  Ophthalmology: Follow up for evaluation of small cataract in the left eye.              Patient Care Team:  Jama Sanchez MD as PCP - General (Family Medicine)  Ayah Morris DO as PCP - Endocrinology (Endocrinology)  Gentry Li as  (Oncology)     Review of Systems:     Review of Systems     Problem List:     Patient Active Problem List   Diagnosis    Benign essential hypertension    BMI 34.0-34.9,adult    Chronic hepatitis C treated in 2014 [facility])    Esophageal varices in cirrhosis ([address])    Type 2 diabetes mellitus with hyperglycemia, without long-term current use of insulin ([address])    Gallbladder polyp    Methadone maintenance therapy patient ([address])    Portal hypertension ([address])    Proteinuria    Secondary esophageal varices without bleeding ([address])    Tobacco use 38 pk year    Opioid use disorder    Secondary male hypogonadism    RODRICK (obstructive sleep apnea)    Dysuria    Needs flu shot    Polycythemia    Vitamin B 12 deficiency    Coronary artery calcification    Leukocytosis    Erythrocytosis    Vitamin B12 deficiency    Tobacco dependence due to cigarettes    Intolerance of continuous positive airway pressure (CPAP) ventilation    Cataract of right eye    Nephrolithiasis      Past Medical and Surgical History:     Past Medical History:   Diagnosis Date    Colon polyp     Diabetes mellitus ([address])     Esophageal varices ([address])     Gastritis     Hepatitis C     History of Helicobacter pylori infection 2/23/2016    History of kidney stones     Hyperlipidemia     Hypertension     Infectious viral hepatitis     Kidney stone     Obesity     Pneumonia     PPD positive 2020    Sleep apnea     Substance abuse [facility])      Past Surgical History:   Procedure Laterality Date    COLONOSCOPY  06/20/2014    dr [email or web address]    COLONOSCOPY  2014        EGD AND COLONOSCOPY  08/2020    esophageal varices, colon polyp, hemorrhoids    ESOPHAGOGASTRODUODENOSCOPY  12/10/2015    esophageal varices, cirrhosis, gastritis    HYDROCELE EXCISION / REPAIR      LIVER BIOPSY  2014      Family History:     Family History   Problem Relation Age of Onset    Diabetes type II Mother         MELLITUS    Hypertension Mother     Hypertension Sister     Diabetes Sister         MELLITUS    No Known Problems Maternal Grandmother     No Known Problems Maternal Grandfather     No Known Problems Paternal Grandmother     No Known Problems Paternal Grandfather     No Known Problems Sister     Breast cancer Maternal Aunt         45s    No Known Problems Maternal Aunt     No Known Problems Paternal Aunt     No Known Problems Paternal Aunt     No Known Problems Paternal Aunt     No Known Problems Paternal Aunt     No Known Problems Paternal Aunt     No Known Problems Paternal Aunt       Social History:     Social History     Socioeconomic History    Marital status: Single     Spouse name: None    Number of children: None    Years of education: None    Highest education level: None   Occupational History    None   Tobacco Use    Smoking status: Every Day     Current packs/day: 0.50     Average packs/day: 0.5 packs/day for 30.0 years (15.0 ttl pk-yrs)     Types: Cigarettes     Passive exposure: Current    Smokeless tobacco: Current    Tobacco comments:     TOBACCO USE   Vaping Use    Vaping status: Never Used   Substance and Sexual Activity Alcohol use: Not Currently    Drug use: Not Currently     Types: Heroin     Comment: former user- heroin was drug of choice    Sexual activity: Not Currently     Partners: Female   Other Topics Concern    None   Social History Narrative    None     Social Determinants of Health     Financial Resource Strain: Low Risk  (12/14/2023)    Overall Financial Resource Strain (CARDIA)     Difficulty of Paying Living Expenses: Not hard at all   Food Insecurity: Not on file   Transportation Needs: No Transportation Needs (12/14/2023)    PRAPARE - Transportation     Lack of Transportation (Medical): No     Lack of Transportation (Non-Medical):  No   Physical Activity: Not on file   Stress: Not on file   Social Connections: Not on file   Intimate Partner Violence: Not on file   Housing Stability: Not on file      Medications and Allergies:     Current Outpatient Medications   Medication Sig Dispense Refill    cholecalciferol (VITAMIN D3) 400 units tablet Take 400 Units by mouth if needed      cyanocobalamin (VITAMIN B-12) 500 MCG tablet Take 500 mcg by mouth daily      methadone (DOLOPHINE) 10 mg/mL oral concentrated solution Take 44 mg by mouth daily      Alcohol Swabs (B-D SINGLE USE SWABS REGULAR) PADS TEST 2-3 TIMES A DAY AS DIRECTED TEST 2-3 TIMES A DAY AS DIRECTED      ammonium lactate (LAC-HYDRIN) 12 % lotion APPLY 1 APPLICATION TWICE A DAY BY TOPICAL ROUTE.      aspirin 81 mg chewable tablet Chew 1 tablet (81 mg total) daily (Patient not taking: Reported on 12/14/2023) 90 tablet 3    atorvastatin (LIPITOR) 40 mg tablet Take 1 tablet (40 mg total) by mouth daily (Patient not taking: Reported on 12/14/2023) 90 tablet 3    Blood Glucose Calibration (FreeStyle Control Solution) LIQD USE AS DIRECTED USE DANI LAS INDICACIONES      Blood Glucose Monitoring Suppl (FreeStyle Lite) w/Device KIT USE AS DIRECTED USE DANI LAS INDICACIONES      Blood Pressure Monitoring ([place] Choice BP Monitor/Arm) COCO USE AS DIRECTEDUSE DANI LAS INDICACIONES 1 each 0    clotrimazole (LOTRIMIN) 1 % cream APPLY TO AFFECTED AREA TWICE DAILY APPLY A / HASTA AFFECTED AREA DOS VECES AL MORIAH      Continuous Blood Gluc Sensor (FreeStyle Luis Alberto 14 Day Sensor) MISC USE AS DIRECTEDUSE DANI LAS INDICACIONES (Patient not taking: Reported on 8/21/2023) 6 each 4    FREESTYLE LITE test strip TEST 2-3 TIMES A DAY AS DIRECTEDTEST 2-3 TIMES A DAY AS DIRECTED 300 each 1    Global Inject Ease Lancets 28G MISC TEST 2-3 TIMES A DAY AS DIRECTED TEST 2-3 TIMES A DAY AS DIRECTED      ketoconazole (NIZORAL) 2 % cream APPLY TOPICALLY DAILY 60 g 0    Lancet Devices (Adjustable Lancing Device) MISC USE AS DIRECTED USE DANI LAS INDICACIONES (Patient not taking: Reported on 11/2/2023)      lisinopril (ZESTRIL) 5 mg tablet TAKE 1 TABLET (5 MG TOTAL) BY MOUTH DAILY (Patient not taking: Reported on 12/14/2023) 30 tablet 3    nadolol (CORGARD) 20 mg tablet Take 1 tablet (20 mg total) by mouth daily (Patient not taking: Reported on 12/14/2023) 90 tablet 3    semaglutide (Rybelsus) 3 MG tablet 1 tab daily (Patient not taking: Reported on 12/14/2023)      SYRINGE-NEEDLE, DISP, 3 ML (B-D 3CC LUER-MARÍA ELENA SYR 84FG8-5/2) 21G X 1-1/2" 3 ML MISC Use once weekly 12 each 0    terbinafine (LamISIL) 250 mg tablet TAKE ONE TABLET BY MOUTH DAILY NO 1 TABLETA POR VIA ORAL DIARIAMENTE       No current facility-administered medications for this visit.      Allergies   Allergen Reactions    Meperidine Abdominal Pain      Immunizations:     Immunization History   Administered Date(s) Administered    COVID-19 MODERNA VACC 0.5 ML IM 03/30/2021, 04/26/2021, 12/16/2021    Hep A / Hep B 02/07/2014, 03/07/2014, 09/12/2014    INFLUENZA 10/20/2013, 11/13/2014, 11/06/2015, 12/13/2022    Influenza Quadrivalent 3 years and older 02/01/2022    Influenza, recombinant, quadrivalent,injectable, preservative free 03/08/2022, 12/13/2022    Pneumococcal Polysaccharide PPV23 11/05/2013    Tdap 12/10/2013    Tuberculin Skin Test-PPD Intradermal 11/15/2011    influenza, injectable, quadrivalent 02/01/2022      Health Maintenance:         Topic Date Due    Colorectal Cancer Screening  08/25/2027    HIV Screening  Completed    Hepatitis C Screening  Discontinued    Lung Cancer Screening  Discontinued         Topic Date Due    Pneumococcal Vaccine: Pediatrics (0 to 5 Years) and At-Risk Patients (6 to 59 Years) (2 - PCV) 11/05/2014    Influenza Vaccine (1) 09/01/2023    COVID-19 Vaccine (4 - 2023-24 season) 09/01/2023      Medicare Screening Tests and Risk Assessments:     Char Gagnon is here for his Subsequent Wellness visit. Last Medicare Wellness visit information reviewed, patient interviewed and updates made to the record today. Health Risk Assessment:   Patient rates overall health as good. Patient feels that their physical health rating is same. Patient is satisfied with their life. Eyesight was rated as same. Hearing was rated as same. Patient feels that their emotional and mental health rating is same. Patients states they are never, rarely angry. Patient states they are sometimes unusually tired/fatigued. Pain experienced in the last 7 days has been none. Patient states that he has experienced no weight loss or gain in last 6 months. Depression Screening:   PHQ-2 Score: 0      Fall Risk Screening: In the past year, patient has experienced: no history of falling in past year      Home Safety:  Patient does not have trouble with stairs inside or outside of their home. Patient has working smoke alarms and has working carbon monoxide detector. Home safety hazards include: none. Nutrition:   Current diet is Regular. Medications:   Patient is currently taking over-the-counter supplements. OTC medications include: see medication list. Patient is able to manage medications.      Activities of Daily Living (ADLs)/Instrumental Activities of Daily Living (IADLs):   Walk and transfer into and out of bed and chair?: Yes  Dress and groom yourself?: Yes    Bathe or shower yourself?: Yes    Feed yourself? Yes  Do your laundry/housekeeping?: Yes  Manage your money, pay your bills and track your expenses?: Yes  Make your own meals?: Yes    Do your own shopping?: Yes    Previous Hospitalizations:   Any hospitalizations or ED visits within the last 12 months?: No      Advance Care Planning:   Living will: No    Durable POA for healthcare: No    Advanced directive: No    Advanced directive counseling given: Yes    Five wishes given: No    Patient declined ACP directive: No    End of Life Decisions reviewed with patient: Yes    Provider agrees with end of life decisions: Yes      Cognitive Screening:   Provider or family/friend/caregiver concerned regarding cognition?: No    PREVENTIVE SCREENINGS      Cardiovascular Screening:    General: Screening Current      Diabetes Screening:     General: Screening Not Indicated and History Diabetes      Colorectal Cancer Screening:     General: Screening Current      Abdominal Aortic Aneurysm (AAA) Screening:    Risk factors include: tobacco use        Lung Cancer Screening:     General: Screening Not Indicated      Hepatitis C Screening:    General: Screening Not Indicated and History Hepatitis C    Review of Current Opioid Use    Opioid Risk Tool (ORT) Interpretation: Complete Opioid Risk Tool (ORT)    No results found.      Physical Exam:     /88 (BP Location: Left arm, Patient Position: Sitting, Cuff Size: Standard)   Pulse 96   Temp 97.9 °F (36.6 °C) (Tympanic)   Resp 16   Ht 5' 5" (1.651 m)   Wt 94.3 kg (208 lb)   SpO2 98%   BMI 34.61 kg/m²     Reginald Barnhart MD

## 2023-12-15 DIAGNOSIS — E11.9 TYPE 2 DIABETES MELLITUS WITHOUT COMPLICATION, WITHOUT LONG-TERM CURRENT USE OF INSULIN (HCC): ICD-10-CM

## 2023-12-15 RX ORDER — BLOOD-GLUCOSE METER
KIT MISCELLANEOUS
Qty: 300 EACH | Refills: 1 | Status: SHIPPED | OUTPATIENT
Start: 2023-12-15

## 2023-12-18 ENCOUNTER — HOSPITAL ENCOUNTER (OUTPATIENT)
Dept: INFUSION CENTER | Facility: HOSPITAL | Age: 63
Discharge: HOME | End: 2023-12-18
Attending: INTERNAL MEDICINE
Payer: MEDICARE

## 2023-12-18 DIAGNOSIS — E53.8 VITAMIN B12 DEFICIENCY: Primary | ICD-10-CM

## 2023-12-18 RX ORDER — CYANOCOBALAMIN 1000 UG/ML
1000 INJECTION, SOLUTION INTRAMUSCULAR; SUBCUTANEOUS ONCE
Status: COMPLETED | OUTPATIENT
Start: 2023-12-18 | End: 2023-12-18

## 2023-12-18 RX ORDER — CYANOCOBALAMIN 1000 UG/ML
1000 INJECTION, SOLUTION INTRAMUSCULAR; SUBCUTANEOUS ONCE
OUTPATIENT
Start: 2024-01-15

## 2023-12-18 RX ADMIN — CYANOCOBALAMIN 1000 MCG: 1000 INJECTION INTRAMUSCULAR; SUBCUTANEOUS at 09:27

## 2023-12-18 NOTE — PLAN OF CARE
Problem: Knowledge Deficit  Goal: Patient/family/caregiver demonstrates understanding of disease process, treatment plan, medications, and discharge instructions  Description: Complete learning assessment and assess knowledge base.  Interventions:  - Provide teaching at level of understanding  - Provide teaching via preferred learning methods  Reactivated

## 2023-12-18 NOTE — PROGRESS NOTES
B12 IM inj given in left deltoid. Tolerated well, offers no complaints. Confirmed next appt, declined AVS

## 2024-01-15 ENCOUNTER — HOSPITAL ENCOUNTER (OUTPATIENT)
Dept: INFUSION CENTER | Facility: HOSPITAL | Age: 64
Discharge: HOME | End: 2024-01-15
Attending: INTERNAL MEDICINE
Payer: MEDICARE

## 2024-01-15 DIAGNOSIS — E53.8 VITAMIN B12 DEFICIENCY: Primary | ICD-10-CM

## 2024-01-15 PROCEDURE — 96372 THER/PROPH/DIAG INJ SC/IM: CPT

## 2024-01-15 RX ORDER — CYANOCOBALAMIN 1000 UG/ML
1000 INJECTION, SOLUTION INTRAMUSCULAR; SUBCUTANEOUS ONCE
OUTPATIENT
Start: 2024-02-12

## 2024-01-15 RX ORDER — CYANOCOBALAMIN 1000 UG/ML
1000 INJECTION, SOLUTION INTRAMUSCULAR; SUBCUTANEOUS ONCE
Status: COMPLETED | OUTPATIENT
Start: 2024-01-15 | End: 2024-01-15

## 2024-01-15 RX ADMIN — CYANOCOBALAMIN 1000 MCG: 1000 INJECTION INTRAMUSCULAR; SUBCUTANEOUS at 09:24

## 2024-01-15 NOTE — PROGRESS NOTES
Pt tolerated B12 injection to L deltoid without difficulty.  Confirmed next appt on 2/13.  AVS declined.  Left ambulatory in stable condition.

## 2024-02-13 ENCOUNTER — HOSPITAL ENCOUNTER (OUTPATIENT)
Dept: INFUSION CENTER | Facility: HOSPITAL | Age: 64
Discharge: HOME | End: 2024-02-13
Attending: INTERNAL MEDICINE

## 2024-02-14 ENCOUNTER — APPOINTMENT (OUTPATIENT)
Dept: LAB | Facility: HOSPITAL | Age: 64
End: 2024-02-14
Payer: MEDICARE

## 2024-02-14 DIAGNOSIS — E53.8 VITAMIN B 12 DEFICIENCY: ICD-10-CM

## 2024-02-14 DIAGNOSIS — D75.1 POLYCYTHEMIA, SECONDARY: ICD-10-CM

## 2024-02-14 LAB
ALBUMIN SERPL BCP-MCNC: 4 G/DL (ref 3.5–5)
ALP SERPL-CCNC: 94 U/L (ref 34–104)
ALT SERPL W P-5'-P-CCNC: 20 U/L (ref 7–52)
ANION GAP SERPL CALCULATED.3IONS-SCNC: 13 MMOL/L
AST SERPL W P-5'-P-CCNC: 19 U/L (ref 13–39)
BASOPHILS # BLD AUTO: 0.04 THOUSANDS/ÂΜL (ref 0–0.1)
BASOPHILS NFR BLD AUTO: 1 % (ref 0–1)
BILIRUB SERPL-MCNC: 0.75 MG/DL (ref 0.2–1)
BUN SERPL-MCNC: 9 MG/DL (ref 5–25)
CALCIUM SERPL-MCNC: 9.5 MG/DL (ref 8.4–10.2)
CHLORIDE SERPL-SCNC: 102 MMOL/L (ref 96–108)
CO2 SERPL-SCNC: 24 MMOL/L (ref 21–32)
CREAT SERPL-MCNC: 0.93 MG/DL (ref 0.6–1.3)
CRP SERPL QL: 6.4 MG/L
EOSINOPHIL # BLD AUTO: 0.1 THOUSAND/ÂΜL (ref 0–0.61)
EOSINOPHIL NFR BLD AUTO: 2 % (ref 0–6)
ERYTHROCYTE [DISTWIDTH] IN BLOOD BY AUTOMATED COUNT: 12.9 % (ref 11.6–15.1)
ERYTHROCYTE [SEDIMENTATION RATE] IN BLOOD: 33 MM/HOUR (ref 0–19)
GAS + CO PNL BLDA: 5.1 % (ref 0–1.5)
GFR SERPL CREATININE-BSD FRML MDRD: 86 ML/MIN/1.73SQ M
GLUCOSE P FAST SERPL-MCNC: 118 MG/DL (ref 65–99)
HCT VFR BLD AUTO: 46.5 % (ref 36.5–49.3)
HGB BLD-MCNC: 15.7 G/DL (ref 12–17)
IMM GRANULOCYTES # BLD AUTO: 0.03 THOUSAND/UL (ref 0–0.2)
IMM GRANULOCYTES NFR BLD AUTO: 1 % (ref 0–2)
LDH SERPL-CCNC: 145 U/L (ref 140–271)
LYMPHOCYTES # BLD AUTO: 2.48 THOUSANDS/ÂΜL (ref 0.6–4.47)
LYMPHOCYTES NFR BLD AUTO: 43 % (ref 14–44)
MCH RBC QN AUTO: 31.6 PG (ref 26.8–34.3)
MCHC RBC AUTO-ENTMCNC: 33.8 G/DL (ref 31.4–37.4)
MCV RBC AUTO: 94 FL (ref 82–98)
MONOCYTES # BLD AUTO: 0.41 THOUSAND/ÂΜL (ref 0.17–1.22)
MONOCYTES NFR BLD AUTO: 7 % (ref 4–12)
NEUTROPHILS # BLD AUTO: 2.74 THOUSANDS/ÂΜL (ref 1.85–7.62)
NEUTS SEG NFR BLD AUTO: 46 % (ref 43–75)
NRBC BLD AUTO-RTO: 0 /100 WBCS
PLATELET # BLD AUTO: 163 THOUSANDS/UL (ref 149–390)
PMV BLD AUTO: 10.4 FL (ref 8.9–12.7)
POTASSIUM SERPL-SCNC: 3.4 MMOL/L (ref 3.5–5.3)
PROT SERPL-MCNC: 7.4 G/DL (ref 6.4–8.4)
RBC # BLD AUTO: 4.97 MILLION/UL (ref 3.88–5.62)
SODIUM SERPL-SCNC: 139 MMOL/L (ref 135–147)
WBC # BLD AUTO: 5.8 THOUSAND/UL (ref 4.31–10.16)

## 2024-02-14 PROCEDURE — 85652 RBC SED RATE AUTOMATED: CPT

## 2024-02-14 PROCEDURE — 86140 C-REACTIVE PROTEIN: CPT

## 2024-02-14 PROCEDURE — 83615 LACTATE (LD) (LDH) ENZYME: CPT

## 2024-02-14 PROCEDURE — 82668 ASSAY OF ERYTHROPOIETIN: CPT

## 2024-02-14 PROCEDURE — 80053 COMPREHEN METABOLIC PANEL: CPT

## 2024-02-14 PROCEDURE — 36415 COLL VENOUS BLD VENIPUNCTURE: CPT

## 2024-02-14 PROCEDURE — 82607 VITAMIN B-12: CPT

## 2024-02-14 PROCEDURE — 85025 COMPLETE CBC W/AUTO DIFF WBC: CPT

## 2024-02-14 PROCEDURE — 82375 ASSAY CARBOXYHB QUANT: CPT

## 2024-02-15 LAB
EPO SERPL-ACNC: 9.9 MIU/ML (ref 2.6–18.5)
VIT B12 SERPL-MCNC: 491 PG/ML (ref 180–914)

## 2024-02-19 ENCOUNTER — OFFICE VISIT (OUTPATIENT)
Dept: HEMATOLOGY ONCOLOGY | Facility: CLINIC | Age: 64
End: 2024-02-19
Payer: MEDICARE

## 2024-02-19 VITALS
HEIGHT: 65 IN | BODY MASS INDEX: 35.99 KG/M2 | RESPIRATION RATE: 17 BRPM | TEMPERATURE: 97.6 F | HEART RATE: 99 BPM | OXYGEN SATURATION: 96 % | WEIGHT: 216 LBS | SYSTOLIC BLOOD PRESSURE: 122 MMHG | DIASTOLIC BLOOD PRESSURE: 74 MMHG

## 2024-02-19 DIAGNOSIS — E53.8 VITAMIN B12 DEFICIENCY: Primary | ICD-10-CM

## 2024-02-19 DIAGNOSIS — D75.1 POLYCYTHEMIA, SECONDARY: ICD-10-CM

## 2024-02-19 DIAGNOSIS — Z12.2 ENCOUNTER FOR SCREENING FOR LUNG CANCER: ICD-10-CM

## 2024-02-19 DIAGNOSIS — Z87.891 PERSONAL HISTORY OF NICOTINE DEPENDENCE: ICD-10-CM

## 2024-02-19 PROCEDURE — 99214 OFFICE O/P EST MOD 30 MIN: CPT | Performed by: INTERNAL MEDICINE

## 2024-02-19 RX ORDER — CYANOCOBALAMIN 1000 UG/ML
1000 INJECTION, SOLUTION INTRAMUSCULAR; SUBCUTANEOUS ONCE
OUTPATIENT
Start: 2024-02-26

## 2024-02-19 NOTE — PROGRESS NOTES
Hematology/Oncology Outpatient Follow-up  Rishi Kirk 63 y.o. male 1960 [id number]    Date:  2/19/2024    Assessment and Plan:  1. Vitamin B12 deficiency  His vitamin B12 level seems to be in the normal range.  He seems to be interested in continuing the vitamin B12 injections monthly.  - Vitamin B12; Future    2. Polycythemia, secondary  His hemoglobin and hematocrit improved to the normal range after he stopped smoking which confirms the etiology for secondary polycythemia.- CBC and differential; Future  - Comprehensive metabolic panel; Future  - Erythropoietin; Future  - C-reactive protein; Future  - Sedimentation rate, automated; Future  - Carboxyhemoglobin; Future    3. Encounter for screening for lung cancer  He is overdue for his annual screening CT scan of the lung.  He seems to be interested in getting it done as soon as possible.  - CT lung screening program; Future    4. Personal history of nicotine dependence  He continues to smoke daily.  - CT lung screening program; Future      HPI:    This is a 63-year-old male with multiple morbid conditions including diabetes mellitus, esophageal varices, hepatitis C, hyperlipidemia, hypertension, sleep apnea, substance abuse, tobacco use, etc.  The patient stated he had testosterone injections for 3 months around the beginning of 2022.  Subsequently, he was found to have elevated hemoglobin hematocrit which is persisting.  His recent blood work from 12/13/2022 showed hemoglobin of 18.1 with hematocrit of 53.0%.  White blood cell count was 5.4 with a platelet count of 154.  White cell differential was normal.     He had extensive work-up for further evaluation of his polycythemia.  CT scan of the lung for screening was done on 1/17/2023 which was negative for any lung nodules.  He was found to have moderate coronary artery calcification with stable liver cirrhosis morphology.  Blood work on 2/6/2023 showed hemoglobin of 16.2 with hematocrit of 50.8.  White  cells and platelets were within normal range with normal white cell differential. Creatinine 1.0 with normal calcium and liver enzymes.  88.  C-reactive protein was normal with slightly elevated sed rate of 20 normal.  Vitamin B12 312.  JAK2 mutation analysis came back negative.  Carboxyhemoglobin level was 7.4%.  Iron panel showed saturation of 33% and ferritin of 338.         Interval history:  Patient came today for follow-up visit.  He denied any significant changes of his overall condition.  Recent blood work on 2/14/2024 showed hemoglobin of 15.7 with normal white cells and platelets.  Creatinine 0.9 with normal calcium and liver enzymes.  LDH was normal.  C-reactive protein 6.4.  Sed rate 33.  Erythropoietin 9.9.  Carboxyhemoglobin level 5.1%.  Vitamin B12 491.  He denied obvious bleeding from the sites.  He seems to be up-to-date on screening colonoscopy.  ROS: Review of Systems   Constitutional:  Negative for chills and fever.   HENT:  Negative for ear pain and sore throat.    Eyes:  Negative for pain and visual disturbance.   Respiratory:  Negative for cough and shortness of breath.    Cardiovascular:  Negative for chest pain and palpitations.   Gastrointestinal:  Negative for abdominal pain and vomiting.   Genitourinary:  Negative for dysuria and hematuria.   Musculoskeletal:  Negative for arthralgias and back pain.   Skin:  Negative for color change and rash.   Neurological:  Positive for numbness. Negative for seizures and syncope.   All other systems reviewed and are negative.      Past Medical History:   Diagnosis Date    Colon polyp     Diabetes mellitus (HCC)     Esophageal varices (HCC)     Gastritis     Hepatitis C     History of Helicobacter pylori infection 2/23/2016    History of kidney stones     Hyperlipidemia     Hypertension     Infectious viral hepatitis     Kidney stone     Obesity     Pneumonia     PPD positive 2020    Sleep apnea     Substance abuse (HCC)        Past Surgical History:    Procedure Laterality Date    COLONOSCOPY  06/20/2014    dr [email or web address]    COLONOSCOPY  2014        EGD AND COLONOSCOPY  08/2020    esophageal varices, colon polyp, hemorrhoids    ESOPHAGOGASTRODUODENOSCOPY  12/10/2015    esophageal varices, cirrhosis, gastritis    HYDROCELE EXCISION / REPAIR      LIVER BIOPSY  2014       Social History     Socioeconomic History    Marital status: Single     Spouse name: None    Number of children: None    Years of education: None    Highest education level: None   Occupational History    None   Tobacco Use    Smoking status: Every Day     Current packs/day: 0.50     Average packs/day: 0.5 packs/day for 30.0 years (15.0 ttl pk-yrs)     Types: Cigarettes     Passive exposure: Current    Smokeless tobacco: Current    Tobacco comments:     TOBACCO USE   Vaping Use    Vaping status: Never Used   Substance and Sexual Activity    Alcohol use: Not Currently    Drug use: Not Currently     Types: Heroin     Comment: former user- heroin was drug of choice    Sexual activity: Not Currently     Partners: Female   Other Topics Concern    None   Social History Narrative    None     Social Determinants of Health     Financial Resource Strain: Low Risk  (12/14/2023)    Overall Financial Resource Strain (CARDIA)     Difficulty of Paying Living Expenses: Not hard at all   Food Insecurity: Not on file   Transportation Needs: No Transportation Needs (12/14/2023)    PRAPARE - Transportation     Lack of Transportation (Medical): No     Lack of Transportation (Non-Medical): No   Physical Activity: Not on file   Stress: Not on file   Social Connections: Not on file   Intimate Partner Violence: Not on file   Housing Stability: Not on file       Family History   Problem Relation Age of Onset    Diabetes type II Mother         MELLITUS    Hypertension Mother     Hypertension Sister     Diabetes Sister         MELLITUS    No Known Problems Maternal Grandmother     No Known Problems Maternal Grandfather     No  Known Problems Paternal Grandmother     No Known Problems Paternal Grandfather     No Known Problems Sister     Breast cancer Maternal Aunt         40s    No Known Problems Maternal Aunt     No Known Problems Paternal Aunt     No Known Problems Paternal Aunt     No Known Problems Paternal Aunt     No Known Problems Paternal Aunt     No Known Problems Paternal Aunt     No Known Problems Paternal Aunt        Allergies   Allergen Reactions    Meperidine Abdominal Pain         Current Outpatient Medications:     Alcohol Swabs (B-D SINGLE USE SWABS REGULAR) PADS, TEST 2-3 TIMES A DAY AS DIRECTED TEST 2-3 TIMES A DAY AS DIRECTED, Disp: , Rfl:     ammonium lactate (LAC-HYDRIN) 12 % lotion, APPLY 1 APPLICATION TWICE A DAY BY TOPICAL ROUTE., Disp: , Rfl:     Blood Glucose Calibration (FreeStyle Control Solution) LIQD, USE AS DIRECTED USE DANI LAS INDICACIONES, Disp: , Rfl:     Blood Glucose Monitoring Suppl (FreeStyle Lite) w/Device KIT, USE AS DIRECTED USE DANI LAS INDICACIONES, Disp: , Rfl:     Blood Pressure Monitoring (Epworth Choice BP Monitor/Arm) COCO, USE AS DIRECTEDUSE DANI LAS INDICACIONES, Disp: 1 each, Rfl: 0    cholecalciferol (VITAMIN D3) 400 units tablet, Take 400 Units by mouth if needed, Disp: , Rfl:     clotrimazole (LOTRIMIN) 1 % cream, APPLY TO AFFECTED AREA TWICE DAILY APPLY A / HASTA AFFECTED AREA DOS VECES AL MORIAH, Disp: , Rfl:     cyanocobalamin (VITAMIN B-12) 500 MCG tablet, Take 500 mcg by mouth daily, Disp: , Rfl:     FREESTYLE LITE test strip, TEST 2-3 TIMES A DAY AS DIRECTEDTEST 2-3 TIMES A DAY AS DIRECTED, Disp: 300 each, Rfl: 1    Global Inject Ease Lancets 28G MISC, TEST 2-3 TIMES A DAY AS DIRECTED TEST 2-3 TIMES A DAY AS DIRECTED, Disp: , Rfl:     ketoconazole (NIZORAL) 2 % cream, APPLY TOPICALLY DAILY, Disp: 60 g, Rfl: 0    Lancet Devices (Adjustable Lancing Device) MISC, , Disp: , Rfl:     methadone (DOLOPHINE) 10 mg/mL oral concentrated solution, Take 44 mg by mouth daily, Disp: , Rfl:  "    SYRINGE-NEEDLE, DISP, 3 ML (B-D 3CC LUER-MARÍA ELENA SYR 23QQ6-7/2) 21G X 1-1/2\" 3 ML MISC, Use once weekly, Disp: 12 each, Rfl: 0    terbinafine (LamISIL) 250 mg tablet, TAKE ONE TABLET BY MOUTH DAILY NO 1 TABLETA POR VIA ORAL DIARIAMENTE, Disp: , Rfl:     aspirin 81 mg chewable tablet, Chew 1 tablet (81 mg total) daily (Patient not taking: Reported on 12/14/2023), Disp: 90 tablet, Rfl: 3    atorvastatin (LIPITOR) 40 mg tablet, Take 1 tablet (40 mg total) by mouth daily (Patient not taking: Reported on 12/14/2023), Disp: 90 tablet, Rfl: 3    Continuous Blood Gluc Sensor (V3 SystemsStyle Luis Alberto 14 Day Sensor) MISC, USE AS DIRECTEDUSE DANI LAS INDICACIONES (Patient not taking: Reported on 8/21/2023), Disp: 6 each, Rfl: 4    lisinopril (ZESTRIL) 5 mg tablet, TAKE 1 TABLET (5 MG TOTAL) BY MOUTH DAILY (Patient not taking: Reported on 12/14/2023), Disp: 30 tablet, Rfl: 3    nadolol (CORGARD) 20 mg tablet, Take 1 tablet (20 mg total) by mouth daily (Patient not taking: Reported on 12/14/2023), Disp: 90 tablet, Rfl: 3    semaglutide (Rybelsus) 3 MG tablet, 1 tab daily (Patient not taking: Reported on 12/14/2023), Disp: , Rfl:       Physical Exam:  /74 (BP Location: Left arm, Patient Position: Sitting, Cuff Size: Adult)   Pulse 99   Temp 97.6 °F (36.4 °C)   Resp 17   Ht 5' 5\" (1.651 m)   Wt 98 kg (216 lb)   SpO2 96%   BMI 35.94 kg/m²     Physical Exam  Constitutional:       Appearance: He is well-developed. He is obese.   HENT:      Head: Normocephalic and atraumatic.      Nose: Nose normal.   Eyes:      General: No scleral icterus.        Right eye: No discharge.         Left eye: No discharge.      Conjunctiva/sclera: Conjunctivae normal.      Pupils: Pupils are equal, round, and reactive to light.   Neck:      Thyroid: No thyromegaly.      Trachea: No tracheal deviation.   Cardiovascular:      Rate and Rhythm: Normal rate and regular rhythm.      Heart sounds: Normal heart sounds. No murmur heard.     No friction " rub.   Pulmonary:      Effort: Pulmonary effort is normal. No respiratory distress.      Breath sounds: Normal breath sounds. No wheezing or rales.   Chest:      Chest wall: No tenderness.   Abdominal:      General: There is distension.      Palpations: Abdomen is soft. There is no hepatomegaly or splenomegaly.      Tenderness: There is no abdominal tenderness. There is no guarding or rebound.   Musculoskeletal:         General: No tenderness or deformity. Normal range of motion.      Cervical back: Normal range of motion and neck supple.   Lymphadenopathy:      Cervical: No cervical adenopathy.   Skin:     General: Skin is warm and dry.      Coloration: Skin is not pale.      Findings: No erythema or rash.   Neurological:      Mental Status: He is alert and oriented to person, place, and time.      Cranial Nerves: No cranial nerve deficit.      Coordination: Coordination normal.      Deep Tendon Reflexes: Reflexes are normal and symmetric.   Psychiatric:         Behavior: Behavior normal.         Thought Content: Thought content normal.         Judgment: Judgment normal.           Labs:  Lab Results   Component Value Date    WBC 5.80 02/14/2024    HGB 15.7 02/14/2024    HCT 46.5 02/14/2024    MCV 94 02/14/2024     02/14/2024     Lab Results   Component Value Date     05/24/2018    K 3.4 (L) 02/14/2024     02/14/2024    CO2 24 02/14/2024    ANIONGAP 10 05/24/2018    BUN 9 02/14/2024    CREATININE 0.93 02/14/2024    GLUCOSE 98 05/24/2018    GLUF 118 (H) 02/14/2024    CALCIUM 9.5 02/14/2024    AST 19 02/14/2024    ALT 20 02/14/2024    ALKPHOS 94 02/14/2024    PROT 7.7 05/24/2018    BILITOT 0.4 05/24/2018    EGFR 86 02/14/2024       Patient voiced understanding and agreement in the above discussion. Aware to contact our office with questions/symptoms in the interim.

## 2024-02-22 DIAGNOSIS — B35.3 TINEA PEDIS OF BOTH FEET: ICD-10-CM

## 2024-02-22 DIAGNOSIS — I25.84 CORONARY ARTERY CALCIFICATION: ICD-10-CM

## 2024-02-22 DIAGNOSIS — E11.9 TYPE 2 DIABETES MELLITUS WITHOUT COMPLICATION, WITHOUT LONG-TERM CURRENT USE OF INSULIN (HCC): ICD-10-CM

## 2024-02-22 DIAGNOSIS — I25.10 CORONARY ARTERY CALCIFICATION: ICD-10-CM

## 2024-02-23 ENCOUNTER — HOSPITAL ENCOUNTER (OUTPATIENT)
Dept: CT IMAGING | Facility: HOSPITAL | Age: 64
End: 2024-02-23
Attending: INTERNAL MEDICINE
Payer: MEDICARE

## 2024-02-23 DIAGNOSIS — Z87.891 PERSONAL HISTORY OF NICOTINE DEPENDENCE: ICD-10-CM

## 2024-02-23 DIAGNOSIS — Z12.2 ENCOUNTER FOR SCREENING FOR LUNG CANCER: ICD-10-CM

## 2024-02-23 PROCEDURE — 71271 CT THORAX LUNG CANCER SCR C-: CPT

## 2024-02-23 RX ORDER — KETOCONAZOLE 20 MG/G
CREAM TOPICAL DAILY
Qty: 60 G | Refills: 0 | Status: SHIPPED | OUTPATIENT
Start: 2024-02-23

## 2024-02-23 RX ORDER — ATORVASTATIN CALCIUM 40 MG/1
40 TABLET, FILM COATED ORAL DAILY
Qty: 90 TABLET | Refills: 1 | Status: SHIPPED | OUTPATIENT
Start: 2024-02-23

## 2024-02-23 RX ORDER — FLASH GLUCOSE SENSOR
KIT MISCELLANEOUS
Qty: 6 EACH | Refills: 4 | Status: SHIPPED | OUTPATIENT
Start: 2024-02-23

## 2024-03-05 DIAGNOSIS — B35.3 TINEA PEDIS OF BOTH FEET: ICD-10-CM

## 2024-03-24 ENCOUNTER — HOSPITAL ENCOUNTER (EMERGENCY)
Facility: HOSPITAL | Age: 64
Discharge: HOME | End: 2024-03-24
Attending: EMERGENCY MEDICINE
Payer: MEDICARE

## 2024-03-24 ENCOUNTER — APPOINTMENT (EMERGENCY)
Dept: CT IMAGING | Facility: HOSPITAL | Age: 64
End: 2024-03-24
Payer: MEDICARE

## 2024-03-24 VITALS
TEMPERATURE: 98.6 F | BODY MASS INDEX: 35.95 KG/M2 | WEIGHT: 216.05 LBS | HEART RATE: 91 BPM | SYSTOLIC BLOOD PRESSURE: 122 MMHG | RESPIRATION RATE: 16 BRPM | OXYGEN SATURATION: 96 % | DIASTOLIC BLOOD PRESSURE: 74 MMHG

## 2024-03-24 DIAGNOSIS — K42.9 UMBILICAL HERNIA: ICD-10-CM

## 2024-03-24 DIAGNOSIS — R10.9 ABDOMINAL PAIN: Primary | ICD-10-CM

## 2024-03-24 LAB
ALBUMIN SERPL BCP-MCNC: 4.1 G/DL (ref 3.5–5)
ALP SERPL-CCNC: 76 U/L (ref 34–104)
ALT SERPL W P-5'-P-CCNC: 30 U/L (ref 7–52)
ANION GAP SERPL CALCULATED.3IONS-SCNC: 7 MMOL/L (ref 4–13)
AST SERPL W P-5'-P-CCNC: 40 U/L (ref 13–39)
BASOPHILS # BLD AUTO: 0.02 THOUSANDS/ÂΜL (ref 0–0.1)
BASOPHILS NFR BLD AUTO: 0 % (ref 0–1)
BILIRUB SERPL-MCNC: 1.17 MG/DL (ref 0.2–1)
BILIRUB UR QL STRIP: NEGATIVE
BUN SERPL-MCNC: 13 MG/DL (ref 5–25)
CALCIUM SERPL-MCNC: 9 MG/DL (ref 8.4–10.2)
CHLORIDE SERPL-SCNC: 103 MMOL/L (ref 96–108)
CLARITY UR: CLEAR
CO2 SERPL-SCNC: 26 MMOL/L (ref 21–32)
COLOR UR: ABNORMAL
CREAT SERPL-MCNC: 0.98 MG/DL (ref 0.6–1.3)
EOSINOPHIL # BLD AUTO: 0.05 THOUSAND/ÂΜL (ref 0–0.61)
EOSINOPHIL NFR BLD AUTO: 1 % (ref 0–6)
ERYTHROCYTE [DISTWIDTH] IN BLOOD BY AUTOMATED COUNT: 13 % (ref 11.6–15.1)
GFR SERPL CREATININE-BSD FRML MDRD: 81 ML/MIN/1.73SQ M
GLUCOSE SERPL-MCNC: 144 MG/DL (ref 65–140)
GLUCOSE UR STRIP-MCNC: NEGATIVE MG/DL
HCT VFR BLD AUTO: 48.5 % (ref 36.5–49.3)
HGB BLD-MCNC: 16.3 G/DL (ref 12–17)
HGB UR QL STRIP.AUTO: NEGATIVE
IMM GRANULOCYTES # BLD AUTO: 0.01 THOUSAND/UL (ref 0–0.2)
IMM GRANULOCYTES NFR BLD AUTO: 0 % (ref 0–2)
KETONES UR STRIP-MCNC: ABNORMAL MG/DL
LEUKOCYTE ESTERASE UR QL STRIP: NEGATIVE
LIPASE SERPL-CCNC: 16 U/L (ref 11–82)
LYMPHOCYTES # BLD AUTO: 1.04 THOUSANDS/ÂΜL (ref 0.6–4.47)
LYMPHOCYTES NFR BLD AUTO: 18 % (ref 14–44)
MCH RBC QN AUTO: 31.2 PG (ref 26.8–34.3)
MCHC RBC AUTO-ENTMCNC: 33.6 G/DL (ref 31.4–37.4)
MCV RBC AUTO: 93 FL (ref 82–98)
MONOCYTES # BLD AUTO: 0.33 THOUSAND/ÂΜL (ref 0.17–1.22)
MONOCYTES NFR BLD AUTO: 6 % (ref 4–12)
NEUTROPHILS # BLD AUTO: 4.24 THOUSANDS/ÂΜL (ref 1.85–7.62)
NEUTS SEG NFR BLD AUTO: 75 % (ref 43–75)
NITRITE UR QL STRIP: NEGATIVE
NRBC BLD AUTO-RTO: 0 /100 WBCS
PH UR STRIP.AUTO: 6 [PH] (ref 4.5–8)
PLATELET # BLD AUTO: 156 THOUSANDS/UL (ref 149–390)
PMV BLD AUTO: 9.3 FL (ref 8.9–12.7)
POTASSIUM SERPL-SCNC: 5.3 MMOL/L (ref 3.5–5.3)
PROT SERPL-MCNC: 7.8 G/DL (ref 6.4–8.4)
PROT UR STRIP-MCNC: NEGATIVE MG/DL
RBC # BLD AUTO: 5.23 MILLION/UL (ref 3.88–5.62)
SODIUM SERPL-SCNC: 136 MMOL/L (ref 135–147)
SP GR UR STRIP.AUTO: 1.02 (ref 1–1.03)
UROBILINOGEN UR QL STRIP.AUTO: 2 E.U./DL
WBC # BLD AUTO: 5.69 THOUSAND/UL (ref 4.31–10.16)

## 2024-03-24 PROCEDURE — 83690 ASSAY OF LIPASE: CPT | Performed by: PHYSICIAN ASSISTANT

## 2024-03-24 PROCEDURE — 74177 CT ABD & PELVIS W/CONTRAST: CPT

## 2024-03-24 PROCEDURE — 96374 THER/PROPH/DIAG INJ IV PUSH: CPT

## 2024-03-24 PROCEDURE — 80053 COMPREHEN METABOLIC PANEL: CPT | Performed by: PHYSICIAN ASSISTANT

## 2024-03-24 PROCEDURE — 99285 EMERGENCY DEPT VISIT HI MDM: CPT

## 2024-03-24 PROCEDURE — 96375 TX/PRO/DX INJ NEW DRUG ADDON: CPT

## 2024-03-24 PROCEDURE — 36415 COLL VENOUS BLD VENIPUNCTURE: CPT | Performed by: PHYSICIAN ASSISTANT

## 2024-03-24 PROCEDURE — 85025 COMPLETE CBC W/AUTO DIFF WBC: CPT | Performed by: PHYSICIAN ASSISTANT

## 2024-03-24 PROCEDURE — 81003 URINALYSIS AUTO W/O SCOPE: CPT

## 2024-03-24 PROCEDURE — 99285 EMERGENCY DEPT VISIT HI MDM: CPT | Performed by: PHYSICIAN ASSISTANT

## 2024-03-24 PROCEDURE — 96361 HYDRATE IV INFUSION ADD-ON: CPT

## 2024-03-24 RX ORDER — KETOROLAC TROMETHAMINE 30 MG/ML
15 INJECTION, SOLUTION INTRAMUSCULAR; INTRAVENOUS ONCE
Status: COMPLETED | OUTPATIENT
Start: 2024-03-24 | End: 2024-03-24

## 2024-03-24 RX ORDER — ONDANSETRON 4 MG/1
4 TABLET, ORALLY DISINTEGRATING ORAL EVERY 8 HOURS PRN
Qty: 10 TABLET | Refills: 0 | Status: SHIPPED | OUTPATIENT
Start: 2024-03-24 | End: 2024-03-31

## 2024-03-24 RX ORDER — MAGNESIUM HYDROXIDE/ALUMINUM HYDROXICE/SIMETHICONE 120; 1200; 1200 MG/30ML; MG/30ML; MG/30ML
30 SUSPENSION ORAL ONCE
Status: COMPLETED | OUTPATIENT
Start: 2024-03-24 | End: 2024-03-24

## 2024-03-24 RX ORDER — SUCRALFATE 1 G/1
1 TABLET ORAL ONCE
Status: COMPLETED | OUTPATIENT
Start: 2024-03-24 | End: 2024-03-24

## 2024-03-24 RX ORDER — ONDANSETRON 2 MG/ML
4 INJECTION INTRAMUSCULAR; INTRAVENOUS ONCE
Status: COMPLETED | OUTPATIENT
Start: 2024-03-24 | End: 2024-03-24

## 2024-03-24 RX ORDER — FAMOTIDINE 10 MG/ML
20 INJECTION, SOLUTION INTRAVENOUS ONCE
Status: COMPLETED | OUTPATIENT
Start: 2024-03-24 | End: 2024-03-24

## 2024-03-24 RX ORDER — OMEPRAZOLE 20 MG/1
20 CAPSULE, DELAYED RELEASE ORAL DAILY
Qty: 14 CAPSULE | Refills: 0 | Status: SHIPPED | OUTPATIENT
Start: 2024-03-24

## 2024-03-24 RX ADMIN — FAMOTIDINE 20 MG: 10 INJECTION INTRAVENOUS at 03:20

## 2024-03-24 RX ADMIN — KETOROLAC TROMETHAMINE 15 MG: 30 INJECTION, SOLUTION INTRAMUSCULAR; INTRAVENOUS at 04:01

## 2024-03-24 RX ADMIN — IOHEXOL 100 ML: 350 INJECTION, SOLUTION INTRAVENOUS at 05:32

## 2024-03-24 RX ADMIN — ONDANSETRON 4 MG: 2 INJECTION INTRAMUSCULAR; INTRAVENOUS at 03:20

## 2024-03-24 RX ADMIN — SODIUM CHLORIDE 500 ML: 0.9 INJECTION, SOLUTION INTRAVENOUS at 03:20

## 2024-03-24 RX ADMIN — ALUMINUM HYDROXIDE, MAGNESIUM HYDROXIDE, AND DIMETHICONE 30 ML: 200; 20; 200 SUSPENSION ORAL at 03:20

## 2024-03-24 RX ADMIN — SUCRALFATE 1 G: 1 TABLET ORAL at 04:01

## 2024-03-24 NOTE — ED PROVIDER NOTES
History  Chief Complaint   Patient presents with    Abdominal Pain     Pt reports abdominal pain and lower back pain for 2 days with NVD.     This is a 63-year-old male with past medical history of DM 2, hep C, H. pylori, HLD, HTN, kidney stones, substance abuse presenting to the ED for evaluation of abdominal pain and low back pain x 2 days.  Patient states abdominal pain is located to the epigastrium.  He has associated nausea, vomiting and diarrhea.  He denies any chest pain, shortness of breath, lightheadedness, dizziness, fatigue or sweats.  He did not take any medications for pain control or for his symptoms.  He denies any alcohol or drug use.  States last episode of vomiting was yesterday.  Patient states he is no longer taking diabetic medications, states he stopped these on his own but did discuss with his doctor after stopping medications.      History provided by:  Patient   used: No        Prior to Admission Medications   Prescriptions Last Dose Informant Patient Reported? Taking?   Alcohol Swabs (B-D SINGLE USE SWABS REGULAR) PADS  Self Yes No   Sig: TEST 2-3 TIMES A DAY AS DIRECTED TEST 2-3 TIMES A DAY AS DIRECTED   Blood Glucose Calibration (FreeStyle Control Solution) LIQD  Self Yes No   Sig: USE AS DIRECTED USE DANI LAS INDICACIONES   Blood Glucose Monitoring Suppl (FreeStyle Lite) w/Device KIT  Self Yes No   Sig: USE AS DIRECTED USE DANI LAS INDICACIONES   Blood Pressure Monitoring ([place] Choice BP Monitor/Arm) COCO  Self No No   Sig: USE AS DIRECTEDUSE DANI LAS INDICACIONES   Continuous Blood Gluc Sensor (FreeStyle Luis Alberto 14 Day Sensor) MISC   No No   Sig: USE AS DIRECTEDUSE DANI LAS INDICACIONES   FREESTYLE LITE test strip   No No   Sig: TEST 2-3 TIMES A DAY AS DIRECTEDTEST 2-3 TIMES A DAY AS DIRECTED   Global Inject Ease Lancets 28G MISC  Self Yes No   Sig: TEST 2-3 TIMES A DAY AS DIRECTED TEST 2-3 TIMES A DAY AS DIRECTED   Lancet Devices (Adjustable Lancing Device)  "MISC  Self Yes No   SYRINGE-NEEDLE, DISP, 3 ML (B-D 3CC LUER-MARÍA ELENA SYR 38XK6-0/2) 21G X 1-1/2\" 3 ML MISC  Self No No   Sig: Use once weekly   ammonium lactate (LAC-HYDRIN) 12 % lotion  Self Yes No   Sig: APPLY 1 APPLICATION TWICE A DAY BY TOPICAL ROUTE.   aspirin 81 mg chewable tablet  Self No No   Sig: Chew 1 tablet (81 mg total) daily   Patient not taking: Reported on 2023   atorvastatin (LIPITOR) 40 mg tablet   No No   Sig: TAKE 1 TABLET (40 MG TOTAL) BY MOUTH DAILY   cholecalciferol (VITAMIN D3) 400 units tablet  Self Yes No   Sig: Take 400 Units by mouth if needed   clotrimazole (LOTRIMIN) 1 % cream  Self Yes No   Sig: APPLY TO AFFECTED AREA TWICE DAILY APPLY A / HASTA AFFECTED AREA DOS VECES AL MORIAH   cyanocobalamin (VITAMIN B-12) 500 MCG tablet  Self Yes No   Sig: Take 500 mcg by mouth daily   ketoconazole (NIZORAL) 2 % cream   No No   Sig: APPLY TOPICALLY DAILY   lisinopril (ZESTRIL) 5 mg tablet   No No   Sig: TAKE 1 TABLET (5 MG TOTAL) BY MOUTH DAILY   Patient not taking: Reported on 2023   methadone (DOLOPHINE) 10 mg/mL oral concentrated solution  Self Yes No   Sig: Take 44 mg by mouth daily   nadolol (CORGARD) 20 mg tablet  Self No No   Sig: Take 1 tablet (20 mg total) by mouth daily   Patient not taking: Reported on 2023   semaglutide (Rybelsus) 3 MG tablet   No No   Si tab daily   Patient not taking: Reported on 2023   terbinafine (LamISIL) 250 mg tablet  Self Yes No   Sig: TAKE ONE TABLET BY MOUTH DAILY NO 1 TABLETA POR VIA ORAL DIARIAMENTE      Facility-Administered Medications: None       Past Medical History:   Diagnosis Date    Colon polyp     Diabetes mellitus (HCC)     Esophageal varices (HCC)     Gastritis     Hepatitis C     History of Helicobacter pylori infection 2016    History of kidney stones     Hyperlipidemia     Hypertension     Infectious viral hepatitis     Kidney stone     Obesity     Pneumonia     PPD positive 2020    Sleep apnea     Substance abuse " (HCC)        Past Surgical History:   Procedure Laterality Date    COLONOSCOPY  06/20/2014    dr [email or web address]    COLONOSCOPY  2014        EGD AND COLONOSCOPY  08/2020    esophageal varices, colon polyp, hemorrhoids    ESOPHAGOGASTRODUODENOSCOPY  12/10/2015    esophageal varices, cirrhosis, gastritis    HYDROCELE EXCISION / REPAIR      LIVER BIOPSY  2014       Family History   Problem Relation Age of Onset    Diabetes type II Mother         MELLITUS    Hypertension Mother     Hypertension Sister     Diabetes Sister         MELLITUS    No Known Problems Maternal Grandmother     No Known Problems Maternal Grandfather     No Known Problems Paternal Grandmother     No Known Problems Paternal Grandfather     No Known Problems Sister     Breast cancer Maternal Aunt         40s    No Known Problems Maternal Aunt     No Known Problems Paternal Aunt     No Known Problems Paternal Aunt     No Known Problems Paternal Aunt     No Known Problems Paternal Aunt     No Known Problems Paternal Aunt     No Known Problems Paternal Aunt      I have reviewed and agree with the history as documented.    E-Cigarette/Vaping    E-Cigarette Use Never User      E-Cigarette/Vaping Substances    Nicotine No     THC No     CBD No     Flavoring No      Social History     Tobacco Use    Smoking status: Every Day     Current packs/day: 0.50     Average packs/day: 0.5 packs/day for 30.0 years (15.0 ttl pk-yrs)     Types: Cigarettes     Passive exposure: Current    Smokeless tobacco: Current    Tobacco comments:     TOBACCO USE   Vaping Use    Vaping status: Never Used   Substance Use Topics    Alcohol use: Not Currently    Drug use: Not Currently     Types: Heroin     Comment: former user- heroin was drug of choice       Review of Systems   Constitutional:  Negative for chills, diaphoresis, fatigue and fever.   HENT:  Negative for congestion.    Respiratory:  Negative for cough and shortness of breath.    Cardiovascular:  Negative for chest pain.    Gastrointestinal:  Positive for abdominal pain, diarrhea, nausea and vomiting.   Genitourinary:  Negative for dysuria, flank pain, hematuria and urgency.   Musculoskeletal:  Positive for back pain.   Neurological:  Negative for dizziness, light-headedness and headaches.   All other systems reviewed and are negative.      Physical Exam  Physical Exam  Vitals reviewed.   Constitutional:       General: He is not in acute distress.     Appearance: Normal appearance. He is well-developed and well-groomed. He is not ill-appearing, toxic-appearing or diaphoretic.   HENT:      Head: Normocephalic and atraumatic.      Right Ear: External ear normal.      Left Ear: External ear normal.      Nose: Nose normal. No congestion or rhinorrhea.      Mouth/Throat:      Mouth: Mucous membranes are moist.      Pharynx: Oropharynx is clear. No oropharyngeal exudate or posterior oropharyngeal erythema.   Eyes:      General: No scleral icterus.        Right eye: No discharge.         Left eye: No discharge.      Extraocular Movements: Extraocular movements intact.      Conjunctiva/sclera: Conjunctivae normal.   Cardiovascular:      Rate and Rhythm: Normal rate and regular rhythm.      Pulses: Normal pulses.      Heart sounds: No murmur heard.     No friction rub. No gallop.   Pulmonary:      Effort: Pulmonary effort is normal. No respiratory distress.      Breath sounds: Normal breath sounds. No wheezing, rhonchi or rales.   Abdominal:      General: Abdomen is flat. There is distension.      Palpations: Abdomen is soft.      Tenderness: There is abdominal tenderness in the epigastric area. There is no right CVA tenderness, left CVA tenderness, guarding or rebound.   Musculoskeletal:         General: No deformity. Normal range of motion.      Cervical back: Normal range of motion and neck supple.   Skin:     General: Skin is warm and dry.      Coloration: Skin is not jaundiced or pale.      Findings: No rash.   Neurological:       General: No focal deficit present.      Mental Status: He is alert.   Psychiatric:         Mood and Affect: Mood normal.         Behavior: Behavior normal. Behavior is cooperative.         Vital Signs  ED Triage Vitals   Temperature Pulse Respirations Blood Pressure SpO2   03/24/24 0234 03/24/24 0234 03/24/24 0234 03/24/24 0234 03/24/24 0234   98.6 °F (37 °C) (!) 113 16 148/90 96 %      Temp Source Heart Rate Source Patient Position - Orthostatic VS BP Location FiO2 (%)   03/24/24 0234 03/24/24 0530 03/24/24 0530 03/24/24 0530 --   Oral Monitor Lying Left arm       Pain Score       03/24/24 0234       9           Vitals:    03/24/24 0234 03/24/24 0530   BP: 148/90 122/74   Pulse: (!) 113 91   Patient Position - Orthostatic VS:  Lying         Visual Acuity      ED Medications  Medications   sodium chloride 0.9 % bolus 500 mL (0 mL Intravenous Stopped 3/24/24 0420)   ondansetron (ZOFRAN) injection 4 mg (4 mg Intravenous Given 3/24/24 0320)   Famotidine (PF) (PEPCID) injection 20 mg (20 mg Intravenous Given 3/24/24 0320)   aluminum-magnesium hydroxide-simethicone (MAALOX) oral suspension 30 mL (30 mL Oral Given 3/24/24 0320)   sucralfate (CARAFATE) tablet 1 g (1 g Oral Given 3/24/24 0401)   ketorolac (TORADOL) injection 15 mg (15 mg Intravenous Given 3/24/24 0401)   iohexol (OMNIPAQUE) 350 MG/ML injection (MULTI-DOSE) 100 mL (100 mL Intravenous Given 3/24/24 0532)       Diagnostic Studies  Results Reviewed       Procedure Component Value Units Date/Time    Urine Macroscopic, POC [[id number]]  (Abnormal) Collected: 03/24/24 0533    Lab Status: Final result Specimen: Urine Updated: 03/24/24 0534     Color, UA Shawna     Clarity, UA Clear     pH, UA 6.0     Leukocytes, UA Negative     Nitrite, UA Negative     Protein, UA Negative mg/dl      Glucose, UA Negative mg/dl      Ketones, UA 15 (1+) mg/dl      Urobilinogen, UA 2.0 E.U./dl      Bilirubin, UA Negative     Occult Blood, UA Negative     Specific Gravity, UA 1.025     Narrative:      CLINITEK RESULT    Comprehensive metabolic panel [[id number]]  (Abnormal) Collected: 03/24/24 0320    Lab Status: Final result Specimen: Blood from Arm, Right Updated: 03/24/24 0345     Sodium 136 mmol/L      Potassium 5.3 mmol/L      Chloride 103 mmol/L      CO2 26 mmol/L      ANION GAP 7 mmol/L      BUN 13 mg/dL      Creatinine 0.98 mg/dL      Glucose 144 mg/dL      Calcium 9.0 mg/dL      AST 40 U/L      ALT 30 U/L      Alkaline Phosphatase 76 U/L      Total Protein 7.8 g/dL      Albumin 4.1 g/dL      Total Bilirubin 1.17 mg/dL      eGFR 81 ml/min/1.73sq m     Narrative:      Specimen is moderately hemolyzed, results may be affected  National Kidney Disease Foundation guidelines for Chronic Kidney Disease (CKD):     Stage 1 with normal or high GFR (GFR > 90 mL/min/1.73 square meters)    Stage 2 Mild CKD (GFR = 60-89 mL/min/1.73 square meters)    Stage 3A Moderate CKD (GFR = 45-59 mL/min/1.73 square meters)    Stage 3B Moderate CKD (GFR = 30-44 mL/min/1.73 square meters)    Stage 4 Severe CKD (GFR = 15-29 mL/min/1.73 square meters)    Stage 5 End Stage CKD (GFR <15 mL/min/1.73 square meters)  Note: GFR calculation is accurate only with a steady state creatinine    Lipase [[id number]]  (Normal) Collected: 03/24/24 0320    Lab Status: Final result Specimen: Blood from Arm, Right Updated: 03/24/24 0345     Lipase 16 u/L     CBC and differential [[id number]] Collected: 03/24/24 0320    Lab Status: Final result Specimen: Blood from Arm, Right Updated: 03/24/24 0325     WBC 5.69 Thousand/uL      RBC 5.23 Million/uL      Hemoglobin 16.3 g/dL      Hematocrit 48.5 %      MCV 93 fL      MCH 31.2 pg      MCHC 33.6 g/dL      RDW 13.0 %      MPV 9.3 fL      Platelets 156 Thousands/uL      nRBC 0 /100 WBCs      Neutrophils Relative 75 %      Immature Grans % 0 %      Lymphocytes Relative 18 %      Monocytes Relative 6 %      Eosinophils Relative 1 %      Basophils Relative 0 %      Neutrophils Absolute 4.24  Thousands/µL      Absolute Immature Grans 0.01 Thousand/uL      Absolute Lymphocytes 1.04 Thousands/µL      Absolute Monocytes 0.33 Thousand/µL      Eosinophils Absolute 0.05 Thousand/µL      Basophils Absolute 0.02 Thousands/µL                    CT abdomen pelvis with contrast    (Results Pending)              Procedures  Procedures         ED Course  ED Course as of 03/24/24 0612   Sun Mar 24, 2024   0435 Persistent abdominal pain, will CT                               SBIRT 20yo+      Flowsheet Row Most Recent Value   Initial Alcohol Screen: US AUDIT-C     1. How often do you have a drink containing alcohol? 0 Filed at: 03/24/2024 0235   2. How many drinks containing alcohol do you have on a typical day you are drinking?  0 Filed at: 03/24/2024 0235   3a. Male UNDER 65: How often do you have five or more drinks on one occasion? 0 Filed at: 03/24/2024 0235   Audit-C Score 0 Filed at: 03/24/2024 0235   GRAYSON: How many times in the past year have you...    Used an illegal drug or used a prescription medication for non-medical reasons? Never Filed at: 03/24/2024 0235                      Medical Decision Making      DDx including but not limited to: gastroenteritis, gastritis, PUD, GERD, gastroparesis, hepatitis, pancreatitis, colitis, enteritis, diverticulitis, food poisoning, mesenteric ischemia, IBD, IBS, ileus, bowel obstruction, volvulus, internal hernia, cholecystitis, biliary colic, choledocholithiasis, perforated viscus, tumor, splenic etiology, constipation, AAA, renal colic, pyelonephritis, UTI; doubt cardiac etiology.     Will order CBC for eval of anemia and leukocytosis, CMP for eval of LFTs, kidney function and electrolyte abnormalities.  Lipase for eval of acute pancreatitis. UA for evaluation of UTI and hematuria.  Will treat symptomatically with IV fluids, Pepcid, Zofran, Maalox.    Patient continues to have abdominal pain, will treat with Carafate, Toradol and order CT abdomen pelvis for evaluation  of any acute abdominal abnormalities.    S/o given to Lavinia Ramirez PA-C. Dispo pending CT read.     Amount and/or Complexity of Data Reviewed  Labs: ordered.  Radiology: ordered.    Risk  OTC drugs.  Prescription drug management.             Disposition  Final diagnoses:   None     ED Disposition       None          Follow-up Information    None         Patient's Medications   Discharge Prescriptions    No medications on file       No discharge procedures on file.    PDMP Review         Value Time User    PDMP Reviewed  Yes 1/7/2021  1:22 PM Soren Trivedi MD            ED Provider  Electronically Signed by KATHLEEN Nix PA-C  03/24/24 0612

## 2024-03-24 NOTE — DISCHARGE INSTRUCTIONS
You may take Zofran as needed for nausea/vomiting. Increase fluids for hydration. Follow up with your family doctor. Return to the ED for worsening symptoms including persistent vomiting or worsening abdominal pain.    Omeprazole daily as directed

## 2024-03-24 NOTE — ED CARE HANDOFF
Emergency Department Sign Out Note        Sign out and transfer of care from [facility]. See Separate Emergency Department note.     The patient, Rishi Kirk, was evaluated by the previous provider for abodminal pain .    Workup Completed:  labs    ED Course / Workup Pending (followup):  Pending CT results  Discussed ct results with pt  Pt feeling much better, has mild tenderness diffusely to upper abdomen. No vomiting, feeling much better  Discussed ct, umbilical hernia, intrarenal stone and calcifications in vessels. Discussed follow up and reasons to return         Procedure Component Value Units Date/Time   CT abdomen pelvis with contrast [[id number]] Collected: 03/24/24 0635   Order Status: Completed Updated: 03/24/24 0644   Narrative:     CT ABDOMEN AND PELVIS WITH IV CONTRAST    INDICATION:   Epigastric pain.    COMPARISON: 3/4/2020    TECHNIQUE:  CT examination of the abdomen and pelvis was performed.   Axial, sagittal, and coronal 2D reformatted images were created from the source data and submitted for interpretation.    Radiation dose length product (DLP) for this visit:  1054.03 mGy-cm .  This examination, like all CT scans performed in the [facility], was performed utilizing techniques to minimize radiation dose exposure, including the use of  iterative reconstruction and automated exposure control.    IV Contrast:  100 mL of iohexol (OMNIPAQUE)  Enteric Contrast:  Enteric contrast was not administered.    FINDINGS:    ABDOMEN    LOWER CHEST: Mild coronary artery calcification. Clear lung bases.    LIVER/BILIARY TREE:  Chronic cirrhosis. No enhancing mass.    Normal bile duct caliber.    GALLBLADDER:  Within normal limits.    SPLEEN:  Within normal limits.    PANCREAS:  Within normal limits.    ADRENAL GLANDS:  Within normal limits.    KIDNEYS/URETERS: Nonobstructing 0.4 cm right lower pole stone. Otherwise within normal limits.    STOMACH AND BOWEL:  Mild  diverticulosis.    Small, chronic fat-containing umbilical hernia now contains a tiny focus of small bowel. Normal caliber and wall thickness.    APPENDIX:  Within normal limits.    ABDOMINOPELVIC CAVITY:  No abnormal air, fluid or enlarged lymph nodes.    VESSELS: Mild atherosclerosis. Normal aortic caliber and patent central mesenteric vessels.    PELVIS:    REPRODUCTIVE ORGANS:  Within normal limits for age.    URINARY BLADDER: Underdistended. Within normal limits.    ABDOMINAL WALL/INGUINAL REGIONS:  Within normal limits.    BONES:  Degenerative changes.     Impression:       No acute findings. No findings identified to explain pain.    Nonemergent findings above.                               ED Course as of 03/24/24 0701   Sun Mar 24, 2024   0610 Signed to myself - came in with vomiting, diarrhea and abdominal pain -      Procedures  Medical Decision Making  Amount and/or Complexity of Data Reviewed  Labs: ordered.  Radiology: ordered.    Risk  OTC drugs.  Prescription drug management.  Decision regarding hospitalization.  Emergency major surgery.            Disposition  Final diagnoses:   Abdominal pain   Umbilical hernia     Time reflects when diagnosis was documented in both MDM as applicable and the Disposition within this note       Time User Action Codes Description Comment    3/24/2024  6:53 AM Lavinia Ramirez [R10.9] Abdominal pain     3/24/2024  6:53 AM Lavinia Ramirez [K42.9] Umbilical hernia           ED Disposition       ED Disposition   Discharge    Condition   Stable    Date/Time   Sun Mar 24, 2024  6:53 AM    Comment   Rishi Kirk discharge to home/self care.                   Follow-up Information       Follow up With Specialties Details Why Contact Info    Soren Trivedi MD Family Medicine   [address] [place] [address]  [phone]            Patient's Medications   Discharge Prescriptions    OMEPRAZOLE (PRILOSEC) 20 MG DELAYED RELEASE CAPSULE    Take 1 capsule  (20 mg total) by mouth daily       Start Date: 3/24/2024 End Date: --       Order Dose: 20 mg       Quantity: 14 capsule    Refills: 0    ONDANSETRON (ZOFRAN-ODT) 4 MG DISINTEGRATING TABLET    Take 1 tablet (4 mg total) by mouth every 8 (eight) hours as needed for nausea or vomiting for up to 7 days       Start Date: 3/24/2024 End Date: 3/31/2024       Order Dose: 4 mg       Quantity: 10 tablet    Refills: 0     No discharge procedures on file.       ED Provider  Electronically Signed by     Lavinia Ramirez PA-C  03/24/24 0701

## 2024-03-28 ENCOUNTER — HOSPITAL ENCOUNTER (EMERGENCY)
Facility: HOSPITAL | Age: 64
Discharge: HOME | End: 2024-03-28
Attending: EMERGENCY MEDICINE
Payer: MEDICARE

## 2024-03-28 VITALS
SYSTOLIC BLOOD PRESSURE: 150 MMHG | RESPIRATION RATE: 18 BRPM | HEIGHT: 65 IN | TEMPERATURE: 98.2 F | OXYGEN SATURATION: 96 % | BODY MASS INDEX: 34.64 KG/M2 | WEIGHT: 207.89 LBS | HEART RATE: 106 BPM | DIASTOLIC BLOOD PRESSURE: 86 MMHG

## 2024-03-28 DIAGNOSIS — R19.7 NAUSEA VOMITING AND DIARRHEA: Primary | ICD-10-CM

## 2024-03-28 DIAGNOSIS — R11.2 NAUSEA VOMITING AND DIARRHEA: Primary | ICD-10-CM

## 2024-03-28 PROCEDURE — 99284 EMERGENCY DEPT VISIT MOD MDM: CPT | Performed by: EMERGENCY MEDICINE

## 2024-03-28 PROCEDURE — 99284 EMERGENCY DEPT VISIT MOD MDM: CPT

## 2024-03-28 RX ORDER — ONDANSETRON 4 MG/1
4 TABLET, ORALLY DISINTEGRATING ORAL EVERY 8 HOURS PRN
Qty: 10 TABLET | Refills: 0 | Status: SHIPPED | OUTPATIENT
Start: 2024-03-28 | End: 2024-04-02

## 2024-03-28 RX ORDER — ONDANSETRON 4 MG/1
4 TABLET, ORALLY DISINTEGRATING ORAL ONCE
Status: COMPLETED | OUTPATIENT
Start: 2024-03-28 | End: 2024-03-28

## 2024-03-28 RX ADMIN — ONDANSETRON 4 MG: 4 TABLET, ORALLY DISINTEGRATING ORAL at 08:45

## 2024-03-28 NOTE — ED PROVIDER NOTES
History  Chief Complaint   Patient presents with    Diarrhea     Pt reports diarrhea, nausea, and vomiting starting yesterday. Pt seen last week for abdominal pain and diagnosed with umbilical hernia. Pt took Imodium at 0700.       History provided by:  Patient   used: No    Diarrhea  Quality:  Watery  Severity:  Moderate  Number of episodes:  Several per patient  Duration:  12 hours  Timing:  Intermittent  Progression:  Improving  Relieved by:  Nothing  Worsened by:  Nothing  Associated symptoms: vomiting    Associated symptoms: no abdominal pain, no chills, no recent cough, no fever and no headaches    Vomiting:     Quality:  Stomach contents    Number of occurrences:  2    Severity:  Mild    Duration:  12 hours    Timing:  Sporadic    Progression:  Resolved  Risk factors: no sick contacts    Risk factors comment:  Umbilical hernia      Prior to Admission Medications   Prescriptions Last Dose Informant Patient Reported? Taking?   Alcohol Swabs (B-D SINGLE USE SWABS REGULAR) PADS  Self Yes No   Sig: TEST 2-3 TIMES A DAY AS DIRECTED TEST 2-3 TIMES A DAY AS DIRECTED   Blood Glucose Calibration (FreeStyle Control Solution) LIQD  Self Yes No   Sig: USE AS DIRECTED USE DANI LAS INDICACIONES   Blood Glucose Monitoring Suppl (FreeStyle Lite) w/Device KIT  Self Yes No   Sig: USE AS DIRECTED USE DANI LAS INDICACIONES   Blood Pressure Monitoring ([place] Choice BP Monitor/Arm) COCO  Self No No   Sig: USE AS DIRECTEDUSE DANI LAS INDICACIONES   Continuous Blood Gluc Sensor (FreeStyle Luis Alberto 14 Day Sensor) MISC   No No   Sig: USE AS DIRECTEDUSE DANI LAS INDICACIONES   FREESTYLE LITE test strip   No No   Sig: TEST 2-3 TIMES A DAY AS DIRECTEDTEST 2-3 TIMES A DAY AS DIRECTED   Global Inject Ease Lancets 28G MISC  Self Yes No   Sig: TEST 2-3 TIMES A DAY AS DIRECTED TEST 2-3 TIMES A DAY AS DIRECTED   Lancet Devices (Adjustable Lancing Device) MISC  Self Yes No   SYRINGE-NEEDLE, DISP, 3 ML (B-D 3CC LUER-MARÍA ELENA  "SYR 23BH1-7/2) 21G X 1-1/2\" 3 ML MISC  Self No No   Sig: Use once weekly   ammonium lactate (LAC-HYDRIN) 12 % lotion  Self Yes No   Sig: APPLY 1 APPLICATION TWICE A DAY BY TOPICAL ROUTE.   aspirin 81 mg chewable tablet  Self No No   Sig: Chew 1 tablet (81 mg total) daily   Patient not taking: Reported on 2023   atorvastatin (LIPITOR) 40 mg tablet   No No   Sig: TAKE 1 TABLET (40 MG TOTAL) BY MOUTH DAILY   cholecalciferol (VITAMIN D3) 400 units tablet  Self Yes No   Sig: Take 400 Units by mouth if needed   clotrimazole (LOTRIMIN) 1 % cream  Self Yes No   Sig: APPLY TO AFFECTED AREA TWICE DAILY APPLY A / HASTA AFFECTED AREA DOS VECES AL MORIAH   cyanocobalamin (VITAMIN B-12) 500 MCG tablet  Self Yes No   Sig: Take 500 mcg by mouth daily   ketoconazole (NIZORAL) 2 % cream   No No   Sig: APPLY TOPICALLY DAILY   lisinopril (ZESTRIL) 5 mg tablet   No No   Sig: TAKE 1 TABLET (5 MG TOTAL) BY MOUTH DAILY   Patient not taking: Reported on 2023   methadone (DOLOPHINE) 10 mg/mL oral concentrated solution  Self Yes No   Sig: Take 44 mg by mouth daily   nadolol (CORGARD) 20 mg tablet  Self No No   Sig: Take 1 tablet (20 mg total) by mouth daily   Patient not taking: Reported on 2023   omeprazole (PriLOSEC) 20 mg delayed release capsule   No No   Sig: Take 1 capsule (20 mg total) by mouth daily   ondansetron (ZOFRAN-ODT) 4 mg disintegrating tablet   No No   Sig: Take 1 tablet (4 mg total) by mouth every 8 (eight) hours as needed for nausea or vomiting for up to 7 days   semaglutide (Rybelsus) 3 MG tablet   No No   Si tab daily   Patient not taking: Reported on 2023   terbinafine (LamISIL) 250 mg tablet  Self Yes No   Sig: TAKE ONE TABLET BY MOUTH DAILY NO 1 TABLETA POR VIA ORAL DIARIAMENTE      Facility-Administered Medications: None       Past Medical History:   Diagnosis Date    Colon polyp     Diabetes mellitus (HCC)     Esophageal varices (HCC)     Gastritis     Hepatitis C     History of " Helicobacter pylori infection 2/23/2016    History of kidney stones     Hyperlipidemia     Hypertension     Infectious viral hepatitis     Kidney stone     Obesity     Pneumonia     PPD positive 2020    Sleep apnea     Substance abuse (HCC)        Past Surgical History:   Procedure Laterality Date    COLONOSCOPY  06/20/2014    dr [email or web address]    COLONOSCOPY  2014        EGD AND COLONOSCOPY  08/2020    esophageal varices, colon polyp, hemorrhoids    ESOPHAGOGASTRODUODENOSCOPY  12/10/2015    esophageal varices, cirrhosis, gastritis    HYDROCELE EXCISION / REPAIR      LIVER BIOPSY  2014       Family History   Problem Relation Age of Onset    Diabetes type II Mother         MELLITUS    Hypertension Mother     Hypertension Sister     Diabetes Sister         MELLITUS    No Known Problems Maternal Grandmother     No Known Problems Maternal Grandfather     No Known Problems Paternal Grandmother     No Known Problems Paternal Grandfather     No Known Problems Sister     Breast cancer Maternal Aunt         40s    No Known Problems Maternal Aunt     No Known Problems Paternal Aunt     No Known Problems Paternal Aunt     No Known Problems Paternal Aunt     No Known Problems Paternal Aunt     No Known Problems Paternal Aunt     No Known Problems Paternal Aunt      I have reviewed and agree with the history as documented.    E-Cigarette/Vaping    E-Cigarette Use Never User      E-Cigarette/Vaping Substances    Nicotine No     THC No     CBD No     Flavoring No      Social History     Tobacco Use    Smoking status: Former     Current packs/day: 0.50     Average packs/day: 0.5 packs/day for 30.0 years (15.0 ttl pk-yrs)     Types: Cigarettes     Passive exposure: Current    Smokeless tobacco: Current    Tobacco comments:     TOBACCO USE   Vaping Use    Vaping status: Never Used   Substance Use Topics    Alcohol use: Not Currently    Drug use: Not Currently     Types: Heroin     Comment: former user- heroin was drug of choice        Review of Systems   Constitutional:  Negative for chills and fever.   HENT:  Negative for facial swelling, sore throat and trouble swallowing.    Eyes:  Negative for pain and visual disturbance.   Respiratory:  Negative for cough, chest tightness and shortness of breath.    Cardiovascular:  Negative for chest pain and leg swelling.   Gastrointestinal:  Positive for diarrhea and vomiting. Negative for abdominal pain, blood in stool and nausea.   Genitourinary:  Negative for dysuria and flank pain.   Musculoskeletal:  Negative for back pain, neck pain and neck stiffness.   Skin:  Negative for pallor and rash.   Allergic/Immunologic: Negative for environmental allergies and immunocompromised state.   Neurological:  Negative for dizziness and headaches.   Hematological:  Negative for adenopathy. Does not bruise/bleed easily.   Psychiatric/Behavioral:  Negative for agitation and behavioral problems.    All other systems reviewed and are negative.      Physical Exam  Physical Exam  Vitals and nursing note reviewed.   Constitutional:       General: He is not in acute distress.     Appearance: He is well-developed.   HENT:      Head: Normocephalic and atraumatic.   Eyes:      Extraocular Movements: Extraocular movements intact.   Cardiovascular:      Rate and Rhythm: Normal rate and regular rhythm.   Pulmonary:      Effort: Pulmonary effort is normal. No respiratory distress.   Abdominal:      General: There is no distension.      Palpations: Abdomen is soft.      Tenderness: There is no abdominal tenderness. There is no guarding or rebound.      Comments: No palpable umbilical hernia   Musculoskeletal:         General: Normal range of motion.      Cervical back: Normal range of motion and neck supple.   Skin:     General: Skin is warm and dry.   Neurological:      General: No focal deficit present.      Mental Status: He is alert and oriented to person, place, and time.   Psychiatric:         Mood and Affect: Mood  normal.         Behavior: Behavior normal.         Vital Signs  ED Triage Vitals [03/28/24 0816]   Temperature Pulse Respirations Blood Pressure SpO2   98.2 °F (36.8 °C) (!) 106 18 150/86 96 %      Temp Source Heart Rate Source Patient Position - Orthostatic VS BP Location FiO2 (%)   Oral Monitor Sitting Right arm --      Pain Score       --           Vitals:    03/28/24 0816   BP: 150/86   Pulse: (!) 106   Patient Position - Orthostatic VS: Sitting         Visual Acuity      ED Medications  Medications   ondansetron (ZOFRAN-ODT) dispersible tablet 4 mg (4 mg Oral Given 3/28/24 0845)       Diagnostic Studies  Results Reviewed       None                   No orders to display              Procedures  Procedures         ED Course                               SBIRT 22yo+      Flowsheet Row Most Recent Value   Initial Alcohol Screen: US AUDIT-C     1. How often do you have a drink containing alcohol? 0 Filed at: 03/28/2024 0817   2. How many drinks containing alcohol do you have on a typical day you are drinking?  0 Filed at: 03/28/2024 0817   3a. Male UNDER 65: How often do you have five or more drinks on one occasion? 0 Filed at: 03/28/2024 0817   Audit-C Score 0 Filed at: 03/28/2024 0817   GRAYSON: How many times in the past year have you...    Used an illegal drug or used a prescription medication for non-medical reasons? Never Filed at: 03/28/2024 0817                      Medical Decision Making  Patient is a 63-year-old male, comes in with complaints of vomiting, diarrhea, started yesterday, patient states that he had 2 episodes of vomiting, multiple episodes of watery diarrhea during the night, symptoms have virtually resolved at this time, no active nausea or vomiting, no fever, no abdominal pain, no chest pain. On exam, patient is conscious, alert, vital signs are stable, no acute distress, abdomen is soft, nontender, nondistended, no palpable hernia.  Impression: Viral gastroenteritis, well-appearing, stable  vitals, benign abdominal exam, patient recently seen in the ER 4 days back for abdominal pain, had labs, CT results with no acute results, umbilical hernia was noted, patient is aware of that.  No indication for workup, patient counseled about GI viral illness, control nausea vomiting with Zofran, ensure oral hydration with fluids and electrolytes, diet instructions discussed, patient showed good understanding of instructions.    Problems Addressed:  Nausea vomiting and diarrhea: acute illness or injury    Amount and/or Complexity of Data Reviewed  External Data Reviewed: labs, radiology and notes.     Details: Notes, Labs, CT imaging results from recent ED visit    Risk  Prescription drug management.             Disposition  Final diagnoses:   Nausea vomiting and diarrhea     Time reflects when diagnosis was documented in both MDM as applicable and the Disposition within this note       Time User Action Codes Description Comment    3/28/2024  8:45 AM Jacinto Castano Add [R11.2,  R19.7] Nausea vomiting and diarrhea           ED Disposition       ED Disposition   Discharge    Condition   Stable    Date/Time   Thu Mar 28, 2024 0842    Comment   Rishi Kirk discharge to home/self care.                   Follow-up Information       Follow up With Specialties Details Why Contact Info    Soren Trivedi MD Family Medicine Schedule an appointment as soon as possible for a visit  As needed [address] [place] [id number]  [phone]              Patient's Medications   Discharge Prescriptions    ONDANSETRON (ZOFRAN-ODT) 4 MG DISINTEGRATING TABLET    Take 1 tablet (4 mg total) by mouth every 8 (eight) hours as needed for nausea or vomiting for up to 5 days       Start Date: 3/28/2024 End Date: 4/2/2024       Order Dose: 4 mg       Quantity: 10 tablet    Refills: 0       No discharge procedures on file.    PDMP Review         Value Time User    PDMP Reviewed  Yes 1/7/2021  1:22 PM Soren Trivedi MD             ED Provider  Electronically Signed by             Jacinto Castano MD  03/28/24 0891

## 2024-04-04 ENCOUNTER — OFFICE VISIT (OUTPATIENT)
Dept: FAMILY MEDICINE CLINIC | Facility: CLINIC | Age: 64
End: 2024-04-04
Payer: MEDICARE

## 2024-04-04 VITALS
BODY MASS INDEX: 34.95 KG/M2 | HEIGHT: 65 IN | OXYGEN SATURATION: 96 % | DIASTOLIC BLOOD PRESSURE: 76 MMHG | HEART RATE: 121 BPM | SYSTOLIC BLOOD PRESSURE: 126 MMHG | TEMPERATURE: 97.6 F | RESPIRATION RATE: 16 BRPM | WEIGHT: 209.8 LBS

## 2024-04-04 DIAGNOSIS — N20.0 NEPHROLITHIASIS: ICD-10-CM

## 2024-04-04 DIAGNOSIS — K70.30 ALCOHOLIC CIRRHOSIS OF LIVER WITHOUT ASCITES (HCC): ICD-10-CM

## 2024-04-04 DIAGNOSIS — K74.60 ESOPHAGEAL VARICES IN CIRRHOSIS (HCC): ICD-10-CM

## 2024-04-04 DIAGNOSIS — K42.9 UMBILICAL HERNIA WITHOUT OBSTRUCTION AND WITHOUT GANGRENE: ICD-10-CM

## 2024-04-04 DIAGNOSIS — E11.9 TYPE 2 DIABETES MELLITUS WITHOUT COMPLICATION, WITHOUT LONG-TERM CURRENT USE OF INSULIN (HCC): ICD-10-CM

## 2024-04-04 DIAGNOSIS — E53.8 VITAMIN B12 DEFICIENCY: ICD-10-CM

## 2024-04-04 DIAGNOSIS — F11.20 METHADONE MAINTENANCE THERAPY PATIENT (HCC): ICD-10-CM

## 2024-04-04 DIAGNOSIS — F17.210 TOBACCO DEPENDENCE DUE TO CIGARETTES: ICD-10-CM

## 2024-04-04 DIAGNOSIS — I85.10 ESOPHAGEAL VARICES IN CIRRHOSIS (HCC): ICD-10-CM

## 2024-04-04 DIAGNOSIS — I10 BENIGN ESSENTIAL HYPERTENSION: ICD-10-CM

## 2024-04-04 DIAGNOSIS — R11.0 NAUSEA: Primary | ICD-10-CM

## 2024-04-04 PROBLEM — E11.51 PERIPHERAL ANGIOPATHY DUE TO DIABETES MELLITUS (HCC): Status: ACTIVE | Noted: 2024-04-04

## 2024-04-04 PROBLEM — D75.1 POLYCYTHEMIA: Status: RESOLVED | Noted: 2022-12-13 | Resolved: 2024-04-04

## 2024-04-04 PROBLEM — R30.0 DYSURIA: Status: RESOLVED | Noted: 2022-03-08 | Resolved: 2024-04-04

## 2024-04-04 PROBLEM — D72.829 LEUKOCYTOSIS: Status: RESOLVED | Noted: 2023-08-15 | Resolved: 2024-04-04

## 2024-04-04 LAB — SL AMB POCT HEMOGLOBIN AIC: 5.9 (ref ?–6.5)

## 2024-04-04 PROCEDURE — 99214 OFFICE O/P EST MOD 30 MIN: CPT | Performed by: PHYSICIAN ASSISTANT

## 2024-04-04 PROCEDURE — 83036 HEMOGLOBIN GLYCOSYLATED A1C: CPT | Performed by: PHYSICIAN ASSISTANT

## 2024-04-04 RX ORDER — OMEPRAZOLE 40 MG/1
40 CAPSULE, DELAYED RELEASE ORAL DAILY
Qty: 30 CAPSULE | Refills: 0 | Status: SHIPPED | OUTPATIENT
Start: 2024-04-04

## 2024-04-04 RX ORDER — NADOLOL 20 MG/1
20 TABLET ORAL DAILY
Qty: 90 TABLET | Refills: 1 | Status: SHIPPED | OUTPATIENT
Start: 2024-04-04

## 2024-04-04 NOTE — ASSESSMENT & PLAN NOTE
Controlled without medication.  Restart nadolol due to esophageal varices.  Lisinopril 5 mg on hold now, consider restart pending microalbumin creatinine ratio urine.

## 2024-04-04 NOTE — ASSESSMENT & PLAN NOTE
Lab Results   Component Value Date    WBC 5.69 03/24/2024    HGB 16.3 03/24/2024    HCT 48.5 03/24/2024    MCV 93 03/24/2024     03/24/2024

## 2024-04-04 NOTE — ASSESSMENT & PLAN NOTE
History of alcohol use in remission.  History of hepatitis C treated.  Check UGAP and liver US.  Refer back to GI.    Lab Results   Component Value Date    ALT 30 03/24/2024    AST 40 (H) 03/24/2024    ALKPHOS 76 03/24/2024    BILITOT 0.4 05/24/2018     Lab Results   Component Value Date    INR 0.99 09/18/2020    INR 1.22 (H) 03/04/2020    PROTIME 13.1 09/18/2020    PROTIME 15.6 (H) 03/04/2020

## 2024-04-04 NOTE — ASSESSMENT & PLAN NOTE
Suspect incidental finding per CT abdomen done in ED on 3/24/2024.  Consider referral to general surgery.  Do not suspect cause of epigastric pain.

## 2024-04-04 NOTE — ASSESSMENT & PLAN NOTE
Reviewed last EGD in 2020 with small esophageal varices.  Started on nadolol 20 mg.  Patient not compliant with medication.  Refilled today.

## 2024-04-04 NOTE — ASSESSMENT & PLAN NOTE
Lab Results   Component Value Date    HGBA1C 5.9 04/04/2024     Well-controlled without medication.  Will continue with lifestyle modifications, diet and exercise.  Lab Results   Component Value Date    HGBA1C 6.2 11/02/2023

## 2024-04-04 NOTE — PROGRESS NOTES
Name: Rishi Kirk      : 1960      MRN: [id number]  Encounter Provider: Anabel Nair PA-C  Encounter Date: 2024   Encounter department: [facility]    Assessment & Plan     1. Nausea  Comments:  x2 weeks, recent suspected gastroenteritis, diarrhea resolved, ED visit on 3/24 and 3/28 reviewed, start omeprazole 40 mg and referred to GI.  Orders:  -     omeprazole (PriLOSEC) 40 MG capsule; Take 1 capsule (40 mg total) by mouth daily [place] 1 capsula 30 minutos antes de desayunar  -     Ambulatory Referral to Gastroenterology; Future    2. Type 2 diabetes mellitus without complication, without long-term current use of insulin (HCC)  Assessment & Plan:  Lab Results   Component Value Date    HGBA1C 5.9 2024     Well-controlled without medication.  Will continue with lifestyle modifications, diet and exercise.  Lab Results   Component Value Date    HGBA1C 6.2 2023     Orders:  -     POCT hemoglobin A1c  -     Albumin / creatinine urine ratio; Future    3. Alcoholic cirrhosis of liver without ascites (HCC)  Assessment & Plan:  History of alcohol use in remission.  History of hepatitis C treated.  Check UGAP and liver US.  Refer back to GI.    Lab Results   Component Value Date    ALT 30 2024    AST 40 (H) 2024    ALKPHOS 76 2024    BILITOT 0.4 2018     Lab Results   Component Value Date    INR 0.99 2020    INR 1.22 (H) 2020    PROTIME 13.1 2020    PROTIME 15.6 (H) 2020     Orders:  -     US elastography/UGAP; Future; Expected date: 2024  -     US right upper quadrant; Future; Expected date: 2024  -     Protime-INR; Future  -     Ambulatory Referral to Gastroenterology; Future  -     Hepatic function panel; Future    4. Esophageal varices in cirrhosis (HCC)  Assessment & Plan:  Reviewed last EGD in  with small esophageal varices.  Started on nadolol 20 mg.  Patient not compliant with medication.   Refilled today.    Orders:  -     Ambulatory Referral to Gastroenterology; Future  -     nadolol (CORGARD) 20 mg tablet; Take 1 tablet (20 mg total) by mouth daily    5. Umbilical hernia without obstruction and without gangrene  Assessment & Plan:  Suspect incidental finding per CT abdomen done in ED on 3/24/2024.  Consider referral to general surgery.  Do not suspect cause of epigastric pain.       6. Methadone maintenance therapy patient (HCC)  Assessment & Plan:  Compliant with methadone clinic.      7. Benign essential hypertension  Assessment & Plan:  Controlled without medication.  Restart nadolol due to esophageal varices.  Lisinopril 5 mg on hold now, consider restart pending microalbumin creatinine ratio urine.      8. Tobacco dependence due to cigarettes  Assessment & Plan:  Quit for a month, recently started yesterday.  Highly encourage smoking cessation.      9. Nephrolithiasis  Assessment & Plan:  Nonobstructing 0.4 cm right lower pole stone.  Per CT abdomen pelvis done in 3/2024.  Asymptomatic.      10. Vitamin B12 deficiency  Assessment & Plan:  Lab Results   Component Value Date    [id number] 491 02/14/2024     Continue follow-up with hematology oncology for B12 injections.           Chris Blackwell is a 63 y.o. male with a h/o diabetes, opioid use in remission on methadone, cirrhosis with history of alcohol abuse and hepatitis, esophageal varices, smoking who presents for ED follow-up.  Started on [place] 3/23 abdominal pain epigastric.  Went to ED next day and was discharged after having CT done discovering umbilical hernia. Went to car inspection and had chicken noodle soup 2 hours later diarrhea x2 days. Watery, no blood, then went back to ED on 3/28/24.  Did not have anything done.  Was told virus. Took a lot of liquid and improved.  Since then still he eats nausea. Doesn't matter what type of food. No vomiting. No abdominal pain. Lives alone. Sister cooks for him. No traveling  recently.    History was conducted in Albanian without the use of .          Review of Systems   Constitutional:  Negative for diaphoresis, fever and unexpected weight change.   Respiratory:  Negative for shortness of breath.    Cardiovascular:  Negative for chest pain.   Gastrointestinal:  Positive for abdominal pain (Epigastric) and nausea. Negative for diarrhea (Resolved) and vomiting.       Current Outpatient Medications on File Prior to Visit   Medication Sig   • Alcohol Swabs (B-D SINGLE USE SWABS REGULAR) PADS TEST 2-3 TIMES A DAY AS DIRECTED TEST 2-3 TIMES A DAY AS DIRECTED   • ammonium lactate (LAC-HYDRIN) 12 % lotion APPLY 1 APPLICATION TWICE A DAY BY TOPICAL ROUTE.   • atorvastatin (LIPITOR) 40 mg tablet TAKE 1 TABLET (40 MG TOTAL) BY MOUTH DAILY   • Blood Glucose Calibration (FreeStyle Control Solution) LIQD USE AS DIRECTED USE DANI CABRALONES   • Blood Glucose Monitoring Suppl (FreeStyle Lite) w/Device KIT USE AS DIRECTED USE DANI LAS INDICACIONES   • Blood Pressure Monitoring ([place] Choice BP Monitor/Arm) COCO USE AS DIRECTEDUSE DANI VILLALOBOSCIONES   • cholecalciferol (VITAMIN D3) 400 units tablet Take 400 Units by mouth if needed   • clotrimazole (LOTRIMIN) 1 % cream APPLY TO AFFECTED AREA TWICE DAILY APPLY A / HASTA AFFECTED AREA DOS VECES AL MORIAH   • Continuous Blood Gluc Sensor (FreeStyle Luis Alberto 14 Day Sensor) MISC USE AS DIRECTEDUSE DANI LAS INDICACIONES   • cyanocobalamin (VITAMIN B-12) 500 MCG tablet Take 500 mcg by mouth daily   • FREESTYLE LITE test strip TEST 2-3 TIMES A DAY AS DIRECTEDTEST 2-3 TIMES A DAY AS DIRECTED   • Global Inject Ease Lancets 28G MISC TEST 2-3 TIMES A DAY AS DIRECTED TEST 2-3 TIMES A DAY AS DIRECTED   • ketoconazole (NIZORAL) 2 % cream APPLY TOPICALLY DAILY   • Lancet Devices (Adjustable Lancing Device) MISC    • methadone (DOLOPHINE) 10 mg/mL oral concentrated solution Take 44 mg by mouth daily   • SYRINGE-NEEDLE, DISP, 3 ML (B-D 3CC LUER-MARÍA ELENA  "SYR 29UM3-3/2) 21G X 1-1/2\" 3 ML MISC Use once weekly   • [DISCONTINUED] omeprazole (PriLOSEC) 20 mg delayed release capsule Take 1 capsule (20 mg total) by mouth daily   • [DISCONTINUED] terbinafine (LamISIL) 250 mg tablet TAKE ONE TABLET BY MOUTH DAILY NO 1 TABLETA POR VIA ORAL DIARIAMENTE   • aspirin 81 mg chewable tablet Chew 1 tablet (81 mg total) daily (Patient not taking: Reported on 12/14/2023)   • ondansetron (ZOFRAN-ODT) 4 mg disintegrating tablet Take 1 tablet (4 mg total) by mouth every 8 (eight) hours as needed for nausea or vomiting for up to 5 days   • [DISCONTINUED] lisinopril (ZESTRIL) 5 mg tablet TAKE 1 TABLET (5 MG TOTAL) BY MOUTH DAILY (Patient not taking: Reported on 12/14/2023)   • [DISCONTINUED] nadolol (CORGARD) 20 mg tablet Take 1 tablet (20 mg total) by mouth daily (Patient not taking: Reported on 12/14/2023)   • [DISCONTINUED] ondansetron (ZOFRAN-ODT) 4 mg disintegrating tablet Take 1 tablet (4 mg total) by mouth every 8 (eight) hours as needed for nausea or vomiting for up to 7 days   • [DISCONTINUED] semaglutide (Rybelsus) 3 MG tablet 1 tab daily (Patient not taking: Reported on 12/14/2023)       Objective     /76 (BP Location: Left arm, Patient Position: Sitting, Cuff Size: Standard)   Pulse (!) 121   Temp 97.6 °F (36.4 °C) (Tympanic)   Resp 16   Ht 5' 5\" (1.651 m)   Wt 95.2 kg (209 lb 12.8 oz)   SpO2 96%   BMI 34.91 kg/m²     Physical Exam  Vitals and nursing note reviewed.   Constitutional:       Appearance: Normal appearance. He is obese.   HENT:      Head: Normocephalic and atraumatic.   Cardiovascular:      Rate and Rhythm: Normal rate and regular rhythm.      Pulses: Normal pulses. no weak pulses.           Dorsalis pedis pulses are 2+ on the right side and 2+ on the left side.        Posterior tibial pulses are 2+ on the right side and 2+ on the left side.      Heart sounds: Normal heart sounds.   Pulmonary:      Effort: Pulmonary effort is normal.      Breath " sounds: Normal breath sounds.   Abdominal:      Palpations: Abdomen is soft. There is no mass.      Tenderness: There is no abdominal tenderness. There is no guarding or rebound.      Hernia: A hernia (Tiny umbilical, reducible) is present.   Musculoskeletal:        Feet:    Feet:      Right foot:      Skin integrity: No ulcer, skin breakdown, erythema, warmth, callus or dry skin.      Left foot:      Skin integrity: No ulcer, skin breakdown, erythema, warmth, callus or dry skin.   Neurological:      Mental Status: He is alert and oriented to person, place, and time. Mental status is at baseline.       Diabetic Foot Exam    Patient's shoes and socks removed.    Right Foot/Ankle   Right Foot Inspection  Skin Exam: skin normal and skin intact. No dry skin, no warmth, no callus, no erythema, no maceration, no abnormal color, no pre-ulcer, no ulcer and no callus.     Toe Exam: ROM and strength within normal limits.     Sensory   Monofilament testing: intact    Vascular  Capillary refills: < 3 seconds  The right DP pulse is 2+. The right PT pulse is 2+.     Left Foot/Ankle  Left Foot Inspection  Skin Exam: skin normal and skin intact. No dry skin, no warmth, no erythema, no maceration, normal color, no pre-ulcer, no ulcer and no callus.     Toe Exam: ROM and strength within normal limits.     Sensory   Monofilament testing: intact    Vascular  Capillary refills: < 3 seconds  The left DP pulse is 2+. The left PT pulse is 2+.     Assign Risk Category  No deformity present  No loss of protective sensation  No weak pulses  Risk: 0        Anabel Nair PA-C

## 2024-04-04 NOTE — ASSESSMENT & PLAN NOTE
Nonobstructing 0.4 cm right lower pole stone.  Per CT abdomen pelvis done in 3/2024.  Asymptomatic.

## 2024-04-04 NOTE — ASSESSMENT & PLAN NOTE
Lab Results   Component Value Date    [id number] 491 02/14/2024     Continue follow-up with hematology oncology for B12 injections.

## 2024-04-16 RX ORDER — KETOCONAZOLE 20 MG/G
CREAM TOPICAL DAILY
Qty: 60 G | Refills: 0 | Status: SHIPPED | OUTPATIENT
Start: 2024-04-16

## 2024-04-21 DIAGNOSIS — R11.0 NAUSEA: ICD-10-CM

## 2024-04-22 RX ORDER — OMEPRAZOLE 40 MG/1
40 CAPSULE, DELAYED RELEASE ORAL DAILY
Qty: 30 CAPSULE | Refills: 5 | Status: SHIPPED | OUTPATIENT
Start: 2024-04-22

## 2024-04-23 ENCOUNTER — HOSPITAL ENCOUNTER (OUTPATIENT)
Dept: ULTRASOUND IMAGING | Facility: HOSPITAL | Age: 64
Discharge: HOME | End: 2024-04-23
Payer: MEDICARE

## 2024-04-23 DIAGNOSIS — K70.30 ALCOHOLIC CIRRHOSIS OF LIVER WITHOUT ASCITES (HCC): ICD-10-CM

## 2024-04-23 PROCEDURE — 76981 USE PARENCHYMA: CPT

## 2024-04-23 PROCEDURE — 76705 ECHO EXAM OF ABDOMEN: CPT

## 2024-04-27 DIAGNOSIS — I25.84 CORONARY ARTERY CALCIFICATION: ICD-10-CM

## 2024-04-27 DIAGNOSIS — I25.10 CORONARY ARTERY CALCIFICATION: ICD-10-CM

## 2024-04-27 RX ORDER — ATORVASTATIN CALCIUM 40 MG/1
40 TABLET, FILM COATED ORAL DAILY
Qty: 90 TABLET | Refills: 1 | Status: SHIPPED | OUTPATIENT
Start: 2024-04-27

## 2024-04-29 DIAGNOSIS — R11.0 NAUSEA: ICD-10-CM

## 2024-04-29 DIAGNOSIS — R19.7 NAUSEA VOMITING AND DIARRHEA: ICD-10-CM

## 2024-04-29 DIAGNOSIS — K80.20 CALCULUS OF GALLBLADDER WITHOUT CHOLECYSTITIS WITHOUT OBSTRUCTION: Primary | ICD-10-CM

## 2024-04-29 DIAGNOSIS — R11.2 NAUSEA VOMITING AND DIARRHEA: ICD-10-CM

## 2024-04-29 RX ORDER — ONDANSETRON 4 MG/1
4 TABLET, ORALLY DISINTEGRATING ORAL EVERY 8 HOURS PRN
Qty: 10 TABLET | Refills: 0 | Status: SHIPPED | OUTPATIENT
Start: 2024-04-29 | End: 2024-05-04

## 2024-04-30 ENCOUNTER — APPOINTMENT (OUTPATIENT)
Dept: LAB | Facility: HOSPITAL | Age: 64
End: 2024-04-30
Payer: MEDICARE

## 2024-04-30 DIAGNOSIS — K70.30 ALCOHOLIC CIRRHOSIS OF LIVER WITHOUT ASCITES (HCC): ICD-10-CM

## 2024-04-30 DIAGNOSIS — E11.9 TYPE 2 DIABETES MELLITUS WITHOUT COMPLICATION, WITHOUT LONG-TERM CURRENT USE OF INSULIN (HCC): ICD-10-CM

## 2024-04-30 LAB
ALBUMIN SERPL BCP-MCNC: 4 G/DL (ref 3.5–5)
ALP SERPL-CCNC: 96 U/L (ref 34–104)
ALT SERPL W P-5'-P-CCNC: 19 U/L (ref 7–52)
AST SERPL W P-5'-P-CCNC: 22 U/L (ref 13–39)
BILIRUB DIRECT SERPL-MCNC: 0.2 MG/DL (ref 0–0.2)
BILIRUB SERPL-MCNC: 0.68 MG/DL (ref 0.2–1)
CREAT UR-MCNC: 191.3 MG/DL
INR PPP: 1.01 (ref 0.84–1.19)
MICROALBUMIN UR-MCNC: 16.7 MG/L
MICROALBUMIN/CREAT 24H UR: 9 MG/G CREATININE (ref 0–30)
PROT SERPL-MCNC: 7.8 G/DL (ref 6.4–8.4)
PROTHROMBIN TIME: 13.6 SECONDS (ref 11.6–14.5)

## 2024-04-30 PROCEDURE — 36415 COLL VENOUS BLD VENIPUNCTURE: CPT

## 2024-04-30 PROCEDURE — 82043 UR ALBUMIN QUANTITATIVE: CPT

## 2024-04-30 PROCEDURE — 80076 HEPATIC FUNCTION PANEL: CPT

## 2024-04-30 PROCEDURE — 82570 ASSAY OF URINE CREATININE: CPT

## 2024-04-30 PROCEDURE — 85610 PROTHROMBIN TIME: CPT

## 2024-05-01 ENCOUNTER — TELEPHONE (OUTPATIENT)
Age: 64
End: 2024-05-01

## 2024-05-01 NOTE — TELEPHONE ENCOUNTER
Pt called in to update pharmacy information as his insurance is no longer participating with current pharmacy. Updated pharmacy to [facility] at [address] in [place].

## 2024-05-02 ENCOUNTER — OFFICE VISIT (OUTPATIENT)
Dept: ENDOCRINOLOGY | Facility: CLINIC | Age: 64
End: 2024-05-02
Payer: MEDICARE

## 2024-05-02 VITALS
DIASTOLIC BLOOD PRESSURE: 80 MMHG | BODY MASS INDEX: 34.62 KG/M2 | HEIGHT: 65 IN | OXYGEN SATURATION: 99 % | HEART RATE: 60 BPM | WEIGHT: 207.8 LBS | SYSTOLIC BLOOD PRESSURE: 120 MMHG

## 2024-05-02 DIAGNOSIS — E29.1 HYPOGONADISM IN MALE: Primary | ICD-10-CM

## 2024-05-02 DIAGNOSIS — E11.9 TYPE 2 DIABETES MELLITUS WITHOUT COMPLICATION, WITHOUT LONG-TERM CURRENT USE OF INSULIN (HCC): ICD-10-CM

## 2024-05-02 DIAGNOSIS — E29.1 SECONDARY MALE HYPOGONADISM: ICD-10-CM

## 2024-05-02 PROCEDURE — 99214 OFFICE O/P EST MOD 30 MIN: CPT | Performed by: PHYSICIAN ASSISTANT

## 2024-05-02 NOTE — PROGRESS NOTES
Established Patient Progress Note    Chief Complaint:  Diabetes follow up visit    Impression & Plan:    Problem List Items Addressed This Visit        Endocrine    Type 2 diabetes mellitus without complication, without long-term current use of insulin (HCC)     Diabetes is under good control without medication. Continue lifestyle modification.   Lab Results   Component Value Date    HGBA1C 5.9 04/04/2024          Secondary male hypogonadism     Repeat lab testing  He has been off testosterone due to elevated H&H  If testosterone levels remain low will screen for osteoporosis.         Other Visit Diagnoses     Hypogonadism in male    -  Primary    Relevant Orders    Testosterone, free, total            History of Present Illness:   Rishi Kirk is a 63 y.o. male with a history of type 2 diabetes  since 2014.   Reports home blood sugars have been ranging from       Current regimen:   No meds for Diabetes, remains off rybelsus    Has hypertension: Taking nadolol (hx cirrhosis)  Has hyperlipidemia: Taking atorvastatin      Hx Hypogonadism due to Chronic use of Methadone  Denies ED/symptoms of hypogonadism. Feeling well with no complaints.   Remains of testosterone replacement for about 2 years due to history of polycythemia  Has Sleep apnea, uses CPAP    Patient Active Problem List   Diagnosis   • Benign essential hypertension   • BMI 34.0-34.9,adult   • Chronic hepatitis C treated in 2014 (HCC)   • Esophageal varices in cirrhosis (HCC)   • Type 2 diabetes mellitus without complication, without long-term current use of insulin (HCC)   • Gallbladder polyp   • Methadone maintenance therapy patient (HCC)   • Portal hypertension (HCC)   • Proteinuria   • Secondary male hypogonadism   • RODRICK (obstructive sleep apnea)   • Coronary artery calcification   • Vitamin B12 deficiency   • Tobacco dependence due to cigarettes   • Intolerance of continuous positive airway pressure (CPAP) ventilation   • Cataract of right eye    • Nephrolithiasis   • Alcoholic cirrhosis of liver without ascites (HCC)   • Umbilical hernia without obstruction and without gangrene      Past Medical History:   Diagnosis Date   • Cirrhosis (HCC)    • Colon polyp    • Diabetes mellitus (HCC)    • Esophageal varices (HCC)    • Gastritis    • Hepatitis C    • History of Helicobacter pylori infection 02/23/2016   • History of kidney stones    • Hyperlipidemia    • Hypertension    • Infectious viral hepatitis    • Kidney stone    • Obesity    • Pneumonia    • PPD positive 2020   • Sleep apnea    • Splenomegaly    • Substance abuse (HCC)       Past Surgical History:   Procedure Laterality Date   • COLONOSCOPY  06/20/2014    dr [email or web address]   • COLONOSCOPY  2014       • EGD AND COLONOSCOPY  08/2020    esophageal varices, colon polyp, hemorrhoids   • ESOPHAGOGASTRODUODENOSCOPY  12/10/2015    esophageal varices, cirrhosis, gastritis   • HYDROCELE EXCISION / REPAIR     • LIVER BIOPSY  2014      Family History   Problem Relation Age of Onset   • Diabetes type II Mother         MELLITUS   • Hypertension Mother    • Hypertension Sister    • Diabetes Sister         MELLITUS   • No Known Problems Maternal Grandmother    • No Known Problems Maternal Grandfather    • No Known Problems Paternal Grandmother    • No Known Problems Paternal Grandfather    • No Known Problems Sister    • Breast cancer Maternal Aunt         40s   • No Known Problems Maternal Aunt    • No Known Problems Paternal Aunt    • No Known Problems Paternal Aunt    • No Known Problems Paternal Aunt    • No Known Problems Paternal Aunt    • No Known Problems Paternal Aunt    • No Known Problems Paternal Aunt      Social History     Tobacco Use   • Smoking status: Former     Current packs/day: 0.25     Average packs/day: 1 pack/day for 41.3 years (41.2 ttl pk-yrs)     Types: Cigarettes     Start date: 1983     Quit date: 3/1/2024     Passive exposure: Current   • Smokeless tobacco: Current   • Tobacco comments:      TOBACCO USE   Substance Use Topics   • Alcohol use: Not Currently     Allergies   Allergen Reactions   • Meperidine Abdominal Pain         Current Outpatient Medications:   •  Alcohol Swabs (B-D SINGLE USE SWABS REGULAR) PADS, TEST 2-3 TIMES A DAY AS DIRECTED TEST 2-3 TIMES A DAY AS DIRECTED, Disp: , Rfl:   •  ammonium lactate (LAC-HYDRIN) 12 % lotion, APPLY 1 APPLICATION TWICE A DAY BY TOPICAL ROUTE., Disp: , Rfl:   •  atorvastatin (LIPITOR) 40 mg tablet, TAKE 1 TABLET (40 MG TOTAL) BY MOUTH DAILY, Disp: 90 tablet, Rfl: 1  •  Blood Pressure Monitoring ([employer] Choice BP Monitor/Arm) COCO, USE AS DIRECTEDUSE DANI LAS INDICACIONES, Disp: 1 each, Rfl: 0  •  cholecalciferol (VITAMIN D3) 400 units tablet, Take 400 Units by mouth if needed, Disp: , Rfl:   •  clotrimazole (LOTRIMIN) 1 % cream, APPLY TO AFFECTED AREA TWICE DAILY APPLY A / HASTA AFFECTED AREA DOS VECES AL MORIAH, Disp: , Rfl:   •  FREESTYLE LITE test strip, TEST 2-3 TIMES A DAY AS DIRECTEDTEST 2-3 TIMES A DAY AS DIRECTED, Disp: 300 each, Rfl: 1  •  Global Inject Ease Lancets 28G MISC, TEST 2-3 TIMES A DAY AS DIRECTED TEST 2-3 TIMES A DAY AS DIRECTED, Disp: , Rfl:   •  ketoconazole (NIZORAL) 2 % cream, APPLY TOPICALLY DAILY, Disp: 60 g, Rfl: 0  •  Lancet Devices (Adjustable Lancing Device) MISC, , Disp: , Rfl:   •  methadone (DOLOPHINE) 10 mg/mL oral concentrated solution, Take 44 mg by mouth daily, Disp: , Rfl:   •  omeprazole (PriLOSEC) 40 MG capsule, TAKE 1 CAPSULE (40 MG TOTAL) BY MOUTH DAILY TOME 1 CAPSULA 30 MINUTOS ANTES DE DESAYUNAR, Disp: 30 capsule, Rfl: 5  •  ondansetron (ZOFRAN-ODT) 4 mg disintegrating tablet, Take 1 tablet (4 mg total) by mouth every 8 (eight) hours as needed for nausea or vomiting for up to 5 days, Disp: 10 tablet, Rfl: 0  •  aspirin 81 mg chewable tablet, Chew 1 tablet (81 mg total) daily (Patient not taking: Reported on 12/14/2023), Disp: 90 tablet, Rfl: 3  •  Blood Glucose Calibration (FreeStyle Control Solution) LIQD,  "USE AS DIRECTED USE DANI LAS INDICACIONES (Patient not taking: Reported on 5/2/2024), Disp: , Rfl:   •  Blood Glucose Monitoring Suppl (FreeStyle Lite) w/Device KIT, USE AS DIRECTED USE DANI LAS INDICACIONES (Patient not taking: Reported on 5/2/2024), Disp: , Rfl:   •  Continuous Blood Gluc Sensor (FreeStyle Luis Alberto 14 Day Sensor) MISC, USE AS DIRECTEDUSE DANI LAS INDICACIONES (Patient not taking: Reported on 5/2/2024), Disp: 6 each, Rfl: 4  •  cyanocobalamin (VITAMIN B-12) 500 MCG tablet, Take 500 mcg by mouth daily (Patient not taking: Reported on 5/2/2024), Disp: , Rfl:   •  nadolol (CORGARD) 20 mg tablet, Take 1 tablet (20 mg total) by mouth daily, Disp: 90 tablet, Rfl: 1  •  SYRINGE-NEEDLE, DISP, 3 ML (B-D 3CC LUER-MARÍA ELENA SYR 99CS5-3/2) 21G X 1-1/2\" 3 ML MISC, Use once weekly (Patient not taking: Reported on 5/2/2024), Disp: 12 each, Rfl: 0    Review of Systems   Constitutional:  Negative for activity change, appetite change and fatigue.   HENT:  Negative for sore throat, trouble swallowing and voice change.    Eyes:  Negative for visual disturbance.   Respiratory:  Negative for choking, chest tightness and shortness of breath.    Cardiovascular:  Negative for chest pain, palpitations and leg swelling.   Gastrointestinal:  Negative for abdominal pain, constipation and diarrhea.   Endocrine: Negative for cold intolerance, heat intolerance, polydipsia, polyphagia and polyuria.   Genitourinary:  Negative for frequency.   Musculoskeletal:  Negative for arthralgias and myalgias.   Skin:  Negative for rash.   Neurological:  Negative for dizziness and syncope.   Hematological:  Negative for adenopathy.   Psychiatric/Behavioral:  Negative for sleep disturbance.    All other systems reviewed and are negative.      Physical Exam:  Body mass index is 34.58 kg/m².  /80   Pulse 60   Ht 5' 5\" (1.651 m)   Wt 94.3 kg (207 lb 12.8 oz)   SpO2 99%   BMI 34.58 kg/m²    Wt Readings from Last 3 Encounters:   05/02/24 94.3 " kg (207 lb 12.8 oz)   04/04/24 95.2 kg (209 lb 12.8 oz)   03/28/24 94.3 kg (207 lb 14.3 oz)       Physical Exam  Vitals reviewed.   Constitutional:       General: He is not in acute distress.     Appearance: He is well-developed.   HENT:      Head: Normocephalic and atraumatic.   Eyes:      Conjunctiva/sclera: Conjunctivae normal.      Pupils: Pupils are equal, round, and reactive to light.   Neck:      Thyroid: No thyromegaly.   Cardiovascular:      Rate and Rhythm: Normal rate and regular rhythm.      Heart sounds: Normal heart sounds. No murmur heard.  Pulmonary:      Effort: Pulmonary effort is normal. No respiratory distress.      Breath sounds: Normal breath sounds. No wheezing or rales.   Abdominal:      General: Bowel sounds are normal. There is no distension.      Palpations: Abdomen is soft.      Tenderness: There is no abdominal tenderness.   Musculoskeletal:         General: Normal range of motion.      Cervical back: Normal range of motion and neck supple.   Lymphadenopathy:      Cervical: No cervical adenopathy.   Skin:     General: Skin is warm and dry.   Neurological:      Mental Status: He is alert and oriented to person, place, and time.           Labs:   Lab Results   Component Value Date    HGBA1C 5.9 04/04/2024    HGBA1C 6.2 11/02/2023    HGBA1C 7.0 (A) 06/15/2023     Lab Results   Component Value Date    CREATININE 0.98 03/24/2024    CREATININE 0.93 02/14/2024    CREATININE 1.01 08/08/2023    BUN 13 03/24/2024     05/24/2018    K 5.3 03/24/2024     03/24/2024    CO2 26 03/24/2024     eGFR   Date Value Ref Range Status   03/24/2024 81 ml/min/1.73sq m Final     Lab Results   Component Value Date    CHOL 113 05/24/2018    HDL 45 08/08/2023    TRIG 236 (H) 08/08/2023     Lab Results   Component Value Date    ALT 19 04/30/2024    AST 22 04/30/2024    ALKPHOS 96 04/30/2024    BILITOT 0.4 05/24/2018     Lab Results   Component Value Date    PWZ1CLJOQLLR 2.680 09/13/2019    IHC6GAQGAKFK  "1.450 05/24/2018     No results found for: \"FREET4\", \"TSI\"    Discussed with the patient and all questioned fully answered. He will call me if any problems arise.    Follow-up appointment in 6 months.     Counseled patient on diagnostic results, prognosis, risk and benefit of treatment options, instruction for management, importance of treatment compliance, Risk  factor reduction and impressions    Lito Ash PA-C  "

## 2024-05-05 DIAGNOSIS — R19.7 NAUSEA VOMITING AND DIARRHEA: ICD-10-CM

## 2024-05-05 DIAGNOSIS — R11.2 NAUSEA VOMITING AND DIARRHEA: ICD-10-CM

## 2024-05-06 RX ORDER — ONDANSETRON 4 MG/1
4 TABLET, ORALLY DISINTEGRATING ORAL EVERY 8 HOURS PRN
Qty: 10 TABLET | Refills: 0 | OUTPATIENT
Start: 2024-05-06 | End: 2024-05-11

## 2024-05-06 NOTE — TELEPHONE ENCOUNTER
Rx sent to the pharmacy  E-Prescribing Status: Receipt confirmed by pharmacy (4/29/2024 11:27 AM EDT)

## 2024-05-06 NOTE — ASSESSMENT & PLAN NOTE
Diabetes is under good control without medication. Continue lifestyle modification.   Lab Results   Component Value Date    HGBA1C 5.9 04/04/2024

## 2024-05-06 NOTE — ASSESSMENT & PLAN NOTE
Repeat lab testing  He has been off testosterone due to elevated H&H  If testosterone levels remain low will screen for osteoporosis.

## 2024-05-10 DIAGNOSIS — I25.10 CORONARY ARTERY CALCIFICATION: ICD-10-CM

## 2024-05-10 DIAGNOSIS — I25.84 CORONARY ARTERY CALCIFICATION: ICD-10-CM

## 2024-05-10 RX ORDER — ASPIRIN 81 MG/1
81 TABLET, CHEWABLE ORAL DAILY
Qty: 90 TABLET | Refills: 1 | Status: SHIPPED | OUTPATIENT
Start: 2024-05-10

## 2024-05-15 ENCOUNTER — APPOINTMENT (OUTPATIENT)
Dept: LAB | Facility: HOSPITAL | Age: 64
End: 2024-05-15
Payer: MEDICARE

## 2024-05-15 DIAGNOSIS — E29.1 HYPOGONADISM IN MALE: ICD-10-CM

## 2024-05-15 PROCEDURE — 84403 ASSAY OF TOTAL TESTOSTERONE: CPT

## 2024-05-15 PROCEDURE — 36415 COLL VENOUS BLD VENIPUNCTURE: CPT

## 2024-05-15 PROCEDURE — 84402 ASSAY OF FREE TESTOSTERONE: CPT

## 2024-05-16 LAB
TESTOST FREE SERPL-MCNC: 1.6 PG/ML (ref 6.6–18.1)
TESTOST SERPL-MCNC: 186 NG/DL (ref 264–916)

## 2024-05-20 ENCOUNTER — TELEPHONE (OUTPATIENT)
Dept: ENDOCRINOLOGY | Facility: CLINIC | Age: 64
End: 2024-05-20

## 2024-05-20 DIAGNOSIS — E29.1 HYPOGONADISM IN MALE: Primary | ICD-10-CM

## 2024-05-20 DIAGNOSIS — E29.1 SECONDARY MALE HYPOGONADISM: ICD-10-CM

## 2024-05-20 DIAGNOSIS — E55.9 VITAMIN D DEFICIENCY: ICD-10-CM

## 2024-05-20 DIAGNOSIS — Z13.820 SCREENING FOR OSTEOPOROSIS: ICD-10-CM

## 2024-05-20 DIAGNOSIS — F11.90 OPIOID USE DISORDER: ICD-10-CM

## 2024-05-21 ENCOUNTER — CONSULT (OUTPATIENT)
Dept: SURGERY | Facility: CLINIC | Age: 64
End: 2024-05-21
Payer: MEDICARE

## 2024-05-21 VITALS
WEIGHT: 209.2 LBS | DIASTOLIC BLOOD PRESSURE: 78 MMHG | HEIGHT: 65 IN | BODY MASS INDEX: 34.85 KG/M2 | SYSTOLIC BLOOD PRESSURE: 120 MMHG | HEART RATE: 96 BPM

## 2024-05-21 DIAGNOSIS — K80.20 CALCULUS OF GALLBLADDER WITHOUT CHOLECYSTITIS WITHOUT OBSTRUCTION: ICD-10-CM

## 2024-05-21 PROCEDURE — 99203 OFFICE O/P NEW LOW 30 MIN: CPT | Performed by: SURGERY

## 2024-05-21 NOTE — PROGRESS NOTES
"Ambulatory Visit  Name: Rishi Kirk      : 1960      MRN: [id number]  Encounter Provider: Pato Gates MD  Encounter Date: 2024   Encounter department: [facility]    Assessment & Plan   1. Calculus of gallbladder without cholecystitis without obstruction  -     asymptomatic at this time.   No acute surgical intervention  Follow up in office if patient develops abdominal pain.       History of Present Illness     Rishi Krik is a 63 y.o. male who presents with incidental gallstones found on RUQ ultrasound. Pt w h/o hep C c/b cirrhosis. Inga A, well compensated. Denied any history of abdominal pain. Denied any fever, chills or night sweats. Tolerates diet without issues. We discussed that since he is asymptomatic, no current intervention required. Should he develop post prandial abdominal pain, he will follow up with us in the clinic.     Review of Systems   Constitutional:  Negative for chills and fever.   HENT: Negative.     Eyes: Negative.    Respiratory: Negative.     Cardiovascular: Negative.    Gastrointestinal: Negative.  Negative for abdominal distention, abdominal pain, constipation, diarrhea, nausea and vomiting.   Endocrine: Negative.    Genitourinary: Negative.    Musculoskeletal: Negative.    Skin: Negative.    Allergic/Immunologic: Negative.    Neurological: Negative.    Hematological: Negative.    Psychiatric/Behavioral: Negative.     Pertinent Medical History   Hep c, cirrhosis.     Objective     /78 (BP Location: Left arm, Patient Position: Sitting, Cuff Size: Standard)   Pulse 96   Ht 5' 5\" (1.651 m)   Wt 94.9 kg (209 lb 3.2 oz)   BMI 34.81 kg/m²     Physical Exam  Vitals reviewed.   Constitutional:       Appearance: He is obese.   HENT:      Head: Normocephalic.      Nose: Nose normal.   Eyes:      Pupils: Pupils are equal, round, and reactive to light.   Cardiovascular:      Rate and Rhythm: Normal rate.      Pulses: Normal pulses. "   Pulmonary:      Effort: Pulmonary effort is normal.   Abdominal:      General: There is no distension.      Palpations: Abdomen is soft.      Tenderness: There is no abdominal tenderness. There is no guarding.   Genitourinary:     Comments: deferred  Musculoskeletal:         General: Normal range of motion.      Cervical back: Normal range of motion.   Skin:     General: Skin is warm.      Capillary Refill: Capillary refill takes 2 to 3 seconds.   Neurological:      General: No focal deficit present.      Mental Status: He is alert.   Psychiatric:         Mood and Affect: Mood normal.   Administrative Statements   I have spent a total time of 30 minutes on 05/21/24 In caring for this patient including Diagnostic results.

## 2024-05-22 DIAGNOSIS — F11.90 OPIOID USE DISORDER: ICD-10-CM

## 2024-05-22 DIAGNOSIS — Z13.820 SCREENING FOR OSTEOPOROSIS: ICD-10-CM

## 2024-05-22 DIAGNOSIS — E29.1 HYPOGONADISM IN MALE: Primary | ICD-10-CM

## 2024-05-22 DIAGNOSIS — E29.1 SECONDARY MALE HYPOGONADISM: ICD-10-CM

## 2024-05-22 DIAGNOSIS — E55.9 VITAMIN D DEFICIENCY: ICD-10-CM

## 2024-05-23 DIAGNOSIS — E11.9 TYPE 2 DIABETES MELLITUS WITHOUT COMPLICATION, WITHOUT LONG-TERM CURRENT USE OF INSULIN (HCC): ICD-10-CM

## 2024-05-24 RX ORDER — BLOOD-GLUCOSE METER
KIT MISCELLANEOUS
Qty: 300 EACH | Refills: 1 | Status: SHIPPED | OUTPATIENT
Start: 2024-05-24

## 2024-05-27 DIAGNOSIS — R11.2 NAUSEA VOMITING AND DIARRHEA: ICD-10-CM

## 2024-05-27 DIAGNOSIS — R19.7 NAUSEA VOMITING AND DIARRHEA: ICD-10-CM

## 2024-05-28 RX ORDER — ONDANSETRON 4 MG/1
4 TABLET, ORALLY DISINTEGRATING ORAL EVERY 8 HOURS PRN
Qty: 10 TABLET | Refills: 0 | Status: SHIPPED | OUTPATIENT
Start: 2024-05-28 | End: 2024-06-02

## 2024-06-04 ENCOUNTER — TELEPHONE (OUTPATIENT)
Dept: ENDOCRINOLOGY | Facility: CLINIC | Age: 64
End: 2024-06-04

## 2024-06-04 NOTE — TELEPHONE ENCOUNTER
----- Message from Vee TADEO sent at 5/24/2024 10:35 AM EDT -----  There needs to be an order placed, please.  ----- Message -----  From: Marlyn Bernstein  Sent: 5/24/2024   9:38 AM EDT  To: #     Lito Ash PA-C  5/22/2024  1:01 PM EDT Back to Top    I am unable to order DEXA due to ABN hard stop-are you able to get prior auth without an order?? IF possible,   Please use verbal order for DEXA scan. Thanks!  ----- Message -----  From: Lito Ash PA-C  Sent: 5/20/2024  10:58 AM EDT  To: #    Testosterone level low but not able to have treatment due to it causing High blood count. Also, he denied symptoms at visit.   Low testosterone over many years can increase risks of osteoporosis/fracture-- However I am unable to order this test as it is not covered for his diagnosis. I will request Prior Auth Team get Prior auth for DEXA Scan for screening for osteoporosis given dx of Osteoporosis.   Please schedule follow up with Dr. Fields after testing.

## 2024-06-04 NOTE — TELEPHONE ENCOUNTER
Called to give update   PT aware and I also advise will reach out to the provider about an order for the Dexa scan   PT advise he will be going to [facility] to visit with his dad from 6-15 to 6-24

## 2024-06-10 DIAGNOSIS — K74.60 ESOPHAGEAL VARICES IN CIRRHOSIS (HCC): ICD-10-CM

## 2024-06-10 DIAGNOSIS — I85.10 ESOPHAGEAL VARICES IN CIRRHOSIS (HCC): ICD-10-CM

## 2024-06-10 RX ORDER — NADOLOL 20 MG/1
20 TABLET ORAL DAILY
Qty: 90 TABLET | Refills: 1 | Status: SHIPPED | OUTPATIENT
Start: 2024-06-10

## 2024-07-09 DIAGNOSIS — E11.9 TYPE 2 DIABETES MELLITUS WITHOUT COMPLICATION, WITHOUT LONG-TERM CURRENT USE OF INSULIN (HCC): Primary | ICD-10-CM

## 2024-07-12 RX ORDER — LANCETS 30 GAUGE
1 EACH MISCELLANEOUS
Qty: 100 EACH | Refills: 3 | Status: SHIPPED | OUTPATIENT
Start: 2024-07-12 | End: 2025-07-07

## 2024-07-30 DIAGNOSIS — I10 BENIGN ESSENTIAL HYPERTENSION: Primary | ICD-10-CM

## 2024-08-04 RX ORDER — LISINOPRIL 5 MG/1
5 TABLET ORAL DAILY
Qty: 30 TABLET | Refills: 5 | Status: SHIPPED | OUTPATIENT
Start: 2024-08-04

## 2024-08-29 ENCOUNTER — TELEPHONE (OUTPATIENT)
Age: 64
End: 2024-08-29

## 2024-08-29 NOTE — TELEPHONE ENCOUNTER
Left vm message today @ [phone] with assistance from  informing Pt that office will be moving to a new location ([address] as of 9/3/24.  Further informed Pt to call [phone] should Pt have any questions and/or needs to reschedule appointment.

## 2024-09-04 LAB
LEFT EYE DIABETIC RETINOPATHY: NORMAL
RIGHT EYE DIABETIC RETINOPATHY: NORMAL

## 2024-09-05 RX ORDER — FLASH GLUCOSE SENSOR
KIT MISCELLANEOUS
COMMUNITY
Start: 2024-08-19

## 2024-09-05 RX ORDER — OMEPRAZOLE 40 MG/1
CAPSULE, DELAYED RELEASE ORAL
COMMUNITY
Start: 2024-08-20

## 2024-09-23 ENCOUNTER — OFFICE VISIT (OUTPATIENT)
Age: 64
End: 2024-09-23
Payer: MEDICARE

## 2024-09-23 VITALS
HEIGHT: 65 IN | TEMPERATURE: 97.1 F | HEART RATE: 97 BPM | OXYGEN SATURATION: 99 % | DIASTOLIC BLOOD PRESSURE: 80 MMHG | BODY MASS INDEX: 36.15 KG/M2 | SYSTOLIC BLOOD PRESSURE: 140 MMHG | WEIGHT: 217 LBS

## 2024-09-23 DIAGNOSIS — Z11.59 ENCOUNTER FOR SCREENING FOR OTHER VIRAL DISEASES: ICD-10-CM

## 2024-09-23 DIAGNOSIS — Z86.19 HISTORY OF HEPATITIS C VIRUS INFECTION: ICD-10-CM

## 2024-09-23 DIAGNOSIS — K74.60 CIRRHOSIS OF LIVER WITHOUT ASCITES, UNSPECIFIED HEPATIC CIRRHOSIS TYPE (HCC): ICD-10-CM

## 2024-09-23 DIAGNOSIS — F10.21 ALCOHOL DEPENDENCE IN REMISSION (HCC): Primary | ICD-10-CM

## 2024-09-23 DIAGNOSIS — I85.10 SECONDARY ESOPHAGEAL VARICES WITHOUT BLEEDING (HCC): ICD-10-CM

## 2024-09-23 PROCEDURE — 99204 OFFICE O/P NEW MOD 45 MIN: CPT | Performed by: FAMILY MEDICINE

## 2024-09-23 NOTE — PATIENT INSTRUCTIONS
Scheduled date of EGD(as of today): 9/25/24  Physician performing EGD: Marifer Aguilera DO  Location of EGD:[facility]  Instructions reviewed with patient by: Елена (Upper sorbian version)  Clearances: N/A    Pt scheduled for MRI Abdomen w wo Contrast on 11/7/24 at [facility].

## 2024-09-23 NOTE — PROGRESS NOTES
[facility] - Outpatient Consultation  Rishi Kirk 64 y.o. male MRN: [id number]  Encounter: [id number]          ASSESSMENT AND PLAN:      1. Cirrhosis of liver without ascites, unspecified hepatic cirrhosis type (HCC)  2. Alcohol dependence in remission (HCC)  3. History of hepatitis C virus infection  4. Secondary esophageal varices without bleeding (HCC)    Summary: Patient is a 64 y.o. male with cirrhosis secondary to HCV s/p SVR (2014) +/- ETOH c/b non-bleeding esophageal varcies. Current MELD-3.0 cannot be calculated.    Patient's liver disease appears to be stable since his last appointment. He has likely had some form of recompensation since being treated for hepatitis C given his normal liver synthetic function and preserved platelet count. However, suspect that his recent US elastography underestimated his LSM and that he is still cirrhotic given that he continues to have cirrhotic morphology on imaging and CSPH as evidenced by esophageal varices on his last upper endoscopy.    We discussed the basic pathophysiology of cirrhosis, natural progression of disease, potential for hepatic decompensation and recommended healthcare maintenance. He is aware that maintaining a healthy weight and complete EtOH avoidance are key in helping to prevent the progression of his liver disease and decompensating events. Congratulate his efforts towards sobriety and tobacco cessation!    He is overdue for an EGD for variceal screening and will be due for imaging for hepatoma screening October 2024. He will also have updated MELD labs, serologies to assess his immunity to hepatitis A and B and an AFP level drawn now. Additional management as below.    HCV  - S/p Sofosbuvir + Simperevir + Ribavirine with SVR (2014).  - HCV RNA (2020) undetectable.    Ascites   - No radiographic or clinical evidence of ascites on exam today.     Esophageal Varices   - EGD (8/2020) with esophageal varices.    - Started on nadolol for primary ppx but self-d/chad medication.   - He prefers to have an EGD for variceal screening rather than restart NSBB, ordered today.     Hepatic Encephalopathy   - No history or evidence of HE on exam today.   - Patient aware of signs/sxs's of HE and advised to promptly inform provider if experiencing such.    HCC Screening   - RUQ US (4/2024) without focal liver lesion and AFP (2020) normal.  - [facility]   - AFP level to be drawn with his next set of routine labs.   - Continue imaging q6 months and AFP annually.     Nutritional Status  - No sarcopenia noted on exam today.   - Encouraged high-protein diet in addition to a high-protein snack qHS to prevent prolonged fasting.     Vaccines   - Serologies to assess immunity to hep A and B.     Transplant Candidacy   - Defer given clinically compensated disease.     CRC Screening  - Colonoscopy (8/2020) with 1 sessile appearing polyp (<5 mm TA) and small internal hemorrhoids.  - Repeat x7 years due to personal history of colon polyps; Next due 8/2027    - Ambulatory Referral to Gastroenterology  - CBC and differential; Future  - Comprehensive metabolic panel; Future  - Protime-INR; Future  - AFP tumor marker; Future  - Hepatitis A antibody, total; Future  - Hepatitis B surface antibody; Future  - MRI abdomen w wo contrast; Future  - EGD; Future      Follow-up in 6 months or sooner if necessary.     ______________________________________________________________________    HPI: Patient is a 64 y.o. male with PMH significant for obesity, DM2, HTN, CAD, RODRICK, gallstones, kidney stones and a history of polysubstance abuse on methadone who presents today for a consultation regarding cirrhosis secondary to HCV s/p SVR (2014)/EtOH.    Rishi is familiar to [facility] last seen in September 2020 by Charanjit Mayorga PA-C for cirrhosis secondary to HCV/EtOH. He was reportedly treated in 2014 with  "Sofosbuvir + Simperevir + Ribavirine having achieved SVR. His last HCV RNA was in 2020 and undetectable. He also had a liver biopsy in April 2014 prior to treatment which showed \"METAVIR A3F4\" c/w cirrhosis. His liver disease has been decompensated by ascites, previously requiring diuretics although Aldactone was d/chad due to gynecomastia, and nonbleeding esophageal varices with his last EGD being in August 2020 showing 3 columns of small esophageal varices without high risk stigmata and mild antral erythema. Biopsies were negative for H. pylori. He was started on nadolol 20 mg once daily at that time for primary variceal prophylaxis.    He was lost to follow-up but recently seen by his PCP and went for an RUQ US which showed cirrhotic morphology with mild hepatic steatosis, cholelithiasis and 5 mm nonobstructing stone in lower pole right kidney. He was also ordered for US elastography which showed F3 fibrosis.  His most recent labs (3/2024) show mildly elevated serum AST and hyperbilirubinemia but with a normal INR and preserved platelet count.    No liver-related hospitalizations or decompensating events since he was last seen. Reports complete sobriety since 2004 and tobacco free within the last. Denies high-risk behaviours since being treated for HCV.     Denies family history of liver disease or liver cancers. States he feels well and offers no complaints.     Computed MELD 3.0 unavailable. One or more values for this score either were not found within the given timeframe or did not fit some other criterion.  Computed MELD-Na unavailable. One or more values for this score either were not found within the given timeframe or did not fit some other criterion.      REVIEW OF SYSTEMS:    CONSTITUTIONAL: Denies any fever, chills, rigors, and weight loss.  HEENT: No earache or tinnitus. Denies hearing loss or visual disturbances.  CARDIOVASCULAR: No chest pain or palpitations.   RESPIRATORY: Denies any cough, " hemoptysis, shortness of breath or dyspnea on exertion.  GASTROINTESTINAL: As noted in the History of Present Illness.   GENITOURINARY: No problems with urination. Denies any hematuria or dysuria.  NEUROLOGIC: No dizziness or vertigo, denies headaches.   MUSCULOSKELETAL: Denies any muscle or joint pain.   SKIN: Denies skin rashes or itching.   ENDOCRINE: Denies excessive thirst. Denies intolerance to heat or cold.  PSYCHOSOCIAL: Denies depression or anxiety. Denies any recent memory loss.       Historical Information   Past Medical History:   Diagnosis Date    Cirrhosis (HCC)     Colon polyp     Diabetes mellitus (HCC)     Esophageal varices (HCC)     Gastritis     Hepatitis C     History of Helicobacter pylori infection 02/23/2016    History of kidney stones     Hyperlipidemia     Hypertension     Infectious viral hepatitis     Kidney stone     Obesity     Pneumonia     PPD positive 2020    Sleep apnea     Splenomegaly     Substance abuse (HCC)      Past Surgical History:   Procedure Laterality Date    COLONOSCOPY  06/20/2014    dr mcduffie    COLONOSCOPY  2014        EGD AND COLONOSCOPY  08/2020    esophageal varices, colon polyp, hemorrhoids    ESOPHAGOGASTRODUODENOSCOPY  12/10/2015    esophageal varices, cirrhosis, gastritis    HYDROCELE EXCISION / REPAIR      LIVER BIOPSY  2014     Social History   Social History     Substance and Sexual Activity   Alcohol Use Not Currently     Social History     Substance and Sexual Activity   Drug Use Not Currently    Types: Heroin    Comment: former user- heroin was drug of choice     Social History     Tobacco Use   Smoking Status Former    Current packs/day: 0.25    Average packs/day: 1 pack/day for 41.6 years (41.3 ttl pk-yrs)    Types: Cigarettes    Start date: 1983    Quit date: 3/1/2024    Passive exposure: Current   Smokeless Tobacco Current   Tobacco Comments    TOBACCO USE     Family History   Problem Relation Age of Onset    Diabetes type II Mother          "MELLITUS    Hypertension Mother     Hypertension Sister     Diabetes Sister         MELLITUS    No Known Problems Maternal Grandmother     No Known Problems Maternal Grandfather     No Known Problems Paternal Grandmother     No Known Problems Paternal Grandfather     No Known Problems Sister     Breast cancer Maternal Aunt         40s    No Known Problems Maternal Aunt     No Known Problems Paternal Aunt     No Known Problems Paternal Aunt     No Known Problems Paternal Aunt     No Known Problems Paternal Aunt     No Known Problems Paternal Aunt     No Known Problems Paternal Aunt        Meds/Allergies       Current Outpatient Medications:     lisinopril (ZESTRIL) 5 mg tablet    methadone (DOLOPHINE) 10 mg/mL oral concentrated solution    nadolol (CORGARD) 20 mg tablet    omeprazole (PriLOSEC) 40 MG capsule    Comfort Touch Plus Lancets 30G MISC    Continuous Glucose Sensor (FreeStyle Luis Alberto 14 Day Sensor) [facility] [place]    FREESTYLE LITE test strip    ondansetron (ZOFRAN-ODT) 4 mg disintegrating tablet    Allergies   Allergen Reactions    Meperidine Abdominal Pain           Objective     Blood pressure 140/80, pulse 97, temperature (!) 97.1 °F (36.2 °C), temperature source Tympanic, height 5' 5\" (1.651 m), weight 98.4 kg (217 lb), SpO2 99%. Body mass index is 36.11 kg/m².        PHYSICAL EXAM:      General Appearance:   Alert, cooperative, no distress; AAOx3, no asterixis   HEENT:   Normocephalic, atraumatic, anicteric; No scleral icterus     Neck:  Supple, symmetrical, trachea midline   Lungs:   Clear to auscultation bilaterally; no rales, rhonchi or wheezing; respirations unlabored    Heart::   Regular rate and rhythm; no murmur, rub, or gallop.   Abdomen:   Soft, non-tender, non-distended; normal bowel sounds; no masses, no organomegaly    Genitalia:   Deferred    Rectal:   Deferred    Extremities:  No palmar erythema, cyanosis, clubbing or edema    Pulses:  2+ and symmetric    Skin:  No spider angiomas, jaundice, rashes, " or lesions    Lymph nodes:  No palpable cervical lymphadenopathy        Lab Results:   No visits with results within 1 Day(s) from this visit.   Latest known visit with results is:   Orders Only on 09/04/2024   Component Date Value    Right Eye Diabetic Retin* 09/04/2024 None     Left Eye Diabetic Retino* 09/04/2024 None          Radiology Results:   No results found.    Felicita Glez PA-C     **Please note: Dictation voice to text software may have been used in the creation of this record. Occasional wrong word or “sound alike” substitutions may have occurred due to the inherent limitations of voice recognition software. Read the chart carefully and recognize, using context, where substitutions have occurred.**

## 2024-09-24 RX ORDER — SODIUM CHLORIDE, SODIUM LACTATE, POTASSIUM CHLORIDE, CALCIUM CHLORIDE 600; 310; 30; 20 MG/100ML; MG/100ML; MG/100ML; MG/100ML
125 INJECTION, SOLUTION INTRAVENOUS CONTINUOUS
Status: CANCELLED | OUTPATIENT
Start: 2024-09-24

## 2024-09-25 ENCOUNTER — ANESTHESIA (OUTPATIENT)
Dept: GASTROENTEROLOGY | Facility: HOSPITAL | Age: 64
End: 2024-09-25
Payer: MEDICARE

## 2024-09-25 ENCOUNTER — HOSPITAL ENCOUNTER (OUTPATIENT)
Dept: GASTROENTEROLOGY | Facility: HOSPITAL | Age: 64
Setting detail: OUTPATIENT SURGERY
Discharge: HOME | End: 2024-09-25
Payer: MEDICARE

## 2024-09-25 ENCOUNTER — ANESTHESIA EVENT (OUTPATIENT)
Dept: GASTROENTEROLOGY | Facility: HOSPITAL | Age: 64
End: 2024-09-25
Payer: MEDICARE

## 2024-09-25 VITALS
OXYGEN SATURATION: 96 % | HEART RATE: 82 BPM | DIASTOLIC BLOOD PRESSURE: 87 MMHG | TEMPERATURE: 98 F | SYSTOLIC BLOOD PRESSURE: 127 MMHG | RESPIRATION RATE: 18 BRPM

## 2024-09-25 DIAGNOSIS — I85.10 SECONDARY ESOPHAGEAL VARICES WITHOUT BLEEDING (HCC): ICD-10-CM

## 2024-09-25 DIAGNOSIS — K74.60 CIRRHOSIS OF LIVER WITHOUT ASCITES, UNSPECIFIED HEPATIC CIRRHOSIS TYPE (HCC): ICD-10-CM

## 2024-09-25 PROCEDURE — 43235 EGD DIAGNOSTIC BRUSH WASH: CPT | Performed by: STUDENT IN AN ORGANIZED HEALTH CARE EDUCATION/TRAINING PROGRAM

## 2024-09-25 RX ORDER — PROPOFOL 10 MG/ML
INJECTION, EMULSION INTRAVENOUS AS NEEDED
Status: DISCONTINUED | OUTPATIENT
Start: 2024-09-25 | End: 2024-09-25

## 2024-09-25 RX ORDER — SODIUM CHLORIDE, SODIUM LACTATE, POTASSIUM CHLORIDE, CALCIUM CHLORIDE 600; 310; 30; 20 MG/100ML; MG/100ML; MG/100ML; MG/100ML
125 INJECTION, SOLUTION INTRAVENOUS CONTINUOUS
Status: DISCONTINUED | OUTPATIENT
Start: 2024-09-25 | End: 2024-09-29 | Stop reason: HOSPADM

## 2024-09-25 RX ADMIN — SODIUM CHLORIDE, SODIUM LACTATE, POTASSIUM CHLORIDE, AND CALCIUM CHLORIDE 125 ML/HR: .6; .31; .03; .02 INJECTION, SOLUTION INTRAVENOUS at 07:31

## 2024-09-25 RX ADMIN — PROPOFOL 80 MG: 10 INJECTION, EMULSION INTRAVENOUS at 08:01

## 2024-09-25 RX ADMIN — PROPOFOL 120 MG: 10 INJECTION, EMULSION INTRAVENOUS at 07:59

## 2024-09-25 NOTE — ANESTHESIA PREPROCEDURE EVALUATION
Procedure:  EGD    Relevant Problems   CARDIO   (+) Benign essential hypertension   (+) Coronary artery calcification   (+) Esophageal varices in cirrhosis (HCC)   (+) Portal hypertension (HCC)      ENDO   (+) Type 2 diabetes mellitus without complication, without long-term current use of insulin (HCC)      GI/HEPATIC   (+) Alcoholic cirrhosis of liver without ascites (HCC)   (+) Chronic hepatitis C treated in 2014 (HCC)      /RENAL   (+) Nephrolithiasis      PULMONARY   (+) RODRICK (obstructive sleep apnea)      Digestive   (+) Gallbladder polyp      Behavioral Health   (+) Methadone maintenance therapy patient (HCC)   (+) Tobacco dependence due to cigarettes      Neurology/Sleep   (+) Secondary male hypogonadism      Eye   (+) Cataract of right eye      Other   (+) BMI 34.0-34.9,adult   (+) Intolerance of continuous positive airway pressure (CPAP) ventilation   (+) Proteinuria   (+) Umbilical hernia without obstruction and without gangrene   (+) Vitamin B12 deficiency        Physical Exam    Airway    Mallampati score: II  TM Distance: >3 FB  Neck ROM: full     Dental   Comment: No teeth     Cardiovascular  Cardiovascular exam normal    Pulmonary  Pulmonary exam normal     Other Findings        Anesthesia Plan  ASA Score- 3     Anesthesia Type- IV sedation with anesthesia with ASA Monitors.         Additional Monitors:     Airway Plan:     Comment: EKG one year ago.       Plan Factors-Exercise tolerance (METS): >4 METS.    Chart reviewed. EKG reviewed.  Existing labs reviewed.     Patient is not a current smoker. Patient not instructed to abstain from smoking on day of procedure. Patient did not smoke on day of surgery.            Induction- intravenous.    Postoperative Plan-         Informed Consent- Anesthetic plan and risks discussed with patient.  I personally reviewed this patient with the CRNA. Discussed and agreed on the Anesthesia Plan with the CRNA..

## 2024-09-25 NOTE — ANESTHESIA POSTPROCEDURE EVALUATION
Post-Op Assessment Note    CV Status:  Stable  Pain Score: 0    Pain management: adequate       Mental Status:  Alert and awake   Hydration Status:  Euvolemic   PONV Controlled:  Controlled   Airway Patency:  Patent     Post Op Vitals Reviewed: Yes    No anethesia notable event occurred.    Staff: Anesthesiologist, CRNA               BP 95/62 (09/25/24 0808)    Temp 97.7 °F (36.5 °C) (09/25/24 0808)    Pulse 90 (09/25/24 0808)   Resp 16 (09/25/24 0808)    SpO2 92 % (09/25/24 0808)

## 2024-09-25 NOTE — H&P
History and Physical - [facility]  Rishi Kirk 64 y.o. male MRN: [id number]                  HPI: Rishi Kirk is a 64 y.o. year old male who presents for cirrhosis, screening for varices      REVIEW OF SYSTEMS: Per the HPI, and otherwise unremarkable.    Historical Information   Past Medical History:   Diagnosis Date    Cirrhosis (HCC)     Colon polyp     Diabetes mellitus (HCC)     Esophageal varices (HCC)     Gastritis     Hepatitis C     History of Helicobacter pylori infection 02/23/2016    History of kidney stones     Hyperlipidemia     Hypertension     Infectious viral hepatitis     Kidney stone     Obesity     Pneumonia     PPD positive 2020    Sleep apnea     Splenomegaly     Substance abuse (HCC)      Past Surgical History:   Procedure Laterality Date    COLONOSCOPY  06/20/2014    dr mcduffie    COLONOSCOPY  2014        EGD AND COLONOSCOPY  08/2020    esophageal varices, colon polyp, hemorrhoids    ESOPHAGOGASTRODUODENOSCOPY  12/10/2015    esophageal varices, cirrhosis, gastritis    HYDROCELE EXCISION / REPAIR      LIVER BIOPSY  2014     Social History   Social History     Substance and Sexual Activity   Alcohol Use Not Currently     Social History     Substance and Sexual Activity   Drug Use Not Currently    Types: Heroin    Comment: former user- heroin was drug of choice     Social History     Tobacco Use   Smoking Status Former    Current packs/day: 0.25    Average packs/day: 1 pack/day for 41.6 years (41.3 ttl pk-yrs)    Types: Cigarettes    Start date: 1983    Quit date: 3/1/2024    Passive exposure: Current   Smokeless Tobacco Current   Tobacco Comments    TOBACCO USE     Family History   Problem Relation Age of Onset    Diabetes type II Mother         MELLITUS    Hypertension Mother     Hypertension Sister     Diabetes Sister         MELLITUS    No Known Problems Maternal Grandmother     No Known Problems Maternal Grandfather     No Known Problems Paternal Grandmother     No  Known Problems Paternal Grandfather     No Known Problems Sister     Breast cancer Maternal Aunt         40s    No Known Problems Maternal Aunt     No Known Problems Paternal Aunt     No Known Problems Paternal Aunt     No Known Problems Paternal Aunt     No Known Problems Paternal Aunt     No Known Problems Paternal Aunt     No Known Problems Paternal Aunt        Meds/Allergies       Current Outpatient Medications:     lisinopril (ZESTRIL) 5 mg tablet    methadone (DOLOPHINE) 10 mg/mL oral concentrated solution    nadolol (CORGARD) 20 mg tablet    omeprazole (PriLOSEC) 40 MG capsule    Comfort Touch Plus Lancets 30G MISC    Continuous Glucose Sensor (FreeStyle Luis Alberto 14 Day Sensor) [facility] [place]    FREESTYLE LITE test strip    ondansetron (ZOFRAN-ODT) 4 mg disintegrating tablet    Current Facility-Administered Medications:     lactated ringers infusion, 125 mL/hr, Intravenous, Continuous, 125 mL/hr at 09/25/24 0731    Allergies   Allergen Reactions    Meperidine Abdominal Pain       Objective     /89   Pulse 83   Temp (!) 97.3 °F (36.3 °C) (Temporal)   Resp 20   SpO2 98%       PHYSICAL EXAM    Gen: NAD  Head: NCAT  CV: RRR  CHEST: Clear  ABD: soft, NT/ND  EXT: no edema      ASSESSMENT/PLAN:  This is a 64 y.o. year old male here for EGD, and he is stable and optimized for his procedure.

## 2024-10-06 ENCOUNTER — APPOINTMENT (EMERGENCY)
Dept: CT IMAGING | Facility: HOSPITAL | Age: 64
End: 2024-10-06
Payer: MEDICARE

## 2024-10-06 ENCOUNTER — HOSPITAL ENCOUNTER (EMERGENCY)
Facility: HOSPITAL | Age: 64
Discharge: HOME | End: 2024-10-06
Attending: EMERGENCY MEDICINE
Payer: MEDICARE

## 2024-10-06 VITALS
OXYGEN SATURATION: 97 % | SYSTOLIC BLOOD PRESSURE: 118 MMHG | HEART RATE: 82 BPM | DIASTOLIC BLOOD PRESSURE: 64 MMHG | TEMPERATURE: 98.1 F | RESPIRATION RATE: 18 BRPM

## 2024-10-06 DIAGNOSIS — R10.9 ABDOMINAL PAIN: Primary | ICD-10-CM

## 2024-10-06 DIAGNOSIS — R11.0 NAUSEA: ICD-10-CM

## 2024-10-06 LAB
ALBUMIN SERPL BCG-MCNC: 4.1 G/DL (ref 3.5–5)
ALP SERPL-CCNC: 97 U/L (ref 34–104)
ALT SERPL W P-5'-P-CCNC: 23 U/L (ref 7–52)
ANION GAP SERPL CALCULATED.3IONS-SCNC: 9 MMOL/L (ref 4–13)
AST SERPL W P-5'-P-CCNC: 23 U/L (ref 13–39)
BACTERIA UR QL AUTO: ABNORMAL /HPF
BASOPHILS # BLD AUTO: 0.02 THOUSANDS/ΜL (ref 0–0.1)
BASOPHILS NFR BLD AUTO: 0 % (ref 0–1)
BILIRUB SERPL-MCNC: 0.83 MG/DL (ref 0.2–1)
BILIRUB UR QL STRIP: NEGATIVE
BUN SERPL-MCNC: 12 MG/DL (ref 5–25)
CALCIUM SERPL-MCNC: 9.3 MG/DL (ref 8.4–10.2)
CHLORIDE SERPL-SCNC: 100 MMOL/L (ref 96–108)
CLARITY UR: CLEAR
CO2 SERPL-SCNC: 25 MMOL/L (ref 21–32)
COLOR UR: YELLOW
CREAT SERPL-MCNC: 0.93 MG/DL (ref 0.6–1.3)
EOSINOPHIL # BLD AUTO: 0 THOUSAND/ΜL (ref 0–0.61)
EOSINOPHIL NFR BLD AUTO: 0 % (ref 0–6)
ERYTHROCYTE [DISTWIDTH] IN BLOOD BY AUTOMATED COUNT: 12.2 % (ref 11.6–15.1)
GFR SERPL CREATININE-BSD FRML MDRD: 85 ML/MIN/1.73SQ M
GLUCOSE SERPL-MCNC: 159 MG/DL (ref 65–140)
GLUCOSE UR STRIP-MCNC: NEGATIVE MG/DL
HCT VFR BLD AUTO: 44.2 % (ref 36.5–49.3)
HGB BLD-MCNC: 15.3 G/DL (ref 12–17)
HGB UR QL STRIP.AUTO: ABNORMAL
HYALINE CASTS #/AREA URNS LPF: ABNORMAL /LPF
IMM GRANULOCYTES # BLD AUTO: 0.03 THOUSAND/UL (ref 0–0.2)
IMM GRANULOCYTES NFR BLD AUTO: 0 % (ref 0–2)
KETONES UR STRIP-MCNC: NEGATIVE MG/DL
LEUKOCYTE ESTERASE UR QL STRIP: NEGATIVE
LIPASE SERPL-CCNC: 10 U/L (ref 11–82)
LYMPHOCYTES # BLD AUTO: 1.2 THOUSANDS/ΜL (ref 0.6–4.47)
LYMPHOCYTES NFR BLD AUTO: 14 % (ref 14–44)
MCH RBC QN AUTO: 31.3 PG (ref 26.8–34.3)
MCHC RBC AUTO-ENTMCNC: 34.6 G/DL (ref 31.4–37.4)
MCV RBC AUTO: 90 FL (ref 82–98)
MONOCYTES # BLD AUTO: 0.38 THOUSAND/ΜL (ref 0.17–1.22)
MONOCYTES NFR BLD AUTO: 5 % (ref 4–12)
MUCOUS THREADS UR QL AUTO: ABNORMAL
NEUTROPHILS # BLD AUTO: 6.87 THOUSANDS/ΜL (ref 1.85–7.62)
NEUTS SEG NFR BLD AUTO: 81 % (ref 43–75)
NITRITE UR QL STRIP: NEGATIVE
NON-SQ EPI CELLS URNS QL MICRO: ABNORMAL /HPF
NRBC BLD AUTO-RTO: 0 /100 WBCS
PH UR STRIP.AUTO: 6 [PH] (ref 4.5–8)
PLATELET # BLD AUTO: 201 THOUSANDS/UL (ref 149–390)
PMV BLD AUTO: 9.4 FL (ref 8.9–12.7)
POTASSIUM SERPL-SCNC: 3.8 MMOL/L (ref 3.5–5.3)
PROT SERPL-MCNC: 7.7 G/DL (ref 6.4–8.4)
PROT UR STRIP-MCNC: ABNORMAL MG/DL
RBC # BLD AUTO: 4.89 MILLION/UL (ref 3.88–5.62)
RBC #/AREA URNS AUTO: ABNORMAL /HPF
SODIUM SERPL-SCNC: 134 MMOL/L (ref 135–147)
SP GR UR STRIP.AUTO: >=1.03 (ref 1–1.03)
TRANS CELLS #/AREA URNS HPF: PRESENT /[HPF]
UROBILINOGEN UR QL STRIP.AUTO: 1 E.U./DL
WBC # BLD AUTO: 8.5 THOUSAND/UL (ref 4.31–10.16)
WBC #/AREA URNS AUTO: ABNORMAL /HPF

## 2024-10-06 PROCEDURE — 80053 COMPREHEN METABOLIC PANEL: CPT | Performed by: EMERGENCY MEDICINE

## 2024-10-06 PROCEDURE — 96374 THER/PROPH/DIAG INJ IV PUSH: CPT

## 2024-10-06 PROCEDURE — 96375 TX/PRO/DX INJ NEW DRUG ADDON: CPT

## 2024-10-06 PROCEDURE — 99284 EMERGENCY DEPT VISIT MOD MDM: CPT

## 2024-10-06 PROCEDURE — 81001 URINALYSIS AUTO W/SCOPE: CPT

## 2024-10-06 PROCEDURE — 99285 EMERGENCY DEPT VISIT HI MDM: CPT | Performed by: EMERGENCY MEDICINE

## 2024-10-06 PROCEDURE — 74176 CT ABD & PELVIS W/O CONTRAST: CPT

## 2024-10-06 PROCEDURE — 83690 ASSAY OF LIPASE: CPT | Performed by: EMERGENCY MEDICINE

## 2024-10-06 PROCEDURE — 96361 HYDRATE IV INFUSION ADD-ON: CPT

## 2024-10-06 PROCEDURE — 36415 COLL VENOUS BLD VENIPUNCTURE: CPT | Performed by: EMERGENCY MEDICINE

## 2024-10-06 PROCEDURE — 85025 COMPLETE CBC W/AUTO DIFF WBC: CPT | Performed by: EMERGENCY MEDICINE

## 2024-10-06 RX ORDER — KETOROLAC TROMETHAMINE 30 MG/ML
15 INJECTION, SOLUTION INTRAMUSCULAR; INTRAVENOUS ONCE
Status: COMPLETED | OUTPATIENT
Start: 2024-10-06 | End: 2024-10-06

## 2024-10-06 RX ORDER — ONDANSETRON 4 MG/1
4 TABLET, ORALLY DISINTEGRATING ORAL EVERY 8 HOURS PRN
Qty: 10 TABLET | Refills: 0 | Status: SHIPPED | OUTPATIENT
Start: 2024-10-06 | End: 2024-10-11

## 2024-10-06 RX ORDER — DICYCLOMINE HCL 20 MG
20 TABLET ORAL 2 TIMES DAILY
Qty: 10 TABLET | Refills: 0 | Status: SHIPPED | OUTPATIENT
Start: 2024-10-06 | End: 2024-10-11

## 2024-10-06 RX ORDER — ONDANSETRON 2 MG/ML
4 INJECTION INTRAMUSCULAR; INTRAVENOUS ONCE
Status: COMPLETED | OUTPATIENT
Start: 2024-10-06 | End: 2024-10-06

## 2024-10-06 RX ADMIN — KETOROLAC TROMETHAMINE 15 MG: 30 INJECTION, SOLUTION INTRAMUSCULAR; INTRAVENOUS at 14:58

## 2024-10-06 RX ADMIN — IOHEXOL 100 ML: 350 INJECTION, SOLUTION INTRAVENOUS at 15:46

## 2024-10-06 RX ADMIN — ONDANSETRON 4 MG: 2 INJECTION INTRAMUSCULAR; INTRAVENOUS at 14:58

## 2024-10-06 RX ADMIN — SODIUM CHLORIDE 1000 ML: 0.9 INJECTION, SOLUTION INTRAVENOUS at 14:59

## 2024-10-06 NOTE — ED PROVIDER NOTES
Final diagnoses:   Abdominal pain   Nausea     ED Disposition       ED Disposition   Discharge    Condition   Stable    Date/Time   Sun Oct 6, 2024  5:38 PM    Comment   Rishi Kirk discharge to home/self care.                   Assessment & Plan       Medical Decision Making  Patient with generalized abdominal pain, left flank pain, states that he has gallbladder issues, and kidney stone, pain associated with nausea, no vomiting, had normal bowel movement, no dysuria or hematuria, no fever or chill, on exam, patient is conscious, alert, no acute distress, stable vital signs, abdomen is soft, obese belly, mild generalized tenderness, mild left CVA tenderness.  Differential diagnosis: Enteritis, colitis, cholecystitis, biliary colic, left renal colic, UTI, pyonephritis, will check labs, urine, get CT scan of the pelvis, give IV fluids, nausea meds, pain meds.    Problems Addressed:  Abdominal pain: acute illness or injury  Nausea: acute illness or injury    Amount and/or Complexity of Data Reviewed  Labs: ordered. Decision-making details documented in ED Course.  Radiology: ordered. Decision-making details documented in ED Course.    Risk  Prescription drug management.        ED Course as of 10/07/24 0040   Sun Oct 06, 2024   1515 WBC: 8.50   1515 Hemoglobin: 15.3   1515 Platelet Count: 201  CBC wnl.   1645 Sodium(!): 134   1645 Potassium: 3.8   1645 BUN: 12   1645 Creatinine: 0.93   1645 GLUCOSE(!): 159   1645 LIPASE(!): 10  CMP, Lipase non-acute.   1730 CT abdomen pelvis wo contrast  IMPRESSION:     1. No acute findings in the abdomen or pelvis within the limits of unenhanced technique.     2. Cholelithiasis/biliary sludge.     3. Unchanged 4 mm nonobstructive right lower pole calculus.     4. Cirrhosis and splenomegaly.     5. IV contrast infiltration noted within the right arm.     1806 Patient informed about the nonacute workup results, advised to follow-up with PCP and gastroenterology for his recurrent  abdominal pain.       Medications   sodium chloride 0.9 % bolus 1,000 mL (0 mL Intravenous Stopped 10/6/24 1707)   ondansetron (ZOFRAN) injection 4 mg (4 mg Intravenous Given 10/6/24 1458)   ketorolac (TORADOL) injection 15 mg (15 mg Intravenous Given 10/6/24 1458)   iohexol (OMNIPAQUE) 350 MG/ML injection (MULTI-DOSE) 100 mL (100 mL Intravenous Given 10/6/24 1546)       ED Risk Strat Scores                           SBIRT 20yo+      Flowsheet Row Most Recent Value   Initial Alcohol Screen: US AUDIT-C     1. How often do you have a drink containing alcohol? 0 Filed at: 10/06/2024 6200   2. How many drinks containing alcohol do you have on a typical day you are drinking?  0 Filed at: 10/06/2024 1013   3b. FEMALE Any Age, or MALE 65+: How often do you have 4 or more drinks on one occassion? 0 Filed at: 10/06/2024 2578   Audit-C Score 0 Filed at: 10/06/2024 1555   GRAYSON: How many times in the past year have you...    Used an illegal drug or used a prescription medication for non-medical reasons? Never Filed at: 10/06/2024 6163                            History of Present Illness       Chief Complaint   Patient presents with    Abdominal Pain     Abdominal pain and vomiting starting this morning. Taking tylenol without relief.        Past Medical History:   Diagnosis Date    Cirrhosis (HCC)     Colon polyp     Diabetes mellitus (HCC)     Esophageal varices (HCC)     Gastritis     Hepatitis C     History of Helicobacter pylori infection 02/23/2016    History of kidney stones     Hyperlipidemia     Hypertension     Infectious viral hepatitis     Kidney stone     Obesity     Pneumonia     PPD positive 2020    Sleep apnea     Splenomegaly     Substance abuse (HCC)       Past Surgical History:   Procedure Laterality Date    COLONOSCOPY  06/20/2014    dr mcduffie    COLONOSCOPY  2014        EGD AND COLONOSCOPY  08/2020    esophageal varices, colon polyp, hemorrhoids    ESOPHAGOGASTRODUODENOSCOPY  12/10/2015    esophageal  varices, cirrhosis, gastritis    HYDROCELE EXCISION / REPAIR      LIVER BIOPSY  2014      Family History   Problem Relation Age of Onset    Diabetes type II Mother         MELLITUS    Hypertension Mother     Hypertension Sister     Diabetes Sister         MELLITUS    No Known Problems Maternal Grandmother     No Known Problems Maternal Grandfather     No Known Problems Paternal Grandmother     No Known Problems Paternal Grandfather     No Known Problems Sister     Breast cancer Maternal Aunt         40s    No Known Problems Maternal Aunt     No Known Problems Paternal Aunt     No Known Problems Paternal Aunt     No Known Problems Paternal Aunt     No Known Problems Paternal Aunt     No Known Problems Paternal Aunt     No Known Problems Paternal Aunt       Social History     Tobacco Use    Smoking status: Former     Current packs/day: 0.25     Average packs/day: 1 pack/day for 41.7 years (41.3 ttl pk-yrs)     Types: Cigarettes     Start date: 1983     Quit date: 3/1/2024     Passive exposure: Current    Smokeless tobacco: Current    Tobacco comments:     TOBACCO USE   Vaping Use    Vaping status: Never Used   Substance Use Topics    Alcohol use: Not Currently    Drug use: Not Currently     Types: Heroin     Comment: former user- heroin was drug of choice      E-Cigarette/Vaping    E-Cigarette Use Never User       E-Cigarette/Vaping Substances    Nicotine No     THC No     CBD No     Flavoring No       I have reviewed and agree with the history as documented.       History provided by:  Patient   used: No    Abdominal Pain  Pain location:  Generalized  Pain quality: aching    Pain radiates to:  L flank  Pain severity:  Moderate  Onset quality:  Gradual  Duration:  1 day  Timing:  Intermittent  Progression:  Waxing and waning  Chronicity:  New  Context comment:  Patient with generalized abdominal pain, left flank pain, states that he has gallbladder issues, associate with nausea, no vomiting, had  normal bowel movement, no dysuria or hematuria, no fever or chills  Relieved by:  Nothing  Worsened by:  Nothing  Ineffective treatments:  None tried  Associated symptoms: nausea    Associated symptoms: no chest pain, no chills, no cough, no diarrhea, no dysuria, no fever, no shortness of breath, no sore throat and no vomiting    Nausea:     Severity:  Mild    Onset quality:  Gradual    Duration:  1 day    Timing:  Intermittent    Progression:  Waxing and waning  Risk factors comment:  Cirrhosis, diabetes, hypertension, kidney stone, substance abuse      Review of Systems   Constitutional:  Negative for chills and fever.   HENT:  Negative for facial swelling, sore throat and trouble swallowing.    Eyes:  Negative for pain and visual disturbance.   Respiratory:  Negative for cough, chest tightness and shortness of breath.    Cardiovascular:  Negative for chest pain and leg swelling.   Gastrointestinal:  Positive for abdominal pain and nausea. Negative for diarrhea and vomiting.   Genitourinary:  Negative for dysuria and flank pain.   Musculoskeletal:  Negative for back pain, neck pain and neck stiffness.   Skin:  Negative for pallor and rash.   Allergic/Immunologic: Negative for environmental allergies and immunocompromised state.   Neurological:  Negative for dizziness and headaches.   Hematological:  Negative for adenopathy. Does not bruise/bleed easily.   Psychiatric/Behavioral:  Negative for agitation and behavioral problems.    All other systems reviewed and are negative.          Objective       ED Triage Vitals   Temperature Pulse Blood Pressure Respirations SpO2 Patient Position - Orthostatic VS   10/06/24 1301 10/06/24 1301 10/06/24 1301 10/06/24 1301 10/06/24 1301 10/06/24 1654   98.1 °F (36.7 °C) (!) 107 (!) 174/92 18 97 % Sitting      Temp src Heart Rate Source BP Location FiO2 (%) Pain Score    -- 10/06/24 1654 10/06/24 1654 -- 10/06/24 1301     Monitor Left arm  10 - Worst Possible Pain      Vitals       Date and Time Temp Pulse SpO2 Resp BP Pain Score FACES Pain Rating User   10/06/24 1654 -- 82 97 % 18 118/64 -- -- DS   10/06/24 1458 -- -- -- -- -- 10 - Worst Possible Pain -- JURGEN   10/06/24 1301 98.1 °F (36.7 °C) 107 97 % 18 174/92 10 - Worst Possible Pain -- JURGEN            Physical Exam  Vitals and nursing note reviewed.   Constitutional:       General: He is not in acute distress.     Appearance: He is well-developed.   HENT:      Head: Normocephalic and atraumatic.   Eyes:      Extraocular Movements: Extraocular movements intact.   Cardiovascular:      Rate and Rhythm: Normal rate and regular rhythm.   Pulmonary:      Effort: Pulmonary effort is normal.   Abdominal:      Palpations: Abdomen is soft.      Tenderness: There is generalized abdominal tenderness. There is left CVA tenderness (mild). There is no guarding or rebound.   Musculoskeletal:         General: Normal range of motion.      Cervical back: Normal range of motion and neck supple.   Skin:     General: Skin is warm and dry.   Neurological:      General: No focal deficit present.      Mental Status: He is alert and oriented to person, place, and time.   Psychiatric:         Mood and Affect: Mood normal.         Behavior: Behavior normal.         Results Reviewed       Procedure Component Value Units Date/Time    Urine Microscopic [[id number]]  (Abnormal) Collected: 10/06/24 1700    Lab Status: Final result Specimen: Urine, Clean Catch Updated: 10/06/24 1748     RBC, UA 4-10 /hpf      WBC, UA 1-2 /hpf      Epithelial Cells Occasional /hpf      Bacteria, UA Occasional /hpf      MUCUS THREADS Occasional     Hyaline Casts, UA 0-3 /lpf      Transitional Epithelial Cells Present    Urine Macroscopic, POC [[id number]]  (Abnormal) Collected: 10/06/24 1700    Lab Status: Final result Specimen: Urine Updated: 10/06/24 1701     Color, UA Yellow     Clarity, UA Clear     pH, UA 6.0     Leukocytes, UA Negative     Nitrite, UA Negative     Protein, UA 30 (1+)  mg/dl      Glucose, UA Negative mg/dl      Ketones, UA Negative mg/dl      Urobilinogen, UA 1.0 E.U./dl      Bilirubin, UA Negative     Occult Blood, UA Trace     Specific Gravity, UA >=1.030    Narrative:      CLINITEK RESULT    Comprehensive metabolic panel [[id number]]  (Abnormal) Collected: 10/06/24 1505    Lab Status: Final result Specimen: Blood from Arm, Right Updated: 10/06/24 1535     Sodium 134 mmol/L      Potassium 3.8 mmol/L      Chloride 100 mmol/L      CO2 25 mmol/L      ANION GAP 9 mmol/L      BUN 12 mg/dL      Creatinine 0.93 mg/dL      Glucose 159 mg/dL      Calcium 9.3 mg/dL      AST 23 U/L      ALT 23 U/L      Alkaline Phosphatase 97 U/L      Total Protein 7.7 g/dL      Albumin 4.1 g/dL      Total Bilirubin 0.83 mg/dL      eGFR 85 ml/min/1.73sq m     Narrative:      National Kidney Disease Foundation guidelines for Chronic Kidney Disease (CKD):     Stage 1 with normal or high GFR (GFR > 90 mL/min/1.73 square meters)    Stage 2 Mild CKD (GFR = 60-89 mL/min/1.73 square meters)    Stage 3A Moderate CKD (GFR = 45-59 mL/min/1.73 square meters)    Stage 3B Moderate CKD (GFR = 30-44 mL/min/1.73 square meters)    Stage 4 Severe CKD (GFR = 15-29 mL/min/1.73 square meters)    Stage 5 End Stage CKD (GFR <15 mL/min/1.73 square meters)  Note: GFR calculation is accurate only with a steady state creatinine    Lipase [[id number]]  (Abnormal) Collected: 10/06/24 1505    Lab Status: Final result Specimen: Blood from Arm, Right Updated: 10/06/24 1535     Lipase 10 u/L     CBC and differential [[id number]]  (Abnormal) Collected: 10/06/24 1458    Lab Status: Final result Specimen: Blood from Arm, Right Updated: 10/06/24 1509     WBC 8.50 Thousand/uL      RBC 4.89 Million/uL      Hemoglobin 15.3 g/dL      Hematocrit 44.2 %      MCV 90 fL      MCH 31.3 pg      MCHC 34.6 g/dL      RDW 12.2 %      MPV 9.4 fL      Platelets 201 Thousands/uL      nRBC 0 /100 WBCs      Segmented % 81 %      Immature Grans % 0 %       Lymphocytes % 14 %      Monocytes % 5 %      Eosinophils Relative 0 %      Basophils Relative 0 %      Absolute Neutrophils 6.87 Thousands/µL      Absolute Immature Grans 0.03 Thousand/uL      Absolute Lymphocytes 1.20 Thousands/µL      Absolute Monocytes 0.38 Thousand/µL      Eosinophils Absolute 0.00 Thousand/µL      Basophils Absolute 0.02 Thousands/µL             CT abdomen pelvis wo contrast   Final Interpretation by Rafael Godfrey MD (10/06 1655)      1. No acute findings in the abdomen or pelvis within the limits of unenhanced technique.      2. Cholelithiasis/biliary sludge.      3. Unchanged 4 mm nonobstructive right lower pole calculus.      4. Cirrhosis and splenomegaly.      5. IV contrast infiltration noted within the right arm.      Resident: KRISTI MARROQUIN I, the attending radiologist, have reviewed the images and agree with the final report above.      Workstation performed: [id number]             Procedures    ED Medication and Procedure Management   Prior to Admission Medications   Prescriptions Last Dose Informant Patient Reported? Taking?   Comfort Touch Plus Lancets 30G MISC  Self No No   Sig: Inject 1 Application under the skin Daily at 2am   Patient not taking: Reported on 9/23/2024   Continuous Glucose Sensor (FreeStyle Luis Alberto 14 Day Sensor) MISC  Self Yes No   Sig: USE AS DIRECTED USE DANI LAS INDICACIONES   Patient not taking: Reported on 9/23/2024   FREESTYLE LITE test strip  Self No No   Sig: TEST 2-3 TIMES A DAY AS DIRECTEDTEST 2-3 TIMES A DAY AS DIRECTED   Patient not taking: Reported on 9/23/2024   lisinopril (ZESTRIL) 5 mg tablet  Self No No   Sig: TAKE 1 TABLET (5 MG TOTAL) BY MOUTH DAILY   methadone (DOLOPHINE) 10 mg/mL oral concentrated solution  Self Yes No   Sig: Take 44 mg by mouth daily   nadolol (CORGARD) 20 mg tablet  Self No No   Sig: TAKE 1 TABLET (20 MG TOTAL) BY MOUTH DAILY   omeprazole (PriLOSEC) 40 MG capsule  Self Yes No   Sig: TAKE 1 CAPSULE (40 MG TOTAL) BY  MOUTH DAILY TOME 1 CAPSULA 30 MINUTOS ANTES DE DESAYUNAR   ondansetron (ZOFRAN-ODT) 4 mg disintegrating tablet   No No   Sig: TAKE 1 TABLET (4 MG TOTAL) BY MOUTH EVERY 8 (EIGHT) HOURS AS NEEDED FOR NAUSEA OR VOMITING FOR UP TO 5 DAYS      Facility-Administered Medications: None     Discharge Medication List as of 10/6/2024  6:18 PM        CONTINUE these medications which have NOT CHANGED    Details   Comfort Touch Plus Lancets 30G MISC Inject 1 Application under the skin Daily at 2am, Starting Fri 7/12/2024, Until Mon 7/7/2025, Normal      Continuous Glucose Sensor (FreeStyle Luis Alberto 14 Day Sensor) MISC USE AS DIRECTED USE DANI LAS INDICACIONES, Historical Med      FREESTYLE LITE test strip TEST 2-3 TIMES A DAY AS DIRECTEDTEST 2-3 TIMES A DAY AS DIRECTED, Normal      lisinopril (ZESTRIL) 5 mg tablet TAKE 1 TABLET (5 MG TOTAL) BY MOUTH DAILY, Starting Sun 8/4/2024, Normal      methadone (DOLOPHINE) 10 mg/mL oral concentrated solution Take 44 mg by mouth daily, Historical Med      nadolol (CORGARD) 20 mg tablet TAKE 1 TABLET (20 MG TOTAL) BY MOUTH DAILY, Starting Mon 6/10/2024, Normal      omeprazole (PriLOSEC) 40 MG capsule TAKE 1 CAPSULE (40 MG TOTAL) BY MOUTH DAILY TOME 1 CAPSULA 30 MINUTOS ANTES DE DESAYUNAR, Historical Med      ondansetron (ZOFRAN-ODT) 4 mg disintegrating tablet TAKE 1 TABLET (4 MG TOTAL) BY MOUTH EVERY 8 (EIGHT) HOURS AS NEEDED FOR NAUSEA OR VOMITING FOR UP TO 5 DAYS, Starting Tue 5/28/2024, Until Sun 6/2/2024 at 2359, Normal           No discharge procedures on file.  ED SEPSIS DOCUMENTATION   Time reflects when diagnosis was documented in both MDM as applicable and the Disposition within this note       Time User Action Codes Description Comment    10/6/2024  2:25 PM Jacinto Castano Add [R10.9] Abdominal pain     10/6/2024  2:25 PM Jacinto Castano Add [R11.0] Nausea                  Jacinto Castano MD  10/07/24 0040

## 2024-10-07 DIAGNOSIS — E11.9 TYPE 2 DIABETES MELLITUS WITHOUT COMPLICATION, WITHOUT LONG-TERM CURRENT USE OF INSULIN (HCC): ICD-10-CM

## 2024-10-07 RX ORDER — BLOOD-GLUCOSE METER
KIT MISCELLANEOUS
Qty: 300 EACH | Refills: 1 | Status: SHIPPED | OUTPATIENT
Start: 2024-10-07

## 2024-10-07 NOTE — TELEPHONE ENCOUNTER
Reason for call:   [x] Refill   [] Prior Auth  [] Other:     Office:   [x] PCP/Provider -PRIMARY CARE JESUS Trivedi MD   [] Specialty/Provider -     Medication:     FREESTYLE LITE test strip       Dose/Frequency:     TEST 2-3 TIMES A DAY AS DIRECTEDTEST 2-3 TIMES A DAY AS DIRECTED       Pharmacy:  [facility] - [place] - [address]     Does the patient have enough for 3 days?   [] Yes   [x] No - Send as HP to POD

## 2024-10-08 NOTE — TELEPHONE ENCOUNTER
Patient's pharmacy called to clarify test strip order and made aware patient is to test 2-3 times a day.

## 2024-10-09 ENCOUNTER — TELEPHONE (OUTPATIENT)
Dept: RADIOLOGY | Facility: HOSPITAL | Age: 64
End: 2024-10-09

## 2024-10-09 ENCOUNTER — LAB (OUTPATIENT)
Dept: LAB | Facility: HOSPITAL | Age: 64
End: 2024-10-09
Payer: MEDICARE

## 2024-10-09 ENCOUNTER — OFFICE VISIT (OUTPATIENT)
Dept: FAMILY MEDICINE CLINIC | Facility: CLINIC | Age: 64
End: 2024-10-09
Payer: MEDICARE

## 2024-10-09 ENCOUNTER — TELEPHONE (OUTPATIENT)
Dept: FAMILY MEDICINE CLINIC | Facility: CLINIC | Age: 64
End: 2024-10-09

## 2024-10-09 VITALS
OXYGEN SATURATION: 98 % | DIASTOLIC BLOOD PRESSURE: 70 MMHG | HEART RATE: 120 BPM | RESPIRATION RATE: 16 BRPM | SYSTOLIC BLOOD PRESSURE: 126 MMHG | HEIGHT: 65 IN | TEMPERATURE: 97.9 F | WEIGHT: 212 LBS | BODY MASS INDEX: 35.32 KG/M2

## 2024-10-09 DIAGNOSIS — E11.9 TYPE 2 DIABETES MELLITUS WITHOUT COMPLICATION, WITHOUT LONG-TERM CURRENT USE OF INSULIN (HCC): ICD-10-CM

## 2024-10-09 DIAGNOSIS — R53.82 CHRONIC FATIGUE: ICD-10-CM

## 2024-10-09 DIAGNOSIS — K80.20 GALLSTONES: ICD-10-CM

## 2024-10-09 DIAGNOSIS — E66.01 OBESITY, MORBID (HCC): ICD-10-CM

## 2024-10-09 DIAGNOSIS — Z11.59 ENCOUNTER FOR SCREENING FOR OTHER VIRAL DISEASES: ICD-10-CM

## 2024-10-09 DIAGNOSIS — K59.01 SLOW TRANSIT CONSTIPATION: ICD-10-CM

## 2024-10-09 DIAGNOSIS — K74.60 CIRRHOSIS OF LIVER WITHOUT ASCITES, UNSPECIFIED HEPATIC CIRRHOSIS TYPE (HCC): ICD-10-CM

## 2024-10-09 DIAGNOSIS — F41.9 ANXIETY: Primary | ICD-10-CM

## 2024-10-09 DIAGNOSIS — F41.9 ANXIETY: ICD-10-CM

## 2024-10-09 LAB
ALBUMIN SERPL BCG-MCNC: 4.3 G/DL (ref 3.5–5)
ALP SERPL-CCNC: 100 U/L (ref 34–104)
ALT SERPL W P-5'-P-CCNC: 25 U/L (ref 7–52)
ANION GAP SERPL CALCULATED.3IONS-SCNC: 11 MMOL/L (ref 4–13)
AST SERPL W P-5'-P-CCNC: 32 U/L (ref 13–39)
BASOPHILS # BLD AUTO: 0.01 THOUSANDS/ΜL (ref 0–0.1)
BASOPHILS NFR BLD AUTO: 0 % (ref 0–1)
BILIRUB SERPL-MCNC: 0.92 MG/DL (ref 0.2–1)
BUN SERPL-MCNC: 10 MG/DL (ref 5–25)
CALCIUM SERPL-MCNC: 9.8 MG/DL (ref 8.4–10.2)
CHLORIDE SERPL-SCNC: 101 MMOL/L (ref 96–108)
CO2 SERPL-SCNC: 26 MMOL/L (ref 21–32)
CREAT SERPL-MCNC: 0.93 MG/DL (ref 0.6–1.3)
EOSINOPHIL # BLD AUTO: 0.01 THOUSAND/ΜL (ref 0–0.61)
EOSINOPHIL NFR BLD AUTO: 0 % (ref 0–6)
ERYTHROCYTE [DISTWIDTH] IN BLOOD BY AUTOMATED COUNT: 12.4 % (ref 11.6–15.1)
GFR SERPL CREATININE-BSD FRML MDRD: 85 ML/MIN/1.73SQ M
GLUCOSE P FAST SERPL-MCNC: 170 MG/DL (ref 65–99)
HCT VFR BLD AUTO: 45.3 % (ref 36.5–49.3)
HGB BLD-MCNC: 15.5 G/DL (ref 12–17)
IMM GRANULOCYTES # BLD AUTO: 0.03 THOUSAND/UL (ref 0–0.2)
IMM GRANULOCYTES NFR BLD AUTO: 1 % (ref 0–2)
LYMPHOCYTES # BLD AUTO: 1.01 THOUSANDS/ΜL (ref 0.6–4.47)
LYMPHOCYTES NFR BLD AUTO: 17 % (ref 14–44)
MCH RBC QN AUTO: 32.1 PG (ref 26.8–34.3)
MCHC RBC AUTO-ENTMCNC: 34.2 G/DL (ref 31.4–37.4)
MCV RBC AUTO: 94 FL (ref 82–98)
MONOCYTES # BLD AUTO: 0.32 THOUSAND/ΜL (ref 0.17–1.22)
MONOCYTES NFR BLD AUTO: 5 % (ref 4–12)
NEUTROPHILS # BLD AUTO: 4.68 THOUSANDS/ΜL (ref 1.85–7.62)
NEUTS SEG NFR BLD AUTO: 77 % (ref 43–75)
NRBC BLD AUTO-RTO: 0 /100 WBCS
PLATELET # BLD AUTO: 179 THOUSANDS/UL (ref 149–390)
PMV BLD AUTO: 9.2 FL (ref 8.9–12.7)
POTASSIUM SERPL-SCNC: 4.1 MMOL/L (ref 3.5–5.3)
PROT SERPL-MCNC: 8.1 G/DL (ref 6.4–8.4)
RBC # BLD AUTO: 4.83 MILLION/UL (ref 3.88–5.62)
SL AMB POCT HEMOGLOBIN AIC: 7 (ref ?–6.5)
SODIUM SERPL-SCNC: 138 MMOL/L (ref 135–147)
TSH SERPL DL<=0.05 MIU/L-ACNC: 0.75 UIU/ML (ref 0.45–4.5)
WBC # BLD AUTO: 6.06 THOUSAND/UL (ref 4.31–10.16)

## 2024-10-09 PROCEDURE — 36415 COLL VENOUS BLD VENIPUNCTURE: CPT

## 2024-10-09 PROCEDURE — 85025 COMPLETE CBC W/AUTO DIFF WBC: CPT

## 2024-10-09 PROCEDURE — 83036 HEMOGLOBIN GLYCOSYLATED A1C: CPT | Performed by: FAMILY MEDICINE

## 2024-10-09 PROCEDURE — 84443 ASSAY THYROID STIM HORMONE: CPT

## 2024-10-09 PROCEDURE — 80053 COMPREHEN METABOLIC PANEL: CPT

## 2024-10-09 PROCEDURE — 99214 OFFICE O/P EST MOD 30 MIN: CPT | Performed by: FAMILY MEDICINE

## 2024-10-09 RX ORDER — DOCUSATE SODIUM 100 MG/1
100 CAPSULE, LIQUID FILLED ORAL 2 TIMES DAILY
Qty: 60 CAPSULE | Refills: 5 | Status: SHIPPED | OUTPATIENT
Start: 2024-10-09

## 2024-10-09 RX ORDER — MIRTAZAPINE 7.5 MG/1
7.5 TABLET, FILM COATED ORAL
Qty: 30 TABLET | Refills: 5 | Status: SHIPPED | OUTPATIENT
Start: 2024-10-09

## 2024-10-09 RX ORDER — ATORVASTATIN CALCIUM 40 MG/1
40 TABLET, FILM COATED ORAL DAILY
Qty: 90 TABLET | Refills: 3 | Status: SHIPPED | OUTPATIENT
Start: 2024-10-09

## 2024-10-09 RX ORDER — PIOGLITAZONEHYDROCHLORIDE 15 MG/1
15 TABLET ORAL DAILY
Qty: 100 TABLET | Refills: 3 | Status: SHIPPED | OUTPATIENT
Start: 2024-10-09

## 2024-10-09 NOTE — PROGRESS NOTES
Ambulatory Visit  Name: Rishi Kirk      : 1960      MRN: [id number]  Encounter Provider: Soren Trivedi MD  Encounter Date: 10/9/2024   Encounter department: [facility] CARE [facility]    Assessment & Plan  Anxiety  1. Anxiety: The patient reports significant anxiety with symptoms of restlessness and inability to sleep. Mirtazapine will be prescribed to address anxiety and aid in sleep. The patient is advised to take one dose at night.    Orders:    mirtazapine (REMERON) 7.5 MG tablet; Take 1 tablet (7.5 mg total) by mouth daily at bedtime    Comprehensive metabolic panel; Future    TSH, 3rd generation with Free T4 reflex; Future    Gallstones  2. Gallbladder Stones: The patient has a history of gallbladder stones but currently denies any pain. No immediate surgical intervention is required unless symptoms develop.         Type 2 diabetes mellitus without complication, without long-term current use of insulin (HCC)    Lab Results   Component Value Date    HGBA1C 7.0 (A) 10/09/2024    Diabetes Management: The patient reports issues with previous diabetes medication. Pioglitazone will be initiated to manage blood sugar levels and address fatty liver concerns.   Follow-up: The patient will undergo blood tests to monitor liver function and other relevant parameters. The patient is advised to return if symptoms worsen or new symptoms develop.    Orders:    POCT hemoglobin A1c    atorvastatin (LIPITOR) 40 mg tablet; Take 1 tablet (40 mg total) by mouth daily    pioglitazone (ACTOS) 15 mg tablet; Take 1 tablet (15 mg total) by mouth daily    Obesity, morbid (HCC)  We discussed the initial approach to the obese patient who desires to lose weight. Patients should diet and exercise, as the NIH Expert Panel recommended in 2017. Combining a reduced-calorie diet and exercise is more productive than either alone. Additional weight loss may be possible with some medication regimens. The initial  goal of weight loss therapy (diet and exercise) is a 10% reduction in body weight over six months. After the initial 6-month period, we will reassess to determine the efficacy of the therapy; whether the patient needs to lose more weight, the patient should establish a weight maintenance program.           Slow transit constipation   A high fiber diet has been found to increase bowel frequency and to be effective in treatment of constipation. Dietary fiber is a natural component of plant products, such as fruit, vegetables, and grains. It provides the bulk needed by the colon to eliminate body waste. As fiber passes through the colon, it acts as a sponge by absorbing water. This results in bulkier and softer stools. Waste then moves through the body more quickly allowing easier and more regular bowel movements. A diet high in fiber is not recommended for individuals who are immobile or who do not consume at least 1500 milliliters of fluids per day   Orders:    docusate sodium (COLACE) 100 mg capsule; Take 1 capsule (100 mg total) by mouth 2 (two) times a day    Chronic fatigue  To rule out causes of fatigue mainly is anemia.  Patient has no signs of bleeding.  He has history of cirrhosis and the main cause for bleeding and patient like him is esophageal varices.  Orders:    CBC and differential; Future       History of Present Illness     The patient appears to be in mild discomfort due to back pain. No visible deformities or acute distress noted. There is no reported numbness or tingling in the lower extremities. The patient has a history of lifting heavy objects at work, which may contribute to his symptoms. No other significant findings were reported during the examination.      Chief Complaint   Patient presents with    Anxiety    Diabetes      History obtained from patient.  The patient presents with complaints of severe anxiety, describing it as a feeling of 'lions in the stomach' that prevents sleep and  "affects appetite. The patient denies depression but feels a strong urge to escape the situation. There is no history of suicidal ideation.  He has history of opioid use disorder.  He is on chronic dose of methadone that he did not miss.  He is on methadone for more than 10 years now.  The patient has a history of gallbladder stones but reports no current pain. Recent medical evaluations, including a CT scan without contrast, showed no blockages. The patient has been managing diabetes but reports issues with previous medications causing rapid drops in blood sugar. The patient lives in a high-rise building and mentions frequent emergency alarms due to elderly residents.  Review of Systems  Past Medical History:   Diagnosis Date    Cirrhosis (HCC)     Colon polyp     Diabetes mellitus (HCC)     Esophageal varices (HCC)     Gastritis     Hepatitis C     History of Helicobacter pylori infection 02/23/2016    History of kidney stones     Hyperlipidemia     Hypertension     Infectious viral hepatitis     Kidney stone     Obesity     Pneumonia     PPD positive 2020    Sleep apnea     Splenomegaly     Substance abuse (HCC)      Past Surgical History:   Procedure Laterality Date    COLONOSCOPY  06/20/2014    dr mcduffie    COLONOSCOPY  2014        EGD AND COLONOSCOPY  08/2020    esophageal varices, colon polyp, hemorrhoids    ESOPHAGOGASTRODUODENOSCOPY  12/10/2015    esophageal varices, cirrhosis, gastritis    HYDROCELE EXCISION / REPAIR      LIVER BIOPSY  2014           Objective     /70 (BP Location: Left arm, Patient Position: Sitting, Cuff Size: Standard)   Pulse (!) 120   Temp 97.9 °F (36.6 °C) (Tympanic)   Resp 16   Ht 5' 5\" (1.651 m)   Wt 96.2 kg (212 lb)   SpO2 98%   BMI 35.28 kg/m²     Physical Exam  He looks anxious., normal respiratory effort. Pulse is regular. Heart without murmurs.     I have spent 35 minutes with Rishi today in which greater than 50% of this time was spent in " counseling/coordination of care regarding Diagnostic results, Prognosis, Risks and benefits of tx options, Counseling / Coordination of care, Reviewing / ordering tests, medicine, procedures.  Please note this time includes cumulative time on the day of encounter, including reviewing medical records and/or coordinating care among the patient's other specialists.

## 2024-10-09 NOTE — NURSING NOTE
Follow up call placed to pt after extravasation of contrast into R Bicep after CT scan on 10/6/2024.  Pt states all symptoms have resolved---denies pain, swelling, blistering, hardness, redness, changes in sensation or increase/decrease in temperature of skin.  RN encouraged pt to elevate and apply ice to the site for 20-60 minutes three times a day as needed.  Pt verbalized understanding.  Pt denies any questions at this time.  RN instructed pt to call MD if any issues would arise. .

## 2024-10-09 NOTE — ASSESSMENT & PLAN NOTE
Lab Results   Component Value Date    HGBA1C 7.0 (A) 10/09/2024    Diabetes Management: The patient reports issues with previous diabetes medication. Pioglitazone will be initiated to manage blood sugar levels and address fatty liver concerns.   Follow-up: The patient will undergo blood tests to monitor liver function and other relevant parameters. The patient is advised to return if symptoms worsen or new symptoms develop.    Orders:    POCT hemoglobin A1c    atorvastatin (LIPITOR) 40 mg tablet; Take 1 tablet (40 mg total) by mouth daily    pioglitazone (ACTOS) 15 mg tablet; Take 1 tablet (15 mg total) by mouth daily

## 2024-10-09 NOTE — ASSESSMENT & PLAN NOTE
We discussed the initial approach to the obese patient who desires to lose weight. Patients should diet and exercise, as the NIH Expert Panel recommended in 2017. Combining a reduced-calorie diet and exercise is more productive than either alone. Additional weight loss may be possible with some medication regimens. The initial goal of weight loss therapy (diet and exercise) is a 10% reduction in body weight over six months. After the initial 6-month period, we will reassess to determine the efficacy of the therapy; whether the patient needs to lose more weight, the patient should establish a weight maintenance program.

## 2024-11-04 DIAGNOSIS — R19.7 NAUSEA VOMITING AND DIARRHEA: ICD-10-CM

## 2024-11-04 DIAGNOSIS — E11.9 TYPE 2 DIABETES MELLITUS WITHOUT COMPLICATION, WITHOUT LONG-TERM CURRENT USE OF INSULIN (HCC): Primary | ICD-10-CM

## 2024-11-04 DIAGNOSIS — R11.2 NAUSEA VOMITING AND DIARRHEA: ICD-10-CM

## 2024-11-05 RX ORDER — ONDANSETRON 4 MG/1
4 TABLET, ORALLY DISINTEGRATING ORAL EVERY 8 HOURS PRN
Qty: 10 TABLET | Refills: 0 | Status: SHIPPED | OUTPATIENT
Start: 2024-11-05 | End: 2024-11-10

## 2024-11-06 RX ORDER — ISOPROPYL ALCOHOL 70 ML/100ML
SWAB TOPICAL
Qty: 300 EACH | Refills: 3 | Status: SHIPPED | OUTPATIENT
Start: 2024-11-06

## 2024-11-11 ENCOUNTER — TELEPHONE (OUTPATIENT)
Dept: FAMILY MEDICINE CLINIC | Facility: CLINIC | Age: 64
End: 2024-11-11

## 2024-11-11 NOTE — TELEPHONE ENCOUNTER
Patient called the RX Refill Line. Message is being forwarded to the office.     Patient is requesting The office call the pharmacy.  Patient called for his refill and they told him they told him they can not fill till 11/19.  They show these medications were dispensed on 11/3 however the patient stated he was not in the area to pick them up. There might be a communication issue.  Must be done from office.    Patient is out of these medications     docusate sodium (COLACE) 100 mg capsule  mirtazapine (REMERON) 7.5 MG tablet      [facility] [place] [place] - [facility] [phone]        Please contact patient for further information

## 2024-11-19 ENCOUNTER — RESULTS FOLLOW-UP (OUTPATIENT)
Dept: OTHER | Facility: HOSPITAL | Age: 64
End: 2024-11-19

## 2024-11-19 ENCOUNTER — APPOINTMENT (OUTPATIENT)
Dept: LAB | Facility: HOSPITAL | Age: 64
End: 2024-11-19
Payer: MEDICARE

## 2024-11-19 DIAGNOSIS — K74.60 CIRRHOSIS OF LIVER WITHOUT ASCITES, UNSPECIFIED HEPATIC CIRRHOSIS TYPE (HCC): ICD-10-CM

## 2024-11-19 LAB
AFP-TM SERPL-MCNC: 4.58 NG/ML (ref 0–9)
ALBUMIN SERPL BCG-MCNC: 4.2 G/DL (ref 3.5–5)
ALP SERPL-CCNC: 108 U/L (ref 34–104)
ALT SERPL W P-5'-P-CCNC: 21 U/L (ref 7–52)
ANION GAP SERPL CALCULATED.3IONS-SCNC: 10 MMOL/L (ref 4–13)
AST SERPL W P-5'-P-CCNC: 26 U/L (ref 13–39)
BASOPHILS # BLD AUTO: 0.06 THOUSANDS/ÂΜL (ref 0–0.1)
BASOPHILS NFR BLD AUTO: 1 % (ref 0–1)
BILIRUB SERPL-MCNC: 0.87 MG/DL (ref 0.2–1)
BUN SERPL-MCNC: 13 MG/DL (ref 5–25)
CALCIUM SERPL-MCNC: 9.4 MG/DL (ref 8.4–10.2)
CHLORIDE SERPL-SCNC: 103 MMOL/L (ref 96–108)
CHOLEST SERPL-MCNC: 122 MG/DL (ref ?–200)
CO2 SERPL-SCNC: 26 MMOL/L (ref 21–32)
CREAT SERPL-MCNC: 1.02 MG/DL (ref 0.6–1.3)
CREAT UR-MCNC: 187 MG/DL
EOSINOPHIL # BLD AUTO: 0.1 THOUSAND/ÂΜL (ref 0–0.61)
EOSINOPHIL NFR BLD AUTO: 2 % (ref 0–6)
ERYTHROCYTE [DISTWIDTH] IN BLOOD BY AUTOMATED COUNT: 13.2 % (ref 11.6–15.1)
EST. AVERAGE GLUCOSE BLD GHB EST-MCNC: 169 MG/DL
GFR SERPL CREATININE-BSD FRML MDRD: 76 ML/MIN/1.73SQ M
GLUCOSE P FAST SERPL-MCNC: 132 MG/DL (ref 65–99)
HAV AB SER QL IA: REACTIVE
HBA1C MFR BLD: 7.5 %
HBV SURFACE AB SER-ACNC: >500 MIU/ML
HCT VFR BLD AUTO: 45.1 % (ref 36.5–49.3)
HDLC SERPL-MCNC: 57 MG/DL
HGB BLD-MCNC: 15.2 G/DL (ref 12–17)
IMM GRANULOCYTES # BLD AUTO: 0.01 THOUSAND/UL (ref 0–0.2)
IMM GRANULOCYTES NFR BLD AUTO: 0 % (ref 0–2)
INR PPP: 1.05 (ref 0.85–1.19)
LDLC SERPL CALC-MCNC: 42 MG/DL (ref 0–100)
LYMPHOCYTES # BLD AUTO: 2 THOUSANDS/ÂΜL (ref 0.6–4.47)
LYMPHOCYTES NFR BLD AUTO: 39 % (ref 14–44)
MCH RBC QN AUTO: 31.6 PG (ref 26.8–34.3)
MCHC RBC AUTO-ENTMCNC: 33.7 G/DL (ref 31.4–37.4)
MCV RBC AUTO: 94 FL (ref 82–98)
MICROALBUMIN UR-MCNC: <7 MG/L
MONOCYTES # BLD AUTO: 0.44 THOUSAND/ÂΜL (ref 0.17–1.22)
MONOCYTES NFR BLD AUTO: 9 % (ref 4–12)
NEUTROPHILS # BLD AUTO: 2.53 THOUSANDS/ÂΜL (ref 1.85–7.62)
NEUTS SEG NFR BLD AUTO: 49 % (ref 43–75)
NRBC BLD AUTO-RTO: 0 /100 WBCS
PLATELET # BLD AUTO: 172 THOUSANDS/UL (ref 149–390)
PMV BLD AUTO: 9.5 FL (ref 8.9–12.7)
POTASSIUM SERPL-SCNC: 3.9 MMOL/L (ref 3.5–5.3)
PROT SERPL-MCNC: 7.9 G/DL (ref 6.4–8.4)
PROTHROMBIN TIME: 14 SECONDS (ref 12.3–15)
RBC # BLD AUTO: 4.81 MILLION/UL (ref 3.88–5.62)
SODIUM SERPL-SCNC: 139 MMOL/L (ref 135–147)
TRIGL SERPL-MCNC: 113 MG/DL (ref ?–150)
TSH SERPL DL<=0.05 MIU/L-ACNC: 1.32 UIU/ML (ref 0.45–4.5)
WBC # BLD AUTO: 5.14 THOUSAND/UL (ref 4.31–10.16)

## 2024-11-19 PROCEDURE — 82105 ALPHA-FETOPROTEIN SERUM: CPT

## 2024-11-19 PROCEDURE — 86706 HEP B SURFACE ANTIBODY: CPT

## 2024-11-19 PROCEDURE — 86708 HEPATITIS A ANTIBODY: CPT

## 2024-11-19 PROCEDURE — 85610 PROTHROMBIN TIME: CPT

## 2024-11-20 LAB
TESTOST FREE SERPL-MCNC: 1.2 PG/ML (ref 6.6–18.1)
TESTOST SERPL-MCNC: 171 NG/DL (ref 264–916)

## 2024-11-21 ENCOUNTER — HOSPITAL ENCOUNTER (OUTPATIENT)
Dept: MRI IMAGING | Facility: HOSPITAL | Age: 64
Discharge: HOME | End: 2024-11-21

## 2024-11-21 DIAGNOSIS — K74.60 CIRRHOSIS OF LIVER WITHOUT ASCITES, UNSPECIFIED HEPATIC CIRRHOSIS TYPE (HCC): ICD-10-CM

## 2024-12-02 ENCOUNTER — OFFICE VISIT (OUTPATIENT)
Dept: FAMILY MEDICINE CLINIC | Facility: CLINIC | Age: 64
End: 2024-12-02
Payer: MEDICARE

## 2024-12-02 VITALS
RESPIRATION RATE: 16 BRPM | OXYGEN SATURATION: 97 % | DIASTOLIC BLOOD PRESSURE: 82 MMHG | TEMPERATURE: 98.7 F | WEIGHT: 217.6 LBS | BODY MASS INDEX: 36.25 KG/M2 | HEIGHT: 65 IN | HEART RATE: 104 BPM | SYSTOLIC BLOOD PRESSURE: 122 MMHG

## 2024-12-02 DIAGNOSIS — K70.30 ALCOHOLIC CIRRHOSIS OF LIVER WITHOUT ASCITES (HCC): ICD-10-CM

## 2024-12-02 DIAGNOSIS — F41.9 ANXIETY: Primary | ICD-10-CM

## 2024-12-02 DIAGNOSIS — E11.9 TYPE 2 DIABETES MELLITUS WITHOUT COMPLICATION, WITHOUT LONG-TERM CURRENT USE OF INSULIN (HCC): ICD-10-CM

## 2024-12-02 DIAGNOSIS — K74.60 ESOPHAGEAL VARICES IN CIRRHOSIS (HCC): ICD-10-CM

## 2024-12-02 DIAGNOSIS — I85.10 ESOPHAGEAL VARICES IN CIRRHOSIS (HCC): ICD-10-CM

## 2024-12-02 DIAGNOSIS — I10 BENIGN ESSENTIAL HYPERTENSION: ICD-10-CM

## 2024-12-02 DIAGNOSIS — E66.01 CLASS 2 SEVERE OBESITY DUE TO EXCESS CALORIES WITH SERIOUS COMORBIDITY AND BODY MASS INDEX (BMI) OF 36.0 TO 36.9 IN ADULT (HCC): ICD-10-CM

## 2024-12-02 DIAGNOSIS — E66.812 CLASS 2 SEVERE OBESITY DUE TO EXCESS CALORIES WITH SERIOUS COMORBIDITY AND BODY MASS INDEX (BMI) OF 36.0 TO 36.9 IN ADULT (HCC): ICD-10-CM

## 2024-12-02 DIAGNOSIS — F11.20 METHADONE MAINTENANCE THERAPY PATIENT (HCC): ICD-10-CM

## 2024-12-02 PROCEDURE — G2211 COMPLEX E/M VISIT ADD ON: HCPCS | Performed by: PHYSICIAN ASSISTANT

## 2024-12-02 PROCEDURE — 99214 OFFICE O/P EST MOD 30 MIN: CPT | Performed by: PHYSICIAN ASSISTANT

## 2024-12-02 RX ORDER — HYDROXYZINE HYDROCHLORIDE 25 MG/1
25 TABLET, FILM COATED ORAL EVERY 6 HOURS PRN
Qty: 30 TABLET | Refills: 1 | Status: SHIPPED | OUTPATIENT
Start: 2024-12-02

## 2024-12-02 RX ORDER — MIRTAZAPINE 7.5 MG/1
15 TABLET, FILM COATED ORAL
Qty: 30 TABLET | Refills: 5 | Status: SHIPPED | OUTPATIENT
Start: 2024-12-02

## 2024-12-02 NOTE — PROGRESS NOTES
Name: Rishi Kirk      : 1960      MRN: [id number]  Encounter Provider: Anabel Nair PA-C  Encounter Date: 2024   Encounter department: [facility] PRIMARY CARE [facility]  :  Assessment & Plan  Anxiety  Recent worsening anxiety in the past 2 months.  Was started on mirtazapine by Dr. Trivedi in October, symptoms improved significantly.  Yesterday anxiety returned suddenly, no known trigger.  Took 2 tablets twice daily yesterday with no improvement.  Still feels anxious, tremors, no depression or SI.  Will start hydroxyzine as needed.  Follow-up in 2 weeks as scheduled.  No relapse with drug use, compliant with methadone clinic.  Increase mirtazapine to 15 mg once daily at night.  Tearful on exam today.  Orders:    mirtazapine (REMERON) 7.5 MG tablet; Take 2 tablets (15 mg total) by mouth daily at bedtime    hydrOXYzine HCL (ATARAX) 25 mg tablet; Take 1 tablet (25 mg total) by mouth every 6 (six) hours as needed for anxiety    Benign essential hypertension  Stable and controlled blood pressure on lisinopril 5 mg and nadolol 20 mg.         Esophageal varices in cirrhosis (HCC)  Most recent EGD 2024 without varices.  Previously with small esophageal varices per EGD in .  Continue nadolol 20 mg.         Alcoholic cirrhosis of liver without ascites (HCC)  In remission with alcohol use, treated hep C.  Was scheduled for MRI but was unable to find vein for contrast.  Patient to reschedule.  Follow-up GI.         Type 2 diabetes mellitus without complication, without long-term current use of insulin (HCC)  Recent increase A1c, was started on pioglitazone per Dr. Trivedi.  Patient did not  yet.  Lab Results   Component Value Date    HGBA1C 7.5 (H) 2024            Methadone maintenance therapy patient (HCC)  Compliant with methadone clinic.  No relapse.         Class 2 severe obesity due to excess calories with serious comorbidity and body mass index (BMI) of 36.0 to 36.9  "in adult (HCC)  Encourage weight loss, diet, exercise.                History of Present Illness     Rishi is a 64 y.o. male with a h/o diabetes, opioid use in remission on methadone, cirrhosis with history of alcohol abuse and hepatitis, esophageal varices, smoking quit 2 months ago who presents for same-day patient for increased anxiety.  Checked his sugars today was 120s.  Anxiety started suddenly yesterday.  He was seen by Dr. Trivedi about 2 months ago and was started on mirtazapine which helped him significantly.  Was able to sleep.  Yesterday woke up and felt restless, could not sleep, took 2 mirtazapine in the morning and still felt anxious.  Took another 2 mirtazapine last night was still unable to sleep.  Worried about not being able to sleep.  No recent changes in environment, no relapse with drug use.  Attending methadone clinic.  No known trigger for worsening anxiety.  Worried about not being on sleep.  Did go to MRI but they could find a vein and he was upset about that.  Never started medication for diabetes.    History was conducted in [place] without the use of .      Anxiety  Symptoms include nervous/anxious behavior. Patient reports no chest pain, confusion, decreased concentration, shortness of breath or suicidal ideas.         Review of Systems   Constitutional:  Negative for fever and unexpected weight change.   Respiratory:  Negative for shortness of breath.    Cardiovascular:  Negative for chest pain.   Psychiatric/Behavioral:  Positive for sleep disturbance. Negative for confusion, decreased concentration, dysphoric mood, hallucinations, self-injury and suicidal ideas. The patient is nervous/anxious. The patient is not hyperactive.           Objective   /82 (BP Location: Left arm, Patient Position: Sitting, Cuff Size: Standard)   Pulse 104   Temp 98.7 °F (37.1 °C) (Tympanic)   Resp 16   Ht 5' 5\" (1.651 m)   Wt 98.7 kg (217 lb 9.6 oz)   SpO2 97%   BMI 36.21 kg/m²    "   Physical Exam  Vitals reviewed.   Constitutional:       Appearance: Normal appearance. He is obese.   HENT:      Head: Normocephalic and atraumatic.   Cardiovascular:      Rate and Rhythm: Normal rate and regular rhythm.   Pulmonary:      Effort: Pulmonary effort is normal.      Breath sounds: Normal breath sounds.   Neurological:      Mental Status: He is alert and oriented to person, place, and time. Mental status is at baseline.   Psychiatric:         Mood and Affect: Mood is anxious. Affect is tearful.

## 2024-12-02 NOTE — ASSESSMENT & PLAN NOTE
Recent worsening anxiety in the past 2 months.  Was started on mirtazapine by Dr. Trivedi in October, symptoms improved significantly.  Yesterday anxiety returned suddenly, no known trigger.  Took 2 tablets twice daily yesterday with no improvement.  Still feels anxious, tremors, no depression or SI.  Will start hydroxyzine as needed.  Follow-up in 2 weeks as scheduled.  No relapse with drug use, compliant with methadone clinic.  Increase mirtazapine to 15 mg once daily at night.  Tearful on exam today.  Orders:    mirtazapine (REMERON) 7.5 MG tablet; Take 2 tablets (15 mg total) by mouth daily at bedtime    hydrOXYzine HCL (ATARAX) 25 mg tablet; Take 1 tablet (25 mg total) by mouth every 6 (six) hours as needed for anxiety

## 2024-12-02 NOTE — ASSESSMENT & PLAN NOTE
In remission with alcohol use, treated hep C.  Was scheduled for MRI but was unable to find vein for contrast.  Patient to reschedule.  Follow-up GI.

## 2024-12-02 NOTE — ASSESSMENT & PLAN NOTE
Recent increase A1c, was started on pioglitazone per Dr. Trivedi.  Patient did not  yet.  Lab Results   Component Value Date    HGBA1C 7.5 (H) 11/19/2024

## 2024-12-02 NOTE — ASSESSMENT & PLAN NOTE
Most recent EGD 9/2024 without varices.  Previously with small esophageal varices per EGD in 2020.  Continue nadolol 20 mg.

## 2024-12-03 ENCOUNTER — RESULTS FOLLOW-UP (OUTPATIENT)
Age: 64
End: 2024-12-03

## 2024-12-13 ENCOUNTER — RA CDI HCC (OUTPATIENT)
Dept: OTHER | Facility: HOSPITAL | Age: 64
End: 2024-12-13

## 2024-12-19 ENCOUNTER — OFFICE VISIT (OUTPATIENT)
Dept: FAMILY MEDICINE CLINIC | Facility: CLINIC | Age: 64
End: 2024-12-19
Payer: MEDICARE

## 2024-12-19 VITALS
RESPIRATION RATE: 16 BRPM | OXYGEN SATURATION: 98 % | WEIGHT: 218 LBS | TEMPERATURE: 97.8 F | DIASTOLIC BLOOD PRESSURE: 80 MMHG | HEART RATE: 105 BPM | HEIGHT: 65 IN | BODY MASS INDEX: 36.32 KG/M2 | SYSTOLIC BLOOD PRESSURE: 120 MMHG

## 2024-12-19 DIAGNOSIS — F41.9 ANXIETY: ICD-10-CM

## 2024-12-19 DIAGNOSIS — Z00.00 MEDICARE ANNUAL WELLNESS VISIT, SUBSEQUENT: Primary | ICD-10-CM

## 2024-12-19 DIAGNOSIS — K70.30 ALCOHOLIC CIRRHOSIS OF LIVER WITHOUT ASCITES (HCC): ICD-10-CM

## 2024-12-19 DIAGNOSIS — F11.20 METHADONE MAINTENANCE THERAPY PATIENT (HCC): ICD-10-CM

## 2024-12-19 DIAGNOSIS — E11.9 TYPE 2 DIABETES MELLITUS WITHOUT COMPLICATION, WITHOUT LONG-TERM CURRENT USE OF INSULIN (HCC): ICD-10-CM

## 2024-12-19 DIAGNOSIS — I10 BENIGN ESSENTIAL HYPERTENSION: ICD-10-CM

## 2024-12-19 PROCEDURE — G0439 PPPS, SUBSEQ VISIT: HCPCS | Performed by: FAMILY MEDICINE

## 2024-12-19 PROCEDURE — 99214 OFFICE O/P EST MOD 30 MIN: CPT | Performed by: FAMILY MEDICINE

## 2024-12-19 NOTE — ASSESSMENT & PLAN NOTE
Lab Results   Component Value Date    HGBA1C 7.5 (H) 11/19/2024   Diabetes Mellitus Type 2 (E11.9)  - A1c increased from 6.0 to 7.5%  - Restarting diabetes medication with Glipizide daily  - Continue blood glucose monitoring

## 2024-12-19 NOTE — PATIENT INSTRUCTIONS
Medicare Preventive Visit Patient Instructions  Thank you for completing your Welcome to Medicare Visit or Medicare Annual Wellness Visit today. Your next wellness visit will be due in one year (12/20/2025).  The screening/preventive services that you may require over the next 5-10 years are detailed below. Some tests may not apply to you based off risk factors and/or age. Screening tests ordered at today's visit but not completed yet may show as past due. Also, please note that scanned in results may not display below.  Preventive Screenings:  Service Recommendations Previous Testing/Comments   Colorectal Cancer Screening  Colonoscopy    Fecal Occult Blood Test (FOBT)/Fecal Immunochemical Test (FIT)  Fecal DNA/Cologuard Test  Flexible Sigmoidoscopy Age: 45-75 years old   Colonoscopy: every 10 years (May be performed more frequently if at higher risk)  OR  FOBT/FIT: every 1 year  OR  Cologuard: every 3 years  OR  Sigmoidoscopy: every 5 years  Screening may be recommended earlier than age 45 if at higher risk for colorectal cancer. Also, an individualized decision between you and your healthcare provider will decide whether screening between the ages of 76-85 would be appropriate. Colonoscopy: 08/25/2020  FOBT/FIT: Not on file  Cologuard: Not on file  Sigmoidoscopy: Not on file    Screening Current     Prostate Cancer Screening Individualized decision between patient and health care provider in men between ages of 55-69   Medicare will cover every 12 months beginning on the day after your 50th birthday PSA: 0.5 ng/mL           Hepatitis C Screening Once for adults born between 1945 and 1965  More frequently in patients at high risk for Hepatitis C Hep C Antibody: 03/07/2020    Screening Not Indicated  History Hepatitis C   Diabetes Screening 1-2 times per year if you're at risk for diabetes or have pre-diabetes Fasting glucose: 132 mg/dL (11/19/2024)  A1C: 7.5 % (11/19/2024)  Screening Not Indicated  History Diabetes    Cholesterol Screening Once every 5 years if you don't have a lipid disorder. May order more often based on risk factors. Lipid panel: 11/19/2024  Screening Current      Other Preventive Screenings Covered by Medicare:  Abdominal Aortic Aneurysm (AAA) Screening: covered once if your at risk. You're considered to be at risk if you have a family history of AAA or a male between the age of 65-75 who smoking at least 100 cigarettes in your lifetime.  Lung Cancer Screening: covers low dose CT scan once per year if you meet all of the following conditions: (1) Age 55-77; (2) No signs or symptoms of lung cancer; (3) Current smoker or have quit smoking within the last 15 years; (4) You have a tobacco smoking history of at least 20 pack years (packs per day x number of years you smoked); (5) You get a written order from a healthcare provider.  Glaucoma Screening: covered annually if you're considered high risk: (1) You have diabetes OR (2) Family history of glaucoma OR (3)  aged 50 and older OR (4)  American aged 65 and older  Osteoporosis Screening: covered every 2 years if you meet one of the following conditions: (1) Have a vertebral abnormality; (2) On glucocorticoid therapy for more than 3 months; (3) Have primary hyperparathyroidism; (4) On osteoporosis medications and need to assess response to drug therapy.  HIV Screening: covered annually if you're between the age of 15-65. Also covered annually if you are younger than 15 and older than 65 with risk factors for HIV infection. For pregnant patients, it is covered up to 3 times per pregnancy.    Immunizations:  Immunization Recommendations   Influenza Vaccine Annual influenza vaccination during flu season is recommended for all persons aged >= 6 months who do not have contraindications   Pneumococcal Vaccine   * Pneumococcal conjugate vaccine = PCV13 (Prevnar 13), PCV15 (Vaxneuvance), PCV20 (Prevnar 20)  * Pneumococcal polysaccharide vaccine =  PPSV23 (Pneumovax) Adults 19-65 yo with certain risk factors or if 65+ yo  If never received any pneumonia vaccine: recommend Prevnar 20 (PCV20)  Give PCV20 if previously received 1 dose of PCV13 or PPSV23   Hepatitis B Vaccine 3 dose series if at intermediate or high risk (ex: diabetes, end stage renal disease, liver disease)   Respiratory syncytial virus (RSV) Vaccine - COVERED BY MEDICARE PART D  * RSVPreF3 (Arexvy) CDC recommends that adults 60 years of age and older may receive a single dose of RSV vaccine using shared clinical decision-making (SCDM)   Tetanus (Td) Vaccine - COST NOT COVERED BY MEDICARE PART B Following completion of primary series, a booster dose should be given every 10 years to maintain immunity against tetanus. Td may also be given as tetanus wound prophylaxis.   Tdap Vaccine - COST NOT COVERED BY MEDICARE PART B Recommended at least once for all adults. For pregnant patients, recommended with each pregnancy.   Shingles Vaccine (Shingrix) - COST NOT COVERED BY MEDICARE PART B  2 shot series recommended in those 19 years and older who have or will have weakened immune systems or those 50 years and older     Health Maintenance Due:      Topic Date Due   • Lung Cancer Screening  02/23/2025   • Colorectal Cancer Screening  08/25/2027   • HIV Screening  Completed   • Hepatitis C Screening  Discontinued     Immunizations Due:      Topic Date Due   • COVID-19 Vaccine (4 - 2024-25 season) 09/01/2024     Advance Directives   What are advance directives?  Advance directives are legal documents that state your wishes and plans for medical care. These plans are made ahead of time in case you lose your ability to make decisions for yourself. Advance directives can apply to any medical decision, such as the treatments you want, and if you want to donate organs.   What are the types of advance directives?  There are many types of advance directives, and each state has rules about how to use them. You may  choose a combination of any of the following:  Living will:  This is a written record of the treatment you want. You can also choose which treatments you do not want, which to limit, and which to stop at a certain time. This includes surgery, medicine, IV fluid, and tube feedings.   Durable power of  for healthcare (DPAHC):  This is a written record that states who you want to make healthcare choices for you when you are unable to make them for yourself. This person, called a proxy, is usually a family member or a friend. You may choose more than 1 proxy.  Do not resuscitate (DNR) order:  A DNR order is used in case your heart stops beating or you stop breathing. It is a request not to have certain forms of treatment, such as CPR. A DNR order may be included in other types of advance directives.  Medical directive:  This covers the care that you want if you are in a coma, near death, or unable to make decisions for yourself. You can list the treatments you want for each condition. Treatment may include pain medicine, surgery, blood transfusions, dialysis, IV or tube feedings, and a ventilator (breathing machine).  Values history:  This document has questions about your views, beliefs, and how you feel and think about life. This information can help others choose the care that you would choose.  Why are advance directives important?  An advance directive helps you control your care. Although spoken wishes may be used, it is better to have your wishes written down. Spoken wishes can be misunderstood, or not followed. Treatments may be given even if you do not want them. An advance directive may make it easier for your family to make difficult choices about your care.   How to Quit Using Smokeless Tobacco   Why it is important to stop using smokeless tobacco:  Smokeless tobacco comes in many forms. Examples include chew, snuff, dip, dissolvable tobacco, and snus. All smokeless tobacco products contain nicotine  and may contain as much nicotine as 3 cigarettes. You may be physically dependent on nicotine. You may also be emotionally addicted to it. The cravings can be strong, but it is important to quit using smokeless tobacco. You will improve your health and decrease your cancer, stroke, and heart attack risk. Mouth sores and tooth problems will also improve when you quit. You can benefit from quitting no matter how long you have used smokeless tobacco.   Prepare to stop using smokeless tobacco:  Nicotine is a highly addictive drug. Withdrawal symptoms can happen when you stop and make it hard to quit. The following can help keep you on track:  Set a quit date.    Tell friends, family, and coworkers that you plan to quit.    Remove all smokeless tobacco products from your home, car, and workplace.    Manage weight gain after you quit:  Nicotine can affect your metabolism. You may gain a few pounds after you quit. The following can help you control your weight:  Eat healthy foods.    Drink water before, during, and between meals.    Exercise as directed.      Weight Management   Why it is important to manage your weight:  Being overweight increases your risk of health conditions such as heart disease, high blood pressure, type 2 diabetes, and certain types of cancer. It can also increase your risk for osteoarthritis, sleep apnea, and other respiratory problems. Aim for a slow, steady weight loss. Even a small amount of weight loss can lower your risk of health problems.  How to lose weight safely:  A safe and healthy way to lose weight is to eat fewer calories and get regular exercise. You can lose up about 1 pound a week by decreasing the number of calories you eat by 500 calories each day.   Healthy meal plan for weight management:  A healthy meal plan includes a variety of foods, contains fewer calories, and helps you stay healthy. A healthy meal plan includes the following:  Eat whole-grain foods more often.  A healthy  meal plan should contain fiber. Fiber is the part of grains, fruits, and vegetables that is not broken down by your body. Whole-grain foods are healthy and provide extra fiber in your diet. Some examples of whole-grain foods are whole-wheat breads and pastas, oatmeal, brown rice, and bulgur.  Eat a variety of vegetables every day.  Include dark, leafy greens such as spinach, kale, giovany greens, and mustard greens. Eat yellow and orange vegetables such as carrots, sweet potatoes, and winter squash.   Eat a variety of fruits every day.  Choose fresh or canned fruit (canned in its own juice or light syrup) instead of juice. Fruit juice has very little or no fiber.  Eat low-fat dairy foods.  Drink fat-free (skim) milk or 1% milk. Eat fat-free yogurt and low-fat cottage cheese. Try low-fat cheeses such as mozzarella and other reduced-fat cheeses.  Choose meat and other protein foods that are low in fat.  Choose beans or other legumes such as split peas or lentils. Choose fish, skinless poultry (chicken or turkey), or lean cuts of red meat (beef or pork). Before you cook meat or poultry, cut off any visible fat.   Use less fat and oil.  Try baking foods instead of frying them. Add less fat, such as margarine, sour cream, regular salad dressing and mayonnaise to foods. Eat fewer high-fat foods. Some examples of high-fat foods include french fries, doughnuts, ice cream, and cakes.  Eat fewer sweets.  Limit foods and drinks that are high in sugar. This includes candy, cookies, regular soda, and sweetened drinks.  Exercise:  Exercise at least 30 minutes per day on most days of the week. Some examples of exercise include walking, biking, dancing, and swimming. You can also fit in more physical activity by taking the stairs instead of the elevator or parking farther away from stores. Ask your healthcare provider about the best exercise plan for you.   Narcotic (Opioid) Safety    Use narcotics safely:  Take prescribed  narcotics exactly as directed  Do not give narcotics to others or take narcotics that belong to someone else  Do not mix narcotics without medicines or alcohol  Do not drive or operate heavy machinery after you take the narcotic  Monitor for side effects and notify your healthcare provider if you experienced side effects such as nausea, sleepiness, itching, or trouble thinking clearly.    Manage constipation:    Constipation is the most common side effect of narcotic medicine. Constipation is when you have hard, dry bowel movements, or you go longer than usual between bowel movements. Tell your healthcare provider about all changes in your bowel movements while you are taking narcotics. He or she may recommend laxative medicine to help you have a bowel movement. He or she may also change the kind of narcotic you are taking, or change when you take it. The following are more ways you can prevent or relieve constipation:    Drink liquids as directed.  You may need to drink extra liquids to help soften and move your bowels. Ask how much liquid to drink each day and which liquids are best for you.  Eat high-fiber foods.  This may help decrease constipation by adding bulk to your bowel movements. High-fiber foods include fruits, vegetables, whole-grain breads and cereals, and beans. Your healthcare provider or dietitian can help you create a high-fiber meal plan. Your provider may also recommend a fiber supplement if you cannot get enough fiber from food.  Exercise regularly.  Regular physical activity can help stimulate your intestines. Walking is a good exercise to prevent or relieve constipation. Ask which exercises are best for you.  Schedule a time each day to have a bowel movement.  This may help train your body to have regular bowel movements. Bend forward while you are on the toilet to help move the bowel movement out. Sit on the toilet for at least 10 minutes, even if you do not have a bowel movement.    Store  narcotics safely:   Store narcotics where others cannot easily get them.  Keep them in a locked cabinet or secure area. Do not  keep them in a purse or other bag you carry with you. A person may be looking for something else and find the narcotics.  Make sure narcotics are stored out of the reach of children.  A child can easily overdose on narcotics. Narcotics may look like candy to a small child.    The best way to dispose of narcotics:      The laws vary by country and area. In the [place], the best way is to return the narcotics through a take-back program. This program is offered by the [place] Drug Enforcement Agency (ADDISON). The following are options for using the program:  Take the narcotics to a ADDISON collection site.  The site is often a law enforcement center. Call your local law enforcement center for scheduled take-back days in your area. You will be given information on where to go if the collection site is in a different location.  Take the narcotics to an approved pharmacy or hospital.  A pharmacy or hospital may be set up as a collection site. You will need to ask if it is a ADDIS[employer] collection site if you were not directed there. A pharmacy or doctor's office may not be able to take back narcotics unless it is a ADDISON site.  Use a mail-back system.  This means you are given containers to put the narcotics into. You will then mail them in the containers.  Use a take-back drop box.  This is a place to leave the narcotics at any time. People and animals will not be able to get into the box. Your local law enforcement agency can tell you where to find a drop box in your area.    Other ways to manage pain:   Ask your healthcare provider about non-narcotic medicines to control pain.  Nonprescription medicines include NSAIDs (such as ibuprofen) and acetaminophen. Prescription medicines include muscle relaxers, antidepressants, and steroids.  Pain may be managed without any medicines.  Some ways to relieve pain  include massage, aromatherapy, or meditation. Physical or occupational therapy may also help.    For more information:   Drug Enforcement Administration  [address] [place] [address]  Phone: 9- 019 - 562-3204  Web Address: https://www.deadiversion.[facility].gov/drug_disposal/    US Food and Drug Administration  [address] [place] [address]  Phone: 2- 027 - 217-7053  Web Address: http://www.fda.gov     © Copyright [employer] 2018 Information is for End User's use only and may not be sold, redistributed or otherwise used for commercial purposes. All illustrations and images included in CareNotes® are the copyrighted property of [employer]., Inc[employer] or [employer]

## 2024-12-19 NOTE — PROGRESS NOTES
Name: Rishi Kirk      : 1960      MRN: [id number]  Encounter Provider: Soren Trivedi MD  Encounter Date: 2024   Encounter department: [facility]    Assessment & Plan  Medicare annual wellness visit, subsequent    During this visit, we have a goal to personalize prevention. I discussed with the patient about    Patient Active Problem List   Diagnosis    Benign essential hypertension    Chronic hepatitis C treated in  (HCC)    Esophageal varices in cirrhosis (HCC)    Type 2 diabetes mellitus without complication, without long-term current use of insulin (HCC)    Gallbladder polyp    Methadone maintenance therapy patient (HCC)    Portal hypertension (HCC)    Proteinuria    Secondary male hypogonadism    Class 2 severe obesity due to excess calories with serious comorbidity and body mass index (BMI) of 36.0 to 36.9 in adult (HCC)    RODRICK (obstructive sleep apnea)    Coronary artery calcification    Vitamin B12 deficiency    Tobacco dependence due to cigarettes    Intolerance of continuous positive airway pressure (CPAP) ventilation    Cataract of right eye    Nephrolithiasis    Alcoholic cirrhosis of liver without ascites (HCC)    Umbilical hernia without obstruction and without gangrene    Anxiety    and decreased strength, decreased ROM, decreased endurance, and decreased mobility--a lifestyle plan to decrease the impact of current problems. I did a Health risk assessment and discussed ways to stay healthier with the patient. We also reviewed the current medications, the need to avoid polypharmacy in his current treatment, and how chronic conditions impact now and later. A recommended healthy diet and exercising as frequently as possible will help better control the patient's chronic conditions, Immunizations, and the need to comply with current CDC recommendations. The patient declined at this time advanced directives. I encouraged against using alcohol,  tobacco, and recreational illegal prescribed and non-prescribed drugs. About smoking: recommended avoid exposure to second hand smoking.         Type 2 diabetes mellitus without complication, without long-term current use of insulin (HCC)    Lab Results   Component Value Date    HGBA1C 7.5 (H) 11/19/2024   Diabetes Mellitus Type 2 (E11.9)  - A1c increased from 6.0 to 7.5%  - Restarting diabetes medication with Glipizide daily  - Continue blood glucose monitoring             Benign essential hypertension  2. Hypertension (I10)  - Well-controlled on Lisinopril  - BP today 120/80  - Continue current medication regimen             Alcoholic cirrhosis of liver without ascites (HCC)  3. Liver Cirrhosis (K74.60)  - Well-compensated with Child-Oseguera score of 5  - Recent MRI abdomen with/without contrast completed  - AFP tumor marker normal  - Continue monitoring liver function             Methadone maintenance therapy patient (HCC)  4. Opioid Dependence (F11.20)  - Stable on Methadone 44mg daily  - Following with methadone clinic             Anxiety  5. Anxiety (F41.9)  - Currently on Nortriptyline 7.5mg  - Increasing to twice daily dosing per recent specialist recommendation  - Will provide prescription refill            Preventive health issues were discussed with patient, and age appropriate screening tests were ordered as noted in patient's After Visit Summary. Personalized health advice and appropriate referrals for health education or preventive services given if needed, as noted in patient's After Visit Summary.    History of Present Illness         Subjective:  64-year-old male presenting for Medicare Annual Wellness Visit. Patient reports compliance with methadone maintenance therapy at 44mg daily, expressing preference to maintain current dose due to concerns about withdrawal and renal effects at lower doses. Reports increased anxiety symptoms that improved with adjustment of Nortriptyline to twice daily dosing.  Denies current alcohol use, quit smoking 3 months ago. Discussion held regarding advance directives - patient opted for full code status and completed necessary documentation. Patient has upcoming imaging to evaluate renal stones, previously measured at 4mm, with intermittent lower back pain. Reports difficulty with IV access during previous medical procedures.      Labs/Imaging:  - HbA1c: 7.5% (increased from 6.0%)  - AFP: Normal  - Hepatitis B antibodies: Reactive from previous infection  - MRI Abdomen w/wo contrast scheduled for 12/21                 Patient Care Team:  Soren Trivedi MD as PCP - General (Family Medicine)  Velasquez Fields DO as PCP - Endocrinology (Endocrinology)  Roberta Graf MA as  (Oncology)  Lito Ash PA-C as Physician Assistant (Endocrinology)    Review of Systems  Medical History Reviewed by provider this encounter:       Annual Wellness Visit Questionnaire   Rishi is here for his Subsequent Wellness visit. Last Medicare Wellness visit information reviewed, patient interviewed and updates made to the record today.      Health Risk Assessment:   Patient rates overall health as good. Patient feels that their physical health rating is same. Patient is satisfied with their life. Eyesight was rated as same. Hearing was rated as same. Patient feels that their emotional and mental health rating is slightly worse. Patients states they are never, rarely angry. Patient states they are sometimes unusually tired/fatigued. Pain experienced in the last 7 days has been some. Patient's pain rating has been 2/10. Patient states that he has experienced no weight loss or gain in last 6 months.     Fall Risk Screening:   In the past year, patient has experienced: no history of falling in past year      Home Safety:  Patient does not have trouble with stairs inside or outside of their home. Patient has working smoke alarms and has working carbon monoxide detector. Home safety  hazards include: none.     Nutrition:   Current diet is Diabetic.     Medications:   Patient is currently taking over-the-counter supplements. OTC medications include: see medication list. Patient is able to manage medications.     Activities of Daily Living (ADLs)/Instrumental Activities of Daily Living (IADLs):   Walk and transfer into and out of bed and chair?: Yes  Dress and groom yourself?: Yes    Bathe or shower yourself?: Yes    Feed yourself? Yes  Do your laundry/housekeeping?: Yes  Manage your money, pay your bills and track your expenses?: Yes  Make your own meals?: Yes    Do your own shopping?: Yes    Previous Hospitalizations:   Any hospitalizations or ED visits within the last 12 months?: Yes    How many hospitalizations have you had in the last year?: 1-2    Advance Care Planning:   Living will: Yes    Durable POA for healthcare: No    Advanced directive: Yes    Advanced directive counseling given: Yes    ACP document given: Yes    Patient declined ACP directive: No    End of Life Decisions reviewed with patient: Yes    Provider agrees with end of life decisions: Yes      Cognitive Screening:   Provider or family/friend/caregiver concerned regarding cognition?: No    PREVENTIVE SCREENINGS      Cardiovascular Screening:    General: Screening Current    Due for: Lipid Panel      Diabetes Screening:     General: Screening Not Indicated and History Diabetes    Due for: Blood Glucose      Colorectal Cancer Screening:     General: Screening Current    Due for: FOBT/FIT      Prostate Cancer Screening:    General: Screening Current      Osteoporosis Screening:    General: Risks and Benefits Discussed    Due for: DXA Axial      Abdominal Aortic Aneurysm (AAA) Screening:    Risk factors include: age between 65-76 yo and tobacco use        General: Screening Not Indicated and Risks and Benefits Discussed      Lung Cancer Screening:     General: Screening Current      Hepatitis C Screening:    General: Screening  Not Indicated and History Hepatitis C    Hep C Screening Accepted: Yes        Preventive Screening Comments: AAA will be assessed with a MRI of abdomen that he will do on 12/21/2024.    Screening, Brief Intervention, and Referral to Treatment (SBIRT)    Screening  Typical number of drinks in a day: 0  Typical number of drinks in a week: 0  Interpretation: Low risk drinking behavior.    Single Item Drug Screening:  How often have you used an illegal drug (including marijuana) or a prescription medication for non-medical reasons in the past year? never    Single Item Drug Screen Score: 0  Interpretation: Negative screen for possible drug use disorder    Review of Current Opioid Use    Opioid Risk Tool (ORT) Interpretation: Complete Opioid Risk Tool (ORT)    Other Counseling Topics:   Alcohol use counseling, car/seat belt/driving safety, skin self-exam, sunscreen and calcium and vitamin D intake and regular weightbearing exercise.     Social Drivers of Health     Financial Resource Strain: Low Risk  (12/14/2023)    Overall Financial Resource Strain (CARDIA)     Difficulty of Paying Living Expenses: Not hard at all   Food Insecurity: No Food Insecurity (12/19/2024)    Hunger Vital Sign     Worried About Running Out of Food in the Last Year: Never true     Ran Out of Food in the Last Year: Never true   Transportation Needs: No Transportation Needs (12/19/2024)    PRAPARE - Transportation     Lack of Transportation (Medical): No     Lack of Transportation (Non-Medical): No   Housing Stability: Low Risk  (12/19/2024)    Housing Stability Vital Sign     Unable to Pay for Housing in the Last Year: No     Number of Times Moved in the Last Year: 1     Homeless in the Last Year: No   Utilities: Not At Risk (12/19/2024)    [facility] Utilities     Threatened with loss of utilities: No     No results found.    Objective   /80 (BP Location: Left arm, Patient Position: Sitting, Cuff Size: Standard)   Pulse 105   Temp 97.8 °F (36.6  "°C) (Tympanic)   Resp 16   Ht 5' 5\" (1.651 m)   Wt 98.9 kg (218 lb)   SpO2 98%   BMI 36.28 kg/m²     Physical Exam  The patient appears to without distress; HEENT undentulous, the neck has no masses or enlarged lymph nodes. Respiratory effort is normal. Lung fields are clear; the heart has a normal rhythm without murmurs. Abdomen soft, obese without ascitis;  Neurological with no focal deficits.      Administrative Statements   I have spent a total time of 35 minutes in caring for this patient on the day of the visit/encounter including Diagnostic results, Risks and benefits of tx options, and Instructions for management. Topics discussed with the patient / family include advanced directives.  "

## 2024-12-20 NOTE — ASSESSMENT & PLAN NOTE
2. Hypertension (I10)  - Well-controlled on Lisinopril  - BP today 120/80  - Continue current medication regimen

## 2024-12-20 NOTE — ASSESSMENT & PLAN NOTE
4. Opioid Dependence (F11.20)  - Stable on Methadone 44mg daily  - Following with methadone clinic

## 2024-12-20 NOTE — ASSESSMENT & PLAN NOTE
5. Anxiety (F41.9)  - Currently on Nortriptyline 7.5mg  - Increasing to twice daily dosing per recent specialist recommendation  - Will provide prescription refill

## 2024-12-20 NOTE — ASSESSMENT & PLAN NOTE
3. Liver Cirrhosis (K74.60)  - Well-compensated with Child-Oseguera score of 5  - Recent MRI abdomen with/without contrast completed  - AFP tumor marker normal  - Continue monitoring liver function

## 2024-12-21 ENCOUNTER — HOSPITAL ENCOUNTER (OUTPATIENT)
Dept: MRI IMAGING | Facility: HOSPITAL | Age: 64
Discharge: HOME | End: 2024-12-21
Payer: MEDICARE

## 2024-12-21 DIAGNOSIS — K74.60 CIRRHOSIS OF LIVER WITHOUT ASCITES, UNSPECIFIED HEPATIC CIRRHOSIS TYPE (HCC): ICD-10-CM

## 2024-12-21 PROCEDURE — 74181 MRI ABDOMEN W/O CONTRAST: CPT

## 2024-12-23 DIAGNOSIS — F41.9 ANXIETY: ICD-10-CM

## 2024-12-23 DIAGNOSIS — I10 BENIGN ESSENTIAL HYPERTENSION: ICD-10-CM

## 2024-12-23 DIAGNOSIS — E11.9 TYPE 2 DIABETES MELLITUS WITHOUT COMPLICATION, WITHOUT LONG-TERM CURRENT USE OF INSULIN (HCC): Primary | ICD-10-CM

## 2024-12-23 RX ORDER — LISINOPRIL 5 MG/1
TABLET ORAL
Qty: 30 TABLET | Refills: 5 | Status: SHIPPED | OUTPATIENT
Start: 2024-12-23

## 2024-12-23 RX ORDER — MIRTAZAPINE 7.5 MG/1
15 TABLET, FILM COATED ORAL
Qty: 30 TABLET | Refills: 5 | Status: SHIPPED | OUTPATIENT
Start: 2024-12-23

## 2025-01-04 ENCOUNTER — VBI (OUTPATIENT)
Dept: ADMINISTRATIVE | Facility: OTHER | Age: 65
End: 2025-01-04

## 2025-01-05 NOTE — TELEPHONE ENCOUNTER
01/04/25 11:35 PM     Chart reviewed for Hemoglobin A1c was/were submitted to the patient's insurance.     Petrona Horner MA   [facility]

## 2025-01-28 ENCOUNTER — RESULTS FOLLOW-UP (OUTPATIENT)
Age: 65
End: 2025-01-28

## 2025-01-28 DIAGNOSIS — K74.60 CIRRHOSIS OF LIVER WITHOUT ASCITES, UNSPECIFIED HEPATIC CIRRHOSIS TYPE (HCC): Primary | ICD-10-CM

## 2025-01-31 ENCOUNTER — TELEPHONE (OUTPATIENT)
Age: 65
End: 2025-01-31

## 2025-01-31 NOTE — TELEPHONE ENCOUNTER
Patient called requesting to speak with Dr Trivedi; I advised he was not in the office today. I asked what it was regarding and he said it's about his anxiety. I advised I will send message to Dr Trivedi to call him Monday. Advised patient if he isn't feeling well, he can go to to Urgent Care or hospital. Patient states he will go to hospital if not feeling well.

## 2025-02-04 RX ORDER — AMMONIUM LACTATE 12 G/100G
LOTION TOPICAL
COMMUNITY
Start: 2025-01-21

## 2025-02-05 ENCOUNTER — OFFICE VISIT (OUTPATIENT)
Dept: FAMILY MEDICINE CLINIC | Facility: CLINIC | Age: 65
End: 2025-02-05
Payer: MEDICARE

## 2025-02-05 VITALS
WEIGHT: 212 LBS | HEIGHT: 65 IN | OXYGEN SATURATION: 98 % | BODY MASS INDEX: 35.32 KG/M2 | DIASTOLIC BLOOD PRESSURE: 76 MMHG | SYSTOLIC BLOOD PRESSURE: 120 MMHG | TEMPERATURE: 98 F | RESPIRATION RATE: 16 BRPM | HEART RATE: 104 BPM

## 2025-02-05 DIAGNOSIS — E66.812 CLASS 2 SEVERE OBESITY DUE TO EXCESS CALORIES WITH SERIOUS COMORBIDITY AND BODY MASS INDEX (BMI) OF 36.0 TO 36.9 IN ADULT (HCC): ICD-10-CM

## 2025-02-05 DIAGNOSIS — E11.9 TYPE 2 DIABETES MELLITUS WITHOUT COMPLICATION, WITHOUT LONG-TERM CURRENT USE OF INSULIN (HCC): ICD-10-CM

## 2025-02-05 DIAGNOSIS — E66.01 CLASS 2 SEVERE OBESITY DUE TO EXCESS CALORIES WITH SERIOUS COMORBIDITY AND BODY MASS INDEX (BMI) OF 36.0 TO 36.9 IN ADULT (HCC): ICD-10-CM

## 2025-02-05 DIAGNOSIS — I85.10 ESOPHAGEAL VARICES IN CIRRHOSIS (HCC): ICD-10-CM

## 2025-02-05 DIAGNOSIS — F41.9 ANXIETY: Primary | ICD-10-CM

## 2025-02-05 DIAGNOSIS — K74.60 ESOPHAGEAL VARICES IN CIRRHOSIS (HCC): ICD-10-CM

## 2025-02-05 DIAGNOSIS — F11.20 METHADONE MAINTENANCE THERAPY PATIENT (HCC): ICD-10-CM

## 2025-02-05 PROCEDURE — G2211 COMPLEX E/M VISIT ADD ON: HCPCS | Performed by: FAMILY MEDICINE

## 2025-02-05 PROCEDURE — 99214 OFFICE O/P EST MOD 30 MIN: CPT | Performed by: FAMILY MEDICINE

## 2025-02-05 RX ORDER — BUSPIRONE HYDROCHLORIDE 5 MG/1
5 TABLET ORAL 3 TIMES DAILY
Qty: 90 TABLET | Refills: 2 | Status: SHIPPED | OUTPATIENT
Start: 2025-02-05

## 2025-02-05 RX ORDER — FLUOXETINE 10 MG/1
10 CAPSULE ORAL DAILY
Qty: 30 CAPSULE | Refills: 2 | Status: SHIPPED | OUTPATIENT
Start: 2025-02-05 | End: 2025-02-10 | Stop reason: ALTCHOICE

## 2025-02-05 RX ORDER — LORAZEPAM 1 MG/1
1 TABLET ORAL 2 TIMES DAILY
Qty: 60 TABLET | Refills: 0 | Status: SHIPPED | OUTPATIENT
Start: 2025-02-05 | End: 2025-02-05 | Stop reason: ALTCHOICE

## 2025-02-05 NOTE — PROGRESS NOTES
Name: Rishi Kirk      : 1960      MRN: [id number]  Encounter Provider: Soren Trivedi MD  Encounter Date: 2025   Encounter department: [facility]    Assessment & Plan  Anxiety    Orders:  •  busPIRone (BUSPAR) 5 mg tablet; Take 1 tablet (5 mg total) by mouth 3 (three) times a day  •  FLUoxetine (PROzac) 10 mg capsule; Take 1 capsule (10 mg total) by mouth daily    Esophageal varices in cirrhosis (HCC)         Methadone maintenance therapy patient (HCC)         Class 2 severe obesity due to excess calories with serious comorbidity and body mass index (BMI) of 36.0 to 36.9 in adult (HCC)           Type 2 diabetes mellitus without complication, without long-term current use of insulin (HCC)    Lab Results   Component Value Date    HGBA1C 7.5 (H) 2024                     Soren Trivedi MD   [facility]

## 2025-02-05 NOTE — ASSESSMENT & PLAN NOTE
In 2020 He had mild size, GI Recommended to take titration  dose of Nadolol from 20 to 40.   In 2024 EGD showed The esophagus appeared normal.  The stomach appeared normal.  Mild erythematous mucosa in the duodenal bulb  The 2nd part of the duodenum appeared normal.  No recent or current Hematemesis or melena.

## 2025-02-05 NOTE — ASSESSMENT & PLAN NOTE
20 years in the program after Heroine drug abuse and infection of Hep. C.  Continue care with Methadone clinic.

## 2025-02-05 NOTE — PATIENT INSTRUCTIONS
National Suicide Prevention Lifeline  I explained if he experiencing emotional distress, crisis, or having suicidal thoughts, please contact the Suicide and Crisis Lifeline at 988 (may call or text) or chat online at [email or web address]. Support is available 24 hours a day, 7 days a week. The previous 5-061-220-TALK ([phone]) number remains functional.  TT[employer] users may dial 711 (or preferred relay service) then [phone].  The hotline is free and calls are confidential.

## 2025-02-05 NOTE — ASSESSMENT & PLAN NOTE
Anxiety Exacerbation with Insomnia and GI Symptoms    Assessment and Plan:  1. Severe Anxiety Disorder with Somatic Symptoms  - Current mirtazapine regimen insufficient for symptom control  - Adding new anti-anxiety medication regimen with two medications:  * Morning dose medication  * TID medication for anxiety management  - Avoiding benzodiazepines due to concurrent methadone therapy  - Follow up in one month to assess response    2. Chronic Pain Management/Opioid Dependence  - Currently stable on methadone 44mg daily  - Monthly visits at methadone clinic  - Continue current methadone management    3. Insomnia  - Secondary to anxiety  - Continue current mirtazapine    Plan:  1. New anxiety medications prescribed as discussed  2. Continue current mirtazapine  3. Follow up in 1 month  4. Crisis intervention available if needed  Orders:    busPIRone (BUSPAR) 5 mg tablet; Take 1 tablet (5 mg total) by mouth 3 (three) times a day    FLUoxetine (PROzac) 10 mg capsule; Take 1 capsule (10 mg total) by mouth daily

## 2025-02-05 NOTE — PROGRESS NOTES
Name: Rishi Kirk      : 1960      MRN: [id number]  Encounter Provider: Soren Trivedi MD  Encounter Date: 2025   Encounter department: [facility]    Assessment & Plan  Anxiety  Anxiety Exacerbation with Insomnia and GI Symptoms    Assessment and Plan:  1. Severe Anxiety Disorder with Somatic Symptoms  - Current mirtazapine regimen insufficient for symptom control  - Adding new anti-anxiety medication regimen with two medications:  * Morning dose medication  * TID medication for anxiety management  - Avoiding benzodiazepines due to concurrent methadone therapy  - Follow up in one month to assess response    2. Chronic Pain Management/Opioid Dependence  - Currently stable on methadone 44mg daily  - Monthly visits at methadone clinic  - Continue current methadone management    3. Insomnia  - Secondary to anxiety  - Continue current mirtazapine    Plan:  1. New anxiety medications prescribed as discussed  2. Continue current mirtazapine  3. Follow up in 1 month  4. Crisis intervention available if needed  Orders:    busPIRone (BUSPAR) 5 mg tablet; Take 1 tablet (5 mg total) by mouth 3 (three) times a day    FLUoxetine (PROzac) 10 mg capsule; Take 1 capsule (10 mg total) by mouth daily    Type 2 diabetes mellitus without complication, without long-term current use of insulin ([facility])    Lab Results   Component Value Date    HGBA1C 7.5 (H) 2024     Rishi is  showing poor and worsening control of hyperglycemia, as indicated by the HbA1C percentage mentioned above. He expressed concerns about statin therapy and kidney protection treatments. We discussed our shared treatment goals, which he accepted. The primary objective is to reduce the risk of vascular disease and its complications. I explained the potential complications of uncontrolled diabetes and the symptoms of hypoglycemia. We emphasized the importance of follow-up with diabetes educators and making  lifestyle modifications. I encouraged him to remain compliant with his treatment plan and to call if he experiences any side effects. Additionally, I requested that he bring a blood sugar logbook to his next appointment.          Esophageal varices in cirrhosis (HCC)  In 2020 He had mild size, GI Recommended to take titration  dose of Nadolol from 20 to 40.   In 2024 EGD showed The esophagus appeared normal.  The stomach appeared normal.  Mild erythematous mucosa in the duodenal bulb  The 2nd part of the duodenum appeared normal.  No recent or current Hematemesis or melena.       Class 2 severe obesity due to excess calories with serious comorbidity and body mass index (BMI) of 36.0 to 36.9 in adult (HCC)  We discussed the initial approach to the obese patient who desires to lose weight. Patients should diet and exercise, as the NIH Expert Panel recommended in 2017. Combining a reduced-calorie diet and exercise is more productive than either alone. Additional weight loss may be possible with some medication regimens. The initial goal of weight loss therapy (diet and exercise) is a 10% reduction in body weight over six months. After the initial 6-month period, we will reassess to determine the efficacy of the therapy; whether the patient needs to lose more weight, the patient should establish a weight maintenance program.           Methadone maintenance therapy patient (HCC)  20 years in the program after Heroine drug abuse and infection of Hep. C.  Continue care with Methadone clinic.              Subjective       65-year-old male presents with exacerbation of anxiety symptoms. Patient reports severe anxiety affecting multiple aspects of daily life, particularly sleep and eating. Describes constant stomach discomfort and inability to eat, requiring ondansetron for nausea. Patient characterizes the sensation as a persistent feeling of unease, similar to the sensation of riding a roller coaster. Denies current  depression or grief issues, though mentions past loss of parents. Previous trial of mirtazapine provided relief for approximately 2 months and 7 days, but symptoms have returned with greater intensity. Currently taking two tablets of mirtazapine without adequate relief.    Patient is in long-term methadone treatment (20-year history), currently stable at 44mg daily with monthly clinic visits. Receives 27 take-home doses per month. Denies any recent changes in methadone dosing. Reports limitations on medication options due to liver concerns and methadone clinic restrictions regarding controlled substances.    Patient Active Problem List   Diagnosis    Benign essential hypertension    Chronic hepatitis C treated in 2014 (HCC)    Esophageal varices in cirrhosis (HCC)    Type 2 diabetes mellitus without complication, without long-term current use of insulin (HCC)    Gallbladder polyp    Methadone maintenance therapy patient (HCC)    Portal hypertension (HCC)    Proteinuria    Secondary male hypogonadism    Class 2 severe obesity due to excess calories with serious comorbidity and body mass index (BMI) of 36.0 to 36.9 in adult (HCC)    RODRICK (obstructive sleep apnea)    Coronary artery calcification    Vitamin B12 deficiency    Tobacco dependence due to cigarettes    Intolerance of continuous positive airway pressure (CPAP) ventilation    Cataract of right eye    Nephrolithiasis    Alcoholic cirrhosis of liver without ascites (HCC)    Umbilical hernia without obstruction and without gangrene    Anxiety       Current Outpatient Medications:     ammonium lactate (LAC-HYDRIN) 12 % lotion, , Disp: , Rfl:     atorvastatin (LIPITOR) 40 mg tablet, Take 1 tablet (40 mg total) by mouth daily, Disp: 90 tablet, Rfl: 3    Comfort Touch Plus Lancets 30G MISC, Inject 1 Application under the skin Daily at 2am, Disp: 100 each, Rfl: 3    docusate sodium (COLACE) 100 mg capsule, Take 1 capsule (100 mg total) by mouth 2 (two) times a day,  "Disp: 60 capsule, Rfl: 5    glucose blood (FREESTYLE LITE) test strip, Use as instructed, Disp: 300 each, Rfl: 1    GNP Alcohol Swabs 70 % PADS, TEST 2-3 TIMES A DAY AS DIRECTEDTEST 2-3 TIMES A DAY AS DIRECTED, Disp: 300 each, Rfl: 3    hydrOXYzine HCL (ATARAX) 25 mg tablet, Take 1 tablet (25 mg total) by mouth every 6 (six) hours as needed for anxiety, Disp: 30 tablet, Rfl: 1    lisinopril (ZESTRIL) 5 mg tablet, TAKE 1 TABLET (5 MG TOTAL) BY MOUTH DAILYTOME 1 TABLET (5 MG TOTAL) BY MOUTH DAILY, Disp: 30 tablet, Rfl: 5    methadone (DOLOPHINE) 10 mg/mL oral concentrated solution, Take 44 mg by mouth daily, Disp: , Rfl:     nadolol (CORGARD) 20 mg tablet, TAKE 1 TABLET (20 MG TOTAL) BY MOUTH DAILY, Disp: 90 tablet, Rfl: 1    omeprazole (PriLOSEC) 40 MG capsule, TAKE 1 CAPSULE (40 MG TOTAL) BY MOUTH DAILY TOME 1 CAPSULA 30 MINUTOS ANTES DE DESAYUNAR, Disp: , Rfl:     sitaGLIPtin (JANUVIA) 100 mg tablet, Take 1 tablet (100 mg total) by mouth daily, Disp: 100 tablet, Rfl: 3  Mirtazapine 15 mg every night.  Objective:  /76 (BP Location: Left arm, Patient Position: Sitting, Cuff Size: Standard)   Pulse 104   Temp 98 °F (36.7 °C) (Tympanic)   Resp 16   Ht 5' 5\" (1.651 m)   Wt 96.2 kg (212 lb)   SpO2 98%   BMI 35.28 kg/m²     General: Alert and oriented, in visible distress  Psychiatric:  - Anxious affect  - No evidence of current depression  - No suicidal ideation  - Good insight and judgment    Abdomen:  - Reported gastric discomfort secondary to anxiety          Soren Trivedi MD   [facility]"

## 2025-02-05 NOTE — ASSESSMENT & PLAN NOTE
Lab Results   Component Value Date    HGBA1C 7.5 (H) 11/19/2024     Rishi is  showing poor and worsening control of hyperglycemia, as indicated by the HbA1C percentage mentioned above. He expressed concerns about statin therapy and kidney protection treatments. We discussed our shared treatment goals, which he accepted. The primary objective is to reduce the risk of vascular disease and its complications. I explained the potential complications of uncontrolled diabetes and the symptoms of hypoglycemia. We emphasized the importance of follow-up with diabetes educators and making lifestyle modifications. I encouraged him to remain compliant with his treatment plan and to call if he experiences any side effects. Additionally, I requested that he bring a blood sugar logbook to his next appointment.

## 2025-02-05 NOTE — ASSESSMENT & PLAN NOTE
Orders:    busPIRone (BUSPAR) 5 mg tablet; Take 1 tablet (5 mg total) by mouth 3 (three) times a day    FLUoxetine (PROzac) 10 mg capsule; Take 1 capsule (10 mg total) by mouth daily

## 2025-02-07 ENCOUNTER — TELEPHONE (OUTPATIENT)
Age: 65
End: 2025-02-07

## 2025-02-07 ENCOUNTER — HOSPITAL ENCOUNTER (EMERGENCY)
Facility: HOSPITAL | Age: 65
Discharge: HOME | End: 2025-02-07
Attending: EMERGENCY MEDICINE
Payer: MEDICARE

## 2025-02-07 VITALS
HEART RATE: 100 BPM | RESPIRATION RATE: 20 BRPM | WEIGHT: 214.51 LBS | BODY MASS INDEX: 35.7 KG/M2 | SYSTOLIC BLOOD PRESSURE: 144 MMHG | TEMPERATURE: 98.1 F | OXYGEN SATURATION: 95 % | DIASTOLIC BLOOD PRESSURE: 90 MMHG

## 2025-02-07 DIAGNOSIS — F19.10 DRUG ABUSE (HCC): ICD-10-CM

## 2025-02-07 DIAGNOSIS — F19.10 SUBSTANCE ABUSE (HCC): ICD-10-CM

## 2025-02-07 DIAGNOSIS — F11.10 OPIATE ABUSE, CONTINUOUS (HCC): Primary | ICD-10-CM

## 2025-02-07 DIAGNOSIS — F41.9 ANXIETY: Primary | ICD-10-CM

## 2025-02-07 DIAGNOSIS — F32.A DEPRESSION: ICD-10-CM

## 2025-02-07 DIAGNOSIS — R11.2 NAUSEA & VOMITING: ICD-10-CM

## 2025-02-07 DIAGNOSIS — F11.93 OPIATE WITHDRAWAL (HCC): ICD-10-CM

## 2025-02-07 LAB
ALBUMIN SERPL BCG-MCNC: 4.3 G/DL (ref 3.5–5)
ALP SERPL-CCNC: 104 U/L (ref 34–104)
ALT SERPL W P-5'-P-CCNC: 36 U/L (ref 7–52)
AMPHETAMINES SERPL QL SCN: NEGATIVE
ANION GAP SERPL CALCULATED.3IONS-SCNC: 11 MMOL/L (ref 4–13)
AST SERPL W P-5'-P-CCNC: 36 U/L (ref 13–39)
ATRIAL RATE: 94 BPM
BARBITURATES UR QL: NEGATIVE
BASOPHILS # BLD AUTO: 0.03 THOUSANDS/ΜL (ref 0–0.1)
BASOPHILS NFR BLD AUTO: 1 % (ref 0–1)
BENZODIAZ UR QL: NEGATIVE
BILIRUB SERPL-MCNC: 0.95 MG/DL (ref 0.2–1)
BUN SERPL-MCNC: 8 MG/DL (ref 5–25)
CALCIUM SERPL-MCNC: 9.4 MG/DL (ref 8.4–10.2)
CHLORIDE SERPL-SCNC: 101 MMOL/L (ref 96–108)
CO2 SERPL-SCNC: 24 MMOL/L (ref 21–32)
COCAINE UR QL: NEGATIVE
CREAT SERPL-MCNC: 1.07 MG/DL (ref 0.6–1.3)
EOSINOPHIL # BLD AUTO: 0.02 THOUSAND/ΜL (ref 0–0.61)
EOSINOPHIL NFR BLD AUTO: 0 % (ref 0–6)
ERYTHROCYTE [DISTWIDTH] IN BLOOD BY AUTOMATED COUNT: 12.8 % (ref 11.6–15.1)
ETHANOL SERPL-MCNC: <10 MG/DL
FENTANYL UR QL SCN: NEGATIVE
GFR SERPL CREATININE-BSD FRML MDRD: 72 ML/MIN/1.73SQ M
GLUCOSE SERPL-MCNC: 201 MG/DL (ref 65–140)
HCT VFR BLD AUTO: 46.3 % (ref 36.5–49.3)
HGB BLD-MCNC: 15.7 G/DL (ref 12–17)
HYDROCODONE UR QL SCN: NEGATIVE
IMM GRANULOCYTES # BLD AUTO: 0.02 THOUSAND/UL (ref 0–0.2)
IMM GRANULOCYTES NFR BLD AUTO: 0 % (ref 0–2)
LYMPHOCYTES # BLD AUTO: 1.38 THOUSANDS/ΜL (ref 0.6–4.47)
LYMPHOCYTES NFR BLD AUTO: 25 % (ref 14–44)
MCH RBC QN AUTO: 31.1 PG (ref 26.8–34.3)
MCHC RBC AUTO-ENTMCNC: 33.9 G/DL (ref 31.4–37.4)
MCV RBC AUTO: 92 FL (ref 82–98)
METHADONE UR QL: POSITIVE
MONOCYTES # BLD AUTO: 0.28 THOUSAND/ΜL (ref 0.17–1.22)
MONOCYTES NFR BLD AUTO: 5 % (ref 4–12)
NEUTROPHILS # BLD AUTO: 3.88 THOUSANDS/ΜL (ref 1.85–7.62)
NEUTS SEG NFR BLD AUTO: 69 % (ref 43–75)
NRBC BLD AUTO-RTO: 0 /100 WBCS
OPIATES UR QL SCN: NEGATIVE
OXYCODONE+OXYMORPHONE UR QL SCN: NEGATIVE
P AXIS: 39 DEGREES
PCP UR QL: NEGATIVE
PLATELET # BLD AUTO: 192 THOUSANDS/UL (ref 149–390)
PMV BLD AUTO: 8.4 FL (ref 8.9–12.7)
POTASSIUM SERPL-SCNC: 3.7 MMOL/L (ref 3.5–5.3)
PR INTERVAL: 170 MS
PROT SERPL-MCNC: 8 G/DL (ref 6.4–8.4)
QRS AXIS: -13 DEGREES
QRSD INTERVAL: 86 MS
QT INTERVAL: 378 MS
QTC INTERVAL: 473 MS
RBC # BLD AUTO: 5.05 MILLION/UL (ref 3.88–5.62)
SODIUM SERPL-SCNC: 136 MMOL/L (ref 135–147)
T WAVE AXIS: 58 DEGREES
THC UR QL: NEGATIVE
VENTRICULAR RATE: 94 BPM
WBC # BLD AUTO: 5.61 THOUSAND/UL (ref 4.31–10.16)

## 2025-02-07 PROCEDURE — 99285 EMERGENCY DEPT VISIT HI MDM: CPT | Performed by: EMERGENCY MEDICINE

## 2025-02-07 PROCEDURE — 80053 COMPREHEN METABOLIC PANEL: CPT | Performed by: EMERGENCY MEDICINE

## 2025-02-07 PROCEDURE — 93005 ELECTROCARDIOGRAM TRACING: CPT

## 2025-02-07 PROCEDURE — 99284 EMERGENCY DEPT VISIT MOD MDM: CPT

## 2025-02-07 PROCEDURE — 80307 DRUG TEST PRSMV CHEM ANLYZR: CPT | Performed by: EMERGENCY MEDICINE

## 2025-02-07 PROCEDURE — 85025 COMPLETE CBC W/AUTO DIFF WBC: CPT | Performed by: EMERGENCY MEDICINE

## 2025-02-07 PROCEDURE — 93010 ELECTROCARDIOGRAM REPORT: CPT | Performed by: INTERNAL MEDICINE

## 2025-02-07 PROCEDURE — 36415 COLL VENOUS BLD VENIPUNCTURE: CPT | Performed by: EMERGENCY MEDICINE

## 2025-02-07 PROCEDURE — 82077 ASSAY SPEC XCP UR&BREATH IA: CPT | Performed by: EMERGENCY MEDICINE

## 2025-02-07 RX ORDER — FLUOXETINE 10 MG/1
10 CAPSULE ORAL DAILY
Status: CANCELLED | OUTPATIENT
Start: 2025-02-07

## 2025-02-07 RX ORDER — CLONIDINE HYDROCHLORIDE 0.1 MG/1
0.2 TABLET ORAL ONCE
Status: COMPLETED | OUTPATIENT
Start: 2025-02-07 | End: 2025-02-07

## 2025-02-07 RX ORDER — METHADONE HYDROCHLORIDE 10 MG/ML
44 CONCENTRATE ORAL DAILY
Refills: 0 | Status: CANCELLED | OUTPATIENT
Start: 2025-02-08

## 2025-02-07 RX ORDER — DOCUSATE SODIUM 100 MG/1
100 CAPSULE, LIQUID FILLED ORAL 2 TIMES DAILY
Status: CANCELLED | OUTPATIENT
Start: 2025-02-07

## 2025-02-07 RX ORDER — BUSPIRONE HYDROCHLORIDE 5 MG/1
5 TABLET ORAL 3 TIMES DAILY
Status: CANCELLED | OUTPATIENT
Start: 2025-02-07

## 2025-02-07 RX ORDER — HYDROXYZINE HYDROCHLORIDE 25 MG/1
25 TABLET, FILM COATED ORAL EVERY 6 HOURS PRN
Status: CANCELLED | OUTPATIENT
Start: 2025-02-07

## 2025-02-07 RX ORDER — LISINOPRIL 10 MG/1
10 TABLET ORAL DAILY
Status: CANCELLED | OUTPATIENT
Start: 2025-02-07

## 2025-02-07 RX ORDER — NADOLOL 20 MG/1
20 TABLET ORAL DAILY
Status: CANCELLED | OUTPATIENT
Start: 2025-02-07

## 2025-02-07 RX ORDER — CLONIDINE HYDROCHLORIDE 0.1 MG/1
0.1 TABLET ORAL EVERY 12 HOURS SCHEDULED
Qty: 60 TABLET | Refills: 5 | Status: SHIPPED | OUTPATIENT
Start: 2025-02-07 | End: 2025-02-10

## 2025-02-07 RX ORDER — ONDANSETRON 4 MG/1
4 TABLET, ORALLY DISINTEGRATING ORAL ONCE
Status: COMPLETED | OUTPATIENT
Start: 2025-02-07 | End: 2025-02-07

## 2025-02-07 RX ORDER — ATORVASTATIN CALCIUM 20 MG/1
40 TABLET, FILM COATED ORAL DAILY
Status: CANCELLED | OUTPATIENT
Start: 2025-02-07

## 2025-02-07 RX ORDER — PANTOPRAZOLE SODIUM 40 MG/1
40 TABLET, DELAYED RELEASE ORAL
Status: CANCELLED | OUTPATIENT
Start: 2025-02-07

## 2025-02-07 RX ADMIN — ONDANSETRON 4 MG: 4 TABLET, ORALLY DISINTEGRATING ORAL at 05:44

## 2025-02-07 RX ADMIN — CLONIDINE HYDROCHLORIDE 0.2 MG: 0.1 TABLET ORAL at 07:53

## 2025-02-07 NOTE — DISCHARGE INSTRUCTIONS
Please see your [facility] counselor and prescribing provider to discuss Methadone dosing.   It is important to call [facility], [facility], or go to the nearest emergency department if you feel you are a danger to yourself, or to others.   As discussed, please rely on your family, your network of friends, and your counselor for support.

## 2025-02-07 NOTE — ED NOTES
The patient is a 63 y/o M with history of opioid dependence, marginally managed with Methadone from [facility]. He did present there this morning for current dose of 44 mg. He has been on methadone since 2004 and has not been able to complete a down-taper. Patient shared that he has been using 20-30 mg more of Methadone, the supply being from a dying friend. Patient stated he is now experiencing withdrawal and difficulty sleeping. Patient stated he does not have a history of depression or psychiatric treatment. He denied suicidal and homicidal ideation. He stated he has Jain beliefs and family that prevent him from even considering taking his own life. He stated he is generally content with his life. He has a place to live, and people in his life that are supportive. He denied A/VH and there is no evidence of delusional thinking. Patient stated he is compliant with sessions for medication management and counseling at [facility]. Today, the patient shared with his therapist, Freda, that he was using additional Methadone. She reportedly confirmed with him that they would be willing to increase it; however, could not assist with his current withdrawal symptoms, which included nausea, anxiety, Therefore, she recommended that he present to the ED. Offered patient a HOST referral, which he refused. He stated he typically does well with his current treatment and expects they will help him through this. Patient stated he feels comfortable being discharged to home. There are no grounds for an inpatient psychiatric admission.

## 2025-02-07 NOTE — ED CARE HANDOFF
Emergency Department Sign Out Note        Sign out and transfer of care from Dr chavez. See Separate Emergency Department note.     The patient, Rishi Kirk, was evaluated by the previous provider for drug abuse si.    Workup Completed:  As above    ED Course / Workup Pending (followup):  Await crisis                                     Procedures  Medical Decision Making  Amount and/or Complexity of Data Reviewed  Labs: ordered.    Risk  Prescription drug management.  Decision regarding hospitalization.            Disposition  Final diagnoses:   Opiate abuse, continuous (HCC)   Nausea & vomiting   Depression   Drug abuse (HCC)     Time reflects when diagnosis was documented in both MDM as applicable and the Disposition within this note       Time User Action Codes Description Comment    2/7/2025  5:38 AM Troy Chavez [F11.10] Opiate abuse, continuous (HCC)     2/7/2025  5:39 AM Troy Chavez [R11.2] Nausea & vomiting     2/7/2025  5:52 AM Troy Chavez [F32.A] Depression     2/7/2025  5:52 AM Troy Chavez [F19.10] Drug abuse (HCC)           ED Disposition       ED Disposition   Transfer to Behavioral Health    Condition   --    Date/Time   Fri Feb 7, 2025  6:30 AM    Comment   Rishi Kirk should be transferred out to behavioral health and has been medically cleared.               Follow-up Information       Follow up With Specialties Details Why Contact Info    Soren Trivedi MD Family Medicine   [address] [place] [id number] [phone]            Patient's Medications   Discharge Prescriptions    No medications on file     No discharge procedures on file.       ED Provider  Electronically Signed by     Davion Pardo MD  02/07/25 0702

## 2025-02-07 NOTE — ED NOTES
This writer discussed the patient's current presentation and recommended discharge plan with Davion Pardo MD.  He agrees with the patient being discharged at this time with plan to return to [facility] for outpatient substance use treatment and MAT.    The patient was Instructed to follow up with their counselor Freda, who will facilitate an appointment with prescribing provider.  The patient declined a referral to [facility].     The patient declined to complete a safety plan. In addition, the patient was instructed to call [facility], 911, or to go to the nearest ER immediately if their situation changes at any time.     This writer discussed discharge plans with the patient, who agrees with same.             National Suicide Prevention Hotline:  987

## 2025-02-07 NOTE — ED NOTES
[place] PR[place] indicates:  Active.  Recipient #[id number]   BH managed by [facility].    Primary payor is [employer].  Plan is a Medicare replacement.

## 2025-02-07 NOTE — ED PROVIDER NOTES
Time reflects when diagnosis was documented in both MDM as applicable and the Disposition within this note       Time User Action Codes Description Comment    2/7/2025  5:38 AM Troy Chavez [F11.10] Opiate abuse, continuous (HCC)     2/7/2025  5:39 AM Troy Chavez [R11.2] Nausea & vomiting     2/7/2025  5:52 AM Troy Chavez [F32.A] Depression     2/7/2025  5:52 AM Troy Chavez [F19.10] Drug abuse (HCC)           ED Disposition       ED Disposition   Transfer to Behavioral Health    Condition   --    Date/Time   Fri Feb 7, 2025  6:30 AM    Comment   Rishi Kirk should be transferred out to behavioral health and has been medically cleared.               Assessment & Plan       Medical Decision Making  64-year-old gentleman presents stating that he has an upset stomach which she fears could be from anxiety or withdrawal from his methadone.  He states that he had his regular methadone dose of 44 mg this morning.  He has been taking extra methadone over the past week or 2 as he had received extra from a friend who was dying.  He has had increased anxiety and at this point is feeling depressed and suicidal.  He states that he would never be able to act on it because he believes in God.  Denies any hallucinations or homicidal ideation.  Other than the nausea and anxiety, he denies any acute medical concerns.  Plan to check basic labs for medical clearance and will have crisis evaluate the patient.    Amount and/or Complexity of Data Reviewed  Labs: ordered.    Risk  Prescription drug management.  Decision regarding hospitalization.             Medications   ondansetron (ZOFRAN-ODT) dispersible tablet 4 mg (4 mg Oral Given 2/7/25 0544)       ED Risk Strat Scores                          SBIRT 22yo+      Flowsheet Row Most Recent Value   Initial Alcohol Screen: US AUDIT-C     1. How often do you have a drink containing alcohol? 0 Filed at: 02/07/2025 0601   2. How many drinks containing alcohol do you  have on a typical day you are drinking?  0 Filed at: 02/07/2025 0601   3a. Male UNDER 65: How often do you have five or more drinks on one occasion? 0 Filed at: 02/07/2025 0527   3b. FEMALE Any Age, or MALE 65+: How often do you have 4 or more drinks on one occassion? 0 Filed at: 02/07/2025 0601   Audit-C Score 0 Filed at: 02/07/2025 0601   GRAYSON: How many times in the past year have you...    Used an illegal drug or used a prescription medication for non-medical reasons? Never Filed at: 02/07/2025 0601                            History of Present Illness       Chief Complaint   Patient presents with    Withdrawal - Drug     Pt was taking 44 mg of methadone a day but began to take extra for about two weeks. Pt has methadone at home. Only took 44 mg this morning. C/o nausea, insomnia (woke up at 2a and unable to go back to sleep)       Past Medical History:   Diagnosis Date    Cirrhosis (HCC)     Colon polyp     Diabetes mellitus (HCC)     Esophageal varices (HCC)     Gastritis     Hepatitis C     History of Helicobacter pylori infection 02/23/2016    History of kidney stones     Hyperlipidemia     Hypertension     Infectious viral hepatitis     Kidney stone     Obesity     Pneumonia     PPD positive 2020    Sleep apnea     Splenomegaly     Substance abuse (HCC)       Past Surgical History:   Procedure Laterality Date    COLONOSCOPY  06/20/2014    dr [email or web address]    COLONOSCOPY  2014        EGD AND COLONOSCOPY  08/2020    esophageal varices, colon polyp, hemorrhoids    ESOPHAGOGASTRODUODENOSCOPY  12/10/2015    esophageal varices, cirrhosis, gastritis    HYDROCELE EXCISION / REPAIR      LIVER BIOPSY  2014      Family History   Problem Relation Age of Onset    Diabetes type II Mother         MELLITUS    Hypertension Mother     Hypertension Sister     Diabetes Sister         MELLITUS    No Known Problems Maternal Grandmother     No Known Problems Maternal Grandfather     No Known Problems Paternal Grandmother     No  Known Problems Paternal Grandfather     No Known Problems Sister     Breast cancer Maternal Aunt         40s    No Known Problems Maternal Aunt     No Known Problems Paternal Aunt     No Known Problems Paternal Aunt     No Known Problems Paternal Aunt     No Known Problems Paternal Aunt     No Known Problems Paternal Aunt     No Known Problems Paternal Aunt       Social History     Tobacco Use    Smoking status: Former     Current packs/day: 0.25     Average packs/day: 1 pack/day for 42.0 years (41.4 ttl pk-yrs)     Types: Cigarettes     Start date: 1983     Quit date: 3/1/2024     Passive exposure: Current    Smokeless tobacco: Current    Tobacco comments:     TOBACCO USE   Vaping Use    Vaping status: Never Used   Substance Use Topics    Alcohol use: Not Currently    Drug use: Not Currently     Types: Heroin     Comment: former user- heroin was drug of choice      E-Cigarette/Vaping    E-Cigarette Use Never User       E-Cigarette/Vaping Substances    Nicotine No     THC No     CBD No     Flavoring No       I have reviewed and agree with the history as documented.       Withdrawal - Drug  Associated symptoms: suicidal ideation    Psychiatric Evaluation  Presenting symptoms: depression and suicidal thoughts    Degree of incapacity (severity):  Severe  Onset quality:  Gradual  Duration: days.  Timing:  Constant  Progression:  Waxing and waning  Chronicity:  Recurrent  Context: drug abuse    Treatment compliance:  Most of the time  Relieved by:  Nothing  Worsened by:  Drugs  Ineffective treatments:  None tried  Associated symptoms: anxiety, feelings of worthlessness, irritability and poor judgment        Review of Systems   Constitutional:  Positive for irritability.   Psychiatric/Behavioral:  Positive for suicidal ideas. The patient is nervous/anxious.    All other systems reviewed and are negative.          Objective       ED Triage Vitals [02/07/25 0531]   Temperature Pulse Blood Pressure Respirations SpO2 Patient  Position - Orthostatic VS   98.1 °F (36.7 °C) (!) 110 168/95 20 96 % Sitting      Temp Source Heart Rate Source BP Location FiO2 (%) Pain Score    Oral Monitor Right arm -- --      Vitals      Date and Time Temp Pulse SpO2 Resp BP Pain Score FACES Pain Rating User   02/07/25 0531 98.1 °F (36.7 °C) 110 96 % 20 168/95 -- -- EY            Physical Exam  Vitals and nursing note reviewed.   Constitutional:       General: He is not in acute distress.     Appearance: Normal appearance. He is well-developed. He is not ill-appearing, toxic-appearing or diaphoretic.   HENT:      Head: Normocephalic and atraumatic.      Right Ear: External ear normal.      Left Ear: External ear normal.      Nose: Nose normal.      Mouth/Throat:      Mouth: Mucous membranes are moist.      Pharynx: Oropharynx is clear.   Eyes:      Conjunctiva/sclera: Conjunctivae normal.      Pupils: Pupils are equal, round, and reactive to light.   Cardiovascular:      Rate and Rhythm: Normal rate and regular rhythm.      Heart sounds: Normal heart sounds.   Pulmonary:      Effort: Pulmonary effort is normal. No respiratory distress.      Breath sounds: Normal breath sounds.   Abdominal:      General: Bowel sounds are normal. There is no distension.      Palpations: Abdomen is soft.      Tenderness: There is no abdominal tenderness. There is no guarding.   Musculoskeletal:         General: Normal range of motion.      Cervical back: Neck supple. No rigidity.      Right lower leg: No edema.      Left lower leg: No edema.   Skin:     General: Skin is warm and dry.      Capillary Refill: Capillary refill takes less than 2 seconds.   Neurological:      General: No focal deficit present.      Mental Status: He is alert and oriented to person, place, and time.   Psychiatric:         Mood and Affect: Mood is anxious. Affect is tearful.         Speech: Speech is rapid and pressured.         Behavior: Behavior normal. Behavior is cooperative.         Thought Content:  Thought content includes suicidal ideation. Thought content does not include suicidal plan.         Results Reviewed       Procedure Component Value Units Date/Time    Comprehensive metabolic panel [[id number]]  (Abnormal) Collected: 02/07/25 0558    Lab Status: Final result Specimen: Blood from Hand, Left Updated: 02/07/25 0639     Sodium 136 mmol/L      Potassium 3.7 mmol/L      Chloride 101 mmol/L      CO2 24 mmol/L      ANION GAP 11 mmol/L      BUN 8 mg/dL      Creatinine 1.07 mg/dL      Glucose 201 mg/dL      Calcium 9.4 mg/dL      AST 36 U/L      ALT 36 U/L      Alkaline Phosphatase 104 U/L      Total Protein 8.0 g/dL      Albumin 4.3 g/dL      Total Bilirubin 0.95 mg/dL      eGFR 72 ml/min/1.73sq m     Narrative:      National Kidney Disease Foundation guidelines for Chronic Kidney Disease (CKD):     Stage 1 with normal or high GFR (GFR > 90 mL/min/1.73 square meters)    Stage 2 Mild CKD (GFR = 60-89 mL/min/1.73 square meters)    Stage 3A Moderate CKD (GFR = 45-59 mL/min/1.73 square meters)    Stage 3B Moderate CKD (GFR = 30-44 mL/min/1.73 square meters)    Stage 4 Severe CKD (GFR = 15-29 mL/min/1.73 square meters)    Stage 5 End Stage CKD (GFR <15 mL/min/1.73 square meters)  Note: GFR calculation is accurate only with a steady state creatinine    Rapid drug screen, urine [[id number]]  (Abnormal) Collected: 02/07/25 0604    Lab Status: Final result Specimen: Urine, Other Updated: 02/07/25 0636     Amph/Meth UR Negative     Barbiturate Ur Negative     Benzodiazepine Urine Negative     Cocaine Urine Negative     Methadone Urine Positive     Opiate Urine Negative     PCP Ur Negative     THC Urine Negative     Oxycodone Urine Negative     Fentanyl Urine Negative     HYDROCODONE URINE Negative    Narrative:      Presumptive report. If requested, specimen will be sent to reference lab for confirmation.  FOR MEDICAL PURPOSES ONLY.   IF CONFIRMATION NEEDED PLEASE CONTACT THE LAB WITHIN 5 DAYS.    Drug Screen Cutoff  Levels:  AMPHETAMINE/METHAMPHETAMINES  1000 ng/mL  BARBITURATES     200 ng/mL  BENZODIAZEPINES     200 ng/mL  COCAINE      300 ng/mL  METHADONE      300 ng/mL  OPIATES      300 ng/mL  PHENCYCLIDINE     25 ng/mL  THC       50 ng/mL  OXYCODONE      100 ng/mL  FENTANYL      5 ng/mL  HYDROCODONE     300 ng/mL    Ethanol [[id number]]  (Normal) Collected: 02/07/25 0558    Lab Status: Final result Specimen: Blood from Arm, Left Updated: 02/07/25 0625     Ethanol Lvl <10 mg/dL     CBC and differential [[id number]]  (Abnormal) Collected: 02/07/25 0558    Lab Status: Final result Specimen: Blood from Hand, Left Updated: 02/07/25 0605     WBC 5.61 Thousand/uL      RBC 5.05 Million/uL      Hemoglobin 15.7 g/dL      Hematocrit 46.3 %      MCV 92 fL      MCH 31.1 pg      MCHC 33.9 g/dL      RDW 12.8 %      MPV 8.4 fL      Platelets 192 Thousands/uL      nRBC 0 /100 WBCs      Segmented % 69 %      Immature Grans % 0 %      Lymphocytes % 25 %      Monocytes % 5 %      Eosinophils Relative 0 %      Basophils Relative 1 %      Absolute Neutrophils 3.88 Thousands/µL      Absolute Immature Grans 0.02 Thousand/uL      Absolute Lymphocytes 1.38 Thousands/µL      Absolute Monocytes 0.28 Thousand/µL      Eosinophils Absolute 0.02 Thousand/µL      Basophils Absolute 0.03 Thousands/µL             No orders to display       ECG 12 Lead Documentation Only    Date/Time: 2/7/2025 6:10 AM    Performed by: Troy Chavez DO  Authorized by: Troy Chavez DO    ECG reviewed by me, the ED Provider: yes    Patient location:  ED  Rate:     ECG rate:  94    ECG rate assessment: normal    Rhythm:     Rhythm: sinus rhythm    Ectopy:     Ectopy: none    QRS:     QRS axis:  Normal  Conduction:     Conduction: normal    ST segments:     ST segments:  Normal  T waves:     T waves: non-specific        ED Medication and Procedure Management   Prior to Admission Medications   Prescriptions Last Dose Informant Patient Reported? Taking?   Comfort Touch Plus  Lancets 30G MISC  Self No No   Sig: Inject 1 Application under the skin Daily at 2am   FLUoxetine (PROzac) 10 mg capsule   No No   Sig: Take 1 capsule (10 mg total) by mouth daily   GNP Alcohol Swabs 70 % PADS   No No   Sig: TEST 2-3 TIMES A DAY AS DIRECTEDTEST 2-3 TIMES A DAY AS DIRECTED   ammonium lactate (LAC-HYDRIN) 12 % lotion   Yes No   atorvastatin (LIPITOR) 40 mg tablet   No No   Sig: Take 1 tablet (40 mg total) by mouth daily   busPIRone (BUSPAR) 5 mg tablet   No No   Sig: Take 1 tablet (5 mg total) by mouth 3 (three) times a day   docusate sodium (COLACE) 100 mg capsule   No No   Sig: Take 1 capsule (100 mg total) by mouth 2 (two) times a day   glucose blood (FREESTYLE LITE) test strip   No No   Sig: Use as instructed   hydrOXYzine HCL (ATARAX) 25 mg tablet   No No   Sig: Take 1 tablet (25 mg total) by mouth every 6 (six) hours as needed for anxiety   lisinopril (ZESTRIL) 5 mg tablet   No No   Sig: TAKE 1 TABLET (5 MG TOTAL) BY MOUTH DAILYTOME 1 TABLET (5 MG TOTAL) BY MOUTH DAILY   methadone (DOLOPHINE) 10 mg/mL oral concentrated solution  Self Yes No   Sig: Take 44 mg by mouth daily   nadolol (CORGARD) 20 mg tablet  Self No No   Sig: TAKE 1 TABLET (20 MG TOTAL) BY MOUTH DAILY   omeprazole (PriLOSEC) 40 MG capsule  Self Yes No   Sig: TAKE 1 CAPSULE (40 MG TOTAL) BY MOUTH DAILY TOME 1 CAPSULA 30 MINUTOS ANTES DE DESAYUNAR   sitaGLIPtin (JANUVIA) 100 mg tablet   No No   Sig: Take 1 tablet (100 mg total) by mouth daily      Facility-Administered Medications: None     Patient's Medications   Discharge Prescriptions    No medications on file     No discharge procedures on file.  ED SEPSIS DOCUMENTATION   Time reflects when diagnosis was documented in both MDM as applicable and the Disposition within this note       Time User Action Codes Description Comment    2/7/2025  5:38 AM Troy Chavez Add [F11.10] Opiate abuse, continuous (HCC)     2/7/2025  5:39 AM Troy Chavez Add [R11.2] Nausea & vomiting      2/7/2025  5:52 AM Troy Chavez [F32.A] Depression     2/7/2025  5:52 AM Troy Chavez [F19.10] Drug abuse (HCC)                  Troy Chavez DO  02/07/25 0652

## 2025-02-07 NOTE — TELEPHONE ENCOUNTER
Patient was given one day script of Clonidine from [facility]. Asking for script for 30 days so he can continue this and aid in his issues that he is having They are allowing him to take this with his treatment     Phone: [phone] Ext [phone]

## 2025-02-10 ENCOUNTER — OFFICE VISIT (OUTPATIENT)
Dept: FAMILY MEDICINE CLINIC | Facility: CLINIC | Age: 65
End: 2025-02-10
Payer: MEDICARE

## 2025-02-10 VITALS
TEMPERATURE: 96.4 F | SYSTOLIC BLOOD PRESSURE: 124 MMHG | DIASTOLIC BLOOD PRESSURE: 84 MMHG | HEART RATE: 110 BPM | WEIGHT: 209.2 LBS | BODY MASS INDEX: 34.85 KG/M2 | HEIGHT: 65 IN | OXYGEN SATURATION: 97 % | RESPIRATION RATE: 20 BRPM

## 2025-02-10 DIAGNOSIS — E66.09 CLASS 1 OBESITY DUE TO EXCESS CALORIES WITH SERIOUS COMORBIDITY AND BODY MASS INDEX (BMI) OF 34.0 TO 34.9 IN ADULT: ICD-10-CM

## 2025-02-10 DIAGNOSIS — K74.60 ESOPHAGEAL VARICES IN CIRRHOSIS (HCC): ICD-10-CM

## 2025-02-10 DIAGNOSIS — F41.9 ANXIETY: ICD-10-CM

## 2025-02-10 DIAGNOSIS — E11.9 TYPE 2 DIABETES MELLITUS WITHOUT COMPLICATION, WITHOUT LONG-TERM CURRENT USE OF INSULIN (HCC): ICD-10-CM

## 2025-02-10 DIAGNOSIS — I85.10 ESOPHAGEAL VARICES IN CIRRHOSIS (HCC): ICD-10-CM

## 2025-02-10 DIAGNOSIS — I10 BENIGN ESSENTIAL HYPERTENSION: ICD-10-CM

## 2025-02-10 DIAGNOSIS — F11.20 METHADONE MAINTENANCE THERAPY PATIENT (HCC): Primary | ICD-10-CM

## 2025-02-10 DIAGNOSIS — E66.811 CLASS 1 OBESITY DUE TO EXCESS CALORIES WITH SERIOUS COMORBIDITY AND BODY MASS INDEX (BMI) OF 34.0 TO 34.9 IN ADULT: ICD-10-CM

## 2025-02-10 DIAGNOSIS — K70.30 ALCOHOLIC CIRRHOSIS OF LIVER WITHOUT ASCITES (HCC): ICD-10-CM

## 2025-02-10 PROCEDURE — G2211 COMPLEX E/M VISIT ADD ON: HCPCS | Performed by: PHYSICIAN ASSISTANT

## 2025-02-10 PROCEDURE — 99214 OFFICE O/P EST MOD 30 MIN: CPT | Performed by: PHYSICIAN ASSISTANT

## 2025-02-10 RX ORDER — CLONIDINE HYDROCHLORIDE 0.1 MG/1
0.2 TABLET ORAL EVERY 12 HOURS SCHEDULED
Qty: 120 TABLET | Refills: 5 | Status: SHIPPED | OUTPATIENT
Start: 2025-02-10

## 2025-02-10 RX ORDER — FLUOXETINE 40 MG/1
40 CAPSULE ORAL DAILY
Qty: 30 CAPSULE | Refills: 1 | Status: SHIPPED | OUTPATIENT
Start: 2025-02-10

## 2025-02-10 RX ORDER — OMEPRAZOLE 40 MG/1
40 CAPSULE, DELAYED RELEASE ORAL DAILY
Qty: 90 CAPSULE | Refills: 1 | Status: SHIPPED | OUTPATIENT
Start: 2025-02-10

## 2025-02-10 NOTE — ASSESSMENT & PLAN NOTE
Stable, sugars are 140s this morning with self check, continue Januvia 100 mg daily.    Lab Results   Component Value Date    HGBA1C 7.5 (H) 11/19/2024

## 2025-02-10 NOTE — PROGRESS NOTES
Name: Rishi Kirk      : 1960      MRN: [id number]  Encounter Provider: Anabel Nair PA-C  Encounter Date: 2/10/2025   Encounter department: [facility]  :  Assessment & Plan  Methadone maintenance therapy patient (HCC)  Typically on 44 mg dose.  He received about 20 extra 10 mg doses from a friend who passed away and took up to double dose for about 1 week, saw methadone clinic and was sent to the ED, was discharged and sent back to PCP, no street drug use, clean for 20 years, no desire to return to street drugs, continue follow-up methadone clinic, discussed reasons to proceed to ED. reviewed ED visit from 2025.       Anxiety  Recent worsening due to withdrawal from higher dose methadone, started on fluoxetine 10 mg with some improvement, he self increased dose to 20 mg.  Will increase dosage to 40 mg and maintain follow-up.  Increase his clonidine to 0.2 mg twice daily and continue same dose buspirone 5 mg 3 times daily.  Maintain close follow-up with anxiety with somatic symptoms.  Contracted for safety.  No active SI but does have fear of desperation and does not want to panic or resort to street drugs, understands reasons to proceed to ED but refused ED evaluation today.  Orders:  •  FLUoxetine (PROzac) 40 MG capsule; Take 1 capsule (40 mg total) by mouth daily  •  cloNIDine (Catapres) 0.1 mg tablet; Take 2 tablets (0.2 mg total) by mouth every 12 (twelve) hours    Alcoholic cirrhosis of liver without ascites (HCC)  Reviewed MRI abdomen from 2024, due for ultrasound liver and follow-up hepatology.  History of treated hepatitis C.       Esophageal varices in cirrhosis (HCC)  Reviewed most recent endoscopy 2024 with normal-appearing esophagus, no varices.  History of small varices in the past with EGD in 2020.  Continue nadolol 20 mg for prophylaxis.  Start omeprazole 40 mg once daily due to nausea and reflux symptoms.  Orders:  •  omeprazole (PriLOSEC)  40 MG capsule; Take 1 capsule (40 mg total) by mouth daily    Benign essential hypertension  Stable and controlled on lisinopril 5 mg, caution with increased dose clonidine for anxiety.       Type 2 diabetes mellitus without complication, without long-term current use of insulin (HCC)  Stable, sugars are 140s this morning with self check, continue Januvia 100 mg daily.    Lab Results   Component Value Date    HGBA1C 7.5 (H) 11/19/2024          Class 1 obesity due to excess calories with serious comorbidity and body mass index (BMI) of 34.0 to 34.9 in adult  Wt Readings from Last 3 Encounters:   02/10/25 94.9 kg (209 lb 3.2 oz)   02/07/25 97.3 kg (214 lb 8.1 oz)   02/05/25 96.2 kg (212 lb)     5 pound weight loss in the past week due to anxiety and poor appetite.  Maintain close follow-up for weight.              History of Present Illness   Rishi is a 64 y.o. male with a h/o diabetes, opioid use in remission on methadone, cirrhosis with history of alcohol abuse and hepatitis, esophageal varices, smoking quit 4 months ago who presents for same-day patient for increased anxiety.  Checked his sugars today was 140s.  Anxiety started last week, took leftover methadone to almost double his dose for 1 week, no longer has extra.  He was seen by Dr. Trivedi on Wednesday, methadone clinic on Thursday [facility] , ED on Friday.  Thinks is because he took 20 extra of 10 mg left over from his friend of methadone.  Was feeling nauseous, taking some Zofran.  Worried about not being able to sleep and feeling desperate, no suicidal plans but has had suicidal thoughts, motivation is [employer].  No relapse with drug use.     History was conducted in [place] without the use of .              Review of Systems   Constitutional:  Negative for fever and unexpected weight change.   Respiratory:  Negative for shortness of breath.    Cardiovascular:  Positive for palpitations and leg swelling. Negative for  "chest pain.       Objective   /84 (BP Location: Left arm, Patient Position: Sitting, Cuff Size: Standard)   Pulse (!) 110   Temp (!) 96.4 °F (35.8 °C) (Temporal)   Resp 20   Ht 5' 5\" (1.651 m)   Wt 94.9 kg (209 lb 3.2 oz)   SpO2 97%   BMI 34.81 kg/m²      Physical Exam  Vitals reviewed.   Constitutional:       Appearance: Normal appearance. He is obese.   HENT:      Head: Normocephalic and atraumatic.   Cardiovascular:      Rate and Rhythm: Regular rhythm. Tachycardia present.   Pulmonary:      Effort: Pulmonary effort is normal.      Breath sounds: Normal breath sounds.   Neurological:      Mental Status: He is alert and oriented to person, place, and time. Mental status is at baseline.   Psychiatric:         Mood and Affect: Mood is anxious. Affect is tearful.         "

## 2025-02-10 NOTE — ASSESSMENT & PLAN NOTE
Wt Readings from Last 3 Encounters:   02/10/25 94.9 kg (209 lb 3.2 oz)   02/07/25 97.3 kg (214 lb 8.1 oz)   02/05/25 96.2 kg (212 lb)     5 pound weight loss in the past week due to anxiety and poor appetite.  Maintain close follow-up for weight.

## 2025-02-10 NOTE — ASSESSMENT & PLAN NOTE
Typically on 44 mg dose.  He received about 20 extra 10 mg doses from a friend who passed away and took up to double dose for about 1 week, saw methadone clinic and was sent to the ED, was discharged and sent back to PCP, no street drug use, clean for 20 years, no desire to return to street drugs, continue follow-up methadone clinic, discussed reasons to proceed to ED. reviewed ED visit from 2/7/2025.

## 2025-02-10 NOTE — ASSESSMENT & PLAN NOTE
Recent worsening due to withdrawal from higher dose methadone, started on fluoxetine 10 mg with some improvement, he self increased dose to 20 mg.  Will increase dosage to 40 mg and maintain follow-up.  Increase his clonidine to 0.2 mg twice daily and continue same dose buspirone 5 mg 3 times daily.  Maintain close follow-up with anxiety with somatic symptoms.  Contracted for safety.  No active SI but does have fear of desperation and does not want to panic or resort to street drugs, understands reasons to proceed to ED but refused ED evaluation today.  Orders:  •  FLUoxetine (PROzac) 40 MG capsule; Take 1 capsule (40 mg total) by mouth daily  •  cloNIDine (Catapres) 0.1 mg tablet; Take 2 tablets (0.2 mg total) by mouth every 12 (twelve) hours

## 2025-02-10 NOTE — ASSESSMENT & PLAN NOTE
Reviewed MRI abdomen from 12/2024, due for ultrasound liver and follow-up hepatology.  History of treated hepatitis C.

## 2025-02-10 NOTE — ASSESSMENT & PLAN NOTE
Reviewed most recent endoscopy 9/2024 with normal-appearing esophagus, no varices.  History of small varices in the past with EGD in 2020.  Continue nadolol 20 mg for prophylaxis.  Start omeprazole 40 mg once daily due to nausea and reflux symptoms.  Orders:  •  omeprazole (PriLOSEC) 40 MG capsule; Take 1 capsule (40 mg total) by mouth daily

## 2025-02-11 ENCOUNTER — OFFICE VISIT (OUTPATIENT)
Dept: FAMILY MEDICINE CLINIC | Facility: CLINIC | Age: 65
End: 2025-02-11
Payer: MEDICARE

## 2025-02-11 ENCOUNTER — HOSPITAL ENCOUNTER (EMERGENCY)
Facility: HOSPITAL | Age: 65
Discharge: HOME | End: 2025-02-12
Attending: EMERGENCY MEDICINE
Payer: MEDICARE

## 2025-02-11 VITALS
HEIGHT: 65 IN | SYSTOLIC BLOOD PRESSURE: 106 MMHG | OXYGEN SATURATION: 98 % | RESPIRATION RATE: 16 BRPM | BODY MASS INDEX: 34.32 KG/M2 | DIASTOLIC BLOOD PRESSURE: 80 MMHG | HEART RATE: 130 BPM | WEIGHT: 206 LBS | TEMPERATURE: 97.8 F

## 2025-02-11 DIAGNOSIS — R10.9 ABDOMINAL PAIN, UNSPECIFIED ABDOMINAL LOCATION: Primary | ICD-10-CM

## 2025-02-11 DIAGNOSIS — F32.1 MODERATE MAJOR DEPRESSION, SINGLE EPISODE (HCC): ICD-10-CM

## 2025-02-11 DIAGNOSIS — F41.9 ANXIETY: Primary | ICD-10-CM

## 2025-02-11 PROCEDURE — G2211 COMPLEX E/M VISIT ADD ON: HCPCS | Performed by: FAMILY MEDICINE

## 2025-02-11 PROCEDURE — 99214 OFFICE O/P EST MOD 30 MIN: CPT | Performed by: FAMILY MEDICINE

## 2025-02-11 PROCEDURE — 99284 EMERGENCY DEPT VISIT MOD MDM: CPT

## 2025-02-11 RX ORDER — ARIPIPRAZOLE 10 MG/1
10 TABLET ORAL DAILY
Qty: 30 TABLET | Refills: 0 | Status: SHIPPED | OUTPATIENT
Start: 2025-02-11

## 2025-02-11 NOTE — PROGRESS NOTES
"  Name: Rishi Kirk      : 1960      MRN: [id number]  Encounter Provider: Soren Trivedi MD  Encounter Date: 2025   Encounter department: [facility]    Assessment & Plan  Anxiety  Increased Anxiety Disorder:  I do not believe he is in withdrawal from Methadone.  - Currently on Fluoxetine 40mg daily  - Continue Clonidine 0.1mg daily (blood pressure is low)  - Continue Buspirone 5mg TID  - Adding Aripiprazole 10mg for acute symptom management  - Continue current methadone dosing schedule  Refuses ER visit due to poor support and understanding.    Insomnia:  - Secondary to withdrawal and anxiety  - Will be addressed with above medication adjustments  Orders:    ARIPiprazole (ABILIFY) 10 mg tablet; Take 1 tablet (10 mg total) by mouth daily    Moderate major depression, single episode (HCC)  Continue current treatment with Fluoxetin, Make appointment for Psychiatry evaluation.        Subjective       47-year-old male presents with severe anxiety and withdrawal symptoms. Reports taking his prescribed methadone this morning at 5 AM (usually takes at 3 AM) but continues to experience significant distress. Patient describes burning sensations throughout his body, including abdomen, arms, legs, and head. Reports no hallucinations but experiencing severe anxiety. Has not eaten since two days ago and reports changes in bowel habits. Denies suicidal ideation but expresses concern about his thoughts. Previously attempted self-managing symptoms by taking extra doses of medication. Current medications include Fluoxetine 40mg, Clonidine, and Buspirone 5mg TID. Reports minimal sleep, waking up at 1 AM. Patient required transportation to appointment due to severity of symptoms.    Objective:  /80 (BP Location: Left arm, Patient Position: Sitting, Cuff Size: Standard)   Pulse (!) 130 Comment: pt hysterically crying  Temp 97.8 °F (36.6 °C) (Tympanic)   Resp 16   Ht 5' 5\" " (1.651 m)   Wt 93.4 kg (206 lb)   SpO2 98%   BMI 34.28 kg/m²     General: Patient in visible distress  Psychiatric:  - Alert and oriented  - No hallucinations  - Evident anxiety  - No suicidal ideation  - Speech clear and coherent    Current Medications:  - Methadone (daily dosing)  - Fluoxetine 40mg daily  - Clonidine 0.1mg daily  - Buspirone 5mg TID      Depression Screening Follow-up Plan: Patient's depression screening was positive with a PHQ-2 score of 6. Their PHQ-9 score was 21. Patient assessed for underlying major depression. They have no active suicidal ideations. Brief counseling provided and recommend additional follow-up/re-evaluation next office visit.              Soren Trivedi MD   [facility]

## 2025-02-11 NOTE — PROGRESS NOTES
Depression Screening Follow-up Plan: Patient's depression screening was positive with a PHQ-2 score of 6. Their PHQ-9 score was 21. {Depression screen follow-up plan:[id number]}

## 2025-02-11 NOTE — ASSESSMENT & PLAN NOTE
Increased Anxiety Disorder:  I do not believe he is in withdrawal from Methadone.  - Currently on Fluoxetine 40mg daily  - Continue Clonidine 0.1mg daily (blood pressure is low)  - Continue Buspirone 5mg TID  - Adding Aripiprazole 10mg for acute symptom management  - Continue current methadone dosing schedule  Refuses ER visit due to poor support and understanding.    Insomnia:  - Secondary to withdrawal and anxiety  - Will be addressed with above medication adjustments  Orders:    ARIPiprazole (ABILIFY) 10 mg tablet; Take 1 tablet (10 mg total) by mouth daily

## 2025-02-12 ENCOUNTER — APPOINTMENT (EMERGENCY)
Dept: CT IMAGING | Facility: HOSPITAL | Age: 65
End: 2025-02-12
Payer: MEDICARE

## 2025-02-12 VITALS
SYSTOLIC BLOOD PRESSURE: 125 MMHG | OXYGEN SATURATION: 97 % | HEART RATE: 92 BPM | DIASTOLIC BLOOD PRESSURE: 84 MMHG | RESPIRATION RATE: 18 BRPM | BODY MASS INDEX: 34.93 KG/M2 | TEMPERATURE: 97.6 F | WEIGHT: 209.88 LBS

## 2025-02-12 LAB
ALBUMIN SERPL BCG-MCNC: 4.1 G/DL (ref 3.5–5)
ALP SERPL-CCNC: 89 U/L (ref 34–104)
ALT SERPL W P-5'-P-CCNC: 42 U/L (ref 7–52)
ANION GAP SERPL CALCULATED.3IONS-SCNC: 12 MMOL/L (ref 4–13)
APTT PPP: 26 SECONDS (ref 23–34)
AST SERPL W P-5'-P-CCNC: 38 U/L (ref 13–39)
ATRIAL RATE: 86 BPM
BASOPHILS # BLD AUTO: 0.02 THOUSANDS/ΜL (ref 0–0.1)
BASOPHILS NFR BLD AUTO: 0 % (ref 0–1)
BILIRUB SERPL-MCNC: 1.46 MG/DL (ref 0.2–1)
BUN SERPL-MCNC: 11 MG/DL (ref 5–25)
CALCIUM SERPL-MCNC: 9.1 MG/DL (ref 8.4–10.2)
CARDIAC TROPONIN I PNL SERPL HS: 3 NG/L (ref ?–50)
CHLORIDE SERPL-SCNC: 95 MMOL/L (ref 96–108)
CO2 SERPL-SCNC: 25 MMOL/L (ref 21–32)
CREAT SERPL-MCNC: 1.09 MG/DL (ref 0.6–1.3)
EOSINOPHIL # BLD AUTO: 0.02 THOUSAND/ΜL (ref 0–0.61)
EOSINOPHIL NFR BLD AUTO: 0 % (ref 0–6)
ERYTHROCYTE [DISTWIDTH] IN BLOOD BY AUTOMATED COUNT: 12.2 % (ref 11.6–15.1)
FLUAV AG UPPER RESP QL IA.RAPID: NEGATIVE
FLUBV AG UPPER RESP QL IA.RAPID: NEGATIVE
GFR SERPL CREATININE-BSD FRML MDRD: 70 ML/MIN/1.73SQ M
GLUCOSE SERPL-MCNC: 159 MG/DL (ref 65–140)
HCT VFR BLD AUTO: 51.7 % (ref 36.5–49.3)
HGB BLD-MCNC: 17.4 G/DL (ref 12–17)
IMM GRANULOCYTES # BLD AUTO: 0.02 THOUSAND/UL (ref 0–0.2)
IMM GRANULOCYTES NFR BLD AUTO: 0 % (ref 0–2)
INR PPP: 1.1 (ref 0.85–1.19)
LACTATE SERPL-SCNC: 1 MMOL/L (ref 0.5–2)
LIPASE SERPL-CCNC: 20 U/L (ref 11–82)
LYMPHOCYTES # BLD AUTO: 2.09 THOUSANDS/ΜL (ref 0.6–4.47)
LYMPHOCYTES NFR BLD AUTO: 33 % (ref 14–44)
MCH RBC QN AUTO: 30.8 PG (ref 26.8–34.3)
MCHC RBC AUTO-ENTMCNC: 33.7 G/DL (ref 31.4–37.4)
MCV RBC AUTO: 92 FL (ref 82–98)
MONOCYTES # BLD AUTO: 0.4 THOUSAND/ΜL (ref 0.17–1.22)
MONOCYTES NFR BLD AUTO: 6 % (ref 4–12)
NEUTROPHILS # BLD AUTO: 3.88 THOUSANDS/ΜL (ref 1.85–7.62)
NEUTS SEG NFR BLD AUTO: 61 % (ref 43–75)
NRBC BLD AUTO-RTO: 0 /100 WBCS
P AXIS: 54 DEGREES
PLATELET # BLD AUTO: 216 THOUSANDS/UL (ref 149–390)
PMV BLD AUTO: 8.7 FL (ref 8.9–12.7)
POTASSIUM SERPL-SCNC: 3.3 MMOL/L (ref 3.5–5.3)
PR INTERVAL: 172 MS
PROT SERPL-MCNC: 7.9 G/DL (ref 6.4–8.4)
PROTHROMBIN TIME: 14.5 SECONDS (ref 12.3–15)
QRS AXIS: -27 DEGREES
QRSD INTERVAL: 84 MS
QT INTERVAL: 408 MS
QTC INTERVAL: 488 MS
RBC # BLD AUTO: 5.65 MILLION/UL (ref 3.88–5.62)
S PYO DNA THROAT QL NAA+PROBE: NOT DETECTED
SARS-COV+SARS-COV-2 AG RESP QL IA.RAPID: NEGATIVE
SODIUM SERPL-SCNC: 132 MMOL/L (ref 135–147)
T WAVE AXIS: 11 DEGREES
VENTRICULAR RATE: 86 BPM
WBC # BLD AUTO: 6.43 THOUSAND/UL (ref 4.31–10.16)

## 2025-02-12 PROCEDURE — 96375 TX/PRO/DX INJ NEW DRUG ADDON: CPT

## 2025-02-12 PROCEDURE — 71275 CT ANGIOGRAPHY CHEST: CPT

## 2025-02-12 PROCEDURE — 84484 ASSAY OF TROPONIN QUANT: CPT | Performed by: PHYSICIAN ASSISTANT

## 2025-02-12 PROCEDURE — 74174 CTA ABD&PLVS W/CONTRAST: CPT

## 2025-02-12 PROCEDURE — 80053 COMPREHEN METABOLIC PANEL: CPT | Performed by: PHYSICIAN ASSISTANT

## 2025-02-12 PROCEDURE — 93005 ELECTROCARDIOGRAM TRACING: CPT

## 2025-02-12 PROCEDURE — 87651 STREP A DNA AMP PROBE: CPT | Performed by: PHYSICIAN ASSISTANT

## 2025-02-12 PROCEDURE — 93010 ELECTROCARDIOGRAM REPORT: CPT | Performed by: INTERNAL MEDICINE

## 2025-02-12 PROCEDURE — 85025 COMPLETE CBC W/AUTO DIFF WBC: CPT | Performed by: PHYSICIAN ASSISTANT

## 2025-02-12 PROCEDURE — 83605 ASSAY OF LACTIC ACID: CPT | Performed by: PHYSICIAN ASSISTANT

## 2025-02-12 PROCEDURE — 87040 BLOOD CULTURE FOR BACTERIA: CPT | Performed by: PHYSICIAN ASSISTANT

## 2025-02-12 PROCEDURE — 87804 INFLUENZA ASSAY W/OPTIC: CPT | Performed by: PHYSICIAN ASSISTANT

## 2025-02-12 PROCEDURE — 87811 SARS-COV-2 COVID19 W/OPTIC: CPT | Performed by: PHYSICIAN ASSISTANT

## 2025-02-12 PROCEDURE — 99285 EMERGENCY DEPT VISIT HI MDM: CPT | Performed by: PHYSICIAN ASSISTANT

## 2025-02-12 PROCEDURE — 85730 THROMBOPLASTIN TIME PARTIAL: CPT | Performed by: PHYSICIAN ASSISTANT

## 2025-02-12 PROCEDURE — 36415 COLL VENOUS BLD VENIPUNCTURE: CPT | Performed by: PHYSICIAN ASSISTANT

## 2025-02-12 PROCEDURE — 96374 THER/PROPH/DIAG INJ IV PUSH: CPT

## 2025-02-12 PROCEDURE — 85610 PROTHROMBIN TIME: CPT | Performed by: PHYSICIAN ASSISTANT

## 2025-02-12 PROCEDURE — 83690 ASSAY OF LIPASE: CPT | Performed by: PHYSICIAN ASSISTANT

## 2025-02-12 RX ORDER — MAGNESIUM HYDROXIDE/ALUMINUM HYDROXICE/SIMETHICONE 120; 1200; 1200 MG/30ML; MG/30ML; MG/30ML
30 SUSPENSION ORAL ONCE
Status: COMPLETED | OUTPATIENT
Start: 2025-02-12 | End: 2025-02-12

## 2025-02-12 RX ORDER — ALUMINA, MAGNESIA, AND SIMETHICONE 2400; 2400; 240 MG/30ML; MG/30ML; MG/30ML
10 SUSPENSION ORAL EVERY 6 HOURS PRN
Qty: 355 ML | Refills: 0 | Status: SHIPPED | OUTPATIENT
Start: 2025-02-12

## 2025-02-12 RX ORDER — POTASSIUM CHLORIDE 1500 MG/1
40 TABLET, EXTENDED RELEASE ORAL ONCE
Status: COMPLETED | OUTPATIENT
Start: 2025-02-12 | End: 2025-02-12

## 2025-02-12 RX ORDER — ONDANSETRON 2 MG/ML
4 INJECTION INTRAMUSCULAR; INTRAVENOUS ONCE
Status: COMPLETED | OUTPATIENT
Start: 2025-02-12 | End: 2025-02-12

## 2025-02-12 RX ORDER — LORAZEPAM 2 MG/ML
1 INJECTION INTRAMUSCULAR ONCE
Status: COMPLETED | OUTPATIENT
Start: 2025-02-12 | End: 2025-02-12

## 2025-02-12 RX ORDER — KETOROLAC TROMETHAMINE 30 MG/ML
15 INJECTION, SOLUTION INTRAMUSCULAR; INTRAVENOUS ONCE
Status: COMPLETED | OUTPATIENT
Start: 2025-02-12 | End: 2025-02-12

## 2025-02-12 RX ORDER — LIDOCAINE HYDROCHLORIDE 20 MG/ML
15 SOLUTION OROPHARYNGEAL 4 TIMES DAILY PRN
Qty: 100 ML | Refills: 0 | Status: SHIPPED | OUTPATIENT
Start: 2025-02-12

## 2025-02-12 RX ORDER — LIDOCAINE HYDROCHLORIDE 20 MG/ML
15 SOLUTION OROPHARYNGEAL ONCE
Status: COMPLETED | OUTPATIENT
Start: 2025-02-12 | End: 2025-02-12

## 2025-02-12 RX ADMIN — LORAZEPAM 1 MG: 2 INJECTION INTRAMUSCULAR; INTRAVENOUS at 04:20

## 2025-02-12 RX ADMIN — LIDOCAINE HYDROCHLORIDE 15 ML: 20 SOLUTION ORAL at 03:27

## 2025-02-12 RX ADMIN — IOHEXOL 100 ML: 350 INJECTION, SOLUTION INTRAVENOUS at 02:15

## 2025-02-12 RX ADMIN — ALUMINUM HYDROXIDE, MAGNESIUM HYDROXIDE, AND DIMETHICONE 30 ML: 200; 20; 200 SUSPENSION ORAL at 03:27

## 2025-02-12 RX ADMIN — ONDANSETRON 4 MG: 2 INJECTION INTRAMUSCULAR; INTRAVENOUS at 01:14

## 2025-02-12 RX ADMIN — POTASSIUM CHLORIDE 40 MEQ: 1500 TABLET, EXTENDED RELEASE ORAL at 03:27

## 2025-02-12 RX ADMIN — KETOROLAC TROMETHAMINE 15 MG: 30 INJECTION, SOLUTION INTRAMUSCULAR; INTRAVENOUS at 01:14

## 2025-02-12 NOTE — ED PROVIDER NOTES
Time reflects when diagnosis was documented in both MDM as applicable and the Disposition within this note       Time User Action Codes Description Comment    2/12/2025  5:13 AM GuilhermetiaChaparrita Add [R10.9] Abdominal pain, unspecified abdominal location           ED Disposition       ED Disposition   Discharge    Condition   Stable    Date/Time   Wed Feb 12, 2025  5:13 AM    Comment   Rishi Kirk discharge to home/self care.                   Assessment & Plan       Medical Decision Making  Patient with significant past medical history including cirrhosis and substance abuse as well as hypertension hyperlipidemia and portal hypertension presenting complaining of epigastric pain.  Initial differential was pancreatitis, cholecystitis, cardiac pathology or viral enteritis.  Patient had largely unremarkable laboratory evaluation and had some relief with medications given in the emergency department.  Patient had unremarkable cardiac evaluation and did not endorse chest pain throughout ED stay.  CT scan without focal abnormality.  Chronic cirrhotic changes already known to patient.  Patient appeared well on reevaluation and was tolerating oral intake.  Symptoms possibly related to viral etiology as patient had no focal findings on exam today.  Patient had some relief with GI cocktail.  Similar was prescribed at home for symptoms.  Patient was comfortable with plan to discharge home at this time and was educated on strict return precautions.  Demonstrates understanding.  Ambulated out of the department.    Amount and/or Complexity of Data Reviewed  Labs: ordered.  Radiology: ordered.    Risk  OTC drugs.  Prescription drug management.             Medications   ketorolac (TORADOL) injection 15 mg (15 mg Intravenous Given 2/12/25 0114)   ondansetron (ZOFRAN) injection 4 mg (4 mg Intravenous Given 2/12/25 0114)   iohexol (OMNIPAQUE) 350 MG/ML injection (SINGLE-DOSE) 100 mL (100 mL Intravenous Given 2/12/25 0215)   potassium  chloride (Klor-Con M20) CR tablet 40 mEq (40 mEq Oral Given 2/12/25 0327)   Lidocaine Viscous HCl (XYLOCAINE) 2 % mucosal solution 15 mL (15 mL Swish & Spit Given 2/12/25 0327)   aluminum-magnesium hydroxide-simethicone (MAALOX) oral suspension 30 mL (30 mL Oral Given 2/12/25 0327)   LORazepam (ATIVAN) injection 1 mg (1 mg Intravenous Given 2/12/25 0420)       ED Risk Strat Scores                          SBIRT 22yo+      Flowsheet Row Most Recent Value   Initial Alcohol Screen: US AUDIT-C     1. How often do you have a drink containing alcohol? 0 Filed at: 02/12/2025 0003   2. How many drinks containing alcohol do you have on a typical day you are drinking?  0 Filed at: 02/12/2025 0003   3b. FEMALE Any Age, or MALE 65+: How often do you have 4 or more drinks on one occassion? 0 Filed at: 02/12/2025 0003   Audit-C Score 0 Filed at: 02/12/2025 0003   GRAYSON: How many times in the past year have you...    Used an illegal drug or used a prescription medication for non-medical reasons? Never Filed at: 02/12/2025 0003                            History of Present Illness       Chief Complaint   Patient presents with    Abdominal Pain     Upper abd pain ongoing for weeks, reports constipation and a sore throat x 1 day. C/o nausea, denies vomiting and meds pta        Past Medical History:   Diagnosis Date    Addiction to drug (HCC)     Alcohol abuse     Anxiety     Cirrhosis (HCC)     Colon polyp     Diabetes mellitus (HCC)     Esophageal varices (HCC)     Gastritis     Heart disease     Hepatitis C     History of Helicobacter pylori infection 02/23/2016    History of kidney stones     Hyperlipidemia     Hypertension     Infectious viral hepatitis     Kidney stone     Obesity     Pneumonia     PPD positive 2020    Sleep apnea     Sleep difficulties     Splenomegaly     Substance abuse (HCC)     Withdrawal symptoms, drug or narcotic (HCC)       Past Surgical History:   Procedure Laterality Date    COLONOSCOPY  06/20/2014     dr [email or web address]    COLONOSCOPY          EGD AND COLONOSCOPY  2020    esophageal varices, colon polyp, hemorrhoids    ESOPHAGOGASTRODUODENOSCOPY  12/10/2015    esophageal varices, cirrhosis, gastritis    HYDROCELE EXCISION / REPAIR      LIVER BIOPSY        Family History   Problem Relation Age of Onset    Diabetes type II Mother         MELLITUS    Hypertension Mother     Hypertension Sister     Diabetes Sister         MELLITUS    No Known Problems Maternal Grandmother     No Known Problems Maternal Grandfather     No Known Problems Paternal Grandmother     No Known Problems Paternal Grandfather     No Known Problems Sister     Breast cancer Maternal Aunt         40s    No Known Problems Maternal Aunt     No Known Problems Paternal Aunt     No Known Problems Paternal Aunt     No Known Problems Paternal Aunt     No Known Problems Paternal Aunt     No Known Problems Paternal Aunt     No Known Problems Paternal Aunt       Social History     Tobacco Use    Smoking status: Former     Current packs/day: 0.00     Average packs/day: 1 pack/day for 41.7 years (41.3 ttl pk-yrs)     Types: Cigarettes     Start date:      Quit date: 10/1/2024     Years since quittin.3     Passive exposure: Current    Smokeless tobacco: Current    Tobacco comments:     TOBACCO USE   Vaping Use    Vaping status: Never Used   Substance Use Topics    Alcohol use: Not Currently    Drug use: Not Currently     Types: Heroin     Comment: former user- heroin was drug of choice; Overuse of Methadone      E-Cigarette/Vaping    E-Cigarette Use Never User       E-Cigarette/Vaping Substances    Nicotine No     THC No     CBD No     Flavoring No       I have reviewed and agree with the history as documented.     64-year-old male with significant past medical history including anxiety, cirrhosis, substance abuse, diabetes, hypertension and hyperlipidemia presents complaining of epigastric pain associated with constipation and sore throat.   Patient states he send decreased appetite over the past few days due to symptoms.  Has not taken any medications for symptoms prior to arrival.  Symptoms started 2 days ago.  Denies chest pain or shortness of breath.  Denies fevers. Denies blood in stool or dark stool.   Denies any other complaints.      History provided by:  Patient   used: No        Review of Systems   Constitutional: Negative.  Negative for chills and fatigue.   HENT:  Negative for ear pain and sore throat.    Eyes:  Negative for photophobia and redness.   Respiratory:  Negative for apnea, cough and shortness of breath.    Cardiovascular:  Negative for chest pain.   Gastrointestinal:  Positive for abdominal pain and nausea. Negative for vomiting.   Genitourinary:  Negative for dysuria.   Musculoskeletal:  Negative for arthralgias, neck pain and neck stiffness.   Skin:  Negative for rash.   Neurological:  Negative for dizziness, tremors, syncope and weakness.   Psychiatric/Behavioral:  Negative for suicidal ideas. The patient is nervous/anxious.            Objective       ED Triage Vitals   Temperature Pulse Blood Pressure Respirations SpO2 Patient Position - Orthostatic VS   02/11/25 2358 02/11/25 2358 02/11/25 2358 02/11/25 2358 02/11/25 2358 02/11/25 2358   97.6 °F (36.4 °C) (!) 118 141/88 22 100 % Sitting      Temp Source Heart Rate Source BP Location FiO2 (%) Pain Score    02/11/25 2358 02/11/25 2358 02/11/25 2358 -- 02/12/25 0114    Oral Monitor Left arm  10 - Worst Possible Pain      Vitals      Date and Time Temp Pulse SpO2 Resp BP Pain Score FACES Pain Rating User   02/12/25 0331 -- 92 97 % 18 125/84 -- -- ST 02/12/25 0117 -- 80 94 % 18 101/73 -- -- ST 02/12/25 0114 -- -- -- -- -- 10 - Worst Possible Pain -- ST 02/12/25 0059 -- 88 95 % 18 98/61 -- -- ST 02/11/25 2358 97.6 °F (36.4 °C) 118 100 % 22 141/88 -- -- MM            Physical Exam  Constitutional:       General: He is not in acute distress.      Appearance: He is well-developed. He is not diaphoretic.   Eyes:      Pupils: Pupils are equal, round, and reactive to light.   Cardiovascular:      Rate and Rhythm: Normal rate and regular rhythm.   Pulmonary:      Effort: Pulmonary effort is normal. No respiratory distress.      Breath sounds: Normal breath sounds.   Abdominal:      General: Bowel sounds are normal. There is no distension.      Palpations: Abdomen is soft.      Tenderness: There is abdominal tenderness (Epigstric pain). There is no right CVA tenderness, left CVA tenderness or guarding.   Musculoskeletal:         General: Normal range of motion.      Cervical back: Normal range of motion and neck supple.   Skin:     General: Skin is warm and dry.   Neurological:      Mental Status: He is alert and oriented to person, place, and time.         Results Reviewed       Procedure Component Value Units Date/Time    HS Troponin 0hr (reflex protocol) [[id number]]  (Normal) Collected: 02/12/25 0114    Lab Status: Final result Specimen: Blood from Arm, Left Updated: 02/12/25 0146     hs TnI 0hr 3 ng/L     Comprehensive metabolic panel [[id number]]  (Abnormal) Collected: 02/12/25 0114    Lab Status: Final result Specimen: Blood from Arm, Left Updated: 02/12/25 0143     Sodium 132 mmol/L      Potassium 3.3 mmol/L      Chloride 95 mmol/L      CO2 25 mmol/L      ANION GAP 12 mmol/L      BUN 11 mg/dL      Creatinine 1.09 mg/dL      Glucose 159 mg/dL      Calcium 9.1 mg/dL      AST 38 U/L      ALT 42 U/L      Alkaline Phosphatase 89 U/L      Total Protein 7.9 g/dL      Albumin 4.1 g/dL      Total Bilirubin 1.46 mg/dL      eGFR 70 ml/min/1.73sq m     Narrative:      National Kid[employer] Foundation guidelines for Chronic Kidney Disease (CKD):     Stage 1 with normal or high GFR (GFR > 90 mL/min/1.73 square meters)    Stage 2 Mild CKD (GFR = 60-89 mL/min/1.73 square meters)    Stage 3A Moderate CKD (GFR = 45-59 mL/min/1.73 square meters)    Stage 3B Moderate CKD (GFR  = 30-44 mL/min/1.73 square meters)    Stage 4 Severe CKD (GFR = 15-29 mL/min/1.73 square meters)    Stage 5 End Stage CKD (GFR <15 mL/min/1.73 square meters)  Note: GFR calculation is accurate only with a steady state creatinine    Lipase [[id number]]  (Normal) Collected: 02/12/25 0114    Lab Status: Final result Specimen: Blood from Arm, Left Updated: 02/12/25 0143     Lipase 20 u/L     Lactic acid, plasma (w/reflex if result > 2.0) [[id number]]  (Normal) Collected: 02/12/25 0114    Lab Status: Final result Specimen: Blood from Arm, Left Updated: 02/12/25 0140     LACTIC ACID 1.0 mmol/L     Narrative:      Result may be elevated if tourniquet was used during collection.    Protime-INR [[id number]]  (Normal) Collected: 02/12/25 0114    Lab Status: Final result Specimen: Blood from Arm, Left Updated: 02/12/25 0136     Protime 14.5 seconds      INR 1.10    Narrative:      INR Therapeutic Range    Indication                                             INR Range      Atrial Fibrillation                                               2.0-3.0  Hypercoagulable State                                    2.0.2.3  Left Ventricular Asist Device                            2.0-3.0  Mechanical Heart Valve                                  -    Aortic(with afib, MI, embolism, HF, LA enlargement,    and/or coagulopathy)                                     2.0-3.0 (2.5-3.5)     Mitral                                                             2.5-3.5  Prosthetic/Bioprosthetic Heart Valve               2.0-3.0  Venous thromboembolism (VTE: VT, PE        2.0-3.0    APTT [[id number]]  (Normal) Collected: 02/12/25 0114    Lab Status: Final result Specimen: Blood from Arm, Left Updated: 02/12/25 0136     PTT 26 seconds     Strep A PCR [[id number]]  (Normal) Collected: 02/12/25 0042    Lab Status: Final result Specimen: Throat Updated: 02/12/25 0120     STREP A PCR Not Detected    FLU/COVID Rapid Antigen (30 min. TAT) - Preferred screening test  in ED [[id number]]  (Normal) Collected: 02/12/25 0042    Lab Status: Final result Specimen: Nares from Nose Updated: 02/12/25 0114     SARS COV Rapid Antigen Negative     Influenza A Rapid Antigen Negative     Influenza B Rapid Antigen Negative    Narrative:      This test has been performed using the [employer] Lidia 2 FLU+SARS Antigen test under the Emergency Use Authorization (EUA). This test has been validated by the  and verified by the performing laboratory. The Lidia uses lateral flow immunofluorescent sandwich assay to detect SARS-COV, Influenza A and Influenza B Antigen.     The [employer] Lidia 2 SARS Antigen test does not differentiate between SARS-CoV and SARS-CoV-2.     Negative results are presumptive and may be confirmed with a molecular assay, if necessary, for patient management. Negative results do not rule out SARS-CoV-2 or influenza infection and should not be used as the sole basis for treatment or patient management decisions. A negative test result may occur if the level of antigen in a sample is below the limit of detection of this test.     Positive results are indicative of the presence of viral antigens, but do not rule out bacterial infection or co-infection with other viruses.     All test results should be used as an adjunct to clinical observations and other information available to the provider.    FOR PEDIATRIC PATIENTS - copy/paste COVID Guidelines URL to browser: [email or web address]    CBC and differential [[id number]]  (Abnormal) Collected: 02/12/25 0042    Lab Status: Final result Specimen: Blood from Arm, Right Updated: 02/12/25 0055     WBC 6.43 Thousand/uL      RBC 5.65 Million/uL      Hemoglobin 17.4 g/dL      Hematocrit 51.7 %      MCV 92 fL      MCH 30.8 pg      MCHC 33.7 g/dL      RDW 12.2 %      MPV 8.7 fL      Platelets 216 Thousands/uL      nRBC 0 /100 WBCs      Segmented % 61 %      Immature Grans % 0 %       Lymphocytes % 33 %      Monocytes % 6 %      Eosinophils Relative 0 %      Basophils Relative 0 %      Absolute Neutrophils 3.88 Thousands/µL      Absolute Immature Grans 0.02 Thousand/uL      Absolute Lymphocytes 2.09 Thousands/µL      Absolute Monocytes 0.40 Thousand/µL      Eosinophils Absolute 0.02 Thousand/µL      Basophils Absolute 0.02 Thousands/µL     Blood culture #1 [[id number]] Collected: 02/12/25 0042    Lab Status: In process Specimen: Blood from Arm, Right Updated: 02/12/25 0053    Blood culture #2 [[id number]] Collected: 02/12/25 0042    Lab Status: In process Specimen: Blood from Arm, Right Updated: 02/12/25 0053            CTA dissection protocol chest/abdomen/pelvis   Final Interpretation by Dustin Rosen MD (02/12 0304)      1.  No acute aortic abnormality   2.  Cirrhotic morphology   3.  Nonobstructing right renal stone               Workstation performed: [id number]             ECG 12 Lead Documentation Only    Date/Time: 2/12/2025 6:08 AM    Performed by: Chaparrita Braxton PA-C  Authorized by: Chaparrita Braxton PA-C    Indications / Diagnosis:  Epigastric pain  ECG reviewed by me, the ED Provider: yes    Patient location:  ED  Interpretation:     Interpretation: normal    Rate:     ECG rate:  86    ECG rate assessment: normal    Rhythm:     Rhythm: sinus rhythm    Ectopy:     Ectopy: none    QRS:     QRS axis:  Normal    QRS intervals:  Normal  Conduction:     Conduction: normal    ST segments:     ST segments:  Normal  T waves:     T waves: normal        ED Medication and Procedure Management   Prior to Admission Medications   Prescriptions Last Dose Informant Patient Reported? Taking?   ARIPiprazole (ABILIFY) 10 mg tablet   No No   Sig: Take 1 tablet (10 mg total) by mouth daily   Comfort Touch Plus Lancets 30G MISC  Self No No   Sig: Inject 1 Application under the skin Daily at 2am   FLUoxetine (PROzac) 40 MG capsule   No No   Sig: Take 1 capsule (40 mg total) by mouth daily   GNP Alcohol  Swabs 70 % PADS   No No   Sig: TEST 2-3 TIMES A DAY AS DIRECTEDTEST 2-3 TIMES A DAY AS DIRECTED   ammonium lactate (LAC-HYDRIN) 12 % lotion   Yes No   atorvastatin (LIPITOR) 40 mg tablet   No No   Sig: Take 1 tablet (40 mg total) by mouth daily   busPIRone (BUSPAR) 5 mg tablet   No No   Sig: Take 1 tablet (5 mg total) by mouth 3 (three) times a day   cloNIDine (Catapres) 0.1 mg tablet   No No   Sig: Take 2 tablets (0.2 mg total) by mouth every 12 (twelve) hours   docusate sodium (COLACE) 100 mg capsule   No No   Sig: Take 1 capsule (100 mg total) by mouth 2 (two) times a day   glucose blood (FREESTYLE LITE) test strip   No No   Sig: Use as instructed   lisinopril (ZESTRIL) 5 mg tablet   No No   Sig: TAKE 1 TABLET (5 MG TOTAL) BY MOUTH DAILYTOME 1 TABLET (5 MG TOTAL) BY MOUTH DAILY   methadone (DOLOPHINE) 10 mg/mL oral concentrated solution  Self Yes No   Sig: Take 44 mg by mouth daily   nadolol (CORGARD) 20 mg tablet  Self No No   Sig: TAKE 1 TABLET (20 MG TOTAL) BY MOUTH DAILY   omeprazole (PriLOSEC) 40 MG capsule   No No   Sig: Take 1 capsule (40 mg total) by mouth daily   sitaGLIPtin (JANUVIA) 100 mg tablet   No No   Sig: Take 1 tablet (100 mg total) by mouth daily      Facility-Administered Medications: None     Discharge Medication List as of 2/12/2025  5:20 AM        START taking these medications    Details   aluminum-magnesium hydroxide-simethicone (MAALOX MAX) 400-400-40 MG/5ML suspension Take 10 mL by mouth every 6 (six) hours as needed for indigestion or heartburn, Starting Wed 2/12/2025, Normal      Lidocaine Viscous HCl (XYLOCAINE) 2 % mucosal solution Swish and spit 15 mL 4 (four) times a day as needed for mouth pain or discomfort, Starting Wed 2/12/2025, Normal           CONTINUE these medications which have NOT CHANGED    Details   ammonium lactate (LAC-HYDRIN) 12 % lotion Historical Med      ARIPiprazole (ABILIFY) 10 mg tablet Take 1 tablet (10 mg total) by mouth daily, Starting Tue 2/11/2025,  Normal      atorvastatin (LIPITOR) 40 mg tablet Take 1 tablet (40 mg total) by mouth daily, Starting Wed 10/9/2024, Normal      busPIRone (BUSPAR) 5 mg tablet Take 1 tablet (5 mg total) by mouth 3 (three) times a day, Starting Wed 2/5/2025, Normal      cloNIDine (Catapres) 0.1 mg tablet Take 2 tablets (0.2 mg total) by mouth every 12 (twelve) hours, Starting Mon 2/10/2025, Fill Later      Comfort Touch Plus Lancets 30G MISC Inject 1 Application under the skin Daily at 2am, Starting Fri 7/12/2024, Until Mon 7/7/2025, Normal      docusate sodium (COLACE) 100 mg capsule Take 1 capsule (100 mg total) by mouth 2 (two) times a day, Starting Wed 10/9/2024, Normal      FLUoxetine (PROzac) 40 MG capsule Take 1 capsule (40 mg total) by mouth daily, Starting Mon 2/10/2025, Normal      glucose blood (FREESTYLE LITE) test strip Use as instructed, Normal      GNP Alcohol Swabs 70 % PADS TEST 2-3 TIMES A DAY AS DIRECTEDTEST 2-3 TIMES A DAY AS DIRECTED, Normal      lisinopril (ZESTRIL) 5 mg tablet TAKE 1 TABLET (5 MG TOTAL) BY MOUTH DAILYTOME 1 TABLET (5 MG TOTAL) BY MOUTH DAILY, Normal      methadone (DOLOPHINE) 10 mg/mL oral concentrated solution Take 44 mg by mouth daily, Historical Med      nadolol (CORGARD) 20 mg tablet TAKE 1 TABLET (20 MG TOTAL) BY MOUTH DAILY, Starting Mon 6/10/2024, Normal      omeprazole (PriLOSEC) 40 MG capsule Take 1 capsule (40 mg total) by mouth daily, Starting Mon 2/10/2025, Normal      sitaGLIPtin (JANUVIA) 100 mg tablet Take 1 tablet (100 mg total) by mouth daily, Starting Mon 12/23/2024, Normal           No discharge procedures on file.  ED SEPSIS DOCUMENTATION   Time reflects when diagnosis was documented in both MDM as applicable and the Disposition within this note       Time User Action Codes Description Comment    2/12/2025  5:13 AM Chaparrita Braxton Add [R10.9] Abdominal pain, unspecified abdominal location                  Chaparrita Braxton PA-C  02/12/25 0612

## 2025-02-16 LAB
BACTERIA BLD CULT: NORMAL
BACTERIA BLD CULT: NORMAL

## 2025-02-17 LAB
BACTERIA BLD CULT: NORMAL
BACTERIA BLD CULT: NORMAL

## 2025-03-06 ENCOUNTER — RA CDI HCC (OUTPATIENT)
Dept: OTHER | Facility: HOSPITAL | Age: 65
End: 2025-03-06

## 2025-03-06 NOTE — PROGRESS NOTES
HCC coding opportunities       Chart reviewed, no opportunity found: CHART REVIEWED, NO OPPORTUNITY FOUND        Patients Insurance     Medicare Insurance: [employer]          This is a reminder to assess ((resolve/update/assess)  all HCC (risk adjustment) codes for the year 2025 as patients FLAQUITA scores reset to zero with the New year.    Also, just a reminder to please review and assess all other chronic conditions for 2025.

## 2025-03-12 ENCOUNTER — OFFICE VISIT (OUTPATIENT)
Dept: FAMILY MEDICINE CLINIC | Facility: CLINIC | Age: 65
End: 2025-03-12
Payer: MEDICARE

## 2025-03-12 ENCOUNTER — TELEPHONE (OUTPATIENT)
Age: 65
End: 2025-03-12

## 2025-03-12 ENCOUNTER — APPOINTMENT (OUTPATIENT)
Dept: LAB | Facility: HOSPITAL | Age: 65
End: 2025-03-12
Payer: MEDICARE

## 2025-03-12 ENCOUNTER — PREP FOR PROCEDURE (OUTPATIENT)
Dept: UROLOGY | Facility: MEDICAL CENTER | Age: 65
End: 2025-03-12

## 2025-03-12 ENCOUNTER — OFFICE VISIT (OUTPATIENT)
Dept: UROLOGY | Facility: MEDICAL CENTER | Age: 65
End: 2025-03-12

## 2025-03-12 VITALS
OXYGEN SATURATION: 94 % | BODY MASS INDEX: 33.82 KG/M2 | HEART RATE: 101 BPM | WEIGHT: 203 LBS | DIASTOLIC BLOOD PRESSURE: 80 MMHG | SYSTOLIC BLOOD PRESSURE: 122 MMHG | HEIGHT: 65 IN

## 2025-03-12 VITALS
BODY MASS INDEX: 34.66 KG/M2 | HEIGHT: 65 IN | WEIGHT: 208 LBS | OXYGEN SATURATION: 98 % | SYSTOLIC BLOOD PRESSURE: 118 MMHG | RESPIRATION RATE: 16 BRPM | HEART RATE: 110 BPM | TEMPERATURE: 98 F | DIASTOLIC BLOOD PRESSURE: 70 MMHG

## 2025-03-12 DIAGNOSIS — R39.89 SUSPECTED UTI: ICD-10-CM

## 2025-03-12 DIAGNOSIS — N47.1 PHIMOSIS: Primary | ICD-10-CM

## 2025-03-12 DIAGNOSIS — K74.60 ESOPHAGEAL VARICES IN CIRRHOSIS (HCC): ICD-10-CM

## 2025-03-12 DIAGNOSIS — F41.9 ANXIETY: ICD-10-CM

## 2025-03-12 DIAGNOSIS — E11.9 TYPE 2 DIABETES MELLITUS WITHOUT COMPLICATION, WITHOUT LONG-TERM CURRENT USE OF INSULIN (HCC): ICD-10-CM

## 2025-03-12 DIAGNOSIS — Z01.812 PRE-OPERATIVE LABORATORY EXAMINATION: ICD-10-CM

## 2025-03-12 DIAGNOSIS — I10 BENIGN ESSENTIAL HYPERTENSION: ICD-10-CM

## 2025-03-12 DIAGNOSIS — K59.03 DRUG-INDUCED CONSTIPATION: ICD-10-CM

## 2025-03-12 DIAGNOSIS — I85.10 ESOPHAGEAL VARICES IN CIRRHOSIS (HCC): ICD-10-CM

## 2025-03-12 DIAGNOSIS — N47.2 PARAPHIMOSIS: ICD-10-CM

## 2025-03-12 DIAGNOSIS — E11.9 TYPE 2 DIABETES MELLITUS WITHOUT COMPLICATION, WITHOUT LONG-TERM CURRENT USE OF INSULIN (HCC): Primary | ICD-10-CM

## 2025-03-12 LAB
ALBUMIN SERPL BCG-MCNC: 4.1 G/DL (ref 3.5–5)
ALP SERPL-CCNC: 99 U/L (ref 34–104)
ALT SERPL W P-5'-P-CCNC: 37 U/L (ref 7–52)
ANION GAP SERPL CALCULATED.3IONS-SCNC: 10 MMOL/L (ref 4–13)
AST SERPL W P-5'-P-CCNC: 34 U/L (ref 13–39)
BASOPHILS # BLD AUTO: 0.03 THOUSANDS/ÂΜL (ref 0–0.1)
BASOPHILS NFR BLD AUTO: 1 % (ref 0–1)
BILIRUB SERPL-MCNC: 0.78 MG/DL (ref 0.2–1)
BUN SERPL-MCNC: 11 MG/DL (ref 5–25)
CALCIUM SERPL-MCNC: 9.1 MG/DL (ref 8.4–10.2)
CHLORIDE SERPL-SCNC: 103 MMOL/L (ref 96–108)
CO2 SERPL-SCNC: 25 MMOL/L (ref 21–32)
CREAT SERPL-MCNC: 1.02 MG/DL (ref 0.6–1.3)
EOSINOPHIL # BLD AUTO: 0.02 THOUSAND/ÂΜL (ref 0–0.61)
EOSINOPHIL NFR BLD AUTO: 0 % (ref 0–6)
ERYTHROCYTE [DISTWIDTH] IN BLOOD BY AUTOMATED COUNT: 12.7 % (ref 11.6–15.1)
GFR SERPL CREATININE-BSD FRML MDRD: 76 ML/MIN/1.73SQ M
GLUCOSE P FAST SERPL-MCNC: 224 MG/DL (ref 65–99)
HCT VFR BLD AUTO: 43.1 % (ref 36.5–49.3)
HGB BLD-MCNC: 14.1 G/DL (ref 12–17)
IMM GRANULOCYTES # BLD AUTO: 0.01 THOUSAND/UL (ref 0–0.2)
IMM GRANULOCYTES NFR BLD AUTO: 0 % (ref 0–2)
LYMPHOCYTES # BLD AUTO: 1.42 THOUSANDS/ÂΜL (ref 0.6–4.47)
LYMPHOCYTES NFR BLD AUTO: 29 % (ref 14–44)
MCH RBC QN AUTO: 30.5 PG (ref 26.8–34.3)
MCHC RBC AUTO-ENTMCNC: 32.7 G/DL (ref 31.4–37.4)
MCV RBC AUTO: 93 FL (ref 82–98)
MONOCYTES # BLD AUTO: 0.34 THOUSAND/ÂΜL (ref 0.17–1.22)
MONOCYTES NFR BLD AUTO: 7 % (ref 4–12)
NEUTROPHILS # BLD AUTO: 3.03 THOUSANDS/ÂΜL (ref 1.85–7.62)
NEUTS SEG NFR BLD AUTO: 63 % (ref 43–75)
NRBC BLD AUTO-RTO: 0 /100 WBCS
PLATELET # BLD AUTO: 187 THOUSANDS/UL (ref 149–390)
PMV BLD AUTO: 9.2 FL (ref 8.9–12.7)
POTASSIUM SERPL-SCNC: 3.8 MMOL/L (ref 3.5–5.3)
PROT SERPL-MCNC: 7.4 G/DL (ref 6.4–8.4)
RBC # BLD AUTO: 4.63 MILLION/UL (ref 3.88–5.62)
SL AMB POCT HEMOGLOBIN AIC: 6.9 (ref ?–6.5)
SODIUM SERPL-SCNC: 138 MMOL/L (ref 135–147)
WBC # BLD AUTO: 4.85 THOUSAND/UL (ref 4.31–10.16)

## 2025-03-12 PROCEDURE — 99214 OFFICE O/P EST MOD 30 MIN: CPT | Performed by: FAMILY MEDICINE

## 2025-03-12 PROCEDURE — 36415 COLL VENOUS BLD VENIPUNCTURE: CPT

## 2025-03-12 PROCEDURE — 85025 COMPLETE CBC W/AUTO DIFF WBC: CPT

## 2025-03-12 PROCEDURE — 83036 HEMOGLOBIN GLYCOSYLATED A1C: CPT | Performed by: FAMILY MEDICINE

## 2025-03-12 PROCEDURE — G2211 COMPLEX E/M VISIT ADD ON: HCPCS | Performed by: FAMILY MEDICINE

## 2025-03-12 PROCEDURE — 80053 COMPREHEN METABOLIC PANEL: CPT

## 2025-03-12 RX ORDER — MIRTAZAPINE 7.5 MG/1
7.5 TABLET, FILM COATED ORAL 2 TIMES DAILY
Qty: 60 TABLET | Refills: 5 | Status: SHIPPED | OUTPATIENT
Start: 2025-03-12

## 2025-03-12 RX ORDER — MIRTAZAPINE 7.5 MG/1
TABLET, FILM COATED ORAL
COMMUNITY
Start: 2025-02-13 | End: 2025-03-12 | Stop reason: SDUPTHER

## 2025-03-12 RX ORDER — LUBIPROSTONE 24 UG/1
24 CAPSULE ORAL 2 TIMES DAILY WITH MEALS
Qty: 200 CAPSULE | Refills: 3 | Status: SHIPPED | OUTPATIENT
Start: 2025-03-12

## 2025-03-12 RX ORDER — BLOOD-GLUCOSE METER
KIT MISCELLANEOUS
Qty: 300 EACH | Refills: 1 | Status: SHIPPED | OUTPATIENT
Start: 2025-03-12

## 2025-03-12 RX ORDER — NADOLOL 20 MG/1
20 TABLET ORAL DAILY
Qty: 90 TABLET | Refills: 1 | Status: SHIPPED | OUTPATIENT
Start: 2025-03-12

## 2025-03-12 RX ORDER — FLUOXETINE 10 MG/1
CAPSULE ORAL
COMMUNITY
Start: 2025-02-28 | End: 2025-03-12 | Stop reason: ALTCHOICE

## 2025-03-12 NOTE — ASSESSMENT & PLAN NOTE
Anxiety/Substance Use:  - Patient recently experienced anxiety episode related to methadone overdose  - Continue Mirtazapine 7.5mg twice daily  - Continue Buspirone  - Discontinued Fluoxetine due to flashback side effects  Orders:    mirtazapine (REMERON) 7.5 MG tablet; Take 1 tablet (7.5 mg total) by mouth 2 (two) times a day (Patient taking differently: Take 44 mg by mouth in the morning)

## 2025-03-12 NOTE — ASSESSMENT & PLAN NOTE
Lab Results   Component Value Date    HGBA1C 6.9 (A) 03/12/2025   2. Diabetes Mellitus Type 2:  - HbA1c: 6.9% (at goal < 7%)  - Continue Januvia 100mg  - Patient to monitor fasting glucose daily and 2-hour post-dinner glucose  - Provided glucose testing strips refill  - Follow up in 4 months due to trending higher glucose       Follow-up:  - Return in 4 months for diabetes monitoring  - Bring glucose monitoring logs    Orders:    Comprehensive metabolic panel; Future    POCT hemoglobin A1c    glucose blood (FREESTYLE LITE) test strip; Use as instructed

## 2025-03-12 NOTE — PROGRESS NOTES
Name: Rishi Kirk      : 1960      MRN: [id number]  Encounter Provider: Soren Trivedi MD  Encounter Date: 3/12/2025   Encounter department: [facility] PRIMARY CARE [facility]    Assessment & Plan  Type 2 diabetes mellitus without complication, without long-term current use of insulin ([facility])    Lab Results   Component Value Date    HGBA1C 6.9 (A) 2025   2. Diabetes Mellitus Type 2:  - HbA1c: 6.9% (at goal < 7%)  - Continue Januvia 100mg  - Patient to monitor fasting glucose daily and 2-hour post-dinner glucose  - Provided glucose testing strips refill  - Follow up in 4 months due to trending higher glucose       Follow-up:  - Return in 4 months for diabetes monitoring  - Bring glucose monitoring logs    Orders:    Comprehensive metabolic panel; Future    POCT hemoglobin A1c    glucose blood (FREESTYLE LITE) test strip; Use as instructed    Benign essential hypertension  - Well-controlled with /70  - Continue current medications including Lisinopril for renal protection  - Continue Atorvastatin 40mg  Orders:    CBC and differential; Future    Drug-induced constipation  Likely related to current use of meds.  - Current DocuSate causing urticaria  - New prescription for Amitiza 24mcg  Orders:    lubiprostone (AMITIZA) 24 mcg capsule; Take 1 capsule (24 mcg total) by mouth 2 (two) times a day with meals    Esophageal varices in cirrhosis (HCC)  Explained the importance to keep Portal system at low pressure.  Orders:    nadolol (CORGARD) 20 mg tablet; Take 1 tablet (20 mg total) by mouth daily    Paraphimosis  HE states pain and tension of prepuce over his gland when having erection. During physical I did not see an obvious paraphimosis. Consult Urology for opinion.  - Referred to Urology for evaluation of possible early paraphimosis  - Needs new ophthalmologist due to insurance changes - to discuss at next visit  Orders:    Ambulatory Referral to Urology; Future    Anxiety    "Anxiety/Substance Use:  - Patient recently experienced anxiety episode related to methadone overdose  - Continue Mirtazapine 7.5mg twice daily  - Continue Buspirone  - Discontinued Fluoxetine due to flashback side effects  Orders:    mirtazapine (REMERON) 7.5 MG tablet; Take 1 tablet (7.5 mg total) by mouth 2 (two) times a day (Patient taking differently: Take 44 mg by mouth in the morning)           Subjective:  64-year-old male presents for follow-up of multiple chronic conditions. Reports recent anxiety episode related to self-administered methadone overdose, taking 200mg over two days (100mg each day) from old supply, which led to increased anxiety and GI symptoms requiring three weeks for detoxification. Currently stable on prescribed methadone regimen. Reports discontinued Fluoxetine due to experiencing flashbacks/hallucinations. Tolerating Mirtazapine well at 7.5mg twice daily for anxiety control. Complains of constipation with current DocuSate causing urticarial reaction. New concern of penile discomfort with possible early paraphimosis, noting tightness of foreskin particularly during erections. Denies chest pain, headaches, or lower extremity edema. Regular foot care maintained with prescribed creams. Reports issues with current ophthalmologist no longer accepting his insurance ([employer]).    Objective:  /70 (BP Location: Left arm, Patient Position: Sitting, Cuff Size: Standard)   Pulse (!) 110   Temp 98 °F (36.7 °C) (Tympanic)   Resp 16   Ht 5' 5\" (1.651 m)   Wt 94.3 kg (208 lb)   SpO2 98%   BMI 34.61 kg/m²       Laboratory Results:  - HbA1c: 6.9%    Physical Examination:  - General: Alert and oriented  - Skin: Noted urticarial reaction  - Genitourinary: Early signs of paraphimosis noted (?), no acute distress  - Extremities: No edema    Current Medications:  - Methadone (maintenance dose)  - Mirtazapine 7.5mg BID  - Buspirone  - Januvia 100mg  - Lisinopril  - Atorvastatin 40mg  - DocuSate " (to be changed) due to Urticarial Rash

## 2025-03-12 NOTE — PROGRESS NOTES
Name: Rishi Kirk      : 1960      MRN: [id number]  Encounter Provider: ANJU Giron  Encounter Date: 3/12/2025   Encounter department: [facility] UROLOGY [place]  :  Assessment & Plan  Phimosis  Patient reports painful tight foreskin when he gets erections for past couple of years   He denies inability to retract foreskin   He denies infection or discharge   He denies issues voiding   Last HgbA1c was 6.9 on 3/12/25  Discussed circumcision as the definitive treatment for phimosis   Procedure and post op expectations discussed with patient today  Consent signed in office   Orders:    Case request operating room: CIRCUMCISION ADULT; Standing      History of Present Illness   Rishi Kirk is a 64 y.o. male who presents for evaluation of phimosis. Patient reports painful and tight foreskin when he gets erections for the past couple of years. He is able to fully retract foreskin and pull it back over glans penis without difficulty. He denies any discharge or pain at the glans penis. Denies dysuria or burning. He is interested in surgical intervention. He does have DM 2 managed on Januvia 100mg. He understands that diabetes may result in delayed wound healing post surgery.       Review of Systems   Constitutional:  Negative for chills, diaphoresis, fatigue and fever.   Respiratory:  Negative for cough and shortness of breath.    Gastrointestinal:  Negative for abdominal pain, diarrhea, nausea and vomiting.   Genitourinary:  Negative for decreased urine volume, difficulty urinating, dysuria, flank pain, frequency, hematuria and urgency.   Musculoskeletal:  Negative for back pain and myalgias.   Skin:  Negative for pallor and wound.   Neurological:  Negative for dizziness, weakness, light-headedness and numbness.       Pertinent Medical History       Medical History Reviewed by provider this encounter:     .  Past Medical History   Past Medical History:   Diagnosis Date    Addiction to drug  (HCC)     Alcohol abuse     Anxiety     Cirrhosis (HCC)     Colon polyp     Diabetes mellitus (HCC)     Esophageal varices (HCC)     Gastritis     Heart disease     Hepatitis C     History of Helicobacter pylori infection 02/23/2016    History of kidney stones     Hyperlipidemia     Hypertension     Infectious viral hepatitis     Kidney stone     Obesity     Pneumonia     PPD positive 2020    Sleep apnea     Sleep difficulties     Splenomegaly     Substance abuse (HCC)     Withdrawal symptoms, drug or narcotic (HCC)      Past Surgical History:   Procedure Laterality Date    COLONOSCOPY  06/20/2014    dr [email or web address]    COLONOSCOPY  2014        EGD AND COLONOSCOPY  08/2020    esophageal varices, colon polyp, hemorrhoids    ESOPHAGOGASTRODUODENOSCOPY  12/10/2015    esophageal varices, cirrhosis, gastritis    HYDROCELE EXCISION / REPAIR      LIVER BIOPSY  2014     Family History   Problem Relation Age of Onset    Diabetes type II Mother         MELLITUS    Hypertension Mother     Hypertension Sister     Diabetes Sister         MELLITUS    No Known Problems Maternal Grandmother     No Known Problems Maternal Grandfather     No Known Problems Paternal Grandmother     No Known Problems Paternal Grandfather     No Known Problems Sister     Breast cancer Maternal Aunt         40s    No Known Problems Maternal Aunt     No Known Problems Paternal Aunt     No Known Problems Paternal Aunt     No Known Problems Paternal Aunt     No Known Problems Paternal Aunt     No Known Problems Paternal Aunt     No Known Problems Paternal Aunt       reports that he quit smoking about 5 months ago. His smoking use included cigarettes. He started smoking about 42 years ago. He has a 41.3 pack-year smoking history. He has been exposed to tobacco smoke. He uses smokeless tobacco. He reports that he does not currently use alcohol. He reports that he does not currently use drugs after having used the following drugs: Heroin.  Current Outpatient  Medications   Medication Instructions    ammonium lactate (LAC-HYDRIN) 12 % lotion     atorvastatin (LIPITOR) 40 mg, Oral, Daily    Comfort Touch Plus Lancets 30G MISC 1 Application, Subcutaneous, Daily  (0200)    glucose blood (FREESTYLE LITE) test strip Use as instructed    GNP Alcohol Swabs 70 % PADS TEST 2-3 TIMES A DAY AS DIRECTEDTEST 2-3 TIMES A DAY AS DIRECTED    Lidocaine Viscous HCl (XYLOCAINE) 2 % mucosal solution 15 mL, Swish & Spit, 4 times daily PRN    lisinopril (ZESTRIL) 5 mg tablet TAKE 1 TABLET (5 MG TOTAL) BY MOUTH DAILYTOME 1 TABLET (5 MG TOTAL) BY MOUTH DAILY    lubiprostone (AMITIZA) 24 mcg, Oral, 2 times daily with meals    methadone (DOLOPHINE) 44 mg, Daily    mirtazapine (REMERON) 7.5 mg, Oral, 2 times daily    nadolol (CORGARD) 20 mg, Oral, Daily    sitaGLIPtin (JANUVIA) 100 mg, Oral, Daily     Allergies   Allergen Reactions    Meperidine Abdominal Pain      Current Outpatient Medications on File Prior to Visit   Medication Sig Dispense Refill    ammonium lactate (LAC-HYDRIN) 12 % lotion       atorvastatin (LIPITOR) 40 mg tablet Take 1 tablet (40 mg total) by mouth daily 90 tablet 3    Comfort Touch Plus Lancets 30G MISC Inject 1 Application under the skin Daily at 2am 100 each 3    glucose blood (FREESTYLE LITE) test strip Use as instructed 300 each 1    GNP Alcohol Swabs 70 % PADS TEST 2-3 TIMES A DAY AS DIRECTEDTEST 2-3 TIMES A DAY AS DIRECTED 300 each 3    lisinopril (ZESTRIL) 5 mg tablet TAKE 1 TABLET (5 MG TOTAL) BY MOUTH DAILYTOME 1 TABLET (5 MG TOTAL) BY MOUTH DAILY 30 tablet 5    lubiprostone (AMITIZA) 24 mcg capsule Take 1 capsule (24 mcg total) by mouth 2 (two) times a day with meals 200 capsule 3    methadone (DOLOPHINE) 10 mg/mL oral concentrated solution Take 44 mg by mouth daily      mirtazapine (REMERON) 7.5 MG tablet Take 1 tablet (7.5 mg total) by mouth 2 (two) times a day (Patient taking differently: Take 44 mg by mouth in the morning) 60 tablet 5    nadolol (CORGARD)  20 mg tablet Take 1 tablet (20 mg total) by mouth daily 90 tablet 1    sitaGLIPtin (JANUVIA) 100 mg tablet Take 1 tablet (100 mg total) by mouth daily 100 tablet 3    Lidocaine Viscous HCl (XYLOCAINE) 2 % mucosal solution Swish and spit 15 mL 4 (four) times a day as needed for mouth pain or discomfort (Patient not taking: Reported on 3/12/2025) 100 mL 0    [DISCONTINUED] aluminum-magnesium hydroxide-simethicone (MAALOX MAX) 400-400-40 MG/5ML suspension Take 10 mL by mouth every 6 (six) hours as needed for indigestion or heartburn 355 mL 0    [DISCONTINUED] ARIPiprazole (ABILIFY) 10 mg tablet Take 1 tablet (10 mg total) by mouth daily 30 tablet 0    [DISCONTINUED] busPIRone (BUSPAR) 5 mg tablet Take 1 tablet (5 mg total) by mouth 3 (three) times a day 90 tablet 2    [DISCONTINUED] cloNIDine (Catapres) 0.1 mg tablet Take 2 tablets (0.2 mg total) by mouth every 12 (twelve) hours 120 tablet 5    [DISCONTINUED] docusate sodium (COLACE) 100 mg capsule Take 1 capsule (100 mg total) by mouth 2 (two) times a day 60 capsule 5    [DISCONTINUED] FLUoxetine (PROzac) 10 mg capsule TAKE 1 CAPSULE (10 MG TOTAL) BY MOUTH DAILY TOME 1 CAPSULE (10 MG TOTAL) BY MOUTH DAILY      [DISCONTINUED] FLUoxetine (PROzac) 40 MG capsule Take 1 capsule (40 mg total) by mouth daily 30 capsule 1    [DISCONTINUED] glucose blood (FREESTYLE LITE) test strip Use as instructed 300 each 1    [DISCONTINUED] mirtazapine (REMERON) 7.5 MG tablet TAKE 2 TABLETS (15 MG TOTAL) BY MOUTH DAILY AT BEDTIME TOME 2 TABLETS (15 MG TOTAL) BY MOUTH DAILY AT BEDTIME      [DISCONTINUED] nadolol (CORGARD) 20 mg tablet TAKE 1 TABLET (20 MG TOTAL) BY MOUTH DAILY 90 tablet 1    [DISCONTINUED] omeprazole (PriLOSEC) 40 MG capsule Take 1 capsule (40 mg total) by mouth daily 90 capsule 1     No current facility-administered medications on file prior to visit.      Social History     Tobacco Use    Smoking status: Former     Current packs/day: 0.00     Average packs/day: 1 pack/day  "for 41.7 years (41.3 ttl pk-yrs)     Types: Cigarettes     Start date:      Quit date: 10/1/2024     Years since quittin.4     Passive exposure: Current    Smokeless tobacco: Current    Tobacco comments:     TOBACCO USE   Vaping Use    Vaping status: Never Used   Substance and Sexual Activity    Alcohol use: Not Currently    Drug use: Not Currently     Types: Heroin     Comment: former user- heroin was drug of choice; Overuse of Methadone    Sexual activity: Not Currently     Partners: Female        Objective   /80 (BP Location: Left arm, Patient Position: Sitting, Cuff Size: Adult)   Pulse 101   Ht 5' 5\" (1.651 m)   Wt 92.1 kg (203 lb)   SpO2 94%   BMI 33.78 kg/m²       Physical Exam  Constitutional:       Appearance: Normal appearance.   HENT:      Head: Normocephalic and atraumatic.   Eyes:      Conjunctiva/sclera: Conjunctivae normal.   Cardiovascular:      Rate and Rhythm: Normal rate and regular rhythm.   Pulmonary:      Effort: Pulmonary effort is normal. No respiratory distress.      Breath sounds: Normal breath sounds.   Genitourinary:     Comments: Uncircumcised phallus. Easily able to retract and pull forth foreskin. No evidence of balanitis. No erythema, discharge, lesions, or tenderness to palpation.   Musculoskeletal:         General: Normal range of motion.      Cervical back: Normal range of motion.   Skin:     General: Skin is warm and dry.   Neurological:      General: No focal deficit present.      Mental Status: He is alert and oriented to person, place, and time.   Psychiatric:         Mood and Affect: Mood normal.         Behavior: Behavior normal.        Results   Lab Results   Component Value Date    PSA 0.5 2022    PSA 0.1 2018     Lab Results   Component Value Date    GLUCOSE 98 2018    CALCIUM 9.1 2025     2018    K 3.8 2025    CO2 25 2025     2025    BUN 11 2025    CREATININE 1.02 2025     Lab " Results   Component Value Date    WBC 4.85 03/12/2025    HGB 14.1 03/12/2025    HCT 43.1 03/12/2025    MCV 93 03/12/2025     03/12/2025

## 2025-03-12 NOTE — ASSESSMENT & PLAN NOTE
- Well-controlled with /70  - Continue current medications including Lisinopril for renal protection  - Continue Atorvastatin 40mg  Orders:    CBC and differential; Future

## 2025-03-12 NOTE — ASSESSMENT & PLAN NOTE
Lab Results   Component Value Date    HGBA1C 6.9 (A) 03/12/2025       Orders:    Comprehensive metabolic panel; Future    POCT hemoglobin A1c    glucose blood (FREESTYLE LITE) test strip; Use as instructed

## 2025-03-12 NOTE — ASSESSMENT & PLAN NOTE
Orders:    mirtazapine (REMERON) 7.5 MG tablet; Take 1 tablet (7.5 mg total) by mouth 2 (two) times a day

## 2025-03-12 NOTE — ASSESSMENT & PLAN NOTE
Explained the importance to keep Portal system at low pressure.  Orders:    nadolol (CORGARD) 20 mg tablet; Take 1 tablet (20 mg total) by mouth daily

## 2025-03-12 NOTE — TELEPHONE ENCOUNTER
Call placed to pt and spoke with him. Explained to pt that he should be seen today as his symptoms could be urgent.   Pt understands and is willing to come to the office this morning.   Offered appointment for this morning at 9:20am.   Pt confirmed.

## 2025-03-12 NOTE — TELEPHONE ENCOUNTER
New Patient   Insurance   Current Insurance? [employer]  Insurance E-verified? Yes     History   Reason for appointment/active symptoms? Paraphimosis     Has the patient had any previous Urologist(s)? [facility]    Was the patient seen in the ED? No    Labs/Imaging(Including Out Of Network)? Labs 3/12/25     Records Requested?   Records Visible in EPIC? Yes    Personal history of cancer? No    Appointment   Office location preference:  [place]    Appointment Details   Date: 3/19/2025    Time: 1:40pm   Location: [place]   Provider:  Sofia Castaneda    Does the appointment need further review?  Offered pt same day appointment as instructed, pt declined and stated he is leaving for [place] today and will not be returning until Monday or Tuesday. Pt requested next Wednesday.

## 2025-03-14 ENCOUNTER — RESULTS FOLLOW-UP (OUTPATIENT)
Dept: FAMILY MEDICINE CLINIC | Facility: CLINIC | Age: 65
End: 2025-03-14

## 2025-03-14 DIAGNOSIS — E11.9 TYPE 2 DIABETES MELLITUS WITHOUT COMPLICATION, WITHOUT LONG-TERM CURRENT USE OF INSULIN (HCC): Primary | ICD-10-CM

## 2025-03-14 RX ORDER — METFORMIN HYDROCHLORIDE 500 MG/1
500 TABLET, EXTENDED RELEASE ORAL
Qty: 100 TABLET | Refills: 3 | Status: SHIPPED | OUTPATIENT
Start: 2025-03-14

## 2025-03-14 NOTE — RESULT ENCOUNTER NOTE
Please call the patient, the blood sugar is higher.  Kidney and liver function are okay.  I want him to start taking an additional medication for diabetes which is metformin 500 mg to take once a day along with Januvia that he already has.

## 2025-03-17 NOTE — TELEPHONE ENCOUNTER
Late Entry from Friday 3/14    Spoke with patient and confirmed surgery date of 4/9/2025  Type of surgery: Circumcision  Operating physician:Dr. Baires  Location of surgery: [facility]    Verbally went over prep with patient on 3/14/2025  NPO  Bowel prep? No  Hospital calls afternoon prior with arrival time (calls Friday afternoon for Monday surgery)  Patient needs ride to and from surgery   outpatient  Pre-op testing to be done 2 weeks prior to surgery. All testing can be done as a walk-in. EKG can only be done as a walk-in at any [facility].  [facility]  Blood thinners:   None  Clearances needed: Medical    Mailed to patient on 3/17/2025  Copy of packet scanned into Media  Labs in packet and in electronic record   Soap prep in packet  post-op in packet     Consent: in media     Medical Clearance: YES  Appt with: Dr Sandeep Adams  Appt date and time:3/18/25 10:20 AM  Date clearance form faxed:3/14/2025  Best fax number: [phone]

## 2025-03-18 ENCOUNTER — APPOINTMENT (OUTPATIENT)
Dept: LAB | Facility: HOSPITAL | Age: 65
End: 2025-03-18
Payer: MEDICARE

## 2025-03-18 DIAGNOSIS — N47.1 PHIMOSIS: ICD-10-CM

## 2025-03-18 DIAGNOSIS — D75.1 POLYCYTHEMIA, SECONDARY: ICD-10-CM

## 2025-03-18 DIAGNOSIS — R39.89 SUSPECTED UTI: ICD-10-CM

## 2025-03-18 DIAGNOSIS — Z01.812 PRE-OPERATIVE LABORATORY EXAMINATION: ICD-10-CM

## 2025-03-18 PROCEDURE — 87086 URINE CULTURE/COLONY COUNT: CPT

## 2025-03-19 LAB — BACTERIA UR CULT: NORMAL

## 2025-03-21 ENCOUNTER — TELEPHONE (OUTPATIENT)
Age: 65
End: 2025-03-21

## 2025-03-21 ENCOUNTER — APPOINTMENT (OUTPATIENT)
Dept: LAB | Facility: HOSPITAL | Age: 65
End: 2025-03-21
Payer: MEDICARE

## 2025-03-21 ENCOUNTER — CONSULT (OUTPATIENT)
Dept: FAMILY MEDICINE CLINIC | Facility: CLINIC | Age: 65
End: 2025-03-21
Payer: MEDICARE

## 2025-03-21 ENCOUNTER — RESULTS FOLLOW-UP (OUTPATIENT)
Dept: FAMILY MEDICINE CLINIC | Facility: CLINIC | Age: 65
End: 2025-03-21

## 2025-03-21 VITALS
DIASTOLIC BLOOD PRESSURE: 69 MMHG | HEIGHT: 65 IN | RESPIRATION RATE: 16 BRPM | WEIGHT: 208 LBS | SYSTOLIC BLOOD PRESSURE: 125 MMHG | TEMPERATURE: 98.7 F | OXYGEN SATURATION: 98 % | HEART RATE: 85 BPM | BODY MASS INDEX: 34.66 KG/M2

## 2025-03-21 DIAGNOSIS — K70.30 ALCOHOLIC CIRRHOSIS OF LIVER WITHOUT ASCITES (HCC): ICD-10-CM

## 2025-03-21 DIAGNOSIS — Z01.818 PRE-OPERATIVE EXAMINATION: Primary | ICD-10-CM

## 2025-03-21 DIAGNOSIS — K76.6 PORTAL HYPERTENSION (HCC): ICD-10-CM

## 2025-03-21 DIAGNOSIS — N47.1 PHIMOSIS: ICD-10-CM

## 2025-03-21 DIAGNOSIS — E11.9 TYPE 2 DIABETES MELLITUS WITHOUT COMPLICATION, WITHOUT LONG-TERM CURRENT USE OF INSULIN (HCC): ICD-10-CM

## 2025-03-21 DIAGNOSIS — I10 BENIGN ESSENTIAL HYPERTENSION: ICD-10-CM

## 2025-03-21 DIAGNOSIS — F11.20 METHADONE MAINTENANCE THERAPY PATIENT (HCC): ICD-10-CM

## 2025-03-21 DIAGNOSIS — Z01.818 PRE-OPERATIVE EXAMINATION: ICD-10-CM

## 2025-03-21 DIAGNOSIS — R11.0 NAUSEA: ICD-10-CM

## 2025-03-21 DIAGNOSIS — I85.10 ESOPHAGEAL VARICES IN CIRRHOSIS (HCC): ICD-10-CM

## 2025-03-21 DIAGNOSIS — K74.60 ESOPHAGEAL VARICES IN CIRRHOSIS (HCC): ICD-10-CM

## 2025-03-21 LAB
INR PPP: 1.02 (ref 0.85–1.19)
PROTHROMBIN TIME: 13.7 SECONDS (ref 12.3–15)

## 2025-03-21 PROCEDURE — G2211 COMPLEX E/M VISIT ADD ON: HCPCS

## 2025-03-21 PROCEDURE — 85610 PROTHROMBIN TIME: CPT

## 2025-03-21 PROCEDURE — 99214 OFFICE O/P EST MOD 30 MIN: CPT

## 2025-03-21 PROCEDURE — 36415 COLL VENOUS BLD VENIPUNCTURE: CPT

## 2025-03-21 PROCEDURE — 93000 ELECTROCARDIOGRAM COMPLETE: CPT

## 2025-03-21 RX ORDER — ONDANSETRON 4 MG/1
4 TABLET, FILM COATED ORAL EVERY 8 HOURS PRN
Qty: 20 TABLET | Refills: 0 | Status: SHIPPED | OUTPATIENT
Start: 2025-03-21 | End: 2025-03-28

## 2025-03-21 NOTE — TELEPHONE ENCOUNTER
Patients GI provider:  Dr. Santo    Number to return call: [phone]    Reason for call: Pt calling to say he has a surgery on 4/9/25 and the surgeon would like him seen for clearance prior to the surgery. I did not see any available appts prior to that. Pt said his surgeon also sent Dr Santo a message. Please reach back out to the pt    Scheduled procedure/appointment date if applicable: Apt/procedure

## 2025-03-21 NOTE — ASSESSMENT & PLAN NOTE
Following with GI. PE stable today with no signs of decompensation. Will need to be seen by GI for clearance as well.

## 2025-03-21 NOTE — PROGRESS NOTES
[facility] PRE-OPERATIVE EVALUATION  [facility] PHYSICIAN [facility]    NAME: Rishi Kirk  AGE: 64 y.o. SEX: male  : 1960     DATE: 3/21/2025    [facility] Pre-Operative Evaluation      Chief Complaint: Pre-operative Evaluation     Surgery: Circumcision  Anticipated Date of Surgery: 2025  Referring Provider: Malik Baires MD       History of Present Illness:     Rishi Kirk is a 64 y.o. male who presents to the office today for a preoperative consultation at the request of surgeon, Malik Baires MD, who plans on performing circumcision on 2025. Planned anesthesia is general. Patient has a bleeding risk of: no recent abnormal bleeding, no remote history of abnormal bleeding, and no use of Ca-channel blockers. Patient does not have objections to receiving blood products if needed. Current anti-platelet/anti-coagulation medications that the patient is prescribed includes:  N/a .      Assessment of Chronic Conditions:   - Diabetes Mellitus: Well-controlled   - Liver Cirrhosis: Stable  - Hypertension: Well-controlled  - Methadone maintenance therapy patient: Stable     Assessment of Cardiac Risk:  Denies unstable or severe angina or MI in the last 6 weeks or history of stent placement in the last year   Denies decompensated heart failure (e.g. New onset heart failure, NYHA functional class IV heart failure, or worsening existing heart failure)  Denies significant arrhythmias such as high grade AV block, symptomatic ventricular arrhythmia, newly recognized ventricular tachycardia, supraventricular tachycardia with resting heart rate >100, or symptomatic bradycardia  Denies severe heart valve disease including aortic stenosis or symptomatic mitral stenosis     Exercise Capacity:  Able to walk 4 blocks without symptoms?: Yes  Able to walk 2 flights without symptoms?: Yes    Prior Anesthesia Reactions: No     Personal history of venous  thromboembolic disease? No    History of steroid use for >2 weeks within last year? No         Review of Systems:     Review of Systems   Constitutional:  Negative for chills and fever.   Respiratory:  Negative for shortness of breath.    Cardiovascular:  Negative for chest pain.   Gastrointestinal:  Negative for abdominal pain, blood in stool, diarrhea, nausea (occasional with medications) and vomiting.   Genitourinary:  Negative for dysuria and hematuria.   Neurological:  Negative for dizziness.   Hematological:  Does not bruise/bleed easily.       Current Problem List:     Patient Active Problem List   Diagnosis    Benign essential hypertension    Chronic hepatitis C treated in 2014 (HCC)    Esophageal varices in cirrhosis (HCC)    Type 2 diabetes mellitus without complication, without long-term current use of insulin (HCC)    Gallbladder polyp    Methadone maintenance therapy patient (HCC)    Portal hypertension (HCC)    Proteinuria    Secondary male hypogonadism    Class 1 obesity due to excess calories with serious comorbidity and body mass index (BMI) of 34.0 to 34.9 in adult    RODRICK (obstructive sleep apnea)    Coronary artery calcification    Vitamin B12 deficiency    Tobacco dependence due to cigarettes    Intolerance of continuous positive airway pressure (CPAP) ventilation    Cataract of right eye    Nephrolithiasis    Alcoholic cirrhosis of liver without ascites (HCC)    Umbilical hernia without obstruction and without gangrene    Anxiety       Allergies:     Allergies   Allergen Reactions    Meperidine Abdominal Pain       Current Medications:       Current Outpatient Medications:     ammonium lactate (LAC-HYDRIN) 12 % lotion, , Disp: , Rfl:     atorvastatin (LIPITOR) 40 mg tablet, Take 1 tablet (40 mg total) by mouth daily, Disp: 90 tablet, Rfl: 3    Comfort Touch Plus Lancets 30G MISC, Inject 1 Application under the skin Daily at 2am, Disp: 100 each, Rfl: 3    glucose blood (FREESTYLE LITE) test  strip, Use as instructed, Disp: 300 each, Rfl: 1    GNP Alcohol Swabs 70 % PADS, TEST 2-3 TIMES A DAY AS DIRECTEDTEST 2-3 TIMES A DAY AS DIRECTED, Disp: 300 each, Rfl: 3    lisinopril (ZESTRIL) 5 mg tablet, TAKE 1 TABLET (5 MG TOTAL) BY MOUTH DAILYTOME 1 TABLET (5 MG TOTAL) BY MOUTH DAILY, Disp: 30 tablet, Rfl: 5    lubiprostone (AMITIZA) 24 mcg capsule, Take 1 capsule (24 mcg total) by mouth 2 (two) times a day with meals, Disp: 200 capsule, Rfl: 3    metFORMIN (GLUCOPHAGE-XR) 500 mg 24 hr tablet, Take 1 tablet (500 mg total) by mouth daily with dinner, Disp: 100 tablet, Rfl: 3    methadone (DOLOPHINE) 10 mg/mL oral concentrated solution, Take 44 mg by mouth daily, Disp: , Rfl:     mirtazapine (REMERON) 7.5 MG tablet, Take 1 tablet (7.5 mg total) by mouth 2 (two) times a day, Disp: 60 tablet, Rfl: 5    nadolol (CORGARD) 20 mg tablet, Take 1 tablet (20 mg total) by mouth daily, Disp: 90 tablet, Rfl: 1    ondansetron (ZOFRAN) 4 mg tablet, Take 1 tablet (4 mg total) by mouth every 8 (eight) hours as needed for nausea or vomiting for up to 7 days, Disp: 20 tablet, Rfl: 0    sitaGLIPtin (JANUVIA) 100 mg tablet, Take 1 tablet (100 mg total) by mouth daily, Disp: 100 tablet, Rfl: 3    Lidocaine Viscous HCl (XYLOCAINE) 2 % mucosal solution, Swish and spit 15 mL 4 (four) times a day as needed for mouth pain or discomfort (Patient not taking: Reported on 3/21/2025), Disp: 100 mL, Rfl: 0    Past Medical History:       Past Medical History:   Diagnosis Date    Addiction to drug (HCC)     Alcohol abuse     Anxiety     Cirrhosis (HCC)     Colon polyp     Diabetes mellitus (HCC)     Esophageal varices (HCC)     Gastritis     Heart disease     Hepatitis C     History of Helicobacter pylori infection 02/23/2016    History of kidney stones     Hyperlipidemia     Hypertension     Infectious viral hepatitis     Kidney stone     Obesity     Pneumonia     PPD positive 2020    Sleep apnea     Sleep difficulties     Splenomegaly      Substance abuse (HCC)     Withdrawal symptoms, drug or narcotic (HCC)         Past Surgical History:   Procedure Laterality Date    COLONOSCOPY  2014    dr [email or web address]    COLONOSCOPY          EGD AND COLONOSCOPY  2020    esophageal varices, colon polyp, hemorrhoids    ESOPHAGOGASTRODUODENOSCOPY  12/10/2015    esophageal varices, cirrhosis, gastritis    HYDROCELE EXCISION / REPAIR      LIVER BIOPSY          Family History   Problem Relation Age of Onset    Diabetes type II Mother         MELLITUS    Hypertension Mother     Hypertension Sister     Diabetes Sister         MELLITUS    No Known Problems Maternal Grandmother     No Known Problems Maternal Grandfather     No Known Problems Paternal Grandmother     No Known Problems Paternal Grandfather     No Known Problems Sister     Breast cancer Maternal Aunt         40s    No Known Problems Maternal Aunt     No Known Problems Paternal Aunt     No Known Problems Paternal Aunt     No Known Problems Paternal Aunt     No Known Problems Paternal Aunt     No Known Problems Paternal Aunt     No Known Problems Paternal Aunt         Social History     Socioeconomic History    Marital status: Single     Spouse name: Not on file    Number of children: Not on file    Years of education: Not on file    Highest education level: Not on file   Occupational History    Not on file   Tobacco Use    Smoking status: Former     Current packs/day: 0.00     Average packs/day: 1 pack/day for 41.7 years (41.3 ttl pk-yrs)     Types: Cigarettes     Start date:      Quit date: 10/1/2024     Years since quittin.4     Passive exposure: Current    Smokeless tobacco: Current    Tobacco comments:     TOBACCO USE   Vaping Use    Vaping status: Never Used   Substance and Sexual Activity    Alcohol use: Not Currently    Drug use: Not Currently     Types: Heroin     Comment: former user- heroin was drug of choice; Overuse of Methadone    Sexual activity: Not Currently     Partners:  "Female   Other Topics Concern    Not on file   Social History Narrative    Not on file     Social Drivers of Health     Financial Resource Strain: Low Risk  (12/14/2023)    Overall Financial Resource Strain (CARDIA)     Difficulty of Paying Living Expenses: Not hard at all   Food Insecurity: No Food Insecurity (12/19/2024)    Hunger Vital Sign     Worried About Running Out of Food in the Last Year: Never true     Ran Out of Food in the Last Year: Never true   Transportation Needs: No Transportation Needs (12/19/2024)    PRAPARE - Transportation     Lack of Transportation (Medical): No     Lack of Transportation (Non-Medical): No   Physical Activity: Not on file   Stress: Not on file   Social Connections: Not on file   Intimate Partner Violence: Not on file   Housing Stability: Low Risk  (12/19/2024)    Housing Stability Vital Sign     Unable to Pay for Housing in the Last Year: No     Number of Times Moved in the Last Year: 1     Homeless in the Last Year: No        Physical Exam:     /69 (BP Location: Left arm, Patient Position: Sitting, Cuff Size: Large)   Pulse 85   Temp 98.7 °F (37.1 °C) (Tympanic)   Resp 16   Ht 5' 5\" (1.651 m)   Wt 94.3 kg (208 lb)   SpO2 98%   BMI 34.61 kg/m²     Physical Exam  Vitals and nursing note reviewed.   Constitutional:       General: He is not in acute distress.     Appearance: Normal appearance. He is not ill-appearing, toxic-appearing or diaphoretic.   HENT:      Head: Normocephalic and atraumatic.   Eyes:      General: No scleral icterus.        Right eye: No discharge.         Left eye: No discharge.      Extraocular Movements: Extraocular movements intact.      Conjunctiva/sclera: Conjunctivae normal.   Cardiovascular:      Rate and Rhythm: Normal rate and regular rhythm.      Pulses: Normal pulses.      Heart sounds: Normal heart sounds. No murmur heard.     No friction rub. No gallop.   Pulmonary:      Effort: Pulmonary effort is normal. No respiratory distress. "      Breath sounds: Normal breath sounds. No stridor. No wheezing, rhonchi or rales.   Abdominal:      Palpations: Abdomen is soft.      Tenderness: There is no abdominal tenderness. There is no right CVA tenderness, left CVA tenderness, guarding or rebound.      Comments: Protruding abdomen though not hard or tender to palpation.   Musculoskeletal:      Cervical back: Normal range of motion.      Right lower le+ Edema present.      Left lower le+ Edema present.   Skin:     General: Skin is warm and dry.      Capillary Refill: Capillary refill takes less than 2 seconds.      Coloration: Skin is not jaundiced.   Neurological:      Mental Status: He is alert and oriented to person, place, and time. Mental status is at baseline.   Psychiatric:         Mood and Affect: Mood normal.         Behavior: Behavior normal. Behavior is cooperative.          Data:     Pre-operative work-up    Laboratory Results: I have personally reviewed the pertinent laboratory results/reports      EKG: Normal sinus rhythm, VA/QRS/QTc within normal limits, no ST segment elevation or depression with reciprocal changes indicating acute ischemic event.  When compared to ECG from 2025 prolonged QTc no longer present and nonspecific T wave abnormalities no longer present.    Chest x-ray: Results Review Statement: No pertinent imaging studies reviewed.      Previous cardiopulmonary studies within the past year:  Echocardiogram: N/a  Cardiac Catheterization: N/a  Stress Test: N/a  Pulmonary Function Testing: N/a      Assessment & Recommendations:     1. Pre-operative examination  POCT ECG    Protime-INR      2. Phimosis  Ambulatory referral to Family Practice      3. Alcoholic cirrhosis of liver without ascites (HCC)  Protime-INR      4. Portal hypertension (HCC)        5. Esophageal varices in cirrhosis (HCC)        6. Type 2 diabetes mellitus without complication, without long-term current use of insulin (HCC)  Ambulatory  referral to Family Practice      7. Benign essential hypertension        8. Methadone maintenance therapy patient (HCC)        9. Nausea  ondansetron (ZOFRAN) 4 mg tablet        Type 2 diabetes mellitus without complication, without long-term current use of insulin (HCC)    Lab Results   Component Value Date    HGBA1C 6.9 (A) 03/12/2025     Well controlled, continue with Januvia 100mg daily.    Benign essential hypertension  BP Readings from Last 3 Encounters:   03/21/25 125/69   03/12/25 122/80   03/12/25 118/70      Well controlled. Continue with lisinopril 5mg daily and atorvastatin 40mg daily.    Alcoholic cirrhosis of liver without ascites (HCC)  Following with GI. PE stable today with no signs of decompensation. Will need to be seen by GI for clearance as well.    Methadone maintenance therapy patient (HCC)  Stable. Continue with methadone 44mg daily.    Portal hypertension (HCC)  Continue with GI follow-up.    Esophageal varices in cirrhosis (HCC)  Continue with nadolol 20 mg daily and GI follow-up.     Nausea  Patient requested a refill for Zofran 4 mg every 8 as needed.  Refill given for 7 days and express need for precaution given history of prolonged QTc.    Pre-Op Evaluation Assessment  64 y.o. male with planned surgery: Circumcision.    Known risk factors for perioperative complications: Diabetes mellitus  Hepatic dysfunction  HTN, methadone maintenance therapy .        Pre-Op Evaluation Plan  1. Further preoperative workup as follows:   - Coagulation studies    2. Medication Management/Recommendations:   - None, continue medication regimen including morning of surgery, with sip of water    3. Prophylaxis for cardiac events with perioperative beta-blockers: not indicated.    4. Patient requires further consultation with:  Gastroenterology    Clearance  Pending coag studies and GI evaluation     Melecio Lopez MD  [facility] [address], [address]  [place] [address]  Phone#  [phone]  Fax#  [phone]

## 2025-03-21 NOTE — ASSESSMENT & PLAN NOTE
Lab Results   Component Value Date    HGBA1C 6.9 (A) 03/12/2025     Well controlled, continue with Januvia 100mg daily.

## 2025-03-21 NOTE — H&P
History and physical examination      I saw the patient in the holding area performed history and physical answered all patient questions and described the procedure step-by-step.  Risks of bleeding infection contracture need for additional operative interventions was also discussed in detail with the patient and the patient expressed understanding providing verbal and signed informed consent.    Below represents the history and physical performed on this patient by ANJU of the [facility] urology group.  I agree with the history and physical in its entirety         ANJU Giron   Nurse Practitioner  Specialty: Urology     Progress Notes      Cosign Needed     Encounter Date: 3/12/2025     Cosign Needed       Expand All Collapse All    Name: Rishi Kirk      : 1960      MRN: [id number]  Encounter Provider: ANJU Giron  Encounter Date: 3/12/2025   Encounter department: [facility] [place]  :  Assessment & Plan  Phimosis  Patient reports painful tight foreskin when he gets erections for past couple of years   He denies inability to retract foreskin   He denies infection or discharge   He denies issues voiding   Last HgbA1c was 6.9 on 3/12/25  Discussed circumcision as the definitive treatment for phimosis   Procedure and post op expectations discussed with patient today  Consent signed in office   Orders:    Case request operating room: CIRCUMCISION ADULT; Standing        History of Present Illness  Rishi Kirk is a 64 y.o. male who presents for evaluation of phimosis. Patient reports painful and tight foreskin when he gets erections for the past couple of years. He is able to fully retract foreskin and pull it back over glans penis without difficulty. He denies any discharge or pain at the glans penis. Denies dysuria or burning. He is interested in surgical intervention. He does have DM 2 managed on Januvia 100mg. He understands that diabetes may result in delayed  wound healing post surgery.         Review of Systems   Constitutional:  Negative for chills, diaphoresis, fatigue and fever.   Respiratory:  Negative for cough and shortness of breath.    Gastrointestinal:  Negative for abdominal pain, diarrhea, nausea and vomiting.   Genitourinary:  Negative for decreased urine volume, difficulty urinating, dysuria, flank pain, frequency, hematuria and urgency.   Musculoskeletal:  Negative for back pain and myalgias.   Skin:  Negative for pallor and wound.   Neurological:  Negative for dizziness, weakness, light-headedness and numbness.         Pertinent Medical History        Medical History Reviewed by provider this encounter:     .  Past Medical History  Medical History        Past Medical History:   Diagnosis Date    Addiction to drug (HCC)      Alcohol abuse      Anxiety      Cirrhosis (HCC)      Colon polyp      Diabetes mellitus (HCC)      Esophageal varices (HCC)      Gastritis      Heart disease      Hepatitis C      History of Helicobacter pylori infection 02/23/2016    History of kidney stones      Hyperlipidemia      Hypertension      Infectious viral hepatitis      Kidney stone      Obesity      Pneumonia      PPD positive 2020    Sleep apnea      Sleep difficulties      Splenomegaly      Substance abuse (HCC)      Withdrawal symptoms, drug or narcotic (HCC)           Surgical History         Past Surgical History:   Procedure Laterality Date    COLONOSCOPY   06/20/2014     dr [email or web address]    COLONOSCOPY   2014         EGD AND COLONOSCOPY   08/2020     esophageal varices, colon polyp, hemorrhoids    ESOPHAGOGASTRODUODENOSCOPY   12/10/2015     esophageal varices, cirrhosis, gastritis    HYDROCELE EXCISION / REPAIR        LIVER BIOPSY   2014         Family History         Family History   Problem Relation Age of Onset    Diabetes type II Mother           MELLITUS    Hypertension Mother      Hypertension Sister      Diabetes Sister           MELLITUS    No Known Problems  Maternal Grandmother      No Known Problems Maternal Grandfather      No Known Problems Paternal Grandmother      No Known Problems Paternal Grandfather      No Known Problems Sister      Breast cancer Maternal Aunt           40s    No Known Problems Maternal Aunt      No Known Problems Paternal Aunt      No Known Problems Paternal Aunt      No Known Problems Paternal Aunt      No Known Problems Paternal Aunt      No Known Problems Paternal Aunt      No Known Problems Paternal Aunt            reports that he quit smoking about 5 months ago. His smoking use included cigarettes. He started smoking about 42 years ago. He has a 41.3 pack-year smoking history. He has been exposed to tobacco smoke. He uses smokeless tobacco. He reports that he does not currently use alcohol. He reports that he does not currently use drugs after having used the following drugs: Heroin.       Current Outpatient Medications   Medication Instructions    ammonium lactate (LAC-HYDRIN) 12 % lotion      atorvastatin (LIPITOR) 40 mg, Oral, Daily    Comfort Touch Plus Lancets 30G MISC 1 Application, Subcutaneous, Daily  (0200)    glucose blood (FREESTYLE LITE) test strip Use as instructed    GNP Alcohol Swabs 70 % PADS TEST 2-3 TIMES A DAY AS DIRECTEDTEST 2-3 TIMES A DAY AS DIRECTED    Lidocaine Viscous HCl (XYLOCAINE) 2 % mucosal solution 15 mL, Swish & Spit, 4 times daily PRN    lisinopril (ZESTRIL) 5 mg tablet TAKE 1 TABLET (5 MG TOTAL) BY MOUTH DAILYTOME 1 TABLET (5 MG TOTAL) BY MOUTH DAILY    lubiprostone (AMITIZA) 24 mcg, Oral, 2 times daily with meals    methadone (DOLOPHINE) 44 mg, Daily    mirtazapine (REMERON) 7.5 mg, Oral, 2 times daily    nadolol (CORGARD) 20 mg, Oral, Daily    sitaGLIPtin (JANUVIA) 100 mg, Oral, Daily      Allergies        Allergies   Allergen Reactions    Meperidine Abdominal Pain         Medications Ordered Prior to Encounter          Current Outpatient Medications on File Prior to Visit   Medication Sig Dispense  Refill    ammonium lactate (LAC-HYDRIN) 12 % lotion          atorvastatin (LIPITOR) 40 mg tablet Take 1 tablet (40 mg total) by mouth daily 90 tablet 3    Comfort Touch Plus Lancets 30G MISC Inject 1 Application under the skin Daily at 2am 100 each 3    glucose blood (FREESTYLE LITE) test strip Use as instructed 300 each 1    GNP Alcohol Swabs 70 % PADS TEST 2-3 TIMES A DAY AS DIRECTEDTEST 2-3 TIMES A DAY AS DIRECTED 300 each 3    lisinopril (ZESTRIL) 5 mg tablet TAKE 1 TABLET (5 MG TOTAL) BY MOUTH DAILYTOME 1 TABLET (5 MG TOTAL) BY MOUTH DAILY 30 tablet 5    lubiprostone (AMITIZA) 24 mcg capsule Take 1 capsule (24 mcg total) by mouth 2 (two) times a day with meals 200 capsule 3    methadone (DOLOPHINE) 10 mg/mL oral concentrated solution Take 44 mg by mouth daily        mirtazapine (REMERON) 7.5 MG tablet Take 1 tablet (7.5 mg total) by mouth 2 (two) times a day (Patient taking differently: Take 44 mg by mouth in the morning) 60 tablet 5    nadolol (CORGARD) 20 mg tablet Take 1 tablet (20 mg total) by mouth daily 90 tablet 1    sitaGLIPtin (JANUVIA) 100 mg tablet Take 1 tablet (100 mg total) by mouth daily 100 tablet 3    Lidocaine Viscous HCl (XYLOCAINE) 2 % mucosal solution Swish and spit 15 mL 4 (four) times a day as needed for mouth pain or discomfort (Patient not taking: Reported on 3/12/2025) 100 mL 0    [DISCONTINUED] aluminum-magnesium hydroxide-simethicone (MAALOX MAX) 400-400-40 MG/5ML suspension Take 10 mL by mouth every 6 (six) hours as needed for indigestion or heartburn 355 mL 0    [DISCONTINUED] ARIPiprazole (ABILIFY) 10 mg tablet Take 1 tablet (10 mg total) by mouth daily 30 tablet 0    [DISCONTINUED] busPIRone (BUSPAR) 5 mg tablet Take 1 tablet (5 mg total) by mouth 3 (three) times a day 90 tablet 2    [DISCONTINUED] cloNIDine (Catapres) 0.1 mg tablet Take 2 tablets (0.2 mg total) by mouth every 12 (twelve) hours 120 tablet 5    [DISCONTINUED] docusate sodium (COLACE) 100 mg capsule Take 1  "capsule (100 mg total) by mouth 2 (two) times a day 60 capsule 5    [DISCONTINUED] FLUoxetine (PROzac) 10 mg capsule TAKE 1 CAPSULE (10 MG TOTAL) BY MOUTH DAILY TOME 1 CAPSULE (10 MG TOTAL) BY MOUTH DAILY        [DISCONTINUED] FLUoxetine (PROzac) 40 MG capsule Take 1 capsule (40 mg total) by mouth daily 30 capsule 1    [DISCONTINUED] glucose blood (FREESTYLE LITE) test strip Use as instructed 300 each 1    [DISCONTINUED] mirtazapine (REMERON) 7.5 MG tablet TAKE 2 TABLETS (15 MG TOTAL) BY MOUTH DAILY AT BEDTIME TOME 2 TABLETS (15 MG TOTAL) BY MOUTH DAILY AT BEDTIME        [DISCONTINUED] nadolol (CORGARD) 20 mg tablet TAKE 1 TABLET (20 MG TOTAL) BY MOUTH DAILY 90 tablet 1    [DISCONTINUED] omeprazole (PriLOSEC) 40 MG capsule Take 1 capsule (40 mg total) by mouth daily 90 capsule 1      No current facility-administered medications on file prior to visit.         Social History               Tobacco Use    Smoking status: Former       Current packs/day: 0.00       Average packs/day: 1 pack/day for 41.7 years (41.3 ttl pk-yrs)       Types: Cigarettes       Start date:        Quit date: 10/1/2024       Years since quittin.4       Passive exposure: Current    Smokeless tobacco: Current    Tobacco comments:       TOBACCO USE   Vaping Use    Vaping status: Never Used   Substance and Sexual Activity    Alcohol use: Not Currently    Drug use: Not Currently       Types: Heroin       Comment: former user- heroin was drug of choice; Overuse of Methadone    Sexual activity: Not Currently       Partners: Female            Objective  /80 (BP Location: Left arm, Patient Position: Sitting, Cuff Size: Adult)   Pulse 101   Ht 5' 5\" (1.651 m)   Wt 92.1 kg (203 lb)   SpO2 94%   BMI 33.78 kg/m²         Physical Exam  Constitutional:       Appearance: Normal appearance.   HENT:      Head: Normocephalic and atraumatic.   Eyes:      Conjunctiva/sclera: Conjunctivae normal.   Cardiovascular:      Rate and Rhythm: Normal rate " "and regular rhythm.   Pulmonary:      Effort: Pulmonary effort is normal. No respiratory distress.      Breath sounds: Normal breath sounds.   Genitourinary:     Comments: Uncircumcised phallus. Easily able to retract and pull forth foreskin. No evidence of balanitis. No erythema, discharge, lesions, or tenderness to palpation.   Musculoskeletal:         General: Normal range of motion.      Cervical back: Normal range of motion.   Skin:     General: Skin is warm and dry.   Neurological:      General: No focal deficit present.      Mental Status: He is alert and oriented to person, place, and time.   Psychiatric:         Mood and Affect: Mood normal.         Behavior: Behavior normal.         Results         Lab Results   Component Value Date     PSA 0.5 01/13/2022     PSA 0.1 11/21/2018            Lab Results   Component Value Date     GLUCOSE 98 05/24/2018     CALCIUM 9.1 03/12/2025      05/24/2018     K 3.8 03/12/2025     CO2 25 03/12/2025      03/12/2025     BUN 11 03/12/2025     CREATININE 1.02 03/12/2025            Lab Results   Component Value Date     WBC 4.85 03/12/2025     HGB 14.1 03/12/2025     HCT 43.1 03/12/2025     MCV 93 03/12/2025      03/12/2025                Electronically signed by ANJU Giron at 3/12/2025  9:41 AM         Office Visit on 3/12/2025       Additional Documentation    Vitals: /80 (BP Location: Left arm, Patient Position: Sitting, Cuff Size: Adult)     Pulse 101     Ht 5' 5\" (1.651 m)     Wt 92.1 kg (203 lb)     SpO2 94%     BMI 33.78 kg/m²     BSA 1.99 m²          More Vitals   SmartForms:  AMB [facility] PRE-CHARTING   Encounter Info: Billing Info,     History,     Allergies,     Detailed Report     Orders Placed    Case request operating room: CIRCUMCISION ADULT Once  Medication Changes      None  Medication List  Visit Diagnoses      Phimosis  Problem List  "

## 2025-03-21 NOTE — PATIENT INSTRUCTIONS
Pre-operative Medication Instructions    Avoid herbs or non-directed vitamins one week prior to surgery  Avoid aspirin containing medications or non-steroidal anti-inflammatory drugs one week preceding surgery  May take tylenol for pain up until the night before surgery    ACE Inhibitors or ARBs     Medication Name     lisinopril (ZESTRIL) 5 mg tablet      Continue this medication up to the evening before surgery/procedure, but do not take the morning of the day of surgery.    Antidepressant Meds     Medication Name     mirtazapine (REMERON) 7.5 MG tablet      Continue to take this medication on your normal schedule.  If this is an oral medication and you take it in the morning, then you may take this medicine with a sip of water.    Beta Blockers     Medication Name     nadolol (CORGARD) 20 mg tablet      Continue to take this heart medication on your normal schedule.  If this is an oral medication and you take it in the morning, then you may take this medicine with a sip of water.    Cholesterol lowering meds     Medication Name     atorvastatin (LIPITOR) 40 mg tablet      Continue to take this medication on your normal schedule.  If this is an oral medication and you take it in the morning, then you may take this medicine with a sip of water.    Opioid Medications     Medication Name     methadone (DOLOPHINE) 10 mg/mL oral concentrated solution      Continue to take this medication on your normal schedule.  If this is an oral medication and you take it in the morning, then you may take this medicine with a sip of water.    Diabetic Medications     Medication Name     metFORMIN (GLUCOPHAGE-XR) 500 mg 24 hr tablet     sitaGLIPtin (JANUVIA) 100 mg tablet        Medicine Instructions for Adults with Diabetes (NO Bowel Prep)    Follow these instructions when a BOWEL PREP is NOT required for your procedure or surgery!    NOTE:  GLP Agonists taken weekly: do not take in the 7 days before your procedure. **Bariatric  surgery: do not take 4 weeks prior to your procedure.    SGLT-2 Inhibitors: do not take in the 4 days before your procedure    On the Day Before Surgery/Procedure  If you are having a procedure (e.g., Colonoscopy) or surgery which DOES NOT require a bowel prep, follow the directions below based on the type of medicine you take for your diabetes.  Type of Medicine You Take Examples What to Do   Pre-Mixed Insulin Intermediate  Duunlvf43/25, Bqxobes68/30, Novolog 70/30, Regular Insulin Take 1/2 your regular dose the evening before our procedure.   Rapid/Fast Acting  Insulin and/or Long-Acting Insulin Humalog U200, NovoLog, Apidra,  Lantus, Levemir, Tresiba, Toujeo,  Fias, Basaglar Take your FULL regular dose the day before procedure.   Oral Diabetic Medicines (sulfonylurea) Glipizide/Glimepiride/  Glucotrol Take your regular dose with dinner the evening before your procedure.   Other Oral Diabetic Medicines Metformin, Glucophage, Glucophage  XR, Riomet, Glumetza, Actose,  Avandia, Gl set, Prandin Take your regular dose with dinner the evening before your procedure   GLP Agonists Adlyxin, Byetta, Bydureon,  Ozempic, Soliqua, Tanzeum,  Trulicity, Victoza, Saxenda,  Rybelsus, Wegovy, Mounjaro, Zepbound If taken daily, take as normal  If taken weekly, do not take this medicine for 7 days before your procedure including the day of the procedure (resume taking after the procedure). **Bariatric surgery: do not take 4 weeks prior to procedure   SGLT-2 Inhibitors Jardiance, Invokana, Farxiga, Steglatro, Brenzavvy, Qtern, Segluromet Glyxambi, Synjardy, Synjardy XR, Invokamet, InvokametXR, Trijary XR, Xigduo X Do not take for 4 days before your procedure including the day of the procedure (resume taking after the procedure)   This educational material has been approved by the Patient Education Advisory Committee.    On the Day of Surgery/Procedure  Follow the directions below based on the type of medicine you take for your  diabetes.  Type of Medicine You Take  Examples What to Do   Long-Acting Insulin Lantus, Levemir, Tresiba,  Toujeo, Basaglar, Semglee If you normally take your Long Acting Insulin in the morning, take the full dose as scheduled.   GLP-I Agonists Adlyxin, Byetta, Bydureon,  Ozempic, Soliqua, Tanzeum,  Trulicity, Victoza, Saxenda,  Rybelsus, Mounjaro Do NOT take this medicine on the day of your procedure (resume taking after the procedure)   Except for the morning Long-Acting Insulin, DO NOT take ANY diabetic medicine on the day of your procedure unless you were instructed by the doctor who manages your diabetes medicines.  Continue to check your blood sugars.  If you have an insulin pump, ask your endocrinologist for instructions at least 3 days before your procedure. NOTE: If you are not able to ask your endocrinologist in advance, on the day of the procedure set your insulin pump to your basal rate only. Bring your insulin pump supplies to the hospital.    If you have any questions about taking your diabetes medicines prior to your procedure, please contact the doctor who manages your diabetes medicines.

## 2025-03-21 NOTE — ASSESSMENT & PLAN NOTE
BP Readings from Last 3 Encounters:   03/21/25 125/69   03/12/25 122/80   03/12/25 118/70      Well controlled. Continue with lisinopril 5mg daily and atorvastatin 40mg daily.

## 2025-03-25 NOTE — TELEPHONE ENCOUNTER
Spoke directly with Pt today, with assistance from  #[id number], via phone call @ [phone].  Pt informed that Pt is scheduled for an appointment with Dr. Fabrice Santo on 4/4/25 at the [facility] office location (Directions to office given over the phone by ).  Appointment letter sent to address in Pt's chart per Pt's request.  Pt given office phone number should Pt need to reschedule appointment.   General

## 2025-03-28 DIAGNOSIS — I10 BENIGN ESSENTIAL HYPERTENSION: ICD-10-CM

## 2025-03-28 RX ORDER — LISINOPRIL 5 MG/1
5 TABLET ORAL DAILY
Qty: 30 TABLET | Refills: 5 | Status: SHIPPED | OUTPATIENT
Start: 2025-03-28

## 2025-04-02 NOTE — PRE-PROCEDURE INSTRUCTIONS
Pre-Surgery Instructions:   Medication Instructions    ammonium lactate (LAC-HYDRIN) 12 % lotion Uses PRN- DO NOT take day of surgery    atorvastatin (LIPITOR) 40 mg tablet Take day of surgery.    Comfort Touch Plus Lancets 30G MISC Take day of surgery.    glucose blood (FREESTYLE LITE) test strip Take day of surgery.    GNP Alcohol Swabs 70 % PADS Take day of surgery.    lisinopril (ZESTRIL) 5 mg tablet Hold day of surgery.    methadone (DOLOPHINE) 10 mg/mL oral concentrated solution Take day of surgery.    mirtazapine (REMERON) 7.5 MG tablet Take day of surgery.    nadolol (CORGARD) 20 mg tablet Take day of surgery.    ondansetron (ZOFRAN) 4 mg tablet Uses PRN- OK to take day of surgery    sitaGLIPtin (JANUVIA) 100 mg tablet Hold day of surgery.   Medication instructions for day of surgery reviewed. Please take all instructed medications with only a sip of water.       You will receive a call one business day prior to surgery with an arrival time and hospital directions. If your surgery is scheduled on a Monday, the hospital will be calling you on the Friday prior to your surgery. If you have not heard from anyone by 8pm, please call the hospital supervisor through the hospital  at [phone]. ([place] [phone] or [place] [phone]).    Do not eat or drink anything after midnight the night before your surgery, including candy, mints, lifesavers, or chewing gum. Do not drink alcohol 24hrs before your surgery. Try not to smoke at least 24hrs before your surgery.       Follow the pre surgery showering instructions as listed in the “My Surgical Experience Booklet” or otherwise provided by your surgeon's office. Do not use a blade to shave the surgical area 1 week before surgery. It is okay to use a clean electric clippers up to 24 hours before surgery. Do not apply any lotions, creams, including makeup, cologne, deodorant, or perfumes after showering on the day of your surgery. Do not use dry  shampoo, hair spray, hair gel, or any type of hair products.     No contact lenses, eye make-up, or artificial eyelashes. Remove nail polish, including gel polish, and any artificial, gel, or acrylic nails if possible. Remove all jewelry including rings and body piercing jewelry.     Wear causal clothing that is easy to take on and off. Consider your type of surgery.    Keep any valuables, jewelry, piercings at home. Please bring any specially ordered equipment (sling, braces) if indicated.    Arrange for a responsible person to drive you to and from the hospital on the day of your surgery. Please confirm the visitor policy for the day of your procedure when you receive your phone call with an arrival time.     Call the surgeon's office with any new illnesses, exposures, or additional questions prior to surgery.    Please reference your “My Surgical Experience Booklet” for additional information to prepare for your upcoming surgery.

## 2025-04-03 DIAGNOSIS — R11.0 NAUSEA: ICD-10-CM

## 2025-04-04 ENCOUNTER — OFFICE VISIT (OUTPATIENT)
Dept: GASTROENTEROLOGY | Facility: CLINIC | Age: 65
End: 2025-04-04
Payer: MEDICARE

## 2025-04-04 VITALS
HEART RATE: 67 BPM | WEIGHT: 214.8 LBS | BODY MASS INDEX: 35.79 KG/M2 | TEMPERATURE: 98 F | RESPIRATION RATE: 16 BRPM | SYSTOLIC BLOOD PRESSURE: 124 MMHG | OXYGEN SATURATION: 96 % | HEIGHT: 65 IN | DIASTOLIC BLOOD PRESSURE: 68 MMHG

## 2025-04-04 DIAGNOSIS — B18.2 CHRONIC HEPATITIS C WITHOUT HEPATIC COMA (HCC): ICD-10-CM

## 2025-04-04 DIAGNOSIS — K70.30 ALCOHOLIC CIRRHOSIS OF LIVER WITHOUT ASCITES (HCC): Primary | ICD-10-CM

## 2025-04-04 PROCEDURE — 99214 OFFICE O/P EST MOD 30 MIN: CPT | Performed by: INTERNAL MEDICINE

## 2025-04-04 NOTE — ASSESSMENT & PLAN NOTE
Rishi Kirk is a 64 y.o. male with h/o cirrhosis 2/2 Hep C s/p SVR 2014, h/o heavy EtOH use sober since 2004, h/o nonbleeding EV who presents for follow up and pre-op eval prior to adult circumcision for phimosis.     Cirrhosis well compensated. Etiology multifactorial from previous EtOH use, Hep C s/p treatment, and metabolic syndrome (obesity, HLD, DM2). Unclear why his elastography improved but suspect it was underestimated as he continues to have cirrhotic morphology and has had liver disease for many years. He denies etoh use since 2004. Has been working on diet which may have contributed. Commended him on his efforts and encouraged him to continue to stay physically active and make healthy dietary choices.     Ascites: none    HE: none     EV: none on last EGD, previously on NSBB but discontinued as he was unsure of why he was taking it. Discussed option of continuing with surveillance EGD q2 years vs resuming medication. He opts to resume the medication. No further EGD needed as long as he stays on NSBB. He did not need refill today     Hepatoma screening: needs to schedule US, last AFP 11/2024 wnl, repeat q6months     Transplant: not indicated in setting of clinically compensated disease and low MELD    Predicted Postoperative Outcomes by the VOCAL-[place] Score using labs from 03/2025 including albumin 4.1, bilirubin 0.78, platelet 187k, ASA 3, BMI >30, +MASLD, and abdominal wall surgery which includes inguinal hernia repair, umbilical hernia repair, incisional/ventral hernia repair (no selection available for circumcision):     30-day mortality: 0.3%      90-day mortality: 1.2%      180-day mortality: 1.9%      90-day decompensation: 3.9%

## 2025-04-04 NOTE — PROGRESS NOTES
Name: Rishi Kirk      : 1960      MRN: [id number]  Encounter Provider: Fabrice Santo MD  Encounter Date: 2025   Encounter department: [facility] [place]  :  Assessment & Plan  Alcoholic cirrhosis of liver without ascites (HCC)  Rishi Kirk is a 64 y.o. male with h/o cirrhosis 2/2 Hep C s/p SVR , h/o heavy EtOH use sober since , h/o nonbleeding EV who presents for follow up and pre-op eval prior to adult circumcision for phimosis.     Cirrhosis well compensated. Etiology multifactorial from previous EtOH use, Hep C s/p treatment, and metabolic syndrome (obesity, HLD, DM2). Unclear why his elastography improved but suspect it was underestimated as he continues to have cirrhotic morphology and has had liver disease for many years. He denies etoh use since . Has been working on diet which may have contributed. Commended him on his efforts and encouraged him to continue to stay physically active and make healthy dietary choices.     Ascites: none    HE: none     EV: none on last EGD, previously on NSBB but discontinued as he was unsure of why he was taking it. Discussed option of continuing with surveillance EGD q2 years vs resuming medication. He opts to resume the medication. No further EGD needed as long as he stays on NSBB. He did not need refill today     Hepatoma screening: needs to schedule US, last AFP 2024 wnl, repeat q6months     Transplant: not indicated in setting of clinically compensated disease and low MELD    Predicted Postoperative Outcomes by the VOCAL-[place] Score using labs from 2025 including albumin 4.1, bilirubin 0.78, platelet 187k, ASA 3, BMI >30, +MASLD, and abdominal wall surgery which includes inguinal hernia repair, umbilical hernia repair, incisional/ventral hernia repair (no selection available for circumcision):     30-day mortality: 0.3%      90-day mortality: 1.2%      180-day mortality: 1.9%      90-day decompensation:  3.9%       Chronic hepatitis C without hepatic coma (HCC)  S/p SVR        RTC 6 months for follow up     History of Present Illness   HPI  Rishi Kirk is a 64 y.o. male with h/o cirrhosis 2/2 Hep C s/p SVR 2014, h/o heavy EtOH use sober since 2004, h/o nonbleeding EV who presents for pre-op eval prior to adult circumcision for phimosis.     Last seen in clinic 09/2024 by Felicita Glez. Liver diseased stable at that time. No evidence of ascites or HE. Underwent EGD 09/2024 without varices. Last RUQ US 04/2024 and AFP 11/2024 negative. UTD on colonoscopy, next due 2027.     Liver hx:   Treated for Hep C 2014. Underwent liver bx prior to tx which showed Metavir A3F4 c/w cirrhosis. Previously on diuretics for ascites but now has been stable without. Had EGD in 2020 with small EV, started on NSBB which he self discontinued. Most recent EGD without EV in 2024. Last elastography F3 fibrosis. Last platelet count 187, normal INR, normal LFT. Sober since 2004.     Translation services declined, patient speaks and understands English. He reports feeling well. Denies abd swelling, LE edema, abd pain, bleeding, confusion, fatigue.      History obtained from: patient    Review of Systems  Medical History Reviewed by provider this encounter:     .     Objective   There were no vitals taken for this visit.     MELD 3.0: 13 at 2/12/2025  1:14 AM  MELD-Na: 10 at 2/12/2025  1:14 AM  Calculated from:  Serum Creatinine: 1.09 mg/dL at 2/12/2025  1:14 AM  Serum Sodium: 132 mmol/L at 2/12/2025  1:14 AM  Total Bilirubin: 1.46 mg/dL at 2/12/2025  1:14 AM  Serum Albumin: 4.1 g/dL (Using max of 3.5 g/dL) at 2/12/2025  1:14 AM  INR(ratio): 1.1 at 2/12/2025  1:14 AM  Age at listing (hypothetical): 64 years  Sex: Male at 2/12/2025  1:14 AM      Physical Exam  Vitals reviewed.   Constitutional:       Appearance: He is obese.   Eyes:      Conjunctiva/sclera: Conjunctivae normal.   Pulmonary:      Effort: Pulmonary effort is normal.   Abdominal:       Palpations: Abdomen is soft.      Tenderness: There is no abdominal tenderness.      Comments: Protuberant but nondistended    Skin:     General: Skin is warm and dry.      Coloration: Skin is not jaundiced.   Neurological:      General: No focal deficit present.      Mental Status: He is alert.   Psychiatric:         Mood and Affect: Mood normal.

## 2025-04-09 ENCOUNTER — ANESTHESIA (OUTPATIENT)
Dept: PERIOP | Facility: HOSPITAL | Age: 65
End: 2025-04-09
Payer: MEDICARE

## 2025-04-09 ENCOUNTER — ANESTHESIA EVENT (OUTPATIENT)
Dept: PERIOP | Facility: HOSPITAL | Age: 65
End: 2025-04-09
Payer: MEDICARE

## 2025-04-09 ENCOUNTER — HOSPITAL ENCOUNTER (OUTPATIENT)
Facility: HOSPITAL | Age: 65
Setting detail: OUTPATIENT SURGERY
Discharge: HOME | End: 2025-04-09
Attending: UROLOGY | Admitting: UROLOGY
Payer: MEDICARE

## 2025-04-09 VITALS
BODY MASS INDEX: 35.65 KG/M2 | WEIGHT: 214 LBS | HEIGHT: 65 IN | DIASTOLIC BLOOD PRESSURE: 98 MMHG | OXYGEN SATURATION: 95 % | SYSTOLIC BLOOD PRESSURE: 141 MMHG | HEART RATE: 76 BPM | RESPIRATION RATE: 16 BRPM | TEMPERATURE: 97.5 F

## 2025-04-09 DIAGNOSIS — E11.9 TYPE 2 DIABETES MELLITUS WITHOUT COMPLICATION, WITHOUT LONG-TERM CURRENT USE OF INSULIN (HCC): ICD-10-CM

## 2025-04-09 DIAGNOSIS — N47.1 PHIMOSIS: ICD-10-CM

## 2025-04-09 LAB
GLUCOSE SERPL-MCNC: 112 MG/DL (ref 65–140)
GLUCOSE SERPL-MCNC: 87 MG/DL (ref 65–140)

## 2025-04-09 PROCEDURE — 88312 SPECIAL STAINS GROUP 1: CPT | Performed by: PATHOLOGY

## 2025-04-09 PROCEDURE — 88304 TISSUE EXAM BY PATHOLOGIST: CPT | Performed by: PATHOLOGY

## 2025-04-09 PROCEDURE — 82948 REAGENT STRIP/BLOOD GLUCOSE: CPT

## 2025-04-09 PROCEDURE — NC001 PR NO CHARGE: Performed by: UROLOGY

## 2025-04-09 PROCEDURE — 54161 CIRCUM 28 DAYS OR OLDER: CPT | Performed by: UROLOGY

## 2025-04-09 RX ORDER — HYDROCODONE BITARTRATE AND ACETAMINOPHEN 5; 325 MG/1; MG/1
1 TABLET ORAL EVERY 6 HOURS PRN
Status: DISCONTINUED | OUTPATIENT
Start: 2025-04-09 | End: 2025-04-09 | Stop reason: HOSPADM

## 2025-04-09 RX ORDER — PROPOFOL 10 MG/ML
INJECTION, EMULSION INTRAVENOUS AS NEEDED
Status: DISCONTINUED | OUTPATIENT
Start: 2025-04-09 | End: 2025-04-09

## 2025-04-09 RX ORDER — ONDANSETRON 2 MG/ML
INJECTION INTRAMUSCULAR; INTRAVENOUS AS NEEDED
Status: DISCONTINUED | OUTPATIENT
Start: 2025-04-09 | End: 2025-04-09

## 2025-04-09 RX ORDER — LIDOCAINE HYDROCHLORIDE 10 MG/ML
INJECTION, SOLUTION EPIDURAL; INFILTRATION; INTRACAUDAL; PERINEURAL AS NEEDED
Status: DISCONTINUED | OUTPATIENT
Start: 2025-04-09 | End: 2025-04-09

## 2025-04-09 RX ORDER — SODIUM CHLORIDE, SODIUM LACTATE, POTASSIUM CHLORIDE, CALCIUM CHLORIDE 600; 310; 30; 20 MG/100ML; MG/100ML; MG/100ML; MG/100ML
INJECTION, SOLUTION INTRAVENOUS CONTINUOUS PRN
Status: DISCONTINUED | OUTPATIENT
Start: 2025-04-09 | End: 2025-04-09

## 2025-04-09 RX ORDER — LIDOCAINE HYDROCHLORIDE 20 MG/ML
INJECTION, SOLUTION EPIDURAL; INFILTRATION; INTRACAUDAL; PERINEURAL AS NEEDED
Status: DISCONTINUED | OUTPATIENT
Start: 2025-04-09 | End: 2025-04-09 | Stop reason: HOSPADM

## 2025-04-09 RX ORDER — FENTANYL CITRATE/PF 50 MCG/ML
25 SYRINGE (ML) INJECTION
Status: DISCONTINUED | OUTPATIENT
Start: 2025-04-09 | End: 2025-04-09 | Stop reason: HOSPADM

## 2025-04-09 RX ORDER — ONDANSETRON 2 MG/ML
4 INJECTION INTRAMUSCULAR; INTRAVENOUS ONCE AS NEEDED
Status: DISCONTINUED | OUTPATIENT
Start: 2025-04-09 | End: 2025-04-09 | Stop reason: HOSPADM

## 2025-04-09 RX ORDER — MAGNESIUM HYDROXIDE 1200 MG/15ML
LIQUID ORAL AS NEEDED
Status: DISCONTINUED | OUTPATIENT
Start: 2025-04-09 | End: 2025-04-09 | Stop reason: HOSPADM

## 2025-04-09 RX ORDER — FENTANYL CITRATE 50 UG/ML
INJECTION, SOLUTION INTRAMUSCULAR; INTRAVENOUS AS NEEDED
Status: DISCONTINUED | OUTPATIENT
Start: 2025-04-09 | End: 2025-04-09

## 2025-04-09 RX ORDER — OXYCODONE AND ACETAMINOPHEN 5; 325 MG/1; MG/1
1 TABLET ORAL ONCE AS NEEDED
Status: DISCONTINUED | OUTPATIENT
Start: 2025-04-09 | End: 2025-04-09 | Stop reason: HOSPADM

## 2025-04-09 RX ORDER — MIDAZOLAM HYDROCHLORIDE 2 MG/2ML
INJECTION, SOLUTION INTRAMUSCULAR; INTRAVENOUS AS NEEDED
Status: DISCONTINUED | OUTPATIENT
Start: 2025-04-09 | End: 2025-04-09

## 2025-04-09 RX ORDER — CEFAZOLIN SODIUM 2 G/50ML
2000 SOLUTION INTRAVENOUS ONCE
Status: DISCONTINUED | OUTPATIENT
Start: 2025-04-09 | End: 2025-04-09 | Stop reason: HOSPADM

## 2025-04-09 RX ORDER — CEFAZOLIN SODIUM 1 G/3ML
INJECTION, POWDER, FOR SOLUTION INTRAMUSCULAR; INTRAVENOUS AS NEEDED
Status: DISCONTINUED | OUTPATIENT
Start: 2025-04-09 | End: 2025-04-09

## 2025-04-09 RX ORDER — FENTANYL CITRATE/PF 50 MCG/ML
50 SYRINGE (ML) INJECTION
Status: DISCONTINUED | OUTPATIENT
Start: 2025-04-09 | End: 2025-04-09 | Stop reason: HOSPADM

## 2025-04-09 RX ADMIN — FENTANYL CITRATE 25 MCG: 50 INJECTION, SOLUTION INTRAMUSCULAR; INTRAVENOUS at 15:16

## 2025-04-09 RX ADMIN — MIDAZOLAM 2 MG: 1 INJECTION INTRAMUSCULAR; INTRAVENOUS at 14:52

## 2025-04-09 RX ADMIN — FENTANYL CITRATE 50 MCG: 50 INJECTION, SOLUTION INTRAMUSCULAR; INTRAVENOUS at 15:00

## 2025-04-09 RX ADMIN — FENTANYL CITRATE 25 MCG: 50 INJECTION, SOLUTION INTRAMUSCULAR; INTRAVENOUS at 15:06

## 2025-04-09 RX ADMIN — SODIUM CHLORIDE, SODIUM LACTATE, POTASSIUM CHLORIDE, AND CALCIUM CHLORIDE: .6; .31; .03; .02 INJECTION, SOLUTION INTRAVENOUS at 14:54

## 2025-04-09 RX ADMIN — ONDANSETRON 4 MG: 2 INJECTION INTRAMUSCULAR; INTRAVENOUS at 15:00

## 2025-04-09 RX ADMIN — LIDOCAINE HYDROCHLORIDE 50 MG: 10 SOLUTION INTRAVENOUS at 15:00

## 2025-04-09 RX ADMIN — CEFAZOLIN 2000 MG: 1 INJECTION, POWDER, FOR SOLUTION INTRAMUSCULAR; INTRAVENOUS; PARENTERAL at 15:01

## 2025-04-09 RX ADMIN — PROPOFOL 180 MG: 10 INJECTION, EMULSION INTRAVENOUS at 15:00

## 2025-04-09 NOTE — ANESTHESIA POSTPROCEDURE EVALUATION
Post-Op Assessment Note    CV Status:  Stable  Pain Score: 0    Pain management: adequate       Mental Status:  Sleepy   Hydration Status:  Stable   PONV Controlled:  None   Airway Patency:  Patent     Post Op Vitals Reviewed: Yes    No anethesia notable event occurred.    Staff: CRNA           Last Filed PACU Vitals:  Vitals Value Taken Time   Temp 99.3 °F (37.4 °C) 04/09/25 1552   Pulse 75 04/09/25 1556   /79 04/09/25 1552   Resp 11 04/09/25 1556   SpO2 95 % 04/09/25 1556   Vitals shown include unfiled device data.

## 2025-04-09 NOTE — OP NOTE
OPERATIVE REPORT  PATIENT NAME: Rishi Kirk    :  1960  MRN: [id number]  Pt Location:  OR ROOM 12    SURGERY DATE: 2025    Surgeons and Role:     * Malik Baires MD - Primary    Preop Diagnosis:  Phimosis [N47.1]    Post-Op Diagnosis Codes:     * Phimosis [N47.1]    Procedure(s):  CIRCUMCISION ADULT    Specimen(s):  ID Type Source Tests Collected by Time Destination   1 :  Tissue Foreskin TISSUE EXAM Malik Baires MD 2025 1538        Estimated Blood Loss:   Minimal    Drains:  * No LDAs found *    Anesthesia Type:   General    Operative Indications:  Phimosis [N47.1]       Operative Findings:  See operative note below      Complications:   None    Procedure and Technique:  I saw the patient in the holding area and again reviewed the procedure step-by-step answered all patient questions performing history and physical discussed risks and complications with the patient providing verbal and signed informed consent.  The patient was taken the operating room placed supine on the operating table prepped draped usual sterile fashion after general LMA anesthesia was induced and appropriate timeout took place.  Lidocaine 1% without epinephrine was injected at the base of the penis on the ventral and dorsal aspect of the penis providing penile block.  Hemostats were placed on the distal extent of the foreskin allowing for distal retraction.  A 15 scalpel blade was used to make a circumferential incision to the cutaneous aspect of the foreskin approximately 1 cm proximal to the coronal sulcus of the penis and then after retracting the foreskin the same latitude circumferential incision was made using a 15 scalpel blade and the mucosal aspect of the foreskin.  The intervening redundant tissue was then removed with the electrocautery providing dissection as well as hemostasis.  After the redundant prep this was removed the 2 free edges of the foreskin were then reapproximated using interrupted 4-0  Monocryl sutures.  Sterile dressings were then applied including Xeroform gauze and dry gauze.  All instrument needle and sponge counts were reported as being correct.  Circumcision was performed without complication.  The patient tolerated the procedure well and was sent to the PACU in good condition.  There were no complications.   I was present for the entire procedure. and I was present for all critical portions of the procedure.    Patient Disposition:  PACU              SIGNATURE: Malik Baires MD  DATE: April 9, 2025  TIME: 3:49 PM

## 2025-04-09 NOTE — DISCHARGE INSTRUCTIONS
Shower in 2 days after removing dressing and change into clean underwear. Vigorous oral fluid intake. Call office for f/u appointment in not already scheduled.

## 2025-04-09 NOTE — INTERVAL H&P NOTE
H&P reviewed. After examining the patient I find no changes in the patients condition since the H&P had been written.    Vitals:    04/09/25 1232   BP: (!) 152/105   Pulse: 84   Resp: 18   Temp: (!) 97.1 °F (36.2 °C)   SpO2: 96%

## 2025-04-09 NOTE — NURSING NOTE
Awake and alert. Denies pain. Dressing intact with small amount of serosanguineous drainage. Tolerating food/drinks. Voided 140ml's of clear yellow urine.

## 2025-04-09 NOTE — ANESTHESIA POSTPROCEDURE EVALUATION
Post-Op Assessment Note    CV Status:  Stable  Pain Score: 0    Pain management: adequate    Multimodal analgesia used between 6 hours prior to anesthesia start to PACU discharge    Mental Status:  Alert   Hydration Status:  Stable   PONV Controlled:  None   Airway Patency:  Patent     Post Op Vitals Reviewed: Yes    No anethesia notable event occurred.    Staff: Anesthesiologist           Last Filed PACU Vitals:  Vitals Value Taken Time   Temp 99.3 °F (37.4 °C) 04/09/25 1552   Pulse 85 04/09/25 1619   /111 04/09/25 1616   Resp 19 04/09/25 1619   SpO2 89 % 04/09/25 1619   Vitals shown include unfiled device data.    Modified Chris:     Vitals Value Taken Time   Activity 2 04/09/25 1552   Respiration 2 04/09/25 1552   Circulation 1 04/09/25 1552   Consciousness 1 04/09/25 1552   Oxygen Saturation 1 04/09/25 1552     Modified Chris Score: 7             electronic

## 2025-04-09 NOTE — ANESTHESIA PREPROCEDURE EVALUATION
Procedure:  CIRCUMCISION ADULT (Penis)    Relevant Problems   CARDIO   (+) Benign essential hypertension   (+) Coronary artery calcification   (+) Esophageal varices in cirrhosis (HCC)   (+) Portal hypertension (HCC)      ENDO   (+) Type 2 diabetes mellitus without complication, without long-term current use of insulin (HCC)      GI/HEPATIC   (+) Alcoholic cirrhosis of liver without ascites (HCC)   (+) Chronic hepatitis C treated in 2014 (HCC)      /RENAL   (+) Nephrolithiasis      NEURO/PSYCH   (+) Anxiety      PULMONARY   (+) RODRICK (obstructive sleep apnea)        Physical Exam    Airway    Mallampati score: II  TM Distance: >3 FB  Neck ROM: full     Dental       Cardiovascular  Rhythm: regular, Rate: normal, Cardiovascular exam normal    Pulmonary   Decreased breath sounds    Other Findings        Anesthesia Plan  ASA Score- 3     Anesthesia Type- general with ASA Monitors.         Additional Monitors:     Airway Plan: LMA.           Plan Factors-Exercise tolerance (METS): >4 METS.    Chart reviewed. EKG reviewed. Imaging results reviewed. Existing labs reviewed. Patient summary reviewed.    Patient is not a current smoker.  Patient did not smoke on day of surgery.            Induction- intravenous.    Postoperative Plan- Plan for postoperative opioid use. Planned trial extubation    Perioperative Resuscitation Plan - Level 1 - Full Code.       Informed Consent- Anesthetic plan and risks discussed with patient.  I personally reviewed this patient with the CRNA. Discussed and agreed on the Anesthesia Plan with the CRNA..      NPO Status:  No vitals data found for the desired time range.

## 2025-04-10 ENCOUNTER — TELEPHONE (OUTPATIENT)
Dept: UROLOGY | Facility: MEDICAL CENTER | Age: 65
End: 2025-04-10

## 2025-04-10 NOTE — TELEPHONE ENCOUNTER
"Post Op Note    Call performed using  #[id number]    Rishi Kirk is a 64 y.o. male s/p  CIRCUMCISION ADULT (Penis) performed 4/9/25 with Dr. Baires.  Rishi Kirk  is seen at the [place] office.     How would you rate your pain on a scale from 1 to 10, 10 being the worst pain ever? 0  Pt is taking Tylenol    Have you had a fever? No    Have your bowel movements been regular? No Pt advised to start stool softener with 60 oz of fluid per day.  He is to take Miralax if no BM in 3 days.    Do you have any difficulty urinating? No    If the patient has an incision- do you have any redness around the incision or any drainage from the incision? Pt is to contact the office with any drainage or blood from incision site.    Do you have any other questions or concerns that I can address at this time?     Pt states he is doing \"fine\".  Confirmed fu appt for 4/23/25.  He knows to contact the office with any questions or concerns.     "

## 2025-04-11 RX ORDER — ONDANSETRON 4 MG/1
TABLET, FILM COATED ORAL
Qty: 20 TABLET | Refills: 0 | Status: SHIPPED | OUTPATIENT
Start: 2025-04-11

## 2025-04-13 PROCEDURE — 88304 TISSUE EXAM BY PATHOLOGIST: CPT | Performed by: PATHOLOGY

## 2025-04-13 PROCEDURE — 88312 SPECIAL STAINS GROUP 1: CPT | Performed by: PATHOLOGY

## 2025-04-15 ENCOUNTER — HOSPITAL ENCOUNTER (OUTPATIENT)
Dept: ULTRASOUND IMAGING | Facility: HOSPITAL | Age: 65
Discharge: HOME | End: 2025-04-15
Payer: MEDICARE

## 2025-04-15 DIAGNOSIS — K74.60 CIRRHOSIS OF LIVER WITHOUT ASCITES, UNSPECIFIED HEPATIC CIRRHOSIS TYPE (HCC): ICD-10-CM

## 2025-04-15 PROCEDURE — 76705 ECHO EXAM OF ABDOMEN: CPT

## 2025-04-17 NOTE — PROGRESS NOTES
Name: Rishi Kirk      : 1960      MRN: [id number]  Encounter Provider: ANJU Giron  Encounter Date: 2025   Encounter department: [facility]OLOG[facility] [place]  :  Assessment & Plan  Follow-up after circumcision  S/p circumcision on 25 by Dr. Baires   Site clean and dry- no signs of infection   Few stiches still present- reassured patient that these will dissolve on their own and may take several weeks   Discussed cleaning the area with a little soap and water- pat area dry  He denies issues passing urine  Denies signs of infection or pain   Discussed no sexual activity for 4-6 weeks   Discussed to call office if experiencing:  Fever, chills, swelling, redness, warmth around the wound, too much pain when touched, yellowish, greenish, or bloody discharge, foul smell coming from the cut site, cut site opens up, problems passing urine, severe pain or bleeding at the cut site.       Screening for prostate cancer  Order placed for PSA  Follow up in 1 year  Orders:    PSA, Total Screen; Future      History of Present Illness   Rishi Kirk is a 64 y.o. male who presents for post op evaluation. He has a history of phimosis and underwent elective circumcision on 25 by Dr. Baires. He reports the incision has been healing nicely. He denies pain, drainage, bleeding or difficulty voiding.   He does report some intermittent nausea and constipation over the past week. He has been taking zofran and stool softeners which he reports are helping- he last had a normal BM today. He denies vomiting. He is able to tolerate fluids.       Review of Systems   Constitutional:  Negative for chills, diaphoresis, fatigue and fever.   Respiratory:  Negative for cough and shortness of breath.    Gastrointestinal:  Positive for nausea. Negative for abdominal pain, diarrhea and vomiting.   Genitourinary:  Negative for decreased urine volume, difficulty urinating, dysuria, flank pain, frequency,  hematuria and urgency.   Musculoskeletal:  Negative for back pain and myalgias.   Skin:  Negative for pallor and wound.   Neurological:  Negative for dizziness, weakness, light-headedness and numbness.       Pertinent Medical History       Medical History Reviewed by provider this encounter:     .  Past Medical History   Past Medical History:   Diagnosis Date    Addiction to drug (HCC)     Alcohol abuse     Anxiety     Cirrhosis (HCC)     Colon polyp     CPAP (continuous positive airway pressure) dependence     used for 2 months only    Diabetes mellitus (HCC)     Esophageal varices (HCC)     Gastritis     Heart disease     Hepatitis C     History of Helicobacter pylori infection 02/23/2016    History of kidney stones     Hyperlipidemia     Hypertension     Infectious viral hepatitis     Kidney stone     Obesity     Pneumonia     PPD positive 2020    Sleep apnea     Sleep difficulties     Splenomegaly     Substance abuse (HCC)     Withdrawal symptoms, drug or narcotic (HCC)      Past Surgical History:   Procedure Laterality Date    COLONOSCOPY  06/20/2014    dr [email or web address]    COLONOSCOPY  2014        EGD AND COLONOSCOPY  08/2020    esophageal varices, colon polyp, hemorrhoids    ESOPHAGOGASTRODUODENOSCOPY  12/10/2015    esophageal varices, cirrhosis, gastritis    HYDROCELE EXCISION / REPAIR Left     teste    LIVER BIOPSY  2014    NE CIRCUMCISION AGE >28 DAYS N/A 4/9/2025    Procedure: CIRCUMCISION ADULT;  Surgeon: Malik Baires MD;  Location:  MAIN OR;  Service: Urology     Family History   Problem Relation Age of Onset    Diabetes type II Mother         MELLITUS    Hypertension Mother     Hypertension Sister     Diabetes Sister         MELLITUS    No Known Problems Maternal Grandmother     No Known Problems Maternal Grandfather     No Known Problems Paternal Grandmother     No Known Problems Paternal Grandfather     No Known Problems Sister     Breast cancer Maternal Aunt         40s    No Known Problems  Maternal Aunt     No Known Problems Paternal Aunt     No Known Problems Paternal Aunt     No Known Problems Paternal Aunt     No Known Problems Paternal Aunt     No Known Problems Paternal Aunt     No Known Problems Paternal Aunt       reports that he quit smoking about 6 months ago. His smoking use included cigarettes. He started smoking about 42 years ago. He has a 41.3 pack-year smoking history. He has been exposed to tobacco smoke. He has never used smokeless tobacco. He reports that he does not currently use alcohol. He reports that he does not currently use drugs after having used the following drugs: Heroin.  Current Outpatient Medications   Medication Instructions    ammonium lactate (LAC-HYDRIN) 12 % lotion Daily PRN    atorvastatin (LIPITOR) 40 mg, Oral, Daily    cloNIDine (CATAPRES) 0.1 mg tablet TAKE 1 TABLET (0.1 MG TOTAL) BY MOUTH EVERY 12 (TWELVE) HOURS TOME 1 TABLET (0.1 MG TOTAL) BY MOUTH CADA 12 (TWELVE) HOURS    Comfort Touch Plus Lancets 30G MISC 1 Application, Subcutaneous, Daily  (0200)    glucose blood (FREESTYLE LITE) test strip Use as instructed    GNP Alcohol Swabs 70 % PADS TEST 2-3 TIMES A DAY AS DIRECTEDTEST 2-3 TIMES A DAY AS DIRECTED    lisinopril (ZESTRIL) 5 mg, Oral, Daily    lubiprostone (AMITIZA) 24 mcg, Oral, 2 times daily with meals    metFORMIN (GLUCOPHAGE-XR) 500 mg, Oral, Daily with dinner    methadone (DOLOPHINE) 44 mg, Daily    mirtazapine (REMERON) 7.5 mg, Oral, 2 times daily    nadolol (CORGARD) 20 mg, Oral, Daily    omeprazole (PriLOSEC) 40 MG capsule TAKE 1 CAPSULE (40 MG TOTAL) BY MOUTH DAILY TOME 1 CAPSULE (40 MG TOTAL) BY MOUTH DAILY    ondansetron (ZOFRAN) 4 mg tablet TAKE 1 TABLET (4 MG TOTAL) BY MOUTH EVERY 8 (EIGHT) HOURS AS NEEDED FOR NAUSEA OR VOMITING FOR UP TO 7 DAYSTOME 1 TABLET (4 MG TOTAL) BY MOUTH CADA 8 (EIGHT) HOURS AS NEEDED PARA NAUSEA OR VOMITING PARA UP A / HASTA 7 LO    sitaGLIPtin (JANUVIA) 100 mg, Oral, Daily     Allergies   Allergen Reactions     Meperidine Abdominal Pain      Current Outpatient Medications on File Prior to Visit   Medication Sig Dispense Refill    ammonium lactate (LAC-HYDRIN) 12 % lotion daily as needed      atorvastatin (LIPITOR) 40 mg tablet Take 1 tablet (40 mg total) by mouth daily 90 tablet 3    cloNIDine (CATAPRES) 0.1 mg tablet TAKE 1 TABLET (0.1 MG TOTAL) BY MOUTH EVERY 12 (TWELVE) HOURS TOME 1 TABLET (0.1 MG TOTAL) BY MOUTH CADA 12 (TWELVE) HOURS      Comfort Touch Plus Lancets 30G MISC Inject 1 Application under the skin Daily at 2am 100 each 3    glucose blood (FREESTYLE LITE) test strip Use as instructed 300 each 1    GNP Alcohol Swabs 70 % PADS TEST 2-3 TIMES A DAY AS DIRECTEDTEST 2-3 TIMES A DAY AS DIRECTED 300 each 3    lisinopril (ZESTRIL) 5 mg tablet TAKE ONE TABLET BY MOUTH DAILYTOMAR 1 TABLETA POR VIA ORAL DIARIAMENTE 30 tablet 5    metFORMIN (GLUCOPHAGE-XR) 500 mg 24 hr tablet Take 1 tablet (500 mg total) by mouth daily with dinner 100 tablet 3    methadone (DOLOPHINE) 10 mg/mL oral concentrated solution Take 44 mg by mouth daily      mirtazapine (REMERON) 7.5 MG tablet Take 1 tablet (7.5 mg total) by mouth 2 (two) times a day 60 tablet 5    nadolol (CORGARD) 20 mg tablet Take 1 tablet (20 mg total) by mouth daily 90 tablet 1    ondansetron (ZOFRAN) 4 mg tablet TAKE 1 TABLET (4 MG TOTAL) BY MOUTH EVERY 8 (EIGHT) HOURS AS NEEDED FOR NAUSEA OR VOMITING FOR UP TO 7 DAYSTOME 1 TABLET (4 MG TOTAL) BY MOUTH CADA 8 (EIGHT) HOURS AS NEEDED PARA NAUSEA OR VOMITING PARA UP A / HASTA 7 LO 20 tablet 0    sitaGLIPtin (JANUVIA) 100 mg tablet Take 1 tablet (100 mg total) by mouth daily 100 tablet 3    lubiprostone (AMITIZA) 24 mcg capsule Take 1 capsule (24 mcg total) by mouth 2 (two) times a day with meals (Patient not taking: Reported on 4/23/2025) 200 capsule 3    omeprazole (PriLOSEC) 40 MG capsule TAKE 1 CAPSULE (40 MG TOTAL) BY MOUTH DAILY TOME 1 CAPSULE (40 MG TOTAL) BY MOUTH DAILY (Patient not taking: Reported on  "2025)      [DISCONTINUED] Lidocaine Viscous HCl (XYLOCAINE) 2 % mucosal solution Swish and spit 15 mL 4 (four) times a day as needed for mouth pain or discomfort (Patient not taking: Reported on 3/12/2025) 100 mL 0     No current facility-administered medications on file prior to visit.      Social History     Tobacco Use    Smoking status: Former     Current packs/day: 0.00     Average packs/day: 1 pack/day for 41.7 years (41.3 ttl pk-yrs)     Types: Cigarettes     Start date:      Quit date: 10/1/2024     Years since quittin.5     Passive exposure: Current    Smokeless tobacco: Never    Tobacco comments:     TOBACCO USE   Vaping Use    Vaping status: Never Used   Substance and Sexual Activity    Alcohol use: Not Currently    Drug use: Not Currently     Types: Heroin     Comment: former user- heroin was drug of choice; Overuse of Methadone    Sexual activity: Not Currently     Partners: Female        Objective   /80 (BP Location: Left arm, Patient Position: Sitting, Cuff Size: Adult)   Pulse 100   Ht 5' 5\" (1.651 m)   Wt 93.4 kg (206 lb)   SpO2 96%   BMI 34.28 kg/m²     Physical Exam  Constitutional:       Appearance: Normal appearance.   HENT:      Head: Normocephalic and atraumatic.   Eyes:      Conjunctiva/sclera: Conjunctivae normal.   Pulmonary:      Effort: Pulmonary effort is normal. No respiratory distress.   Genitourinary:     Comments: Incision CDI around coronal sulcus s/p circumcision. Few stitches remaining. No erythema, warmth, drainage or bleeding noted.   Musculoskeletal:         General: Normal range of motion.      Cervical back: Normal range of motion.   Skin:     General: Skin is warm and dry.   Neurological:      General: No focal deficit present.      Mental Status: He is alert and oriented to person, place, and time.   Psychiatric:         Mood and Affect: Mood normal.         Behavior: Behavior normal.        Results   Lab Results   Component Value Date    PSA 0.5 " 01/13/2022    PSA 0.1 11/21/2018     Lab Results   Component Value Date    GLUCOSE 98 05/24/2018    CALCIUM 9.1 03/12/2025     05/24/2018    K 3.8 03/12/2025    CO2 25 03/12/2025     03/12/2025    BUN 11 03/12/2025    CREATININE 1.02 03/12/2025     Lab Results   Component Value Date    WBC 4.85 03/12/2025    HGB 14.1 03/12/2025    HCT 43.1 03/12/2025    MCV 93 03/12/2025     03/12/2025

## 2025-04-23 ENCOUNTER — OFFICE VISIT (OUTPATIENT)
Dept: UROLOGY | Facility: MEDICAL CENTER | Age: 65
End: 2025-04-23
Payer: MEDICARE

## 2025-04-23 VITALS
SYSTOLIC BLOOD PRESSURE: 128 MMHG | OXYGEN SATURATION: 96 % | WEIGHT: 206 LBS | HEART RATE: 100 BPM | HEIGHT: 65 IN | DIASTOLIC BLOOD PRESSURE: 80 MMHG | BODY MASS INDEX: 34.32 KG/M2

## 2025-04-23 DIAGNOSIS — Z12.5 SCREENING FOR PROSTATE CANCER: ICD-10-CM

## 2025-04-23 DIAGNOSIS — Z09 FOLLOW-UP AFTER CIRCUMCISION: Primary | ICD-10-CM

## 2025-04-23 PROCEDURE — 99213 OFFICE O/P EST LOW 20 MIN: CPT

## 2025-04-23 RX ORDER — OMEPRAZOLE 40 MG/1
CAPSULE, DELAYED RELEASE ORAL
COMMUNITY
Start: 2025-04-22

## 2025-04-23 RX ORDER — CLONIDINE HYDROCHLORIDE 0.1 MG/1
TABLET ORAL
COMMUNITY
Start: 2025-04-17

## 2025-04-23 NOTE — ASSESSMENT & PLAN NOTE
S/p circumcision on 4/9/25 by Dr. Baires   Site clean and dry- no signs of infection   Few stiches still present- reassured patient that these will dissolve on their own and may take several weeks   Discussed cleaning the area with a little soap and water- pat area dry  He denies issues passing urine  Denies signs of infection or pain   Discussed no sexual activity for 4-6 weeks   Discussed to call office if experiencing:  Fever, chills, swelling, redness, warmth around the wound, too much pain when touched, yellowish, greenish, or bloody discharge, foul smell coming from the cut site, cut site opens up, problems passing urine, severe pain or bleeding at the cut site.

## 2025-04-26 DIAGNOSIS — E11.9 TYPE 2 DIABETES MELLITUS WITHOUT COMPLICATION, WITHOUT LONG-TERM CURRENT USE OF INSULIN (HCC): ICD-10-CM

## 2025-04-27 RX ORDER — ATORVASTATIN CALCIUM 40 MG/1
40 TABLET, FILM COATED ORAL DAILY
Qty: 90 TABLET | Refills: 3 | Status: SHIPPED | OUTPATIENT
Start: 2025-04-27

## 2025-05-01 ENCOUNTER — OFFICE VISIT (OUTPATIENT)
Dept: FAMILY MEDICINE CLINIC | Facility: CLINIC | Age: 65
End: 2025-05-01
Payer: MEDICARE

## 2025-05-01 VITALS
WEIGHT: 203.2 LBS | HEIGHT: 65 IN | TEMPERATURE: 97.9 F | OXYGEN SATURATION: 96 % | SYSTOLIC BLOOD PRESSURE: 125 MMHG | DIASTOLIC BLOOD PRESSURE: 74 MMHG | HEART RATE: 112 BPM | BODY MASS INDEX: 33.85 KG/M2 | RESPIRATION RATE: 20 BRPM

## 2025-05-01 DIAGNOSIS — R11.0 NAUSEA: Primary | ICD-10-CM

## 2025-05-01 DIAGNOSIS — F41.9 ANXIETY: ICD-10-CM

## 2025-05-01 DIAGNOSIS — E11.9 TYPE 2 DIABETES MELLITUS WITHOUT COMPLICATION, WITHOUT LONG-TERM CURRENT USE OF INSULIN (HCC): ICD-10-CM

## 2025-05-01 DIAGNOSIS — K59.03 DRUG-INDUCED CONSTIPATION: ICD-10-CM

## 2025-05-01 DIAGNOSIS — R53.83 FATIGUE, UNSPECIFIED TYPE: ICD-10-CM

## 2025-05-01 PROBLEM — Z12.5 SCREENING FOR PROSTATE CANCER: Status: RESOLVED | Noted: 2025-04-23 | Resolved: 2025-05-01

## 2025-05-01 PROBLEM — Z09 FOLLOW-UP AFTER CIRCUMCISION: Status: RESOLVED | Noted: 2025-04-23 | Resolved: 2025-05-01

## 2025-05-01 PROCEDURE — G2211 COMPLEX E/M VISIT ADD ON: HCPCS

## 2025-05-01 PROCEDURE — 99214 OFFICE O/P EST MOD 30 MIN: CPT

## 2025-05-01 RX ORDER — BLOOD-GLUCOSE METER
KIT MISCELLANEOUS
Qty: 300 EACH | Refills: 1 | Status: SHIPPED | OUTPATIENT
Start: 2025-05-01

## 2025-05-01 RX ORDER — LANCETS 30 GAUGE
1 EACH MISCELLANEOUS
Qty: 100 EACH | Refills: 3 | Status: SHIPPED | OUTPATIENT
Start: 2025-05-01 | End: 2026-04-26

## 2025-05-01 RX ORDER — POLYETHYLENE GLYCOL 3350 17 G/17G
17 POWDER, FOR SOLUTION ORAL DAILY PRN
Qty: 510 G | Refills: 0 | Status: SHIPPED | OUTPATIENT
Start: 2025-05-01 | End: 2025-05-31

## 2025-05-01 NOTE — PROGRESS NOTES
Name: Rishi Kirk      : 1960      MRN: [id number]  Encounter Provider: Melecio Lopez MD  Encounter Date: 2025   Encounter department: [facility] CARE [facility]  :  Assessment & Plan  Nausea  Unclear etiology though I suspect anxiety may be contributing to worsening symptoms.  Patient only using Zofran daily as needed.  I recommend he takes Zofran every 8 hours for nausea control. Should symptoms not improve recommend returning in 3 weeks and reaching out to GI.       Fatigue, unspecified type  Likely due to decreased p.o. intake as this improves after eating.  Recommend he take antinausea medication as prescribed and increase his p.o. if tolerated.       Anxiety  Recommend patient take his mirtazapine as prescribed 7.5 mg twice daily rather than as needed.  Will continue to monitor.         Drug-induced constipation  Second to mirtazapine use.  Recommend discontinuing docusate and instead use MiraLAX daily as needed for constipation given his rash with docusate.  Can increase to twice daily if after 2 to 3 days he does not have softer bowel movements.  Orders:    polyethylene glycol (MIRALAX) 17 g packet; Take 17 g by mouth daily as needed (Constipation)    Type 2 diabetes mellitus without complication, without long-term current use of insulin (HCC)    Lab Results   Component Value Date    HGBA1C 6.9 (A) 2025     Patient using Januvia 100 mg as needed rather than daily.  Recommend he adhere to therapy as prescribed.  States he will do so.  Recheck A1c on DM follow-up  Orders:    glucose blood (FREESTYLE LITE) test strip; Use as instructed    Comfort Touch Plus Lancets 30G MISC; Inject 1 Application under the skin Daily at 2am           History of Present Illness   64-year-old male with history of hypertension, alcoholic cirrhosis of the liver without ascites, T2DM, methadone maintenance therapy, anxiety, esophageal varices and cirrhosis presented to clinic  for evaluation of ongoing nausea.  Per EMR review patient has history of intermittent nausea since 2024.  Most recently patient has had worsening nausea since his surgery on 4/9/2025.  He states nausea was mild and has been occurring daily.  He was evaluated by the urology team for postsurgical follow-up on 4/23/2025 who also noted nausea.  He states nausea specifically worsened on Saturday, 4/26/2025 but then improved slightly.  On 4/29/2025 he states he saw his cousin who was not feeling well.  Ultimately his cousin was hospitalized and reached out to patient to let him know that he had been diagnosed with a virus that turned into a bacteria in his bloodstream.  Since receiving the news patient has had worse nausea, fatigue, and weakness.  He has been able to tolerate some soups and liquids which seems to help his fatigue and weakness.  He has taken ondansetron about 4 times since symptoms started.  He also endorses taking mirtazapine as needed rather than twice daily as prescribed.  He states he avoids using mirtazapine as this causes constipation.  Since he has used mirtazapine recently he has noted smaller bowel movements and increased straining with occasional small amounts of bright red blood with stool.  He does not note any increased bruising or bleeding otherwise.  He is using Dulcolax as needed which seems to aid constipation but he avoids this also as he states he gets rashes with medication.  Remaining ROS as noted below      Review of Systems   Constitutional:  Positive for activity change, appetite change, chills and fatigue. Negative for fever.   HENT:  Negative for congestion, facial swelling, rhinorrhea and sore throat.    Respiratory:  Positive for shortness of breath (when laying on his right side, not new symptom but slightly worsened). Negative for cough.    Cardiovascular:  Negative for chest pain and palpitations.   Gastrointestinal:  Positive for blood in stool (typically after straining to  "use bathroom), constipation (worsened with use of mirtazipine use. started once again after US. normal bowel movements 2-3x a day. states he has had small bowel movements since starting med. last bowel movement yesterday. has noted small amounts of bright red blood on stool) and nausea. Negative for abdominal pain, diarrhea and vomiting.   Genitourinary:  Negative for dysuria and hematuria.   Neurological:  Positive for weakness and light-headedness (chronic. states always occurs when getting up from bed. he takes his time getting up to avoid falls.). Negative for dizziness, seizures and syncope.       Objective   /74 (BP Location: Left arm, Patient Position: Sitting, Cuff Size: Standard)   Pulse (!) 112   Temp 97.9 °F (36.6 °C) (Temporal)   Resp 20   Ht 5' 5\" (1.651 m)   Wt 92.2 kg (203 lb 3.2 oz)   SpO2 96%   BMI 33.81 kg/m²      Physical Exam  Vitals and nursing note reviewed.   Constitutional:       General: He is not in acute distress.     Appearance: Normal appearance. He is not ill-appearing, toxic-appearing or diaphoretic.   Eyes:      Conjunctiva/sclera: Conjunctivae normal.   Cardiovascular:      Rate and Rhythm: Normal rate and regular rhythm.      Pulses: Normal pulses.      Heart sounds: Normal heart sounds.   Pulmonary:      Effort: Pulmonary effort is normal.      Breath sounds: Normal breath sounds.   Abdominal:      Palpations: Abdomen is soft.      Tenderness: There is no abdominal tenderness. There is no guarding or rebound.   Skin:     General: Skin is warm and dry.      Capillary Refill: Capillary refill takes less than 2 seconds.   Neurological:      General: No focal deficit present.      Mental Status: He is alert.   Psychiatric:         Mood and Affect: Mood normal.         Behavior: Behavior normal.         "

## 2025-05-01 NOTE — ASSESSMENT & PLAN NOTE
Lab Results   Component Value Date    HGBA1C 6.9 (A) 03/12/2025     Patient using Januvia 100 mg as needed rather than daily.  Recommend he adhere to therapy as prescribed.  States he will do so.  Recheck A1c on DM follow-up  Orders:    glucose blood (FREESTYLE LITE) test strip; Use as instructed    Comfort Touch Plus Lancets 30G MISC; Inject 1 Application under the skin Daily at 2am

## 2025-05-12 ENCOUNTER — RESULTS FOLLOW-UP (OUTPATIENT)
Age: 65
End: 2025-05-12

## 2025-05-14 DIAGNOSIS — I85.10 ESOPHAGEAL VARICES IN CIRRHOSIS (HCC): ICD-10-CM

## 2025-05-14 DIAGNOSIS — K74.60 ESOPHAGEAL VARICES IN CIRRHOSIS (HCC): ICD-10-CM

## 2025-05-14 RX ORDER — NADOLOL 20 MG/1
20 TABLET ORAL DAILY
Qty: 90 TABLET | Refills: 1 | OUTPATIENT
Start: 2025-05-14

## 2025-05-15 RX ORDER — CLONIDINE HYDROCHLORIDE 0.1 MG/1
TABLET ORAL
Qty: 60 TABLET | OUTPATIENT
Start: 2025-05-15

## 2025-05-20 ENCOUNTER — TELEPHONE (OUTPATIENT)
Age: 65
End: 2025-05-20

## 2025-05-20 ENCOUNTER — TELEPHONE (OUTPATIENT)
Dept: FAMILY MEDICINE CLINIC | Facility: CLINIC | Age: 65
End: 2025-05-20

## 2025-05-20 ENCOUNTER — OFFICE VISIT (OUTPATIENT)
Dept: FAMILY MEDICINE CLINIC | Facility: CLINIC | Age: 65
End: 2025-05-20
Payer: MEDICARE

## 2025-05-20 VITALS
WEIGHT: 202 LBS | TEMPERATURE: 97.6 F | HEART RATE: 108 BPM | DIASTOLIC BLOOD PRESSURE: 74 MMHG | HEIGHT: 65 IN | RESPIRATION RATE: 17 BRPM | BODY MASS INDEX: 33.66 KG/M2 | OXYGEN SATURATION: 95 % | SYSTOLIC BLOOD PRESSURE: 122 MMHG

## 2025-05-20 DIAGNOSIS — F41.9 ANXIETY: Primary | ICD-10-CM

## 2025-05-20 DIAGNOSIS — K59.04 CHRONIC IDIOPATHIC CONSTIPATION: ICD-10-CM

## 2025-05-20 DIAGNOSIS — K70.30 ALCOHOLIC CIRRHOSIS OF LIVER WITHOUT ASCITES (HCC): ICD-10-CM

## 2025-05-20 DIAGNOSIS — R11.0 NAUSEA: ICD-10-CM

## 2025-05-20 DIAGNOSIS — K80.20 CALCULUS OF GALLBLADDER WITHOUT CHOLECYSTITIS WITHOUT OBSTRUCTION: ICD-10-CM

## 2025-05-20 PROCEDURE — 99214 OFFICE O/P EST MOD 30 MIN: CPT | Performed by: PHYSICIAN ASSISTANT

## 2025-05-20 PROCEDURE — G2211 COMPLEX E/M VISIT ADD ON: HCPCS | Performed by: PHYSICIAN ASSISTANT

## 2025-05-20 RX ORDER — BUSPIRONE HYDROCHLORIDE 5 MG/1
5 TABLET ORAL 3 TIMES DAILY PRN
COMMUNITY
End: 2025-05-20 | Stop reason: ALTCHOICE

## 2025-05-20 RX ORDER — MIRTAZAPINE 7.5 MG/1
15 TABLET, FILM COATED ORAL 2 TIMES DAILY
Qty: 60 TABLET | Refills: 5 | Status: SHIPPED | OUTPATIENT
Start: 2025-05-20

## 2025-05-20 RX ORDER — POLYETHYLENE GLYCOL 3350 17 G/17G
POWDER ORAL
COMMUNITY
Start: 2025-05-02

## 2025-05-20 NOTE — ASSESSMENT & PLAN NOTE
Denies depression, hallucinations or SI. Worsening anxiety in the past week, previously thought anxiety was due to methadone withdrawal from taking leftover supply a few months ago but has been compliant with clinic since then. Now anxiety is worsening due to constipation. Went 2 days without BM and had to take ex-lax x4 to have a BM last night. Referred to psych. Mirtazapine was increased to 15mg BID by Dr. Trivedi about 2 months ago per patient despite note from 3/2025 statin 7.5mg BID. Okay to take 15mg BID and referred to psych services today.   Orders:  •  mirtazapine (REMERON) 7.5 MG tablet; Take 2 tablets (15 mg total) by mouth in the morning and 2 tablets (15 mg total) before bedtime.  •  Ambulatory referral to Psych Services; Future

## 2025-05-20 NOTE — ASSESSMENT & PLAN NOTE
Ongoing idiopathic constipation, patient attributes to mirtazapine use. Failed docusate, miralax. Prescribed Amitiza by Dr. Trivedi last visit but was not covered, requesting PA. Avoid use of stimulant laxatives. Reviewed last colonoscopy from 2020.

## 2025-05-20 NOTE — TELEPHONE ENCOUNTER
Pending prior auth for denial letter for alternatives - recommend 1 ex-lax every other day for now until we get a message back from his insurance

## 2025-05-20 NOTE — TELEPHONE ENCOUNTER
PA for Lubiprostone 24MCG capsules SUBMITTED to [place]    via    [x]CMM-KEY: [id number]  []Surescripts-Case ID #   []Availity-Auth ID # NDC #   []Faxed to plan   []Other website   []Phone call Case ID #     [x]PA sent as URGENT    All office notes, labs and other pertaining documents and studies sent. Clinical questions answered. Awaiting determination from insurance company.     Turnaround time for your insurance to make a decision on your Prior Authorization can take 7-21 business days.

## 2025-05-20 NOTE — TELEPHONE ENCOUNTER
Pt came to office and stated that the insurance doesn't cover this medication lubiprostone (AMITIZA) 24 mcg capsule and asked if there is another medication he can get instead of this one.

## 2025-05-20 NOTE — TELEPHONE ENCOUNTER
Patient called and stated that the Miralax is not working. Patient is constipated and its giving him more anxiety. Patient also mentioned that the medication he is taking for anxiety is giving him flashbacks. Please review and advise [phone] thank you.

## 2025-05-20 NOTE — PROGRESS NOTES
Name: Rishi Kirk      : 1960      MRN: [id number]  Encounter Provider: Anabel Nair PA-C  Encounter Date: 2025   Encounter department: [facility] PRIMARY CARE [facility]  :  Assessment & Plan  Anxiety  Denies depression, hallucinations or SI. Worsening anxiety in the past week, previously thought anxiety was due to methadone withdrawal from taking leftover supply a few months ago but has been compliant with clinic since then. Now anxiety is worsening due to constipation. Went 2 days without BM and had to take ex-lax x4 to have a BM last night. Referred to psych. Mirtazapine was increased to 15mg BID by Dr. Trivedi about 2 months ago per patient despite note from 3/2025 statin 7.5mg BID. Okay to take 15mg BID and referred to psych services today.   Orders:  •  mirtazapine (REMERON) 7.5 MG tablet; Take 2 tablets (15 mg total) by mouth in the morning and 2 tablets (15 mg total) before bedtime.  •  Ambulatory referral to Psych Services; Future    Nausea  Unclear cause, patient reports omeprazole make it worse. Avoid dietary triggers, not specifically postprandial as it could be without eating. Hx of gallstone but no RUQ pain. Reviewed last office visit seen by Dr. Adams for same concern on 25. Discussed reasons to proceed to ER.        Calculus of gallbladder without cholecystitis without obstruction  Consider surgery consult.   US RUQ in 2025 with following:  IMPRESSION:     1.  Liver cirrhosis. No suspicious liver lesion. LI-RADS US-1, negative. Recommendation: Routine 6-month US surveillance.  2.  Cholelithiasis without findings of acute cholecystitis. No biliary ductal dilation.  3.  A 6 mm nonobstructing right renal calculus.       Alcoholic cirrhosis of liver without ascites (HCC)  Continue nadolol 20mg daily per GI. Reviewed last consult note in 2025. Alcohol abuse in remission.          Chronic idiopathic constipation  Ongoing idiopathic constipation, patient attributes  "to mirtazapine use. Failed docusate, miralax. Prescribed Amitiza by Dr. Trivedi last visit but was not covered, requesting PA. Avoid use of stimulant laxatives. Reviewed last colonoscopy from 2020.                History of Present Illness   Rishi is a 64 y.o. male with a h/o diabetes, opioid use in remission on methadone, cirrhosis with history of alcohol abuse and hepatitis, esophageal varices, smoking quit 7 months ago who presents for same-day patient for increased anxiety and constipation.  Anxiety started last week, taking methadone as indicated. No relapse with drug use. Took 4 ex lax in order to have BM. Miralax every 8 hours is not helping. Thinks mirtazapine is cause of constipation.     History was conducted in [place] without the use of .            Review of Systems   Constitutional:  Negative for chills, fever and unexpected weight change.   Respiratory:  Negative for shortness of breath.    Cardiovascular:  Negative for chest pain.   Gastrointestinal:  Positive for constipation and nausea. Negative for abdominal distention, abdominal pain, anal bleeding, blood in stool, diarrhea, rectal pain and vomiting.   Psychiatric/Behavioral:  Negative for suicidal ideas. The patient is nervous/anxious.        Objective   /74 (BP Location: Left arm, Patient Position: Sitting, Cuff Size: Standard)   Pulse (!) 108   Temp 97.6 °F (36.4 °C) (Tympanic)   Resp 17   Ht 5' 5\" (1.651 m)   Wt 91.6 kg (202 lb)   SpO2 95%   BMI 33.61 kg/m²      Physical Exam  Vitals reviewed.   Constitutional:       Appearance: Normal appearance. He is obese.   HENT:      Head: Normocephalic and atraumatic.     Cardiovascular:      Rate and Rhythm: Normal rate and regular rhythm.      Pulses: no weak pulses.           Dorsalis pedis pulses are 2+ on the right side and 2+ on the left side.        Posterior tibial pulses are 2+ on the right side and 2+ on the left side.   Pulmonary:      Effort: Pulmonary effort is " normal.      Breath sounds: Normal breath sounds.   Feet:      Right foot:      Skin integrity: No ulcer, skin breakdown, erythema, warmth, callus or dry skin.      Left foot:      Skin integrity: No ulcer, skin breakdown, erythema, warmth, callus or dry skin.     Neurological:      Mental Status: He is alert and oriented to person, place, and time. Mental status is at baseline.     Psychiatric:         Mood and Affect: Mood is anxious.     Diabetic Foot Exam    Patient's shoes and socks removed.    Right Foot/Ankle   Right Foot Inspection  Skin Exam: skin normal and skin intact. No dry skin, no warmth, no callus, no erythema, no maceration, no abnormal color, no pre-ulcer, no ulcer and no callus.     Toe Exam: ROM and strength within normal limits.     Sensory   Monofilament testing: intact    Vascular  Capillary refills: < 3 seconds  The right DP pulse is 2+. The right PT pulse is 2+.     Left Foot/Ankle  Left Foot Inspection  Skin Exam: skin normal and skin intact. No dry skin, no warmth, no erythema, no maceration, normal color, no pre-ulcer, no ulcer and no callus.     Toe Exam: ROM and strength within normal limits.     Sensory   Monofilament testing: intact    Vascular  Capillary refills: < 3 seconds  The left DP pulse is 2+. The left PT pulse is 2+.     Assign Risk Category  No deformity present  No loss of protective sensation  No weak pulses  Risk: 0

## 2025-05-20 NOTE — ASSESSMENT & PLAN NOTE
Continue nadolol 20mg daily per GI. Reviewed last consult note in 4/2025. Alcohol abuse in remission.

## 2025-05-20 NOTE — TELEPHONE ENCOUNTER
PA for Lubiprostone 24MCG capsules APPROVED     Date(s) approved 05/20/2025-05/20/2026    Pharmacy advised by    [x]Fax  []Phone call  []Secure Chat     Approval letter scanned into [employer] Yes

## 2025-05-20 NOTE — ASSESSMENT & PLAN NOTE
Consider surgery consult.   US RUQ in 4/2025 with following:  IMPRESSION:     1.  Liver cirrhosis. No suspicious liver lesion. LI-RADS US-1, negative. Recommendation: Routine 6-month US surveillance.  2.  Cholelithiasis without findings of acute cholecystitis. No biliary ductal dilation.  3.  A 6 mm nonobstructing right renal calculus.

## 2025-05-20 NOTE — ASSESSMENT & PLAN NOTE
Unclear cause, patient reports omeprazole make it worse. Avoid dietary triggers, not specifically postprandial as it could be without eating. Hx of gallstone but no RUQ pain. Reviewed last office visit seen by Dr. Adams for same concern on 5/1/25. Discussed reasons to proceed to ER.

## 2025-06-05 DIAGNOSIS — F41.9 ANXIETY: ICD-10-CM

## 2025-06-05 RX ORDER — BUSPIRONE HYDROCHLORIDE 5 MG/1
TABLET ORAL
Qty: 90 TABLET | Refills: 2 | OUTPATIENT
Start: 2025-06-05

## 2025-06-09 ENCOUNTER — TELEPHONE (OUTPATIENT)
Age: 65
End: 2025-06-09

## 2025-06-09 NOTE — TELEPHONE ENCOUNTER
Contacted patient in regards to Routine Referral in attempts to verify patient's needs of services and add patient to proper wait list. spoke with patient whom stated he is not interested in services at this moment. Writer made patient aware the referral is good for one year. Closing referral.

## 2025-06-16 RX ORDER — METFORMIN HYDROCHLORIDE 500 MG/1
TABLET, EXTENDED RELEASE ORAL
COMMUNITY
Start: 2025-05-29

## 2025-06-17 ENCOUNTER — OFFICE VISIT (OUTPATIENT)
Dept: FAMILY MEDICINE CLINIC | Facility: CLINIC | Age: 65
End: 2025-06-17
Payer: MEDICARE

## 2025-06-17 ENCOUNTER — APPOINTMENT (OUTPATIENT)
Dept: LAB | Facility: HOSPITAL | Age: 65
End: 2025-06-17
Payer: MEDICARE

## 2025-06-17 VITALS
BODY MASS INDEX: 34.32 KG/M2 | HEIGHT: 65 IN | DIASTOLIC BLOOD PRESSURE: 70 MMHG | TEMPERATURE: 97.6 F | RESPIRATION RATE: 16 BRPM | WEIGHT: 206 LBS | SYSTOLIC BLOOD PRESSURE: 120 MMHG | OXYGEN SATURATION: 98 % | HEART RATE: 100 BPM

## 2025-06-17 DIAGNOSIS — K59.03 DRUG-INDUCED CONSTIPATION: ICD-10-CM

## 2025-06-17 DIAGNOSIS — F41.9 ANXIETY: ICD-10-CM

## 2025-06-17 DIAGNOSIS — E11.9 TYPE 2 DIABETES MELLITUS WITHOUT COMPLICATION, WITHOUT LONG-TERM CURRENT USE OF INSULIN (HCC): Primary | ICD-10-CM

## 2025-06-17 DIAGNOSIS — Z12.5 SCREENING FOR PROSTATE CANCER: ICD-10-CM

## 2025-06-17 DIAGNOSIS — L85.3 DRY SKIN DERMATITIS: ICD-10-CM

## 2025-06-17 LAB
BASOPHILS # BLD AUTO: 0.02 THOUSANDS/ÂΜL (ref 0–0.1)
BASOPHILS NFR BLD AUTO: 0 % (ref 0–1)
EOSINOPHIL # BLD AUTO: 0.01 THOUSAND/ÂΜL (ref 0–0.61)
EOSINOPHIL NFR BLD AUTO: 0 % (ref 0–6)
ERYTHROCYTE [DISTWIDTH] IN BLOOD BY AUTOMATED COUNT: 13 % (ref 11.6–15.1)
HCT VFR BLD AUTO: 44.7 % (ref 36.5–49.3)
HGB BLD-MCNC: 15.2 G/DL (ref 12–17)
IMM GRANULOCYTES # BLD AUTO: 0.06 THOUSAND/UL (ref 0–0.2)
IMM GRANULOCYTES NFR BLD AUTO: 1 % (ref 0–2)
LYMPHOCYTES # BLD AUTO: 1.47 THOUSANDS/ÂΜL (ref 0.6–4.47)
LYMPHOCYTES NFR BLD AUTO: 22 % (ref 14–44)
MCH RBC QN AUTO: 31.1 PG (ref 26.8–34.3)
MCHC RBC AUTO-ENTMCNC: 34 G/DL (ref 31.4–37.4)
MCV RBC AUTO: 92 FL (ref 82–98)
MONOCYTES # BLD AUTO: 0.44 THOUSAND/ÂΜL (ref 0.17–1.22)
MONOCYTES NFR BLD AUTO: 7 % (ref 4–12)
NEUTROPHILS # BLD AUTO: 4.73 THOUSANDS/ÂΜL (ref 1.85–7.62)
NEUTS SEG NFR BLD AUTO: 70 % (ref 43–75)
NRBC BLD AUTO-RTO: 0 /100 WBCS
PLATELET # BLD AUTO: 193 THOUSANDS/UL (ref 149–390)
PMV BLD AUTO: 9.5 FL (ref 8.9–12.7)
PSA SERPL-MCNC: 0.14 NG/ML (ref 0–4)
RBC # BLD AUTO: 4.88 MILLION/UL (ref 3.88–5.62)
SL AMB POCT HEMOGLOBIN AIC: 7.8 (ref ?–6.5)
WBC # BLD AUTO: 6.73 THOUSAND/UL (ref 4.31–10.16)

## 2025-06-17 PROCEDURE — 36415 COLL VENOUS BLD VENIPUNCTURE: CPT

## 2025-06-17 PROCEDURE — G0103 PSA SCREENING: HCPCS

## 2025-06-17 PROCEDURE — 83036 HEMOGLOBIN GLYCOSYLATED A1C: CPT | Performed by: FAMILY MEDICINE

## 2025-06-17 PROCEDURE — G2211 COMPLEX E/M VISIT ADD ON: HCPCS | Performed by: FAMILY MEDICINE

## 2025-06-17 PROCEDURE — 99214 OFFICE O/P EST MOD 30 MIN: CPT | Performed by: FAMILY MEDICINE

## 2025-06-17 RX ORDER — POLYETHYLENE GLYCOL 3350 17 G/17G
17 POWDER, FOR SOLUTION ORAL DAILY
Qty: 510 G | Refills: 5 | Status: SHIPPED | OUTPATIENT
Start: 2025-06-17 | End: 2025-07-17

## 2025-06-17 RX ORDER — AMMONIUM LACTATE 12 G/100G
LOTION TOPICAL 2 TIMES DAILY
Qty: 225 G | Refills: 5 | Status: SHIPPED | OUTPATIENT
Start: 2025-06-17

## 2025-06-17 RX ORDER — LUBIPROSTONE 24 UG/1
24 CAPSULE ORAL 2 TIMES DAILY WITH MEALS
Qty: 200 CAPSULE | Refills: 3 | Status: SHIPPED | OUTPATIENT
Start: 2025-06-17

## 2025-06-17 RX ORDER — MIRTAZAPINE 15 MG/1
15 TABLET, FILM COATED ORAL
Qty: 30 TABLET | Refills: 5 | Status: SHIPPED | OUTPATIENT
Start: 2025-06-17 | End: 2025-06-23 | Stop reason: ALTCHOICE

## 2025-06-17 RX ORDER — MIRTAZAPINE 7.5 MG/1
TABLET, FILM COATED ORAL
Qty: 60 TABLET | Refills: 5 | OUTPATIENT
Start: 2025-06-17

## 2025-06-17 NOTE — PROGRESS NOTES
Name: Rishi Kirk      : 1960      MRN: [id number]  Encounter Provider: Soren Trivedi MD  Encounter Date: 2025   Encounter department: [facility] PRIMARY CARE [facility]    Assessment & Plan  Type 2 diabetes mellitus without complication, without long-term current use of insulin (HCC)    Lab Results   Component Value Date    HGBA1C 7.8 (A) 2025   1. Diabetes Mellitus Type 2:  - A1C increased from 6.9% (2025) to 7.8% currently, despite relatively stable home glucose readings  - Continue Metformin  - Restart Januvia (sitagliptin) due to worsening glycemic control  - Patient to continue monitoring blood glucose levels at home  - Follow up in 3 months with repeat A1C    Orders:    POCT hemoglobin A1c    sitaGLIPtin (JANUVIA) 100 mg tablet; Take 1 tablet (100 mg total) by mouth daily    Drug-induced constipation    Orders:    polyethylene glycol (MIRALAX) 17 g packet; Take 17 g by mouth daily    lubiprostone (AMITIZA) 24 mcg capsule; Take 1 capsule (24 mcg total) by mouth 2 (two) times a day with meals    Dry skin dermatitis    Orders:    ammonium lactate (LAC-HYDRIN) 12 % lotion; Apply topically 2 (two) times a day    Anxiety  3. Anxiety/Mood:  - Patient reports episodic anxiety with somatic symptoms (nausea, stomach discomfort)  - Currently on mirtazapine 7.5mg, increasing to 15mg daily  - Patient previously on 30mg daily but self-reduced to 15mg  - Patient declined psychiatry follow-up appointment  Orders:    mirtazapine (REMERON) 15 mg tablet; Take 1 tablet (15 mg total) by mouth daily at bedtime                 Subjective:  Mr. Rishi Kirk is a 64-year-old Argentine-speaking male presenting for follow-up of diabetes and hypertension management. He brings his glucose log book for review. Patient reports generally good glucose control at home with fasting glucose averaging 120 mg/dL. Two hours post-breakfast readings vary from  mg/dL, with the higher readings being  "exceptions. Pre-dinner readings are reported as excellent, while post-dinner readings are slightly elevated but mostly within acceptable range. Patient reports occasional readings of 200-210 mg/dL but states these are not typical. Patient also mentions experiencing anxiety symptoms described as a feeling of uneasiness in his stomach, accompanied by nausea and a sense of impending bad news. He denies feeling depressed. Patient reports that mirtazapine has been helpful for his anxiety. He is currently taking 15mg daily (reduced from previous 30mg daily). Patient mentions he was contacted by psychiatry but declined the appointment as he didn't feel depressed. Regarding GI issues, patient reports good response to MiraLAX for constipation. He also mentions having omeprazole 40mg at home but doesn't recall when he last took it.    Objective:  /70 (BP Location: Left arm, Patient Position: Sitting, Cuff Size: Standard)   Pulse 100   Temp 97.6 °F (36.4 °C) (Tympanic)   Resp 16   Ht 5' 5\" (1.651 m)   Wt 93.4 kg (206 lb)   SpO2 98%   BMI 34.28 kg/m²       Laboratory Results:  - Hemoglobin A1C: 7.8% (current)  - Previous A1C: 6.9% (March 12, 2025)    Home Glucose Monitoring:  - Fasting (pre-breakfast): Average 120 mg/dL  - 2 hours post-breakfast: Range  mg/dL (majority within target)  - Pre-dinner: Excellent control (specific values not provided)  - 2 hours post-dinner: Slightly elevated but mostly acceptable    Current Medications:  1. Metformin (dosage not specified)  2. Mirtazapine 7.5mg daily (patient taking 15mg)  3. MiraLAX PRN for constipation  4. Omeprazole 40mg (not taking regularly)  5. Methadone (dosage not specified)    Medications to be added:  1. Januvia (sitagliptin) - to be restarted              "

## 2025-06-17 NOTE — ASSESSMENT & PLAN NOTE
3. Anxiety/Mood:  - Patient reports episodic anxiety with somatic symptoms (nausea, stomach discomfort)  - Currently on mirtazapine 7.5mg, increasing to 15mg daily  - Patient previously on 30mg daily but self-reduced to 15mg  - Patient declined psychiatry follow-up appointment  Orders:    mirtazapine (REMERON) 15 mg tablet; Take 1 tablet (15 mg total) by mouth daily at bedtime

## 2025-06-17 NOTE — TELEPHONE ENCOUNTER
The original prescription was discontinued on 5/20/2025 by Anabel Nair PA-C for the following reason: Adjust Sig. Renewing this prescription may not be appropriate.

## 2025-06-17 NOTE — ASSESSMENT & PLAN NOTE
Lab Results   Component Value Date    HGBA1C 7.8 (A) 06/17/2025   1. Diabetes Mellitus Type 2:  - A1C increased from 6.9% (March 2025) to 7.8% currently, despite relatively stable home glucose readings  - Continue Metformin  - Restart Januvia (sitagliptin) due to worsening glycemic control  - Patient to continue monitoring blood glucose levels at home  - Follow up in 3 months with repeat A1C    Orders:    POCT hemoglobin A1c    sitaGLIPtin (JANUVIA) 100 mg tablet; Take 1 tablet (100 mg total) by mouth daily

## 2025-06-17 NOTE — LETTER
June 17, 2025     Patient: Rishi Kirk  YOB: 1960  Date of Visit: 6/17/2025          Current Outpatient Medications:   •  ammonium lactate (LAC-HYDRIN) 12 % lotion, Apply topically 2 (two) times a day, Disp: 225 g, Rfl: 5  •  lubiprostone (AMITIZA) 24 mcg capsule, Take 1 capsule (24 mcg total) by mouth 2 (two) times a day with meals, Disp: 200 capsule, Rfl: 3  •  metFORMIN (GLUCOPHAGE-XR) 500 mg 24 hr tablet, TAKE 1 TABLET (500 MG TOTAL) BY MOUTH DAILY WITH DINNER TOME 1 TABLET (500 MG TOTAL) BY MOUTH DAILY WITH DINNER, Disp: , Rfl:   •  polyethylene glycol (MIRALAX) 17 g packet, Take 17 g by mouth daily, Disp: 510 g, Rfl: 5  •  sitaGLIPtin (JANUVIA) 100 mg tablet, Take 1 tablet (100 mg total) by mouth daily, Disp: 100 tablet, Rfl: 3  •  atorvastatin (LIPITOR) 40 mg tablet, TAKE ONE TABLET BY MOUTH DAILYTOMAR 1 TABLETA POR VIA ORAL DIARIAMENTE, Disp: 90 tablet, Rfl: 3  •  cloNIDine (CATAPRES) 0.1 mg tablet, , Disp: , Rfl:   •  Comfort Touch Plus Lancets 30G MISC, Inject 1 Application under the skin Daily at 2am, Disp: 100 each, Rfl: 3  •  glucose blood (FREESTYLE LITE) test strip, Use as instructed, Disp: 300 each, Rfl: 1  •  GNP Alcohol Swabs 70 % PADS, TEST 2-3 TIMES A DAY AS DIRECTEDTEST 2-3 TIMES A DAY AS DIRECTED, Disp: 300 each, Rfl: 3  •  lisinopril (ZESTRIL) 5 mg tablet, TAKE ONE TABLET BY MOUTH DAILYTOMAR 1 TABLETA POR VIA ORAL DIARIAMENTE, Disp: 30 tablet, Rfl: 5  •  methadone (DOLOPHINE) 10 mg/mL oral concentrated solution, Take 44 mg by mouth in the morning., Disp: , Rfl:   •  mirtazapine (REMERON) 7.5 MG tablet, Take 2 tablets (15 mg total) by mouth in the morning and 2 tablets (15 mg total) before bedtime., Disp: 60 tablet, Rfl: 5  •  nadolol (CORGARD) 20 mg tablet, Take 1 tablet (20 mg total) by mouth daily, Disp: 90 tablet, Rfl: 1

## 2025-06-17 NOTE — LETTER
June 17, 2025     Patient: Rishi Kirk  YOB: 1960  Date of Visit: 6/17/2025        Current Outpatient Medications:   •  ammonium lactate (LAC-HYDRIN) 12 % lotion, Apply topically 2 (two) times a day, Disp: 225 g, Rfl: 5  •  lubiprostone (AMITIZA) 24 mcg capsule, Take 1 capsule (24 mcg total) by mouth 2 (two) times a day with meals, Disp: 200 capsule, Rfl: 3  •  metFORMIN (GLUCOPHAGE-XR) 500 mg 24 hr tablet, TAKE 1 TABLET (500 MG TOTAL) BY MOUTH DAILY WITH DINNER TOME 1 TABLET (500 MG TOTAL) BY MOUTH DAILY WITH DINNER, Disp: , Rfl:   •  mirtazapine (REMERON) 15 mg tablet, Take 1 tablet (15 mg total) by mouth daily at bedtime, Disp: 30 tablet, Rfl: 5  •  polyethylene glycol (MIRALAX) 17 g packet, Take 17 g by mouth daily, Disp: 510 g, Rfl: 5  •  sitaGLIPtin (JANUVIA) 100 mg tablet, Take 1 tablet (100 mg total) by mouth daily, Disp: 100 tablet, Rfl: 3  •  atorvastatin (LIPITOR) 40 mg tablet, TAKE ONE TABLET BY MOUTH DAILYTOMAR 1 TABLETA POR VIA ORAL DIARIAMENTE, Disp: 90 tablet, Rfl: 3  •  cloNIDine (CATAPRES) 0.1 mg tablet, , Disp: , Rfl:   •  Comfort Touch Plus Lancets 30G MISC, Inject 1 Application under the skin Daily at 2am, Disp: 100 each, Rfl: 3  •  glucose blood (FREESTYLE LITE) test strip, Use as instructed, Disp: 300 each, Rfl: 1  •  GNP Alcohol Swabs 70 % PADS, TEST 2-3 TIMES A DAY AS DIRECTEDTEST 2-3 TIMES A DAY AS DIRECTED, Disp: 300 each, Rfl: 3  •  lisinopril (ZESTRIL) 5 mg tablet, TAKE ONE TABLET BY MOUTH DAILYTOMAR 1 TABLETA POR VIA ORAL DIARIAMENTE, Disp: 30 tablet, Rfl: 5  •  methadone (DOLOPHINE) 10 mg/mL oral concentrated solution, Take 44 mg by mouth in the morning., Disp: , Rfl:   •  nadolol (CORGARD) 20 mg tablet, Take 1 tablet (20 mg total) by mouth daily, Disp: 90 tablet, Rfl: 1       Soren Trivedi MD

## 2025-06-22 NOTE — TELEPHONE ENCOUNTER
Pt reached answering service, he is interested in scheduling, provided new pt info as below.    Appointments:    If wanting to be a NP callers must be instructed to call back during the hours of: 8am to 4:30 pm Monday thru Friday    [facility] Intake Line # [phone]

## 2025-06-23 ENCOUNTER — TELEPHONE (OUTPATIENT)
Age: 65
End: 2025-06-23

## 2025-06-23 ENCOUNTER — OFFICE VISIT (OUTPATIENT)
Dept: FAMILY MEDICINE CLINIC | Facility: CLINIC | Age: 65
End: 2025-06-23
Payer: MEDICARE

## 2025-06-23 ENCOUNTER — NURSE TRIAGE (OUTPATIENT)
Age: 65
End: 2025-06-23

## 2025-06-23 VITALS
WEIGHT: 201 LBS | RESPIRATION RATE: 16 BRPM | BODY MASS INDEX: 33.49 KG/M2 | OXYGEN SATURATION: 98 % | DIASTOLIC BLOOD PRESSURE: 90 MMHG | SYSTOLIC BLOOD PRESSURE: 140 MMHG | HEIGHT: 65 IN | HEART RATE: 102 BPM | TEMPERATURE: 99.1 F

## 2025-06-23 DIAGNOSIS — F41.9 ANXIETY: Primary | ICD-10-CM

## 2025-06-23 PROCEDURE — G2211 COMPLEX E/M VISIT ADD ON: HCPCS | Performed by: FAMILY MEDICINE

## 2025-06-23 PROCEDURE — 99214 OFFICE O/P EST MOD 30 MIN: CPT | Performed by: FAMILY MEDICINE

## 2025-06-23 RX ORDER — DULOXETIN HYDROCHLORIDE 60 MG/1
60 CAPSULE, DELAYED RELEASE ORAL DAILY
Qty: 30 CAPSULE | Refills: 5 | Status: SHIPPED | OUTPATIENT
Start: 2025-06-23 | End: 2025-06-26 | Stop reason: SDUPTHER

## 2025-06-23 RX ORDER — CLONAZEPAM 1 MG/1
1 TABLET ORAL 2 TIMES DAILY
Qty: 60 TABLET | Refills: 0 | Status: SHIPPED | OUTPATIENT
Start: 2025-06-23 | End: 2025-06-26 | Stop reason: CLARIF

## 2025-06-23 NOTE — TELEPHONE ENCOUNTER
"Taking 2 mirtazapine every 12 hrs, took 6 tablets yesterday.  REASON FOR CONVERSATION: Anxiety    SYMPTOMS: Anxiety.  States that he has had increased anxiety since yesterday.  Having loss of appetite, unable to eat, shower, perform ADLS.  Feeling very anxious and requesting to see Dr. Trivedi. Denies any SI/HI    OTHER HEALTH INFORMATION: Prescribed Mirtazapine 15 mg daily for anxiety.  States that he has been taking more than prescribed.  Has been taking 2 tablets twice daily and yesterday he took 6 tablets. Advised that he should not take more than what is prescribed.    PROTOCOL DISPOSITION: See Today in Office    CARE ADVICE PROVIDED: Spoke with Rosa in clerical who was able to scheduled patient for appointment today at 12:00 pm with Dr. Trivedi.  Patient is thankful.    PRACTICE FOLLOW-UP: None needed at this time.    Reason for Disposition   Patient sounds very upset or troubled to the triager    Answer Assessment - Initial Assessment Questions  1. CONCERN: \"Did anything happen that prompted you to call today?\"       Nothing particular happened   2. ANXIETY SYMPTOMS: \"Can you describe how you (your loved one; patient) have been feeling?\" (e.g., tense, restless, panicky, anxious, keyed up, overwhelmed, sense of impending doom).       Poor appetite, not eating \"feel afraid in my stomach\", stomach is in knots  3. ONSET: \"How long have you been feeling this way?\" (e.g., hours, days, weeks)      Since yesterday  4. SEVERITY: \"How would you rate the level of anxiety?\" (e.g., 0 - 10; or mild, moderate, severe).      Severe  5. FUNCTIONAL IMPAIRMENT: \"How have these feelings affected your ability to do daily activities?\" \"Have you had more difficulty than usual doing your normal daily activities?\" (e.g., getting better, same, worse; self-care, school, work, interactions)      Yes- unable to do daily activities such as showering and eating  6. HISTORY: \"Have you felt this way before?\" \"Have you ever been " "diagnosed with an anxiety problem in the past?\" (e.g., generalized anxiety disorder, panic attacks, PTSD). If Yes, ask: \"How was this problem treated?\" (e.g., medicines, counseling, etc.)      Yes, has diagnosis of anxiety, is prescribed Mirtazapine 15 mg daily  7. RISK OF HARM - SUICIDAL IDEATION: \"Do you ever have thoughts of hurting or killing yourself?\" If Yes, ask:  \"Do you have these feelings now?\" \"Do you have a plan on how you would do this?\"      Denies  8. TREATMENT:  \"What has been done so far to treat this anxiety?\" (e.g., medicines, relaxation strategies). \"What has helped?\"      Is prescribed Mirtazapine 15 mg daily- states that he has been take 4-6 tablets daily the past few days and it is not helping.  9. THERAPIST: \"Do you have a counselor or therapist?\" If Yes, ask: \"What is their name?\"      Denies  10. POTENTIAL TRIGGERS: \"Do you drink caffeinated beverages (e.g., coffee, huong, teas), and how much daily?\" \"Do you drink alcohol or use any drugs?\" \"Have you started any new medicines recently?\"        Denies  12. OTHER SYMPTOMS: \"Do you have any other symptoms?\" (e.g., feeling depressed, trouble concentrating, trouble sleeping, trouble breathing, palpitations or fast heartbeat, chest pain, sweating, nausea, or diarrhea)        Trouble sleeping, loss of appetite    Protocols used: Anxiety and Panic Attack-Adult-[place]    "

## 2025-06-23 NOTE — PROGRESS NOTES
Name: Rishi Kirk      : 1960      MRN: [id number]  Encounter Provider: Soren Trivedi MD  Encounter Date: 2025   Encounter department: [facility]    Assessment & Plan  Anxiety  Assessment:  This is an adult patient presenting with severe anxiety symptoms unresponsive to current medication regimen, complicated by significant insomnia and possibly affected by concurrent methadone therapy.  Plan:  # Anxiety  Starting Cymbalta (duloxetine) 60mg daily for anxiety management.  Starting clonazepam tablets BID for short-term anxiety relief and to help with sleep.  Patient attempted to access psychiatric services but was placed on waiting list.  Will provide referral to psychiatry with request for urgent evaluation.    # Insomnia  Clonazepam should help address immediate sleep concerns.  Recommend sleep hygiene measures including consistent sleep schedule, avoiding screens before bedtime, and limiting caffeine.    # Medication Management  Need to monitor for potential interactions between new medications and methadone.  Follow up in 1-2 weeks to assess response to new medication regimen.  Orders:    DULoxetine (CYMBALTA) 60 mg delayed release capsule; Take 1 capsule (60 mg total) by mouth daily    clonazePAM (KlonoPIN) 1 mg tablet; Take 1 tablet (1 mg total) by mouth 2 (two) times a day       Anxiety with Insomnia    Subjective :  Patient presents with anxiety symptoms. Reports taking medication this morning (2 tablets at 6 AM) but states it's not helping. Patient called the psychiatric center this morning and was placed on a waiting list as they have no immediate availability. Patient reports significant insomnia, stating they've been unable to sleep since 1 AM two days ago. Reports lack of appetite, lack of energy, and feeling unmotivated. Patient appears to be taking methadone which may be complicating treatment options. Patient expresses distress about symptoms  "not resolving despite current treatment.    Objective:  /90 (BP Location: Left arm, Patient Position: Sitting, Cuff Size: Standard)   Pulse 102   Temp 99.1 °F (37.3 °C) (Tympanic)   Resp 16   Ht 5' 5\" (1.651 m)   Wt 91.2 kg (201 lb)   SpO2 98%   BMI 33.45 kg/m²     General: Patient in moderate distress due to anxiety symptoms.  Lungs: Clear.  Cardiovascular: Normal heart sounds. No lower extremity edema.  Psych: Alert and oriented x3. Anxious affect. No suicidal ideation reported.  Skin: No rashes or skin lesions.  Neuro: Sensation and CN II-XII grossly normal.  Medication administered during visit: Benadryl (diphenhydramine) 25mg IV (difficult venous access noted).                      "

## 2025-06-23 NOTE — ASSESSMENT & PLAN NOTE
Assessment:  This is an adult patient presenting with severe anxiety symptoms unresponsive to current medication regimen, complicated by significant insomnia and possibly affected by concurrent methadone therapy.  Plan:  # Anxiety  Starting Cymbalta (duloxetine) 60mg daily for anxiety management.  Starting clonazepam tablets BID for short-term anxiety relief and to help with sleep.  Patient attempted to access psychiatric services but was placed on waiting list.  Will provide referral to psychiatry with request for urgent evaluation.    # Insomnia  Clonazepam should help address immediate sleep concerns.  Recommend sleep hygiene measures including consistent sleep schedule, avoiding screens before bedtime, and limiting caffeine.    # Medication Management  Need to monitor for potential interactions between new medications and methadone.  Follow up in 1-2 weeks to assess response to new medication regimen.  Orders:    DULoxetine (CYMBALTA) 60 mg delayed release capsule; Take 1 capsule (60 mg total) by mouth daily    clonazePAM (KlonoPIN) 1 mg tablet; Take 1 tablet (1 mg total) by mouth 2 (two) times a day

## 2025-06-23 NOTE — TELEPHONE ENCOUNTER
Patient called in seeking to get scheduled with his referral. Writer explained the wait list to the patient. Patient asked what he can do he's really struggling with anxiety and is stomach is acting up due to this. Writer asked pt if he would like to speak with someone. Writer warm transferred the pt to the crisis line for assistance.      Patient has been added to the Medication Management wait list with a referral.    Insurance: [facility] care  Insurance Type:    Commercial []   Medicaid [x]   [facility] (if applicable)   Medicare []  Location Preference: [place]  Provider Preference: no pref  Virtual: Yes [x] No []  Were outside resources sent: Yes [x] No []

## 2025-06-26 ENCOUNTER — TELEPHONE (OUTPATIENT)
Dept: FAMILY MEDICINE CLINIC | Facility: CLINIC | Age: 65
End: 2025-06-26

## 2025-06-26 DIAGNOSIS — F41.9 ANXIETY: ICD-10-CM

## 2025-06-26 RX ORDER — DULOXETIN HYDROCHLORIDE 60 MG/1
60 CAPSULE, DELAYED RELEASE ORAL 2 TIMES DAILY
Qty: 60 CAPSULE | Refills: 5 | Status: SHIPPED | OUTPATIENT
Start: 2025-06-26

## 2025-06-26 NOTE — TELEPHONE ENCOUNTER
Pt came to the office states the program say he can't take ClonazePAM but they say he can took Hydroxyzine.

## 2025-06-30 ENCOUNTER — APPOINTMENT (EMERGENCY)
Dept: RADIOLOGY | Facility: HOSPITAL | Age: 65
End: 2025-06-30
Payer: MEDICARE

## 2025-06-30 ENCOUNTER — HOSPITAL ENCOUNTER (EMERGENCY)
Facility: HOSPITAL | Age: 65
Discharge: HOME | End: 2025-06-30
Attending: EMERGENCY MEDICINE | Admitting: EMERGENCY MEDICINE
Payer: MEDICARE

## 2025-06-30 VITALS
OXYGEN SATURATION: 97 % | SYSTOLIC BLOOD PRESSURE: 122 MMHG | TEMPERATURE: 98.1 F | DIASTOLIC BLOOD PRESSURE: 97 MMHG | RESPIRATION RATE: 20 BRPM | HEART RATE: 130 BPM | BODY MASS INDEX: 32.47 KG/M2 | WEIGHT: 195.11 LBS

## 2025-06-30 DIAGNOSIS — K59.00 CONSTIPATION, UNSPECIFIED CONSTIPATION TYPE: ICD-10-CM

## 2025-06-30 DIAGNOSIS — F41.9 ANXIETY: Primary | ICD-10-CM

## 2025-06-30 LAB
AMPHETAMINES SERPL QL SCN: NEGATIVE
BARBITURATES UR QL: NEGATIVE
BENZODIAZ UR QL: NEGATIVE
COCAINE UR QL: NEGATIVE
ETHANOL EXG-MCNC: 0 MG/DL
FENTANYL UR QL SCN: NEGATIVE
HYDROCODONE UR QL SCN: NEGATIVE
METHADONE UR QL: POSITIVE
OPIATES UR QL SCN: NEGATIVE
OXYCODONE+OXYMORPHONE UR QL SCN: NEGATIVE
PCP UR QL: NEGATIVE
THC UR QL: NEGATIVE

## 2025-06-30 PROCEDURE — 99284 EMERGENCY DEPT VISIT MOD MDM: CPT

## 2025-06-30 PROCEDURE — 82075 ASSAY OF BREATH ETHANOL: CPT | Performed by: EMERGENCY MEDICINE

## 2025-06-30 PROCEDURE — 99284 EMERGENCY DEPT VISIT MOD MDM: CPT | Performed by: EMERGENCY MEDICINE

## 2025-06-30 PROCEDURE — 74019 RADEX ABDOMEN 2 VIEWS: CPT

## 2025-06-30 PROCEDURE — 80307 DRUG TEST PRSMV CHEM ANLYZR: CPT | Performed by: EMERGENCY MEDICINE

## 2025-06-30 RX ORDER — POLYETHYLENE GLYCOL 3350 17 G/17G
17 POWDER, FOR SOLUTION ORAL DAILY
Qty: 238 G | Refills: 0 | Status: SHIPPED | OUTPATIENT
Start: 2025-06-30 | End: 2025-07-09

## 2025-06-30 NOTE — Clinical Note
Rishi Kirk was seen and treated in our emergency department on 6/30/2025.                Diagnosis:     Rishi  .    He may return on this date: 07/01/2025         If you have any questions or concerns, please don't hesitate to call.      Davion Pardo MD    ______________________________           _______________          _______________  Hospital Representative                              Date                                Time

## 2025-06-30 NOTE — ED NOTES
Discharge and Safety Plan    This writer discussed the patients current presentation and recommended discharge plan with .  He agrees with the patient being discharged at this time..     The patient was Instructed to follow up with their   PCP Dr Trivedi  Waiting list for [facility] Suicide Prevention Hotline:  988      [facility] [phone] - Crisis   [facility] [phone] - LVF Crisis/Mobile Crisis   [phone] - SLPF Crisis   [facility]: [phone]  [facility]: [phone]   [facility] [phone] - Crisis   [facility] [phone] - Crisis     [facility] [phone] - Crisis   [facility] [phone] - Crisis   [phone] - Peer Support Talk Line (1-9pm daily)  [phone] - Teen Support Talk Line (1-9pm daily)  [phone] - [facility] [phone]- Crisis    [facility] [phone] - Crisis   [facility] [phone] - Crisis    [facility]) [phone] - [facility] Crisis

## 2025-06-30 NOTE — ED PROVIDER NOTES
Time reflects when diagnosis was documented in both MDM as applicable and the Disposition within this note       Time User Action Codes Description Comment    6/30/2025  8:34 AM Davion Pardo Add [F41.9] Anxiety     6/30/2025  8:34 AM Davion Pardo Add [K59.00] Constipation, unspecified constipation type           ED Disposition       ED Disposition   Discharge    Condition   Stable    Date/Time   Mon Jun 30, 2025  8:34 AM    Comment   Rishi Kirk discharge to home/self care.                   Assessment & Plan       Medical Decision Making  She was seen by crisis elected to go back and follow-up with his primary care discussed his options for his anxiety being on methadone as well not suicidal homicidal  X-ray of his abdomen upright and flat showed no obstruction no free air patient is not having abdominal pain he just feels anxious he should be discharged he continues MiraLAX     Amount and/or Complexity of Data Reviewed  Labs: ordered.  Radiology: ordered and independent interpretation performed.             Medications - No data to display    ED Risk Strat Scores                    (ISAR) Identification of Seniors at Risk  Before the illness or injury that brought you to the Emergency, did you need someone to help you on a regular basis?: 0  In the last 24 hours, have you needed more help than usual?: 0  Have you been hospitalized for one or more nights during the past 6 months?: 0  In general, do you see well?: 0  In general, do you have serious problems with your memory?: 0  Do you take more than three different medications every day?: 0  ISAR Score: 0            SBIRT 22yo+      Flowsheet Row Most Recent Value   Initial Alcohol Screen: US AUDIT-C     1. How often do you have a drink containing alcohol? 0 Filed at: 06/30/2025 0749   2. How many drinks containing alcohol do you have on a typical day you are drinking?  0 Filed at: 06/30/2025 0749   3a. Male UNDER 65: How often do you have five or more drinks on  one occasion? 0 Filed at: 06/30/2025 0749   3b. FEMALE Any Age, or MALE 65+: How often do you have 4 or more drinks on one occassion? 0 Filed at: 06/30/2025 0749   Audit-C Score 0 Filed at: 06/30/2025 0749   GRAYSON: How many times in the past year have you...    Used an illegal drug or used a prescription medication for non-medical reasons? Never Filed at: 06/30/2025 0749                            History of Present Illness       Chief Complaint   Patient presents with    Anxiety     Anxiety for past 3 days. Denies SI/HI/AH/VH. Has been taking mirtazapine for months with little relief. States his PCP prescribed clonazepam which he took for 2 doses but it did not help his anxiety    Constipation     States he is constipated and nauseous        Past Medical History[1]   Past Surgical History[2]   Family History[3]   Social History[4]   E-Cigarette/Vaping    E-Cigarette Use Never User       E-Cigarette/Vaping Substances    Nicotine No     THC No     CBD No     Flavoring No     Other No     Unknown No       I have reviewed and agree with the history as documented.     Patient with history of cirrhosis as well as chronic methadone use also suffers from anxiety his doctor recently changed clonazepam a week ago but then his methadone clinic said he cannot take the clonazepam as well so he presents because he is having anxiety not suicidal homicidal no hallucinations patient is not having abdominal pain he just feels anxiety in his abdomen but he does say he is having decreased bowel movements he had a little bowel movement this morning      Anxiety  Presenting symptoms: no hallucinations and no suicidal thoughts    Associated symptoms: no abdominal pain and no chest pain    Constipation  Associated symptoms: no abdominal pain, no fever and no vomiting        Review of Systems   Constitutional:  Negative for chills and fever.   HENT:  Negative for sore throat.    Eyes:  Negative for visual disturbance.   Respiratory:   Negative for cough and shortness of breath.    Cardiovascular:  Negative for chest pain and palpitations.   Gastrointestinal:  Positive for constipation. Negative for abdominal pain and vomiting.   Skin:  Negative for color change and rash.   Neurological:  Negative for seizures and syncope.   Psychiatric/Behavioral:  Negative for confusion, hallucinations and suicidal ideas.    All other systems reviewed and are negative.          Objective       ED Triage Vitals [06/30/25 0724]   Temperature Pulse Blood Pressure Respirations SpO2 Patient Position - Orthostatic VS   98.1 °F (36.7 °C) (!) 130 122/97 20 97 % Sitting      Temp Source Heart Rate Source BP Location FiO2 (%) Pain Score    Tympanic Monitor Left arm -- --      Vitals      Date and Time Temp Pulse SpO2 Resp BP Pain Score FACES Pain Rating User   06/30/25 0724 98.1 °F (36.7 °C) 130 97 % 20 122/97 -- -- MB            Physical Exam  Vitals and nursing note reviewed.   Constitutional:       General: He is not in acute distress.     Appearance: He is well-developed. He is not ill-appearing, toxic-appearing or diaphoretic.   HENT:      Head: Normocephalic and atraumatic.      Mouth/Throat:      Mouth: Mucous membranes are moist.     Eyes:      Extraocular Movements: Extraocular movements intact.      Conjunctiva/sclera: Conjunctivae normal.       Cardiovascular:      Rate and Rhythm: Normal rate and regular rhythm.      Heart sounds: No murmur heard.  Pulmonary:      Effort: Pulmonary effort is normal. No respiratory distress.      Breath sounds: Normal breath sounds.   Abdominal:      General: There is no distension.      Palpations: Abdomen is soft. There is no mass.      Tenderness: There is no abdominal tenderness. There is no guarding or rebound.     Musculoskeletal:         General: No swelling.      Cervical back: Normal range of motion and neck supple.     Skin:     General: Skin is warm and dry.      Capillary Refill: Capillary refill takes less than 2  seconds.      Coloration: Skin is not jaundiced.     Neurological:      General: No focal deficit present.      Mental Status: He is alert.      Comments: Oriented   Psychiatric:         Mood and Affect: Mood normal.         Behavior: Behavior normal.         Thought Content: Thought content normal.         Judgment: Judgment normal.         Results Reviewed       Procedure Component Value Units Date/Time    Rapid drug screen, urine [[id number]] Collected: 06/30/25 0807    Lab Status: In process Specimen: Urine, Clean Catch Updated: 06/30/25 0815    POCT alcohol breath test [[id number]]  (Normal) Collected: 06/30/25 0807    Lab Status: Final result Updated: 06/30/25 0807     EXTBreath Alcohol 0.000            XR abdomen complete inc upright and/or decubitus   ED Interpretation by Davion Pardo MD (06/30 0833)   nad nonspecific bowel gas pattern          Procedures    ED Medication and Procedure Management   Prior to Admission Medications   Prescriptions Last Dose Informant Patient Reported? Taking?   Comfort Touch Plus Lancets 30G MISC   No No   Sig: Inject 1 Application under the skin Daily at 2am   DULoxetine (CYMBALTA) 60 mg delayed release capsule   No No   Sig: Take 1 capsule (60 mg total) by mouth 2 (two) times a day   GNP Alcohol Swabs 70 % PADS   No No   Sig: TEST 2-3 TIMES A DAY AS DIRECTEDTEST 2-3 TIMES A DAY AS DIRECTED   ammonium lactate (LAC-HYDRIN) 12 % lotion   No No   Sig: Apply topically 2 (two) times a day   atorvastatin (LIPITOR) 40 mg tablet   No No   Sig: TAKE ONE TABLET BY MOUTH DAILYTOMAR 1 TABLETA POR VIA ORAL DIARIAMENTE   glucose blood (FREESTYLE LITE) test strip   No No   Sig: Use as instructed   lisinopril (ZESTRIL) 5 mg tablet   No No   Sig: TAKE ONE TABLET BY MOUTH DAILYTOMAR 1 TABLETA POR VIA ORAL DIARIAMENTE   lubiprostone (AMITIZA) 24 mcg capsule   No No   Sig: Take 1 capsule (24 mcg total) by mouth 2 (two) times a day with meals   metFORMIN (GLUCOPHAGE-XR) 500 mg 24 hr tablet   Yes  No   Sig: TAKE 1 TABLET (500 MG TOTAL) BY MOUTH DAILY WITH DINNER TOME 1 TABLET (500 MG TOTAL) BY MOUTH DAILY WITH DINNER   methadone (DOLOPHINE) 10 mg/mL oral concentrated solution  Self Yes No   Sig: Take 44 mg by mouth in the morning.   nadolol (CORGARD) 20 mg tablet   No No   Sig: Take 1 tablet (20 mg total) by mouth daily   polyethylene glycol (MIRALAX) 17 g packet   No No   Sig: Take 17 g by mouth daily   sitaGLIPtin (JANUVIA) 100 mg tablet   No No   Sig: Take 1 tablet (100 mg total) by mouth daily      Facility-Administered Medications: None     Patient's Medications   Discharge Prescriptions    POLYETHYLENE GLYCOL (GLYCOLAX) 17 GM/SCOOP POWDER    Take 17 g by mouth daily       Start Date: 6/30/2025 End Date: --       Order Dose: 17 g       Quantity: 238 g    Refills: 0     No discharge procedures on file.  ED SEPSIS DOCUMENTATION   Time reflects when diagnosis was documented in both MDM as applicable and the Disposition within this note       Time User Action Codes Description Comment    6/30/2025  8:34 AM Davion Pardo Add [F41.9] Anxiety     6/30/2025  8:34 AM Davion Pardo [K59.00] Constipation, unspecified constipation type                      [1]   Past Medical History:  Diagnosis Date    Addiction to drug (HCC)     Alcohol abuse     Anxiety     Cirrhosis (HCC)     Colon polyp     CPAP (continuous positive airway pressure) dependence     used for 2 months only    Diabetes mellitus (HCC)     Esophageal varices (HCC)     Follow-up after circumcision 04/23/2025    Gastritis     Heart disease     Hepatitis C     History of Helicobacter pylori infection 02/23/2016    History of kidney stones     Hyperlipidemia     Hypertension     Infectious viral hepatitis     Kidney stone     Obesity     Pneumonia     PPD positive 2020    Screening for prostate cancer 04/23/2025    Sleep apnea     Sleep difficulties     Splenomegaly     Substance abuse (HCC)     Withdrawal symptoms, drug or narcotic (HCC)    [2]   Past  Surgical History:  Procedure Laterality Date    COLONOSCOPY  2014    dr [email or web address]    COLONOSCOPY          EGD AND COLONOSCOPY  2020    esophageal varices, colon polyp, hemorrhoids    ESOPHAGOGASTRODUODENOSCOPY  12/10/2015    esophageal varices, cirrhosis, gastritis    HYDROCELE EXCISION / REPAIR Left     teste    LIVER BIOPSY  2014    ID CIRCUMCISION AGE >28 DAYS N/A 2025    Procedure: CIRCUMCISION ADULT;  Surgeon: Malik Baires MD;  Location:  MAIN OR;  Service: Urology   [3]   Family History  Problem Relation Name Age of Onset    Diabetes type II Mother Viotalines Kirk         MELLITUS    Hypertension Mother Viotalines Kirk     Hypertension Sister Eli [address]     Diabetes Sister [address]         MELLITUS    No Known Problems Maternal Grandmother      No Known Problems Maternal Grandfather      No Known Problems Paternal Grandmother      No Known Problems Paternal Grandfather      No Known Problems Sister      Breast cancer Maternal Aunt          40s    No Known Problems Maternal Aunt      No Known Problems Paternal Aunt      No Known Problems Paternal Aunt      No Known Problems Paternal Aunt      No Known Problems Paternal Aunt      No Known Problems Paternal Aunt      No Known Problems Paternal Aunt     [4]   Social History  Tobacco Use    Smoking status: Former     Current packs/day: 0.00     Average packs/day: 1 pack/day for 41.7 years (41.3 ttl pk-yrs)     Types: Cigarettes     Start date:      Quit date: 10/1/2024     Years since quittin.7     Passive exposure: Current    Smokeless tobacco: Never    Tobacco comments:     TOBACCO USE   Vaping Use    Vaping status: Never Used   Substance Use Topics    Alcohol use: Not Currently    Drug use: Not Currently     Types: Heroin     Comment: former user- heroin was drug of choice; Overuse of Methadone        Davion Pardo MD  25 0836

## 2025-06-30 NOTE — ED NOTES
Patient is a 65 yr old male with a hx of substance use disorder. He presents in the ED with increasing anxiety. he is currently on the waiting list for [facility]. His PCP is Dr. Trivedi, and Dr. Trivedi prescribed that Klonopin and patient states that it is not helping his anxiety. Patient does not present with HI, SI or psychosis. He reports that he continues to go to the [facility] clinic. He does not need inpatient admission at this time, and was referred back to Dr. Trivedi while he is waiting for a psychiatric appointment     Patient has a hx of depression and anxiety and is on the waiting list for [facility].  He is followed at the [facility] clin.

## 2025-07-01 ENCOUNTER — OFFICE VISIT (OUTPATIENT)
Dept: FAMILY MEDICINE CLINIC | Facility: CLINIC | Age: 65
End: 2025-07-01
Payer: MEDICARE

## 2025-07-01 VITALS
HEART RATE: 117 BPM | DIASTOLIC BLOOD PRESSURE: 83 MMHG | SYSTOLIC BLOOD PRESSURE: 110 MMHG | TEMPERATURE: 97.7 F | OXYGEN SATURATION: 98 % | RESPIRATION RATE: 18 BRPM | HEIGHT: 65 IN | BODY MASS INDEX: 32.65 KG/M2 | WEIGHT: 196 LBS

## 2025-07-01 DIAGNOSIS — F11.20 METHADONE MAINTENANCE THERAPY PATIENT (HCC): ICD-10-CM

## 2025-07-01 DIAGNOSIS — F41.9 ANXIETY: Primary | ICD-10-CM

## 2025-07-01 PROCEDURE — 99214 OFFICE O/P EST MOD 30 MIN: CPT

## 2025-07-01 PROCEDURE — G2211 COMPLEX E/M VISIT ADD ON: HCPCS

## 2025-07-01 RX ORDER — DULOXETIN HYDROCHLORIDE 60 MG/1
60 CAPSULE, DELAYED RELEASE ORAL DAILY
Qty: 30 CAPSULE | Refills: 0 | Status: SHIPPED | OUTPATIENT
Start: 2025-07-01 | End: 2025-07-01 | Stop reason: ALTCHOICE

## 2025-07-01 RX ORDER — HYDROXYZINE HYDROCHLORIDE 25 MG/1
25 TABLET, FILM COATED ORAL EVERY 6 HOURS PRN
Qty: 120 TABLET | Refills: 0 | Status: SHIPPED | OUTPATIENT
Start: 2025-07-01 | End: 2025-07-09

## 2025-07-01 RX ORDER — MIRTAZAPINE 30 MG/1
30 TABLET, FILM COATED ORAL
Qty: 30 TABLET | Refills: 0 | Status: CANCELLED | OUTPATIENT
Start: 2025-07-01 | End: 2025-07-31

## 2025-07-01 RX ORDER — MELATONIN 5 MG
10 TABLET,CHEWABLE ORAL
Qty: 30 TABLET | Refills: 0 | Status: SHIPPED | OUTPATIENT
Start: 2025-07-01 | End: 2025-07-31

## 2025-07-01 NOTE — ASSESSMENT & PLAN NOTE
Pt with complex hx. Will DC cymbalta and instead initiate therapy with sertraline 50mg daily which we can increase to 100mg daily in one week (max dose given pt's hx of cirrhosis). Have also prescribed hydroxizine 25mg q6 PRN for acute attacks and melatonin 10mg HS for sleep. Have advised pt to not take any medication that has not been prescribed or has been discontinued. He will DC mirtazapine. Return in one week for medication titration. ED precautions reviewed but not limited to: CP, SOB, palpitations, diaphoresis, trembling, hypertonia, clonis.   Orders:    hydrOXYzine HCL (ATARAX) 25 mg tablet; Take 1 tablet (25 mg total) by mouth every 6 (six) hours as needed for anxiety    Melatonin 5 MG CHEW; Chew 10 mg daily at bedtime    sertraline (ZOLOFT) 50 mg tablet; Take 1 tablet (50 mg total) by mouth daily

## 2025-07-01 NOTE — PATIENT INSTRUCTIONS
Patient Education     Trastorno de ansiedad generalizado   Conceptos Básicos   Redactado por los médicos y editores de [employer]   ¿Cómo sé cuándo la ansiedad es un problema médico? -- Todos nos sentimos ansiosos o nerviosos de vez en cuando. Eso es normal. Phyllis sentirse extremadamente ansioso o preocupado la mayoría de los días bertrand 6 meses o más no es normal. [place] se llama “trastorno de ansiedad generalizado” y puede hacer que las tareas diarias se vuelvan difíciles.  El trastorno de ansiedad generalizado es solo un tipo de trastorno de ansiedad. Existen otros, home el trastorno de pánico y las fobias. Florencia artículo se centra en el trastorno de ansiedad generalizado.  ¿Cuáles son los síntomas de la ansiedad grave o extrema? -- Las personas con ansiedad grave o extrema están muy preocupadas o “con los nervios de punta” gran parte del tiempo. Pueden tener problemas para dormir u olvidarse cosas. Además, pueden tener síntomas físicos. Por ejemplo, las personas con ansiedad grave a menudo se sienten muy cansadas y tienen los músculos tensos. Algunas tienen dolor de estómago o sienten “opresión” en el pecho.  ¿Earline consultar a un médico o enfermero? -- Consulte a un médico o enfermero si:   Piensa que está más ansioso de lo normal   Se pone muy ansioso acerca de cosas que otras personas pueden manejar más fácilmente  Adhikari médico o enfermero puede hacerle preguntas pensadas para “medir” el nivel de ansiedad de masoud persona. Si efectivamente tiene un problema de ansiedad, hay distintos tratamientos que pueden ayudarlo.  ¿Hay algo que pueda hacer por mi cuenta para sentirme mejor? -- Sí. Podría ser útil que benigno lo siguiente:   Mueva el cuerpo - El ejercicio puede ayudar a muchas personas a sentirse menos ansiosas. Incluso los tipos de ejercicio suaves, home caminar, son buenos para la brittanie.   Limite o evite la cafeína - Reduzca o abandone el consumo de café y otras coffey de cafeína. La cafeína puede empeorar la  "ansiedad.   Busque maneras saludables de manejar el estrés - A algunas personas las ayuda probar un tratamiento llamado \"reducción del estrés basada en técnicas contemplativas\". Consiste en asistir a un programa grupal para practicar la relajación y la meditación. A otras personas, actividades home el yoga, el curtis chi o la meditación las ayudan a manejar la ansiedad.   Tenga masoud alimentación saludable - [place] muchas verduras, frutas y cereales integrales puede ayudar con novka brittanie en general. Intente limitar o evitar el alcohol.  ¿Cómo se trata la ansiedad? -- Estos son algunos de los tratamientos:   Psicoterapia - La psicoterapia implica reunirse con un consejero de brittanie mental para hablar sobre alvin sentimientos, relaciones y preocupaciones. La terapia puede ayudarlo a hallar nuevas formas de pensar en novak situación para sentirse menos ansioso. En terapia, usted también podría aprender nuevas destrezas para reducir la ansiedad.   Medicinas - Las medicinas que se usan para tratar la depresión también pueden aliviar la ansiedad, incluso en personas que no están deprimidas. Novak médico o enfermero decidirá qué medicinas son las indicadas en novak adam.  Algunas personas hacen psicoterapia y maria antonia medicinas al mismo tiempo.  No hay ningún motivo para sentirse avergonzado por recibir tratamiento para la ansiedad. La ansiedad es un problema común. Afecta a toda clase de personas.  Recuerde que puede llevar un poco de tiempo encontrar el tratamiento indicado. Las personas responden de distinta forma a las medicinas y la terapia; por lo tanto, es posible que deba probar varios métodos hasta hallar el que más lo ayude. La clave es no rendirse e informar a novak médico o enfermero cómo se siente bertrand el proceso.  ¿Hay danii a base de hierbas que pueda beti? -- Los fabricantes de danii a base de hierbas a veces dicen que alvin productos alivian la ansiedad. Por ejemplo, unas hierbas llamadas kava y valeriana se venden home " tratamientos para la ansiedad, jose l no hay pruebas de que estos tratamientos funcionen. Además, se ha vinculado a la kava con un daño hepático grave y es posible que no sea funez.  ¿Qué sucede si quiero quedar en embarazo? -- Si aung medicinas para tratar la ansiedad, hable con novak médico antes de intentar quedar en embarazo. Algunas de las medicinas que se usan para tratar la ansiedad pueden causarle problemas a un bebé en desarrollo. Debido a eso, es posible que necesite cambiar de medicina antes de quedar en embarazo.  ¿Cómo será mi sujata? -- Las personas con trastornos de ansiedad a menudo tienen que luchar contra algo de ansiedad el lavern de novak sujata. En algunas personas, la ansiedad va y viene, jose l empeora en épocas de estrés. La buena noticia es que muchas personas encuentran tratamientos o maneras eficaces para manejar novak ansiedad.  Todos los artículos se actualizan a medida que se descubre nueva evidencia y culmina nuestro proceso de evaluación por homólogos   Florencia artículo se recuperó de UpToDate el: Apr 04, 2024.  Artículo 37011 Versión [id number].es-419.1  Release: 32.2.4 - C32.93  © 2024 [employer] Inc. Todos los derechos reservados.  Exención de responsabilidad y uso de la información del consumidor   Descargo de responsabilidad: esta información generalizada es un resumen limitado de información sobre el diagnóstico, el tratamiento y/o los medicamentos. No pretende ser exhaustiva y se debe utilizar home herramienta para ayudar al usuario a comprender y/o evaluar las posibles opciones de diagnóstico y tratamiento. No incluye toda la información sobre afecciones, tratamientos, medicamentos, efectos secundarios o riesgos puedan ser aplicables a un paciente específico. No tiene el propósito de servir home recomendación médica ni de sustituir la recomendación médica, el diagnóstico o el tratamiento de un profesional de atención médica que se base en el examen y la evaluación de florencia profesional de la brittanie  respecto a las circunstancias específicas y únicas del paciente. Los pacientes deben hablar con un profesional de atención médica para obtener información completa sobre novak brittanie, cuestiones médicas y opciones de tratamiento, incluidos los riesgos o los beneficios relacionados con el uso de medicamentos. Esta información no certifica que los tratamientos o medicamentos luiza seguros, eficaces o estén aprobados para tratar a un paciente específico. [employer] y alvin afiliados renuncian a cualquier garantía o responsabilidad relacionada con esta información o el uso de la misma.El uso de esta información está sujeto a las Condiciones de uso, disponibles en https://www.wolHalalatiuwer.com/en/know/clinical-effectiveness-terms. 2024© [employer] y alvin afiliados y/o licenciantes. Todos los derechos reservados.  Copyright   © 2024 [employer] Todos los derechos reservados.

## 2025-07-01 NOTE — ASSESSMENT & PLAN NOTE
>>ASSESSMENT AND PLAN FOR ANXIETY WRITTEN ON 7/1/2025  1:08 PM BY BETHANIE HEARN MD    Pt with complex hx. Will DC cymbalta and instead initiate therapy with sertraline 50mg daily which we can increase to 100mg daily in one week (max dose given pt's hx of cirrhosis). Have also prescribed hydroxizine 25mg q6 PRN for acute attacks and melatonin 10mg HS for sleep. Have advised pt to not take any medication that has not been prescribed or has been discontinued. He will DC mirtazapine. Return in one week for medication titration. ED precautions reviewed but not limited to: CP, SOB, palpitations, diaphoresis, trembling, hypertonia, clonis.   Orders:  •  hydrOXYzine HCL (ATARAX) 25 mg tablet; Take 1 tablet (25 mg total) by mouth every 6 (six) hours as needed for anxiety  •  Melatonin 5 MG CHEW; Chew 10 mg daily at bedtime  •  sertraline (ZOLOFT) 50 mg tablet; Take 1 tablet (50 mg total) by mouth daily

## 2025-07-01 NOTE — PROGRESS NOTES
Name: Rishi Kirk      : 1960      MRN: [id number]  Encounter Provider: Melecio Lopez MD  Encounter Date: 2025   Encounter department: [facility] PRIMARY CARE [facility]  :  Assessment & Plan  Anxiety  Pt with complex hx. Will DC cymbalta and instead initiate therapy with sertraline 50mg daily which we can increase to 100mg daily in one week (max dose given pt's hx of cirrhosis). Have also prescribed hydroxizine 25mg q6 PRN for acute attacks and melatonin 10mg HS for sleep. Have advised pt to not take any medication that has not been prescribed or has been discontinued. He will DC mirtazapine. Return in one week for medication titration. ED precautions reviewed but not limited to: CP, SOB, palpitations, diaphoresis, trembling, hypertonia, clonis.   Orders:    hydrOXYzine HCL (ATARAX) 25 mg tablet; Take 1 tablet (25 mg total) by mouth every 6 (six) hours as needed for anxiety    Melatonin 5 MG CHEW; Chew 10 mg daily at bedtime    sertraline (ZOLOFT) 50 mg tablet; Take 1 tablet (50 mg total) by mouth daily    Methadone maintenance therapy patient (HCC)  Continue with methadone 44mg daily.              History of Present Illness   64yo male with hx of cirrhosis, anxiety, on methadone maintenance therapy, T2DM, and HTN presenting to clinic for ED follow up, seen 2025 for anxiety. Pt with hx of anxiety who recently had medications adjusted. Was previously taking clonazepam but this was Dc'd by his methadone specialist. He is currently using cymbalta and mirtazipine 15mg q4 hrs. He has attempted to get into psych services but was placed on waiting list. Currently following with PCP for anxiety meds. Feels very anxious describing it as intense fear all over his body. Feels very shaky and fortunately not interfering with ADLs.     PCP note 2025: Start Cymbalta 60 mg daily and clonazepam twice daily.      Review of Systems   Constitutional:  Negative for chills and  "fever.   Respiratory:  Negative for shortness of breath.    Cardiovascular:  Negative for chest pain.   Gastrointestinal:  Positive for vomiting (once this morning (NBNB)). Negative for abdominal pain, blood in stool, diarrhea and nausea.   Genitourinary:  Negative for dysuria and hematuria.       Objective   /83 (BP Location: Left arm, Patient Position: Sitting, Cuff Size: Large)   Pulse (!) 117   Temp 97.7 °F (36.5 °C) (Temporal)   Resp 18   Ht 5' 5\" (1.651 m)   Wt 88.9 kg (196 lb)   SpO2 98%   BMI 32.62 kg/m²      Physical Exam  Vitals and nursing note reviewed.   Constitutional:       General: He is not in acute distress.     Appearance: Normal appearance. He is not ill-appearing, toxic-appearing or diaphoretic.     Eyes:      Conjunctiva/sclera: Conjunctivae normal.       Cardiovascular:      Rate and Rhythm: Regular rhythm. Tachycardia present.      Pulses: Normal pulses.      Heart sounds: Normal heart sounds.   Pulmonary:      Effort: Pulmonary effort is normal.      Breath sounds: Normal breath sounds.     Skin:     General: Skin is warm and dry.     Neurological:      General: No focal deficit present.      Mental Status: He is alert.     Psychiatric:         Mood and Affect: Mood is anxious. Affect is tearful.         Speech: Speech normal.         Behavior: Behavior normal. Behavior is cooperative.         Thought Content: Thought content does not include homicidal or suicidal ideation. Thought content does not include homicidal or suicidal plan.         "

## 2025-07-02 ENCOUNTER — TELEPHONE (OUTPATIENT)
Dept: PSYCHOLOGY | Facility: CLINIC | Age: 65
End: 2025-07-02

## 2025-07-02 ENCOUNTER — OFFICE VISIT (OUTPATIENT)
Dept: FAMILY MEDICINE CLINIC | Facility: CLINIC | Age: 65
End: 2025-07-02
Payer: MEDICARE

## 2025-07-02 ENCOUNTER — APPOINTMENT (EMERGENCY)
Dept: CT IMAGING | Facility: HOSPITAL | Age: 65
End: 2025-07-02
Payer: MEDICARE

## 2025-07-02 ENCOUNTER — HOSPITAL ENCOUNTER (EMERGENCY)
Facility: HOSPITAL | Age: 65
Discharge: HOME | End: 2025-07-02
Attending: EMERGENCY MEDICINE
Payer: MEDICARE

## 2025-07-02 VITALS
RESPIRATION RATE: 16 BRPM | SYSTOLIC BLOOD PRESSURE: 133 MMHG | HEART RATE: 96 BPM | DIASTOLIC BLOOD PRESSURE: 84 MMHG | OXYGEN SATURATION: 94 % | TEMPERATURE: 97.6 F

## 2025-07-02 VITALS
SYSTOLIC BLOOD PRESSURE: 136 MMHG | DIASTOLIC BLOOD PRESSURE: 80 MMHG | RESPIRATION RATE: 18 BRPM | WEIGHT: 196 LBS | HEART RATE: 115 BPM | TEMPERATURE: 97.8 F | HEIGHT: 65 IN | BODY MASS INDEX: 32.65 KG/M2 | OXYGEN SATURATION: 98 %

## 2025-07-02 DIAGNOSIS — R30.0 DYSURIA: ICD-10-CM

## 2025-07-02 DIAGNOSIS — F41.9 ANXIETY: Primary | ICD-10-CM

## 2025-07-02 DIAGNOSIS — R10.9 LEFT FLANK PAIN: Primary | ICD-10-CM

## 2025-07-02 DIAGNOSIS — F41.9 ANXIETY: ICD-10-CM

## 2025-07-02 LAB
ALBUMIN SERPL BCG-MCNC: 4.4 G/DL (ref 3.5–5)
ALP SERPL-CCNC: 134 U/L (ref 34–104)
ALT SERPL W P-5'-P-CCNC: 29 U/L (ref 7–52)
ANION GAP SERPL CALCULATED.3IONS-SCNC: 13 MMOL/L (ref 4–13)
AST SERPL W P-5'-P-CCNC: 27 U/L (ref 13–39)
BACTERIA UR QL AUTO: NORMAL /HPF
BASOPHILS # BLD AUTO: 0.02 THOUSANDS/ÂΜL (ref 0–0.1)
BASOPHILS NFR BLD AUTO: 0 % (ref 0–1)
BILIRUB SERPL-MCNC: 1.71 MG/DL (ref 0.2–1)
BILIRUB UR QL STRIP: NEGATIVE
BUN SERPL-MCNC: 12 MG/DL (ref 5–25)
CALCIUM SERPL-MCNC: 10 MG/DL (ref 8.4–10.2)
CHLORIDE SERPL-SCNC: 98 MMOL/L (ref 96–108)
CLARITY UR: CLEAR
CO2 SERPL-SCNC: 24 MMOL/L (ref 21–32)
COLOR UR: ABNORMAL
CREAT SERPL-MCNC: 0.98 MG/DL (ref 0.6–1.3)
EOSINOPHIL # BLD AUTO: 0.02 THOUSAND/ÂΜL (ref 0–0.61)
EOSINOPHIL NFR BLD AUTO: 0 % (ref 0–6)
ERYTHROCYTE [DISTWIDTH] IN BLOOD BY AUTOMATED COUNT: 12.7 % (ref 11.6–15.1)
GFR SERPL CREATININE-BSD FRML MDRD: 80 ML/MIN/1.73SQ M
GLUCOSE SERPL-MCNC: 164 MG/DL (ref 65–140)
GLUCOSE UR STRIP-MCNC: NEGATIVE MG/DL
HCT VFR BLD AUTO: 44.8 % (ref 36.5–49.3)
HGB BLD-MCNC: 16 G/DL (ref 12–17)
HGB UR QL STRIP.AUTO: NEGATIVE
IMM GRANULOCYTES # BLD AUTO: 0.03 THOUSAND/UL (ref 0–0.2)
IMM GRANULOCYTES NFR BLD AUTO: 0 % (ref 0–2)
KETONES UR STRIP-MCNC: ABNORMAL MG/DL
LEUKOCYTE ESTERASE UR QL STRIP: NEGATIVE
LYMPHOCYTES # BLD AUTO: 2.06 THOUSANDS/ÂΜL (ref 0.6–4.47)
LYMPHOCYTES NFR BLD AUTO: 29 % (ref 14–44)
MCH RBC QN AUTO: 31.3 PG (ref 26.8–34.3)
MCHC RBC AUTO-ENTMCNC: 35.7 G/DL (ref 31.4–37.4)
MCV RBC AUTO: 88 FL (ref 82–98)
MONOCYTES # BLD AUTO: 0.43 THOUSAND/ÂΜL (ref 0.17–1.22)
MONOCYTES NFR BLD AUTO: 6 % (ref 4–12)
NEUTROPHILS # BLD AUTO: 4.68 THOUSANDS/ÂΜL (ref 1.85–7.62)
NEUTS SEG NFR BLD AUTO: 65 % (ref 43–75)
NITRITE UR QL STRIP: NEGATIVE
NON-SQ EPI CELLS URNS QL MICRO: NORMAL /HPF
NRBC BLD AUTO-RTO: 0 /100 WBCS
PH UR STRIP.AUTO: 6 [PH]
PLATELET # BLD AUTO: 194 THOUSANDS/UL (ref 149–390)
PMV BLD AUTO: 8.8 FL (ref 8.9–12.7)
POTASSIUM SERPL-SCNC: 3.6 MMOL/L (ref 3.5–5.3)
PROT SERPL-MCNC: 8.4 G/DL (ref 6.4–8.4)
PROT UR STRIP-MCNC: ABNORMAL MG/DL
RBC # BLD AUTO: 5.12 MILLION/UL (ref 3.88–5.62)
RBC #/AREA URNS AUTO: NORMAL /HPF
SODIUM SERPL-SCNC: 135 MMOL/L (ref 135–147)
SP GR UR STRIP.AUTO: 1.01 (ref 1–1.04)
UROBILINOGEN UA: 1 MG/DL
WBC # BLD AUTO: 7.24 THOUSAND/UL (ref 4.31–10.16)
WBC #/AREA URNS AUTO: NORMAL /HPF

## 2025-07-02 PROCEDURE — 96361 HYDRATE IV INFUSION ADD-ON: CPT

## 2025-07-02 PROCEDURE — 80053 COMPREHEN METABOLIC PANEL: CPT

## 2025-07-02 PROCEDURE — 99213 OFFICE O/P EST LOW 20 MIN: CPT

## 2025-07-02 PROCEDURE — 85025 COMPLETE CBC W/AUTO DIFF WBC: CPT

## 2025-07-02 PROCEDURE — 99284 EMERGENCY DEPT VISIT MOD MDM: CPT

## 2025-07-02 PROCEDURE — 74177 CT ABD & PELVIS W/CONTRAST: CPT

## 2025-07-02 PROCEDURE — 99285 EMERGENCY DEPT VISIT HI MDM: CPT

## 2025-07-02 PROCEDURE — 36415 COLL VENOUS BLD VENIPUNCTURE: CPT

## 2025-07-02 PROCEDURE — 81001 URINALYSIS AUTO W/SCOPE: CPT

## 2025-07-02 PROCEDURE — G2211 COMPLEX E/M VISIT ADD ON: HCPCS

## 2025-07-02 PROCEDURE — 81003 URINALYSIS AUTO W/O SCOPE: CPT

## 2025-07-02 PROCEDURE — 96360 HYDRATION IV INFUSION INIT: CPT

## 2025-07-02 RX ORDER — DICYCLOMINE HCL 20 MG
20 TABLET ORAL ONCE
Status: COMPLETED | OUTPATIENT
Start: 2025-07-02 | End: 2025-07-02

## 2025-07-02 RX ORDER — ACETAMINOPHEN 325 MG/1
650 TABLET ORAL ONCE
Status: COMPLETED | OUTPATIENT
Start: 2025-07-02 | End: 2025-07-02

## 2025-07-02 RX ORDER — KETOROLAC TROMETHAMINE 30 MG/ML
15 INJECTION, SOLUTION INTRAMUSCULAR; INTRAVENOUS ONCE
Status: DISCONTINUED | OUTPATIENT
Start: 2025-07-02 | End: 2025-07-02

## 2025-07-02 RX ORDER — LIDOCAINE 50 MG/G
1 PATCH TOPICAL ONCE
Status: DISCONTINUED | OUTPATIENT
Start: 2025-07-02 | End: 2025-07-02 | Stop reason: HOSPADM

## 2025-07-02 RX ORDER — KETOROLAC TROMETHAMINE 30 MG/ML
30 INJECTION, SOLUTION INTRAMUSCULAR; INTRAVENOUS ONCE
Status: COMPLETED | OUTPATIENT
Start: 2025-07-02 | End: 2025-07-02

## 2025-07-02 RX ADMIN — IOHEXOL 85 ML: 350 INJECTION, SOLUTION INTRAVENOUS at 03:14

## 2025-07-02 RX ADMIN — SODIUM CHLORIDE 1000 ML: 0.9 INJECTION, SOLUTION INTRAVENOUS at 02:31

## 2025-07-02 RX ADMIN — ACETAMINOPHEN 650 MG: 325 TABLET ORAL at 04:33

## 2025-07-02 RX ADMIN — DICYCLOMINE HYDROCHLORIDE 20 MG: 20 TABLET ORAL at 04:33

## 2025-07-02 RX ADMIN — LIDOCAINE 5% 1 PATCH: 700 PATCH TOPICAL at 04:32

## 2025-07-02 NOTE — ASSESSMENT & PLAN NOTE
>>ASSESSMENT AND PLAN FOR ANXIETY WRITTEN ON 7/2/2025  9:14 AM BY BETHANIE HEARN MD    I explained to patient that he needs to trial medication for more than 1 day, at least 6 weeks minimum at therapeutic dose, to consider single agent failure.  He expressed understanding. Plan to increase sertraline to 100mg daily in one week. Previous evaluations deemed pt not meeting eligibility for inpatient stay. I have placed asap referral to  Partial Program for further assistance. Should symptoms worsen recommend pt go to ED for re-evaluation of inpatient stay.  Orders:  •  Ambulatory Referral to [facility] or [facility] Program; Future

## 2025-07-02 NOTE — ED PROVIDER NOTES
Time reflects when diagnosis was documented in both MDM as applicable and the Disposition within this note       Time User Action Codes Description Comment    7/2/2025  4:24 AM Katherine Marlow [R10.9] Left flank pain     7/2/2025  4:24 AM Katherine Marlow [R30.0] Dysuria           ED Disposition       ED Disposition   Discharge    Condition   Stable    Date/Time   Wed Jul 2, 2025  4:35 AM    Comment   Rishi Kirk discharge to home/self care.                   Assessment & Plan         Medical Decision Making  Patient presents for left flank pain and dysuria.  He is afebrile and nontoxic-appearing.  Differential diagnosis includes but is not limited to renal colic, UTI, pyelonephritis, IBS, IBD, colitis, diverticulitis, ileus, constipation, bowel obstruction, mesenteric adenitis, musculoskeletal pain, BPH, urinary retention, tumor, or other pelvic etiology.  Labs without leukocytosis or transaminitis, however his alkaline phosphatase and bilirubin were slightly elevated.  Patient has no tenderness to palpation of the upper abdomen.  Subsequent CT redemonstrated a cirrhotic liver, cholelithiasis without evidence of acute cholecystitis, and a right intrarenal calculus without obstruction.  Imaging did also reveal mild symmetrical bilateral perinephric stranding however this is unchanged from prior studies.  No evidence of acute renal dysfunction on labs.  UA without hematuria or evidence of UTI.  Reviewed all results with the patient and his questions were answered.  Strict return precautions discussed.  Follow-up with PCP, but return to the ED in the interim with new or worsening symptoms.    While providing patient with discharge paperwork he reported that his pain was related to anxiety and that he needed to establish care with a psychiatrist.  Upon review of the patient's chart he was evaluated by the ED crisis worker on 6/30 and did not qualify for inpatient treatment at that time.  He followed up with his  PCP yesterday who made several different medication changes.  Explained to the patient that he needs to allow time for the new medications to take effect before the medications are changed again.  He is currently on the wait list for [facility], therefore patient was given a packet of outpatient mental health clinics in the [facility].    Amount and/or Complexity of Data Reviewed  Labs: ordered. Decision-making details documented in ED Course.  Radiology: ordered. Decision-making details documented in ED Course.    Risk  OTC drugs.  Prescription drug management.        ED Course as of 07/02/25 0735   Wed Jul 02, 2025   0221 RBC Urine: None Seen   0249 WBC: 7.24   0249 Hemoglobin: 16.0   0249 Hematocrit: 44.8   0249 Platelet Count: 194   0249 WBC, UA: 0-1   0249 Bacteria, UA: Occasional   0306 AST: 27   0306 ALT: 29   0306 ALK PHOS(!): 134   0306 Total Bilirubin(!): 1.71   0419 CT abdomen pelvis with contrast   0419 CT abdomen pelvis with contrast  IMPRESSION:  1.  Cirrhotic morphology of the liver again seen.  2.  Cholelithiasis without evidence for acute cholecystitis or significant biliary ductal dilatation.  3.  8 mm nonobstructing calculus in the lower pole of the right kidney is again seen. No hydronephrosis bilaterally.  Mild symmetrical bilateral perinephric stranding again seen which may be sequela of medical renal disease.  4.  No evidence of bowel obstruction, inflammation, appendicitis, free air, or free fluid.  5.  Subtle diffuse urinary bladder wall thickening could be related to under distention or cystitis, recommend correlation with urinalysis.       Medications   ketorolac (TORADOL) injection 30 mg (0 mg Intravenous Given to EMS 7/2/25 0208)   sodium chloride 0.9 % bolus 1,000 mL (0 mL Intravenous Stopped 7/2/25 0432)   iohexol (OMNIPAQUE) 350 MG/ML injection (SINGLE-DOSE) 85 mL (85 mL Intravenous Given 7/2/25 0314)   dicyclomine (BENTYL) tablet 20 mg (20 mg Oral Given 7/2/25 0433)    acetaminophen (TYLENOL) tablet 650 mg (650 mg Oral Given 7/2/25 0433)       ED Risk Strat Scores        SBIRT 22yo+      Flowsheet Row Most Recent Value   Initial Alcohol Screen: US AUDIT-C     1. How often do you have a drink containing alcohol? 0 Filed at: 07/02/2025 0206   2. How many drinks containing alcohol do you have on a typical day you are drinking?  0 Filed at: 07/02/2025 0206   3a. Male UNDER 65: How often do you have five or more drinks on one occasion? 0 Filed at: 07/02/2025 0206   3b. FEMALE Any Age, or MALE 65+: How often do you have 4 or more drinks on one occassion? 0 Filed at: 07/02/2025 0206   Audit-C Score 0 Filed at: 07/02/2025 0206   GRAYSON: How many times in the past year have you...    Used an illegal drug or used a prescription medication for non-medical reasons? Never Filed at: 07/02/2025 0206              History of Present Illness       Chief Complaint   Patient presents with    Flank Pain     Pt brought in via EMS, pt reports difficulty urinating all day, and onset of left flank pain 1 hr PTA. Has history of kidney stones. Pt reports taking his hydroxyzine and clonidine today.    Difficulty Urinating       Past Medical History[1]   Past Surgical History[2]   Family History[3]   Social History[4]   E-Cigarette/Vaping    E-Cigarette Use Never User       E-Cigarette/Vaping Substances    Nicotine No     THC No     CBD No     Flavoring No     Other No     Unknown No       I have reviewed and agree with the history as documented.     The patient is a 65-year-old male with a past medical history of type 2 diabetes mellitus, hypertension, cirrhosis, chronic hepatitis C, anxiety, opioid use disorder on methadone maintenance, drug-induced constipation, nephrolithiasis, and cholelithiasis, who presents for evaluation of left flank pain.  He reports experiencing dysuria throughout the day today with an acute onset of left flank pain and chills 1 hour prior to arrival.  He explains that he is able  to urinate a small amount, but then his flow stops suddenly.  No hematuria, burning with urination, nausea, vomiting, diarrhea, chest pain, shortness of breath, or fever.  No medication taken for pain PTA.          Review of Systems   Constitutional:  Positive for chills. Negative for fever.   HENT:  Negative for congestion, ear pain, rhinorrhea and sore throat.    Eyes:  Negative for pain and visual disturbance.   Respiratory:  Negative for cough and shortness of breath.    Cardiovascular:  Negative for chest pain and palpitations.   Gastrointestinal:  Negative for abdominal pain, diarrhea, nausea and vomiting.   Genitourinary:  Positive for difficulty urinating, dysuria and flank pain. Negative for frequency and hematuria.   Musculoskeletal:  Negative for arthralgias, back pain and myalgias.   Skin:  Negative for color change and rash.   Neurological:  Negative for seizures, syncope, weakness, light-headedness and headaches.   All other systems reviewed and are negative.      Objective       ED Triage Vitals   Temperature Pulse Blood Pressure Respirations SpO2 Patient Position - Orthostatic VS   07/02/25 0203 07/02/25 0203 07/02/25 0203 07/02/25 0203 07/02/25 0203 --   97.6 °F (36.4 °C) (!) 128 142/91 18 99 %       Temp src Heart Rate Source BP Location FiO2 (%) Pain Score    -- 07/02/25 0427 07/02/25 0203 -- 07/02/25 0433     Monitor Right arm  6      Vitals      Date and Time Temp Pulse SpO2 Resp BP Pain Score FACES Pain Rating User   07/02/25 0433 -- -- -- -- -- 6 -- RS   07/02/25 0427 -- 96 94 % -- 133/84 -- -- RS   07/02/25 0233 -- 110 96 % 16 115/73 -- --    07/02/25 0207 -- 104 -- -- -- -- --    07/02/25 0203 97.6 °F (36.4 °C) 128 99 % 18 142/91 -- --             Physical Exam  Vitals and nursing note reviewed.   Constitutional:       General: He is awake.      Appearance: He is well-developed and overweight. He is not toxic-appearing or diaphoretic.   HENT:      Head: Normocephalic and atraumatic.       Right Ear: External ear normal.      Left Ear: External ear normal.      Nose: Nose normal.      Mouth/Throat:      Lips: Pink.      Mouth: Mucous membranes are moist.      Pharynx: Oropharynx is clear. Uvula midline. No pharyngeal swelling, oropharyngeal exudate or posterior oropharyngeal erythema.     Eyes:      General: Lids are normal. Vision grossly intact. Gaze aligned appropriately. No scleral icterus.     Conjunctiva/sclera: Conjunctivae normal.      Pupils: Pupils are equal, round, and reactive to light.       Cardiovascular:      Rate and Rhythm: Normal rate and regular rhythm.      Heart sounds: S1 normal and S2 normal. No murmur heard.     No friction rub. No gallop.   Pulmonary:      Effort: Pulmonary effort is normal. No respiratory distress.      Breath sounds: Normal breath sounds and air entry. No wheezing, rhonchi or rales.   Abdominal:      General: Bowel sounds are normal. There is no distension.      Palpations: Abdomen is soft. There is no mass.      Tenderness: There is abdominal tenderness in the left lower quadrant. There is no guarding or rebound.      Comments: Tenderness to palpation of the left flank without point tenderness over the CVA     Musculoskeletal:      Cervical back: Normal, full passive range of motion without pain and neck supple. No rigidity or crepitus. No spinous process tenderness or muscular tenderness.        Back:    Lymphadenopathy:      Cervical: No cervical adenopathy.     Skin:     General: Skin is warm.      Capillary Refill: Capillary refill takes less than 2 seconds.      Coloration: Skin is not pale.      Findings: No erythema, petechiae or rash.     Neurological:      Mental Status: He is alert and oriented to person, place, and time.     Psychiatric:         Attention and Perception: Attention normal.         Mood and Affect: Mood normal.         Speech: Speech normal.         Behavior: Behavior normal. Behavior is cooperative.         Results Reviewed        Procedure Component Value Units Date/Time    Comprehensive metabolic panel [[id number]]  (Abnormal) Collected: 07/02/25 0232    Lab Status: Final result Specimen: Blood from Arm, Left Updated: 07/02/25 0253     Sodium 135 mmol/L      Potassium 3.6 mmol/L      Chloride 98 mmol/L      CO2 24 mmol/L      ANION GAP 13 mmol/L      BUN 12 mg/dL      Creatinine 0.98 mg/dL      Glucose 164 mg/dL      Calcium 10.0 mg/dL      AST 27 U/L      ALT 29 U/L      Alkaline Phosphatase 134 U/L      Total Protein 8.4 g/dL      Albumin 4.4 g/dL      Total Bilirubin 1.71 mg/dL      eGFR 80 ml/min/1.73sq m     Narrative:      National Kidney Disease Foundation guidelines for Chronic Kidney Disease (CKD):     Stage 1 with normal or high GFR (GFR > 90 mL/min/1.73 square meters)    Stage 2 Mild CKD (GFR = 60-89 mL/min/1.73 square meters)    Stage 3A Moderate CKD (GFR = 45-59 mL/min/1.73 square meters)    Stage 3B Moderate CKD (GFR = 30-44 mL/min/1.73 square meters)    Stage 4 Severe CKD (GFR = 15-29 mL/min/1.73 square meters)    Stage 5 End Stage CKD (GFR <15 mL/min/1.73 square meters)  Note: GFR calculation is accurate only with a steady state creatinine    CBC and differential [[id number]]  (Abnormal) Collected: 07/02/25 0232    Lab Status: Final result Specimen: Blood from Arm, Left Updated: 07/02/25 0238     WBC 7.24 Thousand/uL      RBC 5.12 Million/uL      Hemoglobin 16.0 g/dL      Hematocrit 44.8 %      MCV 88 fL      MCH 31.3 pg      MCHC 35.7 g/dL      RDW 12.7 %      MPV 8.8 fL      Platelets 194 Thousands/uL      nRBC 0 /100 WBCs      Segmented % 65 %      Immature Grans % 0 %      Lymphocytes % 29 %      Monocytes % 6 %      Eosinophils Relative 0 %      Basophils Relative 0 %      Absolute Neutrophils 4.68 Thousands/µL      Absolute Immature Grans 0.03 Thousand/uL      Absolute Lymphocytes 2.06 Thousands/µL      Absolute Monocytes 0.43 Thousand/µL      Eosinophils Absolute 0.02 Thousand/µL      Basophils Absolute 0.02  Thousands/µL     Urine Microscopic [[id number]]  (Normal) Collected: 07/02/25 0206    Lab Status: Final result Specimen: Urine, Clean Catch Updated: 07/02/25 0219     RBC, UA None Seen /hpf      WBC, UA 0-1 /hpf      Epithelial Cells Occasional /hpf      Bacteria, UA Occasional /hpf     UA w Reflex to Microscopic w Reflex to Culture [[id number]]  (Abnormal) Collected: 07/02/25 0206    Lab Status: Final result Specimen: Urine, Clean Catch Updated: 07/02/25 0219     Color, UA Shawna     Clarity, UA Clear     Specific Gravity, UA 1.010     pH, UA 6.0     Leukocytes, UA Negative     Nitrite, UA Negative     Protein, UA 15 (Trace) mg/dl      Glucose, UA Negative mg/dl      Ketones, UA 15 (1+) mg/dl      Bilirubin, UA Negative     Occult Blood, UA Negative     UROBILINOGEN UA 1.0 mg/dL             CT abdomen pelvis with contrast   Final Interpretation by Fly Garzon MD (07/02 0409)      1.  Cirrhotic morphology of the liver again seen.   2.  Cholelithiasis without evidence for acute cholecystitis or significant biliary ductal dilatation.   3.  8 mm nonobstructing calculus in the lower pole of the right kidney is again seen. No hydronephrosis bilaterally.  Mild symmetrical bilateral perinephric stranding again seen which may be sequela of medical renal disease.   4.  No evidence of bowel obstruction, inflammation, appendicitis, free air, or free fluid.   5.  Subtle diffuse urinary bladder wall thickening could be related to under distention or cystitis, recommend correlation with urinalysis.         Workstation performed: [id number]             Procedures    ED Medication and Procedure Management   Prior to Admission Medications   Prescriptions Last Dose Informant Patient Reported? Taking?   Comfort Touch Plus Lancets 30G MISC   No No   Sig: Inject 1 Application under the skin Daily at 2am   GNP Alcohol Swabs 70 % PADS   No No   Sig: TEST 2-3 TIMES A DAY AS DIRECTEDTEST 2-3 TIMES A DAY AS DIRECTED   Melatonin 5 MG CHEW    No No   Sig: Chew 10 mg daily at bedtime   ammonium lactate (LAC-HYDRIN) 12 % lotion   No No   Sig: Apply topically 2 (two) times a day   atorvastatin (LIPITOR) 40 mg tablet   No No   Sig: TAKE ONE TABLET BY MOUTH DAILYTOMAR 1 TABLETA POR VIA ORAL DIARIAMENTE   glucose blood (FREESTYLE LITE) test strip   No No   Sig: Use as instructed   hydrOXYzine HCL (ATARAX) 25 mg tablet   No No   Sig: Take 1 tablet (25 mg total) by mouth every 6 (six) hours as needed for anxiety   lisinopril (ZESTRIL) 5 mg tablet   No No   Sig: TAKE ONE TABLET BY MOUTH DAILYTOMAR 1 TABLETA POR VIA ORAL DIARIAMENTE   lubiprostone (AMITIZA) 24 mcg capsule   No No   Sig: Take 1 capsule (24 mcg total) by mouth 2 (two) times a day with meals   metFORMIN (GLUCOPHAGE-XR) 500 mg 24 hr tablet   Yes No   Sig: TAKE 1 TABLET (500 MG TOTAL) BY MOUTH DAILY WITH DINNER TOME 1 TABLET (500 MG TOTAL) BY MOUTH DAILY WITH DINNER   methadone (DOLOPHINE) 10 mg/mL oral concentrated solution  Self Yes No   Sig: Take 44 mg by mouth in the morning.   nadolol (CORGARD) 20 mg tablet   No No   Sig: Take 1 tablet (20 mg total) by mouth daily   polyethylene glycol (GLYCOLAX) 17 GM/SCOOP powder   No No   Sig: Take 17 g by mouth daily   polyethylene glycol (MIRALAX) 17 g packet   No No   Sig: Take 17 g by mouth daily   sertraline (ZOLOFT) 50 mg tablet   No No   Sig: Take 1 tablet (50 mg total) by mouth daily   sitaGLIPtin (JANUVIA) 100 mg tablet   No No   Sig: Take 1 tablet (100 mg total) by mouth daily      Facility-Administered Medications: None     Discharge Medication List as of 7/2/2025  4:39 AM        CONTINUE these medications which have NOT CHANGED    Details   ammonium lactate (LAC-HYDRIN) 12 % lotion Apply topically 2 (two) times a day, Starting Tue 6/17/2025, Normal      atorvastatin (LIPITOR) 40 mg tablet TAKE ONE TABLET BY MOUTH DAILYTOMAR 1 TABLETA POR VIA ORAL DIARIAMENTE, Starting Sun 4/27/2025, Normal      Comfort Touch Plus Lancets 30G MISC Inject 1  Application under the skin Daily at 2am, Starting Thu 5/1/2025, Until Sun 4/26/2026, Normal      glucose blood (FREESTYLE LITE) test strip Use as instructed, Normal      GNP Alcohol Swabs 70 % PADS TEST 2-3 TIMES A DAY AS DIRECTEDTEST 2-3 TIMES A DAY AS DIRECTED, Normal      hydrOXYzine HCL (ATARAX) 25 mg tablet Take 1 tablet (25 mg total) by mouth every 6 (six) hours as needed for anxiety, Starting Tue 7/1/2025, Until Thu 7/31/2025 at 2359, Normal      lisinopril (ZESTRIL) 5 mg tablet TAKE ONE TABLET BY MOUTH DAILYTOMAR 1 TABLETA POR VIA ORAL DIARIAMENTE, Starting Fri 3/28/2025, Normal      lubiprostone (AMITIZA) 24 mcg capsule Take 1 capsule (24 mcg total) by mouth 2 (two) times a day with meals, Starting Tue 6/17/2025, Normal      Melatonin 5 MG CHEW Chew 10 mg daily at bedtime, Starting Tue 7/1/2025, Until Thu 7/31/2025, Normal      metFORMIN (GLUCOPHAGE-XR) 500 mg 24 hr tablet TAKE 1 TABLET (500 MG TOTAL) BY MOUTH DAILY WITH DINNER TOME 1 TABLET (500 MG TOTAL) BY MOUTH DAILY WITH DINNER, Historical Med      methadone (DOLOPHINE) 10 mg/mL oral concentrated solution Take 44 mg by mouth in the morning., Historical Med      nadolol (CORGARD) 20 mg tablet Take 1 tablet (20 mg total) by mouth daily, Starting Wed 3/12/2025, Normal      polyethylene glycol (GLYCOLAX) 17 GM/SCOOP powder Take 17 g by mouth daily, Starting Mon 6/30/2025, Normal      polyethylene glycol (MIRALAX) 17 g packet Take 17 g by mouth daily, Starting Tue 6/17/2025, Until Thu 7/17/2025, Normal      sertraline (ZOLOFT) 50 mg tablet Take 1 tablet (50 mg total) by mouth daily, Starting Tue 7/1/2025, Until Thu 7/31/2025, Normal      sitaGLIPtin (JANUVIA) 100 mg tablet Take 1 tablet (100 mg total) by mouth daily, Starting Tue 6/17/2025, Normal           No discharge procedures on file.  ED SEPSIS DOCUMENTATION   Time reflects when diagnosis was documented in both MDM as applicable and the Disposition within this note       Time User Action Codes  Description Comment    7/2/2025  4:24 AM Katherine Marlow [R10.9] Left flank pain     7/2/2025  4:24 AM Katherine Marlow [R30.0] Dysuria                    [1]   Past Medical History:  Diagnosis Date    Addiction to drug (HCC)     Alcohol abuse     Alcoholism (HCC)     Anxiety     Chronic kidney disease     Cirrhosis (HCC)     Colon polyp     CPAP (continuous positive airway pressure) dependence     used for 2 months only    Diabetes mellitus (HCC)     Erectile dysfunction     Since 2 or 3 years    Esophageal varices (HCC)     Follow-up after circumcision 04/23/2025    Gastritis     Heart disease     Hepatitis C     History of Helicobacter pylori infection 02/23/2016    History of kidney stones     Hyperlipidemia     Hypertension     Infectious viral hepatitis     Kidney stone     Liver cancer (HCC)     Obesity     Pneumonia     PPD positive 2020    Screening for prostate cancer 04/23/2025    Sleep apnea     Sleep difficulties     Splenomegaly     Substance abuse (HCC)     Testosterone deficiency     Withdrawal symptoms, drug or narcotic (HCC)    [2]   Past Surgical History:  Procedure Laterality Date    COLONOSCOPY  06/20/2014    dr [email or web address]    COLONOSCOPY  2014        EGD AND COLONOSCOPY  08/2020    esophageal varices, colon polyp, hemorrhoids    ESOPHAGOGASTRODUODENOSCOPY  12/10/2015    esophageal varices, cirrhosis, gastritis    HYDROCELE EXCISION / REPAIR Left     teste    LIVER BIOPSY  2014    NE CIRCUMCISION AGE >28 DAYS N/A 4/9/2025    Procedure: CIRCUMCISION ADULT;  Surgeon: Malik Baires MD;  Location:  MAIN OR;  Service: Urology   [3]   Family History  Problem Relation Name Age of Onset    Diabetes type II Mother Viotalines Kirk         MELLITUS    Hypertension Mother Viotalines Kirk     Diabetes Mother Viotalines Kirk     Hypertension Sister [address]     Diabetes Sister [address]         MELLITUS    No Known Problems Maternal Grandmother      No Known Problems Maternal Grandfather       No Known Problems Paternal Grandmother      No Known Problems Paternal Grandfather      No Known Problems Sister      Breast cancer Maternal Aunt Neuly Reyes         40s    No Known Problems Maternal Aunt      No Known Problems Paternal Aunt      No Known Problems Paternal Aunt      No Known Problems Paternal Aunt      No Known Problems Paternal Aunt      No Known Problems Paternal Aunt      No Known Problems Paternal Aunt     [4]   Social History  Tobacco Use    Smoking status: Former     Current packs/day: 0.00     Average packs/day: 1 pack/day for 41.7 years (41.3 ttl pk-yrs)     Types: Cigarettes     Start date:      Quit date: 10/1/2024     Years since quittin.7     Passive exposure: Current    Smokeless tobacco: Never    Tobacco comments:     TOBACCO USE   Vaping Use    Vaping status: Never Used   Substance Use Topics    Alcohol use: Not Currently    Drug use: Not Currently     Types: Heroin     Comment: former user- heroin was drug of choice; Overuse of Methadone        Katherine Marlow PA-C  [phone]

## 2025-07-02 NOTE — TELEPHONE ENCOUNTER
Patient  [phone]    Pt came into office to try to schedule an appt. I made pt aware of how dept works. Pt asked if all the groups were in English which I answered yes, and pt said that this group would not work out for. Pt stated that he doesn't speak English that well thank you.

## 2025-07-02 NOTE — PROGRESS NOTES
Name: Rishi Kirk      : 1960      MRN: [id number]  Encounter Provider: Melecio Lopez MD  Encounter Date: 2025   Encounter department: [facility] PRIMARY CARE [facility]  :  Assessment & Plan  Anxiety  I explained to patient that he needs to trial medication for more than 1 day, at least 6 weeks minimum at therapeutic dose, to consider single agent failure.  He expressed understanding. Plan to increase sertraline to 100mg daily in one week. Previous evaluations deemed pt not meeting eligibility for inpatient stay. I have placed asap referral to  Partial Program for further assistance. Should symptoms worsen recommend pt go to ED for re-evaluation of inpatient stay.  Orders:    Ambulatory Referral to [facility] or [facility] Program; Future           History of Present Illness   64yo male with hx of cirrhosis, anxiety, on methadone maintenance therapy, T2DM, and HTN presenting to same day clinic for anxiety. I saw pt yesterday for similar issue and we adjusted his anxiety regimen, now: Sertraline 50 mg daily, hydroxyzine 25 mg every 6 as needed, and melatonin 10 mg at bedtime. Plan was to increase sertraline to 100 mg daily in 1 week which would be max dose given patient's cirrhosis diagnosis.  Unfortunately patient continues to have uncontrolled anxiety and once again went to the ED this morning for flank pain and anxiety.  Patient had multiple labs drawn and had CT abdomen pelvis with contrast.  Laboratory workup was not consistent with UTI and per EMR patient stated his pain was related to anxiety.  This is consistent with his previous ED evaluation on 2025 where he also presented to the ED for abdominal discomfort. Pt says he used the hydroxyzine every 6 hrs without any improvement and took the sertraline at 12pm yesterday. He also endorses taking the melatonin at 8pm. Pt states he also has not eaten in 4 days because of the stomach discomfort. After further  "questioning he says he is able to eat small amounts of [employer] chicken noodle soup. States he is also drinking 8 bottles of water a day.       Review of Systems   Constitutional:  Positive for appetite change, chills and diaphoresis. Negative for fever.   Respiratory:  Negative for cough and shortness of breath.    Cardiovascular:  Negative for chest pain.   Gastrointestinal:  Positive for abdominal pain. Negative for blood in stool, constipation, diarrhea, nausea and vomiting.   Genitourinary:  Negative for dysuria and hematuria.        Intermittent stream       Objective   /80 (BP Location: Left arm, Patient Position: Sitting, Cuff Size: Large)   Pulse (!) 115   Temp 97.8 °F (36.6 °C) (Temporal)   Resp 18   Ht 5' 5\" (1.651 m)   Wt 88.9 kg (196 lb)   SpO2 98%   BMI 32.62 kg/m²      Physical Exam  Vitals and nursing note reviewed.   Constitutional:       General: He is not in acute distress.     Appearance: Normal appearance. He is not ill-appearing, toxic-appearing or diaphoretic.     Eyes:      Conjunctiva/sclera: Conjunctivae normal.       Cardiovascular:      Rate and Rhythm: Normal rate and regular rhythm.      Pulses: Normal pulses.      Heart sounds: Normal heart sounds.   Pulmonary:      Effort: Pulmonary effort is normal.      Breath sounds: Normal breath sounds.   Abdominal:      General: Bowel sounds are normal.      Palpations: Abdomen is soft.      Tenderness: There is no abdominal tenderness.     Skin:     General: Skin is warm and dry.     Neurological:      General: No focal deficit present.      Mental Status: He is alert.     Psychiatric:         Mood and Affect: Mood is anxious. Mood is not depressed or elated. Affect is tearful. Affect is not labile, blunt, flat, angry or inappropriate.         Speech: Speech normal. He is communicative. Speech is not rapid and pressured, delayed, slurred or tangential.         Behavior: Behavior normal. Behavior is not agitated, slowed, " aggressive, withdrawn, hyperactive or combative. Behavior is cooperative.         Thought Content: Thought content is not paranoid or delusional. Thought content does not include homicidal or suicidal ideation. Thought content does not include homicidal or suicidal plan.

## 2025-07-02 NOTE — ASSESSMENT & PLAN NOTE
I explained to patient that he needs to trial medication for more than 1 day, at least 6 weeks minimum at therapeutic dose, to consider single agent failure.  He expressed understanding. Plan to increase sertraline to 100mg daily in one week. Previous evaluations deemed pt not meeting eligibility for inpatient stay. I have placed asap referral to  Partial Program for further assistance. Should symptoms worsen recommend pt go to ED for re-evaluation of inpatient stay.  Orders:    Ambulatory Referral to [facility] or [facility] Psych Program; Future

## 2025-07-03 ENCOUNTER — HOSPITAL ENCOUNTER (INPATIENT)
Facility: HOSPITAL | Age: 65
LOS: 6 days | Discharge: HOME | DRG: 885 | End: 2025-07-09
Attending: EMERGENCY MEDICINE | Admitting: STUDENT IN AN ORGANIZED HEALTH CARE EDUCATION/TRAINING PROGRAM
Payer: MEDICARE

## 2025-07-03 ENCOUNTER — TELEPHONE (OUTPATIENT)
Dept: FAMILY MEDICINE CLINIC | Facility: CLINIC | Age: 65
End: 2025-07-03

## 2025-07-03 DIAGNOSIS — E55.9 VITAMIN D DEFICIENCY: ICD-10-CM

## 2025-07-03 DIAGNOSIS — Z00.8 MEDICAL CLEARANCE FOR PSYCHIATRIC ADMISSION: ICD-10-CM

## 2025-07-03 DIAGNOSIS — E11.9 TYPE 2 DIABETES MELLITUS WITHOUT COMPLICATION, WITHOUT LONG-TERM CURRENT USE OF INSULIN (HCC): ICD-10-CM

## 2025-07-03 DIAGNOSIS — I10 BENIGN ESSENTIAL HYPERTENSION: ICD-10-CM

## 2025-07-03 DIAGNOSIS — I25.10 CORONARY ARTERY CALCIFICATION: ICD-10-CM

## 2025-07-03 DIAGNOSIS — R10.12 LEFT UPPER QUADRANT ABDOMINAL PAIN: ICD-10-CM

## 2025-07-03 DIAGNOSIS — F41.9 ANXIETY: ICD-10-CM

## 2025-07-03 DIAGNOSIS — F33.2 SEVERE EPISODE OF RECURRENT MAJOR DEPRESSIVE DISORDER, WITHOUT PSYCHOTIC FEATURES (HCC): Primary | ICD-10-CM

## 2025-07-03 DIAGNOSIS — K74.60 ESOPHAGEAL VARICES IN CIRRHOSIS (HCC): ICD-10-CM

## 2025-07-03 DIAGNOSIS — I85.10 ESOPHAGEAL VARICES IN CIRRHOSIS (HCC): ICD-10-CM

## 2025-07-03 LAB
ALBUMIN SERPL BCG-MCNC: 4.1 G/DL (ref 3.5–5)
ALP SERPL-CCNC: 115 U/L (ref 34–104)
ALT SERPL W P-5'-P-CCNC: 31 U/L (ref 7–52)
AMPHETAMINES SERPL QL SCN: NEGATIVE
ANION GAP SERPL CALCULATED.3IONS-SCNC: 10 MMOL/L (ref 4–13)
APAP SERPL-MCNC: <2 UG/ML (ref 10–20)
AST SERPL W P-5'-P-CCNC: 32 U/L (ref 13–39)
ATRIAL RATE: 73 BPM
BARBITURATES UR QL: NEGATIVE
BASOPHILS # BLD AUTO: 0.03 THOUSANDS/ÂΜL (ref 0–0.1)
BASOPHILS NFR BLD AUTO: 1 % (ref 0–1)
BENZODIAZ UR QL: NEGATIVE
BILIRUB SERPL-MCNC: 1.47 MG/DL (ref 0.2–1)
BILIRUB UR QL STRIP: NEGATIVE
BUN SERPL-MCNC: 7 MG/DL (ref 5–25)
CALCIUM SERPL-MCNC: 9.2 MG/DL (ref 8.4–10.2)
CHLORIDE SERPL-SCNC: 101 MMOL/L (ref 96–108)
CLARITY UR: CLEAR
CO2 SERPL-SCNC: 26 MMOL/L (ref 21–32)
COCAINE UR QL: NEGATIVE
COLOR UR: YELLOW
CREAT SERPL-MCNC: 0.94 MG/DL (ref 0.6–1.3)
EOSINOPHIL # BLD AUTO: 0 THOUSAND/ÂΜL (ref 0–0.61)
EOSINOPHIL NFR BLD AUTO: 0 % (ref 0–6)
ERYTHROCYTE [DISTWIDTH] IN BLOOD BY AUTOMATED COUNT: 12.8 % (ref 11.6–15.1)
ETHANOL SERPL-MCNC: <10 MG/DL
FENTANYL UR QL SCN: NEGATIVE
GFR SERPL CREATININE-BSD FRML MDRD: 84 ML/MIN/1.73SQ M
GLUCOSE SERPL-MCNC: 119 MG/DL (ref 65–140)
GLUCOSE UR STRIP-MCNC: NEGATIVE MG/DL
HCT VFR BLD AUTO: 41.9 % (ref 36.5–49.3)
HGB BLD-MCNC: 14.2 G/DL (ref 12–17)
HGB UR QL STRIP.AUTO: NEGATIVE
HYDROCODONE UR QL SCN: NEGATIVE
IMM GRANULOCYTES # BLD AUTO: 0.01 THOUSAND/UL (ref 0–0.2)
IMM GRANULOCYTES NFR BLD AUTO: 0 % (ref 0–2)
KETONES UR STRIP-MCNC: NEGATIVE MG/DL
LEUKOCYTE ESTERASE UR QL STRIP: NEGATIVE
LIPASE SERPL-CCNC: 17 U/L (ref 11–82)
LYMPHOCYTES # BLD AUTO: 1.82 THOUSANDS/ÂΜL (ref 0.6–4.47)
LYMPHOCYTES NFR BLD AUTO: 29 % (ref 14–44)
MCH RBC QN AUTO: 30.7 PG (ref 26.8–34.3)
MCHC RBC AUTO-ENTMCNC: 33.9 G/DL (ref 31.4–37.4)
MCV RBC AUTO: 91 FL (ref 82–98)
METHADONE UR QL: POSITIVE
MONOCYTES # BLD AUTO: 0.43 THOUSAND/ÂΜL (ref 0.17–1.22)
MONOCYTES NFR BLD AUTO: 7 % (ref 4–12)
NEUTROPHILS # BLD AUTO: 3.91 THOUSANDS/ÂΜL (ref 1.85–7.62)
NEUTS SEG NFR BLD AUTO: 63 % (ref 43–75)
NITRITE UR QL STRIP: NEGATIVE
NRBC BLD AUTO-RTO: 0 /100 WBCS
OPIATES UR QL SCN: NEGATIVE
OXYCODONE+OXYMORPHONE UR QL SCN: NEGATIVE
P AXIS: 56 DEGREES
PCP UR QL: NEGATIVE
PH UR STRIP.AUTO: 6.5 [PH]
PLATELET # BLD AUTO: 181 THOUSANDS/UL (ref 149–390)
PMV BLD AUTO: 8.7 FL (ref 8.9–12.7)
POTASSIUM SERPL-SCNC: 3.7 MMOL/L (ref 3.5–5.3)
PR INTERVAL: 184 MS
PROT SERPL-MCNC: 7.4 G/DL (ref 6.4–8.4)
PROT UR STRIP-MCNC: NEGATIVE MG/DL
QRS AXIS: -2 DEGREES
QRS AXIS: -28 DEGREES
QRSD INTERVAL: 76 MS
QRSD INTERVAL: 82 MS
QT INTERVAL: 434 MS
QT INTERVAL: 466 MS
QTC INTERVAL: 479 MS
QTC INTERVAL: 642 MS
RBC # BLD AUTO: 4.62 MILLION/UL (ref 3.88–5.62)
SALICYLATES SERPL-MCNC: <5 MG/DL (ref 5–20)
SODIUM SERPL-SCNC: 137 MMOL/L (ref 135–147)
SP GR UR STRIP.AUTO: 1 (ref 1–1.04)
T WAVE AXIS: 26 DEGREES
T WAVE AXIS: 33 DEGREES
THC UR QL: NEGATIVE
TSH SERPL DL<=0.05 MIU/L-ACNC: 0.65 UIU/ML (ref 0.45–4.5)
UROBILINOGEN UA: NEGATIVE MG/DL
VENTRICULAR RATE: 114 BPM
VENTRICULAR RATE: 73 BPM
WBC # BLD AUTO: 6.2 THOUSAND/UL (ref 4.31–10.16)

## 2025-07-03 PROCEDURE — 93005 ELECTROCARDIOGRAM TRACING: CPT

## 2025-07-03 PROCEDURE — 99285 EMERGENCY DEPT VISIT HI MDM: CPT | Performed by: EMERGENCY MEDICINE

## 2025-07-03 PROCEDURE — 80307 DRUG TEST PRSMV CHEM ANLYZR: CPT | Performed by: EMERGENCY MEDICINE

## 2025-07-03 PROCEDURE — 96374 THER/PROPH/DIAG INJ IV PUSH: CPT

## 2025-07-03 PROCEDURE — 99284 EMERGENCY DEPT VISIT MOD MDM: CPT

## 2025-07-03 PROCEDURE — 96361 HYDRATE IV INFUSION ADD-ON: CPT

## 2025-07-03 PROCEDURE — 85025 COMPLETE CBC W/AUTO DIFF WBC: CPT | Performed by: EMERGENCY MEDICINE

## 2025-07-03 PROCEDURE — 84443 ASSAY THYROID STIM HORMONE: CPT | Performed by: EMERGENCY MEDICINE

## 2025-07-03 PROCEDURE — 80053 COMPREHEN METABOLIC PANEL: CPT | Performed by: EMERGENCY MEDICINE

## 2025-07-03 PROCEDURE — 36415 COLL VENOUS BLD VENIPUNCTURE: CPT | Performed by: EMERGENCY MEDICINE

## 2025-07-03 PROCEDURE — 80143 DRUG ASSAY ACETAMINOPHEN: CPT | Performed by: EMERGENCY MEDICINE

## 2025-07-03 PROCEDURE — 80179 DRUG ASSAY SALICYLATE: CPT | Performed by: EMERGENCY MEDICINE

## 2025-07-03 PROCEDURE — 82077 ASSAY SPEC XCP UR&BREATH IA: CPT | Performed by: EMERGENCY MEDICINE

## 2025-07-03 PROCEDURE — 93010 ELECTROCARDIOGRAM REPORT: CPT | Performed by: INTERNAL MEDICINE

## 2025-07-03 PROCEDURE — 83690 ASSAY OF LIPASE: CPT | Performed by: EMERGENCY MEDICINE

## 2025-07-03 RX ORDER — ATORVASTATIN CALCIUM 40 MG/1
40 TABLET, FILM COATED ORAL
Status: DISCONTINUED | OUTPATIENT
Start: 2025-07-03 | End: 2025-07-09 | Stop reason: HOSPADM

## 2025-07-03 RX ORDER — IBUPROFEN 400 MG/1
200 TABLET, FILM COATED ORAL EVERY 6 HOURS PRN
Status: DISCONTINUED | OUTPATIENT
Start: 2025-07-03 | End: 2025-07-09 | Stop reason: HOSPADM

## 2025-07-03 RX ORDER — LORAZEPAM 2 MG/ML
1 INJECTION INTRAMUSCULAR ONCE
Status: COMPLETED | OUTPATIENT
Start: 2025-07-03 | End: 2025-07-03

## 2025-07-03 RX ORDER — LISINOPRIL 5 MG/1
5 TABLET ORAL DAILY
Status: DISCONTINUED | OUTPATIENT
Start: 2025-07-04 | End: 2025-07-09 | Stop reason: HOSPADM

## 2025-07-03 RX ORDER — OLANZAPINE 2.5 MG/1
2.5 TABLET, FILM COATED ORAL
Status: DISCONTINUED | OUTPATIENT
Start: 2025-07-03 | End: 2025-07-09 | Stop reason: HOSPADM

## 2025-07-03 RX ORDER — LORAZEPAM 1 MG/1
1 TABLET ORAL
Status: DISCONTINUED | OUTPATIENT
Start: 2025-07-03 | End: 2025-07-09 | Stop reason: HOSPADM

## 2025-07-03 RX ORDER — NADOLOL 20 MG/1
20 TABLET ORAL DAILY
Status: DISCONTINUED | OUTPATIENT
Start: 2025-07-04 | End: 2025-07-09 | Stop reason: HOSPADM

## 2025-07-03 RX ORDER — IBUPROFEN 600 MG/1
600 TABLET, FILM COATED ORAL EVERY 8 HOURS PRN
Status: DISCONTINUED | OUTPATIENT
Start: 2025-07-03 | End: 2025-07-09 | Stop reason: HOSPADM

## 2025-07-03 RX ORDER — AMOXICILLIN 250 MG
1 CAPSULE ORAL DAILY PRN
Status: DISCONTINUED | OUTPATIENT
Start: 2025-07-03 | End: 2025-07-09 | Stop reason: HOSPADM

## 2025-07-03 RX ORDER — POLYETHYLENE GLYCOL 3350 17 G/17G
17 POWDER, FOR SOLUTION ORAL DAILY PRN
Status: DISCONTINUED | OUTPATIENT
Start: 2025-07-03 | End: 2025-07-05

## 2025-07-03 RX ORDER — OLANZAPINE 5 MG/1
5 TABLET, FILM COATED ORAL
Status: DISCONTINUED | OUTPATIENT
Start: 2025-07-03 | End: 2025-07-09 | Stop reason: HOSPADM

## 2025-07-03 RX ORDER — HYDROXYZINE HYDROCHLORIDE 50 MG/1
50 TABLET, FILM COATED ORAL
Status: DISCONTINUED | OUTPATIENT
Start: 2025-07-03 | End: 2025-07-09 | Stop reason: HOSPADM

## 2025-07-03 RX ORDER — HYDROXYZINE HYDROCHLORIDE 25 MG/1
25 TABLET, FILM COATED ORAL
Status: DISCONTINUED | OUTPATIENT
Start: 2025-07-03 | End: 2025-07-09 | Stop reason: HOSPADM

## 2025-07-03 RX ORDER — LORAZEPAM 2 MG/ML
1 INJECTION INTRAMUSCULAR
Status: DISCONTINUED | OUTPATIENT
Start: 2025-07-03 | End: 2025-07-09 | Stop reason: HOSPADM

## 2025-07-03 RX ORDER — TRAZODONE HYDROCHLORIDE 50 MG/1
50 TABLET ORAL
Status: DISCONTINUED | OUTPATIENT
Start: 2025-07-03 | End: 2025-07-09 | Stop reason: HOSPADM

## 2025-07-03 RX ORDER — IBUPROFEN 400 MG/1
400 TABLET, FILM COATED ORAL EVERY 6 HOURS PRN
Status: DISCONTINUED | OUTPATIENT
Start: 2025-07-03 | End: 2025-07-09 | Stop reason: HOSPADM

## 2025-07-03 RX ORDER — OLANZAPINE 10 MG/2ML
5 INJECTION, POWDER, FOR SOLUTION INTRAMUSCULAR
Status: DISCONTINUED | OUTPATIENT
Start: 2025-07-03 | End: 2025-07-09 | Stop reason: HOSPADM

## 2025-07-03 RX ADMIN — SODIUM CHLORIDE 1000 ML: 0.9 INJECTION, SOLUTION INTRAVENOUS at 11:23

## 2025-07-03 RX ADMIN — LORAZEPAM 1 MG: 2 INJECTION INTRAMUSCULAR; INTRAVENOUS at 11:23

## 2025-07-03 RX ADMIN — SERTRALINE HYDROCHLORIDE 50 MG: 50 TABLET ORAL at 16:38

## 2025-07-03 RX ADMIN — Medication 6 MG: at 21:10

## 2025-07-03 RX ADMIN — ATORVASTATIN CALCIUM 40 MG: 40 TABLET, FILM COATED ORAL at 16:39

## 2025-07-03 RX ADMIN — SITAGLIPTIN 100 MG: 100 TABLET, FILM COATED ORAL at 16:38

## 2025-07-03 NOTE — ED PROVIDER NOTES
Time reflects when diagnosis was documented in both MDM as applicable and the Disposition within this note       Time User Action Codes Description Comment    7/3/2025 11:56 AM Check, Grover Add [R10.12] Left upper quadrant abdominal pain     7/3/2025 11:57 AM Check, Grover Add [F41.9] Anxiety     7/3/2025 11:57 AM Check, Grover Modify [R10.12] Left upper quadrant abdominal pain     7/3/2025 11:57 AM Check, Grover Modify [F41.9] Anxiety           ED Disposition       ED Disposition   Transfer to Behavioral Health Condition   --    Date/Time   Thu Jul 3, 2025  1:13 PM    Comment   Rishi Kirk should be transferred out to [facility] and has been medically cleared.               Assessment & Plan       Medical Decision Making  65-year-old male presents to the emergency department for evaluation of abdominal pain.  On exam he is anxious appearing, but in no acute distress.  Initial vital signs patient was found to be significantly hypertensive and tachycardic however this subsequently improved without intervention.  Suspect his symptoms are all secondary to anxiety, differential diagnosis also includes but is not limited to arrhythmia, GERD, gastritis, dehydration, metabolic derangement, do not believe his symptoms are related to hydroxyzine overdose or anticholinergic syndrome.  Plan to check EKG, labs, treat with IV fluids and Ativan and reassess.    Labs grossly unremarkable, symptoms improved following medications.  He is medically cleared for crisis evaluation.  Do not believe repeat imaging of his abdomen and pelvis is necessary at this time as he had a CAT scan performed yesterday which was negative for any acute findings.  This patient is not a candidate for partial program I do believe he would benefit from inpatient psychiatric admission.    Problems Addressed:  Anxiety: acute illness or injury  Left upper quadrant abdominal pain: acute illness or injury    Amount and/or Complexity of Data Reviewed  External  Data Reviewed: labs and notes.  Labs: ordered. Decision-making details documented in ED Course.  ECG/medicine tests: ordered and independent interpretation performed.    Risk  OTC drugs.  Prescription drug management.  Decision regarding hospitalization.        ED Course as of 07/03/25 1403   Thu Jul 03, 2025   1003 EKG interpreted by myself demonstrates sinus tachycardia with prolonged QT interval, left axis deviation, normal ST segment and T waves   1013 Blood Pressure: 144/76   1013 Pulse: 76   1013 Respirations: 20   1013 SpO2: 95 %   1114 Workup delayed secondary to difficulty obtaining IV access   1139 Urinalysis negative for infection   1156 ETHANOL: <10   1156 Blood Pressure: 136/99   1156 Pulse: 89   1156 Respirations: 20   1156 SpO2: 97 %   1157 Total Bilirubin(!): 1.47  Improved from prior       Medications   sodium chloride 0.9 % bolus 1,000 mL (0 mL Intravenous Stopped 7/3/25 1223)   LORazepam (ATIVAN) injection 1 mg (1 mg Intravenous Given 7/3/25 1123)       ED Risk Strat Scores                    No data recorded        SBIRT 20yo+      Flowsheet Row Most Recent Value   Initial Alcohol Screen: US AUDIT-C     1. How often do you have a drink containing alcohol? 0 Filed at: 07/03/2025 0902   2. How many drinks containing alcohol do you have on a typical day you are drinking?  0 Filed at: 07/03/2025 0902   3a. Male UNDER 65: How often do you have five or more drinks on one occasion? 0 Filed at: 07/03/2025 0902   3b. FEMALE Any Age, or MALE 65+: How often do you have 4 or more drinks on one occassion? 0 Filed at: 07/03/2025 0902   Audit-C Score 0 Filed at: 07/03/2025 0902   GRAYSON: How many times in the past year have you...    Used an illegal drug or used a prescription medication for non-medical reasons? Never Filed at: 07/03/2025 0902                            History of Present Illness       Chief Complaint   Patient presents with    Abdominal Pain     Felt he had a knot in his stomach last night and  anxiety. Took unknown amount of hydroxyzine last night. Reports left sided abd pain today. Denies trying to OD on meds, was just trying to feel better       Past Medical History[1]   Past Surgical History[2]   Family History[3]   Social History[4]   E-Cigarette/Vaping    E-Cigarette Use Never User       E-Cigarette/Vaping Substances    Nicotine No     THC No     CBD No     Flavoring No     Other No     Unknown No       I have reviewed and agree with the history as documented.     65-year-old male with anxiety presents to the emergency department for evaluation of left upper quadrant abdominal pain.  Patient describes it as a knot that has been constant since last night.  He is concerned that it is related to taking too many of his hydroxyzine.  He states yesterday evening around 10 PM took 5-6 hydroxyzine tablets in an attempt to help with his anxiety and then developed the symptoms.  He states this was not an attempt at self-harm, but just to feel better.  He denies any SI or HI.  Denies any alcohol or drug use.  No headache, chest pain, shortness of breath.  No nausea or vomiting.    History taken utilizing  as patient is primarily Slovak-speaking        Review of Systems   Constitutional:  Negative for chills and fever.   HENT:  Negative for ear pain and sore throat.    Eyes:  Negative for pain and visual disturbance.   Respiratory:  Negative for cough and shortness of breath.    Cardiovascular:  Negative for chest pain and palpitations.   Gastrointestinal:  Positive for abdominal pain. Negative for vomiting.   Genitourinary:  Negative for dysuria and hematuria.   Musculoskeletal:  Negative for arthralgias and back pain.   Skin:  Negative for color change and rash.   Neurological:  Negative for seizures and syncope.   Psychiatric/Behavioral:  The patient is nervous/anxious.    All other systems reviewed and are negative.          Objective       ED Triage Vitals [07/03/25 0904]   Temperature Pulse  Blood Pressure Respirations SpO2 Patient Position - Orthostatic VS   97.9 °F (36.6 °C) (!) 132 (!) 210/121 20 97 % Sitting      Temp Source Heart Rate Source BP Location FiO2 (%) Pain Score    Oral Monitor Left arm -- --      Vitals      Date and Time Temp Pulse SpO2 Resp BP Pain Score FACES Pain Rating User   07/03/25 1129 -- 89 97 % 20 136/99 -- -- NR   07/03/25 1010 -- 76 95 % 20 144/76 -- -- NR   07/03/25 0904 97.9 °F (36.6 °C) 132 97 % 20 210/121 -- -- TW            Physical Exam  Vitals and nursing note reviewed.   Constitutional:       General: He is not in acute distress.     Appearance: He is well-developed.   HENT:      Head: Normocephalic and atraumatic.      Right Ear: External ear normal.      Left Ear: External ear normal.      Nose: Nose normal.      Mouth/Throat:      Mouth: Mucous membranes are moist.     Eyes:      General: No scleral icterus.     Extraocular Movements: Extraocular movements intact.      Conjunctiva/sclera: Conjunctivae normal.      Pupils: Pupils are equal, round, and reactive to light.       Cardiovascular:      Rate and Rhythm: Normal rate and regular rhythm.      Heart sounds: Normal heart sounds. No murmur heard.  Pulmonary:      Effort: Pulmonary effort is normal. No respiratory distress.      Breath sounds: Normal breath sounds.   Abdominal:      Palpations: Abdomen is soft.      Tenderness: There is abdominal tenderness in the left upper quadrant. There is no right CVA tenderness, left CVA tenderness, guarding or rebound.     Musculoskeletal:         General: No deformity. Normal range of motion.      Cervical back: Normal range of motion and neck supple.     Skin:     General: Skin is warm and dry.      Capillary Refill: Capillary refill takes less than 2 seconds.     Neurological:      General: No focal deficit present.      Mental Status: He is alert and oriented to person, place, and time.      Comments: No clonus   Psychiatric:         Mood and Affect: Mood normal.       Comments: No SI or HI         Results Reviewed       Procedure Component Value Units Date/Time    Salicylate level [[id number]]  (Abnormal) Collected: 07/03/25 1122    Lab Status: Final result Specimen: Blood from Arm, Right Updated: 07/03/25 1242     Salicylate Lvl <5 mg/dL     Acetaminophen level-If concentration is detectable, please discuss with medical  on call. [[id number]]  (Abnormal) Collected: 07/03/25 1122    Lab Status: Final result Specimen: Blood from Arm, Right Updated: 07/03/25 1242     Acetaminophen Level <2 ug/mL     TSH, 3rd generation with Free T4 reflex [[id number]]  (Normal) Collected: 07/03/25 1122    Lab Status: Final result Specimen: Blood from Arm, Right Updated: 07/03/25 1209     TSH 3RD GENERATION 0.646 uIU/mL     Comprehensive metabolic panel [[id number]]  (Abnormal) Collected: 07/03/25 1122    Lab Status: Final result Specimen: Blood from Arm, Right Updated: 07/03/25 1157     Sodium 137 mmol/L      Potassium 3.7 mmol/L      Chloride 101 mmol/L      CO2 26 mmol/L      ANION GAP 10 mmol/L      BUN 7 mg/dL      Creatinine 0.94 mg/dL      Glucose 119 mg/dL      Calcium 9.2 mg/dL      AST 32 U/L      ALT 31 U/L      Alkaline Phosphatase 115 U/L      Total Protein 7.4 g/dL      Albumin 4.1 g/dL      Total Bilirubin 1.47 mg/dL      eGFR 84 ml/min/1.73sq m     Narrative:      National Kidney Disease Foundation guidelines for Chronic Kidney Disease (CKD):     Stage 1 with normal or high GFR (GFR > 90 mL/min/1.73 square meters)    Stage 2 Mild CKD (GFR = 60-89 mL/min/1.73 square meters)    Stage 3A Moderate CKD (GFR = 45-59 mL/min/1.73 square meters)    Stage 3B Moderate CKD (GFR = 30-44 mL/min/1.73 square meters)    Stage 4 Severe CKD (GFR = 15-29 mL/min/1.73 square meters)    Stage 5 End Stage CKD (GFR <15 mL/min/1.73 square meters)  Note: GFR calculation is accurate only with a steady state creatinine    Lipase [[id number]]  (Normal) Collected: 07/03/25 1122    Lab Status: Final result  Specimen: Blood from Arm, Right Updated: 07/03/25 1157     Lipase 17 u/L     Ethanol [[id number]]  (Normal) Collected: 07/03/25 1122    Lab Status: Final result Specimen: Blood from Arm, Right Updated: 07/03/25 1153     Ethanol Lvl <10 mg/dL     CBC and differential [[id number]]  (Abnormal) Collected: 07/03/25 1122    Lab Status: Final result Specimen: Blood from Arm, Right Updated: 07/03/25 1135     WBC 6.20 Thousand/uL      RBC 4.62 Million/uL      Hemoglobin 14.2 g/dL      Hematocrit 41.9 %      MCV 91 fL      MCH 30.7 pg      MCHC 33.9 g/dL      RDW 12.8 %      MPV 8.7 fL      Platelets 181 Thousands/uL      nRBC 0 /100 WBCs      Segmented % 63 %      Immature Grans % 0 %      Lymphocytes % 29 %      Monocytes % 7 %      Eosinophils Relative 0 %      Basophils Relative 1 %      Absolute Neutrophils 3.91 Thousands/µL      Absolute Immature Grans 0.01 Thousand/uL      Absolute Lymphocytes 1.82 Thousands/µL      Absolute Monocytes 0.43 Thousand/µL      Eosinophils Absolute 0.00 Thousand/µL      Basophils Absolute 0.03 Thousands/µL     UA (URINE) with reflex to Scope [[id number]]  (Normal) Collected: 07/03/25 0927    Lab Status: Final result Specimen: Urine, Clean Catch Updated: 07/03/25 1011     Color, UA Yellow     Clarity, UA Clear     Specific Gravity, UA 1.005     pH, UA 6.5     Leukocytes, UA Negative     Nitrite, UA Negative     Protein, UA Negative mg/dl      Glucose, UA Negative mg/dl      Ketones, UA Negative mg/dl      Bilirubin, UA Negative     Occult Blood, UA Negative     UROBILINOGEN UA Negative mg/dL     Rapid drug screen, urine [[id number]]  (Abnormal) Collected: 07/03/25 0927    Lab Status: Final result Specimen: Urine, Clean Catch Updated: 07/03/25 1011     Amph/Meth UR Negative     Barbiturate Ur Negative     Benzodiazepine Urine Negative     Cocaine Urine Negative     Methadone Urine Positive     Opiate Urine Negative     PCP Ur Negative     THC Urine Negative     Oxycodone Urine Negative      Fentanyl Urine Negative     HYDROCODONE URINE Negative    Narrative:      Presumptive report. If requested, specimen will be sent to reference lab for confirmation.  FOR MEDICAL PURPOSES ONLY.   IF CONFIRMATION NEEDED PLEASE CONTACT THE LAB WITHIN 5 DAYS.    Drug Screen Cutoff Levels:  AMPHETAMINE/METHAMPHETAMINES  1000 ng/mL  BARBITURATES     200 ng/mL  BENZODIAZEPINES     200 ng/mL  COCAINE      300 ng/mL  METHADONE      300 ng/mL  OPIATES      300 ng/mL  PHENCYCLIDINE     25 ng/mL  THC       50 ng/mL  OXYCODONE      100 ng/mL  FENTANYL      5 ng/mL  HYDROCODONE     300 ng/mL            No orders to display       Procedures    ED Medication and Procedure Management   Prior to Admission Medications   Prescriptions Last Dose Informant Patient Reported? Taking?   Comfort Touch Plus Lancets 30G MISC   No No   Sig: Inject 1 Application under the skin Daily at 2am   GNP Alcohol Swabs 70 % PADS   No No   Sig: TEST 2-3 TIMES A DAY AS DIRECTEDTEST 2-3 TIMES A DAY AS DIRECTED   Melatonin 5 MG CHEW   No No   Sig: Chew 10 mg daily at bedtime   ammonium lactate (LAC-HYDRIN) 12 % lotion   No No   Sig: Apply topically 2 (two) times a day   atorvastatin (LIPITOR) 40 mg tablet   No No   Sig: TAKE ONE TABLET BY MOUTH DAILYTOMAR 1 TABLETA POR VIA ORAL DIARIAMENTE   glucose blood (FREESTYLE LITE) test strip   No No   Sig: Use as instructed   hydrOXYzine HCL (ATARAX) 25 mg tablet   No No   Sig: Take 1 tablet (25 mg total) by mouth every 6 (six) hours as needed for anxiety   lisinopril (ZESTRIL) 5 mg tablet   No No   Sig: TAKE ONE TABLET BY MOUTH DAILYTOMAR 1 TABLETA POR VIA ORAL DIARIAMENTE   lubiprostone (AMITIZA) 24 mcg capsule   No No   Sig: Take 1 capsule (24 mcg total) by mouth 2 (two) times a day with meals   metFORMIN (GLUCOPHAGE-XR) 500 mg 24 hr tablet   Yes No   Sig: TAKE 1 TABLET (500 MG TOTAL) BY MOUTH DAILY WITH DINNER TOME 1 TABLET (500 MG TOTAL) BY MOUTH DAILY WITH DINNER   methadone (DOLOPHINE) 10 mg/mL oral  concentrated solution  Self Yes No   Sig: Take 44 mg by mouth in the morning.   nadolol (CORGARD) 20 mg tablet   No No   Sig: Take 1 tablet (20 mg total) by mouth daily   polyethylene glycol (GLYCOLAX) 17 GM/SCOOP powder   No No   Sig: Take 17 g by mouth daily   polyethylene glycol (MIRALAX) 17 g packet   No No   Sig: Take 17 g by mouth daily   sertraline (ZOLOFT) 50 mg tablet   No No   Sig: Take 1 tablet (50 mg total) by mouth daily   sitaGLIPtin (JANUVIA) 100 mg tablet   No No   Sig: Take 1 tablet (100 mg total) by mouth daily      Facility-Administered Medications: None     Patient's Medications   Discharge Prescriptions    No medications on file     No discharge procedures on file.  ED SEPSIS DOCUMENTATION   Time reflects when diagnosis was documented in both MDM as applicable and the Disposition within this note       Time User Action Codes Description Comment    7/3/2025 11:56 AM Check, Grover Add [R10.12] Left upper quadrant abdominal pain     7/3/2025 11:57 AM CheckGrover Add [F41.9] Anxiety     7/3/2025 11:57 AM Check, Grover Modify [R10.12] Left upper quadrant abdominal pain     7/3/2025 11:57 AM Check, Grover Modify [F41.9] Anxiety                      [1]   Past Medical History:  Diagnosis Date    Addiction to drug (HCC)     Alcohol abuse     Alcoholism (HCC)     Anxiety     Chronic kidney disease     Cirrhosis (HCC)     Colon polyp     CPAP (continuous positive airway pressure) dependence     used for 2 months only    Diabetes mellitus (HCC)     Erectile dysfunction     Since 2 or 3 years    Esophageal varices (HCC)     Follow-up after circumcision 04/23/2025    Gastritis     Heart disease     Hepatitis C     History of Helicobacter pylori infection 02/23/2016    History of kidney stones     Hyperlipidemia     Hypertension     Infectious viral hepatitis     Kidney stone     Liver cancer (HCC)     Obesity     Pneumonia     PPD positive 2020    Screening for prostate cancer 04/23/2025    Sleep apnea      Sleep difficulties     Splenomegaly     Substance abuse (HCC)     Testosterone deficiency     Withdrawal symptoms, drug or narcotic (HCC)    [2]   Past Surgical History:  Procedure Laterality Date    COLONOSCOPY  2014    dr [email or web address]    COLONOSCOPY          EGD AND COLONOSCOPY  2020    esophageal varices, colon polyp, hemorrhoids    ESOPHAGOGASTRODUODENOSCOPY  12/10/2015    esophageal varices, cirrhosis, gastritis    HYDROCELE EXCISION / REPAIR Left     teste    LIVER BIOPSY  2014    MN CIRCUMCISION AGE >28 DAYS N/A 2025    Procedure: CIRCUMCISION ADULT;  Surgeon: Malik Baires MD;  Location:  MAIN OR;  Service: Urology   [3]   Family History  Problem Relation Name Age of Onset    Diabetes type II Mother Viotalines Kirk         MELLITUS    Hypertension Mother Viotalines Kirk     Diabetes Mother Viotalines Kirk     Hypertension Sister Eli Jad     Diabetes Sister Eli Jad         MELLITUS    No Known Problems Maternal Grandmother      No Known Problems Maternal Grandfather      No Known Problems Paternal Grandmother      No Known Problems Paternal Grandfather      No Known Problems Sister      Breast cancer Maternal Aunt Neuly Reyes         40s    No Known Problems Maternal Aunt      No Known Problems Paternal Aunt      No Known Problems Paternal Aunt      No Known Problems Paternal Aunt      No Known Problems Paternal Aunt      No Known Problems Paternal Aunt      No Known Problems Paternal Aunt     [4]   Social History  Tobacco Use    Smoking status: Former     Current packs/day: 0.00     Average packs/day: 1 pack/day for 41.7 years (41.3 ttl pk-yrs)     Types: Cigarettes     Start date:      Quit date: 10/1/2024     Years since quittin.7     Passive exposure: Current    Smokeless tobacco: Never    Tobacco comments:     TOBACCO USE   Vaping Use    Vaping status: Never Used   Substance Use Topics    Alcohol use: Not Currently    Drug use: Not Currently     Types: Heroin      Comment: former user- heroin was drug of choice; Overuse of Methadone        Grover Alberts MD  07/03/25 2346

## 2025-07-03 NOTE — NURSING NOTE
"Patient admitted to unit on a 201 status from  ED. Per records pt admitted d/t increased anxiety overnight, leading him to take more than what was prescribed of his hydroxyzine and sertraline. On admission patient adamantly denies SI/HI. States, \"I believe in God and would never do that.\" Alert and oriented x 4. Lives alone in a private residence. Denies current use of tobacco and alcohol. Reports that he has been compliant with all medications as ordered except for Januvia. Displays a steady gait. Skin check completed and dry bilateral heels noted along with generalized moles. Speech is normal and relaxed. Calm and cooperative. Medical history significant for HTN and DMT2. Psychiatric history significant for anxiety, depression, and history of substance/alcohol abuse. Oriented to the unit. Q 15 minute safety checks initiated.    "

## 2025-07-03 NOTE — ED NOTES
This RN and ED CC unable to obtain US guided PIV, Dr. Alberts notified and will attempt     Mckenna Monreal RN  07/03/25 7758

## 2025-07-03 NOTE — ED CARE HANDOFF
Emergency Department Sign Out Note        Sign out and transfer of care from Dr. Alberts. See Separate Emergency Department note.     The patient, Rishi Kirk, was evaluated by the previous provider for .    Workup Completed:  Medical clearance    ED Course / Workup Pending (followup):  Pending placement        No data recorded                             Procedures  Medical Decision Making  Amount and/or Complexity of Data Reviewed  Labs: ordered.    Risk  OTC drugs.  Prescription drug management.  Decision regarding hospitalization.            Disposition  Final diagnoses:   Left upper quadrant abdominal pain   Anxiety     Time reflects when diagnosis was documented in both MDM as applicable and the Disposition within this note       Time User Action Codes Description Comment    7/3/2025 11:56 AM Check, Grover Add [R10.12] Left upper quadrant abdominal pain     7/3/2025 11:57 AM Check, Grover Add [F41.9] Anxiety     7/3/2025 11:57 AM CheckGrover Modify [R10.12] Left upper quadrant abdominal pain     7/3/2025 11:57 AM CheckGrover Modify [F41.9] Anxiety           ED Disposition       ED Disposition   Transfer to Behavioral Health Condition   --    Date/Time   Thu Jul 3, 2025  1:13 PM    Comment   Rishi Kirk should be transferred out to [facility] and has been medically cleared.               MD Documentation      Flowsheet Row Most Recent Value   Sending MD Grover Alberts MD          Follow-up Information    None       Patient's Medications   Discharge Prescriptions    No medications on file     No discharge procedures on file.       ED Provider  Electronically Signed by     Gautam Torres MD  07/03/25 8740

## 2025-07-03 NOTE — ED NOTES
The patient is a 64 y/o M who speaks primarily [place]alander but wanted his interview in English. Concurrent access to  Eli (#[id number]) was provided during the interview. 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 The patient  presented with severe anxiety which has not been remedied by his PCP prescribing, then adjusting medication, including Sertraline and Hydroxyzine. Patient stated he also perceives that his stomach is in a knot in the left lower quadrant. Patient related that he has been sleeping very little due to this anxiety. In an attempt to quell it, he stated that in the span of time between 10 PM and 4 AM today, he took about #5 or #6 Hydroxyzine 25 mg. In addition, he took #2 Sertraline 50 mg. He stated this was not an attempt to harm himself, but an attempt to get relief from anxiety and stomach discomfort. He stated his Adventism beliefs are a protective factor. He stated he has no appetite and has lost about 10 pounds over the last week and-a half. He was recently medically cleared by his PCP via CT of his abdomen and pelvis and multiple labs. The patient does admit to depressed mood but cannot identify specific triggers. He denied HI. The patient denied Hallucinations abnd there is no evidence of delusional thinking. His PCP had planned to increase Zoloft next week. The patient was referred to [facility] however, he is primarily [place]ander-speaking, so is not a candidate. In addition, the wait list for outpatient psychiatry is more than a month. In discussing the case with the ED attending, the patient does not have timely access to outpatient psychiatric care. Inpatient is, therefore, recommended. Explained to the patient the terms of a voluntary admission agreement. He had the opportunity to ask questions. A 201 was signed by the patient and Grover Alberts MD, the ED attending. The referral was sent to  Intake.

## 2025-07-03 NOTE — ED NOTES
Patient is accepted at 6B.  Patient is accepted by Dr. Chris Lopez.   Patient may go to the floor at 1800.          Nurse report is to be called to [phone] prior to patient transfer.

## 2025-07-03 NOTE — ED NOTES
CED signed by the patient to obtain confirmation of Methadone dose from [facility]. Phone call placed to RN desk at [phone]. Spoke with Reshma. She stated the patient received a 27-day supply of 44 mg Methadone. CED faxed to MIMI Justice, at [phone]. Fax confirmation received. She will send hard copy of dosing.

## 2025-07-03 NOTE — TREATMENT TEAM
07/03/25 1859   Provider Notification   Reason for Communication Admission   Provider Name Little Webb   Provider Role Hospitalist   Method of Communication Other (Comment)  (Epic secure chat)   Response Waiting for response   Notification Time 1859     Notified providers that PTA med list reviewed with patient.

## 2025-07-03 NOTE — TELEPHONE ENCOUNTER
Pt came into the office stated he would like to be admitted to the hospital spoke with PCP in the office pt was advise to go to ED to be admitted for anxiety pt understands and stated he will head to ED now.

## 2025-07-03 NOTE — ED NOTES
Attempted PIV without success, patient states he usually requires US guided IV, will attempted US guided PIV     Mckenna Monreal, RN  07/03/25 2362

## 2025-07-03 NOTE — PLAN OF CARE
Problem: PAIN - ADULT  Goal: Verbalizes/displays adequate comfort level or baseline comfort level  Description: Interventions:  - Encourage patient to monitor pain and request assistance  - Assess pain using appropriate pain scale  - Administer analgesics as ordered based on type and severity of pain and evaluate response  - Implement non-pharmacological measures as appropriate and evaluate response  - Consider cultural and social influences on pain and pain management  - Notify physician/advanced practitioner if interventions unsuccessful or patient reports new pain  - Educate patient/family on pain management process including their role and importance of  reporting pain   - Provide non-pharmacologic/complimentary pain relief interventions  Outcome: Progressing     Problem: INFECTION - ADULT  Goal: Absence or prevention of progression during hospitalization  Description: INTERVENTIONS:  - Assess and monitor for signs and symptoms of infection  - Monitor lab/diagnostic results  - Monitor all insertion sites, i.e. indwelling lines, tubes, and drains  - Monitor endotracheal if appropriate and nasal secretions for changes in amount and color  - [place] appropriate cooling/warming therapies per order  - Administer medications as ordered  - Instruct and encourage patient and family to use good hand hygiene technique  - Identify and instruct in appropriate isolation precautions for identified infection/condition  Outcome: Progressing  Goal: Absence of fever/infection during neutropenic period  Description: INTERVENTIONS:  - Monitor WBC  - Perform strict hand hygiene  - Limit to healthy visitors only  - No plants, dried, fresh or silk flowers with stallworth in patient room  Outcome: Progressing     Problem: SAFETY ADULT  Goal: Patient will remain free of falls  Description: INTERVENTIONS:  - Keep Call bell within reach  - Keep bed low and locked with side rails adjusted as appropriate  - Keep care items and personal  belongings within reach  - Initiate and maintain comfort rounds  Outcome: Progressing  Goal: Maintain or return to baseline ADL function  Description: INTERVENTIONS:  -  Assess patient's ability to carry out ADLs; assess patient's baseline for ADL function and identify physical deficits which impact ability to perform ADLs (bathing, care of mouth/teeth, toileting, grooming, dressing, etc.)  - Assess/evaluate cause of self-care deficits   - Assess range of motion  - Assess patient's mobility; develop plan if impaired  - Assess patient's need for assistive devices and provide as appropriate  - Encourage maximum independence but intervene and supervise when necessary  - Involve family in performance of ADLs  - Assess for home care needs following discharge   - Consider OT consult to assist with ADL evaluation and planning for discharge  - Provide patient education as appropriate  - Monitor functional capacity and physical performance, use of AM PAC & JH-HLM   - Monitor gait, balance and fatigue with ambulation    Outcome: Progressing  Goal: Maintains/Returns to pre admission functional level  Description: INTERVENTIONS:  - Set and communicate daily mobility goal to care team and patient/family/caregiver.   - Record patient progress and toleration of activity level   Outcome: Progressing     Problem: DISCHARGE PLANNING  Goal: Discharge to home or other facility with appropriate resources  Description: INTERVENTIONS:  - Identify barriers to discharge w/patient and caregiver  - Arrange for needed discharge resources and transportation as appropriate  - Identify discharge learning needs (meds, wound care, etc.)  - Arrange for interpretive services to assist at discharge as needed  - Refer to Case Management Department for coordinating discharge planning if the patient needs post-hospital services based on physician/advanced practitioner order or complex needs related to functional status, cognitive ability, or social  support system  Outcome: Progressing     Problem: Knowledge Deficit  Goal: Patient/family/caregiver demonstrates understanding of disease process, treatment plan, medications, and discharge instructions  Description: Complete learning assessment and assess knowledge base.  Interventions:  - Provide teaching at level of understanding  - Provide teaching via preferred learning methods  Outcome: Progressing     Problem: Alteration in Thoughts and Perception  Goal: Treatment Goal: Gain control of psychotic behaviors/thinking, reduce/eliminate presenting symptoms and demonstrate improved reality functioning upon discharge  Outcome: Progressing  Goal: Verbalize thoughts and feelings  Description: Interventions:  - Promote a nonjudgmental and trusting relationship with the patient through active listening and therapeutic communication  - Assess patient's level of functioning, behavior and potential for risk  - Engage patient in 1 on 1 interactions  - Encourage patient to express fears, feelings, frustrations, and discuss symptoms    - [place] patient to reality, help patient recognize reality-based thinking   - Administer medications as ordered and assess for potential side effects  - Provide the patient education related to the signs and symptoms of the illness and desired effects of prescribed medications  Outcome: Progressing  Goal: Refrain from acting on delusional thinking/internal stimuli  Description: Interventions:  - Monitor patient closely, per order   - Utilize least restrictive measures   - Set reasonable limits, give positive feedback for acceptable   - Administer medications as ordered and monitor of potential side effects  Outcome: Progressing  Goal: Agree to be compliant with medication regime, as prescribed and report medication side effects  Description: Interventions:  - Offer appropriate PRN medication and supervise ingestion; conduct AIMS, as needed   Outcome: Progressing  Goal: Attend and participate  in unit activities, including therapeutic, recreational, and educational groups  Description: Interventions:  -Encourage Visitation and family involvement in care  Outcome: Progressing  Goal: Recognize dysfunctional thoughts, communicate reality-based thoughts at the time of discharge  Description: Interventions:  - Provide medication and psycho-education to assist patient in compliance and developing insight into his/her illness   Outcome: Progressing  Goal: Complete daily ADLs, including personal hygiene independently, as able  Description: Interventions:  - Observe, teach, and assist patient with ADLS  - Monitor and promote a balance of rest/activity, with adequate nutrition and elimination   Outcome: Progressing     Problem: Ineffective Coping  Goal: Cooperates with admission process  Description: Interventions:   - Complete admission process  Outcome: Progressing  Goal: Identifies ineffective coping skills  Outcome: Progressing  Goal: Identifies healthy coping skills  Outcome: Progressing  Goal: Demonstrates healthy coping skills  Outcome: Progressing  Goal: Participates in unit activities  Description: Interventions:  - Provide therapeutic environment   - Provide required programming   - Redirect inappropriate behaviors   Outcome: Progressing  Goal: Patient/Family participate in treatment and DC plans  Description: Interventions:  - Provide therapeutic environment  Outcome: Progressing  Goal: Patient/Family verbalizes awareness of resources  Outcome: Progressing  Goal: Understands least restrictive measures  Description: Interventions:  - Utilize least restrictive behavior  Outcome: Progressing  Goal: Free from restraint events  Description: - Utilize least restrictive measures   - Provide behavioral interventions   - Redirect inappropriate behaviors   Outcome: Progressing     Problem: Anxiety  Goal: Anxiety is at manageable level  Description: Interventions:  - Assess and monitor patient's anxiety level.   -  Monitor for signs and symptoms (heart palpitations, chest pain, shortness of breath, headaches, nausea, feeling jumpy, restlessness, irritable, apprehensive).   - Collaborate with interdisciplinary team and initiate plan and interventions as ordered.  - [place] patient to unit/surroundings  - Explain treatment plan  - Encourage participation in care  - Encourage verbalization of concerns/fears  - Identify coping mechanisms  - Assist in developing anxiety-reducing skills  - Administer/offer alternative therapies  - Limit or eliminate stimulants  Outcome: Progressing

## 2025-07-03 NOTE — ED PROCEDURE NOTE
PROCEDURE  ECG 12 Lead Documentation Only    Date/Time: 7/3/2025 3:58 PM    Performed by: Gautam Torres MD  Authorized by: Gautam Torres MD    Indications / Diagnosis:    ECG reviewed by me, the ED Provider: yes    Patient location:  ED  Interpretation:     Interpretation: normal    Rate:     ECG rate:  73    ECG rate assessment: normal    Rhythm:     Rhythm: sinus rhythm    Ectopy:     Ectopy: none    QRS:     QRS axis:  Normal    QRS intervals:  Normal  Conduction:     Conduction: normal    ST segments:     ST segments:  Normal  T waves:     T waves: normal         Gautam Torres MD  07/03/25 1559

## 2025-07-04 PROBLEM — F41.1 GAD (GENERALIZED ANXIETY DISORDER): Status: ACTIVE | Noted: 2025-07-04

## 2025-07-04 PROBLEM — Z00.8 MEDICAL CLEARANCE FOR PSYCHIATRIC ADMISSION: Status: ACTIVE | Noted: 2025-07-04

## 2025-07-04 PROBLEM — F33.2 SEVERE EPISODE OF RECURRENT MAJOR DEPRESSIVE DISORDER, WITHOUT PSYCHOTIC FEATURES (HCC): Status: ACTIVE | Noted: 2025-07-04

## 2025-07-04 LAB
25(OH)D3 SERPL-MCNC: 8.2 NG/ML (ref 30–100)
ALBUMIN SERPL BCG-MCNC: 4 G/DL (ref 3.5–5)
ALP SERPL-CCNC: 116 U/L (ref 34–104)
ALT SERPL W P-5'-P-CCNC: 34 U/L (ref 7–52)
ANION GAP SERPL CALCULATED.3IONS-SCNC: 10 MMOL/L (ref 4–13)
AST SERPL W P-5'-P-CCNC: 36 U/L (ref 13–39)
ATRIAL RATE: 94 BPM
BASOPHILS # BLD AUTO: 0.03 THOUSANDS/ÂΜL (ref 0–0.1)
BASOPHILS NFR BLD AUTO: 1 % (ref 0–1)
BILIRUB SERPL-MCNC: 1.17 MG/DL (ref 0.2–1)
BUN SERPL-MCNC: 7 MG/DL (ref 5–25)
CALCIUM SERPL-MCNC: 9.1 MG/DL (ref 8.4–10.2)
CHLORIDE SERPL-SCNC: 102 MMOL/L (ref 96–108)
CHOLEST SERPL-MCNC: 105 MG/DL (ref ?–200)
CO2 SERPL-SCNC: 24 MMOL/L (ref 21–32)
CREAT SERPL-MCNC: 0.89 MG/DL (ref 0.6–1.3)
EOSINOPHIL # BLD AUTO: 0.02 THOUSAND/ÂΜL (ref 0–0.61)
EOSINOPHIL NFR BLD AUTO: 0 % (ref 0–6)
ERYTHROCYTE [DISTWIDTH] IN BLOOD BY AUTOMATED COUNT: 12.7 % (ref 11.6–15.1)
EST. AVERAGE GLUCOSE BLD GHB EST-MCNC: 163 MG/DL
FOLATE SERPL-MCNC: 11.6 NG/ML
GFR SERPL CREATININE-BSD FRML MDRD: 89 ML/MIN/1.73SQ M
GLUCOSE P FAST SERPL-MCNC: 148 MG/DL (ref 65–99)
GLUCOSE SERPL-MCNC: 131 MG/DL (ref 65–140)
GLUCOSE SERPL-MCNC: 134 MG/DL (ref 65–140)
GLUCOSE SERPL-MCNC: 148 MG/DL (ref 65–140)
HBA1C MFR BLD: 7.3 %
HCT VFR BLD AUTO: 46.9 % (ref 36.5–49.3)
HDLC SERPL-MCNC: 58 MG/DL
HGB BLD-MCNC: 15.8 G/DL (ref 12–17)
IMM GRANULOCYTES # BLD AUTO: 0.02 THOUSAND/UL (ref 0–0.2)
IMM GRANULOCYTES NFR BLD AUTO: 0 % (ref 0–2)
LDLC SERPL CALC-MCNC: 28 MG/DL (ref 0–100)
LYMPHOCYTES # BLD AUTO: 1.63 THOUSANDS/ÂΜL (ref 0.6–4.47)
LYMPHOCYTES NFR BLD AUTO: 29 % (ref 14–44)
MAGNESIUM SERPL-MCNC: 1.9 MG/DL (ref 1.9–2.7)
MCH RBC QN AUTO: 30.6 PG (ref 26.8–34.3)
MCHC RBC AUTO-ENTMCNC: 33.7 G/DL (ref 31.4–37.4)
MCV RBC AUTO: 91 FL (ref 82–98)
MONOCYTES # BLD AUTO: 0.38 THOUSAND/ÂΜL (ref 0.17–1.22)
MONOCYTES NFR BLD AUTO: 7 % (ref 4–12)
NEUTROPHILS # BLD AUTO: 3.63 THOUSANDS/ÂΜL (ref 1.85–7.62)
NEUTS SEG NFR BLD AUTO: 63 % (ref 43–75)
NRBC BLD AUTO-RTO: 0 /100 WBCS
P AXIS: 51 DEGREES
PHOSPHATE SERPL-MCNC: 3.2 MG/DL (ref 2.3–4.1)
PLATELET # BLD AUTO: 169 THOUSANDS/UL (ref 149–390)
PMV BLD AUTO: 9.3 FL (ref 8.9–12.7)
POTASSIUM SERPL-SCNC: 3.6 MMOL/L (ref 3.5–5.3)
PR INTERVAL: 186 MS
PROT SERPL-MCNC: 7 G/DL (ref 6.4–8.4)
QRS AXIS: -7 DEGREES
QRSD INTERVAL: 80 MS
QT INTERVAL: 374 MS
QTC INTERVAL: 468 MS
RBC # BLD AUTO: 5.16 MILLION/UL (ref 3.88–5.62)
SODIUM SERPL-SCNC: 136 MMOL/L (ref 135–147)
T WAVE AXIS: 12 DEGREES
T4 FREE SERPL-MCNC: 0.82 NG/DL (ref 0.61–1.12)
TRIGL SERPL-MCNC: 95 MG/DL (ref ?–150)
TSH SERPL DL<=0.05 MIU/L-ACNC: 0.32 UIU/ML (ref 0.45–4.5)
VENTRICULAR RATE: 94 BPM
VIT B12 SERPL-MCNC: 460 PG/ML (ref 180–914)
WBC # BLD AUTO: 5.71 THOUSAND/UL (ref 4.31–10.16)

## 2025-07-04 PROCEDURE — 82306 VITAMIN D 25 HYDROXY: CPT

## 2025-07-04 PROCEDURE — 80053 COMPREHEN METABOLIC PANEL: CPT

## 2025-07-04 PROCEDURE — 84443 ASSAY THYROID STIM HORMONE: CPT

## 2025-07-04 PROCEDURE — 83735 ASSAY OF MAGNESIUM: CPT

## 2025-07-04 PROCEDURE — 82746 ASSAY OF FOLIC ACID SERUM: CPT

## 2025-07-04 PROCEDURE — 93010 ELECTROCARDIOGRAM REPORT: CPT | Performed by: INTERNAL MEDICINE

## 2025-07-04 PROCEDURE — 80061 LIPID PANEL: CPT

## 2025-07-04 PROCEDURE — 99232 SBSQ HOSP IP/OBS MODERATE 35: CPT | Performed by: STUDENT IN AN ORGANIZED HEALTH CARE EDUCATION/TRAINING PROGRAM

## 2025-07-04 PROCEDURE — 84100 ASSAY OF PHOSPHORUS: CPT

## 2025-07-04 PROCEDURE — 83036 HEMOGLOBIN GLYCOSYLATED A1C: CPT

## 2025-07-04 PROCEDURE — 84439 ASSAY OF FREE THYROXINE: CPT

## 2025-07-04 PROCEDURE — 85025 COMPLETE CBC W/AUTO DIFF WBC: CPT

## 2025-07-04 PROCEDURE — 99223 1ST HOSP IP/OBS HIGH 75: CPT | Performed by: STUDENT IN AN ORGANIZED HEALTH CARE EDUCATION/TRAINING PROGRAM

## 2025-07-04 PROCEDURE — 82607 VITAMIN B-12: CPT

## 2025-07-04 PROCEDURE — 82948 REAGENT STRIP/BLOOD GLUCOSE: CPT

## 2025-07-04 RX ORDER — METHADONE HYDROCHLORIDE 10 MG/ML
44 CONCENTRATE ORAL DAILY
Refills: 0 | Status: DISCONTINUED | OUTPATIENT
Start: 2025-07-04 | End: 2025-07-09 | Stop reason: HOSPADM

## 2025-07-04 RX ORDER — GABAPENTIN 300 MG/1
300 CAPSULE ORAL 3 TIMES DAILY
Status: DISCONTINUED | OUTPATIENT
Start: 2025-07-04 | End: 2025-07-07

## 2025-07-04 RX ORDER — BISACODYL 10 MG
10 SUPPOSITORY, RECTAL RECTAL DAILY PRN
Status: DISCONTINUED | OUTPATIENT
Start: 2025-07-04 | End: 2025-07-05

## 2025-07-04 RX ORDER — QUETIAPINE FUMARATE 25 MG/1
25 TABLET, FILM COATED ORAL
Status: DISCONTINUED | OUTPATIENT
Start: 2025-07-04 | End: 2025-07-09 | Stop reason: HOSPADM

## 2025-07-04 RX ADMIN — NADOLOL 20 MG: 20 TABLET ORAL at 09:15

## 2025-07-04 RX ADMIN — QUETIAPINE FUMARATE 25 MG: 25 TABLET ORAL at 21:45

## 2025-07-04 RX ADMIN — GABAPENTIN 300 MG: 300 CAPSULE ORAL at 10:01

## 2025-07-04 RX ADMIN — GABAPENTIN 300 MG: 300 CAPSULE ORAL at 16:49

## 2025-07-04 RX ADMIN — METHADONE HYDROCHLORIDE 44 MG: 10 CONCENTRATE ORAL at 09:16

## 2025-07-04 RX ADMIN — LISINOPRIL 5 MG: 5 TABLET ORAL at 09:15

## 2025-07-04 RX ADMIN — SERTRALINE HYDROCHLORIDE 50 MG: 50 TABLET ORAL at 09:15

## 2025-07-04 RX ADMIN — GABAPENTIN 300 MG: 300 CAPSULE ORAL at 21:45

## 2025-07-04 RX ADMIN — ATORVASTATIN CALCIUM 40 MG: 40 TABLET, FILM COATED ORAL at 16:49

## 2025-07-04 RX ADMIN — Medication 6 MG: at 21:45

## 2025-07-04 RX ADMIN — METFORMIN HYDROCHLORIDE 500 MG: 500 TABLET, FILM COATED ORAL at 16:49

## 2025-07-04 RX ADMIN — SITAGLIPTIN 100 MG: 100 TABLET, FILM COATED ORAL at 09:15

## 2025-07-04 NOTE — ASSESSMENT & PLAN NOTE
Per chart review, sleep study conducted on 9/21/2021 shows adequately remediated with positive airway pressure at 12cm H2O.   Patient has not been compliant with CPAP.    Plan:  Monitor saturation closely

## 2025-07-04 NOTE — CONSULTS
Inpatient Consultation - [facility]    Patient Information: Rishi Kirk 65 y.o. male MRN: [id number]  Unit/Bed#: [facility] 643-01 Encounter: [id number]  PCP: Soren Trivedi MD  Date of Admission:  7/3/2025  Date of Consultation: 07/04/25  Requesting Physician: Christopher Gray MD    Reason For Consultation:   Medical clearance    Assessment/Plan:    Medical clearance for psychiatric admission  Assessment & Plan  65-year-old male with PMH of depression, anxiety, opioid use in remission with MAT, EtOH use in sustained remission presented with severe anxiety.  Patient was then admitted to [facility] for management.  Consulted for medical clearance.  Patient medically cleared from our standpoint.  Vital signs reviewed stable with no acute concerns.    Lab work reviewed and notable for low TSH likely subclinical hyperthyroidism considering previous labs being normal.  Will follow-up with T4 and reassess.  Pending labs: T4, A1c, CBC, folate, vitamin B12, vitamin D 25OH    Plan:  Follow-up pending lab    ERIC (generalized anxiety disorder)  Assessment & Plan  Per primary team    Anxiety  Assessment & Plan  Per primary team    Alcoholic cirrhosis of liver without ascites (HCC)  Assessment & Plan  CT AP (7/02/25): Notable for cirrhotic morphology of liver.  US RUQ (4/15/2025): Cirrhotic morphology of the liver with surface nodularity and coarsening of parenchymal echotexture with trace fluid adjacent to the gallbladder fossa.  Routine 6-month US surveillance.  Patient will be due in October.  Current regimen: Not below 20 mg daily.    Plan:  Continue current regimen  Consider RUQ US surveillance in 3 months (October)  Low-sodium; <2 g  Monitor for signs and symptoms of decompensation        RODRICK (obstructive sleep apnea)  Assessment & Plan  Per chart review, sleep study conducted on 9/21/2021 shows adequately remediated with positive airway pressure at 12cm H2O.   Patient has not been compliant with  CPAP.    Plan:  Monitor saturation closely      Type 2 diabetes mellitus without complication, without long-term current use of insulin (HCC)  Assessment & Plan  Lab Results   Component Value Date    HGBA1C 7.8 (A) 06/17/2025   A1c slightly above goal.  Goal <7.5  ASCVD score 23.5%.  Lipid panel (11/19/2024): Cholesterol/TG/HDL/LDL==> 122/113/57/42.  LDL well-controlled.  Will recheck lipid panel.  Current regimen: Metformin 5 mg daily, Januvia 100 mg daily, Lipitor 40 mg daily    Plan:  Continue current regimen  Monitor glucose  Carb-controlled diet  Follow-up lipid panel  Recheck A1c in 2 months    Benign essential hypertension  Assessment & Plan  BP Readings from Last 3 Encounters:   07/04/25 143/94   07/02/25 136/80   07/02/25 133/84   Slightly elevated otherwise well-controlled.  Current regimen: Lisinopril 5 mg daily    Plan:  Continue current regimen  Continue to monitor vital signs    * Severe episode of recurrent major depressive disorder, without psychotic features (HCC)  Assessment & Plan  Per primary team        VTE Prophylaxis: Reason for no pharmacologic prophylaxis ambulating       Counseling / Coordination of Care Time: 30 minutes.  Greater than 50% of total time spent on patient counseling and coordination of care.    Collaboration of Care: Were Recommendations Directly Discussed with Primary Treatment Team? - Yes     Patient Information Sharing:  Rishi Kirk does not wish inpatient team to notify their loved ones of their condition and current plan.     History of Present Illness:    Rishi Kirk is a 65 y.o. male with PMHx of depression, anxiety, opioid use in remission with MAT, and alcohol use in sustained remission. The patient  presented with severe anxiety.  Patient admitted to [facility] for management.     We are consulted for medical clearance.     Review of Systems:    Review of Systems   Constitutional:  Negative for chills and fever.   HENT:  Negative for ear pain and sore throat.    Eyes:   Negative for pain and visual disturbance.   Respiratory:  Negative for cough, chest tightness and shortness of breath.    Cardiovascular:  Negative for chest pain, palpitations and leg swelling.   Gastrointestinal:  Positive for constipation. Negative for abdominal pain and vomiting.   Genitourinary:  Negative for dysuria and hematuria.   Musculoskeletal:  Negative for arthralgias and back pain.   Skin:  Negative for color change and rash.   Neurological:  Negative for dizziness, seizures, syncope, weakness and light-headedness.   All other systems reviewed and are negative.      Past Medical and Surgical History:   Past Medical History[1]  Past Surgical History[2]  Meds/Allergies:  Allergies: Allergies[3]  Prior to Admission Medications   Prescriptions Last Dose Informant Patient Reported? Taking?   Comfort Touch Plus Lancets 30G MISC 7/2/2025 Self No Yes   Sig: Inject 1 Application under the skin Daily at 2am   GNP Alcohol Swabs 70 % PADS 7/2/2025 Self No Yes   Sig: TEST 2-3 TIMES A DAY AS DIRECTEDTEST 2-3 TIMES A DAY AS DIRECTED   Melatonin 5 MG CHEW 7/2/2025 Self No Yes   Sig: Chew 10 mg daily at bedtime   ammonium lactate (LAC-HYDRIN) 12 % lotion 7/2/2025 Self No Yes   Sig: Apply topically 2 (two) times a day   atorvastatin (LIPITOR) 40 mg tablet 7/2/2025 Self No Yes   Sig: TAKE ONE TABLET BY MOUTH DAILYTOMAR 1 TABLETA POR VIA ORAL DIARIAMENTE   glucose blood (FREESTYLE LITE) test strip 7/2/2025 Self No Yes   Sig: Use as instructed   hydrOXYzine HCL (ATARAX) 25 mg tablet 7/3/2025 Self No Yes   Sig: Take 1 tablet (25 mg total) by mouth every 6 (six) hours as needed for anxiety   lisinopril (ZESTRIL) 5 mg tablet 7/2/2025 Self No Yes   Sig: TAKE ONE TABLET BY MOUTH DAILYTOMAR 1 TABLETA POR VIA ORAL DIARIAMENTE   lubiprostone (AMITIZA) 24 mcg capsule 7/2/2025 Self No Yes   Sig: Take 1 capsule (24 mcg total) by mouth 2 (two) times a day with meals   metFORMIN (GLUCOPHAGE-XR) 500 mg 24 hr tablet 7/2/2025 Self Yes  Yes   Sig: TAKE 1 TABLET (500 MG TOTAL) BY MOUTH DAILY WITH DINNER TOME 1 TABLET (500 MG TOTAL) BY MOUTH DAILY WITH DINNER   methadone (DOLOPHINE) 10 mg/mL oral concentrated solution 2025 Self Yes Yes   Sig: Take 44 mg by mouth in the morning.   nadolol (CORGARD) 20 mg tablet 2025 Self No Yes   Sig: Take 1 tablet (20 mg total) by mouth daily   polyethylene glycol (GLYCOLAX) 17 GM/SCOOP powder  Self No No   Sig: Take 17 g by mouth daily   polyethylene glycol (MIRALAX) 17 g packet 7/3/2025 Self No Yes   Sig: Take 17 g by mouth daily   sertraline (ZOLOFT) 50 mg tablet 7/3/2025 Self No Yes   Sig: Take 1 tablet (50 mg total) by mouth daily   sitaGLIPtin (JANUVIA) 100 mg tablet Not Taking Self No No   Sig: Take 1 tablet (100 mg total) by mouth daily   Patient not taking: Reported on 7/3/2025      Facility-Administered Medications: None     Social History:     Social History     Socioeconomic History    Marital status: Single     Spouse name: Not on file    Number of children: Not on file    Years of education: Not on file    Highest education level: Not on file   Occupational History    Not on file   Tobacco Use    Smoking status: Former     Current packs/day: 0.00     Average packs/day: 1 pack/day for 41.7 years (41.3 ttl pk-yrs)     Types: Cigarettes     Start date:      Quit date: 10/1/2024     Years since quittin.7     Passive exposure: Current    Smokeless tobacco: Never    Tobacco comments:     TOBACCO USE   Vaping Use    Vaping status: Never Used   Substance and Sexual Activity    Alcohol use: Not Currently     Comment: Sustained remission    Drug use: Not Currently     Types: Heroin     Comment: former user- heroin was drug of choice; Overuse of Methadone    Sexual activity: Not Currently     Partners: Female   Other Topics Concern    Not on file   Social History Narrative    Not on file     Social Drivers of Health     Financial Resource Strain: Low Risk  (2023)    Overall Financial Resource  "Strain (CARDIA)     Difficulty of Paying Living Expenses: Not hard at all   Food Insecurity: No Food Insecurity (7/3/2025)    Nursing - Inadequate Food Risk Classification     Worried About Running Out of Food in the Last Year: Never true     Ran Out of Food in the Last Year: Never true     Ran Out of Food in the Last Year: Never true   Transportation Needs: No Transportation Needs (7/3/2025)    Nursing - Transportation Risk Classification     Lack of Transportation: Not on file     Lack of Transportation: No   Physical Activity: Not on file   Stress: Not on file   Social Connections: Not on file   Intimate Partner Violence: Unknown (7/3/2025)    Nursing IPS     Feels Physically and Emotionally Safe: Not on file     Physically Hurt by Someone: Not on file     Humiliated or Emotionally Abused by Someone: Not on file     Physically Hurt by Someone: No     Hurt or Threatened by Someone: No   Housing Stability: Unknown (7/3/2025)    Nursing: Inadequate Housing Risk Classification     Has Housing: Not on file     Worried About Losing Housing: Not on file     Unable to Get Utilities: Not on file     Unable to Pay for Housing in the Last Year: No     Has Housing: No       Family History:  Family History[4]    Physical Exam:     Vitals:   Blood Pressure: 143/94 (07/04/25 0854)  Pulse: 91 (07/04/25 0854)  Temperature: 97.5 °F (36.4 °C) (07/03/25 2039)  Temp Source: Temporal (07/03/25 2039)  Respirations: 18 (07/03/25 2039)  Height: 5' 5\" (165.1 cm) (07/03/25 1838)  Weight - Scale: 88.1 kg (194 lb 4.8 oz) (07/03/25 1838)  SpO2: 94 % (07/03/25 2039)    Physical Exam  Constitutional:       Appearance: Normal appearance.   HENT:      Head: Normocephalic and atraumatic.      Nose: Nose normal.      Mouth/Throat:      Mouth: Mucous membranes are moist.     Eyes:      Pupils: Pupils are equal, round, and reactive to light.       Cardiovascular:      Rate and Rhythm: Normal rate and regular rhythm.      Pulses: Normal pulses.      " Heart sounds: Normal heart sounds.   Pulmonary:      Effort: Pulmonary effort is normal.      Breath sounds: Normal breath sounds.   Abdominal:      General: Bowel sounds are normal.      Palpations: Abdomen is soft.     Musculoskeletal:         General: Normal range of motion.      Cervical back: Normal range of motion.     Skin:     Capillary Refill: Capillary refill takes less than 2 seconds.     Neurological:      General: No focal deficit present.      Mental Status: He is alert and oriented to person, place, and time. Mental status is at baseline.         Additional Data:     Lab Results:    Results from last 7 days   Lab Units 07/04/25  0759   WBC Thousand/uL 5.71   HEMOGLOBIN g/dL 15.8   HEMATOCRIT % 46.9   PLATELETS Thousands/uL 169   SEGS PCT % 63   LYMPHO PCT % 29   MONO PCT % 7   EOS PCT % 0     Results from last 7 days   Lab Units 07/04/25  0514   POTASSIUM mmol/L 3.6   CHLORIDE mmol/L 102   CO2 mmol/L 24   BUN mg/dL 7   CREATININE mg/dL 0.89   CALCIUM mg/dL 9.1   ALK PHOS U/L 116*   ALT U/L 34   AST U/L 36           Imaging:     CT abdomen pelvis with contrast  Result Date: 7/2/2025  Narrative: CT ABDOMEN AND PELVIS WITH IV CONTRAST INDICATION: left flank pain. . COMPARISON: 2/12/2025. TECHNIQUE: CT examination of the abdomen and pelvis was performed. Multiplanar 2D reformatted images were created from the source data. This examination, like all CT scans performed in the [facility], was performed utilizing techniques to minimize radiation dose exposure, including the use of iterative reconstruction and automated exposure control. Radiation dose length product (DLP) for this visit: 659 mGy-cm. IV Contrast: 85 mL of iohexol (OMNIPAQUE) Enteric Contrast: Not administered. FINDINGS: ABDOMEN LOWER CHEST: Subcentimeter right anterior cardiophrenic angle region lymph nodes are seen. No clinically significant abnormality in the visualized lower chest. LIVER/BILIARY TREE: Cirrhotic morphology  again seen. No suspicious mass within study limitations. No biliary dilation. Portal vein is patent. GALLBLADDER: Cholelithiasis without findings of acute cholecystitis. SPLEEN: Unremarkable. PANCREAS: Unremarkable. ADRENAL GLANDS: Unremarkable. KIDNEYS/URETERS: 8 mm nonobstructing calculus in the lower pole of the right kidney is again seen. No suspicious renal mass,, hydronephrosis, or perinephric fluid collections bilaterally. Mild symmetrical bilateral perinephric stranding again seen which may be sequela of medical renal disease. STOMACH AND BOWEL: Stomach is contracted limiting evaluation. No gross intraluminal mass. Small and large bowel loops are normal in course and caliber without obstruction or inflammation. Terminal ileum and appendix are grossly unremarkable. APPENDIX: No findings to suggest appendicitis. ABDOMINOPELVIC CAVITY: No ascites no loculated fluid collection. No pneumoperitoneum. No lymphadenopathy. VESSELS: Scattered calcific atherosclerosis without abdominal aortic aneurysm. PELVIS REPRODUCTIVE ORGANS: Unremarkable for patient's age. URINARY BLADDER: Mildly distended without intraluminal mass or calculi. Subtle diffuse bladder wall thickening could be related to under distention or cystitis, recommend correlation with urinalysis. ABDOMINAL WALL/INGUINAL REGIONS: Unremarkable. BONES: No acute fracture or suspicious osseous lesion. Multilevel degenerative changes of the visualized lower lumbar spine and bilateral hip joints.     Impression: 1.  Cirrhotic morphology of the liver again seen. 2.  Cholelithiasis without evidence for acute cholecystitis or significant biliary ductal dilatation. 3.  8 mm nonobstructing calculus in the lower pole of the right kidney is again seen. No hydronephrosis bilaterally.  Mild symmetrical bilateral perinephric stranding again seen which may be sequela of medical renal disease. 4.  No evidence of bowel obstruction, inflammation, appendicitis, free air, or free  fluid. 5.  Subtle diffuse urinary bladder wall thickening could be related to under distention or cystitis, recommend correlation with urinalysis. Workstation performed: [id number]     XR abdomen complete inc upright and/or decubitus  Result Date: 7/1/2025  Narrative: XR ABDOMEN COMPLETE INC UPRIGHT AND/OR DECUBITUS INDICATION: constipated. COMPARISON: None FINDINGS: Nonobstructive bowel gas pattern. No pneumoperitoneum. No evidence of soft tissue mass. Left proximal hamstring calcification. Lumbar spondylosis. Clear lung bases.     Impression: Nonobstructive bowel gas pattern. Workstation performed: [id number]       EKG, Pathology, and Other Studies Reviewed on Admission:   EKG  Result Date: 07/04/25  Impression:  NSR, HR 94pbm, Qtc 468    ** Please Note: This note has been constructed using a voice recognition system. **    Jaclyn Luna MD  07/04/25  1:29 PM        [1]   Past Medical History:  Diagnosis Date    Addiction to drug (HCC)     Alcohol abuse     Alcoholism (HCC)     Anxiety     Chronic kidney disease     Cirrhosis (HCC)     Colon polyp     CPAP (continuous positive airway pressure) dependence     used for 2 months only    Depression     Diabetes mellitus (HCC)     Erectile dysfunction     Since 2 or 3 years    Esophageal varices (HCC)     Follow-up after circumcision 04/23/2025    Gastritis     Heart disease     Hepatitis C     History of Helicobacter pylori infection 02/23/2016    History of kidney stones     Hyperlipidemia     Hypertension     Infectious viral hepatitis     Kidney stone     Liver cancer (HCC)     Obesity     Pneumonia     PPD positive 2020    Screening for prostate cancer 04/23/2025    Sleep apnea     Sleep difficulties     Splenomegaly     Substance abuse (HCC)     Testosterone deficiency     Withdrawal symptoms, drug or narcotic (HCC)    [2]   Past Surgical History:  Procedure Laterality Date    COLONOSCOPY  06/20/2014    dr [email or web address]    COLONOSCOPY  2014        EGD  AND COLONOSCOPY  08/2020    esophageal varices, colon polyp, hemorrhoids    ESOPHAGOGASTRODUODENOSCOPY  12/10/2015    esophageal varices, cirrhosis, gastritis    HYDROCELE EXCISION / REPAIR Left     teste    LIVER BIOPSY  2014    NC CIRCUMCISION AGE >28 DAYS N/A 4/9/2025    Procedure: CIRCUMCISION ADULT;  Surgeon: Malik Baires MD;  Location:  MAIN OR;  Service: Urology   [3]   Allergies  Allergen Reactions    Meperidine Abdominal Pain   [4]   Family History  Problem Relation Name Age of Onset    Diabetes type II Mother Viotalines Kirk         MELLITUS    Hypertension Mother Viotalines Kirk     Diabetes Mother Viotalines Kirk     Hypertension Sister Eli Jad     Diabetes Sister Eli Jad         MELLITUS    No Known Problems Maternal Grandmother      No Known Problems Maternal Grandfather      No Known Problems Paternal Grandmother      No Known Problems Paternal Grandfather      No Known Problems Sister      Breast cancer Maternal Aunt Neuly Reyes         40s    No Known Problems Maternal Aunt      No Known Problems Paternal Aunt      No Known Problems Paternal Aunt      No Known Problems Paternal Aunt      No Known Problems Paternal Aunt      No Known Problems Paternal Aunt      No Known Problems Paternal Aunt

## 2025-07-04 NOTE — ASSESSMENT & PLAN NOTE
>>ASSESSMENT AND PLAN FOR ERIC (GENERALIZED ANXIETY DISORDER) WRITTEN ON 7/4/2025 11:06 AM BY AGUSTIN HUYNH MD    Per primary team     >>ASSESSMENT AND PLAN FOR ANXIETY WRITTEN ON 7/4/2025 11:19 AM BY AGUSTIN HUYNH MD    Per primary team

## 2025-07-04 NOTE — ASSESSMENT & PLAN NOTE
BP Readings from Last 3 Encounters:   07/04/25 143/94   07/02/25 136/80   07/02/25 133/84   Slightly elevated otherwise well-controlled.  Current regimen: Lisinopril 5 mg daily    Plan:  Continue current regimen  Continue to monitor vital signs

## 2025-07-04 NOTE — ASSESSMENT & PLAN NOTE
Lab Results   Component Value Date    HGBA1C 7.8 (A) 06/17/2025   A1c slightly above goal.  Goal <7.5  ASCVD score 23.5%.  Lipid panel (11/19/2024): Cholesterol/TG/HDL/LDL==> 122/113/57/42.  LDL well-controlled.  Will recheck lipid panel.  Current regimen: Metformin 5 mg daily, Januvia 100 mg daily, Lipitor 40 mg daily    Plan:  Continue current regimen  Monitor glucose  Carb-controlled diet  Follow-up lipid panel  Recheck A1c in 2 months

## 2025-07-04 NOTE — H&P
"H&P - Behavioral Health   Name: Rishi Kirk 65 y.o. male I MRN: [id number]  Unit/Bed#: [facility] 643-01 I Date of Admission: 7/3/2025   Date of Service: 7/4/2025 I Hospital Day: 1     Assessment & Plan  Severe episode of recurrent major depressive disorder, without psychotic features (HCC)  A 65 y.o. Sahil male, Single (never ), domiciled alone, on disability, w/ PMH of alcoholic cirrhosis, portal HTN, umbilical hernia, chronic hepatitis C treated in 2014, DM, coronary artery calcification, gallbladder polyp, nephrolithiasis, RODRICK, vitamin B12 deficiency, and PPH of depression, anxiety, polysubstance use disorder in remission (heroin, cocaine, alcohol and tobacco), on methadone maintenance therapy (44 ml/day as confirmed with [facility]), no prior psychiatric admissions, no prior SA, no h/o self-injurious behavior, on Zoloft 50 mg daily and Atarax woth two ED visited on 6/30/25 and 7/2/25 for somatic sxs and anxiety and then presented to the ED on 7/3/25, c/o \"Felt he had a knot in his stomach last night and anxiety. Took unknown amount of hydroxyzine last night. Reports left sided abd pain today. Denies trying to OD on meds, was just trying to feel better\". The patient signed 201 and got admitted to the inpatient psychiatry unit 6B for further psychiatric stabilization.      - Zoloft 50 mg daily; doses to be adjusted as indicated  - Start Neurontin 100 mg TID for anxiety and pain; doses to be adjusted as indicated  - Seroquel 25 mg nightly for mood sxs and sleep; doses to be adjusted as indicated  - Melatonin 3 mg nightly to adjust the circadian rhythm  - Group and milieu therapy  ERIC (generalized anxiety disorder)  - Management as per principal problem    Methadone maintenance therapy patient (HCC)  ED CW's confirmed Methadone dose from [facility]. Phone call placed to RN desk at [phone]. Spoke with Reshma. She stated the patient received a 27-day supply of 44 mg Methadone.   Alcoholic cirrhosis " of liver without ascites (HCC)  - f/u [facility] recs     Current Medications:    Current Facility-Administered Medications:     atorvastatin (LIPITOR) tablet 40 mg, Oral, Daily With Dinner    gabapentin (NEURONTIN) capsule 300 mg, Oral, TID    lisinopril (ZESTRIL) tablet 5 mg, Oral, Daily    melatonin tablet 6 mg, Oral, HS    metFORMIN (GLUCOPHAGE) tablet 500 mg, Oral, Daily With Dinner    methadone (DOLOPHINE) oral concentrated solution 44 mg, Oral, Daily    nadolol (CORGARD) tablet 20 mg, Oral, Daily    QUEtiapine (SEROquel) tablet 25 mg, Oral, HS    sertraline (ZOLOFT) tablet 50 mg, Oral, Daily    sitaGLIPtin (JANUVIA) tablet 100 mg, Oral, Daily    Current Facility-Administered Medications:     bisacodyl (DULCOLAX) rectal suppository 10 mg, Rectal, Daily PRN    hydrOXYzine HCL (ATARAX) tablet 25 mg, Oral, Q6H PRN Max 4/day    hydrOXYzine HCL (ATARAX) tablet 50 mg, Oral, Q6H PRN Max 4/day    ibuprofen (MOTRIN) tablet 200 mg, Oral, Q6H PRN    ibuprofen (MOTRIN) tablet 400 mg, Oral, Q6H PRN    ibuprofen (MOTRIN) tablet 600 mg, Oral, Q8H PRN    LORazepam (ATIVAN) injection 1 mg, Intramuscular, Q6H PRN Max 3/day    LORazepam (ATIVAN) tablet 1 mg, Oral, Q6H PRN Max 3/day    nicotine polacrilex (NICORETTE) gum 2 mg, Oral, Q4H PRN    OLANZapine (ZyPREXA) IM injection 5 mg, Intramuscular, Q3H PRN Max 3/day    OLANZapine (ZyPREXA) tablet 2.5 mg, Oral, Q4H PRN Max 6/day    OLANZapine (ZyPREXA) tablet 5 mg, Oral, Q4H PRN Max 3/day    OLANZapine (ZyPREXA) tablet 5 mg, Oral, Q3H PRN Max 3/day    polyethylene glycol (MIRALAX) packet 17 g, Oral, Daily PRN    senna-docusate sodium (SENOKOT S) 8.6-50 mg per tablet 1 tablet, Oral, Daily PRN    traZODone (DESYREL) tablet 50 mg, Oral, HS PRN    Risks/Benefits of Treatment:     Risks, benefits, and possible side effects of medications explained to patient and patient verbalizes understanding and agreement for treatment.    Treatment Planning:      - Encourage early mobility and having a  "structured day  - Provide frequent re-orientation, and cognitive stimulation  - Ensure assistive devices are in proper working order (eye-glasses, hearing aids)  - Encourage adequate hydration, nutrition and monitor bowel movements  - Maintain sleep-wake cycle: Uninterrupted sleep time; low-level lighting at night  - Fall precaution  - Follow up with [facility] regarding the medical problems   - Follow up labs  - Continue medication titration and treatment plan; adjust medication to optimize treatment response and as clinically indicated.   - Observation:Routine  - VS: as per unit protocol  - Encourage group attendance and milieu therapy  - Dispo: To be determined  - Estimated Discharge Day:  14 days (7/18/2025)  - Legal Status on Admission:Legal Status : Voluntary 201 commitment.    Psychiatric Evaluation    Chief Complaint: \"I am very anxious\"    History of Present Illness   Rishi Kirk is a 65 y.o. Iranian male, Single (never ), domiciled alone, on disability, w/ PMH of alcoholic cirrhosis, portal HTN, umbilical hernia, chronic hepatitis C treated in 2014, DM, coronary artery calcification, gallbladder polyp, nephrolithiasis, RODRICK, vitamin B12 deficiency, and PPH of depression, anxiety, polysubstance use disorder in remission (heroin, cocaine, alcohol and tobacco), on methadone maintenance therapy (44 ml/day as confirmed with [facility]), no prior psychiatric admissions, no prior SA, no h/o self-injurious behavior, on Zoloft 50 mg daily and Atarax who initially presented to the ED on 6/30/25 due to anxiety for past three days with abdominal pain, and then was BIB EMS to the ED on 7/2/25 due to difficulty urinating all day and onset of L flank pain and increased anxiety, and discharged home. The patient presented to the ED again on 7/3/25, c/o \"Felt he had a knot in his stomach last night and anxiety. Took unknown amount of hydroxyzine last night. Reports left sided abd pain today. Denies trying to OD on " "meds, was just trying to feel better\". The patient signed 201 and got admitted to the inpatient psychiatry unit 6B for further psychiatric stabilization.      As per ED report on 6/30: \"Anxiety for past 3 days. Denies SI/HI/AH/VH. Has been taking mirtazapine for months with little relief. States his PCP prescribed clonazepam which he took for 2 doses but it did not help his anxiety.\"    As per ED CW's note on 7/4/25: \"The patient is a 66 y/o M who speaks primarily Burundian but wanted his interview in English. Concurrent access to  Eli (#[id number]) was provided during the interview. 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 The patient  presented with severe anxiety which has not been remedied by his PCP prescribing, then adjusting medication, including Sertraline and Hydroxyzine. Patient stated he also perceives that his stomach is in a knot in the left lower quadrant. Patient related that he has been sleeping very little due to this anxiety. In an attempt to quell it, he stated that in the span of time between 10 PM and 4 AM today, he took about #5 or #6 Hydroxyzine 25 mg. In addition, he took #2 Sertraline 50 mg. He stated this was not an attempt to harm himself, but an attempt to get relief from anxiety and stomach discomfort. He stated his Sabianism beliefs are a protective factor. He stated he has no appetite and has lost about 10 pounds over the last week and-a half. He was recently medically cleared by his PCP via CT of his abdomen and pelvis and multiple labs. The patient does admit to depressed mood but cannot identify specific triggers. He denied HI. The patient denied Hallucinations abnd there is no evidence of delusional thinking. His PCP had planned to increase Zoloft next week. The patient was referred to [facility] however, he is primarily Burundian-speaking, so is not a candidate. In addition, the wait " "list for outpatient psychiatry is more than a month. In discussing the case with the ED attending, the patient does not have timely access to outpatient psychiatric care. Inpatient is, therefore, recommended. Explained to the patient the terms of a voluntary admission agreement. He had the opportunity to ask questions. A 201 was signed by the patient and Grover Alberts MD, the ED attending. \"    The patient was visited on the unit; chart reviewed. Presented calm, cooperative, dressed in hospital attire, w/ fair hygiene, good eye contact, anxious mood, constricted affect, talking in normal tone, volume and amount, w/ ruminating thought process, somatically preoccupied w/ limited insight and judgement. He reported feeling very anxious and overwhelmed in the context of somatic sxs and pain. He noted that he was started on Zoloft last Monday by his PCP, but remains anxious and reportedly Remeron and Klonopin did not help with his anxiety in the past. Reported depressed and anxious mood, with decreased appetite, poor sleep and energy level.  Strongly denied SI/HI, intent or plan upon direct inquiry at this time. Denied A/VH. No manic sxs, paranoid ideations or fixed delusions were elicited.  Denied history of eating disorder or OCD sxs.     Will f/u [facility] recs for medical issues. Restarted on confirmed dose of Methadone. Maintained on Zoloft 50 mg daily, started on Neurontin 100 mg TID and Seroquel 25 mg nightly; doses to be adjusted as indicated.    Psychiatric Review Of Systems:  Pertinent items are noted in HPI; all others negative    Historical Information     Past Psychiatric History:     Past Inpatient Psychiatric Treatment:   No history of past inpatient psychiatric admissions  Past Outpatient Psychiatric Treatment:    Currently in outpatient psychiatric treatment with a family physician  Past Suicide Attempts: no  Past Violent Behavior: no  Past Psychiatric Medication Trials: Zoloft, Remeron, Atarax, and " Klonopin    Substance Abuse History:    Social History       Tobacco History       Smoking Status  Former Smoking Start Date  1983 Quit Date  10/1/2024 Average Packs/Day  1 pack/day for 41.7 years (41.3 ttl pk-yrs) Smoking Tobacco Type  Cigarettes from 1983 to 10/1/2024   Pack Year History     Packs/Day From To Years    0 10/1/2024  0.8    0.25 4/3/2024 10/1/2024 0.5    0 3/1/2024 4/3/2024 0.1    1 1983 3/1/2024 41.2      Passive Exposure  Current      Smokeless Tobacco Use  Never      Tobacco Comments  TOBACCO USE              Alcohol History       Alcohol Use Status  Not Currently Drinks/Week  0 Glasses of wine, 0 Cans of beer, 0 Shots of liquor, 0 Standard drinks or equivalent per week Comment  Sustained remission              Drug Use       Drug Use Status  Not Currently Types  Heroin Frequency   0 times/week Comment  former user- heroin was drug of choice; Overuse of Methadone              Sexual Activity       Sexually Active  Not Currently Partners  Female              Other Factors    Not Asked                 Additional Substance Use Detail       Questions Responses    Problems Due to Past Use of Alcohol? Yes    Problems Due to Past Use of Substances? Yes    Substance Use Assessment Substance use within the past 12 months    Alcohol Use Frequency Past abuse    Heroin Frequency Past abuse    Cocaine frequency Never used    Comment:  Never used on 2/7/2025     Crack Cocaine Frequency Denies use in past 12 months    Methamphetamine Frequency Denies use in past 12 months    Narcotic Frequency Past abuse    Benzodiazepine Frequency Denies use in past 12 months    Amphetamine frequency Denies use in past 12 months    Barbituate Frequency Denies use use in past 12 months    Inhalant frequency Never used    Comment:  Never used on 2/7/2025     Hallucinogen frequency Never used    Comment:  Never used on 2/7/2025     Ecstasy frequency Never used    Comment:  Never used on 2/7/2025     Other drug frequency Never used     Comment:  Never used on 2/7/2025     Opiate frequency Past abuse    Last reviewed by Sarah Burgos RN on 7/3/2025          I have assessed this patient for substance use within the past 12 months    H/o polysubstance abuse (heroin, cocaine and alcohol) in remission since 2004 and on methadone maintenance therapy. Quit smoking in October 2024.    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)  Functioning Relationships: limited support system  Legal History: none   History: None  Access to firearms: none    Traumatic History:     Abuse:no history of sexual abuse, no history of physical abuse  Other Traumatic Events: none    Past Medical History[2]  Past Surgical History[3]  Meds/Allergies      Allergies   Allergen Reactions    Meperidine Abdominal Pain      Medical History Review: I have reviewed the patient's PMH, PSH, Social History, Family History, Meds, and Allergies     Objective :  Temp:  [97.5 °F (36.4 °C)] 97.5 °F (36.4 °C)  HR:  [] 91  BP: (125-143)/(72-99) 143/94  Resp:  [17-20] 18  SpO2:  [94 %-97 %] 94 %  O2 Device: None (Room air)      Mental Status Evaluation:  Appearance and attitude: appeared as stated age, cooperative and attentive, dressed in hospital attire, with good hygiene  Eye contact: good  Motor Function: within normal limits, No PMA/PMR  Gait/station: Not observed  Speech: normal for rate, rhythm, volume, latency, amount  Language: No overt abnormality  Mood/affect: depressed, anxious / Affect was constricted, increased in intensity, mood-congruent  Thought Processes: ruminating  Thought content: denied suicidal ideations or homicidal ideations, somatic  preoccupation, negative thinking, ruminations  Associations: concrete associations  Perceptual disturbances: denies Auditory/Visual/Tactile Hallucinations  Orientation: oriented to time, person, place and to the situational context  Cognitive Function: intact  Attention/Concentration: attention span and concentration appear shorter than expected for age  Memory: recent and remote memory grossly intact  Intellect: average  Fund of knowledge: aware of current events, aware of past history, and vocabulary average  Impulse control: good  Insight/judgment: limited/limited      Pain : reported having pain in L flank  Pain Scale : 9    Patient Strengths/Assets: negotiates basic needs, patient is on a voluntary commitment    Patient Barriers/Limitations: chronic pain, difficulty adapting, limited family ties, limited support system, medical problems, poor insight, poor interpersonal skills, poor physical health, poor reasoning ability    Suicide/Homicide Risk Assessment:    Risk of Harm to Self:   The following ratings are based on assessment at the time of the interview  Nursing Suicide Risk Assessment Last 24 hours: C-SSRS Risk (Since Last Contact)  Calculated C-SSRS Risk Score (Since Last Contact): No Risk Indicated  Demographic Risk Factors include: never , male, age: over 50 or older  Historical Risk Factors include: history of depression, history of anxiety, history of substance use  Current Specific Risk Factors include: current depressive symptoms, current anxiety symptoms, health problems, chronic pain  Protective Factors: no current suicidal ideation, ability to communicate with staff on the unit, able to seek out staff before acting on thoughts of harming self on the unit  Weapons/Firearms: none. The following steps have been taken to ensure weapons are properly secured: not applicable  Based on today's assessment, Rishi presents the following risk of harm to self: low    Risk of Harm to Others:  The  following ratings are based on assessment at the time of the interview  Based on today's assessment, Rishi presents the following risk of harm to others: low    The following interventions are recommended: Behavioral Health Checks for safety monitoring, continued hospitalization on locked unit      Lab Results: I have reviewed the following results:  Results from the past 24 hours:   Recent Results (from the past 24 hours)   CBC and differential    Collection Time: 07/03/25 11:22 AM   Result Value Ref Range    WBC 6.20 4.31 - 10.16 Thousand/uL    RBC 4.62 3.88 - 5.62 Million/uL    Hemoglobin 14.2 12.0 - 17.0 g/dL    Hematocrit 41.9 36.5 - 49.3 %    MCV 91 82 - 98 fL    MCH 30.7 26.8 - 34.3 pg    MCHC 33.9 31.4 - 37.4 g/dL    RDW 12.8 11.6 - 15.1 %    MPV 8.7 (L) 8.9 - 12.7 fL    Platelets 181 149 - 390 Thousands/uL    nRBC 0 /100 WBCs    Segmented % 63 43 - 75 %    Immature Grans % 0 0 - 2 %    Lymphocytes % 29 14 - 44 %    Monocytes % 7 4 - 12 %    Eosinophils Relative 0 0 - 6 %    Basophils Relative 1 0 - 1 %    Absolute Neutrophils 3.91 1.85 - 7.62 Thousands/µL    Absolute Immature Grans 0.01 0.00 - 0.20 Thousand/uL    Absolute Lymphocytes 1.82 0.60 - 4.47 Thousands/µL    Absolute Monocytes 0.43 0.17 - 1.22 Thousand/µL    Eosinophils Absolute 0.00 0.00 - 0.61 Thousand/µL    Basophils Absolute 0.03 0.00 - 0.10 Thousands/µL   Comprehensive metabolic panel    Collection Time: 07/03/25 11:22 AM   Result Value Ref Range    Sodium 137 135 - 147 mmol/L    Potassium 3.7 3.5 - 5.3 mmol/L    Chloride 101 96 - 108 mmol/L    CO2 26 21 - 32 mmol/L    ANION GAP 10 4 - 13 mmol/L    BUN 7 5 - 25 mg/dL    Creatinine 0.94 0.60 - 1.30 mg/dL    Glucose 119 65 - 140 mg/dL    Calcium 9.2 8.4 - 10.2 mg/dL    AST 32 13 - 39 U/L    ALT 31 7 - 52 U/L    Alkaline Phosphatase 115 (H) 34 - 104 U/L    Total Protein 7.4 6.4 - 8.4 g/dL    Albumin 4.1 3.5 - 5.0 g/dL    Total Bilirubin 1.47 (H) 0.20 - 1.00 mg/dL    eGFR 84 ml/min/1.73sq m    Lipase    Collection Time: 07/03/25 11:22 AM   Result Value Ref Range    Lipase 17 11 - 82 u/L   TSH, 3rd generation with Free T4 reflex    Collection Time: 07/03/25 11:22 AM   Result Value Ref Range    TSH 3RD GENERATION 0.646 0.450 - 4.500 uIU/mL   Ethanol    Collection Time: 07/03/25 11:22 AM   Result Value Ref Range    Ethanol Lvl <10 <10 mg/dL   Salicylate level    Collection Time: 07/03/25 11:22 AM   Result Value Ref Range    Salicylate Lvl <5 (L) 5 - 20 mg/dL   Acetaminophen level-If concentration is detectable, please discuss with medical  on call.    Collection Time: 07/03/25 11:22 AM   Result Value Ref Range    Acetaminophen Level <2 (L) 10 - 20 ug/mL   ECG 12 lead    Collection Time: 07/03/25  3:57 PM   Result Value Ref Range    Ventricular Rate 73 BPM    Atrial Rate 73 BPM    SD Interval 184 ms    QRSD Interval 82 ms    QT Interval 434 ms    QTC Interval 479 ms    P Axis 56 degrees    QRS Axis -2 degrees    T Wave Axis 26 degrees   ECG 12 lead    Collection Time: 07/03/25  7:25 PM   Result Value Ref Range    Ventricular Rate 94 BPM    Atrial Rate 94 BPM    SD Interval 186 ms    QRSD Interval 80 ms    QT Interval 374 ms    QTC Interval 468 ms    P Axis 51 degrees    QRS Axis -7 degrees    T Wave Axis 12 degrees   Comprehensive metabolic panel    Collection Time: 07/04/25  5:14 AM   Result Value Ref Range    Sodium 136 135 - 147 mmol/L    Potassium 3.6 3.5 - 5.3 mmol/L    Chloride 102 96 - 108 mmol/L    CO2 24 21 - 32 mmol/L    ANION GAP 10 4 - 13 mmol/L    BUN 7 5 - 25 mg/dL    Creatinine 0.89 0.60 - 1.30 mg/dL    Glucose 148 (H) 65 - 140 mg/dL    Glucose, Fasting 148 (H) 65 - 99 mg/dL    Calcium 9.1 8.4 - 10.2 mg/dL    AST 36 13 - 39 U/L    ALT 34 7 - 52 U/L    Alkaline Phosphatase 116 (H) 34 - 104 U/L    Total Protein 7.0 6.4 - 8.4 g/dL    Albumin 4.0 3.5 - 5.0 g/dL    Total Bilirubin 1.17 (H) 0.20 - 1.00 mg/dL    eGFR 89 ml/min/1.73sq m   Magnesium    Collection Time: 07/04/25  5:14 AM    Result Value Ref Range    Magnesium 1.9 1.9 - 2.7 mg/dL   Phosphorus    Collection Time: 07/04/25  5:14 AM   Result Value Ref Range    Phosphorus 3.2 2.3 - 4.1 mg/dL   TSH, 3rd generation with Free T4 reflex    Collection Time: 07/04/25  7:59 AM   Result Value Ref Range    TSH 3RD GENERATION 0.316 (L) 0.450 - 4.500 uIU/mL   CBC and differential    Collection Time: 07/04/25  7:59 AM   Result Value Ref Range    WBC 5.71 4.31 - 10.16 Thousand/uL    RBC 5.16 3.88 - 5.62 Million/uL    Hemoglobin 15.8 12.0 - 17.0 g/dL    Hematocrit 46.9 36.5 - 49.3 %    MCV 91 82 - 98 fL    MCH 30.6 26.8 - 34.3 pg    MCHC 33.7 31.4 - 37.4 g/dL    RDW 12.7 11.6 - 15.1 %    MPV 9.3 8.9 - 12.7 fL    Platelets 169 149 - 390 Thousands/uL    nRBC 0 /100 WBCs    Segmented % 63 43 - 75 %    Immature Grans % 0 0 - 2 %    Lymphocytes % 29 14 - 44 %    Monocytes % 7 4 - 12 %    Eosinophils Relative 0 0 - 6 %    Basophils Relative 1 0 - 1 %    Absolute Neutrophils 3.63 1.85 - 7.62 Thousands/µL    Absolute Immature Grans 0.02 0.00 - 0.20 Thousand/uL    Absolute Lymphocytes 1.63 0.60 - 4.47 Thousands/µL    Absolute Monocytes 0.38 0.17 - 1.22 Thousand/µL    Eosinophils Absolute 0.02 0.00 - 0.61 Thousand/µL    Basophils Absolute 0.03 0.00 - 0.10 Thousands/µL   Fingerstick Glucose (POCT)    Collection Time: 07/04/25  9:18 AM   Result Value Ref Range    POC Glucose 131 65 - 140 mg/dl       Imaging Results Review: I reviewed radiology reports from this admission including: CT abdomen/pelvis.  Other Study Results Review: EKG was reviewed.     Diet:       Diet Orders   (From admission, onward)                 Start     Ordered    07/03/25 1823  Diet Regular; Regular House  Diet effective now        References:    Adult Nutrition Support Algorithm    RD Therapeutic Diet Order Protocol   Question Answer Comment   Diet Type Regular    Regular Regular House    Allow Registered Dietitian to adjust diet order per protocol Yes        07/03/25 1822                  "    Code Status: Level 1 - Full Code  Advance Directive and Living Will: Received  Next of Kin: Extended Emergency Contact Information  Primary Emergency Contact: Eli Street  Address: [address] [place] [address] [place]  Mobile Phone: [phone]  Relation: Sister    Administrative Statements     Counseling / Coordination of Care:   Medications, treatment progress and treatment plan reviewed with patient.  Medication education provided to patient.  Patient's presentation on admission and proposed treatment plan discussed with treatment team.  Events leading to admission reviewed with patient.  Supportive therapy provided to patient.  Cognitive techniques utilized during the session.  Reassurance and supportive therapy provided.  Reoriented to reality and reassured.  Encouraged participation in milieu and group therapy on the unit.  Crisis/safety plan discussed with patient..    Inpatient Psychiatric Certification:  Estimated length of stay: 14 midnights   Based upon physical, mental and social evaluations, I certify that inpatient psychiatric services are medically necessary for this patient for a duration of 14 midnights for the treatment of Severe episode of recurrent major depressive disorder, without psychotic features (HCC)  Available alternative community resources do not meet the patient's mental health care needs.  I further attest that an established written individualized plan of care has been implemented and is outlined in the patient's medical records.    Portions of the record may have been created with voice recognition software. Occasional wrong word or \"sound a like\" substitutions may have occurred due to the inherent limitations of voice recognition software. Read the chart carefully and recognize, using context, where substitutions have occurred.    Christopher Gray MD    Board Certified Diplomate of American Board of Psychiatry and Neurology  Attending Psychiatrist "   [facility]    07/04/25       [1]   Family History  Problem Relation Name Age of Onset    Diabetes type II Mother Viotalmeena Kirk         MELLITUS    Hypertension Mother Vimonse Kirk     Diabetes Mother Viotalmeena Kirk     Hypertension Sister Eli Street     Diabetes Sister Eli Street         MELLITUS    No Known Problems Maternal Grandmother      No Known Problems Maternal Grandfather      No Known Problems Paternal Grandmother      No Known Problems Paternal Grandfather      No Known Problems Sister      Breast cancer Maternal Aunt Neutreva Reyes         40s    No Known Problems Maternal Aunt      No Known Problems Paternal Aunt      No Known Problems Paternal Aunt      No Known Problems Paternal Aunt      No Known Problems Paternal Aunt      No Known Problems Paternal Aunt      No Known Problems Paternal Aunt     [2]   Past Medical History:  Diagnosis Date    Addiction to drug (HCC)     Alcohol abuse     Alcoholism (HCC)     Anxiety     Chronic kidney disease     Cirrhosis (HCC)     Colon polyp     CPAP (continuous positive airway pressure) dependence     used for 2 months only    Depression     Diabetes mellitus (HCC)     Erectile dysfunction     Since 2 or 3 years    Esophageal varices (HCC)     Follow-up after circumcision 04/23/2025    Gastritis     Heart disease     Hepatitis C     History of Helicobacter pylori infection 02/23/2016    History of kidney stones     Hyperlipidemia     Hypertension     Infectious viral hepatitis     Kidney stone     Liver cancer (HCC)     Obesity     Pneumonia     PPD positive 2020    Screening for prostate cancer 04/23/2025    Sleep apnea     Sleep difficulties     Splenomegaly     Substance abuse (HCC)     Testosterone deficiency     Withdrawal symptoms, drug or narcotic (HCC)    [3]   Past Surgical History:  Procedure Laterality Date    COLONOSCOPY  06/20/2014    dr [email or web address]    COLONOSCOPY  2014        EGD AND COLONOSCOPY  08/2020     esophageal varices, colon polyp, hemorrhoids    ESOPHAGOGASTRODUODENOSCOPY  12/10/2015    esophageal varices, cirrhosis, gastritis    HYDROCELE EXCISION / REPAIR Left     teste    LIVER BIOPSY  2014    [place] CIRCUMCISION AGE >28 DAYS N/A 4/9/2025    Procedure: CIRCUMCISION ADULT;  Surgeon: Malik Baires MD;  Location:  MAIN OR;  Service: Urology

## 2025-07-04 NOTE — NURSING NOTE
Patient is alert and withdrawn to room, Pleasant and able to make needs known. Denies all psych s/s. No behaviors observed this shift. Medication compliant and cooperative with care. Safety precautions maintained.

## 2025-07-04 NOTE — QUICK NOTE
This is a family medicine patient I have been in communication with the family medicine resident Dr. Ganga Aguirre and I have communicated with nursing,

## 2025-07-04 NOTE — ASSESSMENT & PLAN NOTE
CT AP (7/02/25): Notable for cirrhotic morphology of liver.  US RUQ (4/15/2025): Cirrhotic morphology of the liver with surface nodularity and coarsening of parenchymal echotexture with trace fluid adjacent to the gallbladder fossa.  Routine 6-month US surveillance.  Patient will be due in October.  Current regimen: Not below 20 mg daily.    Plan:  Continue current regimen  Consider RUQ US surveillance in 3 months (October)  Low-sodium; <2 g  Monitor for signs and symptoms of decompensation

## 2025-07-04 NOTE — PLAN OF CARE
Problem: PAIN - ADULT  Goal: Verbalizes/displays adequate comfort level or baseline comfort level  Description: Interventions:  - Encourage patient to monitor pain and request assistance  - Assess pain using appropriate pain scale  - Administer analgesics as ordered based on type and severity of pain and evaluate response  - Implement non-pharmacological measures as appropriate and evaluate response  - Consider cultural and social influences on pain and pain management  - Notify physician/advanced practitioner if interventions unsuccessful or patient reports new pain  - Educate patient/family on pain management process including their role and importance of  reporting pain   - Provide non-pharmacologic/complimentary pain relief interventions  Outcome: Progressing     Problem: Alteration in Thoughts and Perception  Goal: Verbalize thoughts and feelings  Description: Interventions:  - Promote a nonjudgmental and trusting relationship with the patient through active listening and therapeutic communication  - Assess patient's level of functioning, behavior and potential for risk  - Engage patient in 1 on 1 interactions  - Encourage patient to express fears, feelings, frustrations, and discuss symptoms    - [place] patient to reality, help patient recognize reality-based thinking   - Administer medications as ordered and assess for potential side effects  - Provide the patient education related to the signs and symptoms of the illness and desired effects of prescribed medications  Outcome: Progressing  Goal: Refrain from acting on delusional thinking/internal stimuli  Description: Interventions:  - Monitor patient closely, per order   - Utilize least restrictive measures   - Set reasonable limits, give positive feedback for acceptable   - Administer medications as ordered and monitor of potential side effects  Outcome: Progressing  Goal: Agree to be compliant with medication regime, as prescribed and report medication  side effects  Description: Interventions:  - Offer appropriate PRN medication and supervise ingestion; conduct AIMS, as needed   Outcome: Progressing     Problem: Ineffective Coping  Goal: Understands least restrictive measures  Description: Interventions:  - Utilize least restrictive behavior  Outcome: Progressing  Goal: Free from restraint events  Description: - Utilize least restrictive measures   - Provide behavioral interventions   - Redirect inappropriate behaviors   Outcome: Progressing

## 2025-07-04 NOTE — TREATMENT PLAN
TREATMENT PLAN REVIEW - Behavioral Health Rishi Kirk 65 y.o. 1960 male MRN: [id number]    [facility] 6B [facility]HU Room / Bed: [facility] 643/[facility] 643-01 Encounter: [id number]          Admit Date/Time:  7/3/2025  8:52 AM    Treatment Team:   MD Mckenzie Pineda, KESHAWN Gibbons    Diagnosis: Principal Problem:    Severe episode of recurrent major depressive disorder, without psychotic features (HCC)  Active Problems:    ERIC (generalized anxiety disorder)    Methadone maintenance therapy patient (HCC)    Alcoholic cirrhosis of liver without ascites (HCC)      Patient Strengths/Assets: negotiates basic needs, patient is on a voluntary commitment    Patient Barriers/Limitations: difficulty adapting, limited family ties, limited support system, poor insight, poor interpersonal skills, poor physical health    Short Term Goals: decrease in depressive symptoms, decrease in anxiety symptoms, ability to stay safe on the unit, ability to stay free of restraints, improvement in ability to express basic needs, improvement in insight, improvement in reasoning ability, improvement in self care, sleep improvement    Long Term Goals: improvement in depression, improvement in anxiety, stabilization of mood, free of suicidal thoughts, free of homicidal thoughts, no self abusive behavior, improvement in reality testing, improvement in reasoning ability, improved insight, acceptance of need for psychiatric medications, adequate sleep, appropriate interaction with peers    Progress Towards Goals: starting psychiatric medications as prescribed    Recommended Treatment: medication management, patient medication education, group therapy, milieu therapy, continued Behavioral Health psychiatric evaluation/assessment process    Treatment Frequency: daily medication monitoring, group and milieu therapy daily, monitoring through interdisciplinary rounds,  monitoring through weekly patient care conferences    Expected Discharge Date:  14 days    Discharge Plan: referral for outpatient medication management with a psychiatrist, referral for outpatient psychotherapy, return to previous living arrangement    Treatment Plan Created/Updated By: Christopher Gray MD

## 2025-07-04 NOTE — ASSESSMENT & PLAN NOTE
65-year-old male with PMH of depression, anxiety, opioid use in remission with MAT, EtOH use in sustained remission presented with severe anxiety.  Patient was then admitted to U for management.  Consulted for medical clearance.  Patient medically cleared from our standpoint.  Vital signs reviewed stable with no acute concerns.    Lab work reviewed and notable for low TSH likely subclinical hyperthyroidism considering previous labs being normal.  Will follow-up with T4 and reassess.  Pending labs: T4, A1c, CBC, folate, vitamin B12, vitamin D 25OH    Plan:  Follow-up pending lab

## 2025-07-04 NOTE — ASSESSMENT & PLAN NOTE
"A 65 y.o. Sahil male, Single (never ), domiciled alone, on disability, w/ PMH of alcoholic cirrhosis, portal HTN, umbilical hernia, chronic hepatitis C treated in 2014, DM, coronary artery calcification, gallbladder polyp, nephrolithiasis, RODRICK, vitamin B12 deficiency, and PPH of depression, anxiety, polysubstance use disorder in remission (heroin, cocaine, alcohol and tobacco), on methadone maintenance therapy (44 ml/day as confirmed with [facility]), no prior psychiatric admissions, no prior SA, no h/o self-injurious behavior, on Zoloft 50 mg daily and Atarax woth two ED visited on 6/30/25 and 7/2/25 for somatic sxs and anxiety and then presented to the ED on 7/3/25, c/o \"Felt he had a knot in his stomach last night and anxiety. Took unknown amount of hydroxyzine last night. Reports left sided abd pain today. Denies trying to OD on meds, was just trying to feel better\". The patient signed 201 and got admitted to the inpatient psychiatry unit 6B for further psychiatric stabilization.      - Zoloft 50 mg daily; doses to be adjusted as indicated  - Start Neurontin 100 mg TID for anxiety and pain; doses to be adjusted as indicated  - Seroquel 25 mg nightly for mood sxs and sleep; doses to be adjusted as indicated  - Melatonin 3 mg nightly to adjust the circadian rhythm  - Group and milieu therapy  "

## 2025-07-04 NOTE — ASSESSMENT & PLAN NOTE
ED CW's confirmed Methadone dose from [facility]. Phone call placed to RN desk at [phone]. Spoke with Reshma. She stated the patient received a 27-day supply of 44 mg Methadone.

## 2025-07-05 PROBLEM — E05.90 SUBCLINICAL HYPERTHYROIDISM: Status: ACTIVE | Noted: 2025-07-05

## 2025-07-05 LAB — GLUCOSE SERPL-MCNC: 138 MG/DL (ref 65–140)

## 2025-07-05 PROCEDURE — 82948 REAGENT STRIP/BLOOD GLUCOSE: CPT

## 2025-07-05 PROCEDURE — 99232 SBSQ HOSP IP/OBS MODERATE 35: CPT | Performed by: STUDENT IN AN ORGANIZED HEALTH CARE EDUCATION/TRAINING PROGRAM

## 2025-07-05 RX ORDER — POLYETHYLENE GLYCOL 3350 17 G/17G
17 POWDER, FOR SOLUTION ORAL DAILY PRN
Status: DISCONTINUED | OUTPATIENT
Start: 2025-07-05 | End: 2025-07-09 | Stop reason: HOSPADM

## 2025-07-05 RX ORDER — ERGOCALCIFEROL 1.25 MG/1
50000 CAPSULE, LIQUID FILLED ORAL WEEKLY
Status: DISCONTINUED | OUTPATIENT
Start: 2025-07-05 | End: 2025-07-09 | Stop reason: HOSPADM

## 2025-07-05 RX ORDER — BISACODYL 10 MG
10 SUPPOSITORY, RECTAL RECTAL DAILY PRN
Status: DISCONTINUED | OUTPATIENT
Start: 2025-07-05 | End: 2025-07-09 | Stop reason: HOSPADM

## 2025-07-05 RX ADMIN — SERTRALINE HYDROCHLORIDE 50 MG: 50 TABLET ORAL at 08:33

## 2025-07-05 RX ADMIN — QUETIAPINE FUMARATE 25 MG: 25 TABLET ORAL at 21:17

## 2025-07-05 RX ADMIN — GABAPENTIN 300 MG: 300 CAPSULE ORAL at 08:33

## 2025-07-05 RX ADMIN — SITAGLIPTIN 100 MG: 100 TABLET, FILM COATED ORAL at 08:33

## 2025-07-05 RX ADMIN — Medication 6 MG: at 21:17

## 2025-07-05 RX ADMIN — ATORVASTATIN CALCIUM 40 MG: 40 TABLET, FILM COATED ORAL at 16:58

## 2025-07-05 RX ADMIN — ERGOCALCIFEROL 50000 UNITS: 1.25 CAPSULE ORAL at 08:33

## 2025-07-05 RX ADMIN — SENNOSIDES AND DOCUSATE SODIUM 1 TABLET: 50; 8.6 TABLET ORAL at 16:58

## 2025-07-05 RX ADMIN — METHADONE HYDROCHLORIDE 44 MG: 10 CONCENTRATE ORAL at 08:34

## 2025-07-05 RX ADMIN — NADOLOL 20 MG: 20 TABLET ORAL at 08:39

## 2025-07-05 RX ADMIN — GABAPENTIN 300 MG: 300 CAPSULE ORAL at 16:58

## 2025-07-05 RX ADMIN — GABAPENTIN 300 MG: 300 CAPSULE ORAL at 21:17

## 2025-07-05 RX ADMIN — METFORMIN HYDROCHLORIDE 500 MG: 500 TABLET, FILM COATED ORAL at 16:58

## 2025-07-05 RX ADMIN — LISINOPRIL 5 MG: 5 TABLET ORAL at 08:33

## 2025-07-05 NOTE — PROGRESS NOTES
07/05/25 1144   Housing Stability   In the last 12 months, was there a time when you were not able to pay the mortgage or rent on time? N   In the past 12 months, how many times have you moved where you were living? 1   At any time in the past 12 months, were you homeless or living in a shelter (including now)? Y   Transportation Needs   In the past 12 months, has lack of transportation kept you from medical appointments or from getting medications? no   In the past 12 months, has lack of transportation kept you from meetings, work, or from getting things needed for daily living? No   Food Insecurity   Within the past 12 months, you worried that your food would run out before you got the money to buy more. Never true   Within the past 12 months, the food you bought just didn't last and you didn't have money to get more. Never true   Utilities   In the past 12 months has the electric, gas, oil, or water company threatened to shut off services in your home? No

## 2025-07-05 NOTE — PROGRESS NOTES
"   07/05/25 1145   Readmission Root Cause   30 Day Readmission No   Patient Information   Mental Status Alert   During Assessment patient was accompanied by Not accompanied during assessment   Assessment information provided by: Patient   Primary Caregiver Self   Support Systems Self   [facility] [place]   What city do you live in? [place]   Living Arrangements Lives Alone   Is patient a ? No   Activities of Daily Living Prior to Admission   Functional Status Independent   Completes ADLs independently? Yes   Ambulates independently? Yes   Does patient use assisted devices? No   Patient Information Continued   Income Source Other (Comment)  (Social Security : $1140.00)   Does patient have a history of substance abuse? Yes   Historical substance use preference \"Crack\" cocaine;Methamphetamines;Cocaine;Alcohol/ETOH;Heroin   History of Withdrawal Symptoms Denies past symptoms   Is patient currently in treatment for substance abuse? No. Patient declined treatment information.   Current Status: 201   Means of Transportation   Means of Transport to [facility]: Drives Self   Referral Data   Referral Reason Psych       "

## 2025-07-05 NOTE — ASSESSMENT & PLAN NOTE
ED CW's confirmed Methadone dose from [facility]. Phone call placed to RN desk at [phone]. Spoke with Reshma. She stated the patient received a 27-day supply of 44 mg Methadone.

## 2025-07-05 NOTE — PROGRESS NOTES
07/05/25 1147   Team Meeting   Meeting Type Tx Team Meeting   Initial Conference Date 07/05/25   Next Conference Date 08/05/25   Team Members Present   Team Members Present Physician;Nurse;   Physician Team Member Marina   Nursing Team Member Cadence   Care Management Team Member Park Kaiser   Patient/Family Present   Patient Present Yes   Patient's Family Present No   OTHER   Team Meeting - Additional Comments Pt reviewed, agreed, and signed the treatment plan.     Pt reviewed, agreed, and signed the treatment plan.

## 2025-07-05 NOTE — ASSESSMENT & PLAN NOTE
ED CW's confirmed Methadone dose from [facility]. Phone call placed to RN desk at [phone]. Spoke with Reshma. She stated the patient received a 27-day supply of 44 mg Methadone.

## 2025-07-05 NOTE — ASSESSMENT & PLAN NOTE
Lab Results   Component Value Date    HGBA1C 7.3 (H) 07/04/2025       Recent Labs     07/04/25  0918 07/04/25  1134 07/05/25  0719   POCGLU 131 134 138       Blood Sugar Average: Last 72 hrs:  (P) [id number]  - f/u SLIM recs

## 2025-07-05 NOTE — ASSESSMENT & PLAN NOTE
"A 65 y.o. Sahil male, Single (never ), domiciled alone, on disability, w/ PMH of alcoholic cirrhosis, portal HTN, umbilical hernia, chronic hepatitis C treated in 2014, DM, coronary artery calcification, gallbladder polyp, nephrolithiasis, RODRICK, vitamin B12 deficiency, and PPH of depression, anxiety, polysubstance use disorder in remission (heroin, cocaine, alcohol and tobacco), on methadone maintenance therapy (44 ml/day as confirmed with [facility]), no prior psychiatric admissions, no prior SA, no h/o self-injurious behavior, on Zoloft 50 mg daily and Atarax woth two ED visited on 6/30/25 and 7/2/25 for somatic sxs and anxiety and then presented to the ED on 7/3/25, c/o \"Felt he had a knot in his stomach last night and anxiety. Took unknown amount of hydroxyzine last night. Reports left sided abd pain today. Denies trying to OD on meds, was just trying to feel better\". The patient signed 201 and got admitted to the inpatient psychiatry unit 6B for further psychiatric stabilization.      - Zoloft 50 mg po daily; doses to be adjusted as indicated  - Neurontin 100 mg po TID for anxiety and pain; doses to be adjusted as indicated  - Seroquel 25 mg po nightly for mood sxs and sleep; doses to be adjusted as indicated  - Melatonin 3 mg po nightly to adjust the circadian rhythm  - Group and milieu therapy  "

## 2025-07-05 NOTE — NURSING NOTE
Patient is alert and visible in the milieu. Pleasant and social with Greek speaking peer. Denies all psych s/s. Medication compliant and cooperative with care. Safety precautions maintained.

## 2025-07-05 NOTE — PLAN OF CARE
Problem: PAIN - ADULT  Goal: Verbalizes/displays adequate comfort level or baseline comfort level  Description: Interventions:  - Encourage patient to monitor pain and request assistance  - Assess pain using appropriate pain scale  - Administer analgesics as ordered based on type and severity of pain and evaluate response  - Implement non-pharmacological measures as appropriate and evaluate response  - Consider cultural and social influences on pain and pain management  - Notify physician/advanced practitioner if interventions unsuccessful or patient reports new pain  - Educate patient/family on pain management process including their role and importance of  reporting pain   - Provide non-pharmacologic/complimentary pain relief interventions  Outcome: Progressing     Problem: INFECTION - ADULT  Goal: Absence or prevention of progression during hospitalization  Description: INTERVENTIONS:  - Assess and monitor for signs and symptoms of infection  - Monitor lab/diagnostic results  - Monitor all insertion sites, i.e. indwelling lines, tubes, and drains  - Monitor endotracheal if appropriate and nasal secretions for changes in amount and color  - Force appropriate cooling/warming therapies per order  - Administer medications as ordered  - Instruct and encourage patient and family to use good hand hygiene technique  - Identify and instruct in appropriate isolation precautions for identified infection/condition  Outcome: Progressing  Goal: Absence of fever/infection during neutropenic period  Description: INTERVENTIONS:  - Monitor WBC  - Perform strict hand hygiene  - Limit to healthy visitors only  - No plants, dried, fresh or silk flowers with stallworth in patient room  Outcome: Progressing     Problem: SAFETY ADULT  Goal: Patient will remain free of falls  Description: INTERVENTIONS:  - Keep Call bell within reach  - Keep bed low and locked with side rails adjusted as appropriate  - Keep care items and personal  belongings within reach  - Initiate and maintain comfort rounds  Outcome: Progressing  Goal: Maintain or return to baseline ADL function  Description: INTERVENTIONS:  -  Assess patient's ability to carry out ADLs; assess patient's baseline for ADL function and identify physical deficits which impact ability to perform ADLs (bathing, care of mouth/teeth, toileting, grooming, dressing, etc.)  - Assess/evaluate cause of self-care deficits   - Assess range of motion  - Assess patient's mobility; develop plan if impaired  - Assess patient's need for assistive devices and provide as appropriate  - Encourage maximum independence but intervene and supervise when necessary  - Involve family in performance of ADLs  - Assess for home care needs following discharge   - Consider OT consult to assist with ADL evaluation and planning for discharge  - Provide patient education as appropriate  - Monitor functional capacity and physical performance, use of AM PAC & JH-HLM   - Monitor gait, balance and fatigue with ambulation    Outcome: Progressing  Goal: Maintains/Returns to pre admission functional level  Description: INTERVENTIONS:  - Set and communicate daily mobility goal to care team and patient/family/caregiver.   - Record patient progress and toleration of activity level   Outcome: Progressing     Problem: DISCHARGE PLANNING  Goal: Discharge to home or other facility with appropriate resources  Description: INTERVENTIONS:  - Identify barriers to discharge w/patient and caregiver  - Arrange for needed discharge resources and transportation as appropriate  - Identify discharge learning needs (meds, wound care, etc.)  - Arrange for interpretive services to assist at discharge as needed  - Refer to Case Management Department for coordinating discharge planning if the patient needs post-hospital services based on physician/advanced practitioner order or complex needs related to functional status, cognitive ability, or social  support system  Outcome: Progressing     Problem: Knowledge Deficit  Goal: Patient/family/caregiver demonstrates understanding of disease process, treatment plan, medications, and discharge instructions  Description: Complete learning assessment and assess knowledge base.  Interventions:  - Provide teaching at level of understanding  - Provide teaching via preferred learning methods  Outcome: Progressing     Problem: Alteration in Thoughts and Perception  Goal: Treatment Goal: Gain control of psychotic behaviors/thinking, reduce/eliminate presenting symptoms and demonstrate improved reality functioning upon discharge  Outcome: Progressing  Goal: Verbalize thoughts and feelings  Description: Interventions:  - Promote a nonjudgmental and trusting relationship with the patient through active listening and therapeutic communication  - Assess patient's level of functioning, behavior and potential for risk  - Engage patient in 1 on 1 interactions  - Encourage patient to express fears, feelings, frustrations, and discuss symptoms    - [place] patient to reality, help patient recognize reality-based thinking   - Administer medications as ordered and assess for potential side effects  - Provide the patient education related to the signs and symptoms of the illness and desired effects of prescribed medications  Outcome: Progressing  Goal: Refrain from acting on delusional thinking/internal stimuli  Description: Interventions:  - Monitor patient closely, per order   - Utilize least restrictive measures   - Set reasonable limits, give positive feedback for acceptable   - Administer medications as ordered and monitor of potential side effects  Outcome: Progressing  Goal: Agree to be compliant with medication regime, as prescribed and report medication side effects  Description: Interventions:  - Offer appropriate PRN medication and supervise ingestion; conduct AIMS, as needed   Outcome: Progressing  Goal: Attend and participate  in unit activities, including therapeutic, recreational, and educational groups  Description: Interventions:  -Encourage Visitation and family involvement in care  Outcome: Progressing  Goal: Recognize dysfunctional thoughts, communicate reality-based thoughts at the time of discharge  Description: Interventions:  - Provide medication and psycho-education to assist patient in compliance and developing insight into his/her illness   Outcome: Progressing  Goal: Complete daily ADLs, including personal hygiene independently, as able  Description: Interventions:  - Observe, teach, and assist patient with ADLS  - Monitor and promote a balance of rest/activity, with adequate nutrition and elimination   Outcome: Progressing     Problem: Ineffective Coping  Goal: Cooperates with admission process  Description: Interventions:   - Complete admission process  Outcome: Progressing  Goal: Identifies ineffective coping skills  Outcome: Progressing  Goal: Identifies healthy coping skills  Outcome: Progressing  Goal: Demonstrates healthy coping skills  Outcome: Progressing  Goal: Participates in unit activities  Description: Interventions:  - Provide therapeutic environment   - Provide required programming   - Redirect inappropriate behaviors   Outcome: Progressing  Goal: Patient/Family participate in treatment and DC plans  Description: Interventions:  - Provide therapeutic environment  Outcome: Progressing  Goal: Patient/Family verbalizes awareness of resources  Outcome: Progressing  Goal: Understands least restrictive measures  Description: Interventions:  - Utilize least restrictive behavior  Outcome: Progressing  Goal: Free from restraint events  Description: - Utilize least restrictive measures   - Provide behavioral interventions   - Redirect inappropriate behaviors   Outcome: Progressing     Problem: Anxiety  Goal: Anxiety is at manageable level  Description: Interventions:  - Assess and monitor patient's anxiety level.   -  Monitor for signs and symptoms (heart palpitations, chest pain, shortness of breath, headaches, nausea, feeling jumpy, restlessness, irritable, apprehensive).   - Collaborate with interdisciplinary team and initiate plan and interventions as ordered.  - [place] patient to unit/surroundings  - Explain treatment plan  - Encourage participation in care  - Encourage verbalization of concerns/fears  - Identify coping mechanisms  - Assist in developing anxiety-reducing skills  - Administer/offer alternative therapies  - Limit or eliminate stimulants  Outcome: Progressing

## 2025-07-05 NOTE — SOCIAL WORK
Admission Status    Status of admission 201   County of Residence [place]     Patient Intake   Address to discharge to [facility] [address] [facility]    [place] [address]    Living Arrangement Lives alone   Can patient return home Yes   Patient's Telephone Number [phone]   Patient's e-mail Address [email or web address]   Insurance [employer] [facility]/[employer] MEDICARE ASSURED   PCP Soren Trivedi MD   Education Some College   Type of work Social Security $1140.00    History Pt denies    Access to Firearms Pt denies    Marital Status/Children Single   Spirituality/Hinduism Druze   Transportation Self   Preferred Pharmacy [facility] [place]     Patient History   Presenting Problem Patient admitted to unit on a 201 status from  ED. Per records pt admitted d/t increased anxiety overnight, leading him to take more than what was prescribed of his hydroxyzine and sertraline.    Stressor/Trigger Pt denies any stressors or triggers. Pt (+) UDS for Meth.   Treatment History Pt denies    Current psychiatrist/therapist Pt denies    ACT/ICM Pt denies    Family History of Mental Health Pt denies    Suicide Attempts Pt denies    Legal Issues Pt denies    Trauma/Psychosocial loss Pt denies all Trauma, Pt denies any type of abuse.      Substance Abuse Assessment   UDS: + Meth, - Everything else  Audit Score:0  Nicotine/Tobacco:Pt denies   Substance First use Last Use and amount Frequency Amount Used How long Longest period of sobriety and when Method of use   THC            Heroin            Cocaine            ETOH            Meth            Benzos            Other:            History of PB Yes, Heroine, Cocain, Meth, ETOH   Family History of PB Pt denies    Prior Inpatient PB Treatment Yes, [facility]    Current Outpatient treatment Pt denies    Response to Referral N/A     Referrals/ROIs   Referrals Needed N/A   ROIs Signed PCP, Sister, [facility]

## 2025-07-05 NOTE — NURSING NOTE
Patient calm, pleasant and cooperative, withdrawn to room. Denies all psych s/s. Denies pain and discomfort. Medication compliant. Safety precautions maintained.

## 2025-07-05 NOTE — ASSESSMENT & PLAN NOTE
"A 65 y.o. Sahil male, Single (never ), domiciled alone, on disability, w/ PMH of alcoholic cirrhosis, portal HTN, umbilical hernia, chronic hepatitis C treated in 2014, DM, coronary artery calcification, gallbladder polyp, nephrolithiasis, RODRICK, vitamin B12 deficiency, and PPH of depression, anxiety, polysubstance use disorder in remission (heroin, cocaine, alcohol and tobacco), on methadone maintenance therapy (44 ml/day as confirmed with [facility]), no prior psychiatric admissions, no prior SA, no h/o self-injurious behavior, on Zoloft 50 mg daily and Atarax woth two ED visited on 6/30/25 and 7/2/25 for somatic sxs and anxiety and then presented to the ED on 7/3/25, c/o \"Felt he had a knot in his stomach last night and anxiety. Took unknown amount of hydroxyzine last night. Reports left sided abd pain today. Denies trying to OD on meds, was just trying to feel better\". The patient signed 201 and got admitted to the inpatient psychiatry unit 6B for further psychiatric stabilization.      - Zoloft 50 mg daily; doses to be adjusted as indicated  - Neurontin 100 mg TID for anxiety and pain; doses to be adjusted as indicated  - Seroquel 25 mg nightly for mood sxs and sleep; doses to be adjusted as indicated  - Melatonin 3 mg nightly to adjust the circadian rhythm  - Group and milieu therapy  "

## 2025-07-05 NOTE — PROGRESS NOTES
"Progress Note - Behavioral Health   Name: Rishi Kirk 65 y.o. male I MRN: [id number]  Unit/Bed#: [facility] 643-01 I Date of Admission: 7/3/2025   Date of Service: 7/6/2025 I Hospital Day: 3     Assessment & Plan  Severe episode of recurrent major depressive disorder, without psychotic features (HCC)  A 65 y.o. Micron[place] male, Single (never ), domiciled alone, on disability, w/ PMH of alcoholic cirrhosis, portal HTN, umbilical hernia, chronic hepatitis C treated in 2014, DM, coronary artery calcification, gallbladder polyp, nephrolithiasis, RODRICK, vitamin B12 deficiency, and PPH of depression, anxiety, polysubstance use disorder in remission (heroin, cocaine, alcohol and tobacco), on methadone maintenance therapy (44 ml/day as confirmed with [facility]), no prior psychiatric admissions, no prior SA, no h/o self-injurious behavior, on Zoloft 50 mg daily and Atarax woth two ED visited on 6/30/25 and 7/2/25 for somatic sxs and anxiety and then presented to the ED on 7/3/25, c/o \"Felt he had a knot in his stomach last night and anxiety. Took unknown amount of hydroxyzine last night. Reports left sided abd pain today. Denies trying to OD on meds, was just trying to feel better\". The patient signed 201 and got admitted to the inpatient psychiatry unit 6B for further psychiatric stabilization.      - Zoloft 50 mg po daily; doses to be adjusted as indicated  - Neurontin 100 mg po TID for anxiety and pain; doses to be adjusted as indicated  - Seroquel 25 mg po nightly for mood sxs and sleep; doses to be adjusted as indicated  - Melatonin 3 mg po nightly to adjust the circadian rhythm  - Group and milieu therapy  ERIC (generalized anxiety disorder)  - Management as per principal problem    Methadone maintenance therapy patient (HCC)  ED CW's confirmed Methadone dose from [facility]. Phone call placed to RN desk at [phone]. Spoke with Reshma. She stated the patient received a 27-day supply of 44 mg Methadone. "   Alcoholic cirrhosis of liver without ascites (HCC)  - f/u [facility] recs  Benign essential hypertension  - f/u [facility] recs  Type 2 diabetes mellitus without complication, without long-term current use of insulin (HCC)  Lab Results   Component Value Date    HGBA1C 7.3 (H) 07/04/2025       Recent Labs     07/04/25  0918 07/04/25  1134 07/05/25  0719   POCGLU 131 134 138       Blood Sugar Average: Last 72 hrs:  (P) [id number]  - f/u [facility] recs  RODRICK (obstructive sleep apnea)  - f/u [facility] recs  Subclinical hyperthyroidism  - f/u [facility] recs      Current Medications:    Current Facility-Administered Medications:     atorvastatin (LIPITOR) tablet 40 mg, Oral, Daily With Dinner    ergocalciferol (VITAMIN D2) capsule 50,000 Units, Oral, Weekly    gabapentin (NEURONTIN) capsule 300 mg, Oral, TID    lisinopril (ZESTRIL) tablet 5 mg, Oral, Daily    melatonin tablet 6 mg, Oral, HS    metFORMIN (GLUCOPHAGE) tablet 500 mg, Oral, Daily With Dinner    methadone (DOLOPHINE) oral concentrated solution 44 mg, Oral, Daily    nadolol (CORGARD) tablet 20 mg, Oral, Daily    QUEtiapine (SEROquel) tablet 25 mg, Oral, HS    sertraline (ZOLOFT) tablet 50 mg, Oral, Daily    sitaGLIPtin (JANUVIA) tablet 100 mg, Oral, Daily    Current Facility-Administered Medications:     bisacodyl (DULCOLAX) rectal suppository 10 mg, Rectal, Daily PRN    hydrOXYzine HCL (ATARAX) tablet 25 mg, Oral, Q6H PRN Max 4/day    hydrOXYzine HCL (ATARAX) tablet 50 mg, Oral, Q6H PRN Max 4/day    ibuprofen (MOTRIN) tablet 200 mg, Oral, Q6H PRN    ibuprofen (MOTRIN) tablet 400 mg, Oral, Q6H PRN    ibuprofen (MOTRIN) tablet 600 mg, Oral, Q8H PRN    LORazepam (ATIVAN) injection 1 mg, Intramuscular, Q6H PRN Max 3/day    LORazepam (ATIVAN) tablet 1 mg, Oral, Q6H PRN Max 3/day    nicotine polacrilex (NICORETTE) gum 2 mg, Oral, Q4H PRN    OLANZapine (ZyPREXA) IM injection 5 mg, Intramuscular, Q3H PRN Max 3/day    OLANZapine (ZyPREXA) tablet 2.5 mg, Oral, Q4H PRN Max 6/day     OLANZapine (ZyPREXA) tablet 5 mg, Oral, Q4H PRN Max 3/day    OLANZapine (ZyPREXA) tablet 5 mg, Oral, Q3H PRN Max 3/day    polyethylene glycol (MIRALAX) packet 17 g, Oral, Daily PRN    senna-docusate sodium (SENOKOT S) 8.6-50 mg per tablet 1 tablet, Oral, Daily PRN    traZODone (DESYREL) tablet 50 mg, Oral, HS PRN    Risks/Benefits of Treatment:   Risks, benefits, and possible side effects of medications explained to patient and patient verbalizes understanding and agreement for treatment.    Progress Toward Goals: gradual improvement    Treatment Planning:    - Encourage early mobility and having a structured day  - Provide frequent re-orientation, and cognitive stimulation  - Ensure assistive devices are in proper working order (eye-glasses, hearing aids)  - Encourage adequate hydration, nutrition and monitor bowel movements  - Maintain sleep-wake cycle: Uninterrupted sleep time; low-level lighting at night  - Fall precaution  - f/u [facility] recs regarding the medical problems   - Continue medication titration and treatment plan; adjust medication to optimize treatment response and as clinically indicated. .   - Observation: routine            - VS: as per unit protocol  - Encourage group attendance and milieu therapy  - Dispo: To be determined  - Estimated Discharge Day: 7/12/2025   - Legal Status : Voluntary 201 commitment.  -     Subjective     Patient was visited on unit for continuing care; chart reviewed and discussed with the nursing staff.  On approach, the patient was calm and cooperative. Endorsed better mood and less anxiety sxs, but continues to have negative thinking and ruminating thoughts. He endorsed improvements in his abdominal discomfort after had a BM this morning and appeared less somatically preoccupied. Denied any changes in appetite, and energy level. No problem initiating and maintaining sleep.  Denied A/VH currently.  Continues to report intrusive thoughts and negative ruminations. Denied  "SI/HI, intent or plan upon direct inquiry at this time.    The patient has been more visible in the milieu and has been more interactive with select staff and peers. No reports of aggression or self-injurious behavior on unit. No PRN medications used in the past 24 hours.    Patient accepted all offered medications and no adverse effects of medications noted or reported. Maintained on Zoloft 50 mg daily, started on Neurontin 100 mg TID and Seroquel 25 mg nightly on 7/4/25; doses to be adjusted as indicated.    Sleep: improved  Appetite: normal  Medication side effects: No  ROS: review of systems as noted above in HPI/Subjective report, all other systems are negative    Objective :  Temp:  [97 °F (36.1 °C)-97.5 °F (36.4 °C)] 97 °F (36.1 °C)  HR:  [71] 71  BP: (101-118)/(75-80) 101/75  Resp:  [17] 17  SpO2:  [96 %] 96 %  O2 Device: None (Room air)    Mental Status Evaluation:  Appearance and attitude: appeared as stated age, dressed in hospital attire, with good hygiene  Eye contact: good  Motor Function: within normal limits, intact gait, No PMA/PMR  Gait/station: normal gait/station and normal balance  Speech: normal for rate, rhythm, volume, latency, amount  Language: No overt abnormality  Mood/affect: \"better\" / Affect was constricted but reactive, mood congruent, brighter  Thought Processes: ruminating  Thought content: denied suicidal ideations or homicidal ideations, no overt delusions elicited, somatic preoccupation, negative thinking, ruminating thoughts  Associations: concrete associations, perseverative  Perceptual disturbances: denies Auditory/Visual/Tactile Hallucinations  Orientation: oriented to person, place, and time/date  Cognitive Function: intact  Attention/Concentration: attention span and concentration appear shorter than expected for age  Memory: not cooperative with formal MMSE  Fund of knowledge: aware of current events, aware of past history, and vocabulary average  Impulse control: " "good  Insight/judgment: limited/limited    Pain : denied  Pain Scale : 0      Lab Results: I have reviewed the following results:  Results from the past 24 hours:   No results found for this or any previous visit (from the past 24 hours).      Administrative Statements     Counseling / Coordination of Care:   Patient's progress reviewed with nursing staff.  Medications, treatment progress and treatment plan reviewed with patient.  Medication education provided to patient.  Supportive therapy provided to patient.  Cognitive techniques utilized during the session.  Reassurance and supportive therapy provided.  Reoriented to reality and reassured.  Encouraged participation in milieu and group therapy on the unit.  Crisis/safety plan discussed with patient.     Portions of the record may have been created with voice recognition software. Occasional wrong word or \"sound a like\" substitutions may have occurred due to the inherent limitations of voice recognition software. Read the chart carefully and recognize, using context, where substitutions have occurred.    Christopher Gray MD  Attending Psychiatrist   [facility]    07/06/25  "

## 2025-07-05 NOTE — ASSESSMENT & PLAN NOTE
Lab Results   Component Value Date    HGBA1C 7.3 (H) 07/04/2025       Recent Labs     07/04/25  0918 07/04/25  1134 07/05/25  0719   POCGLU 131 134 138       Blood Sugar Average: Last 72 hrs:  (P) [id number]  - f/u [facility] recs

## 2025-07-06 PROCEDURE — 99232 SBSQ HOSP IP/OBS MODERATE 35: CPT | Performed by: STUDENT IN AN ORGANIZED HEALTH CARE EDUCATION/TRAINING PROGRAM

## 2025-07-06 RX ADMIN — GABAPENTIN 300 MG: 300 CAPSULE ORAL at 08:32

## 2025-07-06 RX ADMIN — SERTRALINE HYDROCHLORIDE 50 MG: 50 TABLET ORAL at 08:32

## 2025-07-06 RX ADMIN — METHADONE HYDROCHLORIDE 44 MG: 10 CONCENTRATE ORAL at 08:33

## 2025-07-06 RX ADMIN — QUETIAPINE FUMARATE 25 MG: 25 TABLET ORAL at 21:20

## 2025-07-06 RX ADMIN — METFORMIN HYDROCHLORIDE 500 MG: 500 TABLET, FILM COATED ORAL at 15:37

## 2025-07-06 RX ADMIN — ATORVASTATIN CALCIUM 40 MG: 40 TABLET, FILM COATED ORAL at 15:37

## 2025-07-06 RX ADMIN — GABAPENTIN 300 MG: 300 CAPSULE ORAL at 21:20

## 2025-07-06 RX ADMIN — Medication 6 MG: at 21:20

## 2025-07-06 RX ADMIN — SITAGLIPTIN 100 MG: 100 TABLET, FILM COATED ORAL at 08:32

## 2025-07-06 RX ADMIN — IBUPROFEN 200 MG: 400 TABLET, FILM COATED ORAL at 12:35

## 2025-07-06 RX ADMIN — GABAPENTIN 300 MG: 300 CAPSULE ORAL at 15:37

## 2025-07-06 NOTE — NURSING NOTE
Patient is alert and visible in the milieu. Pleasant and social with peers, visible for group. Denies all psych s/s. Medication compliant and cooperative with care. Safety precautions maintained.

## 2025-07-06 NOTE — PLAN OF CARE
Problem: PAIN - ADULT  Goal: Verbalizes/displays adequate comfort level or baseline comfort level  Description: Interventions:  - Encourage patient to monitor pain and request assistance  - Assess pain using appropriate pain scale  - Administer analgesics as ordered based on type and severity of pain and evaluate response  - Implement non-pharmacological measures as appropriate and evaluate response  - Consider cultural and social influences on pain and pain management  - Notify physician/advanced practitioner if interventions unsuccessful or patient reports new pain  - Educate patient/family on pain management process including their role and importance of  reporting pain   - Provide non-pharmacologic/complimentary pain relief interventions  Outcome: Progressing     Problem: INFECTION - ADULT  Goal: Absence or prevention of progression during hospitalization  Description: INTERVENTIONS:  - Assess and monitor for signs and symptoms of infection  - Monitor lab/diagnostic results  - Monitor all insertion sites, i.e. indwelling lines, tubes, and drains  - Monitor endotracheal if appropriate and nasal secretions for changes in amount and color  - [place] appropriate cooling/warming therapies per order  - Administer medications as ordered  - Instruct and encourage patient and family to use good hand hygiene technique  - Identify and instruct in appropriate isolation precautions for identified infection/condition  Outcome: Progressing  Goal: Absence of fever/infection during neutropenic period  Description: INTERVENTIONS:  - Monitor WBC  - Perform strict hand hygiene  - Limit to healthy visitors only  - No plants, dried, fresh or silk flowers with stallworth in patient room  Outcome: Progressing     Problem: SAFETY ADULT  Goal: Patient will remain free of falls  Description: INTERVENTIONS:  - Keep Call bell within reach  - Keep bed low and locked with side rails adjusted as appropriate  - Keep care items and personal  belongings within reach  - Initiate and maintain comfort rounds  Outcome: Progressing  Goal: Maintain or return to baseline ADL function  Description: INTERVENTIONS:  -  Assess patient's ability to carry out ADLs; assess patient's baseline for ADL function and identify physical deficits which impact ability to perform ADLs (bathing, care of mouth/teeth, toileting, grooming, dressing, etc.)  - Assess/evaluate cause of self-care deficits   - Assess range of motion  - Assess patient's mobility; develop plan if impaired  - Assess patient's need for assistive devices and provide as appropriate  - Encourage maximum independence but intervene and supervise when necessary  - Involve family in performance of ADLs  - Assess for home care needs following discharge   - Consider OT consult to assist with ADL evaluation and planning for discharge  - Provide patient education as appropriate  - Monitor functional capacity and physical performance, use of AM PAC & JH-HLM   - Monitor gait, balance and fatigue with ambulation    Outcome: Progressing  Goal: Maintains/Returns to pre admission functional level  Description: INTERVENTIONS:  - Set and communicate daily mobility goal to care team and patient/family/caregiver.   - Record patient progress and toleration of activity level   Outcome: Progressing     Problem: DISCHARGE PLANNING  Goal: Discharge to home or other facility with appropriate resources  Description: INTERVENTIONS:  - Identify barriers to discharge w/patient and caregiver  - Arrange for needed discharge resources and transportation as appropriate  - Identify discharge learning needs (meds, wound care, etc.)  - Arrange for interpretive services to assist at discharge as needed  - Refer to Case Management Department for coordinating discharge planning if the patient needs post-hospital services based on physician/advanced practitioner order or complex needs related to functional status, cognitive ability, or social  support system  Outcome: Progressing     Problem: Knowledge Deficit  Goal: Patient/family/caregiver demonstrates understanding of disease process, treatment plan, medications, and discharge instructions  Description: Complete learning assessment and assess knowledge base.  Interventions:  - Provide teaching at level of understanding  - Provide teaching via preferred learning methods  Outcome: Progressing     Problem: Alteration in Thoughts and Perception  Goal: Treatment Goal: Gain control of psychotic behaviors/thinking, reduce/eliminate presenting symptoms and demonstrate improved reality functioning upon discharge  Outcome: Progressing  Goal: Verbalize thoughts and feelings  Description: Interventions:  - Promote a nonjudgmental and trusting relationship with the patient through active listening and therapeutic communication  - Assess patient's level of functioning, behavior and potential for risk  - Engage patient in 1 on 1 interactions  - Encourage patient to express fears, feelings, frustrations, and discuss symptoms    - [place] patient to reality, help patient recognize reality-based thinking   - Administer medications as ordered and assess for potential side effects  - Provide the patient education related to the signs and symptoms of the illness and desired effects of prescribed medications  Outcome: Progressing  Goal: Refrain from acting on delusional thinking/internal stimuli  Description: Interventions:  - Monitor patient closely, per order   - Utilize least restrictive measures   - Set reasonable limits, give positive feedback for acceptable   - Administer medications as ordered and monitor of potential side effects  Outcome: Progressing  Goal: Agree to be compliant with medication regime, as prescribed and report medication side effects  Description: Interventions:  - Offer appropriate PRN medication and supervise ingestion; conduct AIMS, as needed   Outcome: Progressing  Goal: Attend and participate  in unit activities, including therapeutic, recreational, and educational groups  Description: Interventions:  -Encourage Visitation and family involvement in care  Outcome: Progressing  Goal: Recognize dysfunctional thoughts, communicate reality-based thoughts at the time of discharge  Description: Interventions:  - Provide medication and psycho-education to assist patient in compliance and developing insight into his/her illness   Outcome: Progressing  Goal: Complete daily ADLs, including personal hygiene independently, as able  Description: Interventions:  - Observe, teach, and assist patient with ADLS  - Monitor and promote a balance of rest/activity, with adequate nutrition and elimination   Outcome: Progressing     Problem: Ineffective Coping  Goal: Cooperates with admission process  Description: Interventions:   - Complete admission process  Outcome: Progressing  Goal: Identifies ineffective coping skills  Outcome: Progressing  Goal: Identifies healthy coping skills  Outcome: Progressing  Goal: Demonstrates healthy coping skills  Outcome: Progressing  Goal: Participates in unit activities  Description: Interventions:  - Provide therapeutic environment   - Provide required programming   - Redirect inappropriate behaviors   Outcome: Progressing  Goal: Patient/Family participate in treatment and DC plans  Description: Interventions:  - Provide therapeutic environment  Outcome: Progressing  Goal: Patient/Family verbalizes awareness of resources  Outcome: Progressing  Goal: Understands least restrictive measures  Description: Interventions:  - Utilize least restrictive behavior  Outcome: Progressing  Goal: Free from restraint events  Description: - Utilize least restrictive measures   - Provide behavioral interventions   - Redirect inappropriate behaviors   Outcome: Progressing     Problem: Anxiety  Goal: Anxiety is at manageable level  Description: Interventions:  - Assess and monitor patient's anxiety level.   -  Monitor for signs and symptoms (heart palpitations, chest pain, shortness of breath, headaches, nausea, feeling jumpy, restlessness, irritable, apprehensive).   - Collaborate with interdisciplinary team and initiate plan and interventions as ordered.  - [place] patient to unit/surroundings  - Explain treatment plan  - Encourage participation in care  - Encourage verbalization of concerns/fears  - Identify coping mechanisms  - Assist in developing anxiety-reducing skills  - Administer/offer alternative therapies  - Limit or eliminate stimulants  Outcome: Progressing     Problem: DISCHARGE PLANNING - CARE MANAGEMENT  Goal: Discharge to post-acute care or home with appropriate resources  Description: INTERVENTIONS:  - Conduct assessment to determine patient/family and health care team treatment goals, and need for post-acute services based on payer coverage, community resources, and patient preferences, and barriers to discharge  - Address psychosocial, clinical, and financial barriers to discharge as identified in assessment in conjunction with the patient/family and health care team  - Arrange appropriate level of post-acute services according to patient’s   needs and preference and payer coverage in collaboration with the physician and health care team  - Communicate with and update the patient/family, physician, and health care team regarding progress on the discharge plan  - Arrange appropriate transportation to post-acute venues  Outcome: Progressing

## 2025-07-06 NOTE — NURSING NOTE
Patient calm, pleasant and cooperative. Visible in the milieu, social with select peers. Denies all psych s/s. Medication compliant. Safety precautions maintained.

## 2025-07-07 PROCEDURE — 99232 SBSQ HOSP IP/OBS MODERATE 35: CPT | Performed by: STUDENT IN AN ORGANIZED HEALTH CARE EDUCATION/TRAINING PROGRAM

## 2025-07-07 RX ORDER — GABAPENTIN 400 MG/1
400 CAPSULE ORAL 3 TIMES DAILY
Status: DISCONTINUED | OUTPATIENT
Start: 2025-07-07 | End: 2025-07-09 | Stop reason: HOSPADM

## 2025-07-07 RX ADMIN — GABAPENTIN 400 MG: 400 CAPSULE ORAL at 16:57

## 2025-07-07 RX ADMIN — METHADONE HYDROCHLORIDE 44 MG: 10 CONCENTRATE ORAL at 08:47

## 2025-07-07 RX ADMIN — Medication 6 MG: at 21:19

## 2025-07-07 RX ADMIN — GABAPENTIN 300 MG: 300 CAPSULE ORAL at 08:46

## 2025-07-07 RX ADMIN — METFORMIN HYDROCHLORIDE 500 MG: 500 TABLET, FILM COATED ORAL at 16:57

## 2025-07-07 RX ADMIN — SITAGLIPTIN 100 MG: 100 TABLET, FILM COATED ORAL at 08:46

## 2025-07-07 RX ADMIN — SERTRALINE HYDROCHLORIDE 50 MG: 50 TABLET ORAL at 08:46

## 2025-07-07 RX ADMIN — ATORVASTATIN CALCIUM 40 MG: 40 TABLET, FILM COATED ORAL at 16:57

## 2025-07-07 RX ADMIN — QUETIAPINE FUMARATE 25 MG: 25 TABLET ORAL at 21:19

## 2025-07-07 RX ADMIN — GABAPENTIN 400 MG: 400 CAPSULE ORAL at 21:19

## 2025-07-07 NOTE — PROGRESS NOTES
"Progress Note - Behavioral Health   Name: Rishi Kirk 65 y.o. male I MRN: [id number]  Unit/Bed#: [facility]-01 I Date of Admission: 7/3/2025   Date of Service: 7/7/2025 I Hospital Day: 4     Assessment & Plan  Severe episode of recurrent major depressive disorder, without psychotic features (HCC)  A 65 y.o. [place] male, Single (never ), domiciled alone, on disability, w/ PMH of alcoholic cirrhosis, portal HTN, umbilical hernia, chronic hepatitis C treated in 2014, DM, coronary artery calcification, gallbladder polyp, nephrolithiasis, RODRICK, vitamin B12 deficiency, and PPH of depression, anxiety, polysubstance use disorder in remission (heroin, cocaine, alcohol and tobacco), on methadone maintenance therapy (44 ml/day as confirmed with [facility]), no prior psychiatric admissions, no prior SA, no h/o self-injurious behavior, on Zoloft 50 mg daily and Atarax woth two ED visited on 6/30/25 and 7/2/25 for somatic sxs and anxiety and then presented to the ED on 7/3/25, c/o \"Felt he had a knot in his stomach last night and anxiety. Took unknown amount of hydroxyzine last night. Reports left sided abd pain today. Denies trying to OD on meds, was just trying to feel better\". The patient signed 201 and got admitted to the inpatient psychiatry unit 6B for further psychiatric stabilization.      - Zoloft 50 mg po daily; doses to be adjusted as indicated  - Increase Neurontin 300 mg to 400 mg po TID on 7/7/25 for anxiety and pain; doses to be adjusted as indicated  - Seroquel 25 mg po nightly for mood sxs and sleep; doses to be adjusted as indicated  - Melatonin 3 mg po nightly to adjust the circadian rhythm  - Group and milieu therapy  ERIC (generalized anxiety disorder)  - Management as per principal problem    Methadone maintenance therapy patient (HCC)  ED CW's confirmed Methadone dose from [facility]. Phone call placed to RN desk at [phone]. Spoke with Reshma. She stated the patient received a 27-day " supply of 44 mg Methadone.   Alcoholic cirrhosis of liver without ascites (HCC)  - f/u [facility] recs  Benign essential hypertension  - f/u [facility] recs  Type 2 diabetes mellitus without complication, without long-term current use of insulin (HCC)  Lab Results   Component Value Date    HGBA1C 7.3 (H) 07/04/2025       Recent Labs     07/05/25  0719   POCGLU 138       Blood Sugar Average: Last 72 hrs:  (P) [id number]  - f/u [facility] recs  RODRICK (obstructive sleep apnea)  - f/u [facility] recs  Subclinical hyperthyroidism  - f/u [facility] recs      Current Medications:    Current Facility-Administered Medications:     atorvastatin (LIPITOR) tablet 40 mg, Oral, Daily With Dinner    ergocalciferol (VITAMIN D2) capsule 50,000 Units, Oral, Weekly    gabapentin (NEURONTIN) capsule 400 mg, Oral, TID    lisinopril (ZESTRIL) tablet 5 mg, Oral, Daily    melatonin tablet 6 mg, Oral, HS    metFORMIN (GLUCOPHAGE) tablet 500 mg, Oral, Daily With Dinner    methadone (DOLOPHINE) oral concentrated solution 44 mg, Oral, Daily    nadolol (CORGARD) tablet 20 mg, Oral, Daily    QUEtiapine (SEROquel) tablet 25 mg, Oral, HS    sertraline (ZOLOFT) tablet 50 mg, Oral, Daily    sitaGLIPtin (JANUVIA) tablet 100 mg, Oral, Daily      Risks/Benefits of Treatment:   Risks, benefits, and possible side effects of medications explained to patient and patient verbalizes understanding and agreement for treatment.    Progress Toward Goals: gradual improvement    Treatment Planning:    - Encourage early mobility and having a structured day  - Provide frequent re-orientation, and cognitive stimulation  - Ensure assistive devices are in proper working order (eye-glasses, hearing aids)  - Encourage adequate hydration, nutrition and monitor bowel movements  - Maintain sleep-wake cycle: Uninterrupted sleep time; low-level lighting at night  - Fall precaution  - f/u [facility] recs regarding the medical problems   - Continue medication titration and treatment plan; adjust  medication to optimize treatment response and as clinically indicated. .   - Observation: routine            - VS: as per unit protocol  - Encourage group attendance and milieu therapy  - Dispo: To be determined  - Estimated Discharge Day: 7/9/2025   - Legal Status : Voluntary 201 commitment.  -     Subjective     Patient was visited on unit for continuing care; chart reviewed and discussed with multidisciplinary treatment team.  On approach, the patient was calm, pleasant and cooperative. Endorsed better mood and less anxiety sxs, but continues to have negative thinking and ruminating thoughts. Remains somatically preoccupied. He noted that Neurontin has helped with anxiety and some pain, but continues to report LBP 5/10 at this time. Denied any changes in appetite, and energy level. No problem initiating and maintaining sleep.  Denied A/VH currently.  Denied SI/HI, intent or plan upon direct inquiry at this time.    Patient continues to be visible in the milieu and interacts with staff and peers. No reports of aggression or self-injurious behavior on unit. No PRN medications used in the past 24 hours.    Patient accepted all offered medications and no adverse effects of medications noted or reported. Neurontin is being uptitrated to 400 mg TID; doses to be adjusted as indicated.    Sleep: slept better  Appetite: normal  Medication side effects: No  ROS: review of systems as noted above in HPI/Subjective report, all other systems are negative    Objective :  Temp:  [97.3 °F (36.3 °C)-97.9 °F (36.6 °C)] 97.9 °F (36.6 °C)  HR:  [71-78] 71  BP: (102-112)/(58-73) 105/58  Resp:  [16-17] 17  SpO2:  [92 %-93 %] 93 %  O2 Device: None (Room air)    Mental Status Evaluation:  Appearance and attitude: appeared as stated age, dressed in hospital attire, with good hygiene  Eye contact: good  Motor Function: within normal limits, intact gait, No PMA/PMR  Gait/station: normal gait/station and normal balance  Speech: normal for  rate, rhythm, volume, latency, amount  Language: No overt abnormality  Mood/affect: less anxious / Affect was constricted but reactive, mood congruent  Thought Processes: negative thinking, ruminating  Thought content: denied suicidal ideations or homicidal ideations, no overt delusions elicited, somatic preoccupation, negative thinking, ruminating thoughts  Associations: circumstantial associations  Perceptual disturbances: denies Auditory/Visual/Tactile Hallucinations  Orientation: oriented to person, place, and time/date  Cognitive Function: intact  Attention/Concentration: attention span and concentration appear shorter than expected for age  Memory: recent and remote memory grossly intact  Fund of knowledge: aware of current events, aware of past history, and vocabulary average  Impulse control: good  Insight/judgment: fair/fair    Pain : reported having pain in lower back  Pain Scale : 5      Lab Results: I have reviewed the following results:  Results from the past 24 hours: No results found for this or any previous visit (from the past 24 hours).    Administrative Statements     Counseling / Coordination of Care:   Patient's progress discussed with staff in treatment team meeting.  Medications, treatment progress and treatment plan reviewed with patient.  Medication changes discussed with patient.  Medication education provided to patient.  Supportive therapy provided to patient.  Cognitive techniques utilized during the session.  Reassurance and supportive therapy provided.  Reoriented to reality and reassured.  Encouraged participation in milieu and group therapy on the unit.  Crisis/safety plan discussed with patient.  Discharge plan discussed with patient.         Christopher Gray MD  Attending Psychiatrist   [facility]    07/07/25

## 2025-07-07 NOTE — NURSING NOTE
"Patient calm, pleasant and cooperative. Visible in the milieu, social with select peers.Denies all psych s/s, stated \" I feel great today\". Denies pain and discomfort. Medication compliant. Safety precautions maintained.   "

## 2025-07-07 NOTE — PLAN OF CARE
Problem: PAIN - ADULT  Goal: Verbalizes/displays adequate comfort level or baseline comfort level  Description: Interventions:  - Encourage patient to monitor pain and request assistance  - Assess pain using appropriate pain scale  - Administer analgesics as ordered based on type and severity of pain and evaluate response  - Implement non-pharmacological measures as appropriate and evaluate response  - Consider cultural and social influences on pain and pain management  - Notify physician/advanced practitioner if interventions unsuccessful or patient reports new pain  - Educate patient/family on pain management process including their role and importance of  reporting pain   - Provide non-pharmacologic/complimentary pain relief interventions  Outcome: Progressing     Problem: INFECTION - ADULT  Goal: Absence or prevention of progression during hospitalization  Description: INTERVENTIONS:  - Assess and monitor for signs and symptoms of infection  - Monitor lab/diagnostic results  - Monitor all insertion sites, i.e. indwelling lines, tubes, and drains  - Monitor endotracheal if appropriate and nasal secretions for changes in amount and color  - [place] appropriate cooling/warming therapies per order  - Administer medications as ordered  - Instruct and encourage patient and family to use good hand hygiene technique  - Identify and instruct in appropriate isolation precautions for identified infection/condition  Outcome: Progressing  Goal: Absence of fever/infection during neutropenic period  Description: INTERVENTIONS:  - Monitor WBC  - Perform strict hand hygiene  - Limit to healthy visitors only  - No plants, dried, fresh or silk flowers with stallworth in patient room  Outcome: Progressing     Problem: SAFETY ADULT  Goal: Patient will remain free of falls  Description: INTERVENTIONS:  - Keep Call bell within reach  - Keep bed low and locked with side rails adjusted as appropriate  - Keep care items and personal  belongings within reach  - Initiate and maintain comfort rounds  Outcome: Progressing  Goal: Maintain or return to baseline ADL function  Description: INTERVENTIONS:  -  Assess patient's ability to carry out ADLs; assess patient's baseline for ADL function and identify physical deficits which impact ability to perform ADLs (bathing, care of mouth/teeth, toileting, grooming, dressing, etc.)  - Assess/evaluate cause of self-care deficits   - Assess range of motion  - Assess patient's mobility; develop plan if impaired  - Assess patient's need for assistive devices and provide as appropriate  - Encourage maximum independence but intervene and supervise when necessary  - Involve family in performance of ADLs  - Assess for home care needs following discharge   - Consider OT consult to assist with ADL evaluation and planning for discharge  - Provide patient education as appropriate  - Monitor functional capacity and physical performance, use of AM PAC & JH-HLM   - Monitor gait, balance and fatigue with ambulation    Outcome: Progressing  Goal: Maintains/Returns to pre admission functional level  Description: INTERVENTIONS:  - Set and communicate daily mobility goal to care team and patient/family/caregiver.   - Record patient progress and toleration of activity level   Outcome: Progressing     Problem: DISCHARGE PLANNING  Goal: Discharge to home or other facility with appropriate resources  Description: INTERVENTIONS:  - Identify barriers to discharge w/patient and caregiver  - Arrange for needed discharge resources and transportation as appropriate  - Identify discharge learning needs (meds, wound care, etc.)  - Arrange for interpretive services to assist at discharge as needed  - Refer to Case Management Department for coordinating discharge planning if the patient needs post-hospital services based on physician/advanced practitioner order or complex needs related to functional status, cognitive ability, or social  support system  Outcome: Progressing     Problem: Knowledge Deficit  Goal: Patient/family/caregiver demonstrates understanding of disease process, treatment plan, medications, and discharge instructions  Description: Complete learning assessment and assess knowledge base.  Interventions:  - Provide teaching at level of understanding  - Provide teaching via preferred learning methods  Outcome: Progressing     Problem: Alteration in Thoughts and Perception  Goal: Treatment Goal: Gain control of psychotic behaviors/thinking, reduce/eliminate presenting symptoms and demonstrate improved reality functioning upon discharge  Outcome: Progressing  Goal: Verbalize thoughts and feelings  Description: Interventions:  - Promote a nonjudgmental and trusting relationship with the patient through active listening and therapeutic communication  - Assess patient's level of functioning, behavior and potential for risk  - Engage patient in 1 on 1 interactions  - Encourage patient to express fears, feelings, frustrations, and discuss symptoms    - [place] patient to reality, help patient recognize reality-based thinking   - Administer medications as ordered and assess for potential side effects  - Provide the patient education related to the signs and symptoms of the illness and desired effects of prescribed medications  Outcome: Progressing  Goal: Refrain from acting on delusional thinking/internal stimuli  Description: Interventions:  - Monitor patient closely, per order   - Utilize least restrictive measures   - Set reasonable limits, give positive feedback for acceptable   - Administer medications as ordered and monitor of potential side effects  Outcome: Progressing  Goal: Agree to be compliant with medication regime, as prescribed and report medication side effects  Description: Interventions:  - Offer appropriate PRN medication and supervise ingestion; conduct AIMS, as needed   Outcome: Progressing  Goal: Attend and participate  in unit activities, including therapeutic, recreational, and educational groups  Description: Interventions:  -Encourage Visitation and family involvement in care  Outcome: Progressing  Goal: Recognize dysfunctional thoughts, communicate reality-based thoughts at the time of discharge  Description: Interventions:  - Provide medication and psycho-education to assist patient in compliance and developing insight into his/her illness   Outcome: Progressing  Goal: Complete daily ADLs, including personal hygiene independently, as able  Description: Interventions:  - Observe, teach, and assist patient with ADLS  - Monitor and promote a balance of rest/activity, with adequate nutrition and elimination   Outcome: Progressing     Problem: Ineffective Coping  Goal: Cooperates with admission process  Description: Interventions:   - Complete admission process  Outcome: Progressing  Goal: Identifies ineffective coping skills  Outcome: Progressing  Goal: Identifies healthy coping skills  Outcome: Progressing  Goal: Demonstrates healthy coping skills  Outcome: Progressing  Goal: Participates in unit activities  Description: Interventions:  - Provide therapeutic environment   - Provide required programming   - Redirect inappropriate behaviors   Outcome: Progressing  Goal: Patient/Family participate in treatment and DC plans  Description: Interventions:  - Provide therapeutic environment  Outcome: Progressing  Goal: Patient/Family verbalizes awareness of resources  Outcome: Progressing  Goal: Understands least restrictive measures  Description: Interventions:  - Utilize least restrictive behavior  Outcome: Progressing  Goal: Free from restraint events  Description: - Utilize least restrictive measures   - Provide behavioral interventions   - Redirect inappropriate behaviors   Outcome: Progressing     Problem: Anxiety  Goal: Anxiety is at manageable level  Description: Interventions:  - Assess and monitor patient's anxiety level.   -  Monitor for signs and symptoms (heart palpitations, chest pain, shortness of breath, headaches, nausea, feeling jumpy, restlessness, irritable, apprehensive).   - Collaborate with interdisciplinary team and initiate plan and interventions as ordered.  - [place] patient to unit/surroundings  - Explain treatment plan  - Encourage participation in care  - Encourage verbalization of concerns/fears  - Identify coping mechanisms  - Assist in developing anxiety-reducing skills  - Administer/offer alternative therapies  - Limit or eliminate stimulants  Outcome: Progressing     Problem: DISCHARGE PLANNING - CARE MANAGEMENT  Goal: Discharge to post-acute care or home with appropriate resources  Description: INTERVENTIONS:  - Conduct assessment to determine patient/family and health care team treatment goals, and need for post-acute services based on payer coverage, community resources, and patient preferences, and barriers to discharge  - Address psychosocial, clinical, and financial barriers to discharge as identified in assessment in conjunction with the patient/family and health care team  - Arrange appropriate level of post-acute services according to patient’s   needs and preference and payer coverage in collaboration with the physician and health care team  - Communicate with and update the patient/family, physician, and health care team regarding progress on the discharge plan  - Arrange appropriate transportation to post-acute venues  Outcome: Progressing

## 2025-07-07 NOTE — ASSESSMENT & PLAN NOTE
Lab Results   Component Value Date    HGBA1C 7.3 (H) 07/04/2025       Recent Labs     07/05/25  0719   POCGLU 138       Blood Sugar Average: Last 72 hrs:  (P) [id number]  - f/u [facility] recs

## 2025-07-07 NOTE — NURSING NOTE
Patient is alert and oriented.He has been intermittently visible on the unit and observed in the milieu. The patient denies suicidal or homicidal ideation, as well as auditory or visual hallucinations. He reports no needs at this time.

## 2025-07-07 NOTE — ED NOTES
Insurance Authorization for admission:   Completed [facility] form completed and faxed to [phone]

## 2025-07-07 NOTE — ASSESSMENT & PLAN NOTE
ED CW's confirmed Methadone dose from [facility]. Phone call placed to RN desk at [phone]. Spoke with Reshma. She stated the patient received a 27-day supply of 44 mg Methadone.

## 2025-07-07 NOTE — ASSESSMENT & PLAN NOTE
"A 65 y.o. [place] male, Single (never ), domiciled alone, on disability, w/ PMH of alcoholic cirrhosis, portal HTN, umbilical hernia, chronic hepatitis C treated in 2014, DM, coronary artery calcification, gallbladder polyp, nephrolithiasis, RODRICK, vitamin B12 deficiency, and PPH of depression, anxiety, polysubstance use disorder in remission (heroin, cocaine, alcohol and tobacco), on methadone maintenance therapy (44 ml/day as confirmed with [facility]), no prior psychiatric admissions, no prior SA, no h/o self-injurious behavior, on Zoloft 50 mg daily and Atarax woth two ED visited on 6/30/25 and 7/2/25 for somatic sxs and anxiety and then presented to the ED on 7/3/25, c/o \"Felt he had a knot in his stomach last night and anxiety. Took unknown amount of hydroxyzine last night. Reports left sided abd pain today. Denies trying to OD on meds, was just trying to feel better\". The patient signed 201 and got admitted to the inpatient psychiatry unit 6B for further psychiatric stabilization.      - Zoloft 50 mg po daily; doses to be adjusted as indicated  - Increase Neurontin 300 mg to 400 mg po TID on 7/7/25 for anxiety and pain; doses to be adjusted as indicated  - Seroquel 25 mg po nightly for mood sxs and sleep; doses to be adjusted as indicated  - Melatonin 3 mg po nightly to adjust the circadian rhythm  - Group and milieu therapy  "

## 2025-07-07 NOTE — PROGRESS NOTES
07/07/25 0831   Team Meeting   Meeting Type Daily Rounds   Initial Conference Date 07/07/25   Next Conference Date 07/08/25   Team Members Present   Team Members Present Physician;Nurse;;   Physician Team Member Marina/Tanja/Patricia/Chris   Nursing Team Member Marlyn   Care Management Team Member Buffy   Social Work Team Member Susanne   Patient/Family Present   Patient Present No   Patient's Family Present No     Social, visible, no groups, meal intake ok, somatically preoccupied, multiple ED visits, stone in kidney but pain in other side, seroquel started, anx getting better, lives alone, connect to OP services, discharge 7/9

## 2025-07-08 PROCEDURE — 99232 SBSQ HOSP IP/OBS MODERATE 35: CPT | Performed by: STUDENT IN AN ORGANIZED HEALTH CARE EDUCATION/TRAINING PROGRAM

## 2025-07-08 RX ORDER — METFORMIN HYDROCHLORIDE 500 MG/1
500 TABLET, EXTENDED RELEASE ORAL
Qty: 30 TABLET | Refills: 0 | Status: ON HOLD | OUTPATIENT
Start: 2025-07-08 | End: 2025-08-07

## 2025-07-08 RX ORDER — ERGOCALCIFEROL 1.25 MG/1
50000 CAPSULE, LIQUID FILLED ORAL WEEKLY
Qty: 4 CAPSULE | Refills: 0 | Status: ON HOLD | OUTPATIENT
Start: 2025-07-12 | End: 2025-08-24

## 2025-07-08 RX ORDER — LISINOPRIL 5 MG/1
5 TABLET ORAL DAILY
Qty: 30 TABLET | Refills: 0 | Status: ON HOLD | OUTPATIENT
Start: 2025-07-08 | End: 2025-08-07

## 2025-07-08 RX ORDER — GABAPENTIN 400 MG/1
400 CAPSULE ORAL 3 TIMES DAILY
Qty: 90 CAPSULE | Refills: 0 | Status: ON HOLD | OUTPATIENT
Start: 2025-07-08 | End: 2025-08-07

## 2025-07-08 RX ORDER — QUETIAPINE FUMARATE 25 MG/1
25 TABLET, FILM COATED ORAL
Qty: 30 TABLET | Refills: 0 | Status: ON HOLD | OUTPATIENT
Start: 2025-07-08 | End: 2025-08-07

## 2025-07-08 RX ORDER — ATORVASTATIN CALCIUM 40 MG/1
40 TABLET, FILM COATED ORAL
Qty: 30 TABLET | Refills: 0 | Status: ON HOLD | OUTPATIENT
Start: 2025-07-08 | End: 2025-08-07

## 2025-07-08 RX ORDER — NADOLOL 20 MG/1
20 TABLET ORAL DAILY
Qty: 30 TABLET | Refills: 0 | Status: ON HOLD | OUTPATIENT
Start: 2025-07-09 | End: 2025-08-08

## 2025-07-08 RX ADMIN — QUETIAPINE FUMARATE 25 MG: 25 TABLET ORAL at 21:15

## 2025-07-08 RX ADMIN — POLYETHYLENE GLYCOL 3350 17 G: 17 POWDER, FOR SOLUTION ORAL at 21:15

## 2025-07-08 RX ADMIN — GABAPENTIN 400 MG: 400 CAPSULE ORAL at 21:15

## 2025-07-08 RX ADMIN — NADOLOL 20 MG: 20 TABLET ORAL at 08:38

## 2025-07-08 RX ADMIN — GABAPENTIN 400 MG: 400 CAPSULE ORAL at 08:38

## 2025-07-08 RX ADMIN — SERTRALINE HYDROCHLORIDE 50 MG: 50 TABLET ORAL at 08:38

## 2025-07-08 RX ADMIN — Medication 6 MG: at 21:15

## 2025-07-08 RX ADMIN — METHADONE HYDROCHLORIDE 44 MG: 10 CONCENTRATE ORAL at 08:37

## 2025-07-08 RX ADMIN — GABAPENTIN 400 MG: 400 CAPSULE ORAL at 15:10

## 2025-07-08 RX ADMIN — LISINOPRIL 5 MG: 5 TABLET ORAL at 08:41

## 2025-07-08 RX ADMIN — ATORVASTATIN CALCIUM 40 MG: 40 TABLET, FILM COATED ORAL at 15:41

## 2025-07-08 RX ADMIN — SITAGLIPTIN 100 MG: 100 TABLET, FILM COATED ORAL at 08:38

## 2025-07-08 RX ADMIN — METFORMIN HYDROCHLORIDE 500 MG: 500 TABLET, FILM COATED ORAL at 15:41

## 2025-07-08 NOTE — CASE MANAGEMENT
Case Management Discharge Planning Note    Patient name Rishi Kirk  [facility] MRN [id number]  : 1960 Date 2025       Current Admission Date: 7/3/2025  Current Admission Diagnosis:Severe episode of recurrent major depressive disorder, without psychotic features (HCC)   Patient Active Problem List    Diagnosis Date Noted    Subclinical hyperthyroidism 2025    ERIC (generalized anxiety disorder) 2025    Severe episode of recurrent major depressive disorder, without psychotic features (HCC) 2025    Medical clearance for psychiatric admission 2025    Calculus of gallbladder without cholecystitis without obstruction 2025    Chronic idiopathic constipation 2025    Anxiety 2024    Alcoholic cirrhosis of liver without ascites (HCC) 2024    Umbilical hernia without obstruction and without gangrene 2024    Cataract of right eye 2023    Nephrolithiasis 2023    Intolerance of continuous positive airway pressure (CPAP) ventilation 2023    Vitamin B12 deficiency 2023    Coronary artery calcification 06/15/2023    RODRICK (obstructive sleep apnea)     Class 1 obesity due to excess calories with serious comorbidity and body mass index (BMI) of 33.0 to 33.9 in adult 2020    Secondary male hypogonadism 2020    Nausea 2020    Type 2 diabetes mellitus without complication, without long-term current use of insulin (HCC)     Benign essential hypertension 2018    Gallbladder polyp 2018    Methadone maintenance therapy patient (HCC) 2015    Former smoker 2015    Proteinuria 2014    Portal hypertension (HCC) 07/10/2014    Esophageal varices in cirrhosis (HCC) 2014    Chronic hepatitis C treated in  (HCC) 01/10/2014      LOS (days): 5  Geometric Mean LOS (GMLOS) (days):   Days to GMLOS:     OBJECTIVE:  Risk of Unplanned Readmission Score: 19.46         Current admission  status: Inpatient Psych   Preferred Pharmacy:   [facility] - [place], [place] - [address]  [address]  [place] [address]  Phone: [phone] Fax: [phone]    Primary Care Provider: Soren Trivedi MD    Primary Insurance: [employer]  REP  Secondary Insurance: [facility]    DISCHARGE DETAILS:    CM met with PT for PT check in. PT was pleasant in conversation, reviewed discharge plan along with follow up care and supports, PT in agreement with all. PT denies si/hi/ah/vh anxiety and depression. PT expressed gratitude. Reviewed IMM, PT declined right to appeal, feels he is ready for discharge and signed. Rishi was concerned about his car being in the [address], message to security, they advised they have not towed any cars so it should be there. He will be discharging to his car. Set the patient up with [facility] for med mgmt, on wait list for therapy.     Aftercare:     [facility] . - 7/31/25 @ 10:30 am with ANJU Easton  [facility] - 7/10/25 @ 3 pm with Melecio Lopez MD

## 2025-07-08 NOTE — CASE MANAGEMENT
Referral put in for [facility] for medication management and talk therapy.     Scheduled a visit with family medicine for 7/10/25 @ 3 pm with Dr Melecio Lopez MD     Sent a message to security via epic to see if the patient's car is still in the [address] parking garage. He has a dark green [employer] License plate [id number] or [id number].

## 2025-07-08 NOTE — ASSESSMENT & PLAN NOTE
"A 65 y.o. Sahil male, Single (never ), domiciled alone, on disability, w/ PMH of alcoholic cirrhosis, portal HTN, umbilical hernia, chronic hepatitis C treated in 2014, DM, coronary artery calcification, gallbladder polyp, nephrolithiasis, RODRICK, vitamin B12 deficiency, and PPH of depression, anxiety, polysubstance use disorder in remission (heroin, cocaine, alcohol and tobacco), on methadone maintenance therapy (44 ml/day as confirmed with [facility]), no prior psychiatric admissions, no prior SA, no h/o self-injurious behavior, on Zoloft 50 mg daily and Atarax woth two ED visited on 6/30/25 and 7/2/25 for somatic sxs and anxiety and then presented to the ED on 7/3/25, c/o \"Felt he had a knot in his stomach last night and anxiety. Took unknown amount of hydroxyzine last night. Reports left sided abd pain today. Denies trying to OD on meds, was just trying to feel better\". The patient signed 201 and got admitted to the inpatient psychiatry unit 6B for further psychiatric stabilization.      - Zoloft 50 mg po daily; doses to be adjusted as indicated  - Neurontin 300 mg to 400 mg po TID on 7/7/25 for anxiety and pain  - Seroquel 25 mg po nightly for mood sxs and sleep; doses to be adjusted as indicated  - Melatonin 3 mg po nightly to adjust the circadian rhythm  - Group and milieu therapy  "

## 2025-07-08 NOTE — PLAN OF CARE
Problem: Ineffective Coping  Goal: Cooperates with admission process  Description: Interventions:   - Complete admission process  Outcome: Progressing  Goal: Identifies ineffective coping skills  Outcome: Progressing  Goal: Identifies healthy coping skills  Outcome: Progressing  Goal: Demonstrates healthy coping skills  Outcome: Progressing  Goal: Participates in unit activities  Description: Interventions:  - Provide therapeutic environment   - Provide required programming   - Redirect inappropriate behaviors   Outcome: Progressing  Goal: Patient/Family participate in treatment and DC plans  Description: Interventions:  - Provide therapeutic environment  Outcome: Progressing

## 2025-07-08 NOTE — ED NOTES
Insurance Authorization for admission:   Fax received from [employer]   7 days approved.  Level of care:  mental health   Review on 7/9/25  Authorization # [id number]

## 2025-07-08 NOTE — NURSING NOTE
Pt calm and pleasant brightens on approach. Pt visible in milieu social with peers and staff. Pt denies SI,HI and AV/H. Pt cooperative with care and medication compliant.

## 2025-07-08 NOTE — NURSING NOTE
Patient is pleasant and cooperative , with poor concentration. His behaviors and attitude is calm ,warm and friendly. Patient has clean appearance with worried affect. Good medication compliance. Impulsive behaviors not  observed. Visible on the unit and social with selected peers. . Denies Si,HI. A,V/H.

## 2025-07-08 NOTE — PROGRESS NOTES
07/08/25 0753   Team Meeting   Meeting Type Daily Rounds   Initial Conference Date 07/08/25   Next Conference Date 07/09/25   Team Members Present   Team Members Present Physician;Nurse;;   Physician Team Member Marina/Tanja/Patricia/Chris   Nursing Team Member Marlyn   Care Management Team Member Buffy   Social Work Team Member Susanne   Patient/Family Present   Patient Present No   Patient's Family Present No     Discharge tomorrow, withdrawn to room, intermittently visible, no behaviors, increased neurontin, referral for [facility].

## 2025-07-08 NOTE — PROGRESS NOTES
07/08/25 1528   Discharge Planning   Living Arrangements Lives Alone   Support Systems Self;Psychiatrist;Therapist   Type of Current Residence Private residence   Current Home Care Services No   Other Referral/Resources/Interventions Provided:   Referrals Provided: Crisis Hotline;Psychiatrist;Therapist   Discharge Communications   Discharge planning discussed with: Patient   Freedom of Choice Yes   CM contacted family/caregiver? Yes   Were Treatment Team discharge recommendations reviewed with patient/caregiver? Yes   Did patient/caregiver verbalize understanding of patient care needs? Yes   Were patient/caregiver advised of the risks associated with not following Treatment Team discharge recommendations? Yes   Contacts   Patient Contacts [address]   Relationship to Patient: Family   Contact Method Phone   Phone Number [phone]   Reason/Outcome Continuity of Care;Emergency Contact;Discharge Planning   [facility]   Would you like to participate in our [facility] service program?   No - Declined

## 2025-07-08 NOTE — TREATMENT TEAM
Pt attended groups.  Pt able to self express and cooperative  Pt making slow and steady progress towards mh recovery goals.   07/08/25 1000 07/08/25 1330   Activity/Group Checklist   Group Other (Comment)  (Community meeting: self improvement) Other (Comment)  (Journaling and Mindfulness)   Attendance Attended Attended   Attendance Duration (min) 46-60 46-60   Interactions Interacted appropriately Interacted appropriately   Affect/Mood Appropriate Appropriate   Goals Achieved Identified feelings;Identified triggers;Identified relapse prevention strategies;Discussed coping strategies;Able to engage in interactions;Able to listen to others Identified triggers;Identified feelings;Identified relapse prevention strategies;Discussed coping strategies;Discussed self-esteem issues;Able to listen to others;Able to engage in interactions;Able to reflect/comment on own behavior;Able to manage/cope with feelings

## 2025-07-08 NOTE — ASSESSMENT & PLAN NOTE
ED CW's confirmed Methadone dose from [facility]. Phone call placed to RN desk at [phone]. Spoke with Reshma. She stated the patient received a 27-day supply of 44 mg Methadone.

## 2025-07-08 NOTE — PROGRESS NOTES
"Progress Note - Behavioral Health   Name: Rihsi Kirk 65 y.o. male I MRN: [id number]  Unit/Bed#: [facility] 643-01 I Date of Admission: 7/3/2025   Date of Service: 7/8/2025 I Hospital Day: 5     Assessment & Plan  Severe episode of recurrent major depressive disorder, without psychotic features (HCC)  A 65 y.o. [place] male, Single (never ), domiciled alone, on disability, w/ PMH of alcoholic cirrhosis, portal HTN, umbilical hernia, chronic hepatitis C treated in 2014, DM, coronary artery calcification, gallbladder polyp, nephrolithiasis, RODRICK, vitamin B12 deficiency, and PPH of depression, anxiety, polysubstance use disorder in remission (heroin, cocaine, alcohol and tobacco), on methadone maintenance therapy (44 ml/day as confirmed with [facility]), no prior psychiatric admissions, no prior SA, no h/o self-injurious behavior, on Zoloft 50 mg daily and Atarax woth two ED visited on 6/30/25 and 7/2/25 for somatic sxs and anxiety and then presented to the ED on 7/3/25, c/o \"Felt he had a knot in his stomach last night and anxiety. Took unknown amount of hydroxyzine last night. Reports left sided abd pain today. Denies trying to OD on meds, was just trying to feel better\". The patient signed 201 and got admitted to the inpatient psychiatry unit 6B for further psychiatric stabilization.      - Zoloft 50 mg po daily; doses to be adjusted as indicated  - Neurontin 300 mg to 400 mg po TID on 7/7/25 for anxiety and pain  - Seroquel 25 mg po nightly for mood sxs and sleep; doses to be adjusted as indicated  - Melatonin 3 mg po nightly to adjust the circadian rhythm  - Group and milieu therapy  ERIC (generalized anxiety disorder)  - Management as per principal problem    Methadone maintenance therapy patient (HCC)  ED CW's confirmed Methadone dose from [facility]. Phone call placed to RN desk at [phone]. Spoke with Reshma. She stated the patient received a 27-day supply of 44 mg Methadone.   Alcoholic " "cirrhosis of liver without ascites (HCC)  - f/u [facility] recs  Benign essential hypertension  - f/u [facility] recs  Type 2 diabetes mellitus without complication, without long-term current use of insulin (HCC)  Lab Results   Component Value Date    HGBA1C 7.3 (H) 07/04/2025       No results for input(s): \"POCGLU\" in the last 72 hours.      Blood Sugar Average: Last 72 hrs:  (P) 138  - f/u [facility] recs  RODRICK (obstructive sleep apnea)  - f/u [facility] recs  Subclinical hyperthyroidism  - f/u [facility] recs    Current Medications:    Current Facility-Administered Medications:     atorvastatin (LIPITOR) tablet 40 mg, Oral, Daily With Dinner    ergocalciferol (VITAMIN D2) capsule 50,000 Units, Oral, Weekly    gabapentin (NEURONTIN) capsule 400 mg, Oral, TID    lisinopril (ZESTRIL) tablet 5 mg, Oral, Daily    melatonin tablet 6 mg, Oral, HS    metFORMIN (GLUCOPHAGE) tablet 500 mg, Oral, Daily With Dinner    methadone (DOLOPHINE) oral concentrated solution 44 mg, Oral, Daily    nadolol (CORGARD) tablet 20 mg, Oral, Daily    QUEtiapine (SEROquel) tablet 25 mg, Oral, HS    sertraline (ZOLOFT) tablet 50 mg, Oral, Daily    sitaGLIPtin (JANUVIA) tablet 100 mg, Oral, Daily    Current Facility-Administered Medications:     bisacodyl (DULCOLAX) rectal suppository 10 mg, Rectal, Daily PRN    hydrOXYzine HCL (ATARAX) tablet 25 mg, Oral, Q6H PRN Max 4/day    hydrOXYzine HCL (ATARAX) tablet 50 mg, Oral, Q6H PRN Max 4/day    ibuprofen (MOTRIN) tablet 200 mg, Oral, Q6H PRN    ibuprofen (MOTRIN) tablet 400 mg, Oral, Q6H PRN    ibuprofen (MOTRIN) tablet 600 mg, Oral, Q8H PRN    LORazepam (ATIVAN) injection 1 mg, Intramuscular, Q6H PRN Max 3/day    LORazepam (ATIVAN) tablet 1 mg, Oral, Q6H PRN Max 3/day    nicotine polacrilex (NICORETTE) gum 2 mg, Oral, Q4H PRN    OLANZapine (ZyPREXA) IM injection 5 mg, Intramuscular, Q3H PRN Max 3/day    OLANZapine (ZyPREXA) tablet 2.5 mg, Oral, Q4H PRN Max 6/day    OLANZapine (ZyPREXA) tablet 5 mg, Oral, Q4H PRN Max 3/day    " "OLANZapine (ZyPREXA) tablet 5 mg, Oral, Q3H PRN Max 3/day    polyethylene glycol (MIRALAX) packet 17 g, Oral, Daily PRN    senna-docusate sodium (SENOKOT S) 8.6-50 mg per tablet 1 tablet, Oral, Daily PRN    traZODone (DESYREL) tablet 50 mg, Oral, HS PRN    Risks/Benefits of Treatment:     Risks, benefits, and possible side effects of medications explained to patient and patient verbalizes understanding and agreement for treatment.    Progress Toward Goals: progressing    Treatment Planning:      - Encourage early mobility and having a structured day  - Provide frequent re-orientation, and cognitive stimulation  - Ensure assistive devices are in proper working order (eye-glasses, hearing aids)  - Encourage adequate hydration, nutrition and monitor bowel movements  - Maintain sleep-wake cycle: Uninterrupted sleep time; low-level lighting at night  - Fall precaution  - Follow up with [facility] regarding the medical problems   - Continue medication titration and treatment plan; adjust medication to optimize treatment response and as clinically indicated.   - Observation: Routine  - VS: as per unit protocol  - Encourage group attendance and milieu therapy  - Dispo: To be determined  - Estimated Discharge Day: 7/9/2025   - Legal Status: Status of Admission  Status of Admission: 201  Release of Information Signed: Yes (PCP, Sister,  [facility]).  - Long Stay Certification : Not Applicable    Chris Blackwell is a 65-year-old male diagnosed with recurrent major depressive disorder, generalized anxiety disorder, alcoholic cirrhosis currently on MAT therapy seen in follow-up in the setting of worsening anxiety due to psychosocial stressors surrounding medical issues.  He is seen this morning pacing the milieu. He is agreeable to speak with writer, appears less anxious and brighter on approach. Expresses his mood has been stable and his anxiety has significantly improved, \"I'm doing much better.\" Has been tolerating " "medications and accepting without side effects. Medication reviewed/psychoeducation provided. Does not overtly express and somatic complaints with writer. He denied any suicidal thoughts, plan or intent. No overt psychosis or alexandra observed. Anticipated discharge for tomorrow.     Sleep: normal  Appetite: normal  Medication side effects: No  ROS: review of systems as noted above in HPI/Subjective report, all other systems are negative    Objective :  Temp:  [97.5 °F (36.4 °C)-97.9 °F (36.6 °C)] 97.5 °F (36.4 °C)  HR:  [78-86] 78  BP: (113-119)/(65-84) 119/84  Resp:  [16-18] 18  SpO2:  [94 %-95 %] 94 %  O2 Device: None (Room air)    Mental Status Evaluation:    Appearance:  age appropriate, dressed in hospital attire, looks stated age, fair hygiene   Behavior:  cooperative, calm   Speech:  decreased rate, soft, non-spontaneous   Mood:  \"Much better\", euthymic   Affect:  brighter, less constricted   Thought Process:  goal directed, logical, linear   Thought Content:  no overt delusions   Perceptual Disturbances: no auditory hallucinations, no visual hallucinations, does not appear responding to internal stimuli   Risk Potential: Suicidal Ideation -  None at present  Homicidal Ideation -  None at present  Potential for Aggression - Not at present   Sensorium:  oriented to person, place, and time/date   Memory:  recent and remote memory grossly intact   Consciousness:  alert and awake   Attention/Concentration: attention span and concentration are age appropriate   Insight:  fair   Judgment: fair   Gait/Station: normal gait/station   Motor Activity: no abnormal movements       Lab Results: I have reviewed the following results:  Results from the past 24 hours: No results found for this or any previous visit (from the past 24 hours).    Administrative Statements     Counseling / Coordination of Care:   Patient's progress discussed with staff in treatment team meeting.  Medication changes reviewed with staff in treatment " "team meeting.  Medication education provided to patient.  Events leading to admission reviewed with patient.  Discussed with patient acceptance of mental illness diagnosis and need for ongoing treatment after discharge.  Supportive therapy provided to patient.  Cognitive techniques utilized during the session.  Reassurance and supportive therapy provided.  Group attendance encouraged.  Encouraged participation in milieu and group therapy on the unit.  Discharge plan discussed with patient.    Portions of the record may have been created with voice recognition software. Occasional wrong word or \"sound a like\" substitutions may have occurred due to the inherent limitations of voice recognition software. Read the chart carefully and recognize, using context, where substitutions have occurred.    Leanna Dahl PA-C 07/08/25  "

## 2025-07-08 NOTE — ASSESSMENT & PLAN NOTE
"Lab Results   Component Value Date    HGBA1C 7.3 (H) 07/04/2025       No results for input(s): \"POCGLU\" in the last 72 hours.      Blood Sugar Average: Last 72 hrs:  (P) 138  - f/u [facility] recs  "

## 2025-07-08 NOTE — NURSING NOTE
Pt withdrawn to his room. Denies all psych s/s. No behaviors noted. No C/o pain. Compliant with HS medications. Safety maintained. Will continue to monitor.

## 2025-07-08 NOTE — TREATMENT TEAM
Pt completed admission Bh relapse prevention.  Pt signed and copy in chart.  Crisis, wamline and 988 numbers provided.   [facility] .  Provider Dr Peng .  Pt attends groups.      07/08/25 1100   Activity/Group Checklist   Group Admission/Discharge   Attendance Attended   Attendance Duration (min) 31-45   Interactions Interacted appropriately   Affect/Mood Appropriate   Goals Achieved Identified feelings;Identified triggers;Identified relapse prevention strategies;Discussed discharge plans;Discussed coping strategies;Able to listen to others;Identified resources and support systems;Identified distorted thoughts/beliefs;Able to engage in interactions;Able to reflect/comment on own behavior;Able to manage/cope with feelings

## 2025-07-09 ENCOUNTER — TRANSITIONAL CARE MANAGEMENT (OUTPATIENT)
Dept: FAMILY MEDICINE CLINIC | Facility: CLINIC | Age: 65
End: 2025-07-09

## 2025-07-09 VITALS
OXYGEN SATURATION: 93 % | RESPIRATION RATE: 18 BRPM | TEMPERATURE: 97.6 F | HEART RATE: 80 BPM | HEIGHT: 65 IN | WEIGHT: 193.3 LBS | DIASTOLIC BLOOD PRESSURE: 70 MMHG | SYSTOLIC BLOOD PRESSURE: 112 MMHG | BODY MASS INDEX: 32.21 KG/M2

## 2025-07-09 PROCEDURE — 99239 HOSP IP/OBS DSCHRG MGMT >30: CPT | Performed by: PSYCHIATRY & NEUROLOGY

## 2025-07-09 RX ADMIN — NADOLOL 20 MG: 20 TABLET ORAL at 08:43

## 2025-07-09 RX ADMIN — METHADONE HYDROCHLORIDE 44 MG: 10 CONCENTRATE ORAL at 08:39

## 2025-07-09 RX ADMIN — SERTRALINE HYDROCHLORIDE 50 MG: 50 TABLET ORAL at 08:38

## 2025-07-09 RX ADMIN — GABAPENTIN 400 MG: 400 CAPSULE ORAL at 08:38

## 2025-07-09 RX ADMIN — LISINOPRIL 5 MG: 5 TABLET ORAL at 08:45

## 2025-07-09 RX ADMIN — SITAGLIPTIN 100 MG: 100 TABLET, FILM COATED ORAL at 08:38

## 2025-07-09 NOTE — PLAN OF CARE
Problem: PAIN - ADULT  Goal: Verbalizes/displays adequate comfort level or baseline comfort level  Description: Interventions:  - Encourage patient to monitor pain and request assistance  - Assess pain using appropriate pain scale  - Administer analgesics as ordered based on type and severity of pain and evaluate response  - Implement non-pharmacological measures as appropriate and evaluate response  - Consider cultural and social influences on pain and pain management  - Notify physician/advanced practitioner if interventions unsuccessful or patient reports new pain  - Educate patient/family on pain management process including their role and importance of  reporting pain   - Provide non-pharmacologic/complimentary pain relief interventions  Outcome: Completed     Problem: INFECTION - ADULT  Goal: Absence or prevention of progression during hospitalization  Description: INTERVENTIONS:  - Assess and monitor for signs and symptoms of infection  - Monitor lab/diagnostic results  - Monitor all insertion sites, i.e. indwelling lines, tubes, and drains  - Monitor endotracheal if appropriate and nasal secretions for changes in amount and color  - [place] appropriate cooling/warming therapies per order  - Administer medications as ordered  - Instruct and encourage patient and family to use good hand hygiene technique  - Identify and instruct in appropriate isolation precautions for identified infection/condition  Outcome: Completed  Goal: Absence of fever/infection during neutropenic period  Description: INTERVENTIONS:  - Monitor WBC  - Perform strict hand hygiene  - Limit to healthy visitors only  - No plants, dried, fresh or silk flowers with stallworth in patient room  Outcome: Completed     Problem: SAFETY ADULT  Goal: Patient will remain free of falls  Description: INTERVENTIONS:  - Keep Call bell within reach  - Keep bed low and locked with side rails adjusted as appropriate  - Keep care items and personal belongings  within reach  - Initiate and maintain comfort rounds  Outcome: Completed  Goal: Maintain or return to baseline ADL function  Description: INTERVENTIONS:  -  Assess patient's ability to carry out ADLs; assess patient's baseline for ADL function and identify physical deficits which impact ability to perform ADLs (bathing, care of mouth/teeth, toileting, grooming, dressing, etc.)  - Assess/evaluate cause of self-care deficits   - Assess range of motion  - Assess patient's mobility; develop plan if impaired  - Assess patient's need for assistive devices and provide as appropriate  - Encourage maximum independence but intervene and supervise when necessary  - Involve family in performance of ADLs  - Assess for home care needs following discharge   - Consider OT consult to assist with ADL evaluation and planning for discharge  - Provide patient education as appropriate  - Monitor functional capacity and physical performance, use of AM PAC & JH-HLM   - Monitor gait, balance and fatigue with ambulation    Outcome: Completed  Goal: Maintains/Returns to pre admission functional level  Description: INTERVENTIONS:  - Set and communicate daily mobility goal to care team and patient/family/caregiver.   - Record patient progress and toleration of activity level   Outcome: Completed     Problem: DISCHARGE PLANNING  Goal: Discharge to home or other facility with appropriate resources  Description: INTERVENTIONS:  - Identify barriers to discharge w/patient and caregiver  - Arrange for needed discharge resources and transportation as appropriate  - Identify discharge learning needs (meds, wound care, etc.)  - Arrange for interpretive services to assist at discharge as needed  - Refer to Case Management Department for coordinating discharge planning if the patient needs post-hospital services based on physician/advanced practitioner order or complex needs related to functional status, cognitive ability, or social support  system  Outcome: Completed     Problem: Knowledge Deficit  Goal: Patient/family/caregiver demonstrates understanding of disease process, treatment plan, medications, and discharge instructions  Description: Complete learning assessment and assess knowledge base.  Interventions:  - Provide teaching at level of understanding  - Provide teaching via preferred learning methods  Outcome: Completed     Problem: Alteration in Thoughts and Perception  Goal: Treatment Goal: Gain control of psychotic behaviors/thinking, reduce/eliminate presenting symptoms and demonstrate improved reality functioning upon discharge  Outcome: Completed  Goal: Verbalize thoughts and feelings  Description: Interventions:  - Promote a nonjudgmental and trusting relationship with the patient through active listening and therapeutic communication  - Assess patient's level of functioning, behavior and potential for risk  - Engage patient in 1 on 1 interactions  - Encourage patient to express fears, feelings, frustrations, and discuss symptoms    - [place] patient to reality, help patient recognize reality-based thinking   - Administer medications as ordered and assess for potential side effects  - Provide the patient education related to the signs and symptoms of the illness and desired effects of prescribed medications  Outcome: Completed  Goal: Refrain from acting on delusional thinking/internal stimuli  Description: Interventions:  - Monitor patient closely, per order   - Utilize least restrictive measures   - Set reasonable limits, give positive feedback for acceptable   - Administer medications as ordered and monitor of potential side effects  Outcome: Completed  Goal: Agree to be compliant with medication regime, as prescribed and report medication side effects  Description: Interventions:  - Offer appropriate PRN medication and supervise ingestion; conduct AIMS, as needed   Outcome: Completed  Goal: Attend and participate in unit activities,  including therapeutic, recreational, and educational groups  Description: Interventions:  -Encourage Visitation and family involvement in care  Outcome: Completed  Goal: Recognize dysfunctional thoughts, communicate reality-based thoughts at the time of discharge  Description: Interventions:  - Provide medication and psycho-education to assist patient in compliance and developing insight into his/her illness   Outcome: Completed  Goal: Complete daily ADLs, including personal hygiene independently, as able  Description: Interventions:  - Observe, teach, and assist patient with ADLS  - Monitor and promote a balance of rest/activity, with adequate nutrition and elimination   Outcome: Completed     Problem: Ineffective Coping  Goal: Cooperates with admission process  Description: Interventions:   - Complete admission process  Outcome: Completed  Goal: Identifies ineffective coping skills  Outcome: Completed  Goal: Identifies healthy coping skills  Outcome: Completed  Goal: Demonstrates healthy coping skills  Outcome: Completed  Goal: Participates in unit activities  Description: Interventions:  - Provide therapeutic environment   - Provide required programming   - Redirect inappropriate behaviors   Outcome: Completed  Goal: Patient/Family participate in treatment and DC plans  Description: Interventions:  - Provide therapeutic environment  Outcome: Completed  Goal: Patient/Family verbalizes awareness of resources  Outcome: Completed  Goal: Understands least restrictive measures  Description: Interventions:  - Utilize least restrictive behavior  Outcome: Completed  Goal: Free from restraint events  Description: - Utilize least restrictive measures   - Provide behavioral interventions   - Redirect inappropriate behaviors   Outcome: Completed     Problem: Anxiety  Goal: Anxiety is at manageable level  Description: Interventions:  - Assess and monitor patient's anxiety level.   - Monitor for signs and symptoms (heart  palpitations, chest pain, shortness of breath, headaches, nausea, feeling jumpy, restlessness, irritable, apprehensive).   - Collaborate with interdisciplinary team and initiate plan and interventions as ordered.  - [place] patient to unit/surroundings  - Explain treatment plan  - Encourage participation in care  - Encourage verbalization of concerns/fears  - Identify coping mechanisms  - Assist in developing anxiety-reducing skills  - Administer/offer alternative therapies  - Limit or eliminate stimulants  Outcome: Completed     Problem: DISCHARGE PLANNING - CARE MANAGEMENT  Goal: Discharge to post-acute care or home with appropriate resources  Description: INTERVENTIONS:  - Conduct assessment to determine patient/family and health care team treatment goals, and need for post-acute services based on payer coverage, community resources, and patient preferences, and barriers to discharge  - Address psychosocial, clinical, and financial barriers to discharge as identified in assessment in conjunction with the patient/family and health care team  - Arrange appropriate level of post-acute services according to patient’s   needs and preference and payer coverage in collaboration with the physician and health care team  - Communicate with and update the patient/family, physician, and health care team regarding progress on the discharge plan  - Arrange appropriate transportation to post-acute venues  Outcome: Completed

## 2025-07-09 NOTE — DISCHARGE INSTR - OTHER ORDERS
You are being discharged to home at [address], [facility] [address]     Triggers you have identified during your hospitalization that led to your admission includes suicidal ideation, and a distressed mood. If you are unable to deal with your distressed mood alone please contact your provider at [facility]. If that is not effective and you continue to have suicidal ideation, having a distressed mood, are overwhelmed, and/or in crisis please contact [facility] at [phone], dial [phone] or go to the nearest emergency center.     [facility] Warm Line: [phone] available 24 hours a day, 7 days a week.    A warm line is a phone service for people who are looking for support, engagement, and hope during non-emergency mental health struggles or distress. A warm line is staffed by peers who have personal or lived experience with mental health disorders and who can listen, share, and offer a sense of recovery. A warm line is different from a crisis line or hotline, which are intended for emergency situations    National Suicide Hotline: 621.443.4834    Crisis Text Line: 324392      HOW TO GET SUBSTANCE ABUSE HELP:    If you or someone you know has a drug or alcohol problem, there is help:  [facility]: [phone]  [facility]: [phone]  An assessment is the first step.   In addition to those listed there are other programs available in the area but assessment is best to determine an appropriate level of care.    If you HAVE Medical Assistance, an assessment can be scheduled at one of these providers:    [facility]  [address] [place] [address]  [phone]   [facility]  [address] [place] [address]  [phone]   [facility]  [address], [facility] [place] [id number] [phone]  [address], [place] [address]  [phone]   [facility]  [address] [place] [address]  [phone]   [facility]  [address] [place] [address]  [phone]   NET ([facility])  [address] [place] [address]  [phone]   [facility]  [address] [place] [address]  [phone]   [facility]  [address] [place] [address]  [phone]   [facility]  [address] [place] [address]  [phone]   [facility]  [address], [place] [address]  [phone]     Our Behavioral Health Nurse Navigators, Mena or Clara, will be calling you after your discharge on the phone number that you provided. They will be available as an additional support, if needed.     If you wish to speak with one of them, you may contact them at [phone].

## 2025-07-09 NOTE — PLAN OF CARE
Problem: Alteration in Thoughts and Perception  Goal: Treatment Goal: Gain control of psychotic behaviors/thinking, reduce/eliminate presenting symptoms and demonstrate improved reality functioning upon discharge  Outcome: Progressing     Problem: Ineffective Coping  Goal: Identifies ineffective coping skills  Outcome: Progressing     Problem: Anxiety  Goal: Anxiety is at manageable level  Description: Interventions:  - Assess and monitor patient's anxiety level.   - Monitor for signs and symptoms (heart palpitations, chest pain, shortness of breath, headaches, nausea, feeling jumpy, restlessness, irritable, apprehensive).   - Collaborate with interdisciplinary team and initiate plan and interventions as ordered.  - [place] patient to unit/surroundings  - Explain treatment plan  - Encourage participation in care  - Encourage verbalization of concerns/fears  - Identify coping mechanisms  - Assist in developing anxiety-reducing skills  - Administer/offer alternative therapies  - Limit or eliminate stimulants  Outcome: Progressing

## 2025-07-09 NOTE — ASSESSMENT & PLAN NOTE
ED CW's confirmed Methadone dose from [facility]. Phone call placed to RN desk at [phone]. Spoke with Reshma. She stated the patient received a 27-day supply of 44 mg Methadone.

## 2025-07-09 NOTE — DISCHARGE SUMMARY
"Discharge Summary - Behavioral Health   Name: Rishi Kirk 65 y.o. male I MRN: [id number]  Unit/Bed#: [facility] 643-01 I Date of Admission: 7/3/2025   Date of Service: 7/9/2025 I Hospital Day: 6    Assessment & Plan  Severe episode of recurrent major depressive disorder, without psychotic features (HCC)  A 65 y.o. Sahil male, Single (never ), domiciled alone, on disability, w/ PMH of alcoholic cirrhosis, portal HTN, umbilical hernia, chronic hepatitis C treated in 2014, DM, coronary artery calcification, gallbladder polyp, nephrolithiasis, RODRICK, vitamin B12 deficiency, and PPH of depression, anxiety, polysubstance use disorder in remission (heroin, cocaine, alcohol and tobacco), on methadone maintenance therapy (44 ml/day as confirmed with [facility]), no prior psychiatric admissions, no prior SA, no h/o self-injurious behavior, on Zoloft 50 mg daily and Atarax woth two ED visited on 6/30/25 and 7/2/25 for somatic sxs and anxiety and then presented to the ED on 7/3/25, c/o \"Felt he had a knot in his stomach last night and anxiety. Took unknown amount of hydroxyzine last night. Reports left sided abd pain today. Denies trying to OD on meds, was just trying to feel better\". The patient signed 201 and got admitted to the inpatient psychiatry unit 6B for further psychiatric stabilization.      - Zoloft 50 mg po daily; doses to be adjusted as indicated  - Neurontin 300 mg to 400 mg po TID on 7/7/25 for anxiety and pain  - Seroquel 25 mg po nightly for mood sxs and sleep; doses to be adjusted as indicated  - Melatonin 3 mg po nightly to adjust the circadian rhythm  - Group and milieu therapy  ERIC (generalized anxiety disorder)  - Management as per principal problem    Methadone maintenance therapy patient (HCC)  ED CW's confirmed Methadone dose from [facility]. Phone call placed to RN desk at [phone]. Spoke with Reshma. She stated the patient received a 27-day supply of 44 mg Methadone.   Alcoholic " "cirrhosis of liver without ascites (HCC)  - f/u [facility] recs  Benign essential hypertension  - f/u [facility] recs  Type 2 diabetes mellitus without complication, without long-term current use of insulin (HCC)  Lab Results   Component Value Date    HGBA1C 7.3 (H) 07/04/2025       No results for input(s): \"POCGLU\" in the last 72 hours.      Blood Sugar Average: Last 72 hrs:    - f/u [facility] recs  RODRICK (obstructive sleep apnea)  - f/u [facility] recs  Subclinical hyperthyroidism  - f/u [facility] recs    Admission Date: 7/3/2025       Discharge Date: No discharge date for patient encounter.  Attending Psychiatrist: Christopher Gray MD    Admission Diagnosis:  Principal Problem:    Severe episode of recurrent major depressive disorder, without psychotic features (HCC)  Active Problems:    Benign essential hypertension    Type 2 diabetes mellitus without complication, without long-term current use of insulin (HCC)    Methadone maintenance therapy patient (HCC)    RODRICK (obstructive sleep apnea)    Alcoholic cirrhosis of liver without ascites (HCC)    Anxiety    ERIC (generalized anxiety disorder)    Medical clearance for psychiatric admission    Subclinical hyperthyroidism    Discharge Diagnosis:   Principal Problem:    Severe episode of recurrent major depressive disorder, without psychotic features (HCC)  Active Problems:    Benign essential hypertension    Type 2 diabetes mellitus without complication, without long-term current use of insulin (HCC)    Methadone maintenance therapy patient (HCC)    RODRICK (obstructive sleep apnea)    Alcoholic cirrhosis of liver without ascites (HCC)    Anxiety    ERIC (generalized anxiety disorder)    Medical clearance for psychiatric admission    Subclinical hyperthyroidism  Resolved Problems:    * No resolved hospital problems. *    Medical Problems       Resolved Problems  Date Reviewed: 7/4/2025   None         Reason for Admission/HPI:     Per initial H&P by Dr. Gray:    Rishi Kirk is a 65 y.o. Sahil " "male, Single (never ), domiciled alone, on disability, w/ PMH of alcoholic cirrhosis, portal HTN, umbilical hernia, chronic hepatitis C treated in 2014, DM, coronary artery calcification, gallbladder polyp, nephrolithiasis, RODRICK, vitamin B12 deficiency, and PPH of depression, anxiety, polysubstance use disorder in remission (heroin, cocaine, alcohol and tobacco), on methadone maintenance therapy (44 ml/day as confirmed with [facility]), no prior psychiatric admissions, no prior SA, no h/o self-injurious behavior, on Zoloft 50 mg daily and Atarax who initially presented to the ED on 6/30/25 due to anxiety for past three days with abdominal pain, and then was BIB EMS to the ED on 7/2/25 due to difficulty urinating all day and onset of L flank pain and increased anxiety, and discharged home. The patient presented to the ED again on 7/3/25, c/o \"Felt he had a knot in his stomach last night and anxiety. Took unknown amount of hydroxyzine last night. Reports left sided abd pain today. Denies trying to OD on meds, was just trying to feel better\". The patient signed 201 and got admitted to the inpatient psychiatry unit 6B for further psychiatric stabilization.       As per ED report on 6/30: \"Anxiety for past 3 days. Denies SI/HI/AH/VH. Has been taking mirtazapine for months with little relief. States his PCP prescribed clonazepam which he took for 2 doses but it did not help his anxiety.\"     As per ED CW's note on 7/4/25: \"The patient is a 66 y/o M who speaks primarily Yakut but wanted his interview in English. Concurrent access to  Eli (#[id number]) was provided during the interview. 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 The patient  presented with severe anxiety which has not been remedied by his PCP prescribing, then adjusting medication, including Sertraline and Hydroxyzine. Patient stated he also perceives that his stomach " "is in a knot in the left lower quadrant. Patient related that he has been sleeping very little due to this anxiety. In an attempt to quell it, he stated that in the span of time between 10 PM and 4 AM today, he took about #5 or #6 Hydroxyzine 25 mg. In addition, he took #2 Sertraline 50 mg. He stated this was not an attempt to harm himself, but an attempt to get relief from anxiety and stomach discomfort. He stated his [employer] beliefs are a protective factor. He stated he has no appetite and has lost about 10 pounds over the last week and-a half. He was recently medically cleared by his PCP via CT of his abdomen and pelvis and multiple labs. The patient does admit to depressed mood but cannot identify specific triggers. He denied HI. The patient denied Hallucinations abnd there is no evidence of delusional thinking. His PCP had planned to increase Zoloft next week. The patient was referred to [facility] however, he is primarily Bulgarian-speaking, so is not a candidate. In addition, the wait list for outpatient psychiatry is more than a month. In discussing the case with the ED attending, the patient does not have timely access to outpatient psychiatric care. Inpatient is, therefore, recommended. Explained to the patient the terms of a voluntary admission agreement. He had the opportunity to ask questions. A 201 was signed by the patient and Grover Alberts MD, the ED attending. \"     The patient was visited on the unit; chart reviewed. Presented calm, cooperative, dressed in hospital attire, w/ fair hygiene, good eye contact, anxious mood, constricted affect, talking in normal tone, volume and amount, w/ ruminating thought process, somatically preoccupied w/ limited insight and judgement. He reported feeling very anxious and overwhelmed in the context of somatic sxs and pain. He noted that he was started on Zoloft last Monday by his PCP, but remains anxious and reportedly Remeron and Klonopin " "did not help with his anxiety in the past. Reported depressed and anxious mood, with decreased appetite, poor sleep and energy level.  Strongly denied SI/HI, intent or plan upon direct inquiry at this time. Denied A/VH. No manic sxs, paranoid ideations or fixed delusions were elicited.  Denied history of eating disorder or OCD sxs.      Will f/u [facility] recs for medical issues. Restarted on confirmed dose of Methadone. Maintained on Zoloft 50 mg daily, started on Neurontin 100 mg TID and Seroquel 25 mg nightly; doses to be adjusted as indicated.    Past Medical History[1]  Past Surgical History[2]  Allergies   Allergen Reactions    Meperidine Abdominal Pain       Objective :  Temp:  [97.4 °F (36.3 °C)-97.6 °F (36.4 °C)] 97.6 °F (36.4 °C)  HR:  [70-80] 80  BP: ()/(63-73) 112/70  Resp:  [18] 18  SpO2:  [93 %-96 %] 93 %  O2 Device: None (Room air)    Hospital Course: Assessment & Plan  Severe episode of recurrent major depressive disorder, without psychotic features (HCC)  A 65 y.o. Sahil male, Single (never ), domiciled alone, on disability, w/ PMH of alcoholic cirrhosis, portal HTN, umbilical hernia, chronic hepatitis C treated in 2014, DM, coronary artery calcification, gallbladder polyp, nephrolithiasis, RODRICK, vitamin B12 deficiency, and PPH of depression, anxiety, polysubstance use disorder in remission (heroin, cocaine, alcohol and tobacco), on methadone maintenance therapy (44 ml/day as confirmed with [facility]), no prior psychiatric admissions, no prior SA, no h/o self-injurious behavior, on Zoloft 50 mg daily and Atarax woth two ED visited on 6/30/25 and 7/2/25 for somatic sxs and anxiety and then presented to the ED on 7/3/25, c/o \"Felt he had a knot in his stomach last night and anxiety. Took unknown amount of hydroxyzine last night. Reports left sided abd pain today. Denies trying to OD on meds, was just trying to feel better\". The patient signed 201 and got admitted to the inpatient " "psychiatry unit 6B for further psychiatric stabilization.      - Zoloft 50 mg po daily; doses to be adjusted as indicated  - Neurontin 300 mg to 400 mg po TID on 7/7/25 for anxiety and pain  - Seroquel 25 mg po nightly for mood sxs and sleep; doses to be adjusted as indicated  - Melatonin 3 mg po nightly to adjust the circadian rhythm  - Group and milieu therapy  ERIC (generalized anxiety disorder)  - Management as per principal problem    Methadone maintenance therapy patient (HCC)  ED CW's confirmed Methadone dose from [facility]. Phone call placed to RN desk at [phone]. Spoke with Reshma. She stated the patient received a 27-day supply of 44 mg Methadone.   Alcoholic cirrhosis of liver without ascites (HCC)  - f/u [facility] recs  Benign essential hypertension  - f/u [facility] recs  Type 2 diabetes mellitus without complication, without long-term current use of insulin (HCC)  Lab Results   Component Value Date    HGBA1C 7.3 (H) 07/04/2025       No results for input(s): \"POCGLU\" in the last 72 hours.      Blood Sugar Average: Last 72 hrs:  (P) 138  - f/u [facility] recs  RODRICK (obstructive sleep apnea)  - f/u [facility] recs  Subclinical hyperthyroidism  - f/u [facility] recs    Rishi was admitted to the inpatient psychiatric unit and started on Behavioral Health checks for safety monitoring. During the hospitalization he was encouraged to attend individual therapy, group therapy, milieu therapy and occupational therapy.     Psychiatric medications were started during the hospital stay. To address depressive symptoms and anxiety symptoms, Rishi was treated with antidepressant Zoloft, antipsychotic medication Seroquel, and anxiolytic medication Gabapentin. Zoloft was started and continued at 50 mg QAM for depression and anxiety, Seroquel was started at 25 mg QHS for anxiety and sleep, Neurontin was started at 100 mg TID for anxiety and chronic pain, which was gradually titrated to 400 mg TID. Prior to beginning of treatment medications " risks and benefits and possible side effects including risk of parkinsonian symptoms, Tardive Dyskinesia and metabolic syndrome related to treatment with antipsychotic medications, risk of cardiovascular events in elderly related to treatment with antipsychotic medications, and risk of suicidality and serotonin syndrome related to treatment with antidepressants were reviewed with Rishi. He verbalized understanding and agreement for treatment. Upon admission Rishi was seen by medical service for medical clearance for inpatient treatment and medical follow up.     Rishi's symptoms improved over the hospital course. Initially after admission he was still feeling anxious, frustrated, and overwhelmed. With adjustment of medications and therapeutic milieu his symptoms improved. At the end of treatment Rishi was doing well. His mood was doing well at the time of discharge. Rishi denied suicidal ideation, intent or plan at the time of discharge and denied homicidal ideation, intent or plan at the time of discharge. Rishi was participating appropriately in milieu at the time of discharge. Behavior was appropriate on the unit at the time of discharge. Sleep and appetite were improved. Rishi was tolerating medications and was not reporting any significant side effects at the time of discharge.     Since Rishi was doing well at the end of the hospitalization, treatment team felt that Rishi could be safely discharged to outpatient care.    At time of discharge, Rishi was seen out in community, was in good spirits and reported much better control of his anxiety symptoms. He subjectively reported significant improvement in thoughts and mood. There was no apparent mood depression on exam. He was overall goal directed and looking forward to returning home and visiting his neighbors. Was forward thinking & optimistic. Safety plan reviewed as well as crisis hotline and to immediately call 911/report to nearest emergency department for  evaluation if he feels that he is a risk to self or others. Reviewed medication list. Otherwise he is not in any imminent risk to self or others. Adamantly denies SI/HI/AVH. No objective evidence of psychosis or manic behavior. Time afforded for all questions with all questions answered.      The outpatient follow up with [facility] was arranged by the unit  upon discharge.      Mental Status at Time of Discharge:     Appearance:  age appropriate, casually dressed, looks stated age, fair hygiene   Behavior:  pleasant, cooperative, calm   Speech:  decreased volume, soft, normal rate   Mood:  euthymic, improved   Affect:  normal range and intensity   Thought Process:  goal directed, logical   Thought Content:  no overt delusions   Perceptual Disturbances: no auditory hallucinations, no visual hallucinations, does not appear responding to internal stimuli   Risk Potential: Suicidal Ideation -  None at present  Homicidal Ideation -  None at present  Potential for Aggression - Not at present   Sensorium:  oriented to person, place, and time/date   Memory:  recent and remote memory grossly intact   Consciousness:  alert and awake   Attention/Concentration: attention span and concentration are age appropriate   Insight:  fair   Judgment: fair   Gait/Station: normal gait/station   Motor Activity: no abnormal movements     Suicide/Homicide Risk Assessment:    Risk of Harm to Self:   The following ratings are based on assessment at the time of the interview  Nursing Suicide Risk Assessment Last 24 hours: C-SSRS Risk (Since Last Contact)  Calculated C-SSRS Risk Score (Since Last Contact): No Risk Indicated  Demographic Risk Factors include: male, age: over 50 or older  Historical Risk Factors include: history of anxiety  Current Specific Risk Factors include: recent inpatient psychiatric admission - being discharged today  Protective Factors: no current suicidal ideation, no  current depressive symptoms, stable mood, ability to make plans for the future, outpatient psychiatric follow up established, compliant with mental health treatment, connected to community, safe and stable living environment, having a desire to live, resiliency, sense of determination, supportive friends, ability to contract for safety with staff, ability to communicate with staff  Weapons/Firearms: no firearms. The following steps have been taken to ensure weapons are properly secured: not applicable  Based on today's assessment, Rishi presents the following risk of harm to self: none    Risk of Harm to Others:  The following ratings are based on  assessment at the time of the interview  Nursing Homicide Risk Assessment: Violence Risk to Others: Denies within past 6 months  Demographic Risk Factors include: male  Historical Risk Factors include: history of substance use  Current Specific Risk Factors include: none  Protective Factors: good impulse control, stable mood, compliant with medications, outpatient D&A follow up established, good self-esteem, good impulse control, moral system, Jehovah's witness beliefs, resilience, sense of personal control  Weapons/Firearms: no firearms. The following steps have been taken to ensure weapons are properly secured: not applicable  Based on today's assessment, Rishi presents the following risk of harm to others: none    The following interventions are recommended: outpatient follow up with a psychiatrist, outpatient follow up with a therapist, follow up with family physician for medical issues      Lab Results: I have reviewed the following results:  Results from the past 24 hours: No results found for this or any previous visit (from the past 24 hours).    Discharge Medications:    Home Medications After Discharge    Scheduled   atorvastatin (LIPITOR) 40 mg tablet, Take 1 tablet (40 mg total) by mouth daily with dinner   Comfort Touch Plus Lancets 30G MISC, Inject 1 Application  under the skin Daily at 2am   ergocalciferol (VITAMIN D2) 50,000 units, Take 1 capsule (50,000 Units total) by mouth once a week for 7 doses, Start: 07/12/25   gabapentin (NEURONTIN) 400 mg capsule, Take 1 capsule (400 mg total) by mouth 3 (three) times a day   lisinopril (ZESTRIL) 5 mg tablet, Take 1 tablet (5 mg total) by mouth daily   Melatonin 5 MG CHEW, Chew 10 mg daily at bedtime   metFORMIN (GLUCOPHAGE-XR) 500 mg 24 hr tablet, Take 1 tablet (500 mg total) by mouth daily with dinner   methadone (DOLOPHINE) 10 mg/mL oral concentrated solution, Take 44 mg by mouth in the morning.   nadolol (CORGARD) 20 mg tablet, Take 1 tablet (20 mg total) by mouth daily   QUEtiapine (SEROquel) 25 mg tablet, Take 1 tablet (25 mg total) by mouth daily at bedtime   sertraline (ZOLOFT) 50 mg tablet, Take 1 tablet (50 mg total) by mouth daily   sitaGLIPtin (JANUVIA) 100 mg tablet, Take 1 tablet (100 mg total) by mouth daily    No Frequency   GNP Alcohol Swabs 70 % PADS, TEST 2-3 TIMES A DAY AS DIRECTEDTEST 2-3 TIMES A DAY AS DIRECTED    Discharge instructions/Information to patient and family:   See After Visit Summary for information provided to patient and family.      Provisions for Follow-Up Care:  See after visit summary for information related to follow-up care and any pertinent home health orders.      Discharge Statement:    I have spent a total time of 45 minutes in caring for this patient on the day of the visit/encounter.   >30 minutes of time was spent on: Diagnostic results, Prognosis, Risks and benefits of tx options, Instructions for management, Patient and family education, Importance of tx compliance, Risk factor reductions, Counseling / Coordination of care, Documenting in the medical record, Reviewing / ordering tests, medicine, procedures  , and Communicating with other healthcare professionals .  I reviewed with Rishi importance of compliance with medications and outpatient treatment after discharge.  I  "discussed the medication regimen and possible side effects of the medications with Rishi prior to discharge. At the time of discharge he was tolerating psychiatric medications.  I discussed outpatient follow up with Rishi.  I reviewed with Rishi crisis plan and safety plan upon discharge.  I discussed with Rishi recommendation to follow up with outpatient drug and alcohol counseling and AA meetings.  Rishi was competent to understand risks and benefits of withholding information and risks and benefits of his actions.    Discharge on Two Antipsychotic Medications: No    Portions of the record may have been created with voice recognition software. Occasional wrong word or \"sound a like\" substitutions may have occurred due to the inherent limitations of voice recognition software. Read the chart carefully and recognize, using context, where substitutions have occurred.    Leanna Dahl PA-C 07/09/25        [1]   Past Medical History:  Diagnosis Date    Addiction to drug (HCC)     Alcohol abuse     Alcoholism (HCC)     Anxiety     Chronic kidney disease     Cirrhosis (HCC)     Colon polyp     CPAP (continuous positive airway pressure) dependence     used for 2 months only    Depression     Diabetes mellitus (HCC)     Erectile dysfunction     Since 2 or 3 years    Esophageal varices (HCC)     Follow-up after circumcision 04/23/2025    Gastritis     Heart disease     Hepatitis C     History of Helicobacter pylori infection 02/23/2016    History of kidney stones     Hyperlipidemia     Hypertension     Infectious viral hepatitis     Kidney stone     Liver cancer (HCC)     Obesity     Pneumonia     PPD positive 2020    Screening for prostate cancer 04/23/2025    Sleep apnea     Sleep difficulties     Splenomegaly     Substance abuse (HCC)     Testosterone deficiency     Withdrawal symptoms, drug or narcotic (HCC)    [2]   Past Surgical History:  Procedure Laterality Date    COLONOSCOPY  06/20/2014    dr [email or web address]    " COLONOSCOPY  2014        EGD AND COLONOSCOPY  08/2020    esophageal varices, colon polyp, hemorrhoids    ESOPHAGOGASTRODUODENOSCOPY  12/10/2015    esophageal varices, cirrhosis, gastritis    HYDROCELE EXCISION / REPAIR Left     teste    LIVER BIOPSY  2014    [place] CIRCUMCISION AGE >28 DAYS N/A 4/9/2025    Procedure: CIRCUMCISION ADULT;  Surgeon: Malik Baires MD;  Location:  MAIN OR;  Service: Urology

## 2025-07-09 NOTE — ASSESSMENT & PLAN NOTE
ED CW's confirmed Methadone dose from [facility]. Phone call placed to RN desk at [phone]. Spoke with Reshma. She stated the patient received a 27-day supply of 44 mg Methadone.

## 2025-07-09 NOTE — PROGRESS NOTES
07/09/25 0736   Team Meeting   Meeting Type Daily Rounds   Initial Conference Date 07/09/25   Next Conference Date 07/10/25   Team Members Present   Team Members Present Physician;Nurse;;   Physician Team Member Marina/Tanja/Patricia/Chris   Nursing Team Member Marlyn   Care Management Team Member Buffy   Social Work Team Member Susanne   Patient/Family Present   Patient Present No   Patient's Family Present No     Discharge today at 11 am, [facility] set up for med mgmt, [facility] therapy waitlist, PCP appt scheduled, deny s/s, 100% meals.

## 2025-07-09 NOTE — ASSESSMENT & PLAN NOTE
"A 65 y.o. Sahil male, Single (never ), domiciled alone, on disability, w/ PMH of alcoholic cirrhosis, portal HTN, umbilical hernia, chronic hepatitis C treated in 2014, DM, coronary artery calcification, gallbladder polyp, nephrolithiasis, RODRICK, vitamin B12 deficiency, and PPH of depression, anxiety, polysubstance use disorder in remission (heroin, cocaine, alcohol and tobacco), on methadone maintenance therapy (44 ml/day as confirmed with [facility]), no prior psychiatric admissions, no prior SA, no h/o self-injurious behavior, on Zoloft 50 mg daily and Atarax woth two ED visited on 6/30/25 and 7/2/25 for somatic sxs and anxiety and then presented to the ED on 7/3/25, c/o \"Felt he had a knot in his stomach last night and anxiety. Took unknown amount of hydroxyzine last night. Reports left sided abd pain today. Denies trying to OD on meds, was just trying to feel better\". The patient signed 201 and got admitted to the inpatient psychiatry unit 6B for further psychiatric stabilization.      - Zoloft 50 mg po daily; doses to be adjusted as indicated  - Neurontin 300 mg to 400 mg po TID on 7/7/25 for anxiety and pain  - Seroquel 25 mg po nightly for mood sxs and sleep; doses to be adjusted as indicated  - Melatonin 3 mg po nightly to adjust the circadian rhythm    "

## 2025-07-09 NOTE — ASSESSMENT & PLAN NOTE
"Lab Results   Component Value Date    HGBA1C 7.3 (H) 07/04/2025       No results for input(s): \"POCGLU\" in the last 72 hours.      Blood Sugar Average: Last 72 hrs:    - f/u SLIM recs  "

## 2025-07-09 NOTE — BH TRANSITION RECORD
Contact Information: If you have any questions, concerns, pended studies, tests and/or procedures, or emergencies regarding your inpatient behavioral health visit. Please contact [facility] [phone] and ask to speak to a , nurse or physician. A contact is available 24 hours/ 7 days a week at this number.     Summary of Procedures Performed During your Stay:  Below is a list of major procedures performed during your hospital stay and a summary of results:  - Cardiac Procedures/Studies: ECG- Normal Sinus Rhythm .    Pending Studies (From admission, onward)      None          Please follow up on the above pending studies with your PCP and/or referring provider.

## 2025-07-09 NOTE — NURSING NOTE
AVS reviewed with patient and placed with belongings. Reviewed scheduled appointments. Pharmacy confirmed.

## 2025-07-10 ENCOUNTER — OFFICE VISIT (OUTPATIENT)
Dept: FAMILY MEDICINE CLINIC | Facility: CLINIC | Age: 65
End: 2025-07-10
Payer: MEDICARE

## 2025-07-10 VITALS
TEMPERATURE: 98.6 F | BODY MASS INDEX: 33.25 KG/M2 | SYSTOLIC BLOOD PRESSURE: 128 MMHG | WEIGHT: 199.8 LBS | OXYGEN SATURATION: 93 % | HEART RATE: 74 BPM | DIASTOLIC BLOOD PRESSURE: 88 MMHG | RESPIRATION RATE: 16 BRPM

## 2025-07-10 DIAGNOSIS — K59.04 CHRONIC IDIOPATHIC CONSTIPATION: ICD-10-CM

## 2025-07-10 DIAGNOSIS — I10 BENIGN ESSENTIAL HYPERTENSION: ICD-10-CM

## 2025-07-10 DIAGNOSIS — K74.60 ESOPHAGEAL VARICES IN CIRRHOSIS (HCC): ICD-10-CM

## 2025-07-10 DIAGNOSIS — E11.9 TYPE 2 DIABETES MELLITUS WITHOUT COMPLICATION, WITHOUT LONG-TERM CURRENT USE OF INSULIN (HCC): ICD-10-CM

## 2025-07-10 DIAGNOSIS — I85.10 ESOPHAGEAL VARICES IN CIRRHOSIS (HCC): ICD-10-CM

## 2025-07-10 DIAGNOSIS — F41.1 GAD (GENERALIZED ANXIETY DISORDER): ICD-10-CM

## 2025-07-10 DIAGNOSIS — Z79.899 POLYPHARMACY: ICD-10-CM

## 2025-07-10 DIAGNOSIS — F33.2 SEVERE EPISODE OF RECURRENT MAJOR DEPRESSIVE DISORDER, WITHOUT PSYCHOTIC FEATURES (HCC): Primary | ICD-10-CM

## 2025-07-10 DIAGNOSIS — R11.0 NAUSEA: ICD-10-CM

## 2025-07-10 PROCEDURE — 99496 TRANSJ CARE MGMT HIGH F2F 7D: CPT

## 2025-07-10 RX ORDER — PIOGLITAZONE 15 MG/1
15 TABLET ORAL DAILY
COMMUNITY
End: 2025-07-10 | Stop reason: ALTCHOICE

## 2025-07-10 RX ORDER — FLUOXETINE HYDROCHLORIDE 40 MG/1
40 CAPSULE ORAL DAILY
COMMUNITY
End: 2025-07-10 | Stop reason: ALTCHOICE

## 2025-07-10 RX ORDER — CLONAZEPAM 1 MG/1
1 TABLET ORAL 2 TIMES DAILY
COMMUNITY
End: 2025-07-10 | Stop reason: ALTCHOICE

## 2025-07-10 RX ORDER — MIRTAZAPINE 7.5 MG/1
TABLET, FILM COATED ORAL
COMMUNITY
Start: 2025-07-05 | End: 2025-07-10 | Stop reason: ALTCHOICE

## 2025-07-10 RX ORDER — BUSPIRONE HYDROCHLORIDE 5 MG/1
5 TABLET ORAL 3 TIMES DAILY
COMMUNITY
End: 2025-07-10 | Stop reason: ALTCHOICE

## 2025-07-10 RX ORDER — MIRTAZAPINE 15 MG/1
15 TABLET, FILM COATED ORAL
COMMUNITY
End: 2025-07-10 | Stop reason: ALTCHOICE

## 2025-07-10 RX ORDER — DULOXETIN HYDROCHLORIDE 60 MG/1
1 CAPSULE, DELAYED RELEASE ORAL 2 TIMES DAILY
COMMUNITY
Start: 2025-07-04 | End: 2025-07-10 | Stop reason: ALTCHOICE

## 2025-07-10 RX ORDER — DOCUSATE SODIUM 100 MG/1
100 CAPSULE, LIQUID FILLED ORAL 2 TIMES DAILY
COMMUNITY
End: 2025-07-10 | Stop reason: ALTCHOICE

## 2025-07-10 RX ORDER — OMEPRAZOLE 40 MG/1
40 CAPSULE, DELAYED RELEASE ORAL DAILY
COMMUNITY
End: 2025-07-10 | Stop reason: ALTCHOICE

## 2025-07-10 RX ORDER — HYDROXYZINE HYDROCHLORIDE 25 MG/1
25 TABLET, FILM COATED ORAL EVERY 6 HOURS PRN
COMMUNITY
End: 2025-07-10 | Stop reason: ALTCHOICE

## 2025-07-10 RX ORDER — ONDANSETRON 4 MG/1
4 TABLET, FILM COATED ORAL EVERY 8 HOURS PRN
Qty: 21 TABLET | Refills: 0 | Status: ON HOLD | OUTPATIENT
Start: 2025-07-10 | End: 2025-07-17

## 2025-07-10 RX ORDER — ARIPIPRAZOLE 10 MG/1
10 TABLET ORAL DAILY
COMMUNITY
End: 2025-07-10 | Stop reason: ALTCHOICE

## 2025-07-10 RX ORDER — LUBIPROSTONE 24 UG/1
24 CAPSULE ORAL 2 TIMES DAILY WITH MEALS
Status: ON HOLD | COMMUNITY

## 2025-07-10 RX ORDER — CLONIDINE HYDROCHLORIDE 0.1 MG/1
0.1 TABLET ORAL EVERY 12 HOURS SCHEDULED
COMMUNITY
End: 2025-07-10 | Stop reason: ALTCHOICE

## 2025-07-10 NOTE — PROGRESS NOTES
Transition of Care Visit:  Name: Rishi Kirk      : 1960      MRN: [id number]  Encounter Provider: Melecio Lopez MD  Encounter Date: 7/10/2025   Encounter department: [facility]    Assessment & Plan  Severe episode of recurrent major depressive disorder, without psychotic features (HCC)  Well controlled. Will continue with current regimen for now. We can increase zoloft to 100mg daily should he have worsening.    - Zoloft 50 mg po daily  - Neurontin 400 mg po TID  - Seroquel 25 mg po nightly for mood sxs and sleep  - Melatonin 3 mg po nightly to adjust the circadian rhythm  - Group therapy, scheduled to be seen 2025         ERIC (generalized anxiety disorder)  Treatment per MDD       Type 2 diabetes mellitus without complication, without long-term current use of insulin (HCC)    Lab Results   Component Value Date    HGBA1C 7.3 (H) 2025     Meds reconciled. Continue with metformin 500mg daily and januvia 100mg daily. Recheck A1c on 10/2025       Esophageal varices in cirrhosis (HCC)  Continue with nadolol 20 mg daily and GI follow-up.         Benign essential hypertension  BP Readings from Last 3 Encounters:   07/10/25 128/88   25 112/70   25 136/80      Well controlled. Continue lisinopril 5mg daily and atorvastatin 40mg daily.       Nausea  Unclear etiology though likely multifactorial: mood dx, constipation, cirrhosis. Will trial zofran 4mg q8 PRN for 7 days. Should symptoms not improve will adjust as possible given multiple comorbids. Continue lubiprostone 24mcg BID for constipation.  Orders:    ondansetron (ZOFRAN) 4 mg tablet; Take 1 tablet (4 mg total) by mouth every 8 (eight) hours as needed for nausea for up to 7 days    Chronic idiopathic constipation  Continue with lubiprostone 24mg BID.         Polypharmacy  Have removed multiple duplicate, old, and previously discontinued medication from pt's medical bag. Picture  uploaded to media.            History of Present Illness     Transitional Care Management Review:   Rishi Kirk is a 65 y.o. male here for TCM follow up.     During the TCM phone call patient stated:  TCM Call (since 6/26/2025)       Date and time call was made  7/8/2025  1:37 PM    Patient was hospitialized at  [facility]    Date of Admission  07/03/25    Date of discharge  07/09/25    Disposition  Home    Were the patients medications reviewed and updated  Yes          TCM Call (since 6/26/2025)       Scheduled for follow up?  Yes    Did you obtain your prescribed medications  Yes    Do you need help managing your prescriptions or medications  No    Is transportation to your appointment needed  No    I have advised the patient to call PCP with any new or worsening symptoms  Vandana BRITO MA    Living Arrangements  Family members          66yo male with hx of cirrhosis, anxiety, on methadone maintenance therapy, T2DM, and HTN presenting to clinic for TCM.  Patient was hospitalized at [facility] from 7/30/2025 to 7/9/2025 for severe episode of recurrent major depression.  Patient remained stable and ultimately was discharged with Zoloft 50 mg daily, gabapentin 300 to 400 mg 3 times daily for anxiety and pain, Seroquel 25 mg at bedtime for mood symptoms and sleep, melatonin 3 mg at bedtime in the group therapy.    Today pt endorses significant improvement in mood and sleep. He does endorse nausea that started this morning. He did take some zofran previously prescribed which did help a little but nausea continues. Was able to eat some soup today. Denying abdominal pain, vomiting, diarrhea, hematochezia, or melena.      Review of Systems   Constitutional:  Negative for chills and fever.   Respiratory:  Negative for shortness of breath.    Cardiovascular:  Negative for chest pain.   Gastrointestinal:  Positive for constipation and nausea. Negative for abdominal pain, blood in stool, diarrhea and  vomiting.   Genitourinary:  Negative for dysuria and hematuria.     Objective   /88 (BP Location: Left arm, Patient Position: Sitting, Cuff Size: Standard)   Pulse 74   Temp 98.6 °F (37 °C) (Tympanic)   Resp 16   Wt 90.6 kg (199 lb 12.8 oz)   SpO2 93%   BMI 33.25 kg/m²     Physical Exam  Vitals and nursing note reviewed.   Constitutional:       General: He is not in acute distress.     Appearance: Normal appearance. He is not ill-appearing, toxic-appearing or diaphoretic.     Eyes:      Conjunctiva/sclera: Conjunctivae normal.       Cardiovascular:      Rate and Rhythm: Normal rate and regular rhythm.      Pulses: Normal pulses.      Heart sounds: Normal heart sounds.   Pulmonary:      Effort: Pulmonary effort is normal.      Breath sounds: Normal breath sounds.   Abdominal:      General: There is no distension. There are no signs of injury.      Palpations: Abdomen is soft.      Tenderness: There is no abdominal tenderness.     Skin:     General: Skin is warm and dry.     Neurological:      General: No focal deficit present.      Mental Status: He is alert.     Psychiatric:         Mood and Affect: Mood normal.         Behavior: Behavior normal.       Medications have been reviewed by provider in current encounter

## 2025-07-10 NOTE — ASSESSMENT & PLAN NOTE
Well controlled. Will continue with current regimen for now. We can increase zoloft to 100mg daily should he have worsening.    - Zoloft 50 mg po daily  - Neurontin 400 mg po TID  - Seroquel 25 mg po nightly for mood sxs and sleep  - Melatonin 3 mg po nightly to adjust the circadian rhythm  - Group therapy, scheduled to be seen 7/31/2025

## 2025-07-10 NOTE — ASSESSMENT & PLAN NOTE
BP Readings from Last 3 Encounters:   07/10/25 128/88   07/09/25 112/70   07/02/25 136/80      Well controlled. Continue lisinopril 5mg daily and atorvastatin 40mg daily.

## 2025-07-10 NOTE — ASSESSMENT & PLAN NOTE
Lab Results   Component Value Date    HGBA1C 7.3 (H) 07/04/2025     Meds reconciled. Continue with metformin 500mg daily and januvia 100mg daily. Recheck A1c on 10/2025

## 2025-07-10 NOTE — ASSESSMENT & PLAN NOTE
Unclear etiology though likely multifactorial: mood dx, constipation, cirrhosis. Will trial zofran 4mg q8 PRN for 7 days. Should symptoms not improve will adjust as possible given multiple comorbids. Continue lubiprostone 24mcg BID for constipation.  Orders:    ondansetron (ZOFRAN) 4 mg tablet; Take 1 tablet (4 mg total) by mouth every 8 (eight) hours as needed for nausea for up to 7 days

## 2025-07-11 ENCOUNTER — TELEPHONE (OUTPATIENT)
Age: 65
End: 2025-07-11

## 2025-07-11 ENCOUNTER — HOSPITAL ENCOUNTER (EMERGENCY)
Facility: HOSPITAL | Age: 65
Discharge: HOME | End: 2025-07-11
Attending: EMERGENCY MEDICINE
Payer: MEDICARE

## 2025-07-11 VITALS
HEART RATE: 73 BPM | DIASTOLIC BLOOD PRESSURE: 94 MMHG | TEMPERATURE: 98 F | BODY MASS INDEX: 33.2 KG/M2 | WEIGHT: 199.5 LBS | RESPIRATION RATE: 12 BRPM | OXYGEN SATURATION: 95 % | SYSTOLIC BLOOD PRESSURE: 141 MMHG

## 2025-07-11 DIAGNOSIS — G89.29 CHRONIC LEFT-SIDED BACK PAIN: Primary | ICD-10-CM

## 2025-07-11 DIAGNOSIS — M54.9 CHRONIC LEFT-SIDED BACK PAIN: Primary | ICD-10-CM

## 2025-07-11 LAB
BILIRUB UR QL STRIP: NEGATIVE
CLARITY UR: CLEAR
COLOR UR: NORMAL
GLUCOSE UR STRIP-MCNC: NEGATIVE MG/DL
HGB UR QL STRIP.AUTO: NEGATIVE
KETONES UR STRIP-MCNC: NEGATIVE MG/DL
LEUKOCYTE ESTERASE UR QL STRIP: NEGATIVE
NITRITE UR QL STRIP: NEGATIVE
PH UR STRIP.AUTO: 7 [PH]
PROT UR STRIP-MCNC: NEGATIVE MG/DL
SP GR UR STRIP.AUTO: 1 (ref 1–1.04)
UROBILINOGEN UA: NEGATIVE MG/DL

## 2025-07-11 PROCEDURE — 96372 THER/PROPH/DIAG INJ SC/IM: CPT

## 2025-07-11 PROCEDURE — 99284 EMERGENCY DEPT VISIT MOD MDM: CPT | Performed by: EMERGENCY MEDICINE

## 2025-07-11 PROCEDURE — 99283 EMERGENCY DEPT VISIT LOW MDM: CPT

## 2025-07-11 PROCEDURE — 81003 URINALYSIS AUTO W/O SCOPE: CPT | Performed by: EMERGENCY MEDICINE

## 2025-07-11 RX ORDER — LIDOCAINE 50 MG/G
1 PATCH TOPICAL ONCE
Status: DISCONTINUED | OUTPATIENT
Start: 2025-07-11 | End: 2025-07-11 | Stop reason: HOSPADM

## 2025-07-11 RX ORDER — HYDROXYZINE HYDROCHLORIDE 25 MG/1
25 TABLET, FILM COATED ORAL ONCE
Status: COMPLETED | OUTPATIENT
Start: 2025-07-11 | End: 2025-07-11

## 2025-07-11 RX ORDER — KETOROLAC TROMETHAMINE 30 MG/ML
15 INJECTION, SOLUTION INTRAMUSCULAR; INTRAVENOUS ONCE
Status: COMPLETED | OUTPATIENT
Start: 2025-07-11 | End: 2025-07-11

## 2025-07-11 RX ADMIN — KETOROLAC TROMETHAMINE 15 MG: 30 INJECTION, SOLUTION INTRAMUSCULAR; INTRAVENOUS at 18:43

## 2025-07-11 RX ADMIN — HYDROXYZINE HYDROCHLORIDE 25 MG: 25 TABLET, FILM COATED ORAL at 18:43

## 2025-07-11 RX ADMIN — LIDOCAINE 5% 1 PATCH: 700 PATCH TOPICAL at 18:43

## 2025-07-11 NOTE — TELEPHONE ENCOUNTER
Aziza, Nurse  from [facility] called to advise she just spoke to patient on the phone. Patient reported that he had been experiencing nausea and dizziness. She stated she did notice that provider had prescribed him medication for nausea and wanted to verify. I advised that was correct. zAiza provided call back #[phone] in case provider had any questions for her.

## 2025-07-11 NOTE — ED PROVIDER NOTES
Time reflects when diagnosis was documented in both MDM as applicable and the Disposition within this note       Time User Action Codes Description Comment    7/11/2025  7:45 PM Roberto Gonzalez Add [M54.9,  G89.29] Chronic left-sided back pain           ED Disposition       ED Disposition   Discharge    Condition   Stable    Date/Time   Fri Jul 11, 2025  7:45 PM    Comment   Rishi Reagan discharge to home/self care.                   Assessment & Plan       Medical Decision Making  65-year-old male presents with left flank pain  No midline tenderness  Patient also denies any red flag symptoms concerning for cauda equina, conus medullaris, epidural abscess, epidural mass, epidural hematoma  Will obtain urinalysis to rule out kidney stone or UTI  Urinalysis was unremarkable  Patient complains of acute on chronic pain and will address the pain  If the pain is controlled we will plan to discharge patient with comprehensive spine follow-up for his chronic pain    Risk  Prescription drug management.             Medications   lidocaine (LIDODERM) 5 % patch 1 patch (1 patch Topical Medication Applied 7/11/25 1843)   ketorolac (TORADOL) injection 15 mg (15 mg Intramuscular Given 7/11/25 1843)   hydrOXYzine HCL (ATARAX) tablet 25 mg (25 mg Oral Given 7/11/25 1843)       ED Risk Strat Scores                    No data recorded                            History of Present Illness       Chief Complaint   Patient presents with    Flank Pain     Left lower back pain that wraps around to front of abd since this morning. No meds for pain pta.        Past Medical History[1]   Past Surgical History[2]   Family History[3]   Social History[4]   E-Cigarette/Vaping    E-Cigarette Use Never User       E-Cigarette/Vaping Substances    Nicotine No     THC No     CBD No     Flavoring No     Other No     Unknown No       I have reviewed and agree with the history as documented.     HPI  65-year-old male presents with left flank pain.  States that  it radiates down to his left abdomen.  Has been ongoing for several months.  States that he was seen about a week ago for the same complaint and no definitive cause was found.  Patient states that he does not take any pain medication because none of them really works.  Denies any lower extremity weakness, numbness, bowel/bladder incontinence/retention.  Denies any recent trauma.  Also reports no urinary symptoms.      Review of Systems   Constitutional:  Negative for chills, diaphoresis, fever and unexpected weight change.   HENT:  Negative for ear pain and sore throat.    Eyes:  Negative for visual disturbance.   Respiratory:  Negative for cough, chest tightness and shortness of breath.    Cardiovascular:  Negative for chest pain and leg swelling.   Gastrointestinal:  Negative for abdominal distention, abdominal pain, constipation, diarrhea, nausea and vomiting.   Endocrine: Negative.    Genitourinary:  Positive for flank pain. Negative for difficulty urinating and dysuria.   Skin: Negative.    Allergic/Immunologic: Negative.    Neurological: Negative.    Hematological: Negative.    Psychiatric/Behavioral: Negative.     All other systems reviewed and are negative.          Objective       ED Triage Vitals [07/11/25 1803]   Temperature Pulse Blood Pressure Respirations SpO2 Patient Position - Orthostatic VS   98 °F (36.7 °C) 73 141/94 12 95 % Sitting      Temp Source Heart Rate Source BP Location FiO2 (%) Pain Score    Oral Monitor Left arm -- --      Vitals      Date and Time Temp Pulse SpO2 Resp BP Pain Score FACES Pain Rating User   07/11/25 1803 98 °F (36.7 °C) 73 95 % 12 141/94 -- -- SH            Physical Exam  Vitals and nursing note reviewed.   Constitutional:       General: He is not in acute distress.     Appearance: Normal appearance. He is not ill-appearing.   HENT:      Head: Normocephalic and atraumatic.      Right Ear: External ear normal.      Left Ear: External ear normal.      Nose: Nose normal.       Mouth/Throat:      Mouth: Mucous membranes are moist.      Pharynx: Oropharynx is clear.     Eyes:      General: No scleral icterus.        Right eye: No discharge.         Left eye: No discharge.      Extraocular Movements: Extraocular movements intact.      Conjunctiva/sclera: Conjunctivae normal.      Pupils: Pupils are equal, round, and reactive to light.       Cardiovascular:      Rate and Rhythm: Normal rate and regular rhythm.      Pulses: Normal pulses.      Heart sounds: Normal heart sounds.   Pulmonary:      Effort: Pulmonary effort is normal.      Breath sounds: Normal breath sounds.   Abdominal:      General: Abdomen is flat. Bowel sounds are normal. There is no distension.      Palpations: Abdomen is soft.      Tenderness: There is no abdominal tenderness. There is no guarding or rebound.     Musculoskeletal:         General: Tenderness (Left flank) present. Normal range of motion.      Cervical back: Normal range of motion and neck supple.     Skin:     General: Skin is warm and dry.      Capillary Refill: Capillary refill takes less than 2 seconds.     Neurological:      General: No focal deficit present.      Mental Status: He is alert and oriented to person, place, and time. Mental status is at baseline.     Psychiatric:         Mood and Affect: Mood normal.         Behavior: Behavior normal.         Thought Content: Thought content normal.         Judgment: Judgment normal.         Results Reviewed       Procedure Component Value Units Date/Time    UA w Reflex to Microscopic w Reflex to Culture [[id number]]  (Normal) Collected: 07/11/25 1810    Lab Status: Final result Specimen: Urine, Clean Catch Updated: 07/11/25 1819     Color, UA Straw     Clarity, UA Clear     Specific Gravity, UA 1.005     pH, UA 7.0     Leukocytes, UA Negative     Nitrite, UA Negative     Protein, UA Negative mg/dl      Glucose, UA Negative mg/dl      Ketones, UA Negative mg/dl      Bilirubin, UA Negative     Occult Blood,  UA Negative     UROBILINOGEN UA Negative mg/dL             No orders to display       Procedures    ED Medication and Procedure Management   Prior to Admission Medications   Prescriptions Last Dose Informant Patient Reported? Taking?   Comfort Touch Plus Lancets 30G MISC  Self No No   Sig: Inject 1 Application under the skin Daily at 2am   GNP Alcohol Swabs 70 % PADS  Self No No   Sig: TEST 2-3 TIMES A DAY AS DIRECTEDTEST 2-3 TIMES A DAY AS DIRECTED   Melatonin 5 MG CHEW  Self No No   Sig: Chew 10 mg daily at bedtime   Patient not taking: Reported on 7/10/2025   QUEtiapine (SEROquel) 25 mg tablet   No No   Sig: Take 1 tablet (25 mg total) by mouth daily at bedtime   Patient not taking: Reported on 7/10/2025   atorvastatin (LIPITOR) 40 mg tablet   No No   Sig: Take 1 tablet (40 mg total) by mouth daily with dinner   ergocalciferol (VITAMIN D2) 50,000 units   No No   Sig: Take 1 capsule (50,000 Units total) by mouth once a week for 7 doses   Patient not taking: Reported on 7/10/2025 Do not start before July 12, 2025.   gabapentin (NEURONTIN) 400 mg capsule   No No   Sig: Take 1 capsule (400 mg total) by mouth 3 (three) times a day   lisinopril (ZESTRIL) 5 mg tablet   No No   Sig: Take 1 tablet (5 mg total) by mouth daily   lubiprostone (AMITIZA) 24 mcg capsule   Yes No   Sig: Take 24 mcg by mouth 2 (two) times a day with meals   metFORMIN (GLUCOPHAGE-XR) 500 mg 24 hr tablet   No No   Sig: Take 1 tablet (500 mg total) by mouth daily with dinner   methadone (DOLOPHINE) 10 mg/mL oral concentrated solution  Self Yes No   Sig: Take 44 mg by mouth in the morning.   Patient not taking: Reported on 7/10/2025   nadolol (CORGARD) 20 mg tablet   No No   Sig: Take 1 tablet (20 mg total) by mouth daily   ondansetron (ZOFRAN) 4 mg tablet   No No   Sig: Take 1 tablet (4 mg total) by mouth every 8 (eight) hours as needed for nausea for up to 7 days   sertraline (ZOLOFT) 50 mg tablet   No No   Sig: Take 1 tablet (50 mg total) by  mouth daily   sitaGLIPtin (JANUVIA) 100 mg tablet   No No   Sig: Take 1 tablet (100 mg total) by mouth daily      Facility-Administered Medications: None     Discharge Medication List as of 7/11/2025  7:45 PM        CONTINUE these medications which have NOT CHANGED    Details   atorvastatin (LIPITOR) 40 mg tablet Take 1 tablet (40 mg total) by mouth daily with dinner, Starting Tue 7/8/2025, Until Thu 8/7/2025, Normal      Comfort Touch Plus Lancets 30G MISC Inject 1 Application under the skin Daily at 2am, Starting Thu 5/1/2025, Until Sun 4/26/2026, Normal      ergocalciferol (VITAMIN D2) 50,000 units Take 1 capsule (50,000 Units total) by mouth once a week for 7 doses, Starting Sat 7/12/2025, Until Sun 8/24/2025, Normal      gabapentin (NEURONTIN) 400 mg capsule Take 1 capsule (400 mg total) by mouth 3 (three) times a day, Starting Tue 7/8/2025, Until Thu 8/7/2025, Normal      GNP Alcohol Swabs 70 % PADS TEST 2-3 TIMES A DAY AS DIRECTEDTEST 2-3 TIMES A DAY AS DIRECTED, Normal      lisinopril (ZESTRIL) 5 mg tablet Take 1 tablet (5 mg total) by mouth daily, Starting Tue 7/8/2025, Until Thu 8/7/2025, Normal      lubiprostone (AMITIZA) 24 mcg capsule Take 24 mcg by mouth 2 (two) times a day with meals, Historical Med      Melatonin 5 MG CHEW Chew 10 mg daily at bedtime, Starting Tue 7/1/2025, Until Thu 7/31/2025, Normal      metFORMIN (GLUCOPHAGE-XR) 500 mg 24 hr tablet Take 1 tablet (500 mg total) by mouth daily with dinner, Starting Tue 7/8/2025, Until Thu 8/7/2025, Normal      methadone (DOLOPHINE) 10 mg/mL oral concentrated solution Take 44 mg by mouth in the morning., Historical Med      nadolol (CORGARD) 20 mg tablet Take 1 tablet (20 mg total) by mouth daily, Starting Wed 7/9/2025, Until Fri 8/8/2025, Normal      ondansetron (ZOFRAN) 4 mg tablet Take 1 tablet (4 mg total) by mouth every 8 (eight) hours as needed for nausea for up to 7 days, Starting Thu 7/10/2025, Until u 7/17/2025 at 2359, Normal       QUEtiapine (SEROquel) 25 mg tablet Take 1 tablet (25 mg total) by mouth daily at bedtime, Starting Tue 7/8/2025, Until Thu 8/7/2025, Normal      sertraline (ZOLOFT) 50 mg tablet Take 1 tablet (50 mg total) by mouth daily, Starting Tue 7/8/2025, Until Thu 8/7/2025, Normal      sitaGLIPtin (JANUVIA) 100 mg tablet Take 1 tablet (100 mg total) by mouth daily, Starting Tue 7/8/2025, Until Thu 8/7/2025, Normal           No discharge procedures on file.  ED SEPSIS DOCUMENTATION   Time reflects when diagnosis was documented in both MDM as applicable and the Disposition within this note       Time User Action Codes Description Comment    7/11/2025  7:45 PM Roberto Gonzalez Add [M54.9,  G89.29] Chronic left-sided back pain                      [1]   Past Medical History:  Diagnosis Date    Addiction to drug (HCC)     Alcohol abuse     Alcoholism (HCC)     Anxiety     Chronic kidney disease     Cirrhosis (HCC)     Colon polyp     CPAP (continuous positive airway pressure) dependence     used for 2 months only    Depression     Diabetes mellitus (HCC)     Erectile dysfunction     Since 2 or 3 years    Esophageal varices (HCC)     Follow-up after circumcision 04/23/2025    Gastritis     Heart disease     Hepatitis C     History of Helicobacter pylori infection 02/23/2016    History of kidney stones     Hyperlipidemia     Hypertension     Infectious viral hepatitis     Kidney stone     Liver cancer (HCC)     Obesity     Pneumonia     PPD positive 2020    Screening for prostate cancer 04/23/2025    Sleep apnea     Sleep difficulties     Splenomegaly     Substance abuse (HCC)     Testosterone deficiency     Withdrawal symptoms, drug or narcotic (HCC)    [2]   Past Surgical History:  Procedure Laterality Date    COLONOSCOPY  06/20/2014    dr [email or web address]    COLONOSCOPY  2014        EGD AND COLONOSCOPY  08/2020    esophageal varices, colon polyp, hemorrhoids    ESOPHAGOGASTRODUODENOSCOPY  12/10/2015    esophageal varices, cirrhosis,  gastritis    HYDROCELE EXCISION / REPAIR Left     teste    LIVER BIOPSY  2014    [place] CIRCUMCISION AGE >28 DAYS N/A 2025    Procedure: CIRCUMCISION ADULT;  Surgeon: Malik Baires MD;  Location:  MAIN OR;  Service: Urology   [3]   Family History  Problem Relation Name Age of Onset    Diabetes type II Mother Viotalines Kirk         MELLITUS    Hypertension Mother Viotalines Kirk     Diabetes Mother Viotalines Kirk     Hypertension Sister Eli [address]     Diabetes Sister Eli [address]         MELLITUS    No Known Problems Maternal Grandmother      No Known Problems Maternal Grandfather      No Known Problems Paternal Grandmother      No Known Problems Paternal Grandfather      No Known Problems Sister      Breast cancer Maternal Aunt Salvatore Reyes         40s    No Known Problems Maternal Aunt      No Known Problems Paternal Aunt      No Known Problems Paternal Aunt      No Known Problems Paternal Aunt      No Known Problems Paternal Aunt      No Known Problems Paternal Aunt      No Known Problems Paternal Aunt     [4]   Social History  Tobacco Use    Smoking status: Former     Current packs/day: 0.00     Average packs/day: 1 pack/day for 41.7 years (41.3 ttl pk-yrs)     Types: Cigarettes     Start date:      Quit date: 10/1/2024     Years since quittin.7     Passive exposure: Current    Smokeless tobacco: Never    Tobacco comments:     TOBACCO USE   Vaping Use    Vaping status: Never Used   Substance Use Topics    Alcohol use: Not Currently     Comment: Sustained remission    Drug use: Not Currently     Types: Heroin     Comment: former user- heroin was drug of choice; Overuse of Methadone        Roberto Gonzalez MD  [phone]

## 2025-07-13 ENCOUNTER — HOSPITAL ENCOUNTER (EMERGENCY)
Facility: HOSPITAL | Age: 65
Discharge: HOME | End: 2025-07-13
Attending: EMERGENCY MEDICINE | Admitting: EMERGENCY MEDICINE
Payer: MEDICARE

## 2025-07-13 VITALS
HEART RATE: 63 BPM | WEIGHT: 197.09 LBS | RESPIRATION RATE: 18 BRPM | TEMPERATURE: 98.8 F | SYSTOLIC BLOOD PRESSURE: 117 MMHG | BODY MASS INDEX: 32.8 KG/M2 | DIASTOLIC BLOOD PRESSURE: 79 MMHG | OXYGEN SATURATION: 95 %

## 2025-07-13 DIAGNOSIS — R10.32 LEFT LOWER QUADRANT ABDOMINAL PAIN: Primary | ICD-10-CM

## 2025-07-13 DIAGNOSIS — F41.9 ANXIETY: ICD-10-CM

## 2025-07-13 LAB
ALBUMIN SERPL BCG-MCNC: 4.2 G/DL (ref 3.5–5)
ALP SERPL-CCNC: 110 U/L (ref 34–104)
ALT SERPL W P-5'-P-CCNC: 25 U/L (ref 7–52)
ANION GAP SERPL CALCULATED.3IONS-SCNC: 6 MMOL/L (ref 4–13)
AST SERPL W P-5'-P-CCNC: 30 U/L (ref 13–39)
BASOPHILS # BLD AUTO: 0.04 THOUSANDS/ÂΜL (ref 0–0.1)
BASOPHILS NFR BLD AUTO: 1 % (ref 0–1)
BILIRUB SERPL-MCNC: 1.06 MG/DL (ref 0.2–1)
BILIRUB UR QL STRIP: NEGATIVE
BUN SERPL-MCNC: 9 MG/DL (ref 5–25)
CALCIUM SERPL-MCNC: 9.4 MG/DL (ref 8.4–10.2)
CHLORIDE SERPL-SCNC: 101 MMOL/L (ref 96–108)
CLARITY UR: CLEAR
CO2 SERPL-SCNC: 26 MMOL/L (ref 21–32)
COLOR UR: NORMAL
CREAT SERPL-MCNC: 0.9 MG/DL (ref 0.6–1.3)
EOSINOPHIL # BLD AUTO: 0.01 THOUSAND/ÂΜL (ref 0–0.61)
EOSINOPHIL NFR BLD AUTO: 0 % (ref 0–6)
ERYTHROCYTE [DISTWIDTH] IN BLOOD BY AUTOMATED COUNT: 12.4 % (ref 11.6–15.1)
GFR SERPL CREATININE-BSD FRML MDRD: 88 ML/MIN/1.73SQ M
GLUCOSE SERPL-MCNC: 146 MG/DL (ref 65–140)
GLUCOSE UR STRIP-MCNC: NEGATIVE MG/DL
HCT VFR BLD AUTO: 42.3 % (ref 36.5–49.3)
HGB BLD-MCNC: 14.5 G/DL (ref 12–17)
HGB UR QL STRIP.AUTO: NEGATIVE
IMM GRANULOCYTES # BLD AUTO: 0.03 THOUSAND/UL (ref 0–0.2)
IMM GRANULOCYTES NFR BLD AUTO: 1 % (ref 0–2)
KETONES UR STRIP-MCNC: NEGATIVE MG/DL
LEUKOCYTE ESTERASE UR QL STRIP: NEGATIVE
LYMPHOCYTES # BLD AUTO: 2.04 THOUSANDS/ÂΜL (ref 0.6–4.47)
LYMPHOCYTES NFR BLD AUTO: 32 % (ref 14–44)
MCH RBC QN AUTO: 31.5 PG (ref 26.8–34.3)
MCHC RBC AUTO-ENTMCNC: 34.3 G/DL (ref 31.4–37.4)
MCV RBC AUTO: 92 FL (ref 82–98)
MONOCYTES # BLD AUTO: 0.48 THOUSAND/ÂΜL (ref 0.17–1.22)
MONOCYTES NFR BLD AUTO: 8 % (ref 4–12)
NEUTROPHILS # BLD AUTO: 3.81 THOUSANDS/ÂΜL (ref 1.85–7.62)
NEUTS SEG NFR BLD AUTO: 58 % (ref 43–75)
NITRITE UR QL STRIP: NEGATIVE
NRBC BLD AUTO-RTO: 0 /100 WBCS
PH UR STRIP.AUTO: 7 [PH]
PLATELET # BLD AUTO: 240 THOUSANDS/UL (ref 149–390)
PMV BLD AUTO: 9.4 FL (ref 8.9–12.7)
POTASSIUM SERPL-SCNC: 4.5 MMOL/L (ref 3.5–5.3)
PROT SERPL-MCNC: 7.7 G/DL (ref 6.4–8.4)
PROT UR STRIP-MCNC: NEGATIVE MG/DL
RBC # BLD AUTO: 4.6 MILLION/UL (ref 3.88–5.62)
SODIUM SERPL-SCNC: 133 MMOL/L (ref 135–147)
SP GR UR STRIP.AUTO: 1 (ref 1–1.04)
UROBILINOGEN UA: NEGATIVE MG/DL
WBC # BLD AUTO: 6.41 THOUSAND/UL (ref 4.31–10.16)

## 2025-07-13 PROCEDURE — 80053 COMPREHEN METABOLIC PANEL: CPT | Performed by: EMERGENCY MEDICINE

## 2025-07-13 PROCEDURE — 81003 URINALYSIS AUTO W/O SCOPE: CPT | Performed by: EMERGENCY MEDICINE

## 2025-07-13 PROCEDURE — 96372 THER/PROPH/DIAG INJ SC/IM: CPT

## 2025-07-13 PROCEDURE — 99284 EMERGENCY DEPT VISIT MOD MDM: CPT | Performed by: EMERGENCY MEDICINE

## 2025-07-13 PROCEDURE — 99284 EMERGENCY DEPT VISIT MOD MDM: CPT

## 2025-07-13 PROCEDURE — 36415 COLL VENOUS BLD VENIPUNCTURE: CPT | Performed by: EMERGENCY MEDICINE

## 2025-07-13 PROCEDURE — 85025 COMPLETE CBC W/AUTO DIFF WBC: CPT | Performed by: EMERGENCY MEDICINE

## 2025-07-13 RX ORDER — KETOROLAC TROMETHAMINE 30 MG/ML
15 INJECTION, SOLUTION INTRAMUSCULAR; INTRAVENOUS ONCE
Status: DISCONTINUED | OUTPATIENT
Start: 2025-07-13 | End: 2025-07-13

## 2025-07-13 RX ORDER — KETOROLAC TROMETHAMINE 30 MG/ML
15 INJECTION, SOLUTION INTRAMUSCULAR; INTRAVENOUS ONCE
Status: COMPLETED | OUTPATIENT
Start: 2025-07-13 | End: 2025-07-13

## 2025-07-13 RX ORDER — LORAZEPAM 1 MG/1
1 TABLET ORAL ONCE
Status: COMPLETED | OUTPATIENT
Start: 2025-07-13 | End: 2025-07-13

## 2025-07-13 RX ADMIN — LORAZEPAM 1 MG: 1 TABLET ORAL at 15:07

## 2025-07-13 RX ADMIN — KETOROLAC TROMETHAMINE 15 MG: 30 INJECTION, SOLUTION INTRAMUSCULAR; INTRAVENOUS at 15:58

## 2025-07-13 NOTE — ED CARE HANDOFF
Emergency Department Sign Out Note        Sign out and transfer of care from Dr. Pardo. See Separate Emergency Department note.     The patient, Rishi Kirk, was evaluated by the previous provider for abdominal pain.    Workup Completed:  labs    ED Course / Workup Pending (followup):  Re-eval        No data recorded                             Procedures  Medical Decision Making  Amount and/or Complexity of Data Reviewed  Labs: ordered.    Risk  Prescription drug management.            Disposition  Final diagnoses:   Left lower quadrant abdominal pain   Anxiety     Time reflects when diagnosis was documented in both MDM as applicable and the Disposition within this note       Time User Action Codes Description Comment    7/13/2025  3:18 PM Davion Pardo Add [R10.32] Left lower quadrant abdominal pain     7/13/2025  3:18 PM Davion Pardo Add [F41.9] Anxiety           ED Disposition       None          Follow-up Information    None       Patient's Medications   Discharge Prescriptions    No medications on file     No discharge procedures on file.       ED Provider  Electronically Signed by     Gautam Torres MD  07/13/25 5528

## 2025-07-13 NOTE — ED PROVIDER NOTES
Time reflects when diagnosis was documented in both MDM as applicable and the Disposition within this note       Time User Action Codes Description Comment    7/13/2025  3:18 PM Davion Pardo Add [R10.32] Left lower quadrant abdominal pain     7/13/2025  3:18 PM Davion Pardo [F41.9] Anxiety           ED Disposition       None          Assessment & Plan       Medical Decision Making  Differential includes chronic pain diverticulitis constipation colitis labs are pending this case is turned over to Dr. Flynn ..  His anxiety has been addressed with some Ativan here he is chronic anxiety I do not feel he needs grounds for admission for anxiety    Amount and/or Complexity of Data Reviewed  Labs: ordered.    Risk  Prescription drug management.             Medications   sodium chloride 0.9 % bolus 1,000 mL (has no administration in time range)   ketorolac (TORADOL) injection 15 mg (has no administration in time range)   LORazepam (ATIVAN) tablet 1 mg (1 mg Oral Given 7/13/25 1507)       ED Risk Strat Scores                    No data recorded        SBIRT 22yo+      Flowsheet Row Most Recent Value   Initial Alcohol Screen: US AUDIT-C     1. How often do you have a drink containing alcohol? 0 Filed at: 07/13/2025 1421   2. How many drinks containing alcohol do you have on a typical day you are drinking?  0 Filed at: 07/13/2025 1421   3b. FEMALE Any Age, or MALE 65+: How often do you have 4 or more drinks on one occassion? 0 Filed at: 07/13/2025 1421   Audit-C Score 0 Filed at: 07/13/2025 1421   GRAYSON: How many times in the past year have you...    Used an illegal drug or used a prescription medication for non-medical reasons? Never Filed at: 07/13/2025 1421                            History of Present Illness       Chief Complaint   Patient presents with    Abdominal Pain     C/o LLQ pain since this morning.     Anxiety     C/o anxiety       Past Medical History[1]   Past Surgical History[2]   Family History[3]   Social  History[4]   E-Cigarette/Vaping    E-Cigarette Use Never User       E-Cigarette/Vaping Substances    Nicotine No     THC No     CBD No     Flavoring No     Other No     Unknown No       I have reviewed and agree with the history as documented.     Patient has a history of chronic back pain flank pain also has had left lower quadrant pain since morning denies any vomiting diarrhea fever no dysuria no testicular pain he also said his anxiety is getting out of hand he is not suicidal homicidal no hallucinations      Abdominal Pain  Associated symptoms: no chest pain, no chills, no cough, no dysuria, no fever, no shortness of breath and no vomiting    Anxiety  Presenting symptoms: no hallucinations and no suicidal thoughts    Associated symptoms: abdominal pain    Associated symptoms: no chest pain        Review of Systems   Constitutional:  Negative for chills and fever.   Respiratory:  Negative for cough and shortness of breath.    Cardiovascular:  Negative for chest pain and palpitations.   Gastrointestinal:  Positive for abdominal pain. Negative for vomiting.   Genitourinary:  Negative for dysuria and testicular pain.   Musculoskeletal:  Negative for arthralgias and back pain.   Skin:  Negative for color change and rash.   Neurological:  Negative for seizures and syncope.   Psychiatric/Behavioral:  Negative for confusion, hallucinations and suicidal ideas.    All other systems reviewed and are negative.          Objective       ED Triage Vitals [07/13/25 1420]   Temperature Pulse Blood Pressure Respirations SpO2 Patient Position - Orthostatic VS   98.8 °F (37.1 °C) 72 138/94 22 96 % Sitting      Temp Source Heart Rate Source BP Location FiO2 (%) Pain Score    Oral Monitor Left arm -- --      Vitals      Date and Time Temp Pulse SpO2 Resp BP Pain Score FACES Pain Rating User   07/13/25 1420 98.8 °F (37.1 °C) 72 96 % 22 138/94 -- -- JR            Physical Exam  Vitals and nursing note reviewed.   Constitutional:        General: He is not in acute distress.     Appearance: He is well-developed. He is not ill-appearing, toxic-appearing or diaphoretic.   HENT:      Head: Normocephalic and atraumatic.     Eyes:      Conjunctiva/sclera: Conjunctivae normal.       Cardiovascular:      Rate and Rhythm: Normal rate and regular rhythm.      Heart sounds: No murmur heard.  Pulmonary:      Effort: Pulmonary effort is normal. No respiratory distress.      Breath sounds: Normal breath sounds.   Abdominal:      Palpations: Abdomen is soft.      Tenderness: There is abdominal tenderness in the left lower quadrant. There is no right CVA tenderness, left CVA tenderness, guarding or rebound. Negative signs include Moncada's sign, Rovsing's sign and McBurney's sign.      Comments: Minimal tenderness left lower quadrant   Genitourinary:     Testes: Normal.      Comments: No testicular pain no inguinal hernia no signs of torsion    Musculoskeletal:         General: No swelling.      Cervical back: Neck supple.     Skin:     General: Skin is warm and dry.      Capillary Refill: Capillary refill takes less than 2 seconds.      Coloration: Skin is not jaundiced.     Neurological:      General: No focal deficit present.      Mental Status: He is alert.     Psychiatric:         Mood and Affect: Mood normal. Mood is not anxious.         Behavior: Behavior normal.         Results Reviewed       Procedure Component Value Units Date/Time    CBC and differential [[id number]]     Lab Status: No result Specimen: Blood     Comprehensive metabolic panel [[id number]]     Lab Status: No result Specimen: Blood     UA w Reflex to Microscopic w Reflex to Culture [[id number]]     Lab Status: No result Specimen: Urine             No orders to display       Procedures    ED Medication and Procedure Management   Prior to Admission Medications   Prescriptions Last Dose Informant Patient Reported? Taking?   Comfort Touch Plus Lancets 30G MISC  Self No No   Sig: Inject 1  Application under the skin Daily at 2am   GNP Alcohol Swabs 70 % PADS  Self No No   Sig: TEST 2-3 TIMES A DAY AS DIRECTEDTEST 2-3 TIMES A DAY AS DIRECTED   Melatonin 5 MG CHEW  Self No No   Sig: Chew 10 mg daily at bedtime   Patient not taking: Reported on 7/10/2025   QUEtiapine (SEROquel) 25 mg tablet   No No   Sig: Take 1 tablet (25 mg total) by mouth daily at bedtime   Patient not taking: Reported on 7/10/2025   atorvastatin (LIPITOR) 40 mg tablet   No No   Sig: Take 1 tablet (40 mg total) by mouth daily with dinner   ergocalciferol (VITAMIN D2) 50,000 units   No No   Sig: Take 1 capsule (50,000 Units total) by mouth once a week for 7 doses   Patient not taking: Reported on 7/10/2025 Do not start before July 12, 2025.   gabapentin (NEURONTIN) 400 mg capsule   No No   Sig: Take 1 capsule (400 mg total) by mouth 3 (three) times a day   lisinopril (ZESTRIL) 5 mg tablet   No No   Sig: Take 1 tablet (5 mg total) by mouth daily   lubiprostone (AMITIZA) 24 mcg capsule   Yes No   Sig: Take 24 mcg by mouth 2 (two) times a day with meals   metFORMIN (GLUCOPHAGE-XR) 500 mg 24 hr tablet   No No   Sig: Take 1 tablet (500 mg total) by mouth daily with dinner   methadone (DOLOPHINE) 10 mg/mL oral concentrated solution  Self Yes No   Sig: Take 44 mg by mouth in the morning.   Patient not taking: Reported on 7/10/2025   nadolol (CORGARD) 20 mg tablet   No No   Sig: Take 1 tablet (20 mg total) by mouth daily   ondansetron (ZOFRAN) 4 mg tablet   No No   Sig: Take 1 tablet (4 mg total) by mouth every 8 (eight) hours as needed for nausea for up to 7 days   sertraline (ZOLOFT) 50 mg tablet   No No   Sig: Take 1 tablet (50 mg total) by mouth daily   sitaGLIPtin (JANUVIA) 100 mg tablet   No No   Sig: Take 1 tablet (100 mg total) by mouth daily      Facility-Administered Medications: None     Patient's Medications   Discharge Prescriptions    No medications on file     No discharge procedures on file.  ED SEPSIS DOCUMENTATION   Time  reflects when diagnosis was documented in both MDM as applicable and the Disposition within this note       Time User Action Codes Description Comment    7/13/2025  3:18 PM Davion Pardo Add [R10.32] Left lower quadrant abdominal pain     7/13/2025  3:18 PM Davion Pardo [F41.9] Anxiety                      [1]   Past Medical History:  Diagnosis Date    Addiction to drug (HCC)     Alcohol abuse     Alcoholism (HCC)     Anxiety     Chronic kidney disease     Cirrhosis (HCC)     Colon polyp     CPAP (continuous positive airway pressure) dependence     used for 2 months only    Depression     Diabetes mellitus (HCC)     Erectile dysfunction     Since 2 or 3 years    Esophageal varices (HCC)     Follow-up after circumcision 04/23/2025    Gastritis     Heart disease     Hepatitis C     History of Helicobacter pylori infection 02/23/2016    History of kidney stones     Hyperlipidemia     Hypertension     Infectious viral hepatitis     Kidney stone     Liver cancer (HCC)     Obesity     Pneumonia     PPD positive 2020    Screening for prostate cancer 04/23/2025    Sleep apnea     Sleep difficulties     Splenomegaly     Substance abuse (HCC)     Testosterone deficiency     Withdrawal symptoms, drug or narcotic (HCC)    [2]   Past Surgical History:  Procedure Laterality Date    COLONOSCOPY  06/20/2014    dr [email or web address]    COLONOSCOPY  2014        EGD AND COLONOSCOPY  08/2020    esophageal varices, colon polyp, hemorrhoids    ESOPHAGOGASTRODUODENOSCOPY  12/10/2015    esophageal varices, cirrhosis, gastritis    HYDROCELE EXCISION / REPAIR Left     teste    LIVER BIOPSY  2014    MD CIRCUMCISION AGE >28 DAYS N/A 4/9/2025    Procedure: CIRCUMCISION ADULT;  Surgeon: Malik Baires MD;  Location:  MAIN OR;  Service: Urology   [3]   Family History  Problem Relation Name Age of Onset    Diabetes type II Mother Sahraotalmeena Kirk         MELLITUS    Hypertension Mother Viotalmeena Kirk     Diabetes Mother Viotalines Kirk      Hypertension Sister Eli Street     Diabetes Sister Eli Street         MELLITUS    No Known Problems Maternal Grandmother      No Known Problems Maternal Grandfather      No Known Problems Paternal Grandmother      No Known Problems Paternal Grandfather      No Known Problems Sister      Breast cancer Maternal Aunt Salvatore Reyes         40s    No Known Problems Maternal Aunt      No Known Problems Paternal Aunt      No Known Problems Paternal Aunt      No Known Problems Paternal Aunt      No Known Problems Paternal Aunt      No Known Problems Paternal Aunt      No Known Problems Paternal Aunt     [4]   Social History  Tobacco Use    Smoking status: Former     Current packs/day: 0.00     Average packs/day: 1 pack/day for 41.7 years (41.3 ttl pk-yrs)     Types: Cigarettes     Start date:      Quit date: 10/1/2024     Years since quittin.7     Passive exposure: Current    Smokeless tobacco: Never    Tobacco comments:     TOBACCO USE   Vaping Use    Vaping status: Never Used   Substance Use Topics    Alcohol use: Not Currently     Comment: Sustained remission    Drug use: Not Currently     Types: Heroin     Comment: former user- heroin was drug of choice; Overuse of Methadone        Davion Pardo MD  [phone]

## 2025-07-14 ENCOUNTER — OFFICE VISIT (OUTPATIENT)
Dept: FAMILY MEDICINE CLINIC | Facility: CLINIC | Age: 65
End: 2025-07-14
Payer: MEDICARE

## 2025-07-14 VITALS
HEART RATE: 85 BPM | BODY MASS INDEX: 32.58 KG/M2 | TEMPERATURE: 97.3 F | RESPIRATION RATE: 18 BRPM | OXYGEN SATURATION: 95 % | DIASTOLIC BLOOD PRESSURE: 80 MMHG | WEIGHT: 195.8 LBS | SYSTOLIC BLOOD PRESSURE: 110 MMHG

## 2025-07-14 DIAGNOSIS — E11.9 TYPE 2 DIABETES MELLITUS WITHOUT COMPLICATION, WITHOUT LONG-TERM CURRENT USE OF INSULIN (HCC): ICD-10-CM

## 2025-07-14 DIAGNOSIS — R10.13 EPIGASTRIC ABDOMINAL PAIN: Primary | ICD-10-CM

## 2025-07-14 DIAGNOSIS — F41.1 GAD (GENERALIZED ANXIETY DISORDER): ICD-10-CM

## 2025-07-14 DIAGNOSIS — R10.32 LLQ PAIN: ICD-10-CM

## 2025-07-14 DIAGNOSIS — K70.30 ALCOHOLIC CIRRHOSIS OF LIVER WITHOUT ASCITES (HCC): ICD-10-CM

## 2025-07-14 DIAGNOSIS — F11.20 METHADONE MAINTENANCE THERAPY PATIENT (HCC): ICD-10-CM

## 2025-07-14 DIAGNOSIS — K59.04 CHRONIC IDIOPATHIC CONSTIPATION: ICD-10-CM

## 2025-07-14 PROCEDURE — 99214 OFFICE O/P EST MOD 30 MIN: CPT | Performed by: PHYSICIAN ASSISTANT

## 2025-07-14 PROCEDURE — G2211 COMPLEX E/M VISIT ADD ON: HCPCS | Performed by: PHYSICIAN ASSISTANT

## 2025-07-14 RX ORDER — OMEPRAZOLE 40 MG/1
40 CAPSULE, DELAYED RELEASE ORAL DAILY
Qty: 30 CAPSULE | Refills: 0 | Status: SHIPPED | OUTPATIENT
Start: 2025-07-14 | End: 2025-07-30

## 2025-07-14 RX ORDER — POLYETHYLENE GLYCOL 3350 17 G/17G
17 POWDER ORAL DAILY
COMMUNITY
Start: 2025-07-12 | End: 2025-07-30

## 2025-07-14 NOTE — ASSESSMENT & PLAN NOTE
Has appointment scheduled with hepatology on 12/19/25.     Lab Results   Component Value Date     07/13/2025     Lab Results   Component Value Date    INR 1.02 03/21/2025    INR 1.10 02/12/2025    INR 1.05 11/19/2024    PROTIME 13.7 03/21/2025    PROTIME 14.5 02/12/2025    PROTIME 14.0 11/19/2024     Lab Results   Component Value Date    ALT 25 07/13/2025    AST 30 07/13/2025    ALKPHOS 110 (H) 07/13/2025    BILITOT 0.4 05/24/2018     Last EGD in 9/2024 with normal appearing esophagus. In remission with alcohol abuse.

## 2025-07-14 NOTE — PROGRESS NOTES
Name: Rishi Kirk      : 1960      MRN: [id number]  Encounter Provider: Anabel Nair PA-C  Encounter Date: 2025   Encounter department: [facility]  :  Assessment & Plan  Epigastric abdominal pain  Had LLQ pain that resolved in ER after getting Toradol 15mg injection and Lorazepam 1mg tablet, ate plantains and woke up with morning with epigastric pain, no longer with LLQ or L flank pain which he had been seen in ER for. Started after taking his sertraline and gabapentin on empty stomach. Reports that while he was inpatient on the 6th floor of hospital, was calm, eating well 3x a day with snacks, sleeping well and had no issues but since coming out of the hospital, appetite has decreased, sometimes nausea and medications aren't helping. Stopped taking omeprazole a few weeks ago, will restart and add Maalox as well. Advance diet as tolerated and avoid dietary triggers, the other day had eggs mixed into canned soup which may have triggered pain. Maintain close follow-up with recurrent ED visits.   Orders:  •  aluminum-magnesium hydroxide 200-200 MG/5ML suspension; Take 5 mL by mouth every 6 (six) hours as needed for heartburn  •  omeprazole (PriLOSEC) 40 MG capsule; Take 1 capsule (40 mg total) by mouth daily 30 minutes before first meal of the day    LLQ pain  Resolved, had soft BM this morning. Reviewed ED visit from 25 for L flank pain and 25 for LLQ pain. Previously with hospital admission from 7/3/25 to 25 for depression on 201 during which he was maintained on sertraline 50mg and started on gabapentin 100mg TID for anxiety and quetiapine 25mg at night after 2 ED episodes on 25 for anxiety and 25 for L flank pain with CT abdomen/pelvis showing following:    IMPRESSION:     1.  Cirrhotic morphology of the liver again seen.  2.  Cholelithiasis without evidence for acute cholecystitis or significant biliary ductal dilatation.  3.  8 mm  nonobstructing calculus in the lower pole of the right kidney is again seen. No hydronephrosis bilaterally.  Mild symmetrical bilateral perinephric stranding again seen which may be sequela of medical renal disease.  4.  No evidence of bowel obstruction, inflammation, appendicitis, free air, or free fluid.  5.  Subtle diffuse urinary bladder wall thickening could be related to under distention or cystitis, recommend correlation with urinalysis.    UA negative for infection on 7/13/25.        ERIC (generalized anxiety disorder)  Appointment scheduled on 7/31/25 with psychiatry for post-hospitalization follow-up after 201 admission. Was seen on 7/10/25 in this office by Dr. Adams for same symptoms of anxiety causing depression with no changes in medication made. Continue same dose sertraline 50mg once daily.        Alcoholic cirrhosis of liver without ascites (HCC)  Has appointment scheduled with hepatology on 12/19/25.     Lab Results   Component Value Date     07/13/2025     Lab Results   Component Value Date    INR 1.02 03/21/2025    INR 1.10 02/12/2025    INR 1.05 11/19/2024    PROTIME 13.7 03/21/2025    PROTIME 14.5 02/12/2025    PROTIME 14.0 11/19/2024     Lab Results   Component Value Date    ALT 25 07/13/2025    AST 30 07/13/2025    ALKPHOS 110 (H) 07/13/2025    BILITOT 0.4 05/24/2018     Last EGD in 9/2024 with normal appearing esophagus. In remission with alcohol abuse.        Methadone maintenance therapy patient ([facility])  Following with methadone clinic, in remission.        Chronic idiopathic constipation  Last colonoscopy done in 2020 with single polyp removed and small internal hemorrhoids. He was started on Amitiza in 3/2025. Failed docusate and Miralax.        Type 2 diabetes mellitus without complication, without long-term current use of insulin (HCC)  Stable and controlled on metformin 500mg daily and Januvia 100mg daily.   Lab Results   Component Value Date    HGBA1C 7.3 (H) 07/04/2025                  History of Present Illness   Rishi is a 65 y.o. male with a h/o diabetes, opioid use in remission on methadone, cirrhosis with history of alcohol abuse and hepatitis, esophageal varices, anxiety and constipation who presents for worsening anxiety with epigastric pain as a same day walk-in ED follow-up. Anxiety worsened again since discharge from hospital, taking methadone as indicated. No relapse with drug use or alcohol. Miralax with Amitiza helps with constipation. Has been to ER a few times due to L sided abdominal pain, not eating as well as he did while he was on 6th floor of hospital. Ate plantains and eggs mixed with canned soup, started with epigastric pain this morning after taking his meds.     History was conducted in Telugu without the use of .        Review of Systems   Constitutional:  Negative for chills, fever and unexpected weight change.   Respiratory:  Negative for shortness of breath.    Cardiovascular:  Negative for chest pain.   Gastrointestinal:  Positive for abdominal pain (epigastric), constipation and nausea.   Psychiatric/Behavioral:  Negative for sleep disturbance and suicidal ideas. The patient is nervous/anxious.        Objective   /80 (BP Location: Left arm, Patient Position: Sitting, Cuff Size: Standard)   Pulse 85   Temp (!) 97.3 °F (36.3 °C) (Tympanic)   Resp 18   Wt 88.8 kg (195 lb 12.8 oz)   SpO2 95%   BMI 32.58 kg/m²      Physical Exam  Vitals reviewed.   Constitutional:       Appearance: Normal appearance.   HENT:      Head: Normocephalic and atraumatic.     Cardiovascular:      Rate and Rhythm: Normal rate and regular rhythm.   Pulmonary:      Effort: Pulmonary effort is normal.      Breath sounds: Normal breath sounds.   Abdominal:      General: Bowel sounds are normal.      Palpations: Abdomen is soft.      Tenderness: There is abdominal tenderness in the epigastric area. There is no guarding or rebound.     Neurological:      Mental Status:  He is alert and oriented to person, place, and time. Mental status is at baseline.

## 2025-07-14 NOTE — ASSESSMENT & PLAN NOTE
Appointment scheduled on 7/31/25 with psychiatry for post-hospitalization follow-up after 201 admission. Was seen on 7/10/25 in this office by Dr. Adams for same symptoms of anxiety causing depression with no changes in medication made. Continue same dose sertraline 50mg once daily.

## 2025-07-14 NOTE — ASSESSMENT & PLAN NOTE
Last colonoscopy done in 2020 with single polyp removed and small internal hemorrhoids. He was started on Amitiza in 3/2025. Failed docusate and Miralax.

## 2025-07-14 NOTE — ASSESSMENT & PLAN NOTE
Stable and controlled on metformin 500mg daily and Januvia 100mg daily.   Lab Results   Component Value Date    HGBA1C 7.3 (H) 07/04/2025

## 2025-07-15 ENCOUNTER — TELEPHONE (OUTPATIENT)
Dept: PSYCHIATRY | Facility: CLINIC | Age: 65
End: 2025-07-15

## 2025-07-15 DIAGNOSIS — F41.9 ANXIETY: ICD-10-CM

## 2025-07-16 ENCOUNTER — HOSPITAL ENCOUNTER (INPATIENT)
Facility: HOSPITAL | Age: 65
LOS: 14 days | Discharge: NURSING, REHAB OR LONG-TERM CARE | DRG: 885 | End: 2025-07-30
Attending: EMERGENCY MEDICINE | Admitting: PSYCHIATRY & NEUROLOGY
Payer: MEDICARE

## 2025-07-16 DIAGNOSIS — I25.10 CORONARY ARTERY CALCIFICATION: ICD-10-CM

## 2025-07-16 DIAGNOSIS — F33.2 SEVERE EPISODE OF RECURRENT MAJOR DEPRESSIVE DISORDER, WITHOUT PSYCHOTIC FEATURES (HCC): ICD-10-CM

## 2025-07-16 DIAGNOSIS — E11.9 TYPE 2 DIABETES MELLITUS WITHOUT COMPLICATION, WITHOUT LONG-TERM CURRENT USE OF INSULIN (HCC): ICD-10-CM

## 2025-07-16 DIAGNOSIS — E55.9 VITAMIN D DEFICIENCY: ICD-10-CM

## 2025-07-16 DIAGNOSIS — K59.04 CHRONIC IDIOPATHIC CONSTIPATION: ICD-10-CM

## 2025-07-16 DIAGNOSIS — I10 BENIGN ESSENTIAL HYPERTENSION: ICD-10-CM

## 2025-07-16 DIAGNOSIS — R10.13 EPIGASTRIC PAIN: Primary | ICD-10-CM

## 2025-07-16 DIAGNOSIS — F11.20 METHADONE MAINTENANCE THERAPY PATIENT (HCC): ICD-10-CM

## 2025-07-16 DIAGNOSIS — Z76.89 ENCOUNTER FOR PSYCHIATRIC ASSESSMENT: ICD-10-CM

## 2025-07-16 DIAGNOSIS — Z00.8 MEDICAL CLEARANCE FOR PSYCHIATRIC ADMISSION: ICD-10-CM

## 2025-07-16 DIAGNOSIS — G31.84 MILD NEUROCOGNITIVE DISORDER: ICD-10-CM

## 2025-07-16 DIAGNOSIS — B88.8 INFESTATION BY BED BUG: ICD-10-CM

## 2025-07-16 LAB
ALBUMIN SERPL BCG-MCNC: 4.3 G/DL (ref 3.5–5)
ALP SERPL-CCNC: 106 U/L (ref 34–104)
ALT SERPL W P-5'-P-CCNC: 23 U/L (ref 7–52)
AMPHETAMINES SERPL QL SCN: NEGATIVE
ANION GAP SERPL CALCULATED.3IONS-SCNC: 9 MMOL/L (ref 4–13)
APAP SERPL-MCNC: 19 UG/ML (ref 10–20)
AST SERPL W P-5'-P-CCNC: 24 U/L (ref 13–39)
ATRIAL RATE: 78 BPM
BARBITURATES UR QL: NEGATIVE
BASOPHILS # BLD AUTO: 0.02 THOUSANDS/ÂΜL (ref 0–0.1)
BASOPHILS NFR BLD AUTO: 0 % (ref 0–1)
BENZODIAZ UR QL: NEGATIVE
BILIRUB SERPL-MCNC: 1.08 MG/DL (ref 0.2–1)
BILIRUB UR QL STRIP: NEGATIVE
BUN SERPL-MCNC: 9 MG/DL (ref 5–25)
CALCIUM SERPL-MCNC: 9.4 MG/DL (ref 8.4–10.2)
CARDIAC TROPONIN I PNL SERPL HS: 3 NG/L (ref ?–50)
CHLORIDE SERPL-SCNC: 99 MMOL/L (ref 96–108)
CLARITY UR: CLEAR
CO2 SERPL-SCNC: 28 MMOL/L (ref 21–32)
COCAINE UR QL: NEGATIVE
COLOR UR: ABNORMAL
CREAT SERPL-MCNC: 0.96 MG/DL (ref 0.6–1.3)
EOSINOPHIL # BLD AUTO: 0.02 THOUSAND/ÂΜL (ref 0–0.61)
EOSINOPHIL NFR BLD AUTO: 0 % (ref 0–6)
ERYTHROCYTE [DISTWIDTH] IN BLOOD BY AUTOMATED COUNT: 12.5 % (ref 11.6–15.1)
ETHANOL SERPL-MCNC: <10 MG/DL
FENTANYL UR QL SCN: NEGATIVE
GFR SERPL CREATININE-BSD FRML MDRD: 82 ML/MIN/1.73SQ M
GLUCOSE SERPL-MCNC: 115 MG/DL (ref 65–140)
GLUCOSE SERPL-MCNC: 167 MG/DL (ref 65–140)
GLUCOSE SERPL-MCNC: 169 MG/DL (ref 65–140)
GLUCOSE SERPL-MCNC: 89 MG/DL (ref 65–140)
GLUCOSE UR STRIP-MCNC: NEGATIVE MG/DL
HCT VFR BLD AUTO: 44.3 % (ref 36.5–49.3)
HGB BLD-MCNC: 15.2 G/DL (ref 12–17)
HGB UR QL STRIP.AUTO: NEGATIVE
HYDROCODONE UR QL SCN: NEGATIVE
IMM GRANULOCYTES # BLD AUTO: 0.02 THOUSAND/UL (ref 0–0.2)
IMM GRANULOCYTES NFR BLD AUTO: 0 % (ref 0–2)
KETONES UR STRIP-MCNC: NEGATIVE MG/DL
LEUKOCYTE ESTERASE UR QL STRIP: NEGATIVE
LIPASE SERPL-CCNC: 27 U/L (ref 11–82)
LYMPHOCYTES # BLD AUTO: 1.72 THOUSANDS/ÂΜL (ref 0.6–4.47)
LYMPHOCYTES NFR BLD AUTO: 23 % (ref 14–44)
MCH RBC QN AUTO: 31.1 PG (ref 26.8–34.3)
MCHC RBC AUTO-ENTMCNC: 34.3 G/DL (ref 31.4–37.4)
MCV RBC AUTO: 91 FL (ref 82–98)
METHADONE UR QL: POSITIVE
MONOCYTES # BLD AUTO: 0.48 THOUSAND/ÂΜL (ref 0.17–1.22)
MONOCYTES NFR BLD AUTO: 6 % (ref 4–12)
NEUTROPHILS # BLD AUTO: 5.22 THOUSANDS/ÂΜL (ref 1.85–7.62)
NEUTS SEG NFR BLD AUTO: 71 % (ref 43–75)
NITRITE UR QL STRIP: NEGATIVE
NRBC BLD AUTO-RTO: 0 /100 WBCS
OPIATES UR QL SCN: NEGATIVE
OXYCODONE+OXYMORPHONE UR QL SCN: NEGATIVE
P AXIS: 65 DEGREES
PCP UR QL: NEGATIVE
PH UR STRIP.AUTO: 8 [PH]
PLATELET # BLD AUTO: 216 THOUSANDS/UL (ref 149–390)
PMV BLD AUTO: 8.7 FL (ref 8.9–12.7)
POTASSIUM SERPL-SCNC: 4.2 MMOL/L (ref 3.5–5.3)
PR INTERVAL: 182 MS
PROT SERPL-MCNC: 8.1 G/DL (ref 6.4–8.4)
PROT UR STRIP-MCNC: NEGATIVE MG/DL
QRS AXIS: 11 DEGREES
QRSD INTERVAL: 80 MS
QT INTERVAL: 430 MS
QTC INTERVAL: 490 MS
RBC # BLD AUTO: 4.89 MILLION/UL (ref 3.88–5.62)
SALICYLATES SERPL-MCNC: <5 MG/DL (ref 5–20)
SODIUM SERPL-SCNC: 136 MMOL/L (ref 135–147)
SP GR UR STRIP.AUTO: 1.01 (ref 1–1.04)
T WAVE AXIS: 32 DEGREES
THC UR QL: NEGATIVE
UROBILINOGEN UA: NEGATIVE MG/DL
VENTRICULAR RATE: 78 BPM
WBC # BLD AUTO: 7.48 THOUSAND/UL (ref 4.31–10.16)

## 2025-07-16 PROCEDURE — 93005 ELECTROCARDIOGRAM TRACING: CPT

## 2025-07-16 PROCEDURE — 99285 EMERGENCY DEPT VISIT HI MDM: CPT

## 2025-07-16 PROCEDURE — 93010 ELECTROCARDIOGRAM REPORT: CPT | Performed by: INTERNAL MEDICINE

## 2025-07-16 PROCEDURE — 84484 ASSAY OF TROPONIN QUANT: CPT

## 2025-07-16 PROCEDURE — 82948 REAGENT STRIP/BLOOD GLUCOSE: CPT

## 2025-07-16 PROCEDURE — 36415 COLL VENOUS BLD VENIPUNCTURE: CPT

## 2025-07-16 PROCEDURE — 96374 THER/PROPH/DIAG INJ IV PUSH: CPT

## 2025-07-16 PROCEDURE — 80307 DRUG TEST PRSMV CHEM ANLYZR: CPT

## 2025-07-16 PROCEDURE — 83690 ASSAY OF LIPASE: CPT

## 2025-07-16 PROCEDURE — 80053 COMPREHEN METABOLIC PANEL: CPT

## 2025-07-16 PROCEDURE — 80179 DRUG ASSAY SALICYLATE: CPT

## 2025-07-16 PROCEDURE — 85025 COMPLETE CBC W/AUTO DIFF WBC: CPT

## 2025-07-16 PROCEDURE — 82077 ASSAY SPEC XCP UR&BREATH IA: CPT

## 2025-07-16 PROCEDURE — 80143 DRUG ASSAY ACETAMINOPHEN: CPT

## 2025-07-16 RX ORDER — OLANZAPINE 5 MG/1
5 TABLET, FILM COATED ORAL
Status: DISCONTINUED | OUTPATIENT
Start: 2025-07-16 | End: 2025-07-30 | Stop reason: HOSPADM

## 2025-07-16 RX ORDER — POLYETHYLENE GLYCOL 3350 17 G/17G
17 POWDER, FOR SOLUTION ORAL DAILY PRN
Status: DISCONTINUED | OUTPATIENT
Start: 2025-07-16 | End: 2025-07-30 | Stop reason: HOSPADM

## 2025-07-16 RX ORDER — METFORMIN HYDROCHLORIDE 500 MG/1
500 TABLET, EXTENDED RELEASE ORAL
Status: DISCONTINUED | OUTPATIENT
Start: 2025-07-16 | End: 2025-07-30 | Stop reason: HOSPADM

## 2025-07-16 RX ORDER — IBUPROFEN 400 MG/1
200 TABLET, FILM COATED ORAL EVERY 6 HOURS PRN
Status: DISCONTINUED | OUTPATIENT
Start: 2025-07-16 | End: 2025-07-30 | Stop reason: HOSPADM

## 2025-07-16 RX ORDER — IBUPROFEN 400 MG/1
400 TABLET, FILM COATED ORAL EVERY 6 HOURS PRN
Status: DISCONTINUED | OUTPATIENT
Start: 2025-07-16 | End: 2025-07-30 | Stop reason: HOSPADM

## 2025-07-16 RX ORDER — LISINOPRIL 5 MG/1
5 TABLET ORAL DAILY
Status: DISCONTINUED | OUTPATIENT
Start: 2025-07-16 | End: 2025-07-30 | Stop reason: HOSPADM

## 2025-07-16 RX ORDER — OLANZAPINE 2.5 MG/1
2.5 TABLET, FILM COATED ORAL
Status: DISCONTINUED | OUTPATIENT
Start: 2025-07-16 | End: 2025-07-30 | Stop reason: HOSPADM

## 2025-07-16 RX ORDER — PANTOPRAZOLE SODIUM 40 MG/1
40 TABLET, DELAYED RELEASE ORAL
Status: DISCONTINUED | OUTPATIENT
Start: 2025-07-16 | End: 2025-07-30 | Stop reason: HOSPADM

## 2025-07-16 RX ORDER — LORAZEPAM 2 MG/ML
1 INJECTION INTRAMUSCULAR
Status: DISCONTINUED | OUTPATIENT
Start: 2025-07-16 | End: 2025-07-30 | Stop reason: HOSPADM

## 2025-07-16 RX ORDER — NADOLOL 20 MG/1
20 TABLET ORAL DAILY
Status: DISCONTINUED | OUTPATIENT
Start: 2025-07-16 | End: 2025-07-30 | Stop reason: HOSPADM

## 2025-07-16 RX ORDER — ATORVASTATIN CALCIUM 40 MG/1
40 TABLET, FILM COATED ORAL
Status: DISCONTINUED | OUTPATIENT
Start: 2025-07-16 | End: 2025-07-30 | Stop reason: HOSPADM

## 2025-07-16 RX ORDER — LUBIPROSTONE 24 UG/1
24 CAPSULE ORAL 2 TIMES DAILY WITH MEALS
Status: DISCONTINUED | OUTPATIENT
Start: 2025-07-16 | End: 2025-07-30 | Stop reason: HOSPADM

## 2025-07-16 RX ORDER — OLANZAPINE 10 MG/2ML
5 INJECTION, POWDER, FOR SOLUTION INTRAMUSCULAR
Status: DISCONTINUED | OUTPATIENT
Start: 2025-07-16 | End: 2025-07-30 | Stop reason: HOSPADM

## 2025-07-16 RX ORDER — METHADONE HYDROCHLORIDE 10 MG/ML
44 CONCENTRATE ORAL DAILY
Status: DISCONTINUED | OUTPATIENT
Start: 2025-07-17 | End: 2025-07-30 | Stop reason: HOSPADM

## 2025-07-16 RX ORDER — HYDROXYZINE HYDROCHLORIDE 50 MG/1
50 TABLET, FILM COATED ORAL
Status: DISCONTINUED | OUTPATIENT
Start: 2025-07-16 | End: 2025-07-30 | Stop reason: HOSPADM

## 2025-07-16 RX ORDER — GABAPENTIN 400 MG/1
400 CAPSULE ORAL 3 TIMES DAILY
Status: DISCONTINUED | OUTPATIENT
Start: 2025-07-16 | End: 2025-07-30 | Stop reason: HOSPADM

## 2025-07-16 RX ORDER — TRAZODONE HYDROCHLORIDE 50 MG/1
50 TABLET ORAL
Status: DISCONTINUED | OUTPATIENT
Start: 2025-07-16 | End: 2025-07-30 | Stop reason: HOSPADM

## 2025-07-16 RX ORDER — KETOROLAC TROMETHAMINE 30 MG/ML
15 INJECTION, SOLUTION INTRAMUSCULAR; INTRAVENOUS ONCE
Status: COMPLETED | OUTPATIENT
Start: 2025-07-16 | End: 2025-07-16

## 2025-07-16 RX ORDER — HYDROXYZINE HYDROCHLORIDE 25 MG/1
25 TABLET, FILM COATED ORAL
Status: DISCONTINUED | OUTPATIENT
Start: 2025-07-16 | End: 2025-07-30 | Stop reason: HOSPADM

## 2025-07-16 RX ORDER — HYDROXYZINE HYDROCHLORIDE 25 MG/1
25 TABLET, FILM COATED ORAL ONCE
Status: COMPLETED | OUTPATIENT
Start: 2025-07-16 | End: 2025-07-16

## 2025-07-16 RX ORDER — IBUPROFEN 600 MG/1
600 TABLET, FILM COATED ORAL EVERY 8 HOURS PRN
Status: DISCONTINUED | OUTPATIENT
Start: 2025-07-16 | End: 2025-07-30 | Stop reason: HOSPADM

## 2025-07-16 RX ORDER — MAGNESIUM HYDROXIDE/ALUMINUM HYDROXICE/SIMETHICONE 120; 1200; 1200 MG/30ML; MG/30ML; MG/30ML
30 SUSPENSION ORAL ONCE
Status: COMPLETED | OUTPATIENT
Start: 2025-07-16 | End: 2025-07-16

## 2025-07-16 RX ORDER — LORAZEPAM 1 MG/1
1 TABLET ORAL
Status: DISCONTINUED | OUTPATIENT
Start: 2025-07-16 | End: 2025-07-30 | Stop reason: HOSPADM

## 2025-07-16 RX ORDER — AMOXICILLIN 250 MG
1 CAPSULE ORAL DAILY PRN
Status: DISCONTINUED | OUTPATIENT
Start: 2025-07-16 | End: 2025-07-30 | Stop reason: HOSPADM

## 2025-07-16 RX ORDER — QUETIAPINE FUMARATE 25 MG/1
25 TABLET, FILM COATED ORAL
Status: DISCONTINUED | OUTPATIENT
Start: 2025-07-16 | End: 2025-07-18

## 2025-07-16 RX ORDER — LIDOCAINE HYDROCHLORIDE 20 MG/ML
15 SOLUTION OROPHARYNGEAL ONCE
Status: COMPLETED | OUTPATIENT
Start: 2025-07-16 | End: 2025-07-16

## 2025-07-16 RX ADMIN — GABAPENTIN 400 MG: 400 CAPSULE ORAL at 21:02

## 2025-07-16 RX ADMIN — Medication 3 MG: at 23:19

## 2025-07-16 RX ADMIN — PANTOPRAZOLE SODIUM 40 MG: 40 TABLET, DELAYED RELEASE ORAL at 09:53

## 2025-07-16 RX ADMIN — LUBIPROSTONE 24 MCG: 24 CAPSULE, GELATIN COATED ORAL at 09:53

## 2025-07-16 RX ADMIN — ALUMINUM HYDROXIDE, MAGNESIUM HYDROXIDE, AND DIMETHICONE 30 ML: 200; 20; 200 SUSPENSION ORAL at 06:30

## 2025-07-16 RX ADMIN — NADOLOL 20 MG: 20 TABLET ORAL at 09:53

## 2025-07-16 RX ADMIN — IBUPROFEN 600 MG: 600 TABLET ORAL at 23:41

## 2025-07-16 RX ADMIN — LIDOCAINE HYDROCHLORIDE 15 ML: 20 SOLUTION ORAL at 06:30

## 2025-07-16 RX ADMIN — HYDROXYZINE HYDROCHLORIDE 25 MG: 25 TABLET, FILM COATED ORAL at 09:53

## 2025-07-16 RX ADMIN — GABAPENTIN 400 MG: 400 CAPSULE ORAL at 09:53

## 2025-07-16 RX ADMIN — LISINOPRIL 5 MG: 10 TABLET ORAL at 09:53

## 2025-07-16 RX ADMIN — SERTRALINE HYDROCHLORIDE 50 MG: 50 TABLET ORAL at 09:53

## 2025-07-16 RX ADMIN — KETOROLAC TROMETHAMINE 15 MG: 30 INJECTION, SOLUTION INTRAMUSCULAR; INTRAVENOUS at 06:29

## 2025-07-16 RX ADMIN — GABAPENTIN 400 MG: 400 CAPSULE ORAL at 16:37

## 2025-07-16 RX ADMIN — LUBIPROSTONE 24 MCG: 24 CAPSULE, GELATIN COATED ORAL at 16:37

## 2025-07-16 RX ADMIN — SITAGLIPTIN 100 MG: 100 TABLET, FILM COATED ORAL at 09:53

## 2025-07-16 RX ADMIN — ATORVASTATIN CALCIUM 40 MG: 40 TABLET, FILM COATED ORAL at 16:37

## 2025-07-16 RX ADMIN — QUETIAPINE FUMARATE 25 MG: 25 TABLET ORAL at 23:19

## 2025-07-17 ENCOUNTER — APPOINTMENT (INPATIENT)
Dept: CT IMAGING | Facility: HOSPITAL | Age: 65
DRG: 885 | End: 2025-07-17
Payer: MEDICARE

## 2025-07-17 PROBLEM — E55.9 VITAMIN D DEFICIENCY: Status: ACTIVE | Noted: 2025-07-17

## 2025-07-17 PROBLEM — F45.21 ILLNESS ANXIETY DISORDER: Status: ACTIVE | Noted: 2025-07-17

## 2025-07-17 PROBLEM — Z00.8 MEDICAL CLEARANCE FOR PSYCHIATRIC ADMISSION: Status: ACTIVE | Noted: 2025-07-17

## 2025-07-17 PROBLEM — G31.84 MILD NEUROCOGNITIVE DISORDER: Status: ACTIVE | Noted: 2025-07-17

## 2025-07-17 PROBLEM — R94.31 QT PROLONGATION: Status: ACTIVE | Noted: 2025-07-17

## 2025-07-17 LAB
25(OH)D3 SERPL-MCNC: 15.6 NG/ML (ref 30–100)
ALBUMIN SERPL BCG-MCNC: 3.8 G/DL (ref 3.5–5)
ALP SERPL-CCNC: 90 U/L (ref 34–104)
ALT SERPL W P-5'-P-CCNC: 16 U/L (ref 7–52)
ANION GAP SERPL CALCULATED.3IONS-SCNC: 7 MMOL/L (ref 4–13)
AST SERPL W P-5'-P-CCNC: 23 U/L (ref 13–39)
BASOPHILS # BLD AUTO: 0.05 THOUSANDS/ÂΜL (ref 0–0.1)
BASOPHILS NFR BLD AUTO: 1 % (ref 0–1)
BILIRUB SERPL-MCNC: 0.97 MG/DL (ref 0.2–1)
BUN SERPL-MCNC: 11 MG/DL (ref 5–25)
CALCIUM SERPL-MCNC: 9.1 MG/DL (ref 8.4–10.2)
CHLORIDE SERPL-SCNC: 102 MMOL/L (ref 96–108)
CO2 SERPL-SCNC: 27 MMOL/L (ref 21–32)
CREAT SERPL-MCNC: 1 MG/DL (ref 0.6–1.3)
EOSINOPHIL # BLD AUTO: 0.05 THOUSAND/ÂΜL (ref 0–0.61)
EOSINOPHIL NFR BLD AUTO: 1 % (ref 0–6)
ERYTHROCYTE [DISTWIDTH] IN BLOOD BY AUTOMATED COUNT: 12.8 % (ref 11.6–15.1)
FOLATE SERPL-MCNC: 9.4 NG/ML
GFR SERPL CREATININE-BSD FRML MDRD: 78 ML/MIN/1.73SQ M
GLUCOSE P FAST SERPL-MCNC: 91 MG/DL (ref 65–99)
GLUCOSE SERPL-MCNC: 91 MG/DL (ref 65–140)
HCT VFR BLD AUTO: 43.4 % (ref 36.5–49.3)
HGB BLD-MCNC: 14.2 G/DL (ref 12–17)
IMM GRANULOCYTES # BLD AUTO: 0.02 THOUSAND/UL (ref 0–0.2)
IMM GRANULOCYTES NFR BLD AUTO: 0 % (ref 0–2)
LYMPHOCYTES # BLD AUTO: 3.55 THOUSANDS/ÂΜL (ref 0.6–4.47)
LYMPHOCYTES NFR BLD AUTO: 43 % (ref 14–44)
MAGNESIUM SERPL-MCNC: 2.3 MG/DL (ref 1.9–2.7)
MCH RBC QN AUTO: 30.6 PG (ref 26.8–34.3)
MCHC RBC AUTO-ENTMCNC: 32.7 G/DL (ref 31.4–37.4)
MCV RBC AUTO: 94 FL (ref 82–98)
MONOCYTES # BLD AUTO: 0.49 THOUSAND/ÂΜL (ref 0.17–1.22)
MONOCYTES NFR BLD AUTO: 6 % (ref 4–12)
NEUTROPHILS # BLD AUTO: 4.17 THOUSANDS/ÂΜL (ref 1.85–7.62)
NEUTS SEG NFR BLD AUTO: 49 % (ref 43–75)
NRBC BLD AUTO-RTO: 0 /100 WBCS
PHOSPHATE SERPL-MCNC: 3.9 MG/DL (ref 2.3–4.1)
PLATELET # BLD AUTO: 212 THOUSANDS/UL (ref 149–390)
PMV BLD AUTO: 9 FL (ref 8.9–12.7)
POTASSIUM SERPL-SCNC: 4.1 MMOL/L (ref 3.5–5.3)
PROT SERPL-MCNC: 6.9 G/DL (ref 6.4–8.4)
RBC # BLD AUTO: 4.64 MILLION/UL (ref 3.88–5.62)
SODIUM SERPL-SCNC: 136 MMOL/L (ref 135–147)
TSH SERPL DL<=0.05 MIU/L-ACNC: 1.54 UIU/ML (ref 0.45–4.5)
VIT B12 SERPL-MCNC: 339 PG/ML (ref 180–914)
WBC # BLD AUTO: 8.33 THOUSAND/UL (ref 4.31–10.16)

## 2025-07-17 PROCEDURE — 82607 VITAMIN B-12: CPT

## 2025-07-17 PROCEDURE — 82306 VITAMIN D 25 HYDROXY: CPT

## 2025-07-17 PROCEDURE — 70450 CT HEAD/BRAIN W/O DYE: CPT

## 2025-07-17 PROCEDURE — 99232 SBSQ HOSP IP/OBS MODERATE 35: CPT | Performed by: FAMILY MEDICINE

## 2025-07-17 PROCEDURE — 82746 ASSAY OF FOLIC ACID SERUM: CPT

## 2025-07-17 PROCEDURE — 85025 COMPLETE CBC W/AUTO DIFF WBC: CPT

## 2025-07-17 PROCEDURE — 84443 ASSAY THYROID STIM HORMONE: CPT

## 2025-07-17 PROCEDURE — 99222 1ST HOSP IP/OBS MODERATE 55: CPT | Performed by: STUDENT IN AN ORGANIZED HEALTH CARE EDUCATION/TRAINING PROGRAM

## 2025-07-17 PROCEDURE — 84100 ASSAY OF PHOSPHORUS: CPT

## 2025-07-17 PROCEDURE — 80053 COMPREHEN METABOLIC PANEL: CPT

## 2025-07-17 PROCEDURE — 83735 ASSAY OF MAGNESIUM: CPT

## 2025-07-17 RX ORDER — ERGOCALCIFEROL 1.25 MG/1
50000 CAPSULE, LIQUID FILLED ORAL WEEKLY
Status: DISCONTINUED | OUTPATIENT
Start: 2025-07-17 | End: 2025-07-30 | Stop reason: HOSPADM

## 2025-07-17 RX ORDER — CLONIDINE HYDROCHLORIDE 0.1 MG/1
TABLET ORAL
Qty: 60 TABLET | Refills: 5 | OUTPATIENT
Start: 2025-07-17

## 2025-07-17 RX ADMIN — NADOLOL 20 MG: 20 TABLET ORAL at 08:14

## 2025-07-17 RX ADMIN — TRAZODONE HYDROCHLORIDE 50 MG: 50 TABLET ORAL at 21:52

## 2025-07-17 RX ADMIN — SITAGLIPTIN 100 MG: 100 TABLET, FILM COATED ORAL at 08:14

## 2025-07-17 RX ADMIN — ERGOCALCIFEROL 50000 UNITS: 1.25 CAPSULE, LIQUID FILLED ORAL at 16:05

## 2025-07-17 RX ADMIN — GABAPENTIN 400 MG: 400 CAPSULE ORAL at 16:04

## 2025-07-17 RX ADMIN — GABAPENTIN 400 MG: 400 CAPSULE ORAL at 08:14

## 2025-07-17 RX ADMIN — QUETIAPINE FUMARATE 25 MG: 25 TABLET ORAL at 21:50

## 2025-07-17 RX ADMIN — Medication 3 MG: at 21:50

## 2025-07-17 RX ADMIN — LUBIPROSTONE 24 MCG: 24 CAPSULE, GELATIN COATED ORAL at 08:13

## 2025-07-17 RX ADMIN — ATORVASTATIN CALCIUM 40 MG: 40 TABLET, FILM COATED ORAL at 16:04

## 2025-07-17 RX ADMIN — SERTRALINE HYDROCHLORIDE 50 MG: 50 TABLET ORAL at 08:13

## 2025-07-17 RX ADMIN — GABAPENTIN 400 MG: 400 CAPSULE ORAL at 21:50

## 2025-07-17 RX ADMIN — LISINOPRIL 5 MG: 10 TABLET ORAL at 08:14

## 2025-07-17 RX ADMIN — LUBIPROSTONE 24 MCG: 24 CAPSULE, GELATIN COATED ORAL at 16:04

## 2025-07-17 RX ADMIN — PANTOPRAZOLE SODIUM 40 MG: 40 TABLET, DELAYED RELEASE ORAL at 05:10

## 2025-07-17 RX ADMIN — METHADONE HYDROCHLORIDE 44 MG: 10 CONCENTRATE ORAL at 08:15

## 2025-07-18 LAB
ATRIAL RATE: 65 BPM
CHOLEST SERPL-MCNC: 75 MG/DL (ref ?–200)
EST. AVERAGE GLUCOSE BLD GHB EST-MCNC: 148 MG/DL
HBA1C MFR BLD: 6.8 %
HDLC SERPL-MCNC: 48 MG/DL
LDLC SERPL CALC-MCNC: 5 MG/DL (ref 0–100)
NONHDLC SERPL-MCNC: 27 MG/DL
P AXIS: 59 DEGREES
PR INTERVAL: 180 MS
QRS AXIS: 0 DEGREES
QRSD INTERVAL: 86 MS
QT INTERVAL: 440 MS
QTC INTERVAL: 458 MS
T WAVE AXIS: 21 DEGREES
TRIGL SERPL-MCNC: 110 MG/DL (ref ?–150)
VENTRICULAR RATE: 65 BPM

## 2025-07-18 PROCEDURE — 87389 HIV-1 AG W/HIV-1&-2 AB AG IA: CPT | Performed by: STUDENT IN AN ORGANIZED HEALTH CARE EDUCATION/TRAINING PROGRAM

## 2025-07-18 PROCEDURE — 83036 HEMOGLOBIN GLYCOSYLATED A1C: CPT | Performed by: STUDENT IN AN ORGANIZED HEALTH CARE EDUCATION/TRAINING PROGRAM

## 2025-07-18 PROCEDURE — 86780 TREPONEMA PALLIDUM: CPT | Performed by: STUDENT IN AN ORGANIZED HEALTH CARE EDUCATION/TRAINING PROGRAM

## 2025-07-18 PROCEDURE — 80061 LIPID PANEL: CPT | Performed by: STUDENT IN AN ORGANIZED HEALTH CARE EDUCATION/TRAINING PROGRAM

## 2025-07-18 PROCEDURE — 97167 OT EVAL HIGH COMPLEX 60 MIN: CPT

## 2025-07-18 PROCEDURE — 93005 ELECTROCARDIOGRAM TRACING: CPT

## 2025-07-18 PROCEDURE — 99232 SBSQ HOSP IP/OBS MODERATE 35: CPT | Performed by: STUDENT IN AN ORGANIZED HEALTH CARE EDUCATION/TRAINING PROGRAM

## 2025-07-18 PROCEDURE — 86592 SYPHILIS TEST NON-TREP QUAL: CPT | Performed by: STUDENT IN AN ORGANIZED HEALTH CARE EDUCATION/TRAINING PROGRAM

## 2025-07-18 PROCEDURE — 93010 ELECTROCARDIOGRAM REPORT: CPT | Performed by: INTERNAL MEDICINE

## 2025-07-18 RX ORDER — SIMETHICONE 80 MG
80 TABLET,CHEWABLE ORAL ONCE
Status: COMPLETED | OUTPATIENT
Start: 2025-07-18 | End: 2025-07-18

## 2025-07-18 RX ORDER — ACETAMINOPHEN 325 MG/1
650 TABLET ORAL EVERY 4 HOURS PRN
Status: DISCONTINUED | OUTPATIENT
Start: 2025-07-18 | End: 2025-07-22

## 2025-07-18 RX ORDER — QUETIAPINE FUMARATE 50 MG/1
50 TABLET, FILM COATED ORAL
Status: DISCONTINUED | OUTPATIENT
Start: 2025-07-18 | End: 2025-07-30 | Stop reason: HOSPADM

## 2025-07-18 RX ORDER — BISACODYL 10 MG
10 SUPPOSITORY, RECTAL RECTAL DAILY PRN
Status: DISCONTINUED | OUTPATIENT
Start: 2025-07-18 | End: 2025-07-22

## 2025-07-18 RX ADMIN — METFORMIN HYDROCHLORIDE 500 MG: 500 TABLET, EXTENDED RELEASE ORAL at 16:30

## 2025-07-18 RX ADMIN — GABAPENTIN 400 MG: 400 CAPSULE ORAL at 21:28

## 2025-07-18 RX ADMIN — GABAPENTIN 400 MG: 400 CAPSULE ORAL at 08:19

## 2025-07-18 RX ADMIN — LUBIPROSTONE 24 MCG: 24 CAPSULE, GELATIN COATED ORAL at 08:18

## 2025-07-18 RX ADMIN — TRIMETHOBENZAMIDE HYDROCHLORIDE 200 MG: 100 INJECTION INTRAMUSCULAR at 11:57

## 2025-07-18 RX ADMIN — QUETIAPINE FUMARATE 50 MG: 50 TABLET ORAL at 21:28

## 2025-07-18 RX ADMIN — NADOLOL 20 MG: 20 TABLET ORAL at 08:19

## 2025-07-18 RX ADMIN — IBUPROFEN 600 MG: 600 TABLET ORAL at 15:03

## 2025-07-18 RX ADMIN — Medication 3 MG: at 21:28

## 2025-07-18 RX ADMIN — GABAPENTIN 400 MG: 400 CAPSULE ORAL at 16:27

## 2025-07-18 RX ADMIN — BISACODYL 10 MG: 10 SUPPOSITORY RECTAL at 13:50

## 2025-07-18 RX ADMIN — PANTOPRAZOLE SODIUM 40 MG: 40 TABLET, DELAYED RELEASE ORAL at 06:12

## 2025-07-18 RX ADMIN — SERTRALINE HYDROCHLORIDE 75 MG: 50 TABLET ORAL at 08:18

## 2025-07-18 RX ADMIN — ACETAMINOPHEN 650 MG: 325 TABLET ORAL at 12:06

## 2025-07-18 RX ADMIN — SITAGLIPTIN 100 MG: 100 TABLET, FILM COATED ORAL at 08:18

## 2025-07-18 RX ADMIN — METHADONE HYDROCHLORIDE 44 MG: 10 CONCENTRATE ORAL at 08:26

## 2025-07-18 RX ADMIN — LUBIPROSTONE 24 MCG: 24 CAPSULE, GELATIN COATED ORAL at 16:27

## 2025-07-18 RX ADMIN — SIMETHICONE 80 MG: 80 TABLET, CHEWABLE ORAL at 10:17

## 2025-07-18 RX ADMIN — ATORVASTATIN CALCIUM 40 MG: 40 TABLET, FILM COATED ORAL at 16:27

## 2025-07-18 RX ADMIN — ACETAMINOPHEN 650 MG: 325 TABLET ORAL at 18:51

## 2025-07-19 LAB
HIV 1+2 AB+HIV1 P24 AG SERPL QL IA: NORMAL
TREPONEMA PALLIDUM IGG+IGM AB [PRESENCE] IN SERUM OR PLASMA BY IMMUNOASSAY: REACTIVE

## 2025-07-19 PROCEDURE — 99232 SBSQ HOSP IP/OBS MODERATE 35: CPT | Performed by: PHYSICIAN ASSISTANT

## 2025-07-19 RX ADMIN — METHADONE HYDROCHLORIDE 44 MG: 10 CONCENTRATE ORAL at 09:26

## 2025-07-19 RX ADMIN — Medication 3 MG: at 21:35

## 2025-07-19 RX ADMIN — PANTOPRAZOLE SODIUM 40 MG: 40 TABLET, DELAYED RELEASE ORAL at 06:08

## 2025-07-19 RX ADMIN — LUBIPROSTONE 24 MCG: 24 CAPSULE, GELATIN COATED ORAL at 09:25

## 2025-07-19 RX ADMIN — SERTRALINE HYDROCHLORIDE 75 MG: 50 TABLET ORAL at 09:24

## 2025-07-19 RX ADMIN — GABAPENTIN 400 MG: 400 CAPSULE ORAL at 21:35

## 2025-07-19 RX ADMIN — METFORMIN HYDROCHLORIDE 500 MG: 500 TABLET, EXTENDED RELEASE ORAL at 17:16

## 2025-07-19 RX ADMIN — SITAGLIPTIN 100 MG: 100 TABLET, FILM COATED ORAL at 09:25

## 2025-07-19 RX ADMIN — LUBIPROSTONE 24 MCG: 24 CAPSULE, GELATIN COATED ORAL at 17:16

## 2025-07-19 RX ADMIN — QUETIAPINE FUMARATE 50 MG: 50 TABLET ORAL at 21:35

## 2025-07-19 RX ADMIN — GABAPENTIN 400 MG: 400 CAPSULE ORAL at 17:15

## 2025-07-19 RX ADMIN — ATORVASTATIN CALCIUM 40 MG: 40 TABLET, FILM COATED ORAL at 17:15

## 2025-07-19 RX ADMIN — GABAPENTIN 400 MG: 400 CAPSULE ORAL at 09:25

## 2025-07-20 LAB — RPR SER QL: NORMAL

## 2025-07-20 PROCEDURE — 99232 SBSQ HOSP IP/OBS MODERATE 35: CPT | Performed by: STUDENT IN AN ORGANIZED HEALTH CARE EDUCATION/TRAINING PROGRAM

## 2025-07-20 RX ADMIN — NADOLOL 20 MG: 20 TABLET ORAL at 08:23

## 2025-07-20 RX ADMIN — SITAGLIPTIN 100 MG: 100 TABLET, FILM COATED ORAL at 08:26

## 2025-07-20 RX ADMIN — GABAPENTIN 400 MG: 400 CAPSULE ORAL at 08:25

## 2025-07-20 RX ADMIN — QUETIAPINE FUMARATE 50 MG: 50 TABLET ORAL at 21:05

## 2025-07-20 RX ADMIN — ATORVASTATIN CALCIUM 40 MG: 40 TABLET, FILM COATED ORAL at 16:44

## 2025-07-20 RX ADMIN — Medication 3 MG: at 21:05

## 2025-07-20 RX ADMIN — LISINOPRIL 5 MG: 10 TABLET ORAL at 08:25

## 2025-07-20 RX ADMIN — GABAPENTIN 400 MG: 400 CAPSULE ORAL at 21:05

## 2025-07-20 RX ADMIN — LUBIPROSTONE 24 MCG: 24 CAPSULE, GELATIN COATED ORAL at 16:44

## 2025-07-20 RX ADMIN — LUBIPROSTONE 24 MCG: 24 CAPSULE, GELATIN COATED ORAL at 08:23

## 2025-07-20 RX ADMIN — GABAPENTIN 400 MG: 400 CAPSULE ORAL at 16:44

## 2025-07-20 RX ADMIN — METFORMIN HYDROCHLORIDE 500 MG: 500 TABLET, EXTENDED RELEASE ORAL at 16:44

## 2025-07-20 RX ADMIN — SERTRALINE HYDROCHLORIDE 75 MG: 50 TABLET ORAL at 08:25

## 2025-07-20 RX ADMIN — METHADONE HYDROCHLORIDE 44 MG: 10 CONCENTRATE ORAL at 08:22

## 2025-07-21 PROCEDURE — 99232 SBSQ HOSP IP/OBS MODERATE 35: CPT | Performed by: STUDENT IN AN ORGANIZED HEALTH CARE EDUCATION/TRAINING PROGRAM

## 2025-07-21 RX ADMIN — GABAPENTIN 400 MG: 400 CAPSULE ORAL at 08:27

## 2025-07-21 RX ADMIN — GABAPENTIN 400 MG: 400 CAPSULE ORAL at 21:09

## 2025-07-21 RX ADMIN — Medication 3 MG: at 21:09

## 2025-07-21 RX ADMIN — METFORMIN HYDROCHLORIDE 500 MG: 500 TABLET, EXTENDED RELEASE ORAL at 15:44

## 2025-07-21 RX ADMIN — GABAPENTIN 400 MG: 400 CAPSULE ORAL at 15:44

## 2025-07-21 RX ADMIN — SITAGLIPTIN 100 MG: 100 TABLET, FILM COATED ORAL at 08:26

## 2025-07-21 RX ADMIN — SERTRALINE HYDROCHLORIDE 75 MG: 50 TABLET ORAL at 08:26

## 2025-07-21 RX ADMIN — METHADONE HYDROCHLORIDE 44 MG: 10 CONCENTRATE ORAL at 08:28

## 2025-07-21 RX ADMIN — LUBIPROSTONE 24 MCG: 24 CAPSULE, GELATIN COATED ORAL at 08:26

## 2025-07-21 RX ADMIN — ATORVASTATIN CALCIUM 40 MG: 40 TABLET, FILM COATED ORAL at 15:44

## 2025-07-21 RX ADMIN — QUETIAPINE FUMARATE 50 MG: 50 TABLET ORAL at 21:09

## 2025-07-21 RX ADMIN — PANTOPRAZOLE SODIUM 40 MG: 40 TABLET, DELAYED RELEASE ORAL at 06:10

## 2025-07-21 RX ADMIN — LUBIPROSTONE 24 MCG: 24 CAPSULE, GELATIN COATED ORAL at 15:44

## 2025-07-22 LAB — T PALLIDUM AB SER QL AGGL: REACTIVE

## 2025-07-22 PROCEDURE — 99232 SBSQ HOSP IP/OBS MODERATE 35: CPT | Performed by: STUDENT IN AN ORGANIZED HEALTH CARE EDUCATION/TRAINING PROGRAM

## 2025-07-22 RX ORDER — SERTRALINE HYDROCHLORIDE 100 MG/1
100 TABLET, FILM COATED ORAL DAILY
Status: DISCONTINUED | OUTPATIENT
Start: 2025-07-23 | End: 2025-07-30 | Stop reason: HOSPADM

## 2025-07-22 RX ORDER — BISACODYL 10 MG
10 SUPPOSITORY, RECTAL RECTAL DAILY PRN
Status: DISCONTINUED | OUTPATIENT
Start: 2025-07-22 | End: 2025-07-30 | Stop reason: HOSPADM

## 2025-07-22 RX ORDER — ACETAMINOPHEN 325 MG/1
650 TABLET ORAL EVERY 4 HOURS PRN
Status: DISCONTINUED | OUTPATIENT
Start: 2025-07-22 | End: 2025-07-30 | Stop reason: HOSPADM

## 2025-07-22 RX ADMIN — LISINOPRIL 5 MG: 10 TABLET ORAL at 08:05

## 2025-07-22 RX ADMIN — GABAPENTIN 400 MG: 400 CAPSULE ORAL at 08:05

## 2025-07-22 RX ADMIN — LUBIPROSTONE 24 MCG: 24 CAPSULE, GELATIN COATED ORAL at 08:05

## 2025-07-22 RX ADMIN — METFORMIN HYDROCHLORIDE 500 MG: 500 TABLET, EXTENDED RELEASE ORAL at 16:12

## 2025-07-22 RX ADMIN — LUBIPROSTONE 24 MCG: 24 CAPSULE, GELATIN COATED ORAL at 16:12

## 2025-07-22 RX ADMIN — SITAGLIPTIN 100 MG: 100 TABLET, FILM COATED ORAL at 08:05

## 2025-07-22 RX ADMIN — ATORVASTATIN CALCIUM 40 MG: 40 TABLET, FILM COATED ORAL at 16:12

## 2025-07-22 RX ADMIN — GABAPENTIN 400 MG: 400 CAPSULE ORAL at 21:10

## 2025-07-22 RX ADMIN — GABAPENTIN 400 MG: 400 CAPSULE ORAL at 16:12

## 2025-07-22 RX ADMIN — QUETIAPINE FUMARATE 50 MG: 50 TABLET ORAL at 21:10

## 2025-07-22 RX ADMIN — SERTRALINE HYDROCHLORIDE 75 MG: 50 TABLET ORAL at 08:05

## 2025-07-22 RX ADMIN — METHADONE HYDROCHLORIDE 44 MG: 10 CONCENTRATE ORAL at 08:07

## 2025-07-22 RX ADMIN — Medication 3 MG: at 21:10

## 2025-07-22 RX ADMIN — PANTOPRAZOLE SODIUM 40 MG: 40 TABLET, DELAYED RELEASE ORAL at 05:57

## 2025-07-22 RX ADMIN — NADOLOL 20 MG: 20 TABLET ORAL at 08:06

## 2025-07-23 PROCEDURE — 99232 SBSQ HOSP IP/OBS MODERATE 35: CPT | Performed by: STUDENT IN AN ORGANIZED HEALTH CARE EDUCATION/TRAINING PROGRAM

## 2025-07-23 RX ORDER — LIDOCAINE 50 MG/G
1 PATCH TOPICAL DAILY
Status: DISCONTINUED | OUTPATIENT
Start: 2025-07-23 | End: 2025-07-30 | Stop reason: HOSPADM

## 2025-07-23 RX ADMIN — LUBIPROSTONE 24 MCG: 24 CAPSULE, GELATIN COATED ORAL at 08:36

## 2025-07-23 RX ADMIN — PANTOPRAZOLE SODIUM 40 MG: 40 TABLET, DELAYED RELEASE ORAL at 06:04

## 2025-07-23 RX ADMIN — METFORMIN HYDROCHLORIDE 500 MG: 500 TABLET, EXTENDED RELEASE ORAL at 15:39

## 2025-07-23 RX ADMIN — SITAGLIPTIN 100 MG: 100 TABLET, FILM COATED ORAL at 08:36

## 2025-07-23 RX ADMIN — QUETIAPINE FUMARATE 50 MG: 50 TABLET ORAL at 21:40

## 2025-07-23 RX ADMIN — GABAPENTIN 400 MG: 400 CAPSULE ORAL at 21:40

## 2025-07-23 RX ADMIN — LUBIPROSTONE 24 MCG: 24 CAPSULE, GELATIN COATED ORAL at 15:39

## 2025-07-23 RX ADMIN — GABAPENTIN 400 MG: 400 CAPSULE ORAL at 15:38

## 2025-07-23 RX ADMIN — GABAPENTIN 400 MG: 400 CAPSULE ORAL at 08:36

## 2025-07-23 RX ADMIN — NADOLOL 20 MG: 20 TABLET ORAL at 08:36

## 2025-07-23 RX ADMIN — ATORVASTATIN CALCIUM 40 MG: 40 TABLET, FILM COATED ORAL at 15:39

## 2025-07-23 RX ADMIN — Medication 3 MG: at 21:40

## 2025-07-23 RX ADMIN — METHADONE HYDROCHLORIDE 44 MG: 10 CONCENTRATE ORAL at 08:39

## 2025-07-23 RX ADMIN — LIDOCAINE 5% 1 PATCH: 700 PATCH TOPICAL at 15:39

## 2025-07-23 RX ADMIN — SERTRALINE HYDROCHLORIDE 100 MG: 100 TABLET ORAL at 08:36

## 2025-07-23 RX ADMIN — LISINOPRIL 5 MG: 10 TABLET ORAL at 08:36

## 2025-07-24 PROCEDURE — 99232 SBSQ HOSP IP/OBS MODERATE 35: CPT | Performed by: STUDENT IN AN ORGANIZED HEALTH CARE EDUCATION/TRAINING PROGRAM

## 2025-07-24 RX ADMIN — GABAPENTIN 400 MG: 400 CAPSULE ORAL at 17:38

## 2025-07-24 RX ADMIN — PANTOPRAZOLE SODIUM 40 MG: 40 TABLET, DELAYED RELEASE ORAL at 06:21

## 2025-07-24 RX ADMIN — METFORMIN HYDROCHLORIDE 500 MG: 500 TABLET, EXTENDED RELEASE ORAL at 17:38

## 2025-07-24 RX ADMIN — LIDOCAINE 5% 1 PATCH: 700 PATCH TOPICAL at 08:38

## 2025-07-24 RX ADMIN — ATORVASTATIN CALCIUM 40 MG: 40 TABLET, FILM COATED ORAL at 17:38

## 2025-07-24 RX ADMIN — GABAPENTIN 400 MG: 400 CAPSULE ORAL at 21:22

## 2025-07-24 RX ADMIN — GABAPENTIN 400 MG: 400 CAPSULE ORAL at 08:36

## 2025-07-24 RX ADMIN — Medication 3 MG: at 21:22

## 2025-07-24 RX ADMIN — QUETIAPINE FUMARATE 50 MG: 50 TABLET ORAL at 21:22

## 2025-07-24 RX ADMIN — NADOLOL 20 MG: 20 TABLET ORAL at 08:36

## 2025-07-24 RX ADMIN — LUBIPROSTONE 24 MCG: 24 CAPSULE, GELATIN COATED ORAL at 08:36

## 2025-07-24 RX ADMIN — METHADONE HYDROCHLORIDE 44 MG: 10 CONCENTRATE ORAL at 08:38

## 2025-07-24 RX ADMIN — LUBIPROSTONE 24 MCG: 24 CAPSULE, GELATIN COATED ORAL at 17:38

## 2025-07-24 RX ADMIN — ERGOCALCIFEROL 50000 UNITS: 1.25 CAPSULE, LIQUID FILLED ORAL at 08:36

## 2025-07-24 RX ADMIN — SITAGLIPTIN 100 MG: 100 TABLET, FILM COATED ORAL at 08:36

## 2025-07-24 RX ADMIN — SERTRALINE HYDROCHLORIDE 100 MG: 100 TABLET ORAL at 08:36

## 2025-07-25 PROCEDURE — 99232 SBSQ HOSP IP/OBS MODERATE 35: CPT | Performed by: STUDENT IN AN ORGANIZED HEALTH CARE EDUCATION/TRAINING PROGRAM

## 2025-07-25 RX ADMIN — PANTOPRAZOLE SODIUM 40 MG: 40 TABLET, DELAYED RELEASE ORAL at 05:47

## 2025-07-25 RX ADMIN — LIDOCAINE 5% 1 PATCH: 700 PATCH TOPICAL at 08:41

## 2025-07-25 RX ADMIN — METFORMIN HYDROCHLORIDE 500 MG: 500 TABLET, EXTENDED RELEASE ORAL at 16:12

## 2025-07-25 RX ADMIN — METHADONE HYDROCHLORIDE 44 MG: 10 CONCENTRATE ORAL at 08:40

## 2025-07-25 RX ADMIN — Medication 3 MG: at 21:23

## 2025-07-25 RX ADMIN — LUBIPROSTONE 24 MCG: 24 CAPSULE, GELATIN COATED ORAL at 16:11

## 2025-07-25 RX ADMIN — GABAPENTIN 400 MG: 400 CAPSULE ORAL at 08:39

## 2025-07-25 RX ADMIN — LUBIPROSTONE 24 MCG: 24 CAPSULE, GELATIN COATED ORAL at 08:39

## 2025-07-25 RX ADMIN — GABAPENTIN 400 MG: 400 CAPSULE ORAL at 21:23

## 2025-07-25 RX ADMIN — ATORVASTATIN CALCIUM 40 MG: 40 TABLET, FILM COATED ORAL at 16:12

## 2025-07-25 RX ADMIN — SERTRALINE HYDROCHLORIDE 100 MG: 100 TABLET ORAL at 08:39

## 2025-07-25 RX ADMIN — SENNOSIDES AND DOCUSATE SODIUM 1 TABLET: 50; 8.6 TABLET ORAL at 16:13

## 2025-07-25 RX ADMIN — GABAPENTIN 400 MG: 400 CAPSULE ORAL at 16:11

## 2025-07-25 RX ADMIN — NADOLOL 20 MG: 20 TABLET ORAL at 08:39

## 2025-07-25 RX ADMIN — QUETIAPINE FUMARATE 50 MG: 50 TABLET ORAL at 21:23

## 2025-07-25 RX ADMIN — SITAGLIPTIN 100 MG: 100 TABLET, FILM COATED ORAL at 08:39

## 2025-07-26 PROCEDURE — 99232 SBSQ HOSP IP/OBS MODERATE 35: CPT | Performed by: STUDENT IN AN ORGANIZED HEALTH CARE EDUCATION/TRAINING PROGRAM

## 2025-07-26 RX ADMIN — GABAPENTIN 400 MG: 400 CAPSULE ORAL at 16:53

## 2025-07-26 RX ADMIN — GABAPENTIN 400 MG: 400 CAPSULE ORAL at 21:06

## 2025-07-26 RX ADMIN — GABAPENTIN 400 MG: 400 CAPSULE ORAL at 08:41

## 2025-07-26 RX ADMIN — QUETIAPINE FUMARATE 50 MG: 50 TABLET ORAL at 21:06

## 2025-07-26 RX ADMIN — LUBIPROSTONE 24 MCG: 24 CAPSULE, GELATIN COATED ORAL at 16:53

## 2025-07-26 RX ADMIN — SITAGLIPTIN 100 MG: 100 TABLET, FILM COATED ORAL at 08:41

## 2025-07-26 RX ADMIN — ATORVASTATIN CALCIUM 40 MG: 40 TABLET, FILM COATED ORAL at 16:53

## 2025-07-26 RX ADMIN — LUBIPROSTONE 24 MCG: 24 CAPSULE, GELATIN COATED ORAL at 08:40

## 2025-07-26 RX ADMIN — PANTOPRAZOLE SODIUM 40 MG: 40 TABLET, DELAYED RELEASE ORAL at 05:40

## 2025-07-26 RX ADMIN — Medication 3 MG: at 21:06

## 2025-07-26 RX ADMIN — LIDOCAINE 5% 1 PATCH: 700 PATCH TOPICAL at 08:41

## 2025-07-26 RX ADMIN — SERTRALINE HYDROCHLORIDE 100 MG: 100 TABLET ORAL at 08:41

## 2025-07-26 RX ADMIN — METHADONE HYDROCHLORIDE 44 MG: 10 CONCENTRATE ORAL at 08:40

## 2025-07-26 RX ADMIN — METFORMIN HYDROCHLORIDE 500 MG: 500 TABLET, EXTENDED RELEASE ORAL at 16:53

## 2025-07-27 PROCEDURE — 99232 SBSQ HOSP IP/OBS MODERATE 35: CPT | Performed by: STUDENT IN AN ORGANIZED HEALTH CARE EDUCATION/TRAINING PROGRAM

## 2025-07-27 RX ADMIN — LIDOCAINE 5% 1 PATCH: 700 PATCH TOPICAL at 08:56

## 2025-07-27 RX ADMIN — METHADONE HYDROCHLORIDE 44 MG: 10 CONCENTRATE ORAL at 08:12

## 2025-07-27 RX ADMIN — GABAPENTIN 400 MG: 400 CAPSULE ORAL at 08:12

## 2025-07-27 RX ADMIN — METFORMIN HYDROCHLORIDE 500 MG: 500 TABLET, EXTENDED RELEASE ORAL at 17:07

## 2025-07-27 RX ADMIN — PANTOPRAZOLE SODIUM 40 MG: 40 TABLET, DELAYED RELEASE ORAL at 06:03

## 2025-07-27 RX ADMIN — ATORVASTATIN CALCIUM 40 MG: 40 TABLET, FILM COATED ORAL at 17:08

## 2025-07-27 RX ADMIN — LUBIPROSTONE 24 MCG: 24 CAPSULE, GELATIN COATED ORAL at 17:07

## 2025-07-27 RX ADMIN — QUETIAPINE FUMARATE 50 MG: 50 TABLET ORAL at 21:48

## 2025-07-27 RX ADMIN — GABAPENTIN 400 MG: 400 CAPSULE ORAL at 21:48

## 2025-07-27 RX ADMIN — LUBIPROSTONE 24 MCG: 24 CAPSULE, GELATIN COATED ORAL at 08:12

## 2025-07-27 RX ADMIN — GABAPENTIN 400 MG: 400 CAPSULE ORAL at 17:08

## 2025-07-27 RX ADMIN — SERTRALINE HYDROCHLORIDE 100 MG: 100 TABLET ORAL at 08:12

## 2025-07-27 RX ADMIN — Medication 3 MG: at 21:48

## 2025-07-27 RX ADMIN — SITAGLIPTIN 100 MG: 100 TABLET, FILM COATED ORAL at 08:12

## 2025-07-28 ENCOUNTER — TELEPHONE (OUTPATIENT)
Age: 65
End: 2025-07-28

## 2025-07-28 PROCEDURE — 99232 SBSQ HOSP IP/OBS MODERATE 35: CPT | Performed by: STUDENT IN AN ORGANIZED HEALTH CARE EDUCATION/TRAINING PROGRAM

## 2025-07-28 RX ORDER — QUETIAPINE FUMARATE 50 MG/1
50 TABLET, FILM COATED ORAL
Qty: 30 TABLET | Refills: 1 | Status: SHIPPED | OUTPATIENT
Start: 2025-07-28 | End: 2025-09-26

## 2025-07-28 RX ORDER — ATORVASTATIN CALCIUM 40 MG/1
40 TABLET, FILM COATED ORAL
Qty: 30 TABLET | Refills: 1 | Status: SHIPPED | OUTPATIENT
Start: 2025-07-28 | End: 2025-09-26

## 2025-07-28 RX ORDER — METHADONE HYDROCHLORIDE 10 MG/ML
44 CONCENTRATE ORAL DAILY
Qty: 44 ML | Refills: 0 | Status: SHIPPED | OUTPATIENT
Start: 2025-07-29 | End: 2025-07-28

## 2025-07-28 RX ORDER — PANTOPRAZOLE SODIUM 40 MG/1
40 TABLET, DELAYED RELEASE ORAL
Qty: 30 TABLET | Refills: 1 | Status: SHIPPED | OUTPATIENT
Start: 2025-07-29 | End: 2025-09-27

## 2025-07-28 RX ORDER — NADOLOL 20 MG/1
20 TABLET ORAL DAILY
Qty: 30 TABLET | Refills: 1 | Status: SHIPPED | OUTPATIENT
Start: 2025-07-29 | End: 2025-09-27

## 2025-07-28 RX ORDER — GABAPENTIN 400 MG/1
400 CAPSULE ORAL 3 TIMES DAILY
Qty: 90 CAPSULE | Refills: 1 | Status: SHIPPED | OUTPATIENT
Start: 2025-07-28 | End: 2025-09-26

## 2025-07-28 RX ORDER — METHADONE HYDROCHLORIDE 10 MG/ML
44 CONCENTRATE ORAL DAILY
Qty: 44 ML | Refills: 0 | Status: SHIPPED | OUTPATIENT
Start: 2025-07-29 | End: 2025-07-29

## 2025-07-28 RX ORDER — METFORMIN HYDROCHLORIDE 500 MG/1
500 TABLET, EXTENDED RELEASE ORAL
Qty: 30 TABLET | Refills: 0 | Status: SHIPPED | OUTPATIENT
Start: 2025-07-28 | End: 2025-08-27

## 2025-07-28 RX ORDER — LUBIPROSTONE 24 UG/1
24 CAPSULE ORAL 2 TIMES DAILY WITH MEALS
Qty: 60 CAPSULE | Refills: 0 | Status: SHIPPED | OUTPATIENT
Start: 2025-07-28 | End: 2025-08-27

## 2025-07-28 RX ORDER — SERTRALINE HYDROCHLORIDE 100 MG/1
100 TABLET, FILM COATED ORAL DAILY
Qty: 30 TABLET | Refills: 1 | Status: SHIPPED | OUTPATIENT
Start: 2025-07-29 | End: 2025-09-27

## 2025-07-28 RX ORDER — ERGOCALCIFEROL 1.25 MG/1
50000 CAPSULE, LIQUID FILLED ORAL WEEKLY
Qty: 7 CAPSULE | Refills: 0 | Status: SHIPPED | OUTPATIENT
Start: 2025-07-28 | End: 2025-09-09

## 2025-07-28 RX ADMIN — GABAPENTIN 400 MG: 400 CAPSULE ORAL at 21:19

## 2025-07-28 RX ADMIN — GABAPENTIN 400 MG: 400 CAPSULE ORAL at 16:44

## 2025-07-28 RX ADMIN — GABAPENTIN 400 MG: 400 CAPSULE ORAL at 08:02

## 2025-07-28 RX ADMIN — SERTRALINE HYDROCHLORIDE 100 MG: 100 TABLET ORAL at 08:02

## 2025-07-28 RX ADMIN — LUBIPROSTONE 24 MCG: 24 CAPSULE, GELATIN COATED ORAL at 08:02

## 2025-07-28 RX ADMIN — PANTOPRAZOLE SODIUM 40 MG: 40 TABLET, DELAYED RELEASE ORAL at 05:43

## 2025-07-28 RX ADMIN — QUETIAPINE FUMARATE 50 MG: 50 TABLET ORAL at 21:19

## 2025-07-28 RX ADMIN — NADOLOL 20 MG: 20 TABLET ORAL at 08:02

## 2025-07-28 RX ADMIN — METFORMIN HYDROCHLORIDE 500 MG: 500 TABLET, EXTENDED RELEASE ORAL at 16:45

## 2025-07-28 RX ADMIN — LIDOCAINE 5% 1 PATCH: 700 PATCH TOPICAL at 08:34

## 2025-07-28 RX ADMIN — ATORVASTATIN CALCIUM 40 MG: 40 TABLET, FILM COATED ORAL at 16:45

## 2025-07-28 RX ADMIN — METHADONE HYDROCHLORIDE 44 MG: 10 CONCENTRATE ORAL at 08:01

## 2025-07-28 RX ADMIN — LUBIPROSTONE 24 MCG: 24 CAPSULE, GELATIN COATED ORAL at 16:44

## 2025-07-28 RX ADMIN — SITAGLIPTIN 100 MG: 100 TABLET, FILM COATED ORAL at 08:02

## 2025-07-28 RX ADMIN — Medication 3 MG: at 21:20

## 2025-07-29 PROCEDURE — 99232 SBSQ HOSP IP/OBS MODERATE 35: CPT | Performed by: STUDENT IN AN ORGANIZED HEALTH CARE EDUCATION/TRAINING PROGRAM

## 2025-07-29 RX ORDER — METHADONE HYDROCHLORIDE 10 MG/ML
44 CONCENTRATE ORAL DAILY
Qty: 30 ML | Refills: 0 | Status: SHIPPED | OUTPATIENT
Start: 2025-07-29 | End: 2025-08-05

## 2025-07-29 RX ADMIN — QUETIAPINE FUMARATE 50 MG: 50 TABLET ORAL at 21:25

## 2025-07-29 RX ADMIN — NADOLOL 20 MG: 20 TABLET ORAL at 08:00

## 2025-07-29 RX ADMIN — SITAGLIPTIN 100 MG: 100 TABLET, FILM COATED ORAL at 08:00

## 2025-07-29 RX ADMIN — GABAPENTIN 400 MG: 400 CAPSULE ORAL at 15:56

## 2025-07-29 RX ADMIN — GABAPENTIN 400 MG: 400 CAPSULE ORAL at 21:25

## 2025-07-29 RX ADMIN — PANTOPRAZOLE SODIUM 40 MG: 40 TABLET, DELAYED RELEASE ORAL at 05:51

## 2025-07-29 RX ADMIN — Medication 3 MG: at 21:25

## 2025-07-29 RX ADMIN — METFORMIN HYDROCHLORIDE 500 MG: 500 TABLET, EXTENDED RELEASE ORAL at 15:56

## 2025-07-29 RX ADMIN — LUBIPROSTONE 24 MCG: 24 CAPSULE, GELATIN COATED ORAL at 15:56

## 2025-07-29 RX ADMIN — SERTRALINE HYDROCHLORIDE 100 MG: 100 TABLET ORAL at 08:00

## 2025-07-29 RX ADMIN — LUBIPROSTONE 24 MCG: 24 CAPSULE, GELATIN COATED ORAL at 08:00

## 2025-07-29 RX ADMIN — GABAPENTIN 400 MG: 400 CAPSULE ORAL at 08:00

## 2025-07-29 RX ADMIN — ATORVASTATIN CALCIUM 40 MG: 40 TABLET, FILM COATED ORAL at 15:56

## 2025-07-30 VITALS
RESPIRATION RATE: 17 BRPM | DIASTOLIC BLOOD PRESSURE: 67 MMHG | SYSTOLIC BLOOD PRESSURE: 116 MMHG | HEIGHT: 65 IN | OXYGEN SATURATION: 98 % | HEART RATE: 80 BPM | WEIGHT: 193.5 LBS | BODY MASS INDEX: 32.24 KG/M2 | TEMPERATURE: 97.5 F

## 2025-07-30 PROCEDURE — 99238 HOSP IP/OBS DSCHRG MGMT 30/<: CPT | Performed by: STUDENT IN AN ORGANIZED HEALTH CARE EDUCATION/TRAINING PROGRAM

## 2025-07-30 RX ADMIN — LUBIPROSTONE 24 MCG: 24 CAPSULE, GELATIN COATED ORAL at 08:10

## 2025-07-30 RX ADMIN — GABAPENTIN 400 MG: 400 CAPSULE ORAL at 08:10

## 2025-07-30 RX ADMIN — METHADONE HYDROCHLORIDE 44 MG: 10 CONCENTRATE ORAL at 08:10

## 2025-07-30 RX ADMIN — PANTOPRAZOLE SODIUM 40 MG: 40 TABLET, DELAYED RELEASE ORAL at 05:42

## 2025-07-30 RX ADMIN — LIDOCAINE 5% 1 PATCH: 700 PATCH TOPICAL at 08:11

## 2025-07-30 RX ADMIN — NADOLOL 20 MG: 20 TABLET ORAL at 08:10

## 2025-07-30 RX ADMIN — SERTRALINE HYDROCHLORIDE 100 MG: 100 TABLET ORAL at 08:10

## 2025-07-30 RX ADMIN — SITAGLIPTIN 100 MG: 100 TABLET, FILM COATED ORAL at 08:10

## 2025-07-31 ENCOUNTER — TRANSITIONAL CARE MANAGEMENT (OUTPATIENT)
Dept: FAMILY MEDICINE CLINIC | Facility: CLINIC | Age: 65
End: 2025-07-31

## 2025-07-31 ENCOUNTER — TELEPHONE (OUTPATIENT)
Dept: PSYCHOLOGY | Facility: CLINIC | Age: 65
End: 2025-07-31

## 2025-08-01 ENCOUNTER — TELEPHONE (OUTPATIENT)
Dept: PSYCHOLOGY | Facility: CLINIC | Age: 65
End: 2025-08-01

## 2025-08-07 DIAGNOSIS — F41.9 ANXIETY: ICD-10-CM

## 2025-08-09 RX ORDER — CLONIDINE HYDROCHLORIDE 0.1 MG/1
TABLET ORAL
Qty: 60 TABLET | Refills: 5 | OUTPATIENT
Start: 2025-08-09

## (undated) DEVICE — SURGICAL CLIPPER BLADE GENERAL USE

## (undated) DEVICE — POV-IOD SOLUTION 4OZ BT

## (undated) DEVICE — LAPAROTOMY SPONGE - RF AND X-RAY DETECTABLE PRE-WASHED: Brand: SITUATE

## (undated) DEVICE — SYRINGE 30ML LL

## (undated) DEVICE — SYRINGE 10ML LL CONTROL TOP

## (undated) DEVICE — SPONGE 4 X 4 XRAY 16 PLY STRL LF RFD

## (undated) DEVICE — PREMIUM DRY TRAY LF: Brand: MEDLINE INDUSTRIES, INC.

## (undated) DEVICE — NEPTUNE E-SEP SMOKE EVACUATION PENCIL, COATED, 70MM BLADE, PUSH BUTTON SWITCH: Brand: NEPTUNE E-SEP

## (undated) DEVICE — NEEDLE 25G X 1 1/2

## (undated) DEVICE — STERILE POLYISOPRENE POWDER-FREE SURGICAL GLOVES: Brand: PROTEXIS

## (undated) DEVICE — USED IN CONJUNCTION WITH A SYRINGE AS AN ADDITIVE DEVICE FOR ASPIRATION FROM MULTI-DOSE MEDICINE VIALS OR INJECTION INTO I.V. SYSTEMS AND PRE-SLIT SEPTUMS COVERING INJECTION SITES.: Brand: SOL-M™ BLUNT FILL NEEDLE

## (undated) DEVICE — SKIN MARKER DUAL TIP WITH RULER CAP, FLEXIBLE RULER AND LABELS: Brand: DEVON

## (undated) DEVICE — REM POLYHESIVE ADULT PATIENT RETURN ELECTRODE: Brand: VALLEYLAB

## (undated) DEVICE — INTENDED FOR TISSUE SEPARATION, AND OTHER PROCEDURES THAT REQUIRE A SHARP SURGICAL BLADE TO PUNCTURE OR CUT.: Brand: BARD-PARKER SAFETY BLADES SIZE 15, STERILE

## (undated) DEVICE — MINOR PROCEDURE DRAPE: Brand: CONVERTORS

## (undated) DEVICE — DISPOSABLE BRIEF/UNDERWEAR

## (undated) DEVICE — SCD SEQUENTIAL COMPRESSION COMFORT SLEEVE MEDIUM KNEE LENGTH: Brand: KENDALL SCD

## (undated) DEVICE — SINGLE PORT MANIFOLD: Brand: NEPTUNE 2

## (undated) DEVICE — GAUZE SPONGES,16 PLY: Brand: CURITY

## (undated) DEVICE — LIGHT GLOVE GREEN

## (undated) DEVICE — SUT MONOCRYL 3-0 SH 27 IN Y416H

## (undated) DEVICE — DISPOSABLE OR TOWEL: Brand: CARDINAL HEALTH

## (undated) DEVICE — BETHLEHEM UNIVERSAL OUTPATIENT: Brand: CARDINAL HEALTH